# Patient Record
Sex: FEMALE | Race: WHITE | NOT HISPANIC OR LATINO | Employment: OTHER | URBAN - METROPOLITAN AREA
[De-identification: names, ages, dates, MRNs, and addresses within clinical notes are randomized per-mention and may not be internally consistent; named-entity substitution may affect disease eponyms.]

---

## 2017-03-13 ENCOUNTER — HOSPITAL ENCOUNTER (OUTPATIENT)
Dept: LAB | Facility: MEDICAL CENTER | Age: 77
End: 2017-03-13
Attending: INTERNAL MEDICINE
Payer: MEDICARE

## 2017-03-13 LAB
25(OH)D3 SERPL-MCNC: 36 NG/ML (ref 30–100)
ALBUMIN SERPL BCP-MCNC: 3.8 G/DL (ref 3.2–4.9)
ALBUMIN/GLOB SERPL: 1.3 G/DL
ALP SERPL-CCNC: 87 U/L (ref 30–99)
ALT SERPL-CCNC: 13 U/L (ref 2–50)
AMORPHOUS CRYSTALS 1764: PRESENT /HPF
ANION GAP SERPL CALC-SCNC: 10 MMOL/L (ref 0–11.9)
APPEARANCE UR: ABNORMAL
AST SERPL-CCNC: 17 U/L (ref 12–45)
BACTERIA #/AREA URNS HPF: ABNORMAL /HPF
BILIRUB SERPL-MCNC: 0.8 MG/DL (ref 0.1–1.5)
BILIRUB UR QL STRIP.AUTO: NEGATIVE
BUN SERPL-MCNC: 12 MG/DL (ref 8–22)
CALCIUM SERPL-MCNC: 9.4 MG/DL (ref 8.5–10.5)
CHLORIDE SERPL-SCNC: 106 MMOL/L (ref 96–112)
CHOLEST SERPL-MCNC: 153 MG/DL (ref 100–199)
CO2 SERPL-SCNC: 26 MMOL/L (ref 20–33)
COLOR UR AUTO: YELLOW
CREAT SERPL-MCNC: 0.62 MG/DL (ref 0.5–1.4)
CULTURE IF INDICATED INDCX: YES UA CULTURE
EPITHELIAL CELLS 1715: ABNORMAL /HPF
GLOBULIN SER CALC-MCNC: 3 G/DL (ref 1.9–3.5)
GLUCOSE SERPL-MCNC: 121 MG/DL (ref 65–99)
GLUCOSE UR STRIP.AUTO-MCNC: NEGATIVE MG/DL
HDLC SERPL-MCNC: 56 MG/DL
KETONES UR STRIP.AUTO-MCNC: NEGATIVE MG/DL
LDLC SERPL CALC-MCNC: 75 MG/DL
LEUKOCYTE ESTERASE UR QL STRIP.AUTO: ABNORMAL
MICRO URNS: ABNORMAL
NITRITE UR QL STRIP.AUTO: POSITIVE
PH UR: 6.5 [PH]
POTASSIUM SERPL-SCNC: 3.7 MMOL/L (ref 3.6–5.5)
PROT SERPL-MCNC: 6.8 G/DL (ref 6–8.2)
PROT UR QL STRIP: NEGATIVE MG/DL
RBC #/AREA URNS HPF: ABNORMAL /HPF
RBC UR QL AUTO: ABNORMAL
SODIUM SERPL-SCNC: 142 MMOL/L (ref 135–145)
SP GR UR STRIP.AUTO: 1.01
TRIGL SERPL-MCNC: 110 MG/DL (ref 0–149)
WBC #/AREA URNS HPF: ABNORMAL /HPF

## 2017-03-13 PROCEDURE — 81001 URINALYSIS AUTO W/SCOPE: CPT

## 2017-03-13 PROCEDURE — 87186 SC STD MICRODIL/AGAR DIL: CPT

## 2017-03-13 PROCEDURE — 82306 VITAMIN D 25 HYDROXY: CPT | Mod: GA

## 2017-03-13 PROCEDURE — 87086 URINE CULTURE/COLONY COUNT: CPT

## 2017-03-13 PROCEDURE — 80053 COMPREHEN METABOLIC PANEL: CPT

## 2017-03-13 PROCEDURE — 87077 CULTURE AEROBIC IDENTIFY: CPT

## 2017-03-13 PROCEDURE — 80061 LIPID PANEL: CPT

## 2017-03-13 PROCEDURE — 36415 COLL VENOUS BLD VENIPUNCTURE: CPT

## 2017-03-15 LAB
BACTERIA UR CULT: ABNORMAL
SIGNIFICANT IND 70042: ABNORMAL
SOURCE SOURCE: ABNORMAL

## 2017-03-17 ENCOUNTER — OFFICE VISIT (OUTPATIENT)
Dept: URGENT CARE | Facility: CLINIC | Age: 77
End: 2017-03-17
Payer: MEDICARE

## 2017-03-17 VITALS
TEMPERATURE: 98.4 F | HEART RATE: 78 BPM | OXYGEN SATURATION: 95 % | WEIGHT: 180 LBS | BODY MASS INDEX: 27.38 KG/M2 | SYSTOLIC BLOOD PRESSURE: 110 MMHG | RESPIRATION RATE: 16 BRPM | DIASTOLIC BLOOD PRESSURE: 70 MMHG

## 2017-03-17 DIAGNOSIS — J40 BRONCHITIS: ICD-10-CM

## 2017-03-17 PROCEDURE — 1036F TOBACCO NON-USER: CPT | Performed by: PHYSICIAN ASSISTANT

## 2017-03-17 PROCEDURE — G8420 CALC BMI NORM PARAMETERS: HCPCS | Performed by: PHYSICIAN ASSISTANT

## 2017-03-17 PROCEDURE — 4040F PNEUMOC VAC/ADMIN/RCVD: CPT | Mod: 8P | Performed by: PHYSICIAN ASSISTANT

## 2017-03-17 PROCEDURE — G8432 DEP SCR NOT DOC, RNG: HCPCS | Performed by: PHYSICIAN ASSISTANT

## 2017-03-17 PROCEDURE — G8484 FLU IMMUNIZE NO ADMIN: HCPCS | Performed by: PHYSICIAN ASSISTANT

## 2017-03-17 PROCEDURE — 99214 OFFICE O/P EST MOD 30 MIN: CPT | Performed by: PHYSICIAN ASSISTANT

## 2017-03-17 PROCEDURE — 1101F PT FALLS ASSESS-DOCD LE1/YR: CPT | Mod: 8P | Performed by: PHYSICIAN ASSISTANT

## 2017-03-17 RX ORDER — BENZONATATE 200 MG/1
200 CAPSULE ORAL 3 TIMES DAILY PRN
Qty: 30 CAP | Refills: 0 | Status: SHIPPED | OUTPATIENT
Start: 2017-03-17 | End: 2019-07-02

## 2017-03-17 RX ORDER — CODEINE PHOSPHATE AND GUAIFENESIN 10; 100 MG/5ML; MG/5ML
5 SOLUTION ORAL EVERY 4 HOURS PRN
Qty: 100 ML | Refills: 0 | Status: SHIPPED | OUTPATIENT
Start: 2017-03-17 | End: 2019-07-02

## 2017-03-17 ASSESSMENT — ENCOUNTER SYMPTOMS
PALPITATIONS: 0
SPUTUM PRODUCTION: 1
HEMOPTYSIS: 0
SHORTNESS OF BREATH: 0
SORE THROAT: 1
WHEEZING: 0
FEVER: 0
COUGH: 1
CHILLS: 0

## 2017-03-17 ASSESSMENT — COPD QUESTIONNAIRES: COPD: 0

## 2017-03-17 NOTE — MR AVS SNAPSHOT
Aaliyah Mijares Donaldo   3/17/2017 11:30 AM   Office Visit   MRN: [id number]    Department:  [facility]   Dept Phone:  [phone]    Description:  Female : 1940   Provider:  Pawel Salgado PA-C           Reason for Visit     Cough x 3 days, productive cough, chest congestion      Allergies as of 3/17/2017     No Known Allergies      You were diagnosed with     Bronchitis   [[id number]]         Vital Signs     Blood Pressure Pulse Temperature Respirations Weight Oxygen Saturation    110/70 mmHg 78 36.9 °C (98.4 °F) 16 81.647 kg (180 lb) 95%    Smoking Status                   Never Smoker            Basic Information     Date Of Birth Sex Race Ethnicity Preferred Language    1940 Female White Non- English      Problem List              ICD-10-CM Priority Class Noted - Resolved    Thoracic kyphosis M40.204   10/17/2012 - Present    S/P laminectomy Z98.890   10/17/2012 - Present    OA (osteoarthritis) of knee M17.9   2013 - Present    Hand arthritis M12.9   3/4/2015 - Present    Depression F32.9   3/4/2015 - Present    Breast cancer in situ D05.90   3/4/2015 - Present      Health Maintenance        Date Due Completion Dates    IMM DTaP/Tdap/Td Vaccine (1 - Tdap) 3/26/1957 ---    PAP SMEAR 3/26/1959 ---    IMM ZOSTER VACCINE 3/26/1998 ---    BONE DENSITY 3/26/2003 ---    IMM PNEUMOCOCCAL 65+ (ADULT) LOW/MEDIUM RISK SERIES (1 of 2 - PCV13) 3/26/2003 ---    IMM INFLUENZA (1) 2016 11/3/2014    MAMMOGRAM 2017, 2016, 2015, 2014, 2013, 2012, 3/25/2011, 3/8/2010, 2010, 2010, 2009, 2009, 2006, 10/21/2005, 10/13/2005, 10/12/2004    COLONOSCOPY 2024 (Prv Comp)    Override on 2014: Previously completed            Current Immunizations     Influenza Vaccine Adult HD 11/3/2014      Below and/or attached are the medications your provider expects you to take. Review all of your home medications and newly ordered  medications with your provider and/or pharmacist. Follow medication instructions as directed by your provider and/or pharmacist. Please keep your medication list with you and share with your provider. Update the information when medications are discontinued, doses are changed, or new medications (including over-the-counter products) are added; and carry medication information at all times in the event of emergency situations     Allergies:  No Known Allergies          Medications  Valid as of: March 17, 2017 - 12:33 PM    Generic Name Brand Name Tablet Size Instructions for use    Albuterol   Inhale  by mouth as needed.        Atorvastatin Calcium (Tab) LIPITOR 10 MG Take 10 mg by mouth every evening.        Benzonatate (Cap) TESSALON 200 MG Take 1 Cap by mouth 3 times a day as needed for Cough.        Calcium Carbonate-Vit D-Min   Take  by mouth 2 Times a Day.        Guaifenesin-Codeine (Solution) ROBITUSSIN -10 mg/5mL Take 5 mL by mouth every four hours as needed for Cough.        Meclizine HCl (Tab) ANTIVERT 25 MG Take 1 Tab by mouth 3 times a day as needed for Dizziness or Vertigo.        Multiple Vitamin   Take 1 Tab by mouth every day.        Multiple Vitamins-Minerals   Take  by mouth every day.        Omega-3 Fatty Acids (Cap) OMEGA 3 FA 1000 MG Take 1,000 mg by mouth every day.        Omeprazole Magnesium   Take  by mouth every day.        Oxybutynin Chloride (TABLET SR 24 HR) DITROPAN-XL 10 MG TAKE ONE TABLET BY MOUTH ONE TIME DAILY        Oxycodone-Acetaminophen (Tab) PERCOCET 5-325 MG Take 1-2 Tabs by mouth every four hours as needed (pain).        TraMADol HCl (Tab) ULTRAM 50 MG Take  mg by mouth every four hours as needed.        Valsartan-Hydrochlorothiazide (Tab) DIOVAN-HCT 80-12.5 MG TAKE 1 TABLET EVERY DAY        Venlafaxine HCl (CAPSULE SR 24 HR) EFFEXOR- MG TAKE 1 CAPSULE EVERY DAY        .                 Medicines prescribed today were sent to:     [facility] [place]  - [address]    [address] [place] [address]    Phone: [phone] Fax: [phone]    Open 24 Hours?: No    [facility] - [place], [place] - [address]    [address] [address]    Phone: [phone] Fax: [phone]    Open 24 Hours?: No      Medication refill instructions:       If your prescription bottle indicates you have medication refills left, it is not necessary to call your provider’s office. Please contact your pharmacy and they will refill your medication.    If your prescription bottle indicates you do not have any refills left, you may request refills at any time through one of the following ways: The online [employer] system (except Urgent Care), by calling your provider’s office, or by asking your pharmacy to contact your provider’s office with a refill request. Medication refills are processed only during regular business hours and may not be available until the next business day. Your provider may request additional information or to have a follow-up visit with you prior to refilling your medication.   *Please Note: Medication refills are assigned a new Rx number when refilled electronically. Your pharmacy may indicate that no refills were authorized even though a new prescription for the same medication is available at the pharmacy. Please request the medicine by name with the pharmacy before contacting your provider for a refill.        Instructions    Bronchitis  Bronchitis is the body's way of reacting to injury and/or infection (inflammation) of the bronchi. Bronchi are the air tubes that extend from the windpipe into the lungs. If the inflammation becomes severe, it may cause shortness of breath.  CAUSES   Inflammation may be caused by:  · A virus.  · Germs (bacteria).  · Dust.  · Allergens.  · Pollutants and many other irritants.  The cells lining the bronchial tree are covered with tiny hairs (cilia). These constantly beat upward, away from the  lungs, toward the mouth. This keeps the lungs free of pollutants. When these cells become too irritated and are unable to do their job, mucus begins to develop. This causes the characteristic cough of bronchitis. The cough clears the lungs when the cilia are unable to do their job. Without either of these protective mechanisms, the mucus would settle in the lungs. Then you would develop pneumonia.  Smoking is a common cause of bronchitis and can contribute to pneumonia. Stopping this habit is the single most important thing you can do to help yourself.  TREATMENT   · Your caregiver may prescribe an antibiotic if the cough is caused by bacteria. Also, medicines that open up your airways make it easier to breathe. Your caregiver may also recommend or prescribe an expectorant. It will loosen the mucus to be coughed up. Only take over-the-counter or prescription medicines for pain, discomfort, or fever as directed by your caregiver.  · Removing whatever causes the problem (smoking, for example) is critical to preventing the problem from getting worse.  · Cough suppressants may be prescribed for relief of cough symptoms.  · Inhaled medicines may be prescribed to help with symptoms now and to help prevent problems from returning.  · For those with recurrent (chronic) bronchitis, there may be a need for steroid medicines.  SEEK IMMEDIATE MEDICAL CARE IF:   · During treatment, you develop more pus-like mucus (purulent sputum).  · You have a fever.  · Your baby is older than 3 months with a rectal temperature of 102° F (38.9° C) or higher.  · Your baby is 3 months old or younger with a rectal temperature of 100.4° F (38° C) or higher.  · You become progressively more ill.  · You have increased difficulty breathing, wheezing, or shortness of breath.  It is necessary to seek immediate medical care if you are elderly or sick from any other disease.  MAKE SURE YOU:   · Understand these instructions.  · Will watch your  condition.  · Will get help right away if you are not doing well or get worse.  Document Released: 12/18/2006 Document Revised: 03/11/2013 Document Reviewed: 10/27/2009  ExitCare® Patient Information ©2014 [employer].            MyChart Access Code: Activation code not generated  Current MyChart Status: Active

## 2017-03-17 NOTE — PATIENT INSTRUCTIONS

## 2017-03-17 NOTE — PROGRESS NOTES
Subjective:      Aaliyah Fulton is a 76 y.o. female who presents with Cough            Cough  This is a new problem. Episode onset: 3 days ago\ The problem has been unchanged. The cough is productive of sputum. Associated symptoms include a sore throat. Pertinent negatives include no chest pain, chills, ear pain, fever, hemoptysis, shortness of breath or wheezing. Nothing aggravates the symptoms. She has tried nothing for the symptoms. There is no history of asthma or COPD.       Review of Systems   Constitutional: Positive for malaise/fatigue. Negative for fever and chills.   HENT: Positive for sore throat. Negative for ear pain.    Respiratory: Positive for cough and sputum production. Negative for hemoptysis, shortness of breath and wheezing.    Cardiovascular: Negative for chest pain and palpitations.     All other systems reviewed and are negative.  PMH:  has a past medical history of High cholesterol; Arthritis; Unspecified urinary incontinence; Hypertension; Anesthesia; Pain; Heart burn; Indigestion; Dental disorder; ASTHMA; Psychiatric problem; Cancer (CMS-[facility]) (2010); Breast cancer (CMS-HCC); Osteoporosis; Pneumonia; and Cataract.  MEDS:   Current outpatient prescriptions:   •  guaifenesin-codeine (CHERATUSSIN AC) Solution oral solution, Take 5 mL by mouth every four hours as needed for Cough., Disp: 100 mL, Rfl: 0  •  benzonatate (TESSALON) 200 MG capsule, Take 1 Cap by mouth 3 times a day as needed for Cough., Disp: 30 Cap, Rfl: 0  •  oxybutynin SR (DITROPAN-XL) 10 MG CR tablet, TAKE ONE TABLET BY MOUTH ONE TIME DAILY, Disp: 30 Tab, Rfl: 3  •  tramadol (ULTRAM) 50 MG Tab, Take  mg by mouth every four hours as needed., Disp: , Rfl:   •  atorvastatin (LIPITOR) 10 MG TABS, Take 10 mg by mouth every evening., Disp: 90 Tab, Rfl: 3  •  Albuterol (PROVENTIL INH), Inhale  by mouth as needed., Disp: , Rfl:   •  Multiple Vitamin (MULTIVITAMINS PO), Take 1 Tab by mouth every day., Disp: , Rfl:   •  Multiple  Vitamins-Minerals (OCUVITE PO), Take  by mouth every day., Disp: , Rfl:   •  Calcium Carbonate-Vit D-Min (CALCIUM 1200 PO), Take  by mouth 2 Times a Day., Disp: , Rfl:   •  docosahexanoic acid (OMEGA-3) 1000 MG CAPS, Take 1,000 mg by mouth every day., Disp: , Rfl:   •  Omeprazole Magnesium (PRILOSEC OTC PO), Take  by mouth every day., Disp: , Rfl:   •  venlafaxine (EFFEXOR-XR) 150 MG extended-release capsule, TAKE 1 CAPSULE EVERY DAY, Disp: 90 Cap, Rfl: 2  •  valsartan-hydrochlorothiazide (DIOVAN-HCT) 80-12.5 MG per tablet, TAKE 1 TABLET EVERY DAY, Disp: 90 Tab, Rfl: 3  •  oxycodone-acetaminophen (PERCOCET) 5-325 MG Tab, Take 1-2 Tabs by mouth every four hours as needed (pain)., Disp: 10 Tab, Rfl: 0  •  meclizine (ANTIVERT) 25 MG TABS, Take 1 Tab by mouth 3 times a day as needed for Dizziness or Vertigo., Disp: 30 Tab, Rfl: 1  ALLERGIES: No Known Allergies  SURGHX:   Past Surgical History   Procedure Laterality Date   • Nila by laparoscopy     • Sinuscopy     • Breast biopsy  5/21/2010     Performed by ROB TOMAS at SURGERY SAME DAY [facility]   • Other       rt breast lumpectomy   • Other orthopedic surgery       foot, shoulder, and knee   • Knee arthroplasty total  9/19/2011     Performed by KISHAN CARDENAS at SURGERY [facility] ORS   • Lumbar laminectomy diskectomy  10/17/2012     Performed by Daksha Melendez M.D. at SURGERY [facility] ORS   • Lumbar decompression  10/17/2012     Performed by Daksha Melendez M.D. at SURGERY [facility] ORS   • Foraminotomy  10/17/2012     Performed by Daksha Melendez M.D. at SURGERY [facility]   • Pr radiation therapy plan simple     • Lumpectomy     • Recovery  5/10/2016     Procedure: [id number];  Surgeon: Recoveryonly Surgery;  [place] [facility] [place]  Service:      SOCHX:  reports that she has never smoked. She does not have any smokeless tobacco history on file. She reports that she does not drink alcohol or use  illicit drugs.  FH: Family history was reviewed, no pertinent findings to report  Medications, Allergies, and current problem list reviewed today in Epic       Objective:     /70 mmHg  Pulse 78  Temp(Src) 36.9 °C (98.4 °F)  Resp 16  Wt 81.647 kg (180 lb)  SpO2 95%     Physical Exam   Constitutional: She is oriented to person, place, and time. She appears well-developed and well-nourished.   HENT:   Head: Normocephalic and atraumatic.   Right Ear: Hearing, tympanic membrane, external ear and ear canal normal.   Left Ear: Hearing, tympanic membrane, external ear and ear canal normal.   Nose: Nose normal.   Mouth/Throat: Uvula is midline, oropharynx is clear and moist and mucous membranes are normal.   Neck: Normal range of motion. Neck supple.   Cardiovascular: Normal rate, regular rhythm and normal heart sounds.  Exam reveals no gallop and no friction rub.    No murmur heard.  Pulmonary/Chest: Effort normal and breath sounds normal. No accessory muscle usage. No apnea, no tachypnea and no bradypnea. No respiratory distress. She has no decreased breath sounds. She has no wheezes. She has no rhonchi. She has no rales. She exhibits no tenderness.   Neurological: She is alert and oriented to person, place, and time.   Skin: Skin is warm and dry.   Psychiatric: She has a normal mood and affect. Her behavior is normal. Judgment and thought content normal.   Vitals reviewed.              Assessment/Plan:     1. Bronchitis    - guaifenesin-codeine (CHERATUSSIN AC) Solution oral solution; Take 5 mL by mouth every four hours as needed for Cough.  Dispense: 100 mL; Refill: 0  - benzonatate (TESSALON) 200 MG capsule; Take 1 Cap by mouth 3 times a day as needed for Cough.  Dispense: 30 Cap; Refill: 0    Differential diagnosis, natural history, supportive care, and indications for immediate follow-up discussed at length.   Follow-up with primary care provider within 4-5 days, emergency room precautions discussed.   Patient and/or family appears understanding of information.

## 2017-05-17 ENCOUNTER — HOSPITAL ENCOUNTER (OUTPATIENT)
Dept: RADIOLOGY | Facility: MEDICAL CENTER | Age: 77
End: 2017-05-17
Attending: INTERNAL MEDICINE
Payer: MEDICARE

## 2017-05-17 DIAGNOSIS — R92.8 ABNORMAL MAMMOGRAM: ICD-10-CM

## 2017-05-17 PROCEDURE — G0204 DX MAMMO INCL CAD BI: HCPCS

## 2017-10-26 ENCOUNTER — HOSPITAL ENCOUNTER (OUTPATIENT)
Dept: LAB | Facility: MEDICAL CENTER | Age: 77
End: 2017-10-26
Attending: INTERNAL MEDICINE
Payer: MEDICARE

## 2017-10-26 LAB
BASOPHILS # BLD AUTO: 1.6 % (ref 0–1.8)
BASOPHILS # BLD: 0.1 K/UL (ref 0–0.12)
EOSINOPHIL # BLD AUTO: 0.57 K/UL (ref 0–0.51)
EOSINOPHIL NFR BLD: 9.3 % (ref 0–6.9)
ERYTHROCYTE [DISTWIDTH] IN BLOOD BY AUTOMATED COUNT: 45.1 FL (ref 35.9–50)
ERYTHROCYTE [SEDIMENTATION RATE] IN BLOOD BY WESTERGREN METHOD: 22 MM/HOUR (ref 0–30)
FOLATE SERPL-MCNC: >23.2 NG/ML
HCT VFR BLD AUTO: 44.1 % (ref 37–47)
HGB BLD-MCNC: 14.5 G/DL (ref 12–16)
IMM GRANULOCYTES # BLD AUTO: 0.01 K/UL (ref 0–0.11)
IMM GRANULOCYTES NFR BLD AUTO: 0.2 % (ref 0–0.9)
LYMPHOCYTES # BLD AUTO: 1.87 K/UL (ref 1–4.8)
LYMPHOCYTES NFR BLD: 30.7 % (ref 22–41)
MCH RBC QN AUTO: 31.5 PG (ref 27–33)
MCHC RBC AUTO-ENTMCNC: 32.9 G/DL (ref 33.6–35)
MCV RBC AUTO: 95.9 FL (ref 81.4–97.8)
MONOCYTES # BLD AUTO: 0.58 K/UL (ref 0–0.85)
MONOCYTES NFR BLD AUTO: 9.5 % (ref 0–13.4)
NEUTROPHILS # BLD AUTO: 2.97 K/UL (ref 2–7.15)
NEUTROPHILS NFR BLD: 48.7 % (ref 44–72)
NRBC # BLD AUTO: 0 K/UL
NRBC BLD AUTO-RTO: 0 /100 WBC
PLATELET # BLD AUTO: 208 K/UL (ref 164–446)
PMV BLD AUTO: 11.8 FL (ref 9–12.9)
RBC # BLD AUTO: 4.6 M/UL (ref 4.2–5.4)
TREPONEMA PALLIDUM IGG+IGM AB [PRESENCE] IN SERUM OR PLASMA BY IMMUNOASSAY: NON REACTIVE
TSH SERPL DL<=0.005 MIU/L-ACNC: 1.6 UIU/ML (ref 0.3–3.7)
VIT B12 SERPL-MCNC: 712 PG/ML (ref 211–911)
WBC # BLD AUTO: 6.1 K/UL (ref 4.8–10.8)

## 2017-10-26 PROCEDURE — 36415 COLL VENOUS BLD VENIPUNCTURE: CPT

## 2017-10-26 PROCEDURE — 84443 ASSAY THYROID STIM HORMONE: CPT

## 2017-10-26 PROCEDURE — 85025 COMPLETE CBC W/AUTO DIFF WBC: CPT

## 2017-10-26 PROCEDURE — 85652 RBC SED RATE AUTOMATED: CPT

## 2017-10-26 PROCEDURE — 86780 TREPONEMA PALLIDUM: CPT | Mod: GA

## 2017-10-26 PROCEDURE — 82607 VITAMIN B-12: CPT

## 2017-10-26 PROCEDURE — 82746 ASSAY OF FOLIC ACID SERUM: CPT

## 2018-03-15 ENCOUNTER — HOSPITAL ENCOUNTER (OUTPATIENT)
Dept: LAB | Facility: MEDICAL CENTER | Age: 78
End: 2018-03-15
Attending: INTERNAL MEDICINE
Payer: MEDICARE

## 2018-03-15 LAB
ALBUMIN SERPL BCP-MCNC: 4.2 G/DL (ref 3.2–4.9)
ALBUMIN/GLOB SERPL: 1.4 G/DL
ALP SERPL-CCNC: 80 U/L (ref 30–99)
ALT SERPL-CCNC: 18 U/L (ref 2–50)
ANION GAP SERPL CALC-SCNC: 8 MMOL/L (ref 0–11.9)
AST SERPL-CCNC: 20 U/L (ref 12–45)
BILIRUB SERPL-MCNC: 0.8 MG/DL (ref 0.1–1.5)
BUN SERPL-MCNC: 12 MG/DL (ref 8–22)
CALCIUM SERPL-MCNC: 9.9 MG/DL (ref 8.5–10.5)
CHLORIDE SERPL-SCNC: 104 MMOL/L (ref 96–112)
CHOLEST SERPL-MCNC: 137 MG/DL (ref 100–199)
CO2 SERPL-SCNC: 29 MMOL/L (ref 20–33)
CREAT SERPL-MCNC: 0.74 MG/DL (ref 0.5–1.4)
CREAT UR-MCNC: 112.2 MG/DL
EST. AVERAGE GLUCOSE BLD GHB EST-MCNC: 114 MG/DL
GLOBULIN SER CALC-MCNC: 3 G/DL (ref 1.9–3.5)
GLUCOSE SERPL-MCNC: 93 MG/DL (ref 65–99)
HBA1C MFR BLD: 5.6 % (ref 0–5.6)
HDLC SERPL-MCNC: 51 MG/DL
LDLC SERPL CALC-MCNC: 68 MG/DL
MICROALBUMIN UR-MCNC: 4.1 MG/DL
MICROALBUMIN/CREAT UR: 37 MG/G (ref 0–30)
POTASSIUM SERPL-SCNC: 3.8 MMOL/L (ref 3.6–5.5)
PROT SERPL-MCNC: 7.2 G/DL (ref 6–8.2)
SODIUM SERPL-SCNC: 141 MMOL/L (ref 135–145)
TRIGL SERPL-MCNC: 88 MG/DL (ref 0–149)

## 2018-03-15 PROCEDURE — 82570 ASSAY OF URINE CREATININE: CPT

## 2018-03-15 PROCEDURE — 80053 COMPREHEN METABOLIC PANEL: CPT

## 2018-03-15 PROCEDURE — 82043 UR ALBUMIN QUANTITATIVE: CPT

## 2018-03-15 PROCEDURE — 83036 HEMOGLOBIN GLYCOSYLATED A1C: CPT | Mod: GA

## 2018-03-15 PROCEDURE — 80061 LIPID PANEL: CPT

## 2018-03-15 PROCEDURE — 36415 COLL VENOUS BLD VENIPUNCTURE: CPT | Mod: GA

## 2018-04-10 ENCOUNTER — HOSPITAL ENCOUNTER (OUTPATIENT)
Dept: HOSPITAL 8 - CFH | Age: 78
Discharge: HOME | End: 2018-04-10
Attending: INTERNAL MEDICINE
Payer: MEDICARE

## 2018-04-10 DIAGNOSIS — J02.9: Primary | ICD-10-CM

## 2018-04-10 DIAGNOSIS — R53.83: ICD-10-CM

## 2018-04-10 PROCEDURE — 71046 X-RAY EXAM CHEST 2 VIEWS: CPT

## 2018-04-29 ENCOUNTER — HOSPITAL ENCOUNTER (EMERGENCY)
Facility: MEDICAL CENTER | Age: 78
End: 2018-04-30
Attending: EMERGENCY MEDICINE
Payer: MEDICARE

## 2018-04-29 ENCOUNTER — APPOINTMENT (OUTPATIENT)
Dept: RADIOLOGY | Facility: MEDICAL CENTER | Age: 78
End: 2018-04-29
Attending: EMERGENCY MEDICINE
Payer: MEDICARE

## 2018-04-29 DIAGNOSIS — R05.9 COUGH: ICD-10-CM

## 2018-04-29 LAB
FLUAV RNA SPEC QL NAA+PROBE: NEGATIVE
FLUBV RNA SPEC QL NAA+PROBE: NEGATIVE

## 2018-04-29 PROCEDURE — 700102 HCHG RX REV CODE 250 W/ 637 OVERRIDE(OP): Performed by: EMERGENCY MEDICINE

## 2018-04-29 PROCEDURE — 700101 HCHG RX REV CODE 250: Performed by: EMERGENCY MEDICINE

## 2018-04-29 PROCEDURE — A9270 NON-COVERED ITEM OR SERVICE: HCPCS | Performed by: EMERGENCY MEDICINE

## 2018-04-29 PROCEDURE — 87502 INFLUENZA DNA AMP PROBE: CPT

## 2018-04-29 PROCEDURE — 94640 AIRWAY INHALATION TREATMENT: CPT

## 2018-04-29 PROCEDURE — 99284 EMERGENCY DEPT VISIT MOD MDM: CPT

## 2018-04-29 PROCEDURE — 71046 X-RAY EXAM CHEST 2 VIEWS: CPT

## 2018-04-29 RX ORDER — LEVOFLOXACIN 750 MG/1
750 TABLET, FILM COATED ORAL ONCE
Status: DISCONTINUED | OUTPATIENT
Start: 2018-04-30 | End: 2018-04-29

## 2018-04-29 RX ORDER — AMOXICILLIN 500 MG/1
1000 CAPSULE ORAL 3 TIMES DAILY
Qty: 30 CAP | Refills: 0 | Status: SHIPPED | OUTPATIENT
Start: 2018-04-29 | End: 2018-05-06

## 2018-04-29 RX ORDER — AZITHROMYCIN 250 MG/1
500 TABLET, FILM COATED ORAL ONCE
Status: COMPLETED | OUTPATIENT
Start: 2018-04-30 | End: 2018-04-29

## 2018-04-29 RX ORDER — AZITHROMYCIN 250 MG/1
250 TABLET, FILM COATED ORAL DAILY
Qty: 4 TAB | Refills: 0 | Status: SHIPPED | OUTPATIENT
Start: 2018-04-29 | End: 2018-05-03

## 2018-04-29 RX ORDER — AMOXICILLIN 500 MG/1
1000 CAPSULE ORAL ONCE
Status: COMPLETED | OUTPATIENT
Start: 2018-04-30 | End: 2018-04-29

## 2018-04-29 RX ADMIN — AMOXICILLIN 1000 MG: 500 CAPSULE ORAL at 23:50

## 2018-04-29 RX ADMIN — ALBUTEROL SULFATE 2.5 MG: 2.5 SOLUTION RESPIRATORY (INHALATION) at 23:05

## 2018-04-29 RX ADMIN — AZITHROMYCIN 500 MG: 250 TABLET, FILM COATED ORAL at 23:50

## 2018-04-29 ASSESSMENT — PAIN SCALES - GENERAL: PAINLEVEL_OUTOF10: 7

## 2018-04-30 VITALS
HEART RATE: 90 BPM | SYSTOLIC BLOOD PRESSURE: 135 MMHG | RESPIRATION RATE: 14 BRPM | OXYGEN SATURATION: 95 % | HEIGHT: 68 IN | DIASTOLIC BLOOD PRESSURE: 60 MMHG | WEIGHT: 177.25 LBS | BODY MASS INDEX: 26.86 KG/M2 | TEMPERATURE: 99.6 F

## 2018-04-30 NOTE — ED NOTES
.All lines and monitors discontinued. Discharge instructions given, questions answered.    ambulated out of ER, escorted by Rn.  Instructed not to drive after taking pain medication and pt verbalizes understanding.  Rx x 2 given.

## 2018-04-30 NOTE — ED NOTES
[place] fall assessment completed. Pt is High risk for fall. Interventions complete. Pt placed in yellow non slip socks, wrist band placed, green sign on door. Bed locked in low position, call light in place. Personal possessions in place.  Personal needs assessed. Safety assessed. Will monitor frequently.

## 2018-04-30 NOTE — ED PROVIDER NOTES
CHIEF COMPLAINT  Chief Complaint   Patient presents with   • Flu Like Symptoms   • Body Aches   • Fever       HPI  Aaliyah Fulton is a 78 y.o. female who presents for cough, subjective fever, body aches for approximately 3-4 days. Patient notes she was treated approximately 2 weeks ago for the acquired pneumonia by her primary care physician. She states she felt better for a few days after the antibiotics but symptoms returned. She denies any nausea, vomiting, diarrhea, abdominal pain, or chest pain.      REVIEW OF SYSTEMS  Constitutional: Fevers, generalized malaise  Skin: No rashes  HEENT: No diplopia or blurred vision, no eye pain, no discharge. No ear pain, ringing in ears, or decreased hearing. Rhinorrhea. No sore throat, sores, trouble swallowing, trouble speaking.  Neck: No neck pain, stiffness, or masses.  Chest: No pain or rashes  Pulm: No shortness of breath, stridor, or pain with inspiration/expiration  Gastrointestinal: No nausea, vomiting, diarrhea, constipation  Genitourinary: No pain, urgency, frequency, dysuria, hematuria, or polyuria.   Musculoskeletal: No recent trauma, pain, swelling, weakness  Neurologic: No sensory or motor changes. No confusion or disorientation.  Heme: No bleeding or bruising problems.   Immuno: No hx of recurrent infections      PAST MEDICAL HISTORY   has a past medical history of Anesthesia; Arthritis; ASTHMA; Breast cancer (HCC); Cancer (HCC) (2010); Cataract; Dental disorder; Heart burn; High cholesterol; Hypertension; Indigestion; Osteoporosis; Pain; Pneumonia; Psychiatric problem; and Unspecified urinary incontinence.    SOCIAL HISTORY  Social History     Social History Main Topics   • Smoking status: Never Smoker   • Smokeless tobacco: Never Used   • Alcohol use No      Comment: very rare   • Drug use: No   • Sexual activity: Not on file      Comment: ; two daughters; retired ( @ [employer] / [employer])       SURGICAL HISTORY   has a past  "surgical history that includes gilbert by laparoscopy; sinuscopy; breast biopsy (5/21/2010); other; other orthopedic surgery; knee arthroplasty total (9/19/2011); lumbar laminectomy diskectomy (10/17/2012); lumbar decompression (10/17/2012); foraminotomy (10/17/2012); radiation therapy plan simple; lumpectomy; and recovery (5/10/2016).    CURRENT MEDICATIONS  Home Medications    **Home medications have not yet been reviewed for this encounter**         ALLERGIES  No Known Allergies    PHYSICAL EXAM  VITAL SIGNS: /57   Pulse 84   Temp 37.6 °C (99.6 °F) (Temporal)   Resp 14   Ht 1.727 m (5' 8\")   Wt 80.4 kg (177 lb 4 oz)   SpO2 93%   BMI 26.95 kg/m²    Gen: Alert in no apparent distress.  HEENT: No signs of trauma, Bilateral external ears normal, Nose normal. Conjunctiva normal, Non-icteric. No posterior pharyngeal erythema or exudate.  Neck:  No tenderness, Supple, No masses  Lymphatic: No cervical lymphadenopathy noted.   Cardiovascular: Regular rate and rhythm  Thorax & Lungs: Scattered coarse wheezing bilaterally. No retractions.   Abdomen: Bowel sounds normal, Soft, No tenderness, No masses, No pulsatile masses. No Guarding or rebound  Skin: Warm, Dry, No erythema, No rash.   Extremities: Intact distal pulses, No edema, No tenderness  Neurologic: Alert , no facial droop, grossly normal coordination and strength. Ambulates without difficulty  Psychiatric: Affect normal, Judgment normal, Mood normal.           LABS  Results for orders placed or performed during the hospital encounter of 04/29/18   INFLUENZA A/B BY PCR   Result Value Ref Range    Influenza virus A RNA Negative Negative    Influenza virus B, PCR Negative Negative       RADIOLOGY  DX-CHEST-2 VIEWS   Final Result         1.  Hazy interstitial bilateral pulmonary opacities suggests interstitial edema or infiltrate.   2.  Atherosclerosis        Reevaluation   Time:12:03 AM  Vital signs: 93% on room air, respiratory rate 20, no apparent " distress but is coughing intermittently.  Assessment:    COURSE & MEDICAL DECISION MAKING  Pertinent Labs & Imaging studies reviewed. (See chart for details)  Patient has symptoms and findings suggestive of an atypical pneumonia although she does not appear septic or toxic. She does have borderline low oxygenation however she did not appear to have any respiratory distress and did not have a sensation of shortness of breath. She did have persistent cough but did not express any deterioration in her condition while in the emergency department. I did consider further testing and imaging however felt this would be low yield and I felt it was reasonable to treat her empirically for recurrent community-acquired pneumonia. I did consider pulmonary edema and CHF as the cause however this seems unlikely given the patient's history and lack of peripheral edema. The patient is on many different medications some of which will interact with respiratory fluoroquinolones so I chose to treat with a beta-lactam and macrolide. The patient felt with the plan for discharge and will follow up with her primary care physician next week. She stated clear to standing return instructions and will have a low threshold for returning if her symptoms worsen or change.     FINAL IMPRESSION  1. Community acquired pneumonia  2.   3.         Electronically signed by: Rod Torres, 4/29/2018 10:16 PM

## 2018-05-04 ENCOUNTER — HOSPITAL ENCOUNTER (OUTPATIENT)
Dept: HOSPITAL 8 - CFH | Age: 78
Discharge: HOME | End: 2018-05-04
Attending: INTERNAL MEDICINE
Payer: MEDICARE

## 2018-05-04 DIAGNOSIS — J18.9: Primary | ICD-10-CM

## 2018-05-04 PROCEDURE — 71046 X-RAY EXAM CHEST 2 VIEWS: CPT

## 2018-05-21 ENCOUNTER — APPOINTMENT (OUTPATIENT)
Dept: RADIOLOGY | Facility: MEDICAL CENTER | Age: 78
End: 2018-05-21
Attending: INTERNAL MEDICINE
Payer: MEDICARE

## 2018-05-23 ENCOUNTER — HOSPITAL ENCOUNTER (OUTPATIENT)
Dept: RADIOLOGY | Facility: MEDICAL CENTER | Age: 78
End: 2018-05-23
Attending: INTERNAL MEDICINE
Payer: MEDICARE

## 2018-05-23 DIAGNOSIS — Z12.31 VISIT FOR SCREENING MAMMOGRAM: ICD-10-CM

## 2018-05-23 PROCEDURE — 77063 BREAST TOMOSYNTHESIS BI: CPT

## 2018-09-17 ENCOUNTER — HOSPITAL ENCOUNTER (OUTPATIENT)
Dept: LAB | Facility: MEDICAL CENTER | Age: 78
End: 2018-09-17
Attending: INTERNAL MEDICINE
Payer: MEDICARE

## 2018-09-17 LAB
ALBUMIN SERPL BCP-MCNC: 4 G/DL (ref 3.2–4.9)
ALBUMIN/GLOB SERPL: 1.3 G/DL
ALP SERPL-CCNC: 83 U/L (ref 30–99)
ALT SERPL-CCNC: 13 U/L (ref 2–50)
ANION GAP SERPL CALC-SCNC: 7 MMOL/L (ref 0–11.9)
AST SERPL-CCNC: 19 U/L (ref 12–45)
BILIRUB SERPL-MCNC: 0.6 MG/DL (ref 0.1–1.5)
BUN SERPL-MCNC: 18 MG/DL (ref 8–22)
CALCIUM SERPL-MCNC: 9.3 MG/DL (ref 8.5–10.5)
CHLORIDE SERPL-SCNC: 105 MMOL/L (ref 96–112)
CHOLEST SERPL-MCNC: 149 MG/DL (ref 100–199)
CO2 SERPL-SCNC: 27 MMOL/L (ref 20–33)
CREAT SERPL-MCNC: 0.61 MG/DL (ref 0.5–1.4)
FASTING STATUS PATIENT QL REPORTED: NORMAL
GLOBULIN SER CALC-MCNC: 3 G/DL (ref 1.9–3.5)
GLUCOSE SERPL-MCNC: 97 MG/DL (ref 65–99)
HDLC SERPL-MCNC: 56 MG/DL
LDLC SERPL CALC-MCNC: 76 MG/DL
POTASSIUM SERPL-SCNC: 4.3 MMOL/L (ref 3.6–5.5)
PROT SERPL-MCNC: 7 G/DL (ref 6–8.2)
SODIUM SERPL-SCNC: 139 MMOL/L (ref 135–145)
TRIGL SERPL-MCNC: 85 MG/DL (ref 0–149)
TSH SERPL DL<=0.005 MIU/L-ACNC: 1.4 UIU/ML (ref 0.38–5.33)

## 2018-09-17 PROCEDURE — 36415 COLL VENOUS BLD VENIPUNCTURE: CPT

## 2018-09-17 PROCEDURE — 80061 LIPID PANEL: CPT

## 2018-09-17 PROCEDURE — 84443 ASSAY THYROID STIM HORMONE: CPT

## 2018-09-17 PROCEDURE — 80053 COMPREHEN METABOLIC PANEL: CPT

## 2019-01-15 ENCOUNTER — HOSPITAL ENCOUNTER (OUTPATIENT)
Dept: RADIOLOGY | Facility: MEDICAL CENTER | Age: 79
End: 2019-01-15
Attending: OPTOMETRIST
Payer: MEDICARE

## 2019-01-15 DIAGNOSIS — I25.10 CORONARY ATHEROSCLEROSIS DUE TO LIPID RICH PLAQUE: ICD-10-CM

## 2019-01-15 DIAGNOSIS — I25.83 CORONARY ATHEROSCLEROSIS DUE TO LIPID RICH PLAQUE: ICD-10-CM

## 2019-01-15 PROCEDURE — 93880 EXTRACRANIAL BILAT STUDY: CPT

## 2019-01-21 ENCOUNTER — HOSPITAL ENCOUNTER (OUTPATIENT)
Dept: LAB | Facility: MEDICAL CENTER | Age: 79
End: 2019-01-21
Attending: STUDENT IN AN ORGANIZED HEALTH CARE EDUCATION/TRAINING PROGRAM
Payer: MEDICARE

## 2019-01-21 ENCOUNTER — HOSPITAL ENCOUNTER (OUTPATIENT)
Dept: LAB | Facility: MEDICAL CENTER | Age: 79
End: 2019-01-21
Attending: INTERNAL MEDICINE
Payer: MEDICARE

## 2019-01-21 LAB
ALBUMIN SERPL BCP-MCNC: 4.1 G/DL (ref 3.2–4.9)
ALBUMIN/GLOB SERPL: 1.3 G/DL
ALP SERPL-CCNC: 78 U/L (ref 30–99)
ALT SERPL-CCNC: 16 U/L (ref 2–50)
ANION GAP SERPL CALC-SCNC: 6 MMOL/L (ref 0–11.9)
AST SERPL-CCNC: 18 U/L (ref 12–45)
BILIRUB SERPL-MCNC: 0.7 MG/DL (ref 0.1–1.5)
BUN SERPL-MCNC: 18 MG/DL (ref 8–22)
CALCIUM SERPL-MCNC: 9.7 MG/DL (ref 8.5–10.5)
CHLORIDE SERPL-SCNC: 108 MMOL/L (ref 96–112)
CHOLEST SERPL-MCNC: 151 MG/DL (ref 100–199)
CO2 SERPL-SCNC: 27 MMOL/L (ref 20–33)
CREAT SERPL-MCNC: 0.83 MG/DL (ref 0.5–1.4)
CRP SERPL HS-MCNC: 0.3 MG/DL (ref 0–0.75)
ERYTHROCYTE [SEDIMENTATION RATE] IN BLOOD BY WESTERGREN METHOD: 14 MM/HOUR (ref 0–30)
FASTING STATUS PATIENT QL REPORTED: NORMAL
GLOBULIN SER CALC-MCNC: 3.1 G/DL (ref 1.9–3.5)
GLUCOSE SERPL-MCNC: 104 MG/DL (ref 65–99)
HDLC SERPL-MCNC: 57 MG/DL
LDLC SERPL CALC-MCNC: 75 MG/DL
POTASSIUM SERPL-SCNC: 4.1 MMOL/L (ref 3.6–5.5)
PROT SERPL-MCNC: 7.2 G/DL (ref 6–8.2)
SODIUM SERPL-SCNC: 141 MMOL/L (ref 135–145)
TRIGL SERPL-MCNC: 94 MG/DL (ref 0–149)

## 2019-01-21 PROCEDURE — 80061 LIPID PANEL: CPT

## 2019-01-21 PROCEDURE — 36415 COLL VENOUS BLD VENIPUNCTURE: CPT

## 2019-01-21 PROCEDURE — 85652 RBC SED RATE AUTOMATED: CPT

## 2019-01-21 PROCEDURE — 86140 C-REACTIVE PROTEIN: CPT

## 2019-01-21 PROCEDURE — 80053 COMPREHEN METABOLIC PANEL: CPT

## 2019-03-04 ENCOUNTER — HOSPITAL ENCOUNTER (OUTPATIENT)
Dept: HOSPITAL 8 - CVU | Age: 79
Discharge: HOME | End: 2019-03-04
Attending: INTERNAL MEDICINE
Payer: MEDICARE

## 2019-03-04 DIAGNOSIS — I11.9: ICD-10-CM

## 2019-03-04 DIAGNOSIS — Z85.3: ICD-10-CM

## 2019-03-04 DIAGNOSIS — E78.5: ICD-10-CM

## 2019-03-04 DIAGNOSIS — I63.9: ICD-10-CM

## 2019-03-04 DIAGNOSIS — H54.61: ICD-10-CM

## 2019-03-04 DIAGNOSIS — I08.1: Primary | ICD-10-CM

## 2019-03-04 PROCEDURE — 93306 TTE W/DOPPLER COMPLETE: CPT

## 2019-07-02 ENCOUNTER — APPOINTMENT (OUTPATIENT)
Dept: RADIOLOGY | Facility: MEDICAL CENTER | Age: 79
End: 2019-07-02
Attending: EMERGENCY MEDICINE
Payer: MEDICARE

## 2019-07-02 ENCOUNTER — HOSPITAL ENCOUNTER (EMERGENCY)
Facility: MEDICAL CENTER | Age: 79
End: 2019-07-02
Attending: EMERGENCY MEDICINE
Payer: MEDICARE

## 2019-07-02 VITALS
TEMPERATURE: 97.3 F | HEART RATE: 60 BPM | BODY MASS INDEX: 27.73 KG/M2 | WEIGHT: 182.98 LBS | DIASTOLIC BLOOD PRESSURE: 62 MMHG | RESPIRATION RATE: 16 BRPM | OXYGEN SATURATION: 93 % | SYSTOLIC BLOOD PRESSURE: 132 MMHG | HEIGHT: 68 IN

## 2019-07-02 DIAGNOSIS — S09.90XA CLOSED HEAD INJURY, INITIAL ENCOUNTER: ICD-10-CM

## 2019-07-02 DIAGNOSIS — S01.81XA LACERATION OF PERIORBITAL AREA, INITIAL ENCOUNTER: ICD-10-CM

## 2019-07-02 PROCEDURE — 70450 CT HEAD/BRAIN W/O DYE: CPT

## 2019-07-02 PROCEDURE — 304217 HCHG IRRIGATION SYSTEM

## 2019-07-02 PROCEDURE — 303747 HCHG EXTRA SUTURE

## 2019-07-02 PROCEDURE — 99284 EMERGENCY DEPT VISIT MOD MDM: CPT

## 2019-07-02 PROCEDURE — 304999 HCHG REPAIR-SIMPLE/INTERMED LEVEL 1

## 2019-07-02 RX ORDER — ACETAMINOPHEN 500 MG
1500 TABLET ORAL
Status: SHIPPED | COMMUNITY
End: 2019-12-23

## 2019-07-02 RX ORDER — ATORVASTATIN CALCIUM 10 MG/1
10 TABLET, FILM COATED ORAL DAILY
Status: SHIPPED | COMMUNITY
End: 2021-11-24

## 2019-07-02 RX ORDER — OMEPRAZOLE 20 MG/1
20 CAPSULE, DELAYED RELEASE ORAL DAILY
Status: SHIPPED | COMMUNITY
End: 2019-12-23

## 2019-07-02 NOTE — ED PROVIDER NOTES
"ED Provider Note    CHIEF COMPLAINT  Chief Complaint   Patient presents with   • Fall     tripped on curb  laceration left eyebrow  No LOC  No vomiing       HPI  Aaliyah Fulton is a 79 y.o. female who presents for evaluation after a fall.  Patient states she tripped on a curb falling forward striking her left supraorbital area against the ground resulting in laceration.  The patient indicates she was not knocked unconscious.  Patient presents here complaining of a laceration only.  She denies: Headache, double vision, blurring of vision, neck pain, back pain, rib pain, difficulty breathing, abdominal pain, extremity pain, motor weakness or paresthesias.  There is been no recent: Fever, chills, URI symptoms, current depressive symptoms, gastrointestinal symptoms.  The patient is not on any anticoagulants.  No other complaints.    REVIEW OF SYSTEMS  See HPI for further details. Review of systems otherwise negative.     PAST MEDICAL HISTORY  Past Medical History:   Diagnosis Date   • Anesthesia     nausea   • Arthritis    • ASTHMA     CHEMICAL INDUCED   • Breast cancer (HCC)     stage 0 - Dr. Naqvi   • Cancer (HCC) 2010    right breast   • Cataract    • Dental disorder    • Heart burn    • High cholesterol    • Hypertension    • Indigestion    • Osteoporosis    • Pain     \"everywhere\"   • Pneumonia    • Psychiatric problem     depression on zoloft   • Unspecified urinary incontinence        FAMILY HISTORY  Family History   Problem Relation Age of Onset   • Lung Disease Mother         copd       SOCIAL HISTORY  Non-smoker; rare alcohol use;    SURGICAL HISTORY  Past Surgical History:   Procedure Laterality Date   • RECOVERY  5/10/2016    Procedure: [id number];  Surgeon: Recovery[facility]ia [facility];  Location: SURGERY PRE-POST PROC UNIT [facility] [place];  Service:    • LUMBAR LAMINECTOMY DISKECTOMY  10/17/2012    Performed by Daksha Melendez M.D. at SURGERY [facility] ORS   • LUMBAR DECOMPRESSION  10/17/2012    " "Performed by Daksha Melendez M.D. at SURGERY [facility] ORS   • FORAMINOTOMY  10/17/2012    Performed by Daksha Melendez M.D. at SURGERY [facility] ORS   • KNEE ARTHROPLASTY TOTAL  9/19/2011    Performed by KISHAN CARDENAS at SURGERY [facility] ORS   • BREAST BIOPSY  5/21/2010    Performed by ROB TOMAS at SURGERY SAME DAY [facility] ORS   • KYLEE BY LAPAROSCOPY     • LUMPECTOMY     • OTHER      rt breast lumpectomy   • OTHER ORTHOPEDIC SURGERY      foot, shoulder, and knee   • PB RADIATION THERAPY PLAN SIMPLE     • SINUSCOPY         CURRENT MEDICATIONS  See nurse's notes    ALLERGIES  No Known Allergies    PHYSICAL EXAM  VITAL SIGNS: /59   Pulse 68   Temp 37.1 °C (98.8 °F) (Temporal)   Resp 18   Ht 1.727 m (5' 8\")   Wt 83 kg (182 lb 15.7 oz)   SpO2 94%   BMI 27.82 kg/m²    Constitutional: 79-year-old female, awake, oriented x3  HENT: Calvarium: 3 cm laceration to the left supraorbital area, No Domínguez's sign, No racoon sign, No hemotypanum, No midface trauma, No intraoral trauma, No malocclusion;  Eyes: PERRL, EOMI,   Neck: No tenderness over the spinous processes, no step-offs; Trachea: midline; No JVD;   Cardiovascular: Normal heart rate, Normal rhythm, No murmurs, No rubs, No gallops.   Thorax & Lungs: Non tender to palpation, No palpable deformities or palpable rib fractures, No subcutaneous emphysema;Equal breath sounds, Lungs are clear to auscultation,No respiratory distress.   Abdomen: Soft, Nontender without guarding, rebound or rigidity; No left or right upper quadrant tenderness; Bowel sounds normal in quality;  Skin: Warm, Dry, No erythema, No rash.   Back: No palpable deformities; No localized tenderness over the spinous processes of the thoracic/lumbar spine;  Extremities: Intact distal pulses, No edema, No tenderness, No cyanosis, No clubbing.   Musculoskeletal: No palpable deformity upper lower extremities with full range of motion without discomfort;  Neurologic: Alert & " oriented x 3, Randall Coma Score: 15; Normal motor function, Normal sensory function, No focal deficits noted.     RADIOLOGY/PROCEDURES  CT-HEAD W/O   Final Result      1.  No acute intracranial process.      2.  Atrophy and small vessel ischemic change.      3.  Soft tissue swelling left supraorbital region.            COURSE & MEDICAL DECISION MAKING  Pertinent Labs & Imaging studies reviewed. (See chart for details)  Procedure note: The patient's laceration was prepped and draped in sterile fashion.  The skin was anesthetized with 1% lidocaine.  Wound was irrigated with normal saline and the skin was cleansed with diluted Betadine prep.  Laceration was closed with 5-0 Ethilon.  7 interrupted sutures were placed.  Wound was cleansed and dressed.  No complications.    Discussion: At this time, the patient presents for evaluation after a fall.  The patient has no symptoms to suggest syncope or seizure activity.  Imaging studies of the calvarium showed no acute ab demise.  Patient had no deterioration in neurological status.  CT scan is unremarkable for any acute ab demise.  At this time, the patient is stable for discharge.  I discussed the findings and treatment plan the patient and her daughter.  They indicate that they are comfortable with this explanation and disposition.    FINAL IMPRESSION  1. Closed head injury, initial encounter    2. Laceration of periorbital area, initial encounter        PLAN  1.  Appropriate discharge instructions given  2.  Stitches out in 5-7 days;    Electronically signed by: Guy G Gansert, 7/2/2019

## 2019-07-02 NOTE — ED NOTES
Med rec updated and complete  Allergies reviewed  Interviewed pt with daughter at bedside with permission from pt.  Pt reports no antibiotics in the last 2 weeks.

## 2019-07-02 NOTE — ED NOTES
Pt was re-evaluated by MD and is now cleared for d/c  Pt given dischg instructions  Verbally understands  D/c'ed to home in NAD w/ daughter driving home

## 2019-07-16 ENCOUNTER — HOSPITAL ENCOUNTER (OUTPATIENT)
Dept: RADIOLOGY | Facility: MEDICAL CENTER | Age: 79
End: 2019-07-16
Attending: INTERNAL MEDICINE
Payer: MEDICARE

## 2019-07-16 DIAGNOSIS — Z12.31 SCREENING MAMMOGRAM, ENCOUNTER FOR: ICD-10-CM

## 2019-07-16 PROCEDURE — 77063 BREAST TOMOSYNTHESIS BI: CPT

## 2019-09-09 ENCOUNTER — HOSPITAL ENCOUNTER (OUTPATIENT)
Dept: LAB | Facility: MEDICAL CENTER | Age: 79
End: 2019-09-09
Attending: INTERNAL MEDICINE
Payer: MEDICARE

## 2019-09-09 LAB
25(OH)D3 SERPL-MCNC: 35 NG/ML (ref 30–100)
ALBUMIN SERPL BCP-MCNC: 3.9 G/DL (ref 3.2–4.9)
ALBUMIN/GLOB SERPL: 1.2 G/DL
ALP SERPL-CCNC: 88 U/L (ref 30–99)
ALT SERPL-CCNC: 16 U/L (ref 2–50)
ANION GAP SERPL CALC-SCNC: 10 MMOL/L (ref 0–11.9)
AST SERPL-CCNC: 20 U/L (ref 12–45)
BILIRUB SERPL-MCNC: 0.5 MG/DL (ref 0.1–1.5)
BUN SERPL-MCNC: 18 MG/DL (ref 8–22)
CALCIUM SERPL-MCNC: 8.8 MG/DL (ref 8.5–10.5)
CHLORIDE SERPL-SCNC: 110 MMOL/L (ref 96–112)
CHOLEST SERPL-MCNC: 147 MG/DL (ref 100–199)
CO2 SERPL-SCNC: 23 MMOL/L (ref 20–33)
CREAT SERPL-MCNC: 0.73 MG/DL (ref 0.5–1.4)
EST. AVERAGE GLUCOSE BLD GHB EST-MCNC: 123 MG/DL
FASTING STATUS PATIENT QL REPORTED: NORMAL
GLOBULIN SER CALC-MCNC: 3.2 G/DL (ref 1.9–3.5)
GLUCOSE SERPL-MCNC: 115 MG/DL (ref 65–99)
HBA1C MFR BLD: 5.9 % (ref 0–5.6)
HDLC SERPL-MCNC: 52 MG/DL
LDLC SERPL CALC-MCNC: 68 MG/DL
POTASSIUM SERPL-SCNC: 3.9 MMOL/L (ref 3.6–5.5)
PROT SERPL-MCNC: 7.1 G/DL (ref 6–8.2)
SODIUM SERPL-SCNC: 143 MMOL/L (ref 135–145)
TRIGL SERPL-MCNC: 137 MG/DL (ref 0–149)

## 2019-09-09 PROCEDURE — 80061 LIPID PANEL: CPT

## 2019-09-09 PROCEDURE — 80053 COMPREHEN METABOLIC PANEL: CPT

## 2019-09-09 PROCEDURE — 83036 HEMOGLOBIN GLYCOSYLATED A1C: CPT | Mod: GA

## 2019-09-09 PROCEDURE — 36415 COLL VENOUS BLD VENIPUNCTURE: CPT

## 2019-09-09 PROCEDURE — 82306 VITAMIN D 25 HYDROXY: CPT

## 2019-10-23 ENCOUNTER — IMMUNIZATION (OUTPATIENT)
Dept: SOCIAL WORK | Facility: CLINIC | Age: 79
End: 2019-10-23
Payer: MEDICARE

## 2019-10-23 DIAGNOSIS — Z23 NEED FOR VACCINATION: ICD-10-CM

## 2019-10-23 PROCEDURE — 90662 IIV NO PRSV INCREASED AG IM: CPT | Performed by: REGISTERED NURSE

## 2019-10-23 PROCEDURE — G0008 ADMIN INFLUENZA VIRUS VAC: HCPCS | Performed by: REGISTERED NURSE

## 2019-12-23 ENCOUNTER — APPOINTMENT (OUTPATIENT)
Dept: RADIOLOGY | Facility: MEDICAL CENTER | Age: 79
End: 2019-12-23
Attending: EMERGENCY MEDICINE
Payer: MEDICARE

## 2019-12-23 ENCOUNTER — HOSPITAL ENCOUNTER (EMERGENCY)
Facility: MEDICAL CENTER | Age: 79
End: 2019-12-23
Attending: EMERGENCY MEDICINE
Payer: MEDICARE

## 2019-12-23 VITALS
HEIGHT: 68 IN | BODY MASS INDEX: 28.03 KG/M2 | RESPIRATION RATE: 18 BRPM | TEMPERATURE: 97.8 F | SYSTOLIC BLOOD PRESSURE: 175 MMHG | HEART RATE: 82 BPM | WEIGHT: 184.97 LBS | OXYGEN SATURATION: 94 % | DIASTOLIC BLOOD PRESSURE: 82 MMHG

## 2019-12-23 DIAGNOSIS — M25.561 ACUTE PAIN OF RIGHT KNEE: ICD-10-CM

## 2019-12-23 DIAGNOSIS — R10.13 EPIGASTRIC ABDOMINAL PAIN: ICD-10-CM

## 2019-12-23 DIAGNOSIS — N39.0 ACUTE UTI: ICD-10-CM

## 2019-12-23 LAB
ALBUMIN SERPL BCP-MCNC: 4.1 G/DL (ref 3.2–4.9)
ALBUMIN/GLOB SERPL: 1.3 G/DL
ALP SERPL-CCNC: 93 U/L (ref 30–99)
ALT SERPL-CCNC: 14 U/L (ref 2–50)
ANION GAP SERPL CALC-SCNC: 12 MMOL/L (ref 0–11.9)
APPEARANCE UR: CLEAR
AST SERPL-CCNC: 18 U/L (ref 12–45)
BACTERIA #/AREA URNS HPF: ABNORMAL /HPF
BASOPHILS # BLD AUTO: 1 % (ref 0–1.8)
BASOPHILS # BLD: 0.08 K/UL (ref 0–0.12)
BILIRUB SERPL-MCNC: 0.4 MG/DL (ref 0.1–1.5)
BILIRUB UR QL STRIP.AUTO: NEGATIVE
BUN SERPL-MCNC: 13 MG/DL (ref 8–22)
CALCIUM SERPL-MCNC: 9.2 MG/DL (ref 8.4–10.2)
CHLORIDE SERPL-SCNC: 106 MMOL/L (ref 96–112)
CO2 SERPL-SCNC: 22 MMOL/L (ref 20–33)
COLOR UR: YELLOW
CREAT SERPL-MCNC: 0.58 MG/DL (ref 0.5–1.4)
EOSINOPHIL # BLD AUTO: 0.26 K/UL (ref 0–0.51)
EOSINOPHIL NFR BLD: 3.3 % (ref 0–6.9)
EPI CELLS #/AREA URNS HPF: ABNORMAL /HPF
ERYTHROCYTE [DISTWIDTH] IN BLOOD BY AUTOMATED COUNT: 43.8 FL (ref 35.9–50)
GLOBULIN SER CALC-MCNC: 3.2 G/DL (ref 1.9–3.5)
GLUCOSE SERPL-MCNC: 110 MG/DL (ref 65–99)
GLUCOSE UR STRIP.AUTO-MCNC: NEGATIVE MG/DL
HCT VFR BLD AUTO: 40.8 % (ref 37–47)
HGB BLD-MCNC: 13.7 G/DL (ref 12–16)
IMM GRANULOCYTES # BLD AUTO: 0.01 K/UL (ref 0–0.11)
IMM GRANULOCYTES NFR BLD AUTO: 0.1 % (ref 0–0.9)
KETONES UR STRIP.AUTO-MCNC: NEGATIVE MG/DL
LEUKOCYTE ESTERASE UR QL STRIP.AUTO: ABNORMAL
LIPASE SERPL-CCNC: 7 U/L (ref 7–58)
LYMPHOCYTES # BLD AUTO: 1.97 K/UL (ref 1–4.8)
LYMPHOCYTES NFR BLD: 25.2 % (ref 22–41)
MCH RBC QN AUTO: 32.5 PG (ref 27–33)
MCHC RBC AUTO-ENTMCNC: 33.6 G/DL (ref 33.6–35)
MCV RBC AUTO: 96.7 FL (ref 81.4–97.8)
MICRO URNS: ABNORMAL
MONOCYTES # BLD AUTO: 0.88 K/UL (ref 0–0.85)
MONOCYTES NFR BLD AUTO: 11.2 % (ref 0–13.4)
MUCOUS THREADS #/AREA URNS HPF: ABNORMAL /HPF
NEUTROPHILS # BLD AUTO: 4.63 K/UL (ref 2–7.15)
NEUTROPHILS NFR BLD: 59.2 % (ref 44–72)
NITRITE UR QL STRIP.AUTO: POSITIVE
NRBC # BLD AUTO: 0 K/UL
NRBC BLD-RTO: 0 /100 WBC
PH UR STRIP.AUTO: 7 [PH] (ref 5–8)
PLATELET # BLD AUTO: 217 K/UL (ref 164–446)
PMV BLD AUTO: 10.8 FL (ref 9–12.9)
POTASSIUM SERPL-SCNC: 3.9 MMOL/L (ref 3.6–5.5)
PROT SERPL-MCNC: 7.3 G/DL (ref 6–8.2)
PROT UR QL STRIP: NEGATIVE MG/DL
RBC # BLD AUTO: 4.22 M/UL (ref 4.2–5.4)
RBC # URNS HPF: ABNORMAL /HPF
RBC UR QL AUTO: ABNORMAL
SODIUM SERPL-SCNC: 140 MMOL/L (ref 135–145)
SP GR UR STRIP.AUTO: 1.01
WBC # BLD AUTO: 7.8 K/UL (ref 4.8–10.8)
WBC #/AREA URNS HPF: ABNORMAL /HPF

## 2019-12-23 PROCEDURE — 85025 COMPLETE CBC W/AUTO DIFF WBC: CPT

## 2019-12-23 PROCEDURE — 99284 EMERGENCY DEPT VISIT MOD MDM: CPT

## 2019-12-23 PROCEDURE — 83690 ASSAY OF LIPASE: CPT

## 2019-12-23 PROCEDURE — 93971 EXTREMITY STUDY: CPT | Mod: RT

## 2019-12-23 PROCEDURE — 93971 EXTREMITY STUDY: CPT | Mod: 26,RT | Performed by: INTERNAL MEDICINE

## 2019-12-23 PROCEDURE — 74177 CT ABD & PELVIS W/CONTRAST: CPT

## 2019-12-23 PROCEDURE — 81001 URINALYSIS AUTO W/SCOPE: CPT

## 2019-12-23 PROCEDURE — 700117 HCHG RX CONTRAST REV CODE 255: Performed by: EMERGENCY MEDICINE

## 2019-12-23 PROCEDURE — 36415 COLL VENOUS BLD VENIPUNCTURE: CPT

## 2019-12-23 PROCEDURE — 80053 COMPREHEN METABOLIC PANEL: CPT

## 2019-12-23 RX ORDER — VALSARTAN 80 MG/1
80 TABLET ORAL
Status: SHIPPED | COMMUNITY
Start: 2019-12-02 | End: 2021-11-24

## 2019-12-23 RX ORDER — VENLAFAXINE HYDROCHLORIDE 75 MG/1
75 TABLET, EXTENDED RELEASE ORAL DAILY
Status: ON HOLD | COMMUNITY
End: 2021-12-10 | Stop reason: SDUPTHER

## 2019-12-23 RX ORDER — OMEPRAZOLE 40 MG/1
40 CAPSULE, DELAYED RELEASE ORAL DAILY
Status: ON HOLD | COMMUNITY
Start: 2019-11-29 | End: 2021-12-10 | Stop reason: SDUPTHER

## 2019-12-23 RX ORDER — MELOXICAM 15 MG/1
7.5-15 TABLET ORAL DAILY
Status: SHIPPED | COMMUNITY
Start: 2019-12-10 | End: 2021-11-24

## 2019-12-23 RX ORDER — CEPHALEXIN 500 MG/1
500 CAPSULE ORAL 4 TIMES DAILY
Qty: 20 CAP | Refills: 0 | Status: SHIPPED | OUTPATIENT
Start: 2019-12-23 | End: 2021-11-24

## 2019-12-23 RX ORDER — HYDROCODONE BITARTRATE AND ACETAMINOPHEN 5; 325 MG/1; MG/1
1 TABLET ORAL EVERY 4 HOURS PRN
Qty: 16 TAB | Refills: 0 | Status: SHIPPED | OUTPATIENT
Start: 2019-12-23 | End: 2019-12-28

## 2019-12-23 RX ORDER — SERTRALINE HYDROCHLORIDE 25 MG/1
25 TABLET, FILM COATED ORAL DAILY
Status: SHIPPED | COMMUNITY
Start: 2019-10-02 | End: 2021-11-24

## 2019-12-23 RX ADMIN — IOHEXOL 100 ML: 350 INJECTION, SOLUTION INTRAVENOUS at 13:25

## 2019-12-23 NOTE — ED NOTES
ERP at bedside. Pt agrees with plan of care discussed by ERP. AIDET acknowledged with patient. Clint in low position, side rail up for pt safety. Call light within reach. Will continue to monitor.      IV established. Blood sent to lab.

## 2019-12-23 NOTE — ED PROVIDER NOTES
"ED Provider Note    ED Provider    Means of arrival: Private vehicle  History obtained from: Patient  History limited by: None    CHIEF COMPLAINT  Chief Complaint   Patient presents with   • Abdominal Pain     Pt thinks she has an \"ulcer\"; has had centralized abdominal pain for 1 month; has had ulcers before   • Leg Pain     Pt c/o pain behind right knee that started last night;        HPI  Aaliyah Fulton is a 79 y.o. female who presents to specific complaints patient complains of epigastric pain is been going on for 1 month.  It is constant, no waxing and waning, no exacerbating or alleviating symptoms.  Is mild to moderate at this time and is described as an ache.  She has had no vomiting, no diarrhea.  She has had her gallbladder removed.  She does have a history of ulcers she sees a GI specialist and is currently on Protonix.  She has had no dizziness no lightheadedness no shortness of breath or other chest pain.    Second pain is right posterior knee pain is been going on for approximately 1 week.  Pain is worse with movement of the leg.  She has no history of recent trauma.  She does have a history of knee replacement in that knee.  There is no distal numbness tingling or weakness.    REVIEW OF SYSTEMS  See HPI for further details. All other systems are negative.     PAST MEDICAL HISTORY   has a past medical history of Anesthesia, Arthritis, ASTHMA, Breast cancer (HCC), Cancer (HCC) (2010), Cataract, Dental disorder, Heart burn, High cholesterol, Hypertension, Indigestion, Osteoporosis, Pain, Pneumonia, Psychiatric problem, and Unspecified urinary incontinence.    SOCIAL HISTORY  Social History     Tobacco Use   • Smoking status: Never Smoker   • Smokeless tobacco: Never Used   Substance and Sexual Activity   • Alcohol use: No     Comment: very rare   • Drug use: No   • Sexual activity: Not on file     Comment: ; two daughters; retired ( @ [employer])       SURGICAL " "HISTORY   has a past surgical history that includes gilbert by laparoscopy; sinuscopy; breast biopsy (5/21/2010); other; other orthopedic surgery; knee arthroplasty total (9/19/2011); lumbar laminectomy diskectomy (10/17/2012); lumbar decompression (10/17/2012); foraminotomy (10/17/2012); radiation therapy plan simple; lumpectomy; and recovery (5/10/2016).    CURRENT MEDICATIONS  Home Medications     Reviewed by Tanja Jones (Pharmacy Tech) on 12/23/19 at 1223  Med List Status: Complete   Medication Last Dose Status   atorvastatin (LIPITOR) 10 MG Tab 12/23/2019 Active   meloxicam (MOBIC) 15 MG tablet 12/23/2019 Active   omeprazole (PRILOSEC) 40 MG delayed-release capsule 12/23/2019 Active   oxybutynin SR (DITROPAN-XL) 10 MG CR tablet 12/23/2019 Active   sertraline (ZOLOFT) 25 MG tablet 12/23/2019 Active   valsartan (DIOVAN) 80 MG Tab 12/23/2019 Active   venlafaxine XR (EFFEXOR XR) 37.5 MG CAPSULE SR 24 HR 12/23/2019 Active                ALLERGIES  No Known Allergies    PHYSICAL EXAM  VITAL SIGNS: BP (!) 175/82   Pulse 82   Temp 36.6 °C (97.8 °F) (Temporal)   Resp 18   Ht 1.727 m (5' 8\")   Wt 83.9 kg (184 lb 15.5 oz)   SpO2 94%   BMI 28.12 kg/m²   Constitutional: Alert in no apparent distress.  HENT: No signs of trauma, Mucous membranes are moist   Eyes:  Conjunctiva normal, Non-icteric.   Neck: Normal range of motion, No tenderness, Supple,  Lymphatic: No lymphadenopathy noted.   Cardiovascular: Regular rate and rhythm, no murmurs.   Thorax & Lungs: Normal breath sounds, No respiratory distress, No wheezing, No chest tenderness.   Abdomen: Bowel sounds normal, Soft, No tenderness, No masses, No pulsatile masses. No peritoneal signs.  Skin: Warm, Dry,Normal color  Back: No bony tenderness, No CVA tenderness.   Extremities:No edema, No tenderness, No cyanosis,    Musculoskeletal: Good range of motion in all major joints. No tenderness to palpation or major deformities noted.   Neurologic: Alert " ,Oriented x4, Normal motor function, Normal sensory function, No focal deficits noted.   Psychiatric: Affect normal, Judgment normal, Mood normal.       MEDICAL DECISION MAKING  This is a 79 y.o. female who presents with complaints as described above.  She does have mild tenderness of the posterior that he ultrasound be done to evaluate for DVT.  There is no deformity.  No recent trauma, x-rays are not indicated    She has midepigastric pain.  Her gallbladder was removed with pancreatitis and common bile duct disease is still possible.  Also concerns of aneurysm so CT will be done for evaluation of the whole area.  The patient will be medicated for discomfort.    DIAGNOSTIC STUDIES / PROCEDURES    EKG      LABS  Results for orders placed or performed during the hospital encounter of 12/23/19   URINALYSIS (UA)   Result Value Ref Range    Color Yellow     Character Clear     Specific Gravity 1.010 <1.035    Ph 7.0 5.0 - 8.0    Glucose Negative Negative mg/dL    Ketones Negative Negative mg/dL    Protein Negative Negative mg/dL    Bilirubin Negative Negative    Nitrite Positive (A) Negative    Leukocyte Esterase Small (A) Negative    Occult Blood Small (A) Negative    Micro Urine Req Microscopic    CBC WITH DIFFERENTIAL   Result Value Ref Range    WBC 7.8 4.8 - 10.8 K/uL    RBC 4.22 4.20 - 5.40 M/uL    Hemoglobin 13.7 12.0 - 16.0 g/dL    Hematocrit 40.8 37.0 - 47.0 %    MCV 96.7 81.4 - 97.8 fL    MCH 32.5 27.0 - 33.0 pg    MCHC 33.6 33.6 - 35.0 g/dL    RDW 43.8 35.9 - 50.0 fL    Platelet Count 217 164 - 446 K/uL    MPV 10.8 9.0 - 12.9 fL    Neutrophils-Polys 59.20 44.00 - 72.00 %    Lymphocytes 25.20 22.00 - 41.00 %    Monocytes 11.20 0.00 - 13.40 %    Eosinophils 3.30 0.00 - 6.90 %    Basophils 1.00 0.00 - 1.80 %    Immature Granulocytes 0.10 0.00 - 0.90 %    Nucleated RBC 0.00 /100 WBC    Neutrophils (Absolute) 4.63 2.00 - 7.15 K/uL    Lymphs (Absolute) 1.97 1.00 - 4.80 K/uL    Monos (Absolute) 0.88 (H) 0.00 - 0.85  K/uL    Eos (Absolute) 0.26 0.00 - 0.51 K/uL    Baso (Absolute) 0.08 0.00 - 0.12 K/uL    Immature Granulocytes (abs) 0.01 0.00 - 0.11 K/uL    NRBC (Absolute) 0.00 K/uL   COMP METABOLIC PANEL   Result Value Ref Range    Sodium 140 135 - 145 mmol/L    Potassium 3.9 3.6 - 5.5 mmol/L    Chloride 106 96 - 112 mmol/L    Co2 22 20 - 33 mmol/L    Anion Gap 12.0 (H) 0.0 - 11.9    Glucose 110 (H) 65 - 99 mg/dL    Bun 13 8 - 22 mg/dL    Creatinine 0.58 0.50 - 1.40 mg/dL    Calcium 9.2 8.4 - 10.2 mg/dL    AST(SGOT) 18 12 - 45 U/L    ALT(SGPT) 14 2 - 50 U/L    Alkaline Phosphatase 93 30 - 99 U/L    Total Bilirubin 0.4 0.1 - 1.5 mg/dL    Albumin 4.1 3.2 - 4.9 g/dL    Total Protein 7.3 6.0 - 8.2 g/dL    Globulin 3.2 1.9 - 3.5 g/dL    A-G Ratio 1.3 g/dL   LIPASE   Result Value Ref Range    Lipase 7 7 - 58 U/L   URINE MICROSCOPIC (W/UA)   Result Value Ref Range    WBC 20-50 (A) /hpf    RBC 5-10 (A) /hpf    Bacteria Many (A) None /hpf    Epithelial Cells Few Few /hpf    Mucous Threads Few /hpf   ESTIMATED GFR   Result Value Ref Range    GFR If African American >60 >60 mL/min/1.73 m 2    GFR If Non African American >60 >60 mL/min/1.73 m 2         RADIOLOGY  CT-ABDOMEN-PELVIS WITH   Final Result         1.  Mild biliary dilatation in this postcholecystectomy patient.   2.  No acute abnormality.   3.  Atherosclerosis.   4.  Small fat-containing periumbilical hernia.   5.  Small hiatal hernia.   6.               US-EXTREMITY VENOUS LOWER UNILAT RIGHT   Final Result          COURSE  Pertinent Labs & Imaging studies reviewed. (See chart for details)    12:15 PM - Patient seen and examined at bedside. Discussed plan of care,    Patient has an atraumatic right knee pain.  She has knee replacement there.  X-rays are not indicated.  Ultrasound shows no DVT.  She is also complaining of midepigastric discomfort is been constant for extended period of time it does not sound cardiac in nature.  CT abdomen pelvis shows no obstructive or  inflammatory process.  Lab test shows no concerns for obstructive processes, she does have a urinary tract infection she will be treated with antibiotics for this.        FINAL IMPRESSION  1. Acute pain of right knee    2. Epigastric abdominal pain    3. Acute UTI

## 2019-12-23 NOTE — ED NOTES
Back from Radiology, per CT tech she thinks IV may have infiltrated during contrast injection, prior to contrast tech states IV flushed without difficulty.  Hot compress on site at this time.

## 2019-12-23 NOTE — ED TRIAGE NOTES
"Aaliyah Fulton 79 y.o. female   Chief Complaint   Patient presents with   • Abdominal Pain     Pt thinks she has an \"ulcer\"; has had centralized abdominal pain for 1 month; has had ulcers before   • Leg Pain     Pt c/o pain behind right knee that started last night;      /64   Pulse 71   Temp 36.6 °C (97.8 °F) (Temporal)   Resp 18   Ht 1.727 m (5' 8\")   Wt 83.9 kg (184 lb 15.5 oz)   SpO2 94%   BMI 28.12 kg/m²     Pt returned to lobby and educated on triage process. Advised to notify RN with changes or concerns.   Pt amb independently with cane and guarded, shuffling gait. Pt refused offered wc.  "

## 2019-12-23 NOTE — ED NOTES
Discharge instructions provided.  Pt verbalized the understanding of discharge instructions to follow up with PCP and to return to ER if condition worsens.  Pt ambulated out of ER without difficulty.     Controlled substance informed consent has been reviewed with patient and necessary signatures.

## 2019-12-23 NOTE — DISCHARGE INSTRUCTIONS
Activity as tolerated, use pain medication as prescribed.  Please follow-up with your gastroenterologist for the abdominal pain.  Please see her primary doctor for the knee pain.

## 2020-07-24 ENCOUNTER — HOSPITAL ENCOUNTER (OUTPATIENT)
Dept: RADIOLOGY | Facility: MEDICAL CENTER | Age: 80
End: 2020-07-24
Attending: INTERNAL MEDICINE
Payer: MEDICARE

## 2020-07-24 DIAGNOSIS — Z12.31 VISIT FOR SCREENING MAMMOGRAM: ICD-10-CM

## 2020-07-24 PROCEDURE — 77067 SCR MAMMO BI INCL CAD: CPT

## 2021-01-14 DIAGNOSIS — Z23 NEED FOR VACCINATION: ICD-10-CM

## 2021-02-16 ENCOUNTER — IMMUNIZATION (OUTPATIENT)
Dept: FAMILY PLANNING/WOMEN'S HEALTH CLINIC | Facility: IMMUNIZATION CENTER | Age: 81
End: 2021-02-16
Attending: INTERNAL MEDICINE
Payer: MEDICARE

## 2021-02-16 DIAGNOSIS — Z23 ENCOUNTER FOR VACCINATION: Primary | ICD-10-CM

## 2021-02-16 DIAGNOSIS — Z23 NEED FOR VACCINATION: ICD-10-CM

## 2021-02-16 PROCEDURE — 0001A PFIZER SARS-COV-2 VACCINE: CPT

## 2021-02-16 PROCEDURE — 91300 PFIZER SARS-COV-2 VACCINE: CPT

## 2021-03-06 PROCEDURE — 0002A PFIZER SARS-COV-2 VACCINE: CPT | Performed by: ANESTHESIOLOGY

## 2021-03-06 PROCEDURE — 91300 PFIZER SARS-COV-2 VACCINE: CPT | Performed by: ANESTHESIOLOGY

## 2021-03-08 ENCOUNTER — IMMUNIZATION (OUTPATIENT)
Dept: FAMILY PLANNING/WOMEN'S HEALTH CLINIC | Facility: IMMUNIZATION CENTER | Age: 81
End: 2021-03-08
Payer: MEDICARE

## 2021-03-08 DIAGNOSIS — Z23 ENCOUNTER FOR VACCINATION: Primary | ICD-10-CM

## 2021-07-13 ENCOUNTER — HOSPITAL ENCOUNTER (OUTPATIENT)
Dept: LAB | Facility: MEDICAL CENTER | Age: 81
End: 2021-07-13
Attending: NEUROLOGICAL SURGERY
Payer: MEDICARE

## 2021-07-13 LAB
INR PPP: 0.99 (ref 0.87–1.13)
PLATELET # BLD AUTO: 267 K/UL (ref 164–446)
PROTHROMBIN TIME: 12.8 SEC (ref 12–14.6)

## 2021-07-13 PROCEDURE — 36415 COLL VENOUS BLD VENIPUNCTURE: CPT | Mod: GA

## 2021-07-13 PROCEDURE — 85049 AUTOMATED PLATELET COUNT: CPT | Mod: GA

## 2021-07-13 PROCEDURE — 85610 PROTHROMBIN TIME: CPT | Mod: GA

## 2021-07-14 ENCOUNTER — HOSPITAL ENCOUNTER (OUTPATIENT)
Dept: RADIOLOGY | Facility: MEDICAL CENTER | Age: 81
End: 2021-07-14
Attending: NEUROLOGICAL SURGERY
Payer: MEDICARE

## 2021-07-14 DIAGNOSIS — Z98.2 PRESENCE OF CEREBROSPINAL FLUID DRAINAGE DEVICE: ICD-10-CM

## 2021-07-15 ENCOUNTER — APPOINTMENT (OUTPATIENT)
Dept: RADIOLOGY | Facility: MEDICAL CENTER | Age: 81
End: 2021-07-15
Attending: NEUROLOGICAL SURGERY
Payer: MEDICARE

## 2021-07-16 ENCOUNTER — APPOINTMENT (OUTPATIENT)
Dept: RADIOLOGY | Facility: MEDICAL CENTER | Age: 81
End: 2021-07-16
Attending: NEUROLOGICAL SURGERY
Payer: MEDICARE

## 2021-07-19 ENCOUNTER — HOSPITAL ENCOUNTER (OUTPATIENT)
Dept: RADIOLOGY | Facility: MEDICAL CENTER | Age: 81
End: 2021-07-19
Attending: NEUROLOGICAL SURGERY
Payer: MEDICARE

## 2021-07-19 PROCEDURE — A9548 IN111 PENTETATE: HCPCS

## 2021-07-19 PROCEDURE — 62328 DX LMBR SPI PNXR W/FLUOR/CT: CPT

## 2021-07-19 PROCEDURE — 306637 DX-LUMBAR PUNCTURE FOR DIAGNOSIS

## 2021-07-19 PROCEDURE — 62270 DX LMBR SPI PNXR: CPT

## 2021-07-20 ENCOUNTER — HOSPITAL ENCOUNTER (OUTPATIENT)
Dept: RADIOLOGY | Facility: MEDICAL CENTER | Age: 81
End: 2021-07-20
Attending: NEUROLOGICAL SURGERY
Payer: MEDICARE

## 2021-07-21 ENCOUNTER — HOSPITAL ENCOUNTER (OUTPATIENT)
Dept: RADIOLOGY | Facility: MEDICAL CENTER | Age: 81
End: 2021-07-21
Attending: NEUROLOGICAL SURGERY
Payer: MEDICARE

## 2021-08-31 ENCOUNTER — HOSPITAL ENCOUNTER (OUTPATIENT)
Dept: RADIOLOGY | Facility: MEDICAL CENTER | Age: 81
End: 2021-08-31
Attending: INTERNAL MEDICINE
Payer: MEDICARE

## 2021-08-31 DIAGNOSIS — Z12.31 VISIT FOR SCREENING MAMMOGRAM: ICD-10-CM

## 2021-08-31 PROCEDURE — 77063 BREAST TOMOSYNTHESIS BI: CPT

## 2021-11-15 ENCOUNTER — HOSPITAL ENCOUNTER (OUTPATIENT)
Facility: MEDICAL CENTER | Age: 81
End: 2021-11-15
Attending: UROLOGY
Payer: MEDICARE

## 2021-11-15 PROCEDURE — 87086 URINE CULTURE/COLONY COUNT: CPT

## 2021-11-18 LAB
BACTERIA UR CULT: NORMAL
SIGNIFICANT IND 70042: NORMAL
SITE SITE: NORMAL
SOURCE SOURCE: NORMAL

## 2021-11-23 ENCOUNTER — APPOINTMENT (OUTPATIENT)
Dept: RADIOLOGY | Facility: MEDICAL CENTER | Age: 81
DRG: 481 | End: 2021-11-23
Attending: EMERGENCY MEDICINE
Payer: MEDICARE

## 2021-11-23 ENCOUNTER — HOSPITAL ENCOUNTER (INPATIENT)
Facility: MEDICAL CENTER | Age: 81
LOS: 2 days | DRG: 481 | End: 2021-11-26
Attending: EMERGENCY MEDICINE | Admitting: INTERNAL MEDICINE
Payer: MEDICARE

## 2021-11-23 DIAGNOSIS — S72.002A CLOSED FRACTURE OF LEFT HIP, INITIAL ENCOUNTER (HCC): ICD-10-CM

## 2021-11-23 PROCEDURE — 85025 COMPLETE CBC W/AUTO DIFF WBC: CPT

## 2021-11-23 PROCEDURE — 99291 CRITICAL CARE FIRST HOUR: CPT

## 2021-11-23 PROCEDURE — 83880 ASSAY OF NATRIURETIC PEPTIDE: CPT

## 2021-11-23 PROCEDURE — 93005 ELECTROCARDIOGRAM TRACING: CPT | Performed by: EMERGENCY MEDICINE

## 2021-11-23 PROCEDURE — 36415 COLL VENOUS BLD VENIPUNCTURE: CPT

## 2021-11-23 PROCEDURE — 84443 ASSAY THYROID STIM HORMONE: CPT

## 2021-11-23 PROCEDURE — 80048 BASIC METABOLIC PNL TOTAL CA: CPT

## 2021-11-23 RX ORDER — MORPHINE SULFATE 4 MG/ML
4 INJECTION INTRAVENOUS ONCE
Status: COMPLETED | OUTPATIENT
Start: 2021-11-24 | End: 2021-11-24

## 2021-11-23 RX ORDER — ONDANSETRON 2 MG/ML
4 INJECTION INTRAMUSCULAR; INTRAVENOUS ONCE
Status: COMPLETED | OUTPATIENT
Start: 2021-11-24 | End: 2021-11-24

## 2021-11-23 ASSESSMENT — FIBROSIS 4 INDEX: FIB4 SCORE: 1.5

## 2021-11-24 ENCOUNTER — ANESTHESIA EVENT (OUTPATIENT)
Dept: SURGERY | Facility: MEDICAL CENTER | Age: 81
DRG: 481 | End: 2021-11-24
Payer: MEDICARE

## 2021-11-24 ENCOUNTER — ANESTHESIA (OUTPATIENT)
Dept: SURGERY | Facility: MEDICAL CENTER | Age: 81
DRG: 481 | End: 2021-11-24
Payer: MEDICARE

## 2021-11-24 PROBLEM — G91.9 HYDROCEPHALUS (HCC): Chronic | Status: ACTIVE | Noted: 2021-11-24

## 2021-11-24 PROBLEM — R01.1 SYSTOLIC MURMUR: Chronic | Status: ACTIVE | Noted: 2021-11-24

## 2021-11-24 PROBLEM — S72.002A CLOSED LEFT HIP FRACTURE (HCC): Status: ACTIVE | Noted: 2021-11-24

## 2021-11-24 LAB
ABO + RH BLD: NORMAL
ABO GROUP BLD: ABNORMAL
ANION GAP SERPL CALC-SCNC: 9 MMOL/L (ref 7–16)
APPEARANCE UR: CLEAR
BACTERIA #/AREA URNS HPF: NEGATIVE /HPF
BASOPHILS # BLD AUTO: 1 % (ref 0–1.8)
BASOPHILS # BLD: 0.07 K/UL (ref 0–0.12)
BILIRUB UR QL STRIP.AUTO: NEGATIVE
BLD GP AB INVEST PLASRBC-IMP: ABNORMAL
BLD GP AB INVEST PLASRBC-IMP: ABNORMAL
BLD GP AB SCN SERPL QL: ABNORMAL
BUN SERPL-MCNC: 12 MG/DL (ref 8–22)
CALCIUM SERPL-MCNC: 8.8 MG/DL (ref 8.5–10.5)
CHLORIDE SERPL-SCNC: 107 MMOL/L (ref 96–112)
CO2 SERPL-SCNC: 25 MMOL/L (ref 20–33)
COLOR UR: YELLOW
CREAT SERPL-MCNC: 0.63 MG/DL (ref 0.5–1.4)
EKG IMPRESSION: NORMAL
EOSINOPHIL # BLD AUTO: 0.25 K/UL (ref 0–0.51)
EOSINOPHIL NFR BLD: 3.7 % (ref 0–6.9)
EPI CELLS #/AREA URNS HPF: NEGATIVE /HPF
ERYTHROCYTE [DISTWIDTH] IN BLOOD BY AUTOMATED COUNT: 43.5 FL (ref 35.9–50)
GLUCOSE SERPL-MCNC: 139 MG/DL (ref 65–99)
GLUCOSE UR STRIP.AUTO-MCNC: NEGATIVE MG/DL
HCT VFR BLD AUTO: 41.4 % (ref 37–47)
HGB BLD-MCNC: 13.9 G/DL (ref 12–16)
HYALINE CASTS #/AREA URNS LPF: NORMAL /LPF
IMM GRANULOCYTES # BLD AUTO: 0.03 K/UL (ref 0–0.11)
IMM GRANULOCYTES NFR BLD AUTO: 0.4 % (ref 0–0.9)
KETONES UR STRIP.AUTO-MCNC: NEGATIVE MG/DL
LEUKOCYTE ESTERASE UR QL STRIP.AUTO: ABNORMAL
LYMPHOCYTES # BLD AUTO: 1.37 K/UL (ref 1–4.8)
LYMPHOCYTES NFR BLD: 20.4 % (ref 22–41)
MCH RBC QN AUTO: 31.8 PG (ref 27–33)
MCHC RBC AUTO-ENTMCNC: 33.6 G/DL (ref 33.6–35)
MCV RBC AUTO: 94.7 FL (ref 81.4–97.8)
MICRO URNS: ABNORMAL
MONOCYTES # BLD AUTO: 0.56 K/UL (ref 0–0.85)
MONOCYTES NFR BLD AUTO: 8.4 % (ref 0–13.4)
NEUTROPHILS # BLD AUTO: 4.42 K/UL (ref 2–7.15)
NEUTROPHILS NFR BLD: 66.1 % (ref 44–72)
NITRITE UR QL STRIP.AUTO: NEGATIVE
NRBC # BLD AUTO: 0 K/UL
NRBC BLD-RTO: 0 /100 WBC
NT-PROBNP SERPL IA-MCNC: 400 PG/ML (ref 0–125)
PH UR STRIP.AUTO: 7.5 [PH] (ref 5–8)
PLATELET # BLD AUTO: 190 K/UL (ref 164–446)
PMV BLD AUTO: 11.4 FL (ref 9–12.9)
POTASSIUM SERPL-SCNC: 4 MMOL/L (ref 3.6–5.5)
PROT UR QL STRIP: NEGATIVE MG/DL
RBC # BLD AUTO: 4.37 M/UL (ref 4.2–5.4)
RBC # URNS HPF: NORMAL /HPF
RBC UR QL AUTO: NEGATIVE
RH BLD: ABNORMAL
SARS-COV+SARS-COV-2 AG RESP QL IA.RAPID: NOTDETECTED
SODIUM SERPL-SCNC: 141 MMOL/L (ref 135–145)
SP GR UR STRIP.AUTO: 1.02
SPECIMEN SOURCE: NORMAL
TSH SERPL DL<=0.005 MIU/L-ACNC: 6.39 UIU/ML (ref 0.38–5.33)
UROBILINOGEN UR STRIP.AUTO-MCNC: 1 MG/DL
WBC # BLD AUTO: 6.7 K/UL (ref 4.8–10.8)
WBC #/AREA URNS HPF: NORMAL /HPF

## 2021-11-24 PROCEDURE — 160048 HCHG OR STATISTICAL LEVEL 1-5: Performed by: ORTHOPAEDIC SURGERY

## 2021-11-24 PROCEDURE — 96375 TX/PRO/DX INJ NEW DRUG ADDON: CPT

## 2021-11-24 PROCEDURE — 501838 HCHG SUTURE GENERAL: Performed by: ORTHOPAEDIC SURGERY

## 2021-11-24 PROCEDURE — A9270 NON-COVERED ITEM OR SERVICE: HCPCS | Performed by: ORTHOPAEDIC SURGERY

## 2021-11-24 PROCEDURE — 73501 X-RAY EXAM HIP UNI 1 VIEW: CPT | Mod: LT

## 2021-11-24 PROCEDURE — 700102 HCHG RX REV CODE 250 W/ 637 OVERRIDE(OP): Performed by: ANESTHESIOLOGY

## 2021-11-24 PROCEDURE — 502000 HCHG MISC OR IMPLANTS RC 0278: Performed by: ORTHOPAEDIC SURGERY

## 2021-11-24 PROCEDURE — L8699 PROSTHETIC IMPLANT NOS: HCPCS | Performed by: ORTHOPAEDIC SURGERY

## 2021-11-24 PROCEDURE — A9270 NON-COVERED ITEM OR SERVICE: HCPCS | Performed by: INTERNAL MEDICINE

## 2021-11-24 PROCEDURE — 160035 HCHG PACU - 1ST 60 MINS PHASE I: Performed by: ORTHOPAEDIC SURGERY

## 2021-11-24 PROCEDURE — 160036 HCHG PACU - EA ADDL 30 MINS PHASE I: Performed by: ORTHOPAEDIC SURGERY

## 2021-11-24 PROCEDURE — 87426 SARSCOV CORONAVIRUS AG IA: CPT

## 2021-11-24 PROCEDURE — C1776 JOINT DEVICE (IMPLANTABLE): HCPCS | Performed by: ORTHOPAEDIC SURGERY

## 2021-11-24 PROCEDURE — 160031 HCHG SURGERY MINUTES - 1ST 30 MINS LEVEL 5: Performed by: ORTHOPAEDIC SURGERY

## 2021-11-24 PROCEDURE — 81001 URINALYSIS AUTO W/SCOPE: CPT

## 2021-11-24 PROCEDURE — 86901 BLOOD TYPING SEROLOGIC RH(D): CPT

## 2021-11-24 PROCEDURE — 700111 HCHG RX REV CODE 636 W/ 250 OVERRIDE (IP): Performed by: ANESTHESIOLOGY

## 2021-11-24 PROCEDURE — 700102 HCHG RX REV CODE 250 W/ 637 OVERRIDE(OP): Performed by: INTERNAL MEDICINE

## 2021-11-24 PROCEDURE — 160002 HCHG RECOVERY MINUTES (STAT): Performed by: ORTHOPAEDIC SURGERY

## 2021-11-24 PROCEDURE — 160009 HCHG ANES TIME/MIN: Performed by: ORTHOPAEDIC SURGERY

## 2021-11-24 PROCEDURE — 700105 HCHG RX REV CODE 258: Performed by: INTERNAL MEDICINE

## 2021-11-24 PROCEDURE — 0QS70ZZ REPOSITION LEFT UPPER FEMUR, OPEN APPROACH: ICD-10-PCS | Performed by: ORTHOPAEDIC SURGERY

## 2021-11-24 PROCEDURE — 36415 COLL VENOUS BLD VENIPUNCTURE: CPT

## 2021-11-24 PROCEDURE — 96374 THER/PROPH/DIAG INJ IV PUSH: CPT

## 2021-11-24 PROCEDURE — 700101 HCHG RX REV CODE 250: Performed by: ANESTHESIOLOGY

## 2021-11-24 PROCEDURE — 700111 HCHG RX REV CODE 636 W/ 250 OVERRIDE (IP): Performed by: EMERGENCY MEDICINE

## 2021-11-24 PROCEDURE — 700102 HCHG RX REV CODE 250 W/ 637 OVERRIDE(OP): Performed by: ORTHOPAEDIC SURGERY

## 2021-11-24 PROCEDURE — 700102 HCHG RX REV CODE 250 W/ 637 OVERRIDE(OP): Performed by: EMERGENCY MEDICINE

## 2021-11-24 PROCEDURE — 27236 TREAT THIGH FRACTURE: CPT | Mod: LT | Performed by: ORTHOPAEDIC SURGERY

## 2021-11-24 PROCEDURE — 86850 RBC ANTIBODY SCREEN: CPT

## 2021-11-24 PROCEDURE — 160042 HCHG SURGERY MINUTES - EA ADDL 1 MIN LEVEL 5: Performed by: ORTHOPAEDIC SURGERY

## 2021-11-24 PROCEDURE — 502578 HCHG PACK, TOTAL HIP: Performed by: ORTHOPAEDIC SURGERY

## 2021-11-24 PROCEDURE — 86870 RBC ANTIBODY IDENTIFICATION: CPT | Mod: 91

## 2021-11-24 PROCEDURE — A9270 NON-COVERED ITEM OR SERVICE: HCPCS | Performed by: ANESTHESIOLOGY

## 2021-11-24 PROCEDURE — 86900 BLOOD TYPING SEROLOGIC ABO: CPT

## 2021-11-24 PROCEDURE — 86905 BLOOD TYPING RBC ANTIGENS: CPT

## 2021-11-24 PROCEDURE — 27236 TREAT THIGH FRACTURE: CPT | Mod: ASROC,LT | Performed by: PHYSICIAN ASSISTANT

## 2021-11-24 PROCEDURE — 99223 1ST HOSP IP/OBS HIGH 75: CPT | Mod: AI | Performed by: INTERNAL MEDICINE

## 2021-11-24 PROCEDURE — 700105 HCHG RX REV CODE 258: Performed by: HOSPITALIST

## 2021-11-24 PROCEDURE — 770020 HCHG ROOM/CARE - TELE (206)

## 2021-11-24 PROCEDURE — 700111 HCHG RX REV CODE 636 W/ 250 OVERRIDE (IP): Performed by: ORTHOPAEDIC SURGERY

## 2021-11-24 DEVICE — BONE CEMENT SIMPLEX ANTIBIO - (10/PK): Type: IMPLANTABLE DEVICE | Site: HIP | Status: FUNCTIONAL

## 2021-11-24 DEVICE — IMPLANTABLE DEVICE: Type: IMPLANTABLE DEVICE | Site: HIP | Status: FUNCTIONAL

## 2021-11-24 RX ORDER — HYDROMORPHONE HYDROCHLORIDE 1 MG/ML
0.5 INJECTION, SOLUTION INTRAMUSCULAR; INTRAVENOUS; SUBCUTANEOUS
Status: DISCONTINUED | OUTPATIENT
Start: 2021-11-24 | End: 2021-11-26 | Stop reason: HOSPADM

## 2021-11-24 RX ORDER — LABETALOL HYDROCHLORIDE 5 MG/ML
5 INJECTION, SOLUTION INTRAVENOUS
Status: DISCONTINUED | OUTPATIENT
Start: 2021-11-24 | End: 2021-11-24 | Stop reason: HOSPADM

## 2021-11-24 RX ORDER — OXYCODONE HYDROCHLORIDE 5 MG/1
2.5 TABLET ORAL
Status: DISCONTINUED | OUTPATIENT
Start: 2021-11-24 | End: 2021-11-24

## 2021-11-24 RX ORDER — DOCUSATE SODIUM 100 MG/1
100 CAPSULE, LIQUID FILLED ORAL 2 TIMES DAILY
Status: DISCONTINUED | OUTPATIENT
Start: 2021-11-24 | End: 2021-11-26 | Stop reason: HOSPADM

## 2021-11-24 RX ORDER — ENEMA 19; 7 G/133ML; G/133ML
1 ENEMA RECTAL
Status: DISCONTINUED | OUTPATIENT
Start: 2021-11-24 | End: 2021-11-26 | Stop reason: HOSPADM

## 2021-11-24 RX ORDER — VENLAFAXINE HYDROCHLORIDE 75 MG/1
225 CAPSULE, EXTENDED RELEASE ORAL DAILY
Status: DISCONTINUED | OUTPATIENT
Start: 2021-11-24 | End: 2021-11-24

## 2021-11-24 RX ORDER — CEFAZOLIN SODIUM 1 G/3ML
INJECTION, POWDER, FOR SOLUTION INTRAMUSCULAR; INTRAVENOUS PRN
Status: DISCONTINUED | OUTPATIENT
Start: 2021-11-24 | End: 2021-11-24 | Stop reason: SURG

## 2021-11-24 RX ORDER — SODIUM CHLORIDE, SODIUM LACTATE, POTASSIUM CHLORIDE, CALCIUM CHLORIDE 600; 310; 30; 20 MG/100ML; MG/100ML; MG/100ML; MG/100ML
INJECTION, SOLUTION INTRAVENOUS CONTINUOUS
Status: DISCONTINUED | OUTPATIENT
Start: 2021-11-24 | End: 2021-11-24 | Stop reason: HOSPADM

## 2021-11-24 RX ORDER — VENLAFAXINE HYDROCHLORIDE 75 MG/1
150 CAPSULE, EXTENDED RELEASE ORAL DAILY
Status: DISCONTINUED | OUTPATIENT
Start: 2021-11-25 | End: 2021-11-26 | Stop reason: HOSPADM

## 2021-11-24 RX ORDER — OXYCODONE HCL 5 MG/5 ML
5 SOLUTION, ORAL ORAL
Status: COMPLETED | OUTPATIENT
Start: 2021-11-24 | End: 2021-11-24

## 2021-11-24 RX ORDER — CEFAZOLIN SODIUM 2 G/100ML
2 INJECTION, SOLUTION INTRAVENOUS EVERY 8 HOURS
Status: COMPLETED | OUTPATIENT
Start: 2021-11-24 | End: 2021-11-25

## 2021-11-24 RX ORDER — OXYBUTYNIN CHLORIDE 5 MG/1
5 TABLET, EXTENDED RELEASE ORAL DAILY
Status: DISCONTINUED | OUTPATIENT
Start: 2021-11-25 | End: 2021-11-26 | Stop reason: HOSPADM

## 2021-11-24 RX ORDER — HYDROMORPHONE HYDROCHLORIDE 1 MG/ML
0.25 INJECTION, SOLUTION INTRAMUSCULAR; INTRAVENOUS; SUBCUTANEOUS
Status: DISCONTINUED | OUTPATIENT
Start: 2021-11-24 | End: 2021-11-24

## 2021-11-24 RX ORDER — ACETAMINOPHEN 325 MG/1
650 TABLET ORAL EVERY 6 HOURS PRN
Status: DISCONTINUED | OUTPATIENT
Start: 2021-11-29 | End: 2021-11-26 | Stop reason: HOSPADM

## 2021-11-24 RX ORDER — LIDOCAINE HYDROCHLORIDE 20 MG/ML
INJECTION, SOLUTION EPIDURAL; INFILTRATION; INTRACAUDAL; PERINEURAL PRN
Status: DISCONTINUED | OUTPATIENT
Start: 2021-11-24 | End: 2021-11-24 | Stop reason: SURG

## 2021-11-24 RX ORDER — ONDANSETRON 2 MG/ML
4 INJECTION INTRAMUSCULAR; INTRAVENOUS EVERY 4 HOURS PRN
Status: DISCONTINUED | OUTPATIENT
Start: 2021-11-24 | End: 2021-11-26 | Stop reason: HOSPADM

## 2021-11-24 RX ORDER — KETOROLAC TROMETHAMINE 30 MG/ML
15 INJECTION, SOLUTION INTRAMUSCULAR; INTRAVENOUS EVERY 6 HOURS
Status: DISCONTINUED | OUTPATIENT
Start: 2021-11-24 | End: 2021-11-26 | Stop reason: HOSPADM

## 2021-11-24 RX ORDER — OXYCODONE HCL 5 MG/5 ML
10 SOLUTION, ORAL ORAL
Status: COMPLETED | OUTPATIENT
Start: 2021-11-24 | End: 2021-11-24

## 2021-11-24 RX ORDER — ONDANSETRON 2 MG/ML
4 INJECTION INTRAMUSCULAR; INTRAVENOUS EVERY 4 HOURS PRN
Status: DISCONTINUED | OUTPATIENT
Start: 2021-11-24 | End: 2021-11-24

## 2021-11-24 RX ORDER — PROPOFOL 10 MG/ML
INJECTION, EMULSION INTRAVENOUS PRN
Status: DISCONTINUED | OUTPATIENT
Start: 2021-11-24 | End: 2021-11-24 | Stop reason: SURG

## 2021-11-24 RX ORDER — ENALAPRILAT 1.25 MG/ML
1.25 INJECTION INTRAVENOUS EVERY 6 HOURS PRN
Status: DISCONTINUED | OUTPATIENT
Start: 2021-11-24 | End: 2021-11-26 | Stop reason: HOSPADM

## 2021-11-24 RX ORDER — KETOROLAC TROMETHAMINE 30 MG/ML
INJECTION, SOLUTION INTRAMUSCULAR; INTRAVENOUS PRN
Status: DISCONTINUED | OUTPATIENT
Start: 2021-11-24 | End: 2021-11-24 | Stop reason: SURG

## 2021-11-24 RX ORDER — IBUPROFEN 800 MG/1
800 TABLET ORAL 3 TIMES DAILY PRN
Status: DISCONTINUED | OUTPATIENT
Start: 2021-11-27 | End: 2021-11-26 | Stop reason: HOSPADM

## 2021-11-24 RX ORDER — OXYCODONE HYDROCHLORIDE 5 MG/1
5 TABLET ORAL
Status: DISCONTINUED | OUTPATIENT
Start: 2021-11-24 | End: 2021-11-24

## 2021-11-24 RX ORDER — SCOLOPAMINE TRANSDERMAL SYSTEM 1 MG/1
1 PATCH, EXTENDED RELEASE TRANSDERMAL
Status: DISCONTINUED | OUTPATIENT
Start: 2021-11-24 | End: 2021-11-26 | Stop reason: HOSPADM

## 2021-11-24 RX ORDER — VENLAFAXINE HYDROCHLORIDE 75 MG/1
75 TABLET, EXTENDED RELEASE ORAL DAILY
Status: DISCONTINUED | OUTPATIENT
Start: 2021-11-24 | End: 2021-11-24

## 2021-11-24 RX ORDER — LABETALOL HYDROCHLORIDE 5 MG/ML
10 INJECTION, SOLUTION INTRAVENOUS EVERY 4 HOURS PRN
Status: DISCONTINUED | OUTPATIENT
Start: 2021-11-24 | End: 2021-11-26 | Stop reason: HOSPADM

## 2021-11-24 RX ORDER — ONDANSETRON 4 MG/1
4 TABLET, ORALLY DISINTEGRATING ORAL EVERY 4 HOURS PRN
Status: DISCONTINUED | OUTPATIENT
Start: 2021-11-24 | End: 2021-11-26 | Stop reason: HOSPADM

## 2021-11-24 RX ORDER — ACETAMINOPHEN 325 MG/1
650 TABLET ORAL EVERY 6 HOURS PRN
Status: DISCONTINUED | OUTPATIENT
Start: 2021-11-24 | End: 2021-11-24

## 2021-11-24 RX ORDER — OXYCODONE HYDROCHLORIDE 10 MG/1
10 TABLET ORAL
Status: DISCONTINUED | OUTPATIENT
Start: 2021-11-24 | End: 2021-11-26 | Stop reason: HOSPADM

## 2021-11-24 RX ORDER — HALOPERIDOL 5 MG/ML
1 INJECTION INTRAMUSCULAR EVERY 6 HOURS PRN
Status: DISCONTINUED | OUTPATIENT
Start: 2021-11-24 | End: 2021-11-26 | Stop reason: HOSPADM

## 2021-11-24 RX ORDER — OXYBUTYNIN CHLORIDE 5 MG/1
15 TABLET, EXTENDED RELEASE ORAL DAILY
Status: DISCONTINUED | OUTPATIENT
Start: 2021-11-24 | End: 2021-11-24

## 2021-11-24 RX ORDER — ONDANSETRON 2 MG/ML
4 INJECTION INTRAMUSCULAR; INTRAVENOUS
Status: COMPLETED | OUTPATIENT
Start: 2021-11-24 | End: 2021-11-24

## 2021-11-24 RX ORDER — AMOXICILLIN 250 MG
2 CAPSULE ORAL 2 TIMES DAILY
Status: DISCONTINUED | OUTPATIENT
Start: 2021-11-24 | End: 2021-11-24

## 2021-11-24 RX ORDER — DIPHENHYDRAMINE HYDROCHLORIDE 50 MG/ML
12.5 INJECTION INTRAMUSCULAR; INTRAVENOUS
Status: DISCONTINUED | OUTPATIENT
Start: 2021-11-24 | End: 2021-11-24 | Stop reason: HOSPADM

## 2021-11-24 RX ORDER — PHENYLEPHRINE HYDROCHLORIDE 10 MG/ML
INJECTION, SOLUTION INTRAMUSCULAR; INTRAVENOUS; SUBCUTANEOUS PRN
Status: DISCONTINUED | OUTPATIENT
Start: 2021-11-24 | End: 2021-11-24 | Stop reason: SURG

## 2021-11-24 RX ORDER — ACETAMINOPHEN 325 MG/1
650 TABLET ORAL EVERY 6 HOURS
Status: DISCONTINUED | OUTPATIENT
Start: 2021-11-24 | End: 2021-11-26 | Stop reason: HOSPADM

## 2021-11-24 RX ORDER — HALOPERIDOL 5 MG/ML
1 INJECTION INTRAMUSCULAR
Status: DISCONTINUED | OUTPATIENT
Start: 2021-11-24 | End: 2021-11-24 | Stop reason: HOSPADM

## 2021-11-24 RX ORDER — SODIUM CHLORIDE 9 MG/ML
INJECTION, SOLUTION INTRAVENOUS CONTINUOUS
Status: DISCONTINUED | OUTPATIENT
Start: 2021-11-24 | End: 2021-11-26

## 2021-11-24 RX ORDER — DEXAMETHASONE SODIUM PHOSPHATE 4 MG/ML
4 INJECTION, SOLUTION INTRA-ARTICULAR; INTRALESIONAL; INTRAMUSCULAR; INTRAVENOUS; SOFT TISSUE
Status: DISCONTINUED | OUTPATIENT
Start: 2021-11-24 | End: 2021-11-26 | Stop reason: HOSPADM

## 2021-11-24 RX ORDER — DIPHENHYDRAMINE HYDROCHLORIDE 50 MG/ML
25 INJECTION INTRAMUSCULAR; INTRAVENOUS EVERY 6 HOURS PRN
Status: DISCONTINUED | OUTPATIENT
Start: 2021-11-24 | End: 2021-11-26 | Stop reason: HOSPADM

## 2021-11-24 RX ORDER — BISACODYL 10 MG
10 SUPPOSITORY, RECTAL RECTAL
Status: DISCONTINUED | OUTPATIENT
Start: 2021-11-24 | End: 2021-11-26 | Stop reason: HOSPADM

## 2021-11-24 RX ORDER — POLYETHYLENE GLYCOL 3350 17 G/17G
1 POWDER, FOR SOLUTION ORAL
Status: DISCONTINUED | OUTPATIENT
Start: 2021-11-24 | End: 2021-11-24

## 2021-11-24 RX ORDER — NITROFURANTOIN MACROCRYSTALS 50 MG/1
50 CAPSULE ORAL DAILY
Status: ON HOLD | COMMUNITY
End: 2021-11-26

## 2021-11-24 RX ORDER — AMOXICILLIN 250 MG
1 CAPSULE ORAL NIGHTLY
Status: DISCONTINUED | OUTPATIENT
Start: 2021-11-24 | End: 2021-11-26 | Stop reason: HOSPADM

## 2021-11-24 RX ORDER — PHENOL 1.4 %
600 AEROSOL, SPRAY (ML) MUCOUS MEMBRANE EVERY EVENING
COMMUNITY
End: 2023-03-21

## 2021-11-24 RX ORDER — AMOXICILLIN 250 MG
1 CAPSULE ORAL
Status: DISCONTINUED | OUTPATIENT
Start: 2021-11-24 | End: 2021-11-26 | Stop reason: HOSPADM

## 2021-11-24 RX ORDER — MELATONIN
1000 DAILY
COMMUNITY
End: 2023-03-21

## 2021-11-24 RX ORDER — ACETAMINOPHEN 500 MG
1000 TABLET ORAL ONCE
Status: COMPLETED | OUTPATIENT
Start: 2021-11-24 | End: 2021-11-24

## 2021-11-24 RX ORDER — POLYETHYLENE GLYCOL 3350 17 G/17G
1 POWDER, FOR SOLUTION ORAL 2 TIMES DAILY PRN
Status: DISCONTINUED | OUTPATIENT
Start: 2021-11-24 | End: 2021-11-26 | Stop reason: HOSPADM

## 2021-11-24 RX ORDER — ONDANSETRON 2 MG/ML
INJECTION INTRAMUSCULAR; INTRAVENOUS PRN
Status: DISCONTINUED | OUTPATIENT
Start: 2021-11-24 | End: 2021-11-24 | Stop reason: SURG

## 2021-11-24 RX ORDER — OXYCODONE HYDROCHLORIDE 5 MG/1
5 TABLET ORAL
Status: DISCONTINUED | OUTPATIENT
Start: 2021-11-24 | End: 2021-11-26 | Stop reason: HOSPADM

## 2021-11-24 RX ORDER — BISACODYL 10 MG
10 SUPPOSITORY, RECTAL RECTAL
Status: DISCONTINUED | OUTPATIENT
Start: 2021-11-24 | End: 2021-11-24

## 2021-11-24 RX ADMIN — ONDANSETRON 4 MG: 2 INJECTION INTRAMUSCULAR; INTRAVENOUS at 14:08

## 2021-11-24 RX ADMIN — MORPHINE SULFATE 4 MG: 4 INJECTION INTRAVENOUS at 00:11

## 2021-11-24 RX ADMIN — SENNOSIDES AND DOCUSATE SODIUM 1 TABLET: 50; 8.6 TABLET ORAL at 19:36

## 2021-11-24 RX ADMIN — CEFAZOLIN SODIUM 2 G: 2 INJECTION, SOLUTION INTRAVENOUS at 19:35

## 2021-11-24 RX ADMIN — DOCUSATE SODIUM 100 MG: 100 CAPSULE, LIQUID FILLED ORAL at 19:35

## 2021-11-24 RX ADMIN — ACETAMINOPHEN 650 MG: 325 TABLET, FILM COATED ORAL at 19:35

## 2021-11-24 RX ADMIN — LIDOCAINE HYDROCHLORIDE 30 MG: 20 INJECTION, SOLUTION EPIDURAL; INFILTRATION; INTRACAUDAL at 13:38

## 2021-11-24 RX ADMIN — FENTANYL CITRATE 25 MCG: 50 INJECTION, SOLUTION INTRAMUSCULAR; INTRAVENOUS at 13:38

## 2021-11-24 RX ADMIN — FENTANYL CITRATE 25 MCG: 50 INJECTION, SOLUTION INTRAMUSCULAR; INTRAVENOUS at 13:56

## 2021-11-24 RX ADMIN — SODIUM CHLORIDE: 9 INJECTION, SOLUTION INTRAVENOUS at 13:30

## 2021-11-24 RX ADMIN — ONDANSETRON 4 MG: 2 INJECTION INTRAMUSCULAR; INTRAVENOUS at 00:11

## 2021-11-24 RX ADMIN — OXYCODONE HYDROCHLORIDE 5 MG: 5 SOLUTION ORAL at 15:42

## 2021-11-24 RX ADMIN — SODIUM CHLORIDE: 9 INJECTION, SOLUTION INTRAVENOUS at 00:45

## 2021-11-24 RX ADMIN — ACETAMINOPHEN 1000 MG: 500 TABLET ORAL at 12:57

## 2021-11-24 RX ADMIN — OXYBUTYNIN CHLORIDE 15 MG: 5 TABLET, EXTENDED RELEASE ORAL at 09:18

## 2021-11-24 RX ADMIN — KETOROLAC TROMETHAMINE 15 MG: 30 INJECTION, SOLUTION INTRAMUSCULAR; INTRAVENOUS at 19:33

## 2021-11-24 RX ADMIN — FENTANYL CITRATE 25 MCG: 50 INJECTION, SOLUTION INTRAMUSCULAR; INTRAVENOUS at 13:54

## 2021-11-24 RX ADMIN — VENLAFAXINE HYDROCHLORIDE 225 MG: 75 CAPSULE, EXTENDED RELEASE ORAL at 09:18

## 2021-11-24 RX ADMIN — PROPOFOL 50 MG: 10 INJECTION, EMULSION INTRAVENOUS at 13:57

## 2021-11-24 RX ADMIN — PHENYLEPHRINE HYDROCHLORIDE 100 MCG: 10 INJECTION INTRAVENOUS at 13:49

## 2021-11-24 RX ADMIN — EPHEDRINE SULFATE 10 MG: 50 INJECTION, SOLUTION INTRAVENOUS at 13:47

## 2021-11-24 RX ADMIN — FENTANYL CITRATE 25 MCG: 50 INJECTION, SOLUTION INTRAMUSCULAR; INTRAVENOUS at 13:55

## 2021-11-24 RX ADMIN — PHENYLEPHRINE HYDROCHLORIDE 100 MCG: 10 INJECTION INTRAVENOUS at 14:13

## 2021-11-24 RX ADMIN — KETOROLAC TROMETHAMINE 30 MG: 30 INJECTION, SOLUTION INTRAMUSCULAR at 14:07

## 2021-11-24 RX ADMIN — OXYCODONE 5 MG: 5 TABLET ORAL at 11:38

## 2021-11-24 RX ADMIN — PROPOFOL 100 MG: 10 INJECTION, EMULSION INTRAVENOUS at 13:38

## 2021-11-24 RX ADMIN — SENNOSIDES AND DOCUSATE SODIUM 1 TABLET: 50; 8.6 TABLET ORAL at 21:00

## 2021-11-24 RX ADMIN — CEFAZOLIN 2 G: 330 INJECTION, POWDER, FOR SOLUTION INTRAMUSCULAR; INTRAVENOUS at 13:35

## 2021-11-24 RX ADMIN — OXYCODONE 5 MG: 5 TABLET ORAL at 08:15

## 2021-11-24 ASSESSMENT — PAIN DESCRIPTION - PAIN TYPE
TYPE: SURGICAL PAIN

## 2021-11-24 ASSESSMENT — ENCOUNTER SYMPTOMS
CONSTITUTIONAL NEGATIVE: 1
EYES NEGATIVE: 1
DOUBLE VISION: 0
FALLS: 1
NAUSEA: 0
FOCAL WEAKNESS: 0
CHILLS: 0
DIZZINESS: 0
WEAKNESS: 1
MEMORY LOSS: 1
NERVOUS/ANXIOUS: 1
FEVER: 0
SORE THROAT: 0
BLURRED VISION: 0
HEARTBURN: 0
DIARRHEA: 0
MYALGIAS: 1
SPUTUM PRODUCTION: 0
RESPIRATORY NEGATIVE: 1
HEMOPTYSIS: 0
ABDOMINAL PAIN: 0
PALPITATIONS: 0
GASTROINTESTINAL NEGATIVE: 1
COUGH: 0
CARDIOVASCULAR NEGATIVE: 1
ORTHOPNEA: 0
SHORTNESS OF BREATH: 0
VOMITING: 0

## 2021-11-24 ASSESSMENT — FIBROSIS 4 INDEX: FIB4 SCORE: 2.1

## 2021-11-24 ASSESSMENT — PAIN SCALES - GENERAL: PAIN_LEVEL: 0

## 2021-11-24 NOTE — ED NOTES
Patient is resting comfortably, visitor at bedside. Pt remains on monitoring. Call light within reach, denies needs or questions.

## 2021-11-24 NOTE — ED NOTES
Med Rec completed: per pt's daughter in pt's room      Pt reports taking Macrodantin 50 mg every day for Chronic use for UTI.    Preferred Pharmacy: [facility]     Pt confirmed following allergies:  No Known Allergies     Pt's home medications:   Medication Sig   • nitrofurantoin (MACRODANTIN) 50 MG Cap Take 50 mg by mouth every day. Indications: Urinary Tract Infection   • CRANBERRY EXTRACT PO Take 650 mg by mouth every day.   • Calcium Carbonate (CALCIUM 600) 600 MG Tab Take 600 mg by mouth every day.   • vitamin D (CHOLECALCIFEROL) 1000 Unit Tab Take 1,000 Units by mouth every day.   • oxybutynin (DITROPAN XL) 15 MG CR tablet Take 15 mg by mouth every day.   • venlafaxine (EFFEXOR-XR) 150 MG extended-release capsule Take 150 mg by mouth every day. Taken with 75 mg for 225mg daily   • omeprazole (PRILOSEC) 40 MG delayed-release capsule Take 40 mg by mouth every day.   • Venlafaxine HCl 75 MG TABLET SR 24 HR Take 75 mg by mouth every day. Taken with 150 mg for 225mg daily     Removed medications:   Medication Removal Reason   • [DISCONTINUED] meloxicam (MOBIC) 15 MG tablet   [DISCONTINUED] sertraline (ZOLOFT) 25 MG tablet   [DISCONTINUED] valsartan (DIOVAN) 80 MG Tab   [DISCONTINUED] cephALEXin (KEFLEX) 500 MG Cap   [DISCONTINUED] atorvastatin (LIPITOR) 10 MG Tab   [DISCONTINUED] oxybutynin SR (DITROPAN-XL) 10 MG CR tablet    Pt reports not taking

## 2021-11-24 NOTE — ED TRIAGE NOTES
"Chief Complaint   Patient presents with   • Fall     Per EMS report, Pt sustained a GLF today due to feeling like she \"was going to pass out\". -LOC, -head trauma. Pt experiencing left hip pain. Pt had a syncopal episode yesterday in the store. PTA EMS gave 50mcg fent. Pt reports feeling dehydrated the past few days.         "

## 2021-11-24 NOTE — H&P
"Hospital Medicine History & Physical Note    Date of Service  11/24/2021    Primary Care Physician  Katerina Yanes M.D.    Consultants  neurosurgery and orthopedics    Specialist Names: call NSG in AM    Code Status  Full Code    Chief Complaint  Chief Complaint   Patient presents with   • Fall     Per EMS report, Pt sustained a GLF today due to feeling like she \"was going to pass out\". -LOC, -head trauma. Pt experiencing left hip pain. Pt had a syncopal episode yesterday in the store. PTA EMS gave 50mcg fent. Pt reports feeling dehydrated the past few days.         History of Presenting Illness  Aaliyah Fulton is a 81 y.o. female who presented 11/23/2021 with ground level fall. She felt lightheaded after taking trash out and tried to fall on her stomach, injuring her left side. She did not lose consciousness and did not hit her head. Patient reports that she has been falling more frequently due to her hydrocephalus and is being followed by neurosurgery for shunt placement. She states that she had an infection on her left leg that finally cleared and she was to have shunt surgery scheduled soon. She had intractable pain and came to ED where she was found to have left femoral fracture. Patient will be going to OR, however she is found to have loud systolic murmur, no previous ECHO available. She states that she is aware of the murmur. Given her current symptoms and cardiac murmur, she should be evaluated for cardiac risk factors prior to proceeding with surgery.     I discussed the plan of care with patient and family.    Review of Systems  Review of Systems   Constitutional: Negative.    HENT: Negative.    Eyes: Negative.    Respiratory: Negative.    Cardiovascular: Negative.    Gastrointestinal: Negative.    Genitourinary: Negative.    Musculoskeletal: Positive for falls and myalgias.   Skin: Negative.    Neurological: Positive for weakness.   Psychiatric/Behavioral: Positive for memory loss.       Past " Medical History   has a past medical history of Anesthesia, Arthritis, ASTHMA, Breast cancer (HCC), Cancer (HCC) (2010), Cataract, Dental disorder, Heart burn, High cholesterol, Hypertension, Indigestion, Osteoporosis, Pain, Pneumonia, Psychiatric problem, and Unspecified urinary incontinence.    Surgical History   has a past surgical history that includes gilbert by laparoscopy; sinuscopy; breast biopsy (5/21/2010); other; other orthopedic surgery; knee arthroplasty total (9/19/2011); lumbar laminectomy diskectomy (10/17/2012); lumbar decompression (10/17/2012); foraminotomy (10/17/2012); pr radiation therapy plan simple; lumpectomy; and recovery (5/10/2016).     Family History  family history includes Lung Disease in her mother.   Family history reviewed with patient. There is no family history that is pertinent to the chief complaint.     Social History   reports that she has never smoked. She has never used smokeless tobacco. She reports that she does not drink alcohol and does not use drugs.    Allergies  No Known Allergies    Medications  Prior to Admission Medications   Prescriptions Last Dose Informant Patient Reported? Taking?   CRANBERRY EXTRACT PO 11/23/2021 at am Family Member Yes Yes   Sig: Take 650 mg by mouth every day.   Calcium Carbonate (CALCIUM 600) 600 MG Tab 11/23/2021 at am Family Member Yes No   Sig: Take 600 mg by mouth every day.   Venlafaxine HCl 75 MG TABLET SR 24 HR 11/23/2021 at am Family Member Yes No   Sig: Take 75 mg by mouth every day. Taken with 150 mg for 225mg daily   nitrofurantoin (MACRODANTIN) 50 MG Cap 11/23/2021 at am Family Member Yes Yes   Sig: Take 50 mg by mouth every day. Indications: Urinary Tract Infection   omeprazole (PRILOSEC) 40 MG delayed-release capsule 11/23/2021 at am Family Member Yes No   Sig: Take 40 mg by mouth every day.   oxybutynin (DITROPAN XL) 15 MG CR tablet 11/23/2021 at am Family Member Yes No   Sig: Take 15 mg by mouth every day.   venlafaxine  (EFFEXOR-XR) 150 MG extended-release capsule 11/23/2021 at am Family Member Yes No   Sig: Take 150 mg by mouth every day. Taken with 75 mg for 225mg daily   vitamin D (CHOLECALCIFEROL) 1000 Unit Tab 11/23/2021 at am Family Member Yes No   Sig: Take 1,000 Units by mouth every day.      Facility-Administered Medications: None       Physical Exam  Temp:  [36.6 °C (97.9 °F)] 36.6 °C (97.9 °F)  Pulse:  [76] 76  Resp:  [20] 20  BP: (186)/(91) 186/91  SpO2:  [98 %] 98 %  Blood Pressure : (!) 186/91   Temperature: 36.6 °C (97.9 °F)   Pulse: 76   Respiration: 20   Pulse Oximetry: 98 %       Physical Exam  Vitals and nursing note reviewed.   Constitutional:       General: She is not in acute distress.     Appearance: Normal appearance.   HENT:      Head: Normocephalic and atraumatic.      Mouth/Throat:      Mouth: Mucous membranes are moist.      Pharynx: Oropharynx is clear.   Eyes:      General: No scleral icterus.     Extraocular Movements: Extraocular movements intact.      Conjunctiva/sclera: Conjunctivae normal.      Pupils: Pupils are equal, round, and reactive to light.   Cardiovascular:      Rate and Rhythm: Normal rate and regular rhythm.      Pulses: Normal pulses.      Heart sounds: Murmur heard.       Pulmonary:      Effort: Pulmonary effort is normal. No respiratory distress.      Breath sounds: Normal breath sounds. No wheezing.   Abdominal:      General: Abdomen is flat. Bowel sounds are normal. There is no distension.      Palpations: Abdomen is soft.      Tenderness: There is no abdominal tenderness. There is no guarding.   Musculoskeletal:         General: Tenderness present. No swelling.      Cervical back: Normal range of motion and neck supple. No rigidity.      Comments: Left lower extremity tender to touch, rotated outward   Skin:     General: Skin is warm and dry.   Neurological:      General: No focal deficit present.      Mental Status: She is alert and oriented to person, place, and time. Mental  status is at baseline.      Cranial Nerves: No cranial nerve deficit.         Laboratory:  Recent Labs     11/23/21 2306   WBC 6.7   RBC 4.37   HEMOGLOBIN 13.9   HEMATOCRIT 41.4   MCV 94.7   MCH 31.8   MCHC 33.6   RDW 43.5   PLATELETCT 190   MPV 11.4     Recent Labs     11/23/21 2306   SODIUM 141   POTASSIUM 4.0   CHLORIDE 107   CO2 25   GLUCOSE 139*   BUN 12   CREATININE 0.63   CALCIUM 8.8     Recent Labs     11/23/21 2306   GLUCOSE 139*         No results for input(s): NTPROBNP in the last 72 hours.      No results for input(s): TROPONINT in the last 72 hours.    Imaging:  DX-HIP-UNILATERAL-WITH PELVIS-1 VIEW LEFT   Final Result      Acute left subcapital femoral neck fracture has slightly irregular margination raising possibility of a pathologic fracture. This however may just be related to positioning. CT pelvis without contrast may help clarify if clinically indicated      Healed right obturator ring fractures      EC-ECHOCARDIOGRAM COMPLETE W/O CONT    (Results Pending)       X-Ray:  I have personally reviewed the images and compared with prior images.    Assessment/Plan:  I anticipate this patient will require at least two midnights for appropriate medical management, necessitating inpatient admission.    * Closed left hip fracture (HCC)- (present on admission)  Assessment & Plan  -Pain control PRN  -Ortho consult  -Will need cardiac evaluation with ECHO prior to surgery  -ECHO pending  -PT eval post op    Systolic murmur- (present on admission)  Assessment & Plan  -Will obtain ECHO to rule out valvular disease as cause for her symptoms    Hydrocephalus (HCC)- (present on admission)  Assessment & Plan  -Neurosurgery consult - Dr. Blanchard      Depression- (present on admission)  Assessment & Plan  -Resume home meds      VTE prophylaxis: SCDs/TEDs

## 2021-11-24 NOTE — ANESTHESIA PREPROCEDURE EVALUATION
Case: [id number] Date/Time: 11/24/21 1223    Procedure: HEMIARTHROPLASTY, HIP (Left )    Location: [place] [address] / SURGERY [facility]    Surgeons: Marlon Culver M.D.          Relevant Problems   CARDIAC   (positive) Systolic murmur      Other   (positive) Hand arthritis       Physical Exam    Airway   Mallampati: III  TM distance: >3 FB  Neck ROM: full       Cardiovascular   Rhythm: regular  Rate: normal  (+) murmur     Dental - normal exam           Pulmonary   Breath sounds clear to auscultation     Abdominal - normal exam     Neurological              Anesthesia Plan    ASA 3 (Syncope and hydrocephalus)   ASA physical status 3 criteria: other (comment)    Plan - general       Airway plan will be LMA          Induction: intravenous    Postoperative Plan: Postoperative administration of opioids is intended.    Pertinent diagnostic labs and testing reviewed    Informed Consent:    Anesthetic plan and risks discussed with patient.    Use of blood products discussed with: whom consented to blood products.

## 2021-11-24 NOTE — PROGRESS NOTES
Left femoral neck fx, displaced  Will need hemiarthroplasty  I have asked Dr. Culver to oversee orthopaedic care  Will defer timing to him  Covid negative  Echo pending  Keep NPO

## 2021-11-24 NOTE — ED NOTES
Patient is resting comfortably, sleeping with unlabored respirs. Patient remains on monitoring, daughter at bedside. Call light within reach, lights dimmed.

## 2021-11-24 NOTE — ED PROVIDER NOTES
"ER Provider Note     Scribed for Layton Li M.D. by Teja Chase. 11/23/2021, 11:24 PM.    Primary Care Provider: Katerina Yanes M.D.  Means of Arrival: EMS   History obtained from: Patient  History limited by: None     CHIEF COMPLAINT  Chief Complaint   Patient presents with    Fall     Per EMS report, Pt sustained a GLF today due to feeling like she \"was going to pass out\". -LOC, -head trauma. Pt experiencing left hip pain. Pt had a syncopal episode yesterday in the store. PTA EMS gave 50mcg fent. Pt reports feeling dehydrated the past few days.         HPI  Aaliyah Fulton is a 81 y.o. female who presents to the Emergency Department by EMS for evaluation of ground level fall onset today. Per EMS, patient felt near syncopal and lightheaded prior to her fall . Patient denies loss of consciousness or or head trauma. On route, patient was administered 50 mcg fentanyl.  In the ED, patient complains of left hip pain secondary to fall. She adds that she had a syncopal event yesterday that was witnessed by her daughter. She did not los consciousness or sustain a head injury then. She presents associated symptoms of dehydration. Denies emesis. According to patient's daughter, patient has an active UTI. Patient also needs to have a stent placed in her brain for hydrocephaly. After the stent, she is scheduled for a knee replacement.      REVIEW OF SYSTEMS  See HPI for further details. All other systems are negative.     PAST MEDICAL HISTORY   has a past medical history of Anesthesia, Arthritis, ASTHMA, Breast cancer (HCC), Cancer (HCC) (2010), Cataract, Dental disorder, Heart burn, High cholesterol, Hypertension, Indigestion, Osteoporosis, Pain, Pneumonia, Psychiatric problem, and Unspecified urinary incontinence.    SURGICAL HISTORY   has a past surgical history that includes gilbert by laparoscopy; sinuscopy; breast biopsy (5/21/2010); other; other orthopedic surgery; knee arthroplasty total (9/19/2011); " "lumbar laminectomy diskectomy (10/17/2012); lumbar decompression (10/17/2012); foraminotomy (10/17/2012); radiation therapy plan simple; lumpectomy; and recovery (5/10/2016).    SOCIAL HISTORY  Social History     Tobacco Use    Smoking status: Never Smoker    Smokeless tobacco: Never Used   Substance Use Topics    Alcohol use: No     Comment: very rare    Drug use: No      Social History     Substance and Sexual Activity   Drug Use No       FAMILY HISTORY  Family History   Problem Relation Age of Onset    Lung Disease Mother         copd       CURRENT MEDICATIONS  Current Outpatient Medications   Medication Instructions    atorvastatin (LIPITOR) 10 mg, Oral, DAILY    cephALEXin (KEFLEX) 500 mg, Oral, 4 TIMES DAILY    meloxicam (MOBIC) 7.5-15 mg, Oral, DAILY, Hand pain    omeprazole (PRILOSEC) 40 mg, Oral, DAILY    oxybutynin (DITROPAN XL) 15 mg, Oral, EVERY DAY    oxybutynin SR (DITROPAN-XL) 10 MG CR tablet TAKE ONE TABLET BY MOUTH ONE TIME DAILY    sertraline (ZOLOFT) 25 mg, Oral, DAILY, TAKE 1 TABLET BY MOUTH DAILY    valsartan (DIOVAN) 80 mg, Oral, EVERY DAY    venlafaxine (EFFEXOR-XR) 150 mg, Oral    venlafaxine XR (EFFEXOR XR) 37.5 mg, Oral, DAILY        ALLERGIES  No Known Allergies    PHYSICAL EXAM  VITAL SIGNS: BP (!) 186/91   Pulse 76   Temp 36.6 °C (97.9 °F)   Resp 20   Ht 1.727 m (5' 8\")   Wt 72.6 kg (160 lb)   SpO2 98%   BMI 24.33 kg/m²      Constitutional: Alert in no apparent distress.  HENT: No signs of trauma, Bilateral external ears normal, Nose normal.   Eyes: Pupils are equal and reactive, Conjunctiva normal, Non-icteric.   Neck: Normal range of motion, No tenderness, Supple, No stridor.   Lymphatic: No lymphadenopathy noted.   Cardiovascular: Regular rate and rhythm, no palpable thrill  Thorax & Lungs: No respiratory distress,  No chest tenderness.  CTAB  Abdomen: Bowel sounds normal, Soft, No tenderness, No masses, No pulsatile masses. No peritoneal signs.  Skin: Warm, Dry, No erythema, " No rash.   Back: No bony tenderness, No CVA tenderness.   Extremities: Strong dorsalis pedal pulses. External rotation and shortening of left leg to the left hip.  Musculoskeletal: Good range of motion in all major joints. No tenderness to palpation or major deformities noted.   Neurologic: Alert , Normal motor function, Normal sensory function, No focal deficits noted.  Inability to move her left lower extremity secondary to pain.  Psychiatric: Affect normal, Judgment normal, Mood normal.     DIAGNOSTIC STUDIES / PROCEDURES    EKG Interpretation:  Interpreted by me  Sinus rate of 77 with no ST elevation, depression or T wave inversion.    LABS  Labs Reviewed   CBC WITH DIFFERENTIAL - Abnormal; Notable for the following components:       Result Value    Lymphocytes 20.40 (*)     All other components within normal limits   BASIC METABOLIC PANEL - Abnormal; Notable for the following components:    Glucose 139 (*)     All other components within normal limits   SARS-COV ANTIGEN MARGARITA    Narrative:     Have you been in close contact with a person who is suspected  or known to be positive for COVID-19 within the last 30 days  (e.g. last seen that person < 30 days ago)->Unknown   ESTIMATED GFR   URINALYSIS,CULTURE IF INDICATED           All labs reviewed by me.    RADIOLOGY  DX-HIP-UNILATERAL-WITH PELVIS-1 VIEW LEFT   Final Result      Acute left subcapital femoral neck fracture has slightly irregular margination raising possibility of a pathologic fracture. This however may just be related to positioning. CT pelvis without contrast may help clarify if clinically indicated      Healed right obturator ring fractures      EC-ECHOCARDIOGRAM COMPLETE W/O CONT    (Results Pending)      The radiologist's interpretation of all radiological studies have been reviewed by me.    COURSE & MEDICAL DECISION MAKING  Pertinent Labs & Imaging studies reviewed. (See chart for details)    This is a 81 y.o. female that presents with a fall.   This could be related to the patient's hydrocephalus versus urinary tract infection and will get a screening EKG as well.  I will reassess after this..     11:24 PM - Patient seen and examined at bedside. Ordered DX-Hip, CBC with diff., BMP, Urinalysis, and EKG.  Patient will be medicated with Morphine 4 mg and Zofran 4 mg for her symptoms. Informed patient that she more than likely has broken her hip and will require surgical intervention. Patient verbalizes understanding and agreement to this plan of care.      EKG is unremarkable.  The patient does have an acute left subcapital femoral neck fracture.  The patient has no significant electrolyte derangements and no anemia.  Urinalysis is pending at this time.  We will admit the patient to the hospitalist in guarded condition.    FINAL IMPRESSION  1. Closed fracture of left hip, initial encounter ([facility])          Teja SEQUEIRA (Scribe), am scribing for, and in the presence of, Layton Li M.D..    Electronically signed by: Teja Chase (Dali), 11/23/2021    Layton SEQUEIRA M.D. personally performed the services described in this documentation, as scribed by Teja Chase in my presence, and it is both accurate and complete.     C    The note accurately reflects work and decisions made by me.  Layton Li M.D.  11/24/2021  1:09 AM

## 2021-11-24 NOTE — ED NOTES
Report given to pre-op RN, states pt on schedule for approx 1230, likely earlier. Pt to be updated on this. hospitalist at bedside updating pt & daughter on POC.

## 2021-11-24 NOTE — ED NOTES
Patient is resting on cart, medicated per MAR. Updated on POC, pending time for OR, all questions answered. Pt denies needs, resting with unlabored respirs, remains on monitoring.

## 2021-11-24 NOTE — ASSESSMENT & PLAN NOTE
X-ray of the hip showed Acute left subcapital femoral neck fracture has slightly irregular margination raising possibility of a pathologic fracture.   Orthopedic was consulted, s/p surgical fixation 11/24  PT/OT evaluation, PT recommend SNF, referral placed

## 2021-11-24 NOTE — PROGRESS NOTES
"Hospital Medicine Daily Progress Note    Date of Service  11/24/2021    Chief Complaint  Aaliyah Fulton is a 81 y.o. female admitted 11/23/2021 with   Chief Complaint   Patient presents with   • Fall     Per EMS report, Pt sustained a GLF today due to feeling like she \"was going to pass out\". -LOC, -head trauma. Pt experiencing left hip pain. Pt had a syncopal episode yesterday in the store. PTA EMS gave 50mcg fent. Pt reports feeling dehydrated the past few days.           Hospital Course  This 81-year-old female with a past medical significant for hydrocephalus being followed by Dr. Blanchard depression history of systolic murmur present to the ER on 11/23/2021 after she was noted to have a ground-level fall.  She reported that she was feeling lightheaded after taking the trash and landed on the left side. X-ray of the hip showed Acute left subcapital femoral neck fracture has slightly irregular margination raising possibility of a pathologic fracture.   Orthopedic was consulted, patient will be going for surgery today.    Of note he is found to have a systolic murmur, echocardiogram ordered, pending at this time.  She reports that she has been following more frequently at home, she is supposed to have a scheduled shunt placement by Dr. Blanchard in January.  I called Dr. Blanchard's office and notified the patient is being admitted to the hospital.      Interval Problem Update  No acute events overnight, laying in bed, denies any complaint.  Patient is alert and oriented, answering questions appropriately.  Plan of care has been discussed with the patient daughter at the bedside    Echocardiogram pending, orthopedic surgery following, follow orthopedic surgery recommendation    I have personally seen and examined the patient at bedside. I discussed the plan of care with patient, family, bedside RN and charge RN.    Consultants/Specialty  orthopedics    Code Status  Full Code    Disposition  Patient is not medically " cleared.   Anticipate discharge to to home with close outpatient follow-up.  I have placed the appropriate orders for post-discharge needs.    Review of Systems  Review of Systems   Constitutional: Negative for chills, fever and malaise/fatigue.   HENT: Negative for congestion and sore throat.    Eyes: Negative for blurred vision and double vision.   Respiratory: Negative for cough, hemoptysis, sputum production and shortness of breath.    Cardiovascular: Negative for chest pain, palpitations and orthopnea.   Gastrointestinal: Negative for abdominal pain, diarrhea, heartburn, nausea and vomiting.   Genitourinary: Negative for dysuria.   Musculoskeletal: Positive for joint pain (lft femur).   Neurological: Negative for dizziness and focal weakness.   Psychiatric/Behavioral: The patient is nervous/anxious.         Physical Exam  Temp:  [36.6 °C (97.9 °F)-37.4 °C (99.4 °F)] 37.4 °C (99.4 °F)  Pulse:  [] 109  Resp:  [17-22] 17  BP: (154-186)/(73-91) 177/85  SpO2:  [90 %-98 %] 97 %    Physical Exam  Vitals and nursing note reviewed.   Constitutional:       Appearance: Normal appearance.   HENT:      Head: Normocephalic.   Eyes:      Extraocular Movements: Extraocular movements intact.   Cardiovascular:      Rate and Rhythm: Normal rate.      Pulses: Normal pulses.   Pulmonary:      Effort: No respiratory distress.      Breath sounds: Normal breath sounds.   Abdominal:      General: Bowel sounds are normal.      Palpations: Abdomen is soft.   Musculoskeletal:      Right lower leg: No edema.      Left lower leg: No edema.      Comments: Decreased ROM on the left side 2/2 pain   Skin:     General: Skin is warm.   Neurological:      Mental Status: She is alert and oriented to person, place, and time.      Cranial Nerves: No cranial nerve deficit.         Fluids    Intake/Output Summary (Last 24 hours) at 11/24/2021 1348  Last data filed at 11/24/2021 1229  Gross per 24 hour   Intake --   Output 300 ml   Net -300 ml        Laboratory  Recent Labs     11/23/21  2306   WBC 6.7   RBC 4.37   HEMOGLOBIN 13.9   HEMATOCRIT 41.4   MCV 94.7   MCH 31.8   MCHC 33.6   RDW 43.5   PLATELETCT 190   MPV 11.4     Recent Labs     11/23/21  2306   SODIUM 141   POTASSIUM 4.0   CHLORIDE 107   CO2 25   GLUCOSE 139*   BUN 12   CREATININE 0.63   CALCIUM 8.8                   Imaging  DX-HIP-UNILATERAL-WITH PELVIS-1 VIEW LEFT   Final Result      Acute left subcapital femoral neck fracture has slightly irregular margination raising possibility of a pathologic fracture. This however may just be related to positioning. CT pelvis without contrast may help clarify if clinically indicated      Healed right obturator ring fractures      EC-ECHOCARDIOGRAM COMPLETE W/O CONT    (Results Pending)        Assessment/Plan  * Closed left hip fracture (HCC)- (present on admission)  Assessment & Plan  X-ray of the hip showed Acute left subcapital femoral neck fracture has slightly irregular margination raising possibility of a pathologic fracture.   Orthopedic was consulted, patient will be going for surgery today  PT/OT  As per surgery recs    Systolic murmur- (present on admission)  Assessment & Plan  -Will obtain ECHO to rule out valvular disease as cause for her symptoms    Hydrocephalus (HCC)- (present on admission)  Assessment & Plan  Call Dr. Blanchard's office, stated that if patient did not hit her head no imaging is required  Patient is supposed to have a shunt procedure in January  But his MA stated that patient need to  recover from this hip fracture prior to the surgery    Depression- (present on admission)  Assessment & Plan  Continue  venlafaxine to 150 mg daily       VTE prophylaxis: SCDs/TEDs as patient is going for [facility]

## 2021-11-24 NOTE — OP REPORT
DATE OF OPERATION: 11/24/2021     PREOPERATIVE DIAGNOSIS: Left displaced femoral neck fracture    POSTOPERATIVE DIAGNOSIS: Same    PROCEDURE PERFORMED: Open treatment of left femoral fracture, proximal end, neck with prosthetic replacement    SURGEON: Marlon Culver M.D.     ASSISTANT: Trey Earl PA-C    ANESTHESIOLOGIST: Devorah Torres MD.    ANESTHESIA: General    ESTIMATED BLOOD LOSS: 50 mL    INDICATIONS: The patient is a 81 y.o. female with a left femoral neck fracture resulting from a ground level fall.  The patient denies antecedent pain, and was found to have a normal neurovascular exam and skin envelope.  Radiographs reviewed by myself demonstrated a displaced femoral neck fracture.  Given these findings, the patient is a candidate for surgical treatment of the femoral neck fracture with hemiarthroplasty.  I discussed the risks and benefits of the procedure, including the risks of infection, wound healing complication, neurovascular injury, instability, limb length discrepancy, need for revision surgery, and the medical risks of anesthesia including DVT, PE, MI, stroke, and death.  Benefits include early mobilization and reduction in the medical risks of hip fractures.  Alternatives to surgery were also discussed, including non-operative management, percutaneous screw fixation and total hip arthroplasty.  The patient was in agreement with the plan to proceed, the informed consent was signed and documented and the operative extremity was marked.      PROCEDURE:  The patient was sedated with general anesthesia, and positioned in the lateral decubitus position on a beanbag with all bony prominences well padded and an axillary roll placed.  Perioperative antibiotics were administered. Sequential compression devices were employed.  The correct operative site was prepped and draped into a sterile field.  A procedural pause was conducted to verify correct patient, correct extremity, and presence of the  surgeons initials on the operative extremity.    A posterior approach to the hip was performed with care taken to avoid all neurovascular structures.  The fascia was split in line with the incision to the tip of the greater troch, then curved posteriorly to parallel the gluteal fibers.  The short external rotators and capsule were taken down en bloc and tagged with No 5 Ticron suture for future repair. The labrum was preserved and the sciatic nerve was protected at all times.    A femoral neck cut was made one finger breadth above the lesser trochanter and the femoral head was removed without difficulty.  The acetabulum was inspected and cleared of foreign material.  A femoral neck retractor was placed, and the canal was sequentially reamed and broached to a size CR4.  After preparation of the canal, a cement restrictor was placed. A [employer] Ion size CR4 stem was cemented in the appropriate version using third generation cement techniques.    Once cement had dried completely a 28+0mm head and 43mm shell were impacted. The hip was then reduced and stability was tested. Hip was stable in full extension and external rotation and stable to 90 degrees internal rotation at 90 degrees forward flexion. Wounds were irrigated and capsule was closed to greater trochanter with #5 Ticron sutures.  The wound was then closed in layers, with #1 Vicryl in the fascia, 2-0 vicryl in the subcutaneous tissue, and staples in the skin.  Sterile dressings were applied, and the patient was transferred to PACU in stable condition.    The patient tolerated the procedure well. There were no apparent complications. All sponge, needle, and instrument counts were correct on two separate occasions. She was awakened, extubated, and transferred to the recovery room in satisfactory condition.     The use of Trey Earl as a surgical assistant was necessary for assistance with exposure, retraction, fracture reduction, instrumentation, and  closure.      Post-Operative Plan:    1.  The patient should bear weight as tolerated on their operative extremity with posterior hip precautions.  Gait aids (crutch or crutches, cane, walker) may be used as needed, and may be discontinued when no longer required.  2.  IV antibiotics - may be continued for 24 hours  3.  DVT prophylaxis - SCD's and Lovenox 40 mg SQ daily while inpatient.  The patient may transition to Aspirin 325 mg PO BID as an outpatient  4.  Discharge planning  ____________________________________   Marlon Culver M.D.   DD: 11/24/2021  2:12 PM

## 2021-11-24 NOTE — ASSESSMENT & PLAN NOTE
Call Dr. Blanchard's office, stated that if patient did not hit her head no imaging is required  Patient is supposed to have a shunt procedure in January  But his MA stated that patient need to  recover from this hip fracture prior to the surgery

## 2021-11-24 NOTE — ED NOTES
Pt resting on cart, sleeping, wakes to verbal stim. Pt alert, oriented x 4. Pt daughter arrives to bedside, purewick placed for patient to void, instructed on use & verbalizes understanding. Pt remains on monitoring, c/o 7/10 left hip pain, agreeable to medication per MAR. Patient denies additional needs, call light within reach, resting with unlabored respirs in no distress.

## 2021-11-24 NOTE — OR NURSING
1440 Pt arrived from OR via gurney. Report given by anesthesia and RN. Sleeping 6L mask even non labored breathing, lungs clear, airway patent. ST, murmur. Skin pink warm dry. PIV patent. Left hip dressing cdi, cold pack, no drainage, no hematoma. LLE 2+ pedal pulse, pink warm brisk cap refill    1455 easily arousable. Even non labored breathing. LLE strong dorsi flex/ext, wiggles toes, denies numbness tingling.     1500 10L oxymask ERAS.     1530 resting with eyes closed, even non labored breathing. Face relaxed. Skin pink warm dry. LLE dressing cdi, cold pack, no changes. LLE cms intact.    1534 updated Angi, daughter.      1542 awake and oriented, c/o pain treated per mar. Denies nausea, sob, chest pain.     1600 phlebotomy at bedside.     1615 no changes    1630 replaced PIV. Phase 1 complete    1640 updated Angi, daughter     1700 resting comfortably, denies pain and nausea. LLE no changes.     1714 MD Torres at bedside    1728 report given to Zaria BURT T820. VSS  Pt ready for transfer to room with RN, monitor, O2

## 2021-11-24 NOTE — ANESTHESIA PROCEDURE NOTES
Airway    Date/Time: 11/24/2021 1:44 PM  Performed by: Devorah Torres M.D.  Authorized by: Devorah Torres M.D.     Location:  OR  Urgency:  Elective  Difficult Airway: No    Indications for Airway Management:  Anesthesia      Spontaneous Ventilation: present    Sedation Level:  Deep  Preoxygenated: Yes    Patient Position:  Sniffing  MILS Maintained Throughout: Yes    Mask Difficulty Assessment:  0 - not attempted  Final Airway Type:  Supraglottic airway  Final Supraglottic Airway:  Standard LMA    SGA Size:  4  Number of Attempts at Approach:  1

## 2021-11-25 ENCOUNTER — APPOINTMENT (OUTPATIENT)
Dept: CARDIOLOGY | Facility: MEDICAL CENTER | Age: 81
DRG: 481 | End: 2021-11-25
Attending: INTERNAL MEDICINE
Payer: MEDICARE

## 2021-11-25 LAB
ANION GAP SERPL CALC-SCNC: 8 MMOL/L (ref 7–16)
BUN SERPL-MCNC: 12 MG/DL (ref 8–22)
CALCIUM SERPL-MCNC: 8.5 MG/DL (ref 8.5–10.5)
CHLORIDE SERPL-SCNC: 104 MMOL/L (ref 96–112)
CO2 SERPL-SCNC: 25 MMOL/L (ref 20–33)
CREAT SERPL-MCNC: 0.66 MG/DL (ref 0.5–1.4)
ERYTHROCYTE [DISTWIDTH] IN BLOOD BY AUTOMATED COUNT: 45.3 FL (ref 35.9–50)
GLUCOSE SERPL-MCNC: 93 MG/DL (ref 65–99)
HCT VFR BLD AUTO: 36.9 % (ref 37–47)
HGB BLD-MCNC: 11.7 G/DL (ref 12–16)
LV EJECT FRACT MOD 2C 99903: 74.03
LV EJECT FRACT MOD 4C 99902: 76.28
LV EJECT FRACT MOD BP 99901: 73.47
MCH RBC QN AUTO: 31.4 PG (ref 27–33)
MCHC RBC AUTO-ENTMCNC: 31.7 G/DL (ref 33.6–35)
MCV RBC AUTO: 98.9 FL (ref 81.4–97.8)
PLATELET # BLD AUTO: 123 K/UL (ref 164–446)
PMV BLD AUTO: 11 FL (ref 9–12.9)
POTASSIUM SERPL-SCNC: 4.2 MMOL/L (ref 3.6–5.5)
RBC # BLD AUTO: 3.73 M/UL (ref 4.2–5.4)
SODIUM SERPL-SCNC: 137 MMOL/L (ref 135–145)
WBC # BLD AUTO: 6.6 K/UL (ref 4.8–10.8)

## 2021-11-25 PROCEDURE — A9270 NON-COVERED ITEM OR SERVICE: HCPCS | Performed by: ORTHOPAEDIC SURGERY

## 2021-11-25 PROCEDURE — 700111 HCHG RX REV CODE 636 W/ 250 OVERRIDE (IP): Performed by: ORTHOPAEDIC SURGERY

## 2021-11-25 PROCEDURE — 85027 COMPLETE CBC AUTOMATED: CPT

## 2021-11-25 PROCEDURE — 36415 COLL VENOUS BLD VENIPUNCTURE: CPT

## 2021-11-25 PROCEDURE — 97166 OT EVAL MOD COMPLEX 45 MIN: CPT

## 2021-11-25 PROCEDURE — 99024 POSTOP FOLLOW-UP VISIT: CPT | Performed by: SURGERY

## 2021-11-25 PROCEDURE — 700102 HCHG RX REV CODE 250 W/ 637 OVERRIDE(OP): Performed by: ORTHOPAEDIC SURGERY

## 2021-11-25 PROCEDURE — 99232 SBSQ HOSP IP/OBS MODERATE 35: CPT | Performed by: STUDENT IN AN ORGANIZED HEALTH CARE EDUCATION/TRAINING PROGRAM

## 2021-11-25 PROCEDURE — 93306 TTE W/DOPPLER COMPLETE: CPT | Mod: 26 | Performed by: STUDENT IN AN ORGANIZED HEALTH CARE EDUCATION/TRAINING PROGRAM

## 2021-11-25 PROCEDURE — 93306 TTE W/DOPPLER COMPLETE: CPT

## 2021-11-25 PROCEDURE — 770020 HCHG ROOM/CARE - TELE (206)

## 2021-11-25 PROCEDURE — A9270 NON-COVERED ITEM OR SERVICE: HCPCS | Performed by: HOSPITALIST

## 2021-11-25 PROCEDURE — 97535 SELF CARE MNGMENT TRAINING: CPT

## 2021-11-25 PROCEDURE — 97161 PT EVAL LOW COMPLEX 20 MIN: CPT

## 2021-11-25 PROCEDURE — 80048 BASIC METABOLIC PNL TOTAL CA: CPT

## 2021-11-25 PROCEDURE — 700102 HCHG RX REV CODE 250 W/ 637 OVERRIDE(OP): Performed by: HOSPITALIST

## 2021-11-25 RX ADMIN — DOCUSATE SODIUM 100 MG: 100 CAPSULE, LIQUID FILLED ORAL at 06:00

## 2021-11-25 RX ADMIN — ENOXAPARIN SODIUM 40 MG: 40 INJECTION SUBCUTANEOUS at 06:06

## 2021-11-25 RX ADMIN — KETOROLAC TROMETHAMINE 15 MG: 30 INJECTION, SOLUTION INTRAMUSCULAR; INTRAVENOUS at 02:51

## 2021-11-25 RX ADMIN — ACETAMINOPHEN 650 MG: 325 TABLET, FILM COATED ORAL at 06:06

## 2021-11-25 RX ADMIN — SENNOSIDES AND DOCUSATE SODIUM 1 TABLET: 50; 8.6 TABLET ORAL at 20:20

## 2021-11-25 RX ADMIN — KETOROLAC TROMETHAMINE 15 MG: 30 INJECTION, SOLUTION INTRAMUSCULAR; INTRAVENOUS at 13:32

## 2021-11-25 RX ADMIN — CEFAZOLIN SODIUM 2 G: 2 INJECTION, SOLUTION INTRAVENOUS at 02:58

## 2021-11-25 RX ADMIN — DOCUSATE SODIUM 100 MG: 100 CAPSULE, LIQUID FILLED ORAL at 18:02

## 2021-11-25 RX ADMIN — VENLAFAXINE HYDROCHLORIDE 150 MG: 75 CAPSULE, EXTENDED RELEASE ORAL at 06:07

## 2021-11-25 RX ADMIN — ACETAMINOPHEN 650 MG: 325 TABLET, FILM COATED ORAL at 18:03

## 2021-11-25 RX ADMIN — KETOROLAC TROMETHAMINE 15 MG: 30 INJECTION, SOLUTION INTRAMUSCULAR; INTRAVENOUS at 20:20

## 2021-11-25 RX ADMIN — OXYBUTYNIN CHLORIDE 5 MG: 5 TABLET, EXTENDED RELEASE ORAL at 06:07

## 2021-11-25 RX ADMIN — ACETAMINOPHEN 650 MG: 325 TABLET, FILM COATED ORAL at 02:50

## 2021-11-25 RX ADMIN — ACETAMINOPHEN 650 MG: 325 TABLET, FILM COATED ORAL at 13:33

## 2021-11-25 RX ADMIN — KETOROLAC TROMETHAMINE 15 MG: 30 INJECTION, SOLUTION INTRAMUSCULAR; INTRAVENOUS at 08:46

## 2021-11-25 ASSESSMENT — COGNITIVE AND FUNCTIONAL STATUS - GENERAL
SUGGESTED CMS G CODE MODIFIER MOBILITY: CL
HELP NEEDED FOR BATHING: A LOT
SUGGESTED CMS G CODE MODIFIER DAILY ACTIVITY: CK
DRESSING REGULAR UPPER BODY CLOTHING: A LITTLE
CLIMB 3 TO 5 STEPS WITH RAILING: A LOT
TURNING FROM BACK TO SIDE WHILE IN FLAT BAD: A LOT
STANDING UP FROM CHAIR USING ARMS: A LITTLE
DAILY ACTIVITIY SCORE: 16
MOVING FROM LYING ON BACK TO SITTING ON SIDE OF FLAT BED: UNABLE
WALKING IN HOSPITAL ROOM: A LITTLE
DRESSING REGULAR LOWER BODY CLOTHING: A LOT
TOILETING: A LOT
MOBILITY SCORE: 12
PERSONAL GROOMING: A LITTLE
MOVING TO AND FROM BED TO CHAIR: UNABLE

## 2021-11-25 ASSESSMENT — ENCOUNTER SYMPTOMS
COUGH: 0
DIARRHEA: 0
ORTHOPNEA: 0
SORE THROAT: 0
NAUSEA: 0
DOUBLE VISION: 0
SHORTNESS OF BREATH: 0
FOCAL WEAKNESS: 0
ABDOMINAL PAIN: 0
HEMOPTYSIS: 0
CHILLS: 0
DIZZINESS: 0
VOMITING: 0
NERVOUS/ANXIOUS: 1
SPUTUM PRODUCTION: 0
FEVER: 0
BLURRED VISION: 0
HEARTBURN: 0
PALPITATIONS: 0

## 2021-11-25 ASSESSMENT — FIBROSIS 4 INDEX: FIB4 SCORE: 3.25

## 2021-11-25 ASSESSMENT — GAIT ASSESSMENTS
DEVIATION: ANTALGIC;STEP TO;DECREASED BASE OF SUPPORT;BRADYKINETIC;DECREASED HEEL STRIKE;DECREASED TOE OFF
DISTANCE (FEET): 12
GAIT LEVEL OF ASSIST: MINIMAL ASSIST
ASSISTIVE DEVICE: FRONT WHEEL WALKER

## 2021-11-25 ASSESSMENT — ACTIVITIES OF DAILY LIVING (ADL): TOILETING: INDEPENDENT

## 2021-11-25 NOTE — PROGRESS NOTES
Assumed care of pt. Bedside report received from JAVED Bell. Pt was updated on plan of care. Call light, phone and personal belongings in reach. Bed alarm on and working properly, bed in lowest position, and locked.     COVID 19 crisis charting performed

## 2021-11-25 NOTE — THERAPY
"Occupational Therapy   Initial Evaluation     Patient Name: Aaliyah Fulton  Age:  81 y.o., Sex:  female  Medical Record #: [id number]  Today's Date: 11/25/2021     Precautions  Precautions: (P) Fall Risk,Posterior Hip Precautions,Weight Bearing As Tolerated Left Lower Extremity    Assessment  Patient is 81 y.o. female admitted for GLF, now POD # 1 L hip hemiarthroplasty.  Patient reported she has been falling more frequently due to hydrocephalus & is being followed by neurosurgery for shunt placement, pt unaware she had surgery had to be shown surgical bandage as proof. Pt required Prince for bed mobility, maxA for lower body ADLs, Prince for STS transfer and Prince for mobility up to chair. Pt educated on posterior hip precautions along with provided handout, will address lower body ADLs at future session, will continue to see for skilled therapy while admitted as well as recommend post-acute placement at this time.    Plan    Recommend Occupational Therapy 4 times per week until therapy goals are met for the following treatments:  Adaptive Equipment, Neuro Re-Education / Balance, Self Care/Activities of Daily Living, Therapeutic Activities and Therapeutic Exercises.    DC Equipment Recommendations: (P) Unable to determine at this time  Discharge Recommendations: (P) Recommend post-acute placement for additional occupational therapy services prior to discharge home     Subjective    \"I had surgery? No way, they haven't done it yet\"     Objective       11/25/21 0822   Prior Living Situation   Prior Services Home-Independent   Housing / Facility 1 Story House   Steps Into Home 2   Rail Right Rail (Steps into Home)   Bathroom Set up Bathtub / Shower Combination;Grab Bars   Equipment Owned 4-Wheel Walker   Lives with - Patient's Self Care Capacity Spouse   Comments Spouse able to assist as needed   Prior Level of ADL Function   Self Feeding Independent   Grooming / Hygiene Independent   Bathing Independent   Dressing " Independent   Toileting Independent   Prior Level of IADL Function   Medication Management Independent   Laundry Independent   Kitchen Mobility Independent   Finances Independent   Home Management Independent   Shopping Independent   Prior Level Of Mobility Independent With Device in Community   History of Falls   History of Falls Yes   Date of Last Fall   (reason for admit)   Precautions   Precautions Fall Risk;Posterior Hip Precautions;Weight Bearing As Tolerated Left Lower Extremity   Cognition    Cognition / Consciousness X   Orientation Level Not Oriented to Reason   Safety Awareness Impaired   New Learning Impaired   Attention Impaired   Comments Pleasant, cooperative, unaware surgery occured   Active ROM Upper Body   Active ROM Upper Body  WDL   Dominant Hand Right   Strength Upper Body   Upper Body Strength  X   Gross Strength Generalized Weakness, Equal Bilaterally.    Sensation Upper Body   Upper Extremity Sensation  WDL   Upper Body Muscle Tone   Upper Body Muscle Tone  WDL   Neurological Concerns   Neurological Concerns No   Coordination Upper Body   Coordination WDL   Balance Assessment   Sitting Balance (Dynamic) Fair   Standing Balance (Static) Fair -   Standing Balance (Dynamic) Poor +   Weight Shift Sitting Fair   Weight Shift Standing Fair   Comments   (w/ FWW)   Bed Mobility    Supine to Sit Minimal Assist   Scooting Minimal Assist   Comments HOB elevated, left seated in chair   ADL Assessment   Grooming Supervision;Seated   Upper Body Dressing Supervision   Lower Body Dressing Maximal Assist   Toileting   (NT-refused need)   How much help from another person does the patient currently need...   Putting on and taking off regular lower body clothing? 2   Bathing (including washing, rinsing, and drying)? 2   Toileting, which includes using a toilet, bedpan, or urinal? 2   Putting on and taking off regular upper body clothing? 3   Taking care of personal grooming such as brushing teeth? 3   Eating  meals? 4   6 Clicks Daily Activity Score 16   Functional Mobility   Sit to Stand Minimal Assist   Bed, Chair, Wheelchair Transfer Minimal Assist   Toilet Transfers Refused   Transfer Method Stand Step   Mobility bed mobility, mobility in room, up to chair   Comments w/ FWW   Activity Tolerance   Sitting in Chair left seated in chair   Sitting Edge of Bed 10 min   Standing 5 min   Patient / Family Goals   Patient / Family Goal #1 To go home   Short Term Goals   Short Term Goal # 1 Pt will complete LB dressing with AE PRN maintaining hip precautions   Short Term Goal # 2 Pt will complete ADL transfers with supervision   Short Term Goal # 3 Pt will complete toileting with supervision   Education Group   Education Provided Role of Occupational Therapist;Hip Precautions;Weight Bearing Precautions   Role of Occupational Therapist Patient Response Patient;Acceptance;Explanation   Hip Precautions Patient Response Patient;Acceptance;Explanation;Handout;Reinforcement Needed   Weight Bearing Precautions Patient Response Patient;Acceptance;Explanation;Reinforcement Needed   Problem List   Problem List Decreased Active Daily Living Skills;Decreased Homemaking Skills;Decreased Upper Extremity Strength Right;Decreased Upper Extremity Strength Left;Decreased Functional Mobility;Decreased Activity Tolerance;Limited Knowledge of Post Op Precautions;Safety Awareness Deficits / Cognition   Interdisciplinary Plan of Care Collaboration   IDT Collaboration with  Nursing;Physical Therapist   Patient Position at End of Therapy Seated;Chair Alarm On;Call Light within Reach;Tray Table within Reach;Phone within Reach   Collaboration Comments RN updated

## 2021-11-25 NOTE — ANESTHESIA POSTPROCEDURE EVALUATION
Patient: Aaliyah Fulton    Procedure Summary     Date: 11/24/21 Room / Location: [place] [address] / SURGERY [facility]    Anesthesia Start: 1330 Anesthesia Stop: 1445    Procedure: HEMIARTHROPLASTY, HIP (Left Hip) Diagnosis: (left femoral neck fracture )    Surgeons: Marlon Culver M.D. Responsible Provider: Devorah Torres M.D.    Anesthesia Type: general ASA Status: 3          Final Anesthesia Type: general  Last vitals  BP   Blood Pressure : 101/57    Temp   36.4 °C (97.6 °F)    Pulse   90   Resp   20    SpO2   95 %      Anesthesia Post Evaluation    Patient location during evaluation: PACU  Patient participation: complete - patient participated  Level of consciousness: awake and alert  Pain score: 0    Airway patency: patent  Anesthetic complications: no  Cardiovascular status: adequate  Respiratory status: acceptable and face mask  Hydration status: acceptable    PONV: none          No complications documented.     Nurse Pain Score: 0 (NPRS)

## 2021-11-25 NOTE — THERAPY
Physical Therapy   Initial Evaluation     Patient Name: Aaliyah Fulton  Age:  81 y.o., Sex:  female  Medical Record #: [id number]  Today's Date: 11/25/2021     Precautions  Precautions: Fall Risk;Posterior Hip Precautions;Weight Bearing As Tolerated Left Lower Extremity    Assessment  Patient is 81 y.o. female who presented acutely after GLF, now POD # 1 L hip hemiarthroplasty.  Patient reported she has been falling more frequently due to hydrocephalus & is being followed by neurosurgery for shunt placement.  Today patient was unaware that she had surgery.  Educated patient on procedure performed and posterior hip precautions.  Handout was provided, recommend reinforcement.  Patient with B LE hip adduction at baseline.  Patient required min A for supine > sit.  She was able to perform STS with FWW and min A and was able to perform standing marches with cues for sequencing.  Patient was able to ambulate ~12 feet with FWW and min A with cues for stepping and assist to progress FWW.  Patient ambulated with antalgic, step to gait pattern with frequent pauses.  Recommend continued acute PT and post acute placement to address deficits in balance, sequencing, strength, and safety with functional mobility.    Plan    Recommend Physical Therapy 4 times per week until therapy goals are met for the following treatments:  Bed Mobility, Equipment, Gait Training, Neuro Re-Education / Balance, Self Care/Home Evaluation, Stair Training, Therapeutic Activities and Therapeutic Exercises    DC Equipment Recommendations: Unable to determine at this time  Discharge Recommendations: Recommend post-acute placement for additional physical therapy services prior to discharge home       Objective     11/25/21 0819   Prior Living Situation   Prior Services Home-Independent   Housing / Facility 1 Story House   Steps Into Home 2   Rail Right Rail (Steps into Home)   Equipment Owned 4-Wheel Walker   Lives with - Patient's Self Care Capacity  Spouse   Comments Pt reports her  will be able to assist her, reported he also uses a 4WW   Prior Level of Functional Mobility   Bed Mobility Independent   Transfer Status Independent   Ambulation Independent   Distance Ambulation (Feet) (household-limited community)   Assistive Devices Used 4-Wheel Walker   Stairs Independent   Comments Pt reports her daughter assists with grocery shopping and does not allow her to drive much   History of Falls   History of Falls Yes   Date of Last Fall 11/24/21   Cognition    Cognition / Consciousness X   Safety Awareness Impaired   New Learning Impaired   Comments Pleasant & cooperative, unaware that she had surgery   Active ROM Lower Body    Active ROM Lower Body  WDL   Strength Lower Body   Lower Body Strength  WDL   Comments R LE grossly 4/5, L LE grossly 3+ to 4/5, limited by pain   Sensation Lower Body   Lower Extremity Sensation   WDL   Comments Denies numbness/tingling   Balance Assessment   Sitting Balance (Static) Fair +   Sitting Balance (Dynamic) Fair   Standing Balance (Static) Fair -   Standing Balance (Dynamic) Poor +   Weight Shift Sitting Fair   Weight Shift Standing Fair   Comments w/ FWW   Gait Analysis   Gait Level Of Assist Minimal Assist   Assistive Device Front Wheel Walker   Distance (Feet) 12   # of Times Distance was Traveled 1   Deviation Antalgic;Step To;Decreased Base Of Support;Bradykinetic;Decreased Heel Strike;Decreased Toe Off   # of Stairs Climbed 0   Weight Bearing Status WBAT L LE   Comments Cues for sequencing steps, progression of gait, assist for progressing FWW   Bed Mobility    Supine to Sit Minimal Assist   Sit to Supine (NT, up in chair post session)   Scooting Minimal Assist (seated)   Comments HOB elevated, cues for sequencing   Functional Mobility   Sit to Stand Minimal Assist   Bed, Chair, Wheelchair Transfer Minimal Assist   Transfer Method Stand Step   Mobility bed mobility, ambulation to chair   Activity Tolerance   Sitting  in Chair 10+ min (post session)   Sitting Edge of Bed 8 min   Standing 5 min   Comments limited by pain   Short Term Goals    Short Term Goal # 1 Pt will perform supine <> sit without bed features with SPV within 6 visits in order to progress toward functional independence   Short Term Goal # 2 Pt will perform STS/functional transfers with FWW and SPV within 6 visits in order to progress OOB activity   Short Term Goal # 3 Pt will ambulate 100 ft with FWW and SPV within 6 visits in order to progress toward PLOF   Short Term Goal # 4 Pt will negotiate 2 steps with R rail and min A within 6 visits in order to access home   Session Information   Date / Session Number  11/25 - 1 (1/4, 12/1)

## 2021-11-25 NOTE — PROGRESS NOTES
4 Eyes Skin Assessment Completed by JAVED Arnold and JAVED Eller.    Head WDL  Ears WDL  Nose WDL  Mouth WDL  Neck WDL  Breast/Chest Redness and Blanching  Shoulder Blades WDL  Spine WDL  (R) Arm/Elbow/Hand Redness and Blanching  (L) Arm/Elbow/Hand Redness and Blanching  Abdomen WDL  Groin Redness and Blanching  Scrotum/Coccyx/Buttocks Redness, Blanching and Scar  (R) Leg Scar  (L) Leg Scar  (R) Heel/Foot/Toe Redness and Blanching  (L) Heel/Foot/Toe Redness and Blanching    Generalized dark spots and scars to back, buttocks, and lower extremities      Devices In Places Tele Box, Pulse Ox and Oxy Mask      Interventions In Place NC W/Ear Foams, Sacral Mepilex, Pillows and Pressure Redistribution Mattress    Possible Skin Injury No    Pictures Uploaded Into Epic N/A  Wound Consult Placed N/A  RN Wound Prevention Protocol Ordered No

## 2021-11-25 NOTE — PROGRESS NOTES
Assumed care of patient at bedside report from Zaria BURT. Pt on 10L oxymask, with no complaints of pain. Surgical site is CDI, no bruising or shadowing. Pt AOx4. Updated on POC. Call light within reach. Whiteboard updated. Fall precautions in place. Bed locked and in lowest position. All questions answered. No other needs indicated at this time.    Crisis charting performed, Covid-19 surge in effect

## 2021-11-25 NOTE — PROGRESS NOTES
Received pt from PACU via bed. Report received from Cat BURT. Pt drowsy but able oriented x4, 10L oxymask in place. Pt denies pain. 2 RN  Skin check completed. Incision site with dressing in place CDI. Left LE CMS intact foot cool and dry to touch with palpable pedal pulse. Pt denies numbness or tingling, sensation intact, . Pt daughter at bedside.

## 2021-11-25 NOTE — DISCHARGE PLANNING
[facility] Transitional Care Coordination    Referral from:  Dr. Herrera Velasquez  Insurance Provider on Facesheet: Medicare/[employer]  Potential Rehab Diagnosis:  Unilateral hip fracture/Non-traumatic brain    Chart review indicates patient has on going medical management and therapy needs to possibly meet inpatient rehab facility criteria with the goal of returning to community.    D/C support: Spouse     Physiatry to consult Friday 11/26.  GLF - POD #1 left hip hemiarthroplasty.  Hx hydrocephalus w/shunt - recent frequent falls.      Last Covid test: 11/24/202 not detected     Thank you for the referral.

## 2021-11-25 NOTE — PROGRESS NOTES
"Hospital Medicine Daily Progress Note    Date of Service  11/25/2021    Chief Complaint  Aaliyah Fulton is a 81 y.o. female admitted 11/23/2021 with   Chief Complaint   Patient presents with   • Fall     Per EMS report, Pt sustained a GLF today due to feeling like she \"was going to pass out\". -LOC, -head trauma. Pt experiencing left hip pain. Pt had a syncopal episode yesterday in the store. PTA EMS gave 50mcg fent. Pt reports feeling dehydrated the past few days.           Hospital Course  This 81-year-old female with a past medical significant for hydrocephalus being followed by Dr. Blanchard depression history of systolic murmur present to the ER on 11/23/2021 after she was noted to have a ground-level fall.  She reported that she was feeling lightheaded after taking the trash and landed on the left side. X-ray of the hip showed Acute left subcapital femoral neck fracture has slightly irregular margination raising possibility of a pathologic fracture.   Orthopedic was consulted, patient will be going for surgery today.    Of note he is found to have a systolic murmur, echocardiogram ordered, pending at this time.  She reports that she has been following more frequently at home, she is supposed to have a scheduled shunt placement by Dr. Blanchard in January.  I called Dr. Blanchrad's office and notified the patient is being admitted to the hospital.      Interval Problem Update  No acute events overnight.  S/p femoral neck fracture surgery yesterday.  On 2.5L oxygen, continue to wean.  PT recommending SNF. SNF referral placed.  Echocardiogram pending, although suspect dizziness is due to chronic hydrocephalus.      I have personally seen and examined the patient at bedside. I discussed the plan of care with patient, family, bedside RN and charge RN.    Consultants/Specialty  orthopedics    Code Status  Full Code    Disposition  Patient is not medically cleared.   Anticipate discharge to SNF.  I have placed the " appropriate orders for post-discharge needs.    Review of Systems  Review of Systems   Constitutional: Negative for chills, fever and malaise/fatigue.   HENT: Negative for congestion and sore throat.    Eyes: Negative for blurred vision and double vision.   Respiratory: Negative for cough, hemoptysis, sputum production and shortness of breath.    Cardiovascular: Negative for chest pain, palpitations and orthopnea.   Gastrointestinal: Negative for abdominal pain, diarrhea, heartburn, nausea and vomiting.   Genitourinary: Negative for dysuria.   Musculoskeletal: Positive for joint pain (lft femur).   Neurological: Negative for dizziness and focal weakness.   Psychiatric/Behavioral: The patient is nervous/anxious.         Physical Exam  Temp:  [36 °C (96.8 °F)-37.4 °C (99.4 °F)] 36.6 °C (97.8 °F)  Pulse:  [] 81  Resp:  [14-20] 18  BP: ()/(53-85) 114/69  SpO2:  [92 %-99 %] 96 %    Physical Exam  Vitals and nursing note reviewed.   Constitutional:       Appearance: Normal appearance.   HENT:      Head: Normocephalic.   Eyes:      Extraocular Movements: Extraocular movements intact.   Cardiovascular:      Rate and Rhythm: Normal rate.      Pulses: Normal pulses.   Pulmonary:      Effort: No respiratory distress.      Breath sounds: Normal breath sounds.   Abdominal:      General: Bowel sounds are normal.      Palpations: Abdomen is soft.   Musculoskeletal:      Right lower leg: No edema.      Left lower leg: No edema.      Comments: Decreased ROM on the left side 2/2 pain   Skin:     General: Skin is warm.   Neurological:      Mental Status: She is alert and oriented to person, place, and time.      Cranial Nerves: No cranial nerve deficit.         Fluids    Intake/Output Summary (Last 24 hours) at 11/25/2021 1213  Last data filed at 11/25/2021 1100  Gross per 24 hour   Intake 1120 ml   Output 1550 ml   Net -430 ml       Laboratory  Recent Labs     11/23/21  2306 11/25/21  0054   WBC 6.7 6.6   RBC 4.37 3.73*    HEMOGLOBIN 13.9 11.7*   HEMATOCRIT 41.4 36.9*   MCV 94.7 98.9*   MCH 31.8 31.4   MCHC 33.6 31.7*   RDW 43.5 45.3   PLATELETCT 190 123*   MPV 11.4 11.0     Recent Labs     11/23/21  2306 11/25/21  0054   SODIUM 141 137   POTASSIUM 4.0 4.2   CHLORIDE 107 104   CO2 25 25   GLUCOSE 139* 93   BUN 12 12   CREATININE 0.63 0.66   CALCIUM 8.8 8.5                   Imaging  DX-HIP-UNILATERAL-WITH PELVIS-1 VIEW LEFT   Final Result      Acute left subcapital femoral neck fracture has slightly irregular margination raising possibility of a pathologic fracture. This however may just be related to positioning. CT pelvis without contrast may help clarify if clinically indicated      Healed right obturator ring fractures      EC-ECHOCARDIOGRAM COMPLETE W/O CONT    (Results Pending)        Assessment/Plan  * Closed left hip fracture (HCC)- (present on admission)  Assessment & Plan  X-ray of the hip showed Acute left subcapital femoral neck fracture has slightly irregular margination raising possibility of a pathologic fracture.   Orthopedic was consulted, s/p surgical fixation 11/24  PT/OT evaluation, PT recommend SNF, referral placed    Systolic murmur- (present on admission)  Assessment & Plan  -Will obtain ECHO to rule out valvular disease as cause for her symptoms    Hydrocephalus (HCC)- (present on admission)  Assessment & Plan  Call Dr. Blanchard's office, stated that if patient did not hit her head no imaging is required  Patient is supposed to have a shunt procedure in January  But his MA stated that patient need to  recover from this hip fracture prior to the surgery    Depression- (present on admission)  Assessment & Plan  Continue  venlafaxine to 150 mg daily       VTE prophylaxis: SCDs/TEDs as patient is going for [facility]

## 2021-11-25 NOTE — PROGRESS NOTES
"   Orthopaedic PA Progress Note  Pleasant 81F suffered left femoral neck fracture yesterday and is now s/p L hip hemiarthroplasty on 11/24/2021    Interval changes:Did well overnight, now OOB in chair    ROS - Patient denies any new issues. No chest pain, dyspnea, or fever.  Pain well controlled.    /69   Pulse 81   Temp 36.6 °C (97.8 °F) (Temporal)   Resp 18   Ht 1.727 m (5' 8\")   Wt 72.5 kg (159 lb 13.3 oz)   SpO2 96%     Patient seen and examined  No acute distress  Breathing non labored  RRR  Surgical dressing is clean, dry, and intact. Patient clearly fires tibialis anterior, EHL, and gastrocnemius/soleus. Sensation is intact to light touch throughout superficial peroneal, deep peroneal, tibial, saphenous, and sural nerve distributions. Strong and palpable 2+ dorsalis pedis and posterior tibial pulses with capillary refill less than 2 seconds. No lower leg tenderness or discomfort.    Recent Labs     11/23/21  2306 11/25/21  0054   WBC 6.7 6.6   RBC 4.37 3.73*   HEMOGLOBIN 13.9 11.7*   HEMATOCRIT 41.4 36.9*   MCV 94.7 98.9*   MCH 31.8 31.4   MCHC 33.6 31.7*   RDW 43.5 45.3   PLATELETCT 190 123*   MPV 11.4 11.0       Active Hospital Problems    Diagnosis    • Hydrocephalus (HCC) [G91.9]    • Systolic murmur [R01.1]    • Closed left hip fracture (HCC) [S72.002A]    • Depression [F32.A]        Assessment/Plan:  POD#1  Wt bearing status - AT  PT/OT-initiated  Wound care:dressing left in place  Drains - no  Woodson-may discontinue  Sutures/Staples out- 10-14 days post operatively  Antibiotics: complete  DVT Prophylaxis- TEDS/SCDs/Foot pumps.   Lovenox: Start 40 mg daily, Duration-until ambulatory > 150'  OK to convert to  PO BID on discharge/ xfr  Future Procedures - none planned  Case Coordination for Discharge Planning - Disposition Clear for disposition (home/SNF/IRF) from Orthopaedic team standpoint. Follow-Up: needs appointment with Dr. Culver at [facility] at 10-14 days post-op " for re-evaluation, staple removal and radiographs.

## 2021-11-26 ENCOUNTER — HOSPITAL ENCOUNTER (INPATIENT)
Facility: REHABILITATION | Age: 81
LOS: 15 days | DRG: 560 | End: 2021-12-11
Attending: PHYSICAL MEDICINE & REHABILITATION | Admitting: PHYSICAL MEDICINE & REHABILITATION
Payer: MEDICARE

## 2021-11-26 VITALS
BODY MASS INDEX: 24.16 KG/M2 | OXYGEN SATURATION: 91 % | HEIGHT: 68 IN | TEMPERATURE: 97.7 F | RESPIRATION RATE: 17 BRPM | SYSTOLIC BLOOD PRESSURE: 115 MMHG | HEART RATE: 85 BPM | WEIGHT: 159.39 LBS | DIASTOLIC BLOOD PRESSURE: 67 MMHG

## 2021-11-26 DIAGNOSIS — N39.0 URINARY TRACT INFECTION DUE TO EXTENDED-SPECTRUM BETA LACTAMASE (ESBL) PRODUCING ESCHERICHIA COLI: ICD-10-CM

## 2021-11-26 DIAGNOSIS — I10 PRIMARY HYPERTENSION: ICD-10-CM

## 2021-11-26 DIAGNOSIS — M17.9 OSTEOARTHRITIS OF KNEE, UNSPECIFIED LATERALITY, UNSPECIFIED OSTEOARTHRITIS TYPE: ICD-10-CM

## 2021-11-26 DIAGNOSIS — Z16.12 URINARY TRACT INFECTION DUE TO EXTENDED-SPECTRUM BETA LACTAMASE (ESBL) PRODUCING ESCHERICHIA COLI: ICD-10-CM

## 2021-11-26 DIAGNOSIS — N32.81 OVERACTIVE BLADDER: ICD-10-CM

## 2021-11-26 DIAGNOSIS — M40.204 KYPHOSIS OF THORACIC REGION, UNSPECIFIED KYPHOSIS TYPE: ICD-10-CM

## 2021-11-26 DIAGNOSIS — F32.A DEPRESSION, UNSPECIFIED DEPRESSION TYPE: ICD-10-CM

## 2021-11-26 DIAGNOSIS — B96.29 URINARY TRACT INFECTION DUE TO EXTENDED-SPECTRUM BETA LACTAMASE (ESBL) PRODUCING ESCHERICHIA COLI: ICD-10-CM

## 2021-11-26 PROBLEM — D64.89 OTHER SPECIFIED ANEMIAS: Status: ACTIVE | Noted: 2021-11-26

## 2021-11-26 PROCEDURE — A9270 NON-COVERED ITEM OR SERVICE: HCPCS | Performed by: PHYSICAL MEDICINE & REHABILITATION

## 2021-11-26 PROCEDURE — 700111 HCHG RX REV CODE 636 W/ 250 OVERRIDE (IP): Performed by: ORTHOPAEDIC SURGERY

## 2021-11-26 PROCEDURE — A9270 NON-COVERED ITEM OR SERVICE: HCPCS | Performed by: HOSPITALIST

## 2021-11-26 PROCEDURE — 700102 HCHG RX REV CODE 250 W/ 637 OVERRIDE(OP): Performed by: HOSPITALIST

## 2021-11-26 PROCEDURE — 770010 HCHG ROOM/CARE - REHAB SEMI PRIVAT*

## 2021-11-26 PROCEDURE — U0005 INFEC AGEN DETEC AMPLI PROBE: HCPCS

## 2021-11-26 PROCEDURE — 99239 HOSP IP/OBS DSCHRG MGMT >30: CPT | Performed by: STUDENT IN AN ORGANIZED HEALTH CARE EDUCATION/TRAINING PROGRAM

## 2021-11-26 PROCEDURE — A9270 NON-COVERED ITEM OR SERVICE: HCPCS | Performed by: ORTHOPAEDIC SURGERY

## 2021-11-26 PROCEDURE — 99223 1ST HOSP IP/OBS HIGH 75: CPT | Mod: AI | Performed by: PHYSICAL MEDICINE & REHABILITATION

## 2021-11-26 PROCEDURE — 94760 N-INVAS EAR/PLS OXIMETRY 1: CPT

## 2021-11-26 PROCEDURE — 700102 HCHG RX REV CODE 250 W/ 637 OVERRIDE(OP): Performed by: PHYSICAL MEDICINE & REHABILITATION

## 2021-11-26 PROCEDURE — U0003 INFECTIOUS AGENT DETECTION BY NUCLEIC ACID (DNA OR RNA); SEVERE ACUTE RESPIRATORY SYNDROME CORONAVIRUS 2 (SARS-COV-2) (CORONAVIRUS DISEASE [COVID-19]), AMPLIFIED PROBE TECHNIQUE, MAKING USE OF HIGH THROUGHPUT TECHNOLOGIES AS DESCRIBED BY CMS-2020-01-R: HCPCS

## 2021-11-26 PROCEDURE — 700102 HCHG RX REV CODE 250 W/ 637 OVERRIDE(OP): Performed by: ORTHOPAEDIC SURGERY

## 2021-11-26 RX ORDER — TRAZODONE HYDROCHLORIDE 50 MG/1
50 TABLET ORAL
Status: DISCONTINUED | OUTPATIENT
Start: 2021-11-26 | End: 2021-12-12 | Stop reason: HOSPADM

## 2021-11-26 RX ORDER — HYDRALAZINE HYDROCHLORIDE 25 MG/1
25 TABLET, FILM COATED ORAL EVERY 8 HOURS PRN
Status: DISCONTINUED | OUTPATIENT
Start: 2021-11-26 | End: 2021-12-12 | Stop reason: HOSPADM

## 2021-11-26 RX ORDER — OXYBUTYNIN CHLORIDE 5 MG/1
5 TABLET, EXTENDED RELEASE ORAL DAILY
Status: DISCONTINUED | OUTPATIENT
Start: 2021-11-27 | End: 2021-12-12 | Stop reason: HOSPADM

## 2021-11-26 RX ORDER — VENLAFAXINE HYDROCHLORIDE 37.5 MG/1
150 CAPSULE, EXTENDED RELEASE ORAL DAILY
Status: CANCELLED | OUTPATIENT
Start: 2021-11-26

## 2021-11-26 RX ORDER — OXYCODONE HYDROCHLORIDE 10 MG/1
10 TABLET ORAL
Status: DISCONTINUED | OUTPATIENT
Start: 2021-11-26 | End: 2021-12-12 | Stop reason: HOSPADM

## 2021-11-26 RX ORDER — OXYCODONE HYDROCHLORIDE 5 MG/1
5 TABLET ORAL
Status: DISCONTINUED | OUTPATIENT
Start: 2021-11-26 | End: 2021-12-12 | Stop reason: HOSPADM

## 2021-11-26 RX ORDER — ALUMINA, MAGNESIA, AND SIMETHICONE 2400; 2400; 240 MG/30ML; MG/30ML; MG/30ML
20 SUSPENSION ORAL
Status: DISCONTINUED | OUTPATIENT
Start: 2021-11-26 | End: 2021-12-12 | Stop reason: HOSPADM

## 2021-11-26 RX ORDER — SCOLOPAMINE TRANSDERMAL SYSTEM 1 MG/1
1 PATCH, EXTENDED RELEASE TRANSDERMAL
Status: CANCELLED | OUTPATIENT
Start: 2021-11-26

## 2021-11-26 RX ORDER — OMEPRAZOLE 20 MG/1
20 CAPSULE, DELAYED RELEASE ORAL DAILY
Status: DISCONTINUED | OUTPATIENT
Start: 2021-11-26 | End: 2021-12-12 | Stop reason: HOSPADM

## 2021-11-26 RX ORDER — ECHINACEA PURPUREA EXTRACT 125 MG
2 TABLET ORAL PRN
Status: DISCONTINUED | OUTPATIENT
Start: 2021-11-26 | End: 2021-12-12 | Stop reason: HOSPADM

## 2021-11-26 RX ORDER — VENLAFAXINE HYDROCHLORIDE 75 MG/1
150 CAPSULE, EXTENDED RELEASE ORAL DAILY
Status: DISCONTINUED | OUTPATIENT
Start: 2021-11-27 | End: 2021-12-06

## 2021-11-26 RX ORDER — AMOXICILLIN 250 MG
2 CAPSULE ORAL 2 TIMES DAILY
Status: DISCONTINUED | OUTPATIENT
Start: 2021-11-26 | End: 2021-12-12 | Stop reason: HOSPADM

## 2021-11-26 RX ORDER — POLYVINYL ALCOHOL 14 MG/ML
1 SOLUTION/ DROPS OPHTHALMIC PRN
Status: DISCONTINUED | OUTPATIENT
Start: 2021-11-26 | End: 2021-12-12 | Stop reason: HOSPADM

## 2021-11-26 RX ORDER — POLYETHYLENE GLYCOL 3350 17 G/17G
1 POWDER, FOR SOLUTION ORAL
Status: DISCONTINUED | OUTPATIENT
Start: 2021-11-26 | End: 2021-12-12 | Stop reason: HOSPADM

## 2021-11-26 RX ORDER — BISACODYL 10 MG
10 SUPPOSITORY, RECTAL RECTAL
Status: DISCONTINUED | OUTPATIENT
Start: 2021-11-26 | End: 2021-12-12 | Stop reason: HOSPADM

## 2021-11-26 RX ORDER — OXYBUTYNIN CHLORIDE 5 MG/1
5 TABLET, EXTENDED RELEASE ORAL DAILY
Status: CANCELLED | OUTPATIENT
Start: 2021-11-26

## 2021-11-26 RX ORDER — ONDANSETRON 4 MG/1
4 TABLET, ORALLY DISINTEGRATING ORAL 4 TIMES DAILY PRN
Status: DISCONTINUED | OUTPATIENT
Start: 2021-11-26 | End: 2021-12-12 | Stop reason: HOSPADM

## 2021-11-26 RX ORDER — SCOLOPAMINE TRANSDERMAL SYSTEM 1 MG/1
1 PATCH, EXTENDED RELEASE TRANSDERMAL
Status: DISCONTINUED | OUTPATIENT
Start: 2021-11-26 | End: 2021-12-12 | Stop reason: HOSPADM

## 2021-11-26 RX ORDER — ACETAMINOPHEN 325 MG/1
650 TABLET ORAL EVERY 4 HOURS PRN
Status: DISCONTINUED | OUTPATIENT
Start: 2021-11-26 | End: 2021-12-12 | Stop reason: HOSPADM

## 2021-11-26 RX ORDER — ACETAMINOPHEN 500 MG
1000 TABLET ORAL 3 TIMES DAILY
Status: DISCONTINUED | OUTPATIENT
Start: 2021-11-26 | End: 2021-12-12 | Stop reason: HOSPADM

## 2021-11-26 RX ORDER — ONDANSETRON 2 MG/ML
4 INJECTION INTRAMUSCULAR; INTRAVENOUS 4 TIMES DAILY PRN
Status: DISCONTINUED | OUTPATIENT
Start: 2021-11-26 | End: 2021-12-12 | Stop reason: HOSPADM

## 2021-11-26 RX ADMIN — OXYBUTYNIN CHLORIDE 5 MG: 5 TABLET, EXTENDED RELEASE ORAL at 04:34

## 2021-11-26 RX ADMIN — ALUMINUM HYDROXIDE, MAGNESIUM HYDROXIDE, AND DIMETHICONE 20 ML: 400; 400; 40 SUSPENSION ORAL at 15:08

## 2021-11-26 RX ADMIN — ACETAMINOPHEN 1000 MG: 500 TABLET, FILM COATED ORAL at 20:48

## 2021-11-26 RX ADMIN — ACETAMINOPHEN 650 MG: 325 TABLET, FILM COATED ORAL at 01:34

## 2021-11-26 RX ADMIN — OMEPRAZOLE 20 MG: 20 CAPSULE, DELAYED RELEASE ORAL at 15:09

## 2021-11-26 RX ADMIN — SENNOSIDES AND DOCUSATE SODIUM 2 TABLET: 50; 8.6 TABLET ORAL at 20:48

## 2021-11-26 RX ADMIN — KETOROLAC TROMETHAMINE 15 MG: 30 INJECTION, SOLUTION INTRAMUSCULAR; INTRAVENOUS at 09:10

## 2021-11-26 RX ADMIN — ENOXAPARIN SODIUM 40 MG: 40 INJECTION SUBCUTANEOUS at 04:34

## 2021-11-26 RX ADMIN — KETOROLAC TROMETHAMINE 15 MG: 30 INJECTION, SOLUTION INTRAMUSCULAR; INTRAVENOUS at 01:34

## 2021-11-26 RX ADMIN — VENLAFAXINE HYDROCHLORIDE 150 MG: 75 CAPSULE, EXTENDED RELEASE ORAL at 04:34

## 2021-11-26 RX ADMIN — DOCUSATE SODIUM 100 MG: 100 CAPSULE, LIQUID FILLED ORAL at 04:34

## 2021-11-26 RX ADMIN — ACETAMINOPHEN 1000 MG: 500 TABLET, FILM COATED ORAL at 15:09

## 2021-11-26 RX ADMIN — ACETAMINOPHEN 650 MG: 325 TABLET, FILM COATED ORAL at 06:41

## 2021-11-26 ASSESSMENT — PAIN DESCRIPTION - PAIN TYPE
TYPE: ACUTE PAIN

## 2021-11-26 ASSESSMENT — FIBROSIS 4 INDEX: FIB4 SCORE: 3.25

## 2021-11-26 ASSESSMENT — PATIENT HEALTH QUESTIONNAIRE - PHQ9
1. LITTLE INTEREST OR PLEASURE IN DOING THINGS: NOT AT ALL
SUM OF ALL RESPONSES TO PHQ9 QUESTIONS 1 AND 2: 0
2. FEELING DOWN, DEPRESSED, IRRITABLE, OR HOPELESS: NOT AT ALL

## 2021-11-26 ASSESSMENT — LIFESTYLE VARIABLES
ON A TYPICAL DAY WHEN YOU DRINK ALCOHOL HOW MANY DRINKS DO YOU HAVE: 0
CONSUMPTION TOTAL: NEGATIVE
TOTAL SCORE: 0
TOTAL SCORE: 0
EVER HAD A DRINK FIRST THING IN THE MORNING TO STEADY YOUR NERVES TO GET RID OF A HANGOVER: NO
EVER_SMOKED: NEVER
HAVE YOU EVER FELT YOU SHOULD CUT DOWN ON YOUR DRINKING: NO
EVER FELT BAD OR GUILTY ABOUT YOUR DRINKING: NO
ALCOHOL_USE: NO
HAVE PEOPLE ANNOYED YOU BY CRITICIZING YOUR DRINKING: NO
AVERAGE NUMBER OF DAYS PER WEEK YOU HAVE A DRINK CONTAINING ALCOHOL: 0
TOTAL SCORE: 0
HOW MANY TIMES IN THE PAST YEAR HAVE YOU HAD 5 OR MORE DRINKS IN A DAY: 0

## 2021-11-26 NOTE — CONSULTS
Chart reviewed, patient is appropriate for direct admit to [facility] today. Accepting physician will be Dr. Reji Gibbons MD. Thank you for the consult.     Dx:  Left displaced femoral neck fracture s/p repair by Marlon Culver on 11/24    Malcolm Rodas, DO   Physical Medicine and Rehabilitation   11/26/2021

## 2021-11-26 NOTE — CARE PLAN
Problem: Fall Risk  Goal: Patient will remain free from falls  Outcome: Progressing  Note: Bed locked in lowest position. Bed alarm on. Treaded socks in use. Call light and belongings within reach. Pt educated to call for assistance. Pt verbalized understanding. Hourly rounding in place.      Problem: Knowledge Deficit - Standard  Goal: Patient and family/care givers will demonstrate understanding of plan of care, disease process/condition, diagnostic tests and medications  Outcome: Progressing  Note: Pt's whiteboard is updated, pt has been updated on POC, all questions have been answered at this time       Problem: Pain - Standard  Goal: Alleviation of pain or a reduction in pain to the patient’s comfort goal  Outcome: Progressing  Note: Pain interventions will allow pt to sleep comfortably    The patient is Watcher - Medium risk of patient condition declining or worsening    Shift Goals  Clinical Goals: pain control  Patient Goals: rest    Progress made toward(s) clinical / shift goals:  rest, pain management    Patient is not progressing towards the following goals:

## 2021-11-26 NOTE — DISCHARGE SUMMARY
"Discharge Summary    CHIEF COMPLAINT ON ADMISSION  Chief Complaint   Patient presents with   • Fall     Per EMS report, Pt sustained a GLF today due to feeling like she \"was going to pass out\". -LOC, -head trauma. Pt experiencing left hip pain. Pt had a syncopal episode yesterday in the store. PTA EMS gave 50mcg fent. Pt reports feeling dehydrated the past few days.         Reason for Admission  Closed left hip fracture, initial *     CODE STATUS  Full Code    HPI & HOSPITAL COURSE  This is a 81 y.o. female here with fall. Patient with history of hydrocephalus, who presented for ground level fall. Patient experienced dizziness which appears chronic due to hydrocephalus, with resulting fall. She was found to have acute subcapital femoral neck fracture. Orthopedic surgery performed left hip hemiarthroplasty. Patient doing well post operatively. Patient accepted to inpatient rehab facility. She is to follow up with her neurosurgery Dr Blanchard for hydrocephalus follow up, as well as follow up with her PCP.    Therefore, she is discharged in good and stable condition to an inpatient rehabilitation hospital.    The patient met 2-midnight criteria for an inpatient stay at the time of discharge.      FOLLOW UP ITEMS POST DISCHARGE  Take medications as prescribed.  Follow up with neurosurgery and PCP.    DISCHARGE DIAGNOSES  Principal Problem:    Closed left hip fracture (HCC) POA: Yes  Active Problems:    Depression POA: Yes    Hydrocephalus (HCC) (Chronic) POA: Yes    Systolic murmur (Chronic) POA: Yes  Resolved Problems:    * No resolved hospital problems. *      FOLLOW UP  No future appointments.  No follow-up provider specified.    MEDICATIONS ON DISCHARGE     Medication List      CONTINUE taking these medications      Instructions   Calcium 600 600 MG Tabs  Generic drug: Calcium Carbonate   Take 600 mg by mouth every day.  Dose: 600 mg     CRANBERRY EXTRACT PO   Take 650 mg by mouth every day.  Dose: 650 mg   "   omeprazole 40 MG delayed-release capsule  Commonly known as: PRILOSEC   Take 40 mg by mouth every day.  Dose: 40 mg     oxybutynin 15 MG CR tablet  Commonly known as: DITROPAN XL   Take 15 mg by mouth every day.  Dose: 15 mg     * Venlafaxine HCl 75 MG Tb24   Take 75 mg by mouth every day. Taken with 150 mg for 225mg daily  Dose: 75 mg     * venlafaxine 150 MG extended-release capsule  Commonly known as: EFFEXOR-XR   Take 150 mg by mouth every day. Taken with 75 mg for 225mg daily  Dose: 150 mg     vitamin D 1000 Unit Tabs  Commonly known as: Cholecalciferol   Take 1,000 Units by mouth every day.  Dose: 1,000 Units         * This list has 2 medication(s) that are the same as other medications prescribed for you. Read the directions carefully, and ask your doctor or other care provider to review them with you.            STOP taking these medications    nitrofurantoin 50 MG Caps  Commonly known as: MACRODANTIN            Allergies  No Known Allergies    DIET  No orders of the defined types were placed in this encounter.      ACTIVITY  As tolerated and directed by rehab.  Weight bearing as tolerated    LINES, DRAINS, AND WOUNDS  This is an automated list. Peripheral IVs will be removed prior to discharge.  Peripheral IV 11/23/21 20 G Left;Posterior Hand (Active)       Wound 11/24/21 Incision Hip Left mepilex silver (Active)   Site Assessment Dry;Clean;Intact 11/26/21 0815   Periwound Assessment Induration 11/25/21 2105   Margins SHALINI 11/25/21 2105   Closure SHALINI 11/25/21 2105   Drainage Amount None 11/25/21 2105   Treatments Offloading 11/25/21 2105   Wound Cleansing Not Applicable 11/25/21 2105   Periwound Protectant Not Applicable 11/25/21 2105   Dressing Cleansing/Solutions Not Applicable 11/25/21 2105   Dressing Options Mepilex 11/24/21 1915   Dressing Changed Observed 11/25/21 2105   Dressing Status Clean;Dry;Intact 11/26/21 0815       Peripheral IV 11/23/21 20 G Left;Posterior Hand (Active)               MENTAL  STATUS ON TRANSFER      alert and oriented x4       CONSULTATIONS  Orthopedic surgery    PROCEDURES  Open treatment of left femoral neck fracture, proximal end, neck with prosthetic replacement    LABORATORY  Lab Results   Component Value Date    SODIUM 137 11/25/2021    POTASSIUM 4.2 11/25/2021    CHLORIDE 104 11/25/2021    CO2 25 11/25/2021    GLUCOSE 93 11/25/2021    BUN 12 11/25/2021    CREATININE 0.66 11/25/2021        Lab Results   Component Value Date    WBC 6.6 11/25/2021    HEMOGLOBIN 11.7 (L) 11/25/2021    HEMATOCRIT 36.9 (L) 11/25/2021    PLATELETCT 123 (L) 11/25/2021        Total time of the discharge process exceeds 34 minutes.

## 2021-11-26 NOTE — H&P
"REHABILITATION HISTORY AND PHYSICAL/POST ADMISSION PHYSICAL EVALUATION    Date of Admission: 11/26/2021  Aaliyah Fulton  RH15/02    [facility] Code / Diagnosis to Support: 0008.11 - Orthopaedic Disorders: Status Post Unilateral Hip Fracture  Etiologic Diagnosis: Closed left hip fracture (HCC)    CC: Decreased mobility, hip pain    HPI:  Patient is a 81 y.o. female with a PMH of OA< asthma, HTN, hydrocephalus and depression who was admitted on 11/23/21 after a GLF at home. Patient reportedly was taking the trash out when she lost balance, felt dizzy, and fell onto her left side. Patient was found to have left hip fracture on trauma screen. Patient is now s/p surgical fixation on 11/24/21 with Dr. Culver. Of note, patient is followed by Dr. Blanchard for hydrocephalus and scheduled for possible shunt in January.  Post-operative course has been complicated by anemia and labile SBP.  Patient evaluated by PT/OT and recommend ongoing rehabilitation.     Patient tolerated transfer to [facility]. She reports she is motivated to get home. She reports poor memory at baseline which she attributes to the hydrocephalus. She reports that prior to hydrocephalus she would never forget a face or name.  She is hopeful the surgery might improve it some. Denies pain at rest. Denies NVD. Denies numbness in LLE. Denies foot drop.     REVIEW OF SYSTEMS:     Comprehensive 14 point ROS was reviewed and all were negative except as noted elsewhere in this document.       PMH:  Past Medical History:   Diagnosis Date   • Anesthesia     nausea   • Arthritis    • ASTHMA     CHEMICAL INDUCED   • Breast cancer (HCC)     stage 0 - Dr. Naqvi   • Cancer (HCC) 2010    right breast   • Cataract    • Dental disorder    • Heart burn    • High cholesterol    • Hypertension    • Indigestion    • Osteoporosis    • Pain     \"everywhere\"   • Pneumonia    • Psychiatric problem     depression on zoloft   • Unspecified urinary incontinence        PSH:  Past Surgical History: "   Procedure Laterality Date   • RECOVERY  5/10/2016    Procedure: [id number];  Surgeon: [facility];  [place] [facility] [place]:    • LUMBAR LAMINECTOMY DISKECTOMY  10/17/2012    Performed by Daksha Melendez M.D. at SURGERY [facility] ORS   • LUMBAR DECOMPRESSION  10/17/2012    Performed by Daksha Melendez M.D. at SURGERY [facility] ORS   • FORAMINOTOMY  10/17/2012    Performed by Daksha Melendez M.D. at SURGERY [facility] ORS   • KNEE ARTHROPLASTY TOTAL  9/19/2011    Performed by KISHAN CARDENAS at SURGERY [facility] ORS   • BREAST BIOPSY  5/21/2010    Performed by ORB TOMAS at SURGERY SAME DAY [facility] ORS   • KYLEE BY LAPAROSCOPY     • LUMPECTOMY     • OTHER      rt breast lumpectomy   • OTHER ORTHOPEDIC SURGERY      foot, shoulder, and knee   • PB RADIATION THERAPY PLAN SIMPLE     • SINUSCOPY         FAMILY HISTORY:  Family History   Problem Relation Age of Onset   • Lung Disease Mother         copd       MEDICATIONS:  Current Facility-Administered Medications   Medication Dose   • Respiratory Therapy Consult     • hydrALAZINE (APRESOLINE) tablet 25 mg  25 mg   • acetaminophen (Tylenol) tablet 650 mg  650 mg   • senna-docusate (PERICOLACE or SENOKOT S) 8.6-50 MG per tablet 2 Tablet  2 Tablet    And   • polyethylene glycol/lytes (MIRALAX) PACKET 1 Packet  1 Packet    And   • magnesium hydroxide (MILK OF MAGNESIA) suspension 30 mL  30 mL    And   • bisacodyl (DULCOLAX) suppository 10 mg  10 mg   • omeprazole (PRILOSEC) capsule 20 mg  20 mg   • artificial tears ophthalmic solution 1 Drop  1 Drop   • benzocaine-menthol (CEPACOL) lozenge 1 Lozenge  1 Lozenge   • mag hydrox-al hydrox-simeth (MAALOX PLUS ES or MYLANTA DS) suspension 20 mL  20 mL   • ondansetron (ZOFRAN ODT) dispertab 4 mg  4 mg    Or   • ondansetron (ZOFRAN) syringe/vial injection 4 mg  4 mg   • traZODone (DESYREL) tablet 50 mg  50 mg   • sodium chloride (OCEAN) 0.65 % nasal spray  2 Spray  2 Spray   • oxyCODONE immediate-release (ROXICODONE) tablet 5 mg  5 mg    Or   • oxyCODONE immediate release (ROXICODONE) tablet 10 mg  10 mg   • [START ON 2021] enoxaparin (LOVENOX) inj 40 mg  40 mg   • scopolamine (TRANSDERM-SCOP) patch 1 Patch  1 Patch   • [START ON 2021] oxybutynin SR (DITROPAN-XL) tablet 5 mg  5 mg   • [START ON 2021] venlafaxine XR (EFFEXOR XR) capsule 150 mg  150 mg   • acetaminophen (TYLENOL) tablet 1,000 mg  1,000 mg       ALLERGIES:  Patient has no known allergies.    PSYCHOSOCIAL HISTORY:  Housing / Facility: 1 Story House  Steps Into Home: 2  Lives with - Patient's Self Care Capacity: Spouse  Equipment Owned: 4-Wheel Walker     Prior Level of Function / Living Situation:   Physical Therapy: Prior Services: Home-Independent  Housing / Facility: 1 Story House  Steps Into Home: 2  Rail: Right Rail (Steps into Home)  Bathroom Set up: Bathtub / Shower Combination,Grab Bars  Equipment Owned: 4-Wheel Walker  Lives with - Patient's Self Care Capacity: Spouse  Bed Mobility: Independent  Transfer Status: Independent  Ambulation: Independent  Distance Ambulation (Feet):  (household-limited community)  Assistive Devices Used: 4-Wheel Walker  Stairs: Independent  Current Level of Function:   Gait Level Of Assist: Minimal Assist  Assistive Device: Front Wheel Walker  Distance (Feet): 12  Deviation: Antalgic,Step To,Decreased Base Of Support,Bradykinetic,Decreased Heel Strike,Decreased Toe Off  # of Stairs Climbed: 0  Weight Bearing Status: WBAT L LE  Supine to Sit: Minimal Assist  Sit to Supine:  (NT, up in chair post session)  Scooting: Minimal Assist  Comments: HOB elevated, left seated in chair  Sit to Stand: Minimal Assist  Bed, Chair, Wheelchair Transfer: Minimal Assist  Toilet Transfers: Refused  Transfer Method: Stand Step  Sitting in Chair: left seated in chair  Sitting Edge of Bed: 10 min  Standin min  Occupational Therapy:   Self Feeding: Independent  Grooming  "/ Hygiene: Independent  Bathing: Independent  Dressing: Independent  Toileting: Independent  Medication Management: Independent  Laundry: Independent  Kitchen Mobility: Independent  Finances: Independent  Home Management: Independent  Shopping: Independent  Prior Level Of Mobility: Independent With Device in Community  Prior Services: Home-Independent  Housing / Facility: [address]  Current Level of Function:   Upper Body Dressing: Supervision  Lower Body Dressing: Maximal Assist  Toileting:  (NT-refused need)    CURRENT LEVEL OF FUNCTION:   Same as level of function prior to admission to [facility]    PHYSICAL EXAM:     VITAL SIGNS:   height is 1.727 m (5' 8\") and weight is 80.5 kg (177 lb 7.5 oz). Her temporal temperature is 36.2 °C (97.1 °F). Her blood pressure is 120/75 and her pulse is 82. Her respiration is 18 and oxygen saturation is 97%.      GENERAL: No apparent distress  HEENT: Normocephalic/atraumatic, EOMI and PERRL  CARDIAC: Regular rate and rhythm, normal S1, S2   LUNGS: Clear to auscultation   ABDOMINAL: bowel sounds present, soft and nontender    EXTREMITIES: no contractures, spasticity, or edema.  No calf tenderness bilaterally, 2+ bilateral DP/PT pulses.    NEURO:  Mental status:  A&Ox4 (person, place, date, situation) answers questions appropriately  Speech: fluent, no aphasia or dysarthria  CRANIAL NERVES: CN 2-12 intact  Motor:  5/5 BUE  5/5 RLE, 3/5 L HF, 4/5 L KE otherwise foot intact  Sensory: intact to light touch through out  DTRs: 2+ in bilateral biceps and patellar tendons      RADIOLOGY:  XR Hip 11/24/21    IMPRESSION:     Acute left subcapital femoral neck fracture has slightly irregular margination raising possibility of a pathologic fracture. This however may just be related to positioning. CT pelvis without contrast may help clarify if clinically indicated     Healed right obturator ring fractures    LABS:  Recent Labs     11/23/21  2306 11/25/21  0054 "   SODIUM 141 137   POTASSIUM 4.0 4.2   CHLORIDE 107 104   CO2 25 25   GLUCOSE 139* 93   BUN 12 12   CREATININE 0.63 0.66   CALCIUM 8.8 8.5     Recent Labs     11/23/21  2306 11/25/21  0054   WBC 6.7 6.6   RBC 4.37 3.73*   HEMOGLOBIN 13.9 11.7*   HEMATOCRIT 41.4 36.9*   MCV 94.7 98.9*   MCH 31.8 31.4   MCHC 33.6 31.7*   RDW 43.5 45.3   PLATELETCT 190 123*   MPV 11.4 11.0             PRIMARY REHAB DIAGNOSIS:    This patient is a 81 y.o. female admitted for acute inpatient rehabilitation with Closed left hip fracture (HCC).    IMPAIRMENTS:   ADLs/IADLs  Mobility    SECONDARY DIAGNOSIS/MEDICAL CO-MORBIDITIES AFFECTING FUNCTION:  Hydrocephalus  HTN  Asthma  Anemia  GERD  Depression    RELEVANT CHANGES SINCE PREADMISSION EVALUATION:    Status unchanged    The patient's rehabilitation potential is Good  The patient's medical prognosis is good    PLAN:   Discussion and Recommendations:   1. The patient requires an acute inpatient rehabilitation program with a coordinated program of care at an intensity and frequency not available at a lower level of care. This recommendation is substantiated by the patient's medical physicians who recommend that the patient's intervention and assessment of medical issues needs to be done at an acute level of care for patient's safety and maximum outcome.   2. A coordinated program of care will be supplied by an interdisciplinary team of physical therapy, occupational therapy, rehab physician, rehab nursing, and, if needed, speech therapy and rehab psychology. Rehab team presents a patient-specific rehabilitation and education program concentrating on prevention of future problems related to accessibility, mobility, skin, bowel, bladder, sexuality, and psychosocial and medical/surgical problems.   3. Need for Rehabilitation Physician: The rehab physician will be evaluating the patient on a multi-weekly basis to help coordinate the program of care. The rehab physician communicates between  medical physicians, therapists, and nurses to maximize the patient's potential outcome. Specific areas in which the rehab physician will be providing daily assessment include the following:   A. Assessing the patient's heart rate and blood pressure response (vitals monitoring) to activity and making adjustments in medications or conservative measures as needed.   B. The rehab physician will be assessing the frequency at which the program can be increased to allow the patient to reach optimal functional outcome.   C. The rehab physician will also provide assessments in daily skin care, especially in light of patient's impairments in mobility.   D. The rehab physician will provide special expertise in understanding how to work with functional impairment and recommend appropriate interventions, compensatory techniques, and education that will facilitate the patient's outcome.   4. Rehab R.N.   The rehab RN will be working with patient to carry over in room mobility and activities of daily living when the patient is not in 3 hours of skilled therapy. Rehab nursing will be working in conjunction with rehab physician to address all the medical issues above and continue to assess laboratory work and discuss abnormalities with the treating physicians, assess vitals, and response to activity, and discuss and report abnormalities with the rehab physician. Rehab RN will also continue daily skin care, supervise bladder/bowel program, instruct in medication administration, and ensure patient safety.   5. Rehab Therapy: Therapies to treat at intensity and frequency of (may change after completion of evaluation by all therapeutic disciplines):       PT:  Physical therapy to address mobility, transfer, gait training and evaluation for adaptive equipment needs 1 and 1/2 hour/day at least 5 days/week for the duration of the ELOS (see below)       OT:  Occupational therapy to address ADLs, self-care, home management training,  functional mobility/transfers and assistive device evaluation, and community re-integration 1 and 1/2  hour/day at least 5 days/week for the duration of the ELOS (see below).     Medical management / Rehabilitation Issues/ Adverse Potential as part of rehabilitation plan     REHABILITATION ISSUES/ADVERSE POTENTIAL:  1.  Left Hip fracture - Patient with GLF with L hip fracture s/p fixation with Dr. Culver on 11/24/21. Patient demonstrates functional deficits in strength, balance, coordination, and ADL's. Patient is admitted to [facility] for comprehensive rehabilitation therapy as described below.   Rehabilitation nursing monitors bowel and bladder control, educates on medication administration, co-morbidities and monitors patient safety    2.  Neurostimulants: None at this time but continue to assess daily for need to initiate should status change.    3.  DVT prophylaxis:  Patient is on Lovenox for anticoagulation upon transfer. Encourage OOB. Monitor daily for signs and symptoms of DVT including but not limited to swelling and pain to prevent the development of DVT that may interfere with therapies.    4.  GI prophylaxis:  On prilosec to prevent gastritis/dyspepsia which may interfere with therapies.    5.  Pain: No issues with pain currently / Controlled with APAP/oxycodone     6.  Nutrition/Dysphagia: Dietician monitors nutrient intake, recommend supplements prn and provide nutrition education to pt/family to promote optimal nutrition for wound healing/recovery.     7.  Bladder/bowel:  Start bowel and bladder program as described below, to prevent constipation, urinary retention (which may lead to UTI), and urinary incontinence (which will impact upon pt's functional independence).   - Post void bladder scans, I&O cath for PVRs >400  - up to commode after meal     8.  Skin/dermal ulcer prophylaxis: Monitor for new skin conditions with q.2 h. turns as required to prevent the development of  skin breakdown.     9.  Cognition/Behavior: As needed psychologist provides adjustment counseling to illness and psychosocial barriers that may be potential barriers to rehabilitation.     10. Respiratory therapy: RT performs O2 management prn, breathing retraining, pulmonary hygiene and bronchospasm management prn to optimize participation in therapies.     MEDICAL CO-MORBIDITIES/ADVERSE POTENTIAL AFFECTING FUNCTION:   Left Hip fracture - Patient with GLF with L hip fracture s/p fixation with Dr. Culver on 11/24/21. WBAT per ortho  -PT and OT for mobility and ADLs  -Follow-up Dr. Culver  -Schedule Tylenol TID for pain control. PRN Oxycodone    Hydrocephalus - Followed by Dr. Blanchard's office. Possible intervention in January. May benefit from SLP will wait on OT evaluation.     HTN - Labile SBP post-surgery. Not on medications. Will monitor for a few days    Asthma - PRN Albuterol.     Anemia - Check AM CBC    GERD - Patient on Oxybutynin     Depression - Patient on Effexor 150 mg daily    DVT Ppx - Patient on Lovenox    I personally performed a complete drug regimen review and no potential clinically significant medication issues were identified.     Pt was seen today for 73 min, and entire time spent in face-to-face contact was >50% in counseling and coordination of care as detailed in A/P above.        GOALS/EXPECTED LEVEL OF FUNCTION BASED ON CURRENT MEDICAL AND FUNCTIONAL STATUS (may change based on patient's medical status and rate of impairment recovery):  Transfers:   Modified Independent  Mobility/Gait:   Modified Independent  ADL's:   Modified Independent    DISPOSITION: Discharge to pre-morbid independent living setting with the supportive care of patient's family.    ELOS: 10-14 days

## 2021-11-26 NOTE — DISCHARGE INSTRUCTIONS
Discharge Instructions    Discharged to other by [facility] with self. Discharged via wheelchair, hospital escort: Refused.  Special equipment needed: Walker    Be sure to schedule a follow-up appointment with your primary care doctor or any specialists as instructed.     Discharge Plan:        I understand that a diet low in cholesterol, fat, and sodium is recommended for good health. Unless I have been given specific instructions below for another diet, I accept this instruction as my diet prescription.   Other diet: Regular Diet    Special Instructions: None    · Is patient discharged on Warfarin / Coumadin?   No     Depression / Suicide Risk    As you are discharged from this [facility], it is important to learn how to keep safe from harming yourself.    Recognize the warning signs:  · Abrupt changes in personality, positive or negative- including increase in energy   · Giving away possessions  · Change in eating patterns- significant weight changes-  positive or negative  · Change in sleeping patterns- unable to sleep or sleeping all the time   · Unwillingness or inability to communicate  · Depression  · Unusual sadness, discouragement and loneliness  · Talk of wanting to die  · Neglect of personal appearance   · Rebelliousness- reckless behavior  · Withdrawal from people/activities they love  · Confusion- inability to concentrate     If you or a loved one observes any of these behaviors or has concerns about self-harm, here's what you can do:  · Talk about it- your feelings and reasons for harming yourself  · Remove any means that you might use to hurt yourself (examples: pills, rope, extension cords, firearm)  · Get professional help from the community (Mental Health, Substance Abuse, psychological counseling)  · Do not be alone:Call your Safe Contact- someone whom you trust who will be there for you.  · Call your local CRISIS HOTLINE 825-7631 or 921-670-8939  · Call your local Children's Mobile  Crisis Response Team [place] (668) 399-5182 or www.[employer]  · Call the toll free National Suicide Prevention Hotlines   · National Suicide Prevention Lifeline 323-119-GWSB (5000)  · National Hope Line Network 800-SUICIDE (840-6727)

## 2021-11-26 NOTE — DISCHARGE PLANNING
1009: Per Dr Velasquez patient is medically cleared for rehab. Dr Gibbons has reviewed the case. Attempted to contact family to verify support and left messages (Daughter Abby [phone] and spouse Jose [phone]). TCC to remain available.    1025: Sent PAS to Dr Gibbons to review. Waiting for family to call back to verify dc support.    1120: Dr Gibbons has accepted and transport arranged for 1230 with [facility]. Attempted to call spouse and daughter again to verify support, left messages. Updated bedside nurse, attending, and cm.

## 2021-11-26 NOTE — DISCHARGE PLANNING
Anticipated Discharge Disposition: [facility]     Action: CM visited with patient and daughter Angi at bedside. Patient was living with  Bertin in their single level home. She has a walker and cane at home. She plans on putting her cane in the closet forever and using her walker. She feels that if she was using her walker she would not have fallen. Her family is supportive and able to transport to appointments when needed. No other CM needs at this time.    Barriers to Discharge: None identified.    Plan: Discharge to [facility] at 1230 on 11/26/21      Care Transition Team Assessment    Information Source  Orientation Level: Oriented X4  Information Given By: Patient  Who is responsible for making decisions for patient? : Patient    Readmission Evaluation  Is this a readmission?: No    Elopement Risk  Legal Hold: No  Ambulatory or Self Mobile in Wheelchair: Yes  Disoriented: No  Psychiatric Symptoms: None  History of Wandering: No  Elopement this Admit: No  Vocalizing Wanting to Leave: No  Displays Behaviors, Body Language Wanting to Leave: No-Not at Risk for Elopement  Elopement Risk: Not at Risk for Elopement    Interdisciplinary Discharge Planning  Lives with - Patient's Self Care Capacity: Spouse  Housing / Facility: [address] [facility]  Prior Services: Home-Independent    Discharge Preparedness  What is your plan after discharge?: Other (comment) ([facility])  What are your discharge supports?: Child,Spouse         Finances  Financial Barriers to Discharge: No              Advance Directive  Advance Directive?: DPOA for Health Care    Domestic Abuse  Have you ever been the victim of abuse or violence?: No         Discharge Risks or Barriers  Discharge risks or barriers?: No    Anticipated Discharge Information  Discharge Disposition: Disch to  rehab facility or distinct part unit (62)  Discharge Address: [facility]

## 2021-11-26 NOTE — PREADMISSION SCREENING NOTE
"  Pre-Admission Screening Form    Patient Information:   Name: Aaliyah Fulton     MRN: [id number]       : 1940      Age: 81 y.o.   Gender: female      Race: White [7]       Marital Status:  [2]  Family Contact: Abby Tejeda,Angi Dasilva        Relationship: Daughter [2]  Spouse [17]  Daughter [2]  Home Phone: [phone]           Cell Phone: [phone]  Advanced Directives: Yes  Code Status:  FULL  Current Attending Provider: Herrera Velasquez M.D.  Referring Physician: Dr. Velasquez      Physiatrist Consult: Dr. Gibbons       Referral Date: 21  Primary Payor Source:  MEDICARE  Secondary Payor Source:  [facility]    Medical Information:   Date of Admission to Acute Care Sett[place]2021  Room Number: T820/00  Rehabilitation Diagnosis: 0008.11 - Orthopaedic Disorders: Status Post Unilateral Hip Fracture and 0008.51 - Orthopaedic Disorders: Status Post Unilateral Hip Replacement  Immunization History   Administered Date(s) Administered   • Influenza Vaccine Adult HD 2014, 10/23/2019   • Pfizer SARS-CoV-2 Vaccine 12+ 2021, 2021, 10/02/2021     No Known Allergies  Past Medical History:   Diagnosis Date   • Anesthesia     nausea   • Arthritis    • ASTHMA     CHEMICAL INDUCED   • Breast cancer (HCC)     stage 0 - Dr. Naqvi   • Cancer (HCC) 2010    right breast   • Cataract    • Dental disorder    • Heart burn    • High cholesterol    • Hypertension    • Indigestion    • Osteoporosis    • Pain     \"everywhere\"   • Pneumonia    • Psychiatric problem     depression on zoloft   • Unspecified urinary incontinence      Past Surgical History:   Procedure Laterality Date   • RECOVERY  5/10/2016    Procedure: [id number].[id number]-ANESTHESIA;  Surgeon: Recoveryonly Surgery;  Location: SURGERY PRE-POST PROC UNIT [facility] [place];  Service:    • LUMBAR LAMINECTOMY DISKECTOMY  10/17/2012    Performed by Daksha Melendez M.D. at [facility]" "ORS   • LUMBAR DECOMPRESSION  10/17/2012    Performed by Dkasha Melendez M.D. at SURGERY [facility] ORS   • FORAMINOTOMY  10/17/2012    Performed by Daksha Melendez M.D. at SURGERY [facility] ORS   • KNEE ARTHROPLASTY TOTAL  9/19/2011    Performed by KISHAN CARDENAS at SURGERY [facility] ORS   • BREAST BIOPSY  5/21/2010    Performed by ROB TOMAS at SURGERY SAME DAY [facility] ORS   • KYLEE BY LAPAROSCOPY     • LUMPECTOMY     • OTHER      rt breast lumpectomy   • OTHER ORTHOPEDIC SURGERY      foot, shoulder, and knee   • PB RADIATION THERAPY PLAN SIMPLE     • SINUSCOPY         History Leading to Admission, Conditions that Caused the Need for Rehab (CMS):     H&P 11/26 (Dr Vargas):    Chief Complaint   Patient presents with   • Fall       Per EMS report, Pt sustained a GLF today due to feeling like she \"was going to pass out\". -LOC, -head trauma. Pt experiencing left hip pain. Pt had a syncopal episode yesterday in the store. PTA EMS gave 50mcg fent. Pt reports feeling dehydrated the past few days.           History of Presenting Illness  Aaliyah Fulton is a 81 y.o. female who presented 11/23/2021 with ground level fall. She felt lightheaded after taking trash out and tried to fall on her stomach, injuring her left side. She did not lose consciousness and did not hit her head. Patient reports that she has been falling more frequently due to her hydrocephalus and is being followed by neurosurgery for shunt placement. She states that she had an infection on her left leg that finally cleared and she was to have shunt surgery scheduled soon. She had intractable pain and came to ED where she was found to have left femoral fracture. Patient will be going to OR, however she is found to have loud systolic murmur, no previous ECHO available. She states that she is aware of the murmur. Given her current symptoms and cardiac murmur, she should be evaluated for cardiac risk factors prior to proceeding with surgery. "     Assessment/Plan:  I anticipate this patient will require at least two midnights for appropriate medical management, necessitating inpatient admission.     * Closed left hip fracture (HCC)- (present on admission)  Assessment & Plan  -Pain control PRN  -Ortho consult  -Will need cardiac evaluation with ECHO prior to surgery  -ECHO pending  -PT eval post op     Systolic murmur- (present on admission)  Assessment & Plan  -Will obtain ECHO to rule out valvular disease as cause for her symptoms     Hydrocephalus (HCC)- (present on admission)  Assessment & Plan  -Neurosurgery consult - Dr. Blanchard        Depression- (present on admission)  Assessment & Plan  -Resume home meds        OP Report 11/24 (Dr Culver):  DATE OF OPERATION: 11/24/2021     PREOPERATIVE DIAGNOSIS: Left displaced femoral neck fracture     POSTOPERATIVE DIAGNOSIS: Same     PROCEDURE PERFORMED: Open treatment of left femoral fracture, proximal end, neck with prosthetic replacement     SURGEON: Marlon Culver M.D.      ASSISTANT: Trey Earl PA-C     ANESTHESIOLOGIST: Devorah Torres MD.     ANESTHESIA: General     ESTIMATED BLOOD LOSS: 50 mL     INDICATIONS: The patient is a 81 y.o. female with a left femoral neck fracture resulting from a ground level fall.  The patient denies antecedent pain, and was found to have a normal neurovascular exam and skin envelope.  Radiographs reviewed by myself demonstrated a displaced femoral neck fracture.  Given these findings, the patient is a candidate for surgical treatment of the femoral neck fracture with hemiarthroplasty.  I discussed the risks and benefits of the procedure, including the risks of infection, wound healing complication, neurovascular injury, instability, limb length discrepancy, need for revision surgery, and the medical risks of anesthesia including DVT, PE, MI, stroke, and death.  Benefits include early mobilization and reduction in the medical risks of hip fractures.  Alternatives  to surgery were also discussed, including non-operative management, percutaneous screw fixation and total hip arthroplasty.  The patient was in agreement with the plan to proceed, the informed consent was signed and documented and the operative extremity was marked.       PROCEDURE:  The patient was sedated with general anesthesia, and positioned in the lateral decubitus position on a beanbag with all bony prominences well padded and an axillary roll placed.  Perioperative antibiotics were administered. Sequential compression devices were employed.  The correct operative site was prepped and draped into a sterile field.  A procedural pause was conducted to verify correct patient, correct extremity, and presence of the surgeons initials on the operative extremity.     A posterior approach to the hip was performed with care taken to avoid all neurovascular structures.  The fascia was split in line with the incision to the tip of the greater troch, then curved posteriorly to parallel the gluteal fibers.  The short external rotators and capsule were taken down en bloc and tagged with No 5 Ticron suture for future repair. The labrum was preserved and the sciatic nerve was protected at all times.     A femoral neck cut was made one finger breadth above the lesser trochanter and the femoral head was removed without difficulty.  The acetabulum was inspected and cleared of foreign material.  A femoral neck retractor was placed, and the canal was sequentially reamed and broached to a size CR4.  After preparation of the canal, a cement restrictor was placed. A Gro[employer] Ion size CR4 stem was cemented in the appropriate version using third generation cement techniques.     Once cement had dried completely a 28+0mm head and 43mm shell were impacted. The hip was then reduced and stability was tested. Hip was stable in full extension and external rotation and stable to 90 degrees internal rotation at 90 degrees forward flexion.  "Wounds were irrigated and capsule was closed to greater trochanter with #5 Ticron sutures.  The wound was then closed in layers, with #1 Vicryl in the fascia, 2-0 vicryl in the subcutaneous tissue, and staples in the skin.  Sterile dressings were applied, and the patient was transferred to PACU in stable condition.     The patient tolerated the procedure well. There were no apparent complications. All sponge, needle, and instrument counts were correct on two separate occasions. She was awakened, extubated, and transferred to the recovery room in satisfactory condition.      The use of Trey Earl as a surgical assistant was necessary for assistance with exposure, retraction, fracture reduction, instrumentation, and closure.        Post-Operative Plan:     1.  The patient should bear weight as tolerated on their operative extremity with posterior hip precautions.  Gait aids (crutch or crutches, cane, walker) may be used as needed, and may be discontinued when no longer required.  2.  IV antibiotics - may be continued for 24 hours  3.  DVT prophylaxis - SCD's and Lovenox 40 mg SQ daily while inpatient.  The patient may transition to Aspirin 325 mg PO BID as an outpatient  4.  Discharge planning      Orthopedic Surgery Progress Note 11/25 (Tien MENDEZ)  Rigoberto 81F suffered left femoral neck fracture yesterday and is now s/p L hip hemiarthroplasty on 11/24/2021     Interval changes:Did well overnight, now OOB in chair     ROS - Patient denies any new issues. No chest pain, dyspnea, or fever.  Pain well controlled.     /69   Pulse 81   Temp 36.6 °C (97.8 °F) (Temporal)   Resp 18   Ht 1.727 m (5' 8\")   Wt 72.5 kg (159 lb 13.3 oz)   SpO2 96%      Patient seen and examined  No acute distress  Breathing non labored  RRR  Surgical dressing is clean, dry, and intact. Patient clearly fires tibialis anterior, EHL, and gastrocnemius/soleus. Sensation is intact to light touch throughout superficial peroneal, " "deep peroneal, tibial, saphenous, and sural nerve distributions. Strong and palpable 2+ dorsalis pedis and posterior tibial pulses with capillary refill less than 2 seconds. No lower leg tenderness or discomfort.      Active Hospital Problems     Diagnosis     • Hydrocephalus (HCC) [G91.9]     • Systolic murmur [R01.1]     • Closed left hip fracture (HCC) [S72.002A]     • Depression [F32.A]           Assessment/Plan:  POD#1  Wt bearing status - AT  PT/OT-initiated  Wound care:dressing left in place  Drains - no  Woodson-may discontinue  Sutures/Staples out- 10-14 days post operatively  Antibiotics: complete  DVT Prophylaxis- TEDS/SCDs/Foot pumps.   Lovenox: Start 40 mg daily, Duration-until ambulatory > 150'  OK to convert to  PO BID on discharge/ xfr  Future Procedures - none planned  Case Coordination for Discharge Planning - Disposition Clear for disposition (home/SNF/IRF) from Orthopaedic team standpoint. Follow-Up: needs appointment with Dr. Culver at [facility] at 10-14 days post-op for re-evaluation, staple removal and radiographs.        Co-morbidities: See PMH  Potential Risk - Complications: Deep Vein Thrombosis, Incontinence, Pain, Pneumonia, Pressure Ulcer and Urinary Tract Infection  Level of Risk: High    Ongoing Medical Management Needed (Medical/Nursing Needs):   Patient Active Problem List    Diagnosis Date Noted   • Hydrocephalus (HCC) 11/24/2021   • Systolic murmur 11/24/2021   • Closed left hip fracture (HCC) 11/24/2021   • Hand arthritis 03/04/2015   • Depression 03/04/2015   • Breast cancer in situ 03/04/2015   • OA (osteoarthritis) of knee 11/14/2013   • Thoracic kyphosis 10/17/2012   • S/P laminectomy 10/17/2012     Alert  Current Vital Signs:   Temperature: 36.7 °C (98.1 °F) Pulse: 76 Respiration: 17 Blood Pressure : 142/65  Weight: 72.3 kg (159 lb 6.3 oz) Height: 172.7 cm (5' 8\")  Pulse Oximetry: 92 % O2 (LPM): 3      Completed Laboratory Reports:  Recent Labs     " 21  2306 21  0054   WBC 6.7 6.6   HEMOGLOBIN 13.9 11.7*   HEMATOCRIT 41.4 36.9*   PLATELETCT 190 123*   SODIUM 141 137   POTASSIUM 4.0 4.2   BUN 12 12   CREATININE 0.63 0.66   GLUCOSE 139* 93     Additional Labs: Not Applicable    Prior Living Situation:   Housing / Facility: 1 Story House  Steps Into Home: 2  Lives with - Patient's Self Care Capacity: Spouse  Equipment Owned: 4-Wheel Walker    Prior Level of Function / Living Situation:   Physical Therapy: Prior Services: Home-Independent  Housing / Facility: 1 Story House  Steps Into Home: 2  Rail: Right Rail (Steps into Home)  Bathroom Set up: Bathtub / Shower Combination,Grab Bars  Equipment Owned: 4-Wheel Walker  Lives with - Patient's Self Care Capacity: Spouse  Bed Mobility: Independent  Transfer Status: Independent  Ambulation: Independent  Distance Ambulation (Feet):  (household-limited community)  Assistive Devices Used: 4-Wheel Walker  Stairs: Independent  Current Level of Function:   Gait Level Of Assist: Minimal Assist  Assistive Device: Front Wheel Walker  Distance (Feet): 12  Deviation: Antalgic,Step To,Decreased Base Of Support,Bradykinetic,Decreased Heel Strike,Decreased Toe Off  # of Stairs Climbed: 0  Weight Bearing Status: WBAT L LE  Supine to Sit: Minimal Assist  Sit to Supine:  (NT, up in chair post session)  Scooting: Minimal Assist  Comments: HOB elevated, left seated in chair  Sit to Stand: Minimal Assist  Bed, Chair, Wheelchair Transfer: Minimal Assist  Toilet Transfers: Refused  Transfer Method: Stand Step  Sitting in Chair: left seated in chair  Sitting Edge of Bed: 10 min  Standin min  Occupational Therapy:   Self Feeding: Independent  Grooming / Hygiene: Independent  Bathing: Independent  Dressing: Independent  Toileting: Independent  Medication Management: Independent  Laundry: Independent  Kitchen Mobility: Independent  Finances: Independent  Home Management: Independent  Shopping: Independent  Prior Level Of Mobility:  Independent With Device in Community  Prior Services: Home-Independent  Housing / Facility: [address] [place] [address]  Current Level of Function:   Upper Body Dressing: Supervision  Lower Body Dressing: Maximal Assist  Toileting:  (NT-refused need)  Speech Language Pathology:      Rehabilitation Prognosis/Potential: Good  Estimated Length of Stay: 10-14 days    Nursing:      Woodson in Place    Scope/Intensity of Services Recommended:  Physical Therapy: 1.5 hr / day  5 days / week. Therapeutic Interventions Required: Maximize Endurance, Mobility, Strength and Safety  Occupational Therapy: 1.5 hr / day 5 days / week. Therapeutic Interventions Required: Maximize Self Care, ADLs, IADLs and Energy Conservation  Rehabilitation Nursin/7. Therapeutic Interventions Required: Monitor Pain, Skin, Wound(s), Vital Signs, Intake and Output, Labs, Safety, Aspiration Risk and Family Training  Rehabilitation Physician: 3 - 5 days / week. Therapeutic Interventions Required: Medical Management    She requires 24-hour rehabilitation nursing to manage bowel and bladder function, skin care, surgical incision, nutrition and fluid intake, pulmonary hygiene, pain control, safety, medication management and patient/family goals. In addition, rehabilitation nursing will reiterate and reinforce therapy skills and equipment use, including ADLs, as well as provide education to the patient and family. Aaliyah Fulton is willing to participate in and is able to tolerate the proposed plan of care.    Rehabilitation Goals and Plan (Expected frequency & duration of treatment in the IRF):   Return to the Community, Modified Independent Level of Care, Minimal Assist Level of Care and Family Able to Provide 24/7 Assistance  Anticipated Date of Rehabilitation Admission: 21  Patient/Family oriented IRF level of care/facility/plan: Yes  Patient/Family willing to participate in IRF care/facility/plan: Yes  Patient able to tolerate IRF level of care proposed:  Yes  Patient has potential to benefit IRF level of care proposed: Yes  Comments: Not Applicable    Special Needs or Precautions - Medical Necessity:  Safety Concerns/Precautions:  Fall Risk / High Risk for Falls, Balance and Bed / Chair Alarm  IV Site: Peripheral  Requires Oxygen  Current Medications:    Current Facility-Administered Medications Ordered in Epic   Medication Dose Route Frequency Provider Last Rate Last Admin   • enalaprilat (Vasotec) injection 1.25 mg 1 mL  1.25 mg Intravenous Q6HRS PRN Caren Vargas M.D.       • labetalol (NORMODYNE/TRANDATE) injection 10 mg  10 mg Intravenous Q4HRS PRN Caren Vargas M.D.       • ondansetron (ZOFRAN ODT) dispertab 4 mg  4 mg Oral Q4HRS PRN Caren Vargas M.D.       • oxybutynin SR (DITROPAN-XL) tablet 5 mg  5 mg Oral DAILY PAUL Rowe.DJose Roberto   5 mg at 11/26/21 0434   • venlafaxine XR (EFFEXOR XR) capsule 150 mg  150 mg Oral DAILY William Caro M.D.   150 mg at 11/26/21 0434   • Pharmacy Consult Request ...Pain Management Review 1 Each  1 Each Other PHARMACY TO DOSE Marlon Culver M.D.       • ondansetron (ZOFRAN) syringe/vial injection 4 mg  4 mg Intravenous Q4HRS PRN Marlon Culver M.D.       • dexamethasone (DECADRON) injection 4 mg  4 mg Intravenous Once PRN Marlon Culver M.D.       • diphenhydrAMINE (BENADRYL) injection 25 mg  25 mg Intravenous Q6HRS PRN Marlon Culver M.D.       • haloperidol lactate (HALDOL) injection 1 mg  1 mg Intravenous Q6HRS PRN Marlon Culver M.D.       • scopolamine (TRANSDERM-SCOP) patch 1 Patch  1 Patch Transdermal Q72HRS PRN Marlon Culver M.D.       • docusate sodium (COLACE) capsule 100 mg  100 mg Oral BID Marlon Culver M.D.   100 mg at 11/26/21 0434   • senna-docusate (PERICOLACE or SENOKOT S) 8.6-50 MG per tablet 1 Tablet  1 Tablet Oral Nightly Marlon Culver M.D.   1 Tablet at 11/25/21 2020   • senna-docusate (PERICOLACE or SENOKOT S) 8.6-50 MG per tablet 1 Tablet  1 Tablet Oral Q24HRS PRN Marlon PERALTA  NORRIS Culver   1 Tablet at 11/24/21 1936   • polyethylene glycol/lytes (MIRALAX) PACKET 1 Packet  1 Packet Oral BID PRN Marlon Culver M.D.       • magnesium hydroxide (MILK OF MAGNESIA) suspension 30 mL  30 mL Oral QDAY PRN Marlon Culver M.D.       • bisacodyl (DULCOLAX) suppository 10 mg  10 mg Rectal Q24HRS PRN Marlon Culver M.D.       • sodium phosphate (Fleet) enema 133 mL  1 Each Rectal Once PRN Marlon Culver M.D.       • enoxaparin (LOVENOX) inj 40 mg  40 mg Subcutaneous QDAY Marlon Culver M.D.   40 mg at 11/26/21 0434   • acetaminophen (Tylenol) tablet 650 mg  650 mg Oral Q6HRS Marlon Culver M.D.   650 mg at 11/26/21 0641    Followed by   • [START ON 11/29/2021] acetaminophen (Tylenol) tablet 650 mg  650 mg Oral Q6HRS PRN Marlon Culver M.D.       • ketorolac (TORADOL) injection 15 mg  15 mg Intravenous Q6HRS Marlon Culver M.D.   15 mg at 11/26/21 0910    Followed by   • [START ON 11/27/2021] ibuprofen (MOTRIN) tablet 800 mg  800 mg Oral TID PRN Marlon Culver M.D.       • oxyCODONE immediate-release (ROXICODONE) tablet 5 mg  5 mg Oral Q3HRS PRN Marlon Culver M.D.        Or   • oxyCODONE immediate release (ROXICODONE) tablet 10 mg  10 mg Oral Q3HRS PRN Marlon Culver M.D.        Or   • HYDROmorphone (Dilaudid) injection 0.5 mg  0.5 mg Intravenous Q3HRS PRCARLOS EDUARDO Culver M.D.         No current Epic-ordered outpatient medications on file.     Diet:   DIET ORDERS (From admission to next 24h)     Start     Ordered    11/24/21 1811  Diet Order Diet: Regular  ALL MEALS        Question:  Diet:  Answer:  Regular    11/24/21 1810                Anticipated Discharge Destination / Patient/Family Goal:  Destination: Home with Assistance Support System: Spouse  Anticipated home health services: OT, PT, Nursing, Social Work and Aide  Previously used HH service/ provider: Not Applicable  Anticipated DME Needs: Oxygen, Walker, Commode and Life Line  Outpatient  Services: OT and PT  Alternative resources to address additional identified needs:   None  Pre-Screen Completed: 11/26/2021 10:13 AM Gale Tovar

## 2021-11-26 NOTE — FLOWSHEET NOTE
11/26/21 1400   Events/Summary/Plan   Events/Summary/Plan RT assessment, pt admitted on 1 lpm and weaned to RA   Vital Signs   Pulse 79   Respiration 18   Pulse Oximetry 94 %   $ Pulse Oximetry (Spot Check) Yes   Respiratory Assessment   Level of Consciousness Alert   Chest Exam   Work Of Breathing / Effort Within Normal Limits   Oxygen   O2 Delivery Device Room air w/o2 available   Smoking History   Have you ever smoked Never

## 2021-11-26 NOTE — FLOWSHEET NOTE
11/26/21 1411   Patient History   Pulmonary Diagnosis none   Procedures Relevant to Respiratory Status none   Home O2 No   Nocturnal CPAP No   Home Treatments/Frequency No   Sleep Apnea Screening   Have you had a sleep study? No   Have you been diagnosed with sleep apnea? No

## 2021-11-27 LAB
ALBUMIN SERPL BCP-MCNC: 3.3 G/DL (ref 3.2–4.9)
ALBUMIN/GLOB SERPL: 1 G/DL
ALP SERPL-CCNC: 149 U/L (ref 30–99)
ALT SERPL-CCNC: 58 U/L (ref 2–50)
ANION GAP SERPL CALC-SCNC: 9 MMOL/L (ref 7–16)
AST SERPL-CCNC: 42 U/L (ref 12–45)
BASOPHILS # BLD AUTO: 0.8 % (ref 0–1.8)
BASOPHILS # BLD: 0.04 K/UL (ref 0–0.12)
BILIRUB SERPL-MCNC: 0.6 MG/DL (ref 0.1–1.5)
BUN SERPL-MCNC: 16 MG/DL (ref 8–22)
CALCIUM SERPL-MCNC: 8.8 MG/DL (ref 8.5–10.5)
CHLORIDE SERPL-SCNC: 105 MMOL/L (ref 96–112)
CO2 SERPL-SCNC: 26 MMOL/L (ref 20–33)
CREAT SERPL-MCNC: 0.57 MG/DL (ref 0.5–1.4)
EOSINOPHIL # BLD AUTO: 0.55 K/UL (ref 0–0.51)
EOSINOPHIL NFR BLD: 10.7 % (ref 0–6.9)
ERYTHROCYTE [DISTWIDTH] IN BLOOD BY AUTOMATED COUNT: 44.4 FL (ref 35.9–50)
EST. AVERAGE GLUCOSE BLD GHB EST-MCNC: 114 MG/DL
GLOBULIN SER CALC-MCNC: 3.4 G/DL (ref 1.9–3.5)
GLUCOSE SERPL-MCNC: 112 MG/DL (ref 65–99)
HBA1C MFR BLD: 5.6 % (ref 4–5.6)
HCT VFR BLD AUTO: 36.5 % (ref 37–47)
HGB BLD-MCNC: 11.8 G/DL (ref 12–16)
IMM GRANULOCYTES # BLD AUTO: 0.01 K/UL (ref 0–0.11)
IMM GRANULOCYTES NFR BLD AUTO: 0.2 % (ref 0–0.9)
LYMPHOCYTES # BLD AUTO: 1.13 K/UL (ref 1–4.8)
LYMPHOCYTES NFR BLD: 22 % (ref 22–41)
MCH RBC QN AUTO: 31.4 PG (ref 27–33)
MCHC RBC AUTO-ENTMCNC: 32.3 G/DL (ref 33.6–35)
MCV RBC AUTO: 97.1 FL (ref 81.4–97.8)
MONOCYTES # BLD AUTO: 0.37 K/UL (ref 0–0.85)
MONOCYTES NFR BLD AUTO: 7.2 % (ref 0–13.4)
NEUTROPHILS # BLD AUTO: 3.04 K/UL (ref 2–7.15)
NEUTROPHILS NFR BLD: 59.1 % (ref 44–72)
NRBC # BLD AUTO: 0 K/UL
NRBC BLD-RTO: 0 /100 WBC
PLATELET # BLD AUTO: 179 K/UL (ref 164–446)
PMV BLD AUTO: 11 FL (ref 9–12.9)
POTASSIUM SERPL-SCNC: 3.9 MMOL/L (ref 3.6–5.5)
PROT SERPL-MCNC: 6.7 G/DL (ref 6–8.2)
RBC # BLD AUTO: 3.76 M/UL (ref 4.2–5.4)
SARS-COV-2 RNA RESP QL NAA+PROBE: NOTDETECTED
SODIUM SERPL-SCNC: 140 MMOL/L (ref 135–145)
SPECIMEN SOURCE: NORMAL
TSH SERPL DL<=0.005 MIU/L-ACNC: 3.05 UIU/ML (ref 0.38–5.33)
WBC # BLD AUTO: 5.1 K/UL (ref 4.8–10.8)

## 2021-11-27 PROCEDURE — 36415 COLL VENOUS BLD VENIPUNCTURE: CPT

## 2021-11-27 PROCEDURE — 85025 COMPLETE CBC W/AUTO DIFF WBC: CPT

## 2021-11-27 PROCEDURE — 97110 THERAPEUTIC EXERCISES: CPT

## 2021-11-27 PROCEDURE — 700111 HCHG RX REV CODE 636 W/ 250 OVERRIDE (IP): Performed by: PHYSICAL MEDICINE & REHABILITATION

## 2021-11-27 PROCEDURE — 700102 HCHG RX REV CODE 250 W/ 637 OVERRIDE(OP): Performed by: PHYSICAL MEDICINE & REHABILITATION

## 2021-11-27 PROCEDURE — 97116 GAIT TRAINING THERAPY: CPT

## 2021-11-27 PROCEDURE — 80053 COMPREHEN METABOLIC PANEL: CPT

## 2021-11-27 PROCEDURE — 99233 SBSQ HOSP IP/OBS HIGH 50: CPT | Performed by: PHYSICAL MEDICINE & REHABILITATION

## 2021-11-27 PROCEDURE — A9270 NON-COVERED ITEM OR SERVICE: HCPCS | Performed by: PHYSICAL MEDICINE & REHABILITATION

## 2021-11-27 PROCEDURE — 83036 HEMOGLOBIN GLYCOSYLATED A1C: CPT

## 2021-11-27 PROCEDURE — 82306 VITAMIN D 25 HYDROXY: CPT

## 2021-11-27 PROCEDURE — 84443 ASSAY THYROID STIM HORMONE: CPT

## 2021-11-27 PROCEDURE — 770010 HCHG ROOM/CARE - REHAB SEMI PRIVAT*

## 2021-11-27 PROCEDURE — 97162 PT EVAL MOD COMPLEX 30 MIN: CPT

## 2021-11-27 PROCEDURE — 97166 OT EVAL MOD COMPLEX 45 MIN: CPT

## 2021-11-27 PROCEDURE — 97535 SELF CARE MNGMENT TRAINING: CPT

## 2021-11-27 RX ORDER — VALSARTAN 80 MG/1
80 TABLET ORAL DAILY
Status: DISCONTINUED | OUTPATIENT
Start: 2021-11-28 | End: 2021-11-29

## 2021-11-27 RX ADMIN — SENNOSIDES AND DOCUSATE SODIUM 2 TABLET: 50; 8.6 TABLET ORAL at 21:12

## 2021-11-27 RX ADMIN — ACETAMINOPHEN 1000 MG: 500 TABLET, FILM COATED ORAL at 21:12

## 2021-11-27 RX ADMIN — VENLAFAXINE HYDROCHLORIDE 150 MG: 75 CAPSULE, EXTENDED RELEASE ORAL at 08:22

## 2021-11-27 RX ADMIN — ENOXAPARIN SODIUM 40 MG: 40 INJECTION SUBCUTANEOUS at 08:22

## 2021-11-27 RX ADMIN — SENNOSIDES AND DOCUSATE SODIUM 2 TABLET: 50; 8.6 TABLET ORAL at 08:23

## 2021-11-27 RX ADMIN — OXYBUTYNIN CHLORIDE 5 MG: 5 TABLET, EXTENDED RELEASE ORAL at 08:22

## 2021-11-27 RX ADMIN — ACETAMINOPHEN 1000 MG: 500 TABLET, FILM COATED ORAL at 15:47

## 2021-11-27 RX ADMIN — ACETAMINOPHEN 1000 MG: 500 TABLET, FILM COATED ORAL at 08:22

## 2021-11-27 RX ADMIN — OMEPRAZOLE 20 MG: 20 CAPSULE, DELAYED RELEASE ORAL at 08:22

## 2021-11-27 ASSESSMENT — ACTIVITIES OF DAILY LIVING (ADL)
TOILET_TRANSFER_DESCRIPTION: ADAPTIVE EQUIPMENT;GRAB BAR;VERBAL CUEING
BED_CHAIR_WHEELCHAIR_TRANSFER_DESCRIPTION: ADAPTIVE EQUIPMENT;SET-UP OF EQUIPMENT;SUPERVISION FOR SAFETY;VERBAL CUEING
TOILET_TRANSFER_DESCRIPTION: GRAB BAR;INCREASED TIME;INITIAL PREPARATION FOR TASK;SET-UP OF EQUIPMENT;SUPERVISION FOR SAFETY;VERBAL CUEING
TOILET_TRANSFER_DESCRIPTION: GRAB BAR;INCREASED TIME;INITIAL PREPARATION FOR TASK;SET-UP OF EQUIPMENT;SUPERVISION FOR SAFETY;VERBAL CUEING;REQUIRES LIFT
TOILETING_LEVEL_OF_ASSIST_DESCRIPTION: ASSIST FOR HYGIENE;ASSIST TO PULL PANTS UP;ASSIST TO PULL PANTS DOWN;ASSIST FOR STANDING BALANCE;GRAB BAR;INCREASED TIME;INITIAL PREPARATION FOR TASK;SET-UP OF EQUIPMENT;SUPERVISION FOR SAFETY;VERBAL CUEING
BED_CHAIR_WHEELCHAIR_TRANSFER_DESCRIPTION: ADAPTIVE EQUIPMENT;INCREASED TIME;REQUIRES LIFT
TUB_SHOWER_TRANSFER_DESCRIPTION: GRAB BAR;SHOWER BENCH;INCREASED TIME;INITIAL PREPARATION FOR TASK;REQUIRES LIFT;SET-UP OF EQUIPMENT;SUPERVISION FOR SAFETY;VERBAL CUEING
BED_CHAIR_WHEELCHAIR_TRANSFER_DESCRIPTION: INCREASED TIME;REQUIRES LIFT;SET-UP OF EQUIPMENT;SUPERVISION FOR SAFETY;VERBAL CUEING
TOILETING_LEVEL_OF_ASSIST_DESCRIPTION: ASSIST TO PULL PANTS UP;ASSIST TO PULL PANTS DOWN;ASSIST FOR STANDING BALANCE;GRAB BAR;INCREASED TIME;INITIAL PREPARATION FOR TASK;SET-UP OF EQUIPMENT;SUPERVISION FOR SAFETY;VERBAL CUEING
TOILETING: INDEPENDENT

## 2021-11-27 ASSESSMENT — GAIT ASSESSMENTS
DEVIATION: ANTALGIC;STEP TO;DECREASED BASE OF SUPPORT
GAIT LEVEL OF ASSIST: MINIMAL ASSIST
GAIT LEVEL OF ASSIST: MINIMAL ASSIST
DISTANCE (FEET): 10
DEVIATION: ANTALGIC;STEP TO;DECREASED BASE OF SUPPORT
ASSISTIVE DEVICE: FRONT WHEEL WALKER
DISTANCE (FEET): 10
ASSISTIVE DEVICE: FRONT WHEEL WALKER

## 2021-11-27 ASSESSMENT — BRIEF INTERVIEW FOR MENTAL STATUS (BIMS)
ASKED TO RECALL SOCK: NO, COULD NOT RECALL
INITIAL REPETITION OF BED BLUE SOCK - FIRST ATTEMPT: 3
BIMS SUMMARY SCORE: 9
ASKED TO RECALL BED: NO, COULD NOT RECALL
ASKED TO RECALL BLUE: NO, COULD NOT RECALL
WHAT YEAR IS IT: CORRECT
WHAT MONTH IS IT: ACCURATE WITHIN 5 DAYS
WHAT DAY OF THE WEEK IS IT: CORRECT

## 2021-11-27 ASSESSMENT — PATIENT HEALTH QUESTIONNAIRE - PHQ9
SUM OF ALL RESPONSES TO PHQ9 QUESTIONS 1 AND 2: 0
1. LITTLE INTEREST OR PLEASURE IN DOING THINGS: NOT AT ALL
2. FEELING DOWN, DEPRESSED, IRRITABLE, OR HOPELESS: NOT AT ALL

## 2021-11-27 ASSESSMENT — PAIN DESCRIPTION - PAIN TYPE: TYPE: ACUTE PAIN

## 2021-11-27 NOTE — CARE PLAN
"The patient is Stable - Low risk of patient condition declining or worsening    Problem: Pain - Standard  Goal: Alleviation of pain or a reduction in pain to the patient’s comfort goal  Outcome: Progressing  Flowsheets  Taken 11/27/2021 0306  OB Pain Intervention:   Declines   Rest  Taken 11/26/2021 2100  Pain Rating Scale (NPRS): 0     Problem: Fall Risk - Rehab  Goal: Patient will remain free from falls  Outcome: Progressing  Note: Diana Becker Fall risk Assessment Score: 12    Moderate fall risk Interventions  - Bed and strip alarm   - Yellow sign by the door   - Yellow wrist band \"Fall risk\"  - Room near to the nurse station  - Do not leave patient unattended in the bathroom  - Fall risk education provided       "

## 2021-11-27 NOTE — PROGRESS NOTES
"Rehab Progress Note     Encounter Date: 11/27/2021    CC: Decreased mobility, hip pain    Interval Events (Subjective)  Patient sitting up in room. She reports she is doing well. She reports poor sleep. She reports ongoing poor memory.  Discussed about waiting on covid screen before family can come in. Daughter dropped off clothing for her. Reviewed admission labs and ongoing anemia. Denies NVD.    Objective:  VITAL SIGNS: /81   Pulse 86   Temp 36.5 °C (97.7 °F) (Temporal)   Resp 18   Ht 1.727 m (5' 8\")   Wt 80.5 kg (177 lb 7.5 oz)   SpO2 93%   BMI 26.98 kg/m²   Gen: NAD  Psych: Mood and affect appropriate  CV: RRR, no edema  Resp: CTAB, no upper airway sounds  Abd: NTND  Neuro: AOx3, following commands    Recent Results (from the past 72 hour(s))   URINALYSIS CULTURE, IF INDICATED    Collection Time: 11/24/21 12:22 PM    Specimen: Urine   Result Value Ref Range    Color Yellow     Character Clear     Specific Gravity 1.016 <1.035    Ph 7.5 5.0 - 8.0    Glucose Negative Negative mg/dL    Ketones Negative Negative mg/dL    Protein Negative Negative mg/dL    Bilirubin Negative Negative    Urobilinogen, Urine 1.0 Negative    Nitrite Negative Negative    Leukocyte Esterase Trace (A) Negative    Occult Blood Negative Negative    Micro Urine Req Microscopic    URINE MICROSCOPIC (W/UA)    Collection Time: 11/24/21 12:22 PM   Result Value Ref Range    WBC 0-2 /hpf    RBC 0-2 /hpf    Bacteria Negative None /hpf    Epithelial Cells Negative /hpf    Hyaline Cast 0-2 /lpf   COD - Adult (Type and Screen)    Collection Time: 11/24/21  4:01 PM   Result Value Ref Range    ABO Grouping Only O     Rh Grouping Only NEG     Antibody Screen-Cod POS (A)    ANTIBODY IDENTIFICATION    Collection Time: 11/24/21  4:01 PM   Result Value Ref Range    Antibody Id POS, anti-C (A)     Antibody Id POS, anti-D (A)    CBC without Differential    Collection Time: 11/25/21 12:54 AM   Result Value Ref Range    WBC 6.6 4.8 - 10.8 K/uL    " RBC 3.73 (L) 4.20 - 5.40 M/uL    Hemoglobin 11.7 (L) 12.0 - 16.0 g/dL    Hematocrit 36.9 (L) 37.0 - 47.0 %    MCV 98.9 (H) 81.4 - 97.8 fL    MCH 31.4 27.0 - 33.0 pg    MCHC 31.7 (L) 33.6 - 35.0 g/dL    RDW 45.3 35.9 - 50.0 fL    Platelet Count 123 (L) 164 - 446 K/uL    MPV 11.0 9.0 - 12.9 fL   Basic Metabolic Panel (BMP)    Collection Time: 11/25/21 12:54 AM   Result Value Ref Range    Sodium 137 135 - 145 mmol/L    Potassium 4.2 3.6 - 5.5 mmol/L    Chloride 104 96 - 112 mmol/L    Co2 25 20 - 33 mmol/L    Glucose 93 65 - 99 mg/dL    Bun 12 8 - 22 mg/dL    Creatinine 0.66 0.50 - 1.40 mg/dL    Calcium 8.5 8.5 - 10.5 mg/dL    Anion Gap 8.0 7.0 - 16.0   ESTIMATED GFR    Collection Time: 11/25/21 12:54 AM   Result Value Ref Range    GFR If African American >60 >60 mL/min/1.73 m 2    GFR If Non African American >60 >60 mL/min/1.73 m 2   EC-ECHOCARDIOGRAM COMPLETE W/O CONT    Collection Time: 11/25/21  3:01 PM   Result Value Ref Range    Eject.Frac. MOD BP 73.47     Eject.Frac. MOD 4C 76.28     Eject.Frac. MOD 2C 74.03    SARS-CoV-2 PCR (24 hour In-House): Collect NP swab in VTM    Collection Time: 11/26/21  2:30 PM    Specimen: Nasopharyngeal; Respirate   Result Value Ref Range    SARS-CoV-2 Source NP Swab    CBC with Differential    Collection Time: 11/27/21  5:38 AM   Result Value Ref Range    WBC 5.1 4.8 - 10.8 K/uL    RBC 3.76 (L) 4.20 - 5.40 M/uL    Hemoglobin 11.8 (L) 12.0 - 16.0 g/dL    Hematocrit 36.5 (L) 37.0 - 47.0 %    MCV 97.1 81.4 - 97.8 fL    MCH 31.4 27.0 - 33.0 pg    MCHC 32.3 (L) 33.6 - 35.0 g/dL    RDW 44.4 35.9 - 50.0 fL    Platelet Count 179 164 - 446 K/uL    MPV 11.0 9.0 - 12.9 fL    Neutrophils-Polys 59.10 44.00 - 72.00 %    Lymphocytes 22.00 22.00 - 41.00 %    Monocytes 7.20 0.00 - 13.40 %    Eosinophils 10.70 (H) 0.00 - 6.90 %    Basophils 0.80 0.00 - 1.80 %    Immature Granulocytes 0.20 0.00 - 0.90 %    Nucleated RBC 0.00 /100 WBC    Neutrophils (Absolute) 3.04 2.00 - 7.15 K/uL    Lymphs  (Absolute) 1.13 1.00 - 4.80 K/uL    Monos (Absolute) 0.37 0.00 - 0.85 K/uL    Eos (Absolute) 0.55 (H) 0.00 - 0.51 K/uL    Baso (Absolute) 0.04 0.00 - 0.12 K/uL    Immature Granulocytes (abs) 0.01 0.00 - 0.11 K/uL    NRBC (Absolute) 0.00 K/uL   Comp Metabolic Panel (CMP)    Collection Time: 11/27/21  5:38 AM   Result Value Ref Range    Sodium 140 135 - 145 mmol/L    Potassium 3.9 3.6 - 5.5 mmol/L    Chloride 105 96 - 112 mmol/L    Co2 26 20 - 33 mmol/L    Anion Gap 9.0 7.0 - 16.0    Glucose 112 (H) 65 - 99 mg/dL    Bun 16 8 - 22 mg/dL    Creatinine 0.57 0.50 - 1.40 mg/dL    Calcium 8.8 8.5 - 10.5 mg/dL    AST(SGOT) 42 12 - 45 U/L    ALT(SGPT) 58 (H) 2 - 50 U/L    Alkaline Phosphatase 149 (H) 30 - 99 U/L    Total Bilirubin 0.6 0.1 - 1.5 mg/dL    Albumin 3.3 3.2 - 4.9 g/dL    Total Protein 6.7 6.0 - 8.2 g/dL    Globulin 3.4 1.9 - 3.5 g/dL    A-G Ratio 1.0 g/dL   HEMOGLOBIN A1C    Collection Time: 11/27/21  5:38 AM   Result Value Ref Range    Glycohemoglobin 5.6 4.0 - 5.6 %    Est Avg Glucose 114 mg/dL   TSH with Reflex to FT4    Collection Time: 11/27/21  5:38 AM   Result Value Ref Range    TSH 3.050 0.380 - 5.330 uIU/mL   ESTIMATED GFR    Collection Time: 11/27/21  5:38 AM   Result Value Ref Range    GFR If African American >60 >60 mL/min/1.73 m 2    GFR If Non African American >60 >60 mL/min/1.73 m 2       Current Facility-Administered Medications   Medication Frequency   • enoxaparin (LOVENOX) inj 40 mg QDAY   • Respiratory Therapy Consult Continuous RT   • hydrALAZINE (APRESOLINE) tablet 25 mg Q8HRS PRN   • acetaminophen (Tylenol) tablet 650 mg Q4HRS PRN   • senna-docusate (PERICOLACE or SENOKOT S) 8.6-50 MG per tablet 2 Tablet BID    And   • polyethylene glycol/lytes (MIRALAX) PACKET 1 Packet QDAY PRN    And   • magnesium hydroxide (MILK OF MAGNESIA) suspension 30 mL QDAY PRN    And   • bisacodyl (DULCOLAX) suppository 10 mg QDAY PRN   • omeprazole (PRILOSEC) capsule 20 mg DAILY   • artificial tears ophthalmic  solution 1 Drop PRN   • benzocaine-menthol (CEPACOL) lozenge 1 Lozenge Q2HRS PRN   • mag hydrox-al hydrox-simeth (MAALOX PLUS ES or MYLANTA DS) suspension 20 mL Q2HRS PRN   • ondansetron (ZOFRAN ODT) dispertab 4 mg 4X/DAY PRN    Or   • ondansetron (ZOFRAN) syringe/vial injection 4 mg 4X/DAY PRN   • traZODone (DESYREL) tablet 50 mg QHS PRN   • sodium chloride (OCEAN) 0.65 % nasal spray 2 Spray PRN   • oxyCODONE immediate-release (ROXICODONE) tablet 5 mg Q3HRS PRN    Or   • oxyCODONE immediate release (ROXICODONE) tablet 10 mg Q3HRS PRN   • scopolamine (TRANSDERM-SCOP) patch 1 Patch Q72HRS PRN   • oxybutynin SR (DITROPAN-XL) tablet 5 mg DAILY   • venlafaxine XR (EFFEXOR XR) capsule 150 mg DAILY   • acetaminophen (TYLENOL) tablet 1,000 mg TID       Orders Placed This Encounter   Procedures   • Diet Order Diet: Regular     Standing Status:   Standing     Number of Occurrences:   1     Order Specific Question:   Diet:     Answer:   Regular [1]       Assessment:  Active Hospital Problems    Diagnosis    • *Closed left hip fracture (HCC)    • Other specified anemias    • Overactive bladder    • Hydrocephalus (HCC)    • Systolic murmur    • Depression    • OA (osteoarthritis) of knee        Medical Decision Making and Plan:  Left Hip fracture - Patient with GLF with L hip fracture s/p fixation with Dr. Culver on 11/24/21. WBAT per ortho  -PT and OT for mobility and ADLs  -Follow-up Dr. Culver  -Schedule Tylenol TID for pain control. PRN Oxycodone     Hydrocephalus - Followed by Dr. Blanchard's office. Possible intervention in January. May benefit from SLP will wait on OT evaluation.      HTN - Labile SBP post-surgery. Not on medications. Into 170s, start Valsartan 80 mg     Asthma - PRN Albuterol.      Anemia - Check AM CBC - 11.8, stable     GERD - Patient on Oxybutynin      Depression - Patient on Effexor 150 mg daily     DVT Ppx - Patient on Lovenox    Total time:  36 minutes.  I spent greater than 50% of the time  for patient care and coordination on this date, including unit/floor time, and face-to-face time with the patient as per assessment and plan above.  Discussion included admission labs, anemia, pain control, elevated SBP, and start ARB.     Prem Gibbons M.D.

## 2021-11-27 NOTE — PROGRESS NOTES
Patient admitted to facility at 1330 via w/c; accompanied by hospital transport.  Patient assisted to room and positioned in bed for comfort and safety; call light within reach.  Patient assisted with stowing belongings and oriented to room and facility.  Admission assessment performed and documented in computer. Patient received and reviewed education binder. Admission paperwork completed; signed copies placed in chart.  Will continue to monitor.

## 2021-11-27 NOTE — THERAPY
Physical Therapy   Initial Evaluation     Patient Name: Aaliyah Fulton  Age:  81 y.o., Sex:  female  Medical Record #: [id number]  Today's Date: 11/27/2021     Subjective    Pt up in wc, willing to participate     Objective       11/27/21 1231   Prior Living Situation   Prior Services None   Housing / Facility 1 Story House   Steps Into Home 2   Steps In Home 0   Rail None  (grab bar on wall)   Equipment Owned 4-Wheel Walker;Quad Cane   Lives with - Patient's Self Care Capacity Spouse   Prior Level of Functional Mobility   Bed Mobility Independent   Transfer Status Independent   Ambulation Independent   Distance Ambulation (Feet)   (household/ limited community)   Assistive Devices Used Single Point Cane   Stairs Independent   Prior Functioning: Everyday Activities   Self Care Independent   Indoor Mobility (Ambulation) Independent   Stairs Independent   Functional Cognition Independent   Prior Device Use   (SPC)   Pain 0 - 10 Group   Location Hip   Location Orientation Left   Therapist Pain Assessment During Activity   Passive ROM Lower Body   Passive ROM Lower Body WDL   Comments L hip within posterior hip precaution restrictions. Pt with significant L knee valgus   Active ROM Lower Body    Active ROM Lower Body  WDL   Comments L hip within posterior hip precaution restrictions   Strength Lower Body   Lower Body Strength  X   Comments 3+ at hips, otherwise 4+ to 5/5 knees and ankles   Sensation Lower Body   Lower Extremity Sensation   WDL   Lower Body Muscle Tone   Lower Body Muscle Tone  WDL   Balance Assessment   Sitting Balance (Static) Fair +   Sitting Balance (Dynamic) Fair   Standing Balance (Static) Fair -   Standing Balance (Dynamic) Poor +   Weight Shift Sitting Fair   Weight Shift Standing Fair   Comments UE support at FWW for standing stabilization   Bed Mobility    Supine to Sit Minimal Assist   Sit to Supine Moderate Assist   Sit to Stand Moderate Assist  (mod to min with repeated trials)    Neurological Concerns   Neurological Concerns No   Coordination Lower Body    Coordination Lower Body  WDL   Roll Left and Right   Reason if not Attempted Medical concerns   CARE Score - Roll Left and Right 88   Roll Left and Right Discharge Goal   Discharge Goal 4   Sit to Lying   Assistance Needed Physical assistance   Physical Assistance Level 51%-75%   CARE Score - Sit to Lying 2   Sit to Lying Discharge Goal   Discharge Goal 4   Lying to Sitting on Side of Bed   Assistance Needed Physical assistance   Physical Assistance Level 26%-50%   CARE Score - Lying to Sitting on Side of Bed 3   Lying to Sitting on Side of Bed Discharge Goal   Discharge Goal 4   Sit to Stand   Assistance Needed Adaptive equipment;Physical assistance   Physical Assistance Level 26%-50%   CARE Score - Sit to Stand 3   Sit to Stand Discharge Goal   Discharge Goal 4   Chair/Bed-to-Chair Transfer   Assistance Needed Adaptive equipment;Physical assistance   Physical Assistance Level 26%-50%   CARE Score - Chair/Bed-to-Chair Transfer 3   Chair/Bed-to-Chair Transfer Discharge Goal   Discharge Goal 4   Car Transfer   Reason if not Attempted Medical concerns   CARE Score - Car Transfer 88   Car Transfer Discharge Goal   Discharge Goal 4   Walk 10 Feet   Assistance Needed Adaptive equipment;Physical assistance   Physical Assistance Level 26%-50%   CARE Score - Walk 10 Feet 3   Walk 10 Feet Discharge Goal   Discharge Goal 4   Walk 50 Feet with Two Turns   Reason if not Attempted Medical concerns   CARE Score - Walk 50 Feet with Two Turns 88   Walk 50 Feet with Two Turns Discharge Goal   Discharge Goal 4   Walk 150 Feet   Reason if not Attempted Medical concerns   CARE Score - Walk 150 Feet 88   Walk 150 Feet Discharge Goal   Discharge Goal 3   Walking 10 Feet on Uneven Surfaces   Reason if not Attempted Medical concerns   CARE Score - Walking 10 Feet on Uneven Surfaces 88   Walking 10 Feet on Uneven Surfaces Discharge Goal   Discharge Goal 4   1  Step (Curb)   Reason if not Attempted Medical concerns   CARE Score - 1 Step (Curb) 88   1 Step (Curb) Discharge Goal   Discharge Goal 4   4 Steps   Reason if not Attempted Medical concerns   CARE Score - 4 Steps 88   4 Steps Discharge Goal   Discharge Goal 4   12 Steps   Reason if not Attempted Activity not applicable   CARE Score - 12 Steps 9   12 Steps Discharge Goal   Discharge Goal 9   Picking Up Object   Reason if not Attempted Safety concerns   CARE Score - Picking Up Object 88   Picking Up Object Discharge Goal   Discharge Goal 4   Wheel 50 Feet with Two Turns   Reason if not Attempted Activity not applicable   CARE Score - Wheel 50 Feet with Two Turns 9   Type of Wheelchair/Scooter Manual   Wheel 50 Feet with Two Turns Discharge Goal   Discharge Goal 9   Wheel 150 Feet   Reason if not Attempted Activity not applicable   CARE Score - Wheel 150 Feet 9   Type of Wheelchair/Scooter Manual   Wheel 150 Feet Discharge Goal   Discharge Goal 9   Gait Functional Level of Assist    Gait Level Of Assist Minimal Assist   Assistive Device Front Wheel Walker   Distance (Feet) 10   # of Times Distance was Traveled 3   Deviation Antalgic;Step To;Decreased Base Of Support  (L knee valgus)   Stairs Functional Level of Assist   Level of Assist with Stairs Unable to Participate   Transfer Functional Level of Assist   Bed, Chair, Wheelchair Transfer Moderate Assist  (mod to min for sit<>stand with repeated trials with FWW)   Bed Chair Wheelchair Transfer Description Adaptive equipment;Increased time;Requires lift   Problem List    Problems Pain;Impaired Bed Mobility;Impaired Transfers;Impaired Ambulation;Functional ROM Deficit;Functional Strength Deficit;Impaired Balance;Impaired Coordination;Decreased Activity Tolerance;Limited Knowledge of Post-Op Precautions  (requires frequent cues for posterior hip precautions)   Precautions   Precautions Posterior Hip Precautions;Weight Bearing As Tolerated Left Lower Extremity   Current  Discharge Plan   Current Discharge Plan Return to Prior Living Situation   Interdisciplinary Plan of Care Collaboration   IDT Collaboration with  Occupational Therapist   Patient Position at End of Therapy In Bed;Call Light within Reach;Tray Table within Reach;Phone within Reach   Collaboration Comments regarding L knee valgus and difficulty maintaining posterior hip precautions   Benefit   Therapy Benefit Patient Would Benefit from Inpatient Rehabilitation Physical Therapy to Maximize Functional [place] with ADLs, IADLs and Mobility.   Strengths & Barriers   Strengths Able to follow instructions;Adequate strength;Effective communication skills;Good insight into deficits/needs;Independent prior level of function;Making steady progress towards goals;Pleasant and cooperative;Supportive family;Willingly participates in therapeutic activities   Barriers Decreased endurance;Fatigue;Generalized weakness;Home accessibility;Impaired activity tolerance;Impaired balance;Pain;Limited mobility  (L knee valgus, posterior hip precautions)   PT Total Time Spent   PT Individual Total Time Spent (Mins) 60   PT Charge Group   Charges Yes   PT Gait Training 1   PT Evaluation PT Evaluation Mod     Pt oriented to rehab/ PT and reviewed posterior hip precautions. Pt ambulated 10 ft x 3 with FWW and CGA/min A, demonstrates L knee valgus and impaired WB stability. Pt completed supine bridging x 5 reps with min A to maintain postioning and prevent L hip adduction.     Assessment  Patient is 81 y.o. female with a diagnosis of L hip fx 2* GLF 11/23 s/p L hip hemiarthroplasy 11/24.  Additional factors influencing patient status / progress : include posterior hip precautions and WBAT, post op anemia and labile BP. Pt with PMH for OA, HTN, asthma, hydrocephalus and depression. Pt reports independent for household mobility but with frequent falls. Pt currently presents with impaired bed mobility, transfers, gait with FWW, LLE pain and  "weakness, difficulty maintaining posterior hip precautions and is unable to attempt stairs at this time. Pt has supportive family, motivated for independence and should benefit from rehab PT to maximize safety and household mobility with FWW.    Plan  Recommend Physical Therapy  minutes per day 5-7 days per week for 2 weeks for the following treatments:  PT Group Therapy, PT Gait Training, PT Self Care/Home Eval, PT Therapeutic Exercises, PT TENS Application, PT Neuro Re-Ed/Balance, PT Therapeutic Activity, PT Manual Therapy and PT Evaluation.    Passport items to be completed:  Get in/out of bed safely, in/out of a vehicle, safely use mobility device, walk or wheel around home/community, navigate up and down stairs, show how to get up/down from the ground, ensure home is accessible, demonstrate HEP, complete caregiver training    Goals:  Long term and short term goals have been discussed with patient and they are in agreement.    Physical Therapy Problems (Active)     Problem: Mobility     Dates: Start: 11/27/21       Goal: STG-Within one week, patient will ambulate household distance CGA with FWW 50 ft x 2     Dates: Start: 11/27/21          Goal: STG-Within one week, patient will ambulate up/down a curb with // bars for 4\" and 6\" step stool with CGA     Dates: Start: 11/27/21             Problem: Mobility Transfers     Dates: Start: 11/27/21       Goal: STG-Within one week, patient will perform bed mobility SBA with leg  as needed and compliance to posterior hip precautions LLE     Dates: Start: 11/27/21          Goal: STG-Within one week, patient will transfer bed to chair SBA with FWW after set-up     Dates: Start: 11/27/21             Problem: PT-Long Term Goals     Dates: Start: 11/27/21       Goal: LTG-By discharge, patient will ambulate with FWW and SPV / modified independent 100 ft x 2     Dates: Start: 11/27/21          Goal: LTG-By discharge, patient will transfer one surface to another with " FWW and SPV / modified independent     Dates: Start: 11/27/21          Goal: LTG-By discharge, patient will ambulate up/down 4-6 stairs with hand rails and CGA     Dates: Start: 11/27/21          Goal: LTG-By discharge, patient will transfer in/out of a car with FWW and SBA/ CGA to maintain posterior hip precautions LLE     Dates: Start: 11/27/21

## 2021-11-27 NOTE — CARE PLAN
The patient is Watcher - Medium risk of patient condition declining or worsening    Shift Goals  Clinical Goals: Safety      Problem: Pain - Standard  Goal: Alleviation of pain or a reduction in pain to the patient’s comfort goal  Note: Patient having pain in her left hip, patient received scheduled tylenol, provided ice pack, and helped the patient reposition when in bed.      Problem: Skin Integrity  Goal: Patient's skin integrity will be maintained or improve  Note: Patient's skin remains intact and free from new or accidental injury this shift.  Will continue to monitor.

## 2021-11-27 NOTE — THERAPY
Occupational Therapy  Daily Treatment     Patient Name: Aaliyah Fulton  Age:  81 y.o., Sex:  female  Medical Record #: [id number]  Today's Date: 11/27/2021     Precautions  Precautions: Fall Risk,Posterior Hip Precautions,Weight Bearing As Tolerated Left Lower Extremity  Comments: hydrocephalus at baseline          Subjective    Pt resting in bed upon arrival, agreeable to OT session. Pt reported improved pain mgmt compared to this morning.      Objective     11/27/21 1101   Vitals   O2 Delivery Device None - Room Air   Pain   Intervention Declines   Functional Level of Assist   Upper Body Dressing Supervision  (setup )   Upper Body Dressing Description Initial preparation for task   Lower Body Dressing Maximal Assist   Lower Body Dressing Description Assistive devices;Grab bar;Reacher;Assist with threading into pant leg;Increased time;Initial preparation for task;Set-up of equipment;Supervision for safety;Verbal cueing   Toileting Moderate Assist   Toileting Description Assist to pull pants up;Assist to pull pants down;Assist for standing balance;Grab bar;Increased time;Initial preparation for task;Set-up of equipment;Supervision for safety;Verbal cueing   Bed, Chair, Wheelchair Transfer Minimal Assist  (SPT EOB -> w/c)   Bed Chair Wheelchair Transfer Description Increased time;Requires lift;Set-up of equipment;Supervision for safety;Verbal cueing   Toilet Transfers Minimal Assist  (SPT w/c <> toilet via GB )   Toilet Transfer Description Grab bar;Increased time;Initial preparation for task;Set-up of equipment;Supervision for safety;Verbal cueing   Bed Mobility    Supine to Sit Minimal Assist   Scooting Maximal Assist   Interdisciplinary Plan of Care Collaboration   IDT Collaboration with  Physician   Patient Position at End of Therapy Seated;Chair Alarm On;Self Releasing Lap Belt Applied;Call Light within Reach;Tray Table within Reach;Phone within Reach   Collaboration Comments Dr. Gibbons rounds during session     OT Total Time Spent   OT Individual Total Time Spent (Mins) 30   OT Charge Group   OT Self Care / ADL 2     Education and training for use of reacher for LBD to maintain posterior hip precautions.    Assessment    Pt tolerated OT session well focused on above ADLs and use of AE. Pt requires increased time for transfers and ADLs. Had difficulty using the reacher to don pants but was able to thread the LLE through with it.     Strengths: Able to follow instructions,Alert and oriented,Effective communication skills,Good insight into deficits/needs,Independent prior level of function,Motivated for self care and independence,Pleasant and cooperative,Supportive family,Willingly participates in therapeutic activities  Barriers: Decreased endurance,Generalized weakness,Impaired activity tolerance,Impaired balance,Limited mobility,Pain    Plan    ADLs with AE, transfers, continue posterior hip precaution education, functional mobility, fall prevention education, balance, UE strengthening      Passport items to be completed:  Perform bathroom transfers, complete dressing, complete feeding, get ready for the day, prepare a simple meal, participate in household tasks, adapt home for safety needs, demonstrate home exercise program, complete caregiver training     Occupational Therapy Goals (Active)     Problem: Bathing     Dates: Start: 11/27/21       Goal: STG-Within one week, patient will bathe with SBA and use of AE as needed.      Dates: Start: 11/27/21             Problem: Dressing     Dates: Start: 11/27/21       Goal: STG-Within one week, patient will dress LB with Prince and use of AE as needed.      Dates: Start: 11/27/21             Problem: Functional Transfers     Dates: Start: 11/27/21       Goal: STG-Within one week, patient will perform bathroom transfers with SBA and use of AE as needed.      Dates: Start: 11/27/21             Problem: OT Long Term Goals     Dates: Start: 11/27/21       Goal: LTG-By discharge,  patient will complete basic self care tasks with [place] to modified independence and use of AE as needed.      Dates: Start: 11/27/21          Goal: LTG-By discharge, patient will perform bathroom transfers with supervision to modified independence with use of AE as needed.      Dates: Start: 11/27/21             Problem: Toileting     Dates: Start: 11/27/21       Goal: STG-Within one week, patient will complete toileting tasks with CGA to SBA and use of AE as needed.      Dates: Start: 11/27/21

## 2021-11-27 NOTE — PROGRESS NOTES
2 RN skin check done with admitting RN Halima and JAVED Escobedo. Face photo and skin photos documented in media. Appropriate LDAs opened.   Pt with Bogdan score of 18, RN wound protocol in place, orders current. Prevention measures in place.

## 2021-11-27 NOTE — THERAPY
Physical Therapy   Daily Treatment     Patient Name: Aaliyah Fulton  Age:  81 y.o., Sex:  female  Medical Record #: [id number]  Today's Date: 11/27/2021     Precautions  Precautions: Posterior Hip Precautions,Weight Bearing As Tolerated Left Lower Extremity  Comments: hydrocephalus at baseline     Subjective    Pt resting in bed, willing to participate     Objective       11/27/21 1501   Gait Functional Level of Assist    Gait Level Of Assist Minimal Assist   Assistive Device Front Wheel Walker   Distance (Feet) 10   # of Times Distance was Traveled 2   Deviation Antalgic;Step To;Decreased Base Of Support  (L knee valgus)   Transfer Functional Level of Assist   Bed, Chair, Wheelchair Transfer Minimal Assist   Bed Chair Wheelchair Transfer Description Adaptive equipment;Set-up of equipment;Supervision for safety;Verbal cueing   Toilet Transfers Minimal Assist   Toilet Transfer Description Adaptive equipment;Grab bar;Verbal cueing   Supine Lower Body Exercise   Supine Lower Body Exercises Yes   Bridges Two Legged;1 set of 10  (manual cues for neutral hip LLE)   Sitting Lower Body Exercises   Sitting Lower Body Exercises Yes   Long Arc Quad 2 sets of 10   Comments hip abduction pillow in place to maintain neutral hip LLE.    Bed Mobility    Supine to Sit Minimal Assist   Sit to Supine Moderate Assist   Sit to Stand Minimal Assist   PT Total Time Spent   PT Individual Total Time Spent (Mins) 30   PT Charge Group   PT Gait Training 1   PT Therapeutic Exercise 1       Assessment    Pt demonstrates difficulty with WB tolerance to LLE, knee valgus and instability. Pt provided with hip abduction pillow and instructed with use for sitting and when in bed to maintain neutral L hip positioning, re-inforced posterior hip precautions.     Strengths: Able to follow instructions,Adequate strength,Effective communication skills,Good insight into deficits/needs,Independent prior level of function,Making steady progress towards  "goals,Pleasant and cooperative,Supportive family,Willingly participates in therapeutic activities  Barriers: Decreased endurance,Fatigue,Generalized weakness,Home accessibility,Impaired activity tolerance,Impaired balance,Pain,Limited mobility (L knee valgus, posterior hip precautions)    Plan    Progressive gait tolerance with FWW, bed mobility/ transfers and LE strength training. Review posterior hip precautions and use of abduction pillow.     Passport items to be completed:  Get in/out of bed safely, in/out of a vehicle, safely use mobility device, walk or wheel around home/community, navigate up and down stairs, show how to get up/down from the ground, ensure home is accessible, demonstrate HEP, complete caregiver training    Physical Therapy Problems (Active)     Problem: Mobility     Dates: Start: 11/27/21       Goal: STG-Within one week, patient will ambulate household distance CGA with FWW 50 ft x 2     Dates: Start: 11/27/21          Goal: STG-Within one week, patient will ambulate up/down a curb with // bars for 4\" and 6\" step stool with CGA     Dates: Start: 11/27/21             Problem: Mobility Transfers     Dates: Start: 11/27/21       Goal: STG-Within one week, patient will perform bed mobility SBA with leg  as needed and compliance to posterior hip precautions LLE     Dates: Start: 11/27/21          Goal: STG-Within one week, patient will transfer bed to chair SBA with FWW after set-up     Dates: Start: 11/27/21             Problem: PT-Long Term Goals     Dates: Start: 11/27/21       Goal: LTG-By discharge, patient will ambulate with FWW and SPV / modified independent 100 ft x 2     Dates: Start: 11/27/21          Goal: LTG-By discharge, patient will transfer one surface to another with FWW and SPV / modified independent     Dates: Start: 11/27/21          Goal: LTG-By discharge, patient will ambulate up/down 4-6 stairs with hand rails and CGA     Dates: Start: 11/27/21          Goal: LTG-By " discharge, patient will transfer in/out of a car with FWW and SBA/ CGA to maintain posterior hip precautions LLE     Dates: Start: 11/27/21

## 2021-11-27 NOTE — THERAPY
Occupational Therapy   Initial Evaluation     Patient Name: Aaliyah Fulton  Age:  81 y.o., Sex:  female  Medical Record #: [id number]  Today's Date: 11/27/2021     Subjective    Pt resting in bed upon arrival, agreeable to OT session.      Objective     11/27/21 0701   Prior Living Situation   Prior Services None   Housing / Facility 1 Story House   Steps Into Home 2   Steps In Home 0  (has grab bar on wall entering home )   Rail None   Elevator No   Bathroom Set up Bathtub / Shower Combination   Equipment Owned 4-Wheel Walker;Quad Cane;Grab Bar(s) In Tub / Shower   Lives with - Patient's Self Care Capacity Spouse   Prior Level of ADL Function   Self Feeding Independent   Grooming / Hygiene Independent   Bathing Independent   Dressing Independent   Toileting Independent   Prior Level of IADL Function   Medication Management Independent   Laundry Independent   Kitchen Mobility Independent   Finances Independent   Home Management Independent   Shopping Independent   Prior Level Of Mobility Supervision With Device in Home   Driving / Transportation Driving Independent   Occupation (Pre-Hospital Vocational) Retired Due To Age  (retired  )   Prior Functioning: Everyday Activities   Self Care Independent   Indoor Mobility (Ambulation) Independent   Stairs Independent   Functional Cognition Independent   Prior Device Use Walker  (also has SPC )   Vitals   O2 Delivery Device None - Room Air   Pain 0 - 10 Group   Location Hip   Location Orientation Left   Therapist Pain Assessment During Activity  (pain with movement, did not quantify )   Cognition    Orientation Level Oriented x 4   Level of Consciousness Alert   Ability To Follow Commands 1 Step   ABS (Agitated Behavior Scale)   Agitated Behavior Scale Performed No   Cognitive Pattern Assessment   Cognitive Pattern Assessment Used BIMS   Brief Interview for Mental Status (BIMS)   Repetition of Three Words (First Attempt) 3   Temporal Orientation: Year  "Correct   Temporal Orientation: Month Accurate within 5 days   Temporal Orientation: Day Correct   Recall: \"Sock\" No, could not recall   Recall: \"Blue\" No, could not recall   Recall: \"Bed\" No, could not recall   BIMS Summary Score 9   Vision Screen   Vision Not tested  (reports no changes in vision )   Passive ROM Upper Body   Passive ROM Upper Body WDL   Active ROM Upper Body   Active ROM Upper Body  WDL   Strength Upper Body   Upper Body Strength  X   Rt Shoulder Flexion Strength 3+ (F+)   Rt Elbow Flexion Strength 4 (G)   Lt Shoulder Flexion Strength 3+ (F+)   Lt Elbow Flexion Strength 4 (G)   Sensation Upper Body   Upper Extremity Sensation  WDL  (no reported numbness or tingling )   Upper Body Muscle Tone   Upper Body Muscle Tone  WDL   Balance Assessment   Sitting Balance (Static) Fair   Sitting Balance (Dynamic) Fair -   Standing Balance (Static) Fair -   Standing Balance (Dynamic) Poor +   Weight Shift Sitting Poor   Weight Shift Standing Poor   Bed Mobility    Supine to Sit Moderate Assist   Sit to Supine   (DNT - pt up in w/c at end of session )   Sit to Stand Minimal Assist   Scooting Maximal Assist   Coordination Upper Body   Coordination WDL  (serial opposition intact )   Eating   Assistance Needed Set-up / clean-up   Physical Assistance Level No physical assistance   CARE Score - Eating 5   Eating Discharge Goal   Discharge Goal 6   Oral Hygiene   Assistance Needed Set-up / clean-up   Physical Assistance Level No physical assistance   CARE Score - Oral Hygiene 5   Oral Hygiene Discharge Goal   Discharge Goal 6   Shower/Bathe Self   Assistance Needed Physical assistance   Physical Assistance Level 25% or less   CARE Score - Shower/Bathe Self 3   Shower/Bathe Self Discharge Goal   Discharge Goal 6   Upper Body Dressing   Assistance Needed Set-up / clean-up   Physical Assistance Level No physical assistance   CARE Score - Upper Body Dressing 5   Upper Body Dressing Discharge Goal   Discharge Goal 6 "   Lower Body Dressing   Assistance Needed Physical assistance   Physical Assistance Level 76% or more   CARE Score - Lower Body Dressing 2   Lower Body Dressing Discharge Goal   Discharge Goal 4   Putting On/Taking Off Footwear   Assistance Needed Physical assistance   Physical Assistance Level Total assistance   CARE Score - Putting On/Taking Off Footwear 1   Putting On/Taking Off Footwear Discharge Goal   Discharge Goal 5   Toileting Hygiene   Assistance Needed Physical assistance   Physical Assistance Level 76% or more   CARE Score - Toileting Hygiene 2   Toileting Hygiene Discharge Goal   Discharge Goal 6   Toilet Transfer   Assistance Needed Physical assistance   Physical Assistance Level 25% or less   CARE Score - Toilet Transfer 3   Toilet Transfer Discharge Goal   Discharge Goal 6   Hearing, Speech, and Vision   Expression of Ideas and Wants Without difficulty   Understanding Verbal and Non-Verbal Content Understands   Functional Level of Assist   Eating Supervision   Eating Description   (setup )   Grooming Supervision;Seated   Grooming Description Increased time;Initial preparation for task;Seated in wheelchair at sink   Bathing Minimal Assist  (assist for distal LEs; could not stand  )   Bathing Description Grab bar;Hand held shower;Tub bench;Assit with back;Assit wtih lower extremities;Increased time;Initial preparation for task;Set-up of equipment;Supervision for safety;Verbal cueing   Upper Body Dressing Supervision   Upper Body Dressing Description Initial preparation for task   Lower Body Dressing Maximal Assist   Lower Body Dressing Description Grab bar;Assist with threading into pant leg;Increased time;Initial preparation for task;Set-up of equipment;Supervision for safety;Verbal cueing   Toileting Maximal Assist   Toileting Description Assist for hygiene;Assist to pull pants up;Assist to pull pants down;Assist for standing balance;Grab bar;Increased time;Initial preparation for task;Set-up of  equipment;Supervision for safety;Verbal cueing   Bed, Chair, Wheelchair Transfer Minimal Assist  (SPT EOB -> w/c )   Bed Chair Wheelchair Transfer Description Increased time;Initial preparation for task;Set-up of equipment;Supervision for safety;Verbal cueing;Requires lift   Toilet Transfers Minimal Assist  (SPT w/c <> toilet via GB )   Toilet Transfer Description Grab bar;Increased time;Initial preparation for task;Set-up of equipment;Supervision for safety;Verbal cueing;Requires lift   Tub / Shower Transfers Minimal Assist  (SPT w/c <> tub bench via GB )   Tub Shower Transfer Description Grab bar;Shower bench;Increased time;Initial preparation for task;Requires lift;Set-up of equipment;Supervision for safety;Verbal cueing   Comprehension Modified Independent   Comprehension Description Glasses  (readers )   Expression Independent   Problem Solving Supervision   Memory Supervision   Problem List   Problem List Decreased Active Daily Living Skills;Decreased Homemaking Skills;Decreased Upper Extremity Strength Right;Decreased Upper Extremity Strength Left;Decreased Functional Mobility;Decreased Activity Tolerance;Impaired Postural Control / Balance;Limited Knowledge of Post Op Precautions   Precautions   Precautions Fall Risk;Posterior Hip Precautions;Weight Bearing As Tolerated Left Lower Extremity   Comments hydrocephalus at baseline    Current Discharge Plan   Current Discharge Plan Return to Prior Living Situation   Benefit    Therapy Benefit Patient Would Benefit from Inpatient Rehab Occupational Therapy to Maximize [place] with ADLs, IADLs and Functional Mobility.   Interdisciplinary Plan of Care Collaboration   IDT Collaboration with  Nursing;Physical Therapist   Patient Position at End of Therapy Seated;Chair Alarm On;Self Releasing Lap Belt Applied;Call Light within Reach;Tray Table within Reach;Phone within Reach   Collaboration Comments RN notified of need for island dressing change; CLOF with PT     Equipment Needs   Assistive Device / DME Front-Wheel Walker;Tub Transfer Bench;Raised Toilet Seat;Grab Bars By Toilet   Adaptive Equipment Reacher;Sock Aide;Dressing Stick;Long Handled Shoe Horn;Long Handled Sponge;Leg    Strengths & Barriers   Strengths Able to follow instructions;Alert and oriented;Effective communication skills;Good insight into deficits/needs;Independent prior level of function;Motivated for self care and independence;Pleasant and cooperative;Supportive family;Willingly participates in therapeutic activities   Barriers Decreased endurance;Generalized weakness;Impaired activity tolerance;Impaired balance;Limited mobility;Pain   OT Total Time Spent   OT Individual Total Time Spent (Mins) 60   OT Charge Group   OT Self Care / ADL 1   OT Evaluation OT Evaluation Mod     Assessment  Patient is an 81 y.o. female with a diagnosis of left hip fx now s/p surgical fixation on 11/24/21. Pt had GLF when she was taking the trash out, lost balance, felt dizzy and fell on her left side. Additional factors influencing patient status / progress (ie: cognitive factors, co-morbidities, social support, etc): PMHx includes OA, asthma, HTN, hydrocephalus and depression. Pt is being followed for the hydrocephalus and is reportedly scheduled for possible shunt placement in January.     Pt presents with pain during STS and transfers, impaired balance, decreased activity tolerance, and generalized weakness. Requires verbal cues to maintain posterior hip precautions. MaxA for LBD and toileting, Prince for bathing, setup/supervision for UBD and oral care. Pt reports she falls frequently especially when she is not paying attention to her surroundings. Pt will benefit from inpatient occupational therapy services to maximize safety and independence in ADLs prior to d/c home with the support of her family.     Plan  Recommend Occupational Therapy  minutes per day 5-6 days per week for 2 weeks for the following  treatments:  OT Group Therapy, OT Self Care/ADL, OT Cognitive Skill Dev, OT Neuro Re-Ed/Balance, OT Therapeutic Activity and OT Therapeutic Exercise.    Passport items to be completed:  Perform bathroom transfers, complete dressing, complete feeding, get ready for the day, prepare a simple meal, participate in household tasks, adapt home for safety needs, demonstrate home exercise program, complete caregiver training     Goals:  Long term and short term goals have been discussed with patient and they are in agreement.    Occupational Therapy Goals (Active)     Problem: Bathing     Dates: Start: 11/27/21       Goal: STG-Within one week, patient will bathe with SBA and use of AE as needed.      Dates: Start: 11/27/21             Problem: Dressing     Dates: Start: 11/27/21       Goal: STG-Within one week, patient will dress LB with Prince and use of AE as needed.      Dates: Start: 11/27/21             Problem: Functional Transfers     Dates: Start: 11/27/21       Goal: STG-Within one week, patient will perform bathroom transfers with SBA and use of AE as needed.      Dates: Start: 11/27/21             Problem: OT Long Term Goals     Dates: Start: 11/27/21       Goal: LTG-By discharge, patient will complete basic self care tasks with Prince to modified independence and use of AE as needed.      Dates: Start: 11/27/21          Goal: LTG-By discharge, patient will perform bathroom transfers with supervision to modified independence with use of AE as needed.      Dates: Start: 11/27/21             Problem: Toileting     Dates: Start: 11/27/21       Goal: STG-Within one week, patient will complete toileting tasks with CGA to SBA and use of AE as needed.      Dates: Start: 11/27/21

## 2021-11-28 PROCEDURE — 97530 THERAPEUTIC ACTIVITIES: CPT

## 2021-11-28 PROCEDURE — 700111 HCHG RX REV CODE 636 W/ 250 OVERRIDE (IP): Performed by: PHYSICAL MEDICINE & REHABILITATION

## 2021-11-28 PROCEDURE — 700102 HCHG RX REV CODE 250 W/ 637 OVERRIDE(OP): Performed by: PHYSICAL MEDICINE & REHABILITATION

## 2021-11-28 PROCEDURE — A9270 NON-COVERED ITEM OR SERVICE: HCPCS | Performed by: PHYSICAL MEDICINE & REHABILITATION

## 2021-11-28 PROCEDURE — 770010 HCHG ROOM/CARE - REHAB SEMI PRIVAT*

## 2021-11-28 PROCEDURE — 97535 SELF CARE MNGMENT TRAINING: CPT

## 2021-11-28 RX ADMIN — ENOXAPARIN SODIUM 40 MG: 40 INJECTION SUBCUTANEOUS at 08:41

## 2021-11-28 RX ADMIN — SENNOSIDES AND DOCUSATE SODIUM 2 TABLET: 50; 8.6 TABLET ORAL at 08:40

## 2021-11-28 RX ADMIN — ACETAMINOPHEN 1000 MG: 500 TABLET, FILM COATED ORAL at 08:40

## 2021-11-28 RX ADMIN — OMEPRAZOLE 20 MG: 20 CAPSULE, DELAYED RELEASE ORAL at 08:41

## 2021-11-28 RX ADMIN — VENLAFAXINE HYDROCHLORIDE 150 MG: 75 CAPSULE, EXTENDED RELEASE ORAL at 08:41

## 2021-11-28 RX ADMIN — VALSARTAN 80 MG: 80 TABLET, FILM COATED ORAL at 08:42

## 2021-11-28 RX ADMIN — OXYBUTYNIN CHLORIDE 5 MG: 5 TABLET, EXTENDED RELEASE ORAL at 08:41

## 2021-11-28 RX ADMIN — SENNOSIDES AND DOCUSATE SODIUM 2 TABLET: 50; 8.6 TABLET ORAL at 21:13

## 2021-11-28 RX ADMIN — ACETAMINOPHEN 1000 MG: 500 TABLET, FILM COATED ORAL at 15:35

## 2021-11-28 RX ADMIN — ACETAMINOPHEN 1000 MG: 500 TABLET, FILM COATED ORAL at 21:13

## 2021-11-28 ASSESSMENT — PAIN DESCRIPTION - PAIN TYPE: TYPE: ACUTE PAIN

## 2021-11-28 ASSESSMENT — ACTIVITIES OF DAILY LIVING (ADL)
TOILET_TRANSFER_DESCRIPTION: GRAB BAR;SET-UP OF EQUIPMENT;SUPERVISION FOR SAFETY;VERBAL CUEING
TOILETING_LEVEL_OF_ASSIST_DESCRIPTION: GRAB BAR;INCREASED TIME;SET-UP OF EQUIPMENT;SUPERVISION FOR SAFETY

## 2021-11-28 ASSESSMENT — FIBROSIS 4 INDEX: FIB4 SCORE: 2.56

## 2021-11-28 NOTE — CARE PLAN
Problem: Pain - Post Surgery  Goal: Alleviation or reduction of pain post surgery  Outcome: Progressing  Note: Patient able to verbalize pain level and verbalize an acceptable level of pain.    The patient is Stable - Low risk of patient condition declining or worsening    Shift Goals  Clinical Goals: Safety    Progress made toward(s) clinical / shift goals:  wnl    Patient is not progressing towards the following goals:

## 2021-11-28 NOTE — CARE PLAN
The patient is Stable - Low risk of patient condition declining or worsening    Shift Goals  Clinical Goals: Safety    Problem: Pain - Standard  Goal: Alleviation of pain or a reduction in pain to the patient’s comfort goal  Outcome: Progressing  Flowsheets  Taken 11/27/2021 2112  Pain Rating Scale (NPRS): 2  Taken 11/27/2021 0306  OB Pain Intervention:   Declines   Rest  Problem: Skin Integrity  Goal: Patient's skin integrity will be maintained or improve  Outcome: Progressing  Note:   Bogdan Score: 18    Patient's skin remains intact and free from new or accidental injury this shift; no s/s of infection. RN wound protocol checked. Encouraged hydration and educated about the importance of nutrition to keep skin integrity. Will continue to monitor.

## 2021-11-28 NOTE — DISCHARGE PLANNING
CASE MANAGEMENT INITIAL ASSESSMENT    Admit Date:  11/26/2021     Patient is a  81 y.o. female transferred from [facility] where she was admitted from 11/23-11/26 after a fall. She was transferred to [facility] on 11/26/2021.    Admitting physician: Dr. Gibbons   PCP: Katerina Yanes   Ortho: Dr. Culver   Neurosurgeon: Dr. Blanchard     Case Management review chart and will meet patient and family to discuss role of case management / discharge planning / team conference.     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   • Depression 03/04/2015   • Breast cancer in situ 03/04/2015   • OA (osteoarthritis) of knee 11/14/2013   • Thoracic kyphosis 10/17/2012   • S/P laminectomy 10/17/2012     Prior Living Situation:  Housing / Facility: [address]  Lives with - Patient's Self Care Capacity: Spouse    Prior Level of Function:  Medication Management: Independent  Finances: Independent  Home Management: Independent  Shopping: Independent  Prior Level Of Mobility: Supervision With Device in Home  Driving / Transportation: Driving Independent    Support Systems:  Primary : Abby Tejeda (daughter) [phone], Bertin Fulton (spouse) [phone]    Previous Services Utilized:   Equipment Owned: 4-Wheel Walker,Quad Cane  Prior Services: None    Other Information:  Occupation (Pre-Hospital Vocational): Retired Due To Age (retired  )  Primary Payor Source: Other (Comments) (Medicare - [employer] )  Secondary Payor Source: Other (Comments) (aarp)  Primary Care Practitioner : Katerina Yanes  Other MDs: Dr. Culver (ortho) Dr. Blanchard (nerosurg)     Patient / Family Goal:  Case Management reviewed medical chart and will meet  patient closer to discharge.     Patient lives at home with spouse. They have a 1 sh with 2 hugh. Patient has a fww and quad cane.     Plan:  1. Continue to follow patient through hospitalization and provide discharge planning in collaboration with patient, family, physicians and ancillary services.     2. Utilize community resources to ensure a safe discharge.

## 2021-11-28 NOTE — THERAPY
"Occupational Therapy  Daily Treatment     Patient Name: Aaliyah Fulton  Age:  81 y.o., Sex:  female  Medical Record #: [id number]  Today's Date: 11/28/2021     Precautions  Precautions: Posterior Hip Precautions,Weight Bearing As Tolerated Left Lower Extremity  Comments: hydrocephalus at baseline          Subjective    \"I'm stuck here. I'm glad you found me. I was trying to fill up my water in the sink, but I couldn't get the door open. Then I was going to use the toilet while I was in there, but I realized my nurse would be mad at me if I did. I'm not supposed to be doing that yet.\"    \"This has been the worst day. I don't know why I am being short with everyone\" See below.    \"My  walks with a cane or a walker too. I thought he would've been the one to fall.\"     Objective       11/28/21 1031   Vitals   O2 Delivery Device None - Room Air   Vitals Comments no s/s of respiratory or cardiac distress during therapy   Cognition    Level of Consciousness Alert   Ability To Follow Commands 2 Step   Functional Level of Assist   Grooming Supervision;Seated  (practice propelling self in/out of restroom to sink)   Toileting Contact Guard Assist   Toileting Description Grab bar;Increased time;Set-up of equipment;Supervision for safety   Toilet Transfers Contact Guard Assist   Toilet Transfer Description Grab bar;Set-up of equipment;Supervision for safety;Verbal cueing   Tub / Shower Transfers Contact Guard Assist  (tub transfer bench to simulate home set up. FWW)   Tub Shower Transfer Description   (Demo provided. Cues throughout.)   Interdisciplinary Plan of Care Collaboration   IDT Collaboration with  Certified Nursing Assistant   Patient Position at End of Therapy Seated;Self Releasing Lap Belt Applied;Call Light within Reach;Tray Table within Reach;Phone within Reach   Collaboration Comments re: set up for lunch   OT Total Time Spent   OT Individual Total Time Spent (Mins) 60   OT Charge Group   OT Self Care / ADL " 2   OT Therapy Activity 2     Pt with negative self talk and depressed mood at start of session. Pt felt regretful re: her fall. Education on importance of learning from mistakes instead of dwelling/bullying self. Pt able to identify several things she could do differently next time to prevent fall such as: slow down, remove area rug, use her walker (not her cane), and let her daughter help with some of the more difficult cleaning. OT provided empathetic listening re: pt having a bad day/year. Completed motivational interviewing and coping skills education. Pt reported cleaning is her best coping skill, but understands she should do cleaning within her physical abilities. Pt reported feeling much better at end of session.     Assessment    Pt improved significantly with ADLs and functional transfers. Pt walked 3 feet to/from tub during tub transfer bench practice. Pt put minimal weight through LLE. Pt asking appropriate questions to improve performance. Pt does need cueing to attend to task. Pt admitted to trying to procrastinate movement with conversation at times.     Strengths: Able to follow instructions,Alert and oriented,Effective communication skills,Good insight into deficits/needs,Independent prior level of function,Motivated for self care and independence,Pleasant and cooperative,Supportive family,Willingly participates in therapeutic activities  Barriers: Decreased endurance,Generalized weakness,Impaired activity tolerance,Impaired balance,Limited mobility,Pain    Plan    Discuss ordering Tub Transfer Bench with pts daughters, ADLs with AE, transfers, continue posterior hip precaution education, functional mobility, fall prevention education, balance, UE strengthening       Passport items to be completed:  Perform bathroom transfers, complete dressing, complete feeding, get ready for the day, prepare a simple meal, participate in household tasks, adapt home for safety needs, demonstrate home exercise  program, complete caregiver training     \\  Passport items to be completed:  Perform bathroom transfers, complete dressing, get ready for the day, prepare a simple meal, participate in household tasks, adapt home for safety needs, demonstrate home exercise program, complete caregiver training     Occupational Therapy Goals (Active)     Problem: Bathing     Dates: Start: 11/27/21       Goal: STG-Within one week, patient will bathe with SBA and use of AE as needed.      Dates: Start: 11/27/21             Problem: Dressing     Dates: Start: 11/27/21       Goal: STG-Within one week, patient will dress LB with Prince and use of AE as needed.      Dates: Start: 11/27/21             Problem: Functional Transfers     Dates: Start: 11/27/21       Goal: STG-Within one week, patient will perform bathroom transfers with SBA and use of AE as needed.      Dates: Start: 11/27/21             Problem: OT Long Term Goals     Dates: Start: 11/27/21       Goal: LTG-By discharge, patient will complete basic self care tasks with Prince to modified independence and use of AE as needed.      Dates: Start: 11/27/21          Goal: LTG-By discharge, patient will perform bathroom transfers with supervision to modified independence with use of AE as needed.      Dates: Start: 11/27/21             Problem: Toileting     Dates: Start: 11/27/21       Goal: STG-Within one week, patient will complete toileting tasks with CGA to SBA and use of AE as needed.      Dates: Start: 11/27/21

## 2021-11-29 PROCEDURE — 99232 SBSQ HOSP IP/OBS MODERATE 35: CPT | Performed by: PHYSICAL MEDICINE & REHABILITATION

## 2021-11-29 PROCEDURE — A9270 NON-COVERED ITEM OR SERVICE: HCPCS | Performed by: PHYSICAL MEDICINE & REHABILITATION

## 2021-11-29 PROCEDURE — 97116 GAIT TRAINING THERAPY: CPT

## 2021-11-29 PROCEDURE — 770010 HCHG ROOM/CARE - REHAB SEMI PRIVAT*

## 2021-11-29 PROCEDURE — 97110 THERAPEUTIC EXERCISES: CPT

## 2021-11-29 PROCEDURE — 700111 HCHG RX REV CODE 636 W/ 250 OVERRIDE (IP): Performed by: PHYSICAL MEDICINE & REHABILITATION

## 2021-11-29 PROCEDURE — 97530 THERAPEUTIC ACTIVITIES: CPT

## 2021-11-29 PROCEDURE — 700102 HCHG RX REV CODE 250 W/ 637 OVERRIDE(OP): Performed by: PHYSICAL MEDICINE & REHABILITATION

## 2021-11-29 RX ORDER — VALSARTAN 80 MG/1
160 TABLET ORAL DAILY
Status: DISCONTINUED | OUTPATIENT
Start: 2021-11-30 | End: 2021-12-12 | Stop reason: HOSPADM

## 2021-11-29 RX ADMIN — ACETAMINOPHEN 650 MG: 325 TABLET ORAL at 06:27

## 2021-11-29 RX ADMIN — VENLAFAXINE HYDROCHLORIDE 150 MG: 75 CAPSULE, EXTENDED RELEASE ORAL at 08:17

## 2021-11-29 RX ADMIN — OXYCODONE 5 MG: 5 TABLET ORAL at 11:29

## 2021-11-29 RX ADMIN — SENNOSIDES AND DOCUSATE SODIUM 2 TABLET: 50; 8.6 TABLET ORAL at 08:18

## 2021-11-29 RX ADMIN — ACETAMINOPHEN 1000 MG: 500 TABLET, FILM COATED ORAL at 15:19

## 2021-11-29 RX ADMIN — OMEPRAZOLE 20 MG: 20 CAPSULE, DELAYED RELEASE ORAL at 08:18

## 2021-11-29 RX ADMIN — ENOXAPARIN SODIUM 40 MG: 40 INJECTION SUBCUTANEOUS at 08:20

## 2021-11-29 RX ADMIN — OXYBUTYNIN CHLORIDE 5 MG: 5 TABLET, EXTENDED RELEASE ORAL at 08:18

## 2021-11-29 RX ADMIN — ACETAMINOPHEN 1000 MG: 500 TABLET, FILM COATED ORAL at 08:18

## 2021-11-29 RX ADMIN — ACETAMINOPHEN 1000 MG: 500 TABLET, FILM COATED ORAL at 21:23

## 2021-11-29 RX ADMIN — SENNOSIDES AND DOCUSATE SODIUM 2 TABLET: 50; 8.6 TABLET ORAL at 21:22

## 2021-11-29 RX ADMIN — VALSARTAN 80 MG: 80 TABLET, FILM COATED ORAL at 08:18

## 2021-11-29 ASSESSMENT — GAIT ASSESSMENTS
DISTANCE (FEET): 15
DEVIATION: ANTALGIC;STEP TO;BRADYKINETIC
GAIT LEVEL OF ASSIST: MINIMAL ASSIST
ASSISTIVE DEVICE: FRONT WHEEL WALKER

## 2021-11-29 ASSESSMENT — ACTIVITIES OF DAILY LIVING (ADL)
TOILET_TRANSFER_DESCRIPTION: ADAPTIVE EQUIPMENT;INCREASED TIME;INITIAL PREPARATION FOR TASK;SET-UP OF EQUIPMENT;SUPERVISION FOR SAFETY;VERBAL CUEING
TUB_SHOWER_TRANSFER_DESCRIPTION: ADAPTIVE EQUIPMENT;GRAB BAR;SHOWER BENCH;INCREASED TIME;INITIAL PREPARATION FOR TASK;SET-UP OF EQUIPMENT;SUPERVISION FOR SAFETY;VERBAL CUEING

## 2021-11-29 ASSESSMENT — PAIN DESCRIPTION - PAIN TYPE
TYPE: ACUTE PAIN

## 2021-11-29 NOTE — PROGRESS NOTES
"Rehab Progress Note     Encounter Date: 11/29/2021    CC: Decreased mobility, hip pain    Interval Events (Subjective)  Patient sitting up in room. She reports she is doing well. She reports pain is controlled. She understands it will take some time to heal. She did take oxycodone this morning. Reviewed about constipation and she reports she will monitor. SBP still remains elevated, will increase ARB.     Objective:  VITAL SIGNS: /69   Pulse 76   Temp 36.4 °C (97.5 °F) (Temporal)   Resp 18   Ht 1.727 m (5' 8\")   Wt 79.5 kg (175 lb 4.3 oz)   SpO2 94%   BMI 26.65 kg/m²   Gen: NAD  Psych: Mood and affect appropriate  CV: RRR, no edema  Resp: CTAB, no upper airway sounds  Abd: NTND  Neuro: AOx4, following commands    Recent Results (from the past 72 hour(s))   SARS-CoV-2 PCR (24 hour In-House): Collect NP swab in [place]    Collection Time: 11/26/21  2:30 PM    Specimen: Nasopharyngeal; Respirate   Result Value Ref Range    SARS-CoV-2 Source NP Swab     SARS-CoV-2 by PCR NotDetected    CBC with Differential    Collection Time: 11/27/21  5:38 AM   Result Value Ref Range    WBC 5.1 4.8 - 10.8 K/uL    RBC 3.76 (L) 4.20 - 5.40 M/uL    Hemoglobin 11.8 (L) 12.0 - 16.0 g/dL    Hematocrit 36.5 (L) 37.0 - 47.0 %    MCV 97.1 81.4 - 97.8 fL    MCH 31.4 27.0 - 33.0 pg    MCHC 32.3 (L) 33.6 - 35.0 g/dL    RDW 44.4 35.9 - 50.0 fL    Platelet Count 179 164 - 446 K/uL    MPV 11.0 9.0 - 12.9 fL    Neutrophils-Polys 59.10 44.00 - 72.00 %    Lymphocytes 22.00 22.00 - 41.00 %    Monocytes 7.20 0.00 - 13.40 %    Eosinophils 10.70 (H) 0.00 - 6.90 %    Basophils 0.80 0.00 - 1.80 %    Immature Granulocytes 0.20 0.00 - 0.90 %    Nucleated RBC 0.00 /100 WBC    Neutrophils (Absolute) 3.04 2.00 - 7.15 K/uL    Lymphs (Absolute) 1.13 1.00 - 4.80 K/uL    Monos (Absolute) 0.37 0.00 - 0.85 K/uL    Eos (Absolute) 0.55 (H) 0.00 - 0.51 K/uL    Baso (Absolute) 0.04 0.00 - 0.12 K/uL    Immature Granulocytes (abs) 0.01 0.00 - 0.11 K/uL    NRBC " (Absolute) 0.00 K/uL   Comp Metabolic Panel (CMP)    Collection Time: 11/27/21  5:38 AM   Result Value Ref Range    Sodium 140 135 - 145 mmol/L    Potassium 3.9 3.6 - 5.5 mmol/L    Chloride 105 96 - 112 mmol/L    Co2 26 20 - 33 mmol/L    Anion Gap 9.0 7.0 - 16.0    Glucose 112 (H) 65 - 99 mg/dL    Bun 16 8 - 22 mg/dL    Creatinine 0.57 0.50 - 1.40 mg/dL    Calcium 8.8 8.5 - 10.5 mg/dL    AST(SGOT) 42 12 - 45 U/L    ALT(SGPT) 58 (H) 2 - 50 U/L    Alkaline Phosphatase 149 (H) 30 - 99 U/L    Total Bilirubin 0.6 0.1 - 1.5 mg/dL    Albumin 3.3 3.2 - 4.9 g/dL    Total Protein 6.7 6.0 - 8.2 g/dL    Globulin 3.4 1.9 - 3.5 g/dL    A-G Ratio 1.0 g/dL   HEMOGLOBIN A1C    Collection Time: 11/27/21  5:38 AM   Result Value Ref Range    Glycohemoglobin 5.6 4.0 - 5.6 %    Est Avg Glucose 114 mg/dL   TSH with Reflex to FT4    Collection Time: 11/27/21  5:38 AM   Result Value Ref Range    TSH 3.050 0.380 - 5.330 uIU/mL   ESTIMATED GFR    Collection Time: 11/27/21  5:38 AM   Result Value Ref Range    GFR If African American >60 >60 mL/min/1.73 m 2    GFR If Non African American >60 >60 mL/min/1.73 m 2       Current Facility-Administered Medications   Medication Frequency   • enoxaparin (LOVENOX) inj 40 mg QDAY   • valsartan (DIOVAN) tablet 80 mg DAILY   • Respiratory Therapy Consult Continuous RT   • hydrALAZINE (APRESOLINE) tablet 25 mg Q8HRS PRN   • acetaminophen (Tylenol) tablet 650 mg Q4HRS PRN   • senna-docusate (PERICOLACE or SENOKOT S) 8.6-50 MG per tablet 2 Tablet BID    And   • polyethylene glycol/lytes (MIRALAX) PACKET 1 Packet QDAY PRN    And   • magnesium hydroxide (MILK OF MAGNESIA) suspension 30 mL QDAY PRN    And   • bisacodyl (DULCOLAX) suppository 10 mg QDAY PRN   • omeprazole (PRILOSEC) capsule 20 mg DAILY   • artificial tears ophthalmic solution 1 Drop PRN   • benzocaine-menthol (CEPACOL) lozenge 1 Lozenge Q2HRS PRN   • mag hydrox-al hydrox-simeth (MAALOX PLUS ES or MYLANTA DS) suspension 20 mL Q2HRS PRN   •  ondansetron (ZOFRAN ODT) dispertab 4 mg 4X/DAY PRN    Or   • ondansetron (ZOFRAN) syringe/vial injection 4 mg 4X/DAY PRN   • traZODone (DESYREL) tablet 50 mg QHS PRN   • sodium chloride (OCEAN) 0.65 % nasal spray 2 Spray PRN   • oxyCODONE immediate-release (ROXICODONE) tablet 5 mg Q3HRS PRN    Or   • oxyCODONE immediate release (ROXICODONE) tablet 10 mg Q3HRS PRN   • scopolamine (TRANSDERM-SCOP) patch 1 Patch Q72HRS PRN   • oxybutynin SR (DITROPAN-XL) tablet 5 mg DAILY   • venlafaxine XR (EFFEXOR XR) capsule 150 mg DAILY   • acetaminophen (TYLENOL) tablet 1,000 mg TID       Orders Placed This Encounter   Procedures   • Diet Order Diet: Regular     Standing Status:   Standing     Number of Occurrences:   1     Order Specific Question:   Diet:     Answer:   Regular [1]       Assessment:  Active Hospital Problems    Diagnosis    • *Closed left hip fracture (HCC)    • Other specified anemias    • Overactive bladder    • Hydrocephalus (HCC)    • Systolic murmur    • Depression    • OA (osteoarthritis) of knee        Medical Decision Making and Plan:  Left Hip fracture - Patient with GLF with L hip fracture s/p fixation with Dr. Culver on 11/24/21. WBAT per ortho  -PT and OT for mobility and ADLs  -Follow-up Dr. Culver  -Schedule Tylenol TID for pain control. PRN Oxycodone     Hydrocephalus - Followed by Dr. Blanchard's office. Possible intervention in January. May benefit from SLP will wait on OT evaluation.      HTN - Labile SBP post-surgery. Not on medications. Into 170s, start Valsartan 80 mg. Ongoing elevation, will increase to 160     Asthma - PRN Albuterol.      Anemia - Check AM CBC - 11.8, stable     GERD - Patient on Oxybutynin      Depression - Patient on Effexor 150 mg daily     DVT Ppx - Patient on Lovenox    Total time:  26 minutes.  I spent greater than 50% of the time for patient care, counseling, and coordination on this date, including unit/floor time, and face-to-face time with the patient as per  interval events and assessment and plan above. Topics discussed included good pain control, opiate constipation, elevated SBP and increase ARB.     Prem Gibbons M.D.

## 2021-11-29 NOTE — PROGRESS NOTES
Received bedside shift report from Fabrizio BAKER RN regarding patient and assumed care. Patient awake, calm and stable, currently positioned in bed for comfort and safety; call light within reach. Denies pain or discomfort at this time. Will continue to monitor.

## 2021-11-29 NOTE — THERAPY
Physical Therapy   Daily Treatment     Patient Name: Aaliyah Fulton  Age:  81 y.o., Sex:  female  Medical Record #: [id number]  Today's Date: 11/29/2021     Precautions  Precautions: Posterior Hip Precautions,Weight Bearing As Tolerated Left Lower Extremity  Comments: hydrocephalus at baseline     Subjective    Pt up in wc, daughter present to observe     Objective       11/29/21 1955   Gait Functional Level of Assist    Gait Level Of Assist Minimal Assist   Assistive Device Front Wheel Walker   Distance (Feet) 15   # of Times Distance was Traveled 2   Deviation Antalgic;Step To;Bradykinetic  (L knee valgus)   Stairs Functional Level of Assist   Level of Assist with Stairs Minimal Assist   # of Stairs Climbed 2  (2 short rise / 1 standard rise)   Stairs Description Extra time;Hand rails;Verbal cueing   Sitting Lower Body Exercises   Sitting Lower Body Exercises Yes   Tricep Press 3 sets of 10  (15# at rickshaw/ forward facing )   Ankle Pumps 3 sets of 10   Hip Flexion 3 sets of 10   Hip Abduction 3 sets of 10   Long Arc Quad 3 sets of 10   Hamstring Curl 2 sets of 10  (pt with L hip muscle cramp)   Comments hip abduction pillow in place to maintain neutral hip LLE   Bed Mobility    Sit to Stand Minimal Assist   Interdisciplinary Plan of Care Collaboration   IDT Collaboration with  Family / Caregiver   Patient Position at End of Therapy Seated;Chair Alarm On;Self Releasing Lap Belt Applied;Family / Friend in Room   Collaboration Comments daughter present for observation, discussed adding hand rails for safe access to home.    PT Total Time Spent   PT Individual Total Time Spent (Mins) 60   PT Charge Group   PT Gait Training 2   PT Therapeutic Exercise 2     Gait training with // bars and CGA with cues for step through pattern 15 ft x 3.     Assessment    Pt limited by left hip pain/ weakness and impaired weight bearing tolerance, L knee valgus and instability. Improved gait quality with training in // bars for stride  "through.    Strengths: Able to follow instructions,Adequate strength,Effective communication skills,Good insight into deficits/needs,Independent prior level of function,Making steady progress towards goals,Pleasant and cooperative,Supportive family,Willingly participates in therapeutic activities  Barriers: Decreased endurance,Fatigue,Generalized weakness,Home accessibility,Impaired activity tolerance,Impaired balance,Pain,Limited mobility (L knee valgus, posterior hip precautions)    Plan    Progressive gait tolerance with FWW, bed mobility/ transfers and LE strength training. Review posterior hip precautions and use of abduction pillow.      Passport items to be completed:  Get in/out of bed safely, in/out of a vehicle, safely use mobility device, walk or wheel around home/community, navigate up and down stairs, show how to get up/down from the ground, ensure home is accessible, demonstrate HEP, complete caregiver training      Physical Therapy Problems (Active)     Problem: Mobility     Dates: Start: 11/27/21       Goal: STG-Within one week, patient will ambulate household distance CGA with FWW 50 ft x 2     Dates: Start: 11/27/21          Goal: STG-Within one week, patient will ambulate up/down a curb with // bars for 4\" and 6\" step stool with CGA     Dates: Start: 11/27/21             Problem: Mobility Transfers     Dates: Start: 11/27/21       Goal: STG-Within one week, patient will perform bed mobility SBA with leg  as needed and compliance to posterior hip precautions LLE     Dates: Start: 11/27/21          Goal: STG-Within one week, patient will transfer bed to chair SBA with FWW after set-up     Dates: Start: 11/27/21             Problem: PT-Long Term Goals     Dates: Start: 11/27/21       Goal: LTG-By discharge, patient will ambulate with FWW and SPV / modified independent 100 ft x 2     Dates: Start: 11/27/21          Goal: LTG-By discharge, patient will transfer one surface to another with FWW and " SPV / modified independent     Dates: Start: 11/27/21          Goal: LTG-By discharge, patient will ambulate up/down 4-6 stairs with hand rails and CGA     Dates: Start: 11/27/21          Goal: LTG-By discharge, patient will transfer in/out of a car with FWW and SBA/ CGA to maintain posterior hip precautions LLE     Dates: Start: 11/27/21

## 2021-11-29 NOTE — CARE PLAN
Problem: Knowledge Deficit - Standard  Goal: Patient and family/care givers will demonstrate understanding of plan of care, disease process/condition, diagnostic tests and medications  Outcome: Progressing  Note: Pt agrees with plan of care tonight regarding medications and safety.  Will continue to monitor patient.      Problem: Fall Risk - Rehab  Goal: Patient will remain free from falls  Outcome: Progressing  Note: Diana Becker Fall risk Assessment Score:  10      Low fall risk interventions   - Call light within reach   - Yellow  socks   - Belongings within reach   - Bed in the lowest position          The patient is Stable - Low risk of patient condition declining or worsening    Shift Goals  Clinical Goals: Safety  Patient Goals: Sleep well    Progress made toward(s) clinical / shift goals:  progressing

## 2021-11-29 NOTE — CARE PLAN
Problem: Bladder / Voiding  Goal: Patient will establish and maintain regular urinary output  Outcome: Progressing  Note: Patient is continent of bladder this shift.  Will continue to monitor.    The patient is Stable - Low risk of patient condition declining or worsening    Shift Goals  Clinical Goals: Safety  Patient Goals: Sleep well    Progress made toward(s) clinical / shift goals:  wnl    Patient is not progressing towards the following goals:

## 2021-11-29 NOTE — THERAPY
Occupational Therapy  Daily Treatment     Patient Name: Aaliyah Fulton  Age:  81 y.o., Sex:  female  Medical Record #: [id number]  Today's Date: 11/29/2021     Precautions  Precautions: Posterior Hip Precautions,Weight Bearing As Tolerated Left Lower Extremity  Comments: hydrocephalus at baseline          Subjective    Pt sitting up in w/c upon arrival for both sessions, agreeable to OT. Pt seen from 9:30-10:30 and 2:30-3:00; see below for details.      Objective     11/29/21 0931   Pain   Intervention Nurse Notified   Pain 0 - 10 Group   Location Hip   Location Orientation Left   Pain Rating Scale (NPRS) 4   Description Aching   Comfort Goal Comfort with Movement;Perform Activity;Sleep Comfortably   Therapist Pain Assessment Prior to Activity;During Activity;Post Activity   Functional Level of Assist   Toilet Transfers Contact Guard Assist  (FWW <> BSC over toilet in ADL bathroom )   Toilet Transfer Description Adaptive equipment;Increased time;Initial preparation for task;Set-up of equipment;Supervision for safety;Verbal cueing   Tub / Shower Transfers Contact Guard Assist  (tub bench transfer in ADL bathroom )   Tub Shower Transfer Description Adaptive equipment;Grab bar;Shower bench;Increased time;Initial preparation for task;Set-up of equipment;Supervision for safety;Verbal cueing   Sitting Upper Body Exercises   Front Arm Raise 2 sets of 10;Bilateral;Weight (See Comments for lbs)  (3# dumbbells)   Internal Shoulder Rotation 2 sets of 10;Bilateral;Weight (See Comments for lbs)  (3# dumbbells)   External Shoulder Rotation 2 sets of 10;Bilateral;Weight (See Comments for lbs)  (3# dumbbells)   Bilateral Row 3 sets of 10;Bilateral;Weight (See Comments for lbs)  (25# on Equalizer )   Bicep Curls 2 sets of 10;Bilateral;Weight (See Comments for lbs)  (3# dumbbells)   Tricep Press 3 sets of 10;Bilateral;Weight (See Comments for lbs)  (3x10 20# facing fwd, 3x10 15# facing bwd on Rikshaw )   Elbow Extension 2 sets of  "10;Bilateral;Weight (See Comments for lbs)  (3# dumbbells)   Upper Extremity Bike Level 3 Resistance  (Motomed 10 minutes for general endurance/UE AROM/strength)   Bed Mobility    Sit to Supine Moderate Assist  (assist for BLEs )   Interdisciplinary Plan of Care Collaboration   IDT Collaboration with  Nursing   Patient Position at End of Therapy In Bed;Bed Alarm On;Call Light within Reach;Tray Table within Reach;Phone within Reach   Collaboration Comments RN notified of pt request for pain med    OT Total Time Spent   OT Individual Total Time Spent (Mins) 90   OT Charge Group   OT Therapy Activity 2   OT Therapeutic Exercise  4     9:30-10:30: Pt performed above UE exercises to increase strength for functional transfers and ADLs.     2:30-3:00: Pt performed above bathroom transfers in ADL bathroom to simulate home setup. Provided pt education regarding recommendation to acquire tub transfer bench and BSC to place over toilet to maintain hip precautions; pt agreeable and may have BSC at home but needs to confirm with her daughter and . Handout provided for education on posterior hip precautions and tub transfer bench sequence. Pt positioned in bed with abductor wedge in place at end of session.     Assessment    Pt tolerated OT sessions well but requires frequent rest breaks due to fatigue, generalized weakness and pain. Pt performed bathroom transfers with CGA and verbal cues for sequence with FWW. Pt was standing parallel to tub transfer bench and stated \"can I sit down now?\" not realizing she was not backed up to the bench. Educated pt that she needs to feel the chair behind her legs and reach back to assure she is sitting down where she intends to; pt verbalized understanding.     Strengths: Able to follow instructions,Alert and oriented,Effective communication skills,Good insight into deficits/needs,Independent prior level of function,Motivated for self care and independence,Pleasant and " cooperative,Supportive family,Willingly participates in therapeutic activities  Barriers: Decreased endurance,Generalized weakness,Impaired activity tolerance,Impaired balance,Limited mobility,Pain    Plan    Shower 11/30, discuss ordering Tub Transfer Bench with pts daughters, ADLs with AE, transfers, continue posterior hip precaution education, functional mobility, fall prevention education, balance, UE strengthening     Passport items to be completed:  Perform bathroom transfers, complete dressing, complete feeding, get ready for the day, prepare a simple meal, participate in household tasks, adapt home for safety needs, demonstrate home exercise program, complete caregiver training     Occupational Therapy Goals (Active)     Problem: Bathing     Dates: Start: 11/27/21       Goal: STG-Within one week, patient will bathe with SBA and use of AE as needed.      Dates: Start: 11/27/21             Problem: Dressing     Dates: Start: 11/27/21       Goal: STG-Within one week, patient will dress LB with Prince and use of AE as needed.      Dates: Start: 11/27/21             Problem: Functional Transfers     Dates: Start: 11/27/21       Goal: STG-Within one week, patient will perform bathroom transfers with SBA and use of AE as needed.      Dates: Start: 11/27/21             Problem: OT Long Term Goals     Dates: Start: 11/27/21       Goal: LTG-By discharge, patient will complete basic self care tasks with Prince to modified independence and use of AE as needed.      Dates: Start: 11/27/21          Goal: LTG-By discharge, patient will perform bathroom transfers with supervision to modified independence with use of AE as needed.      Dates: Start: 11/27/21             Problem: Toileting     Dates: Start: 11/27/21       Goal: STG-Within one week, patient will complete toileting tasks with CGA to SBA and use of AE as needed.      Dates: Start: 11/27/21

## 2021-11-29 NOTE — CARE PLAN
"  Problem: Mobility  Goal: STG-Within one week, patient will ambulate household distance CGA with FWW 50 ft x 2  Outcome: Not Met     Problem: Mobility Transfers  Goal: STG-Within one week, patient will perform bed mobility SBA with leg  as needed and compliance to posterior hip precautions LLE  Outcome: Not Met  Goal: STG-Within one week, patient will transfer bed to chair SBA with FWW after set-up  Outcome: Not Met     Problem: Mobility  Goal: STG-Within one week, patient will ambulate up/down a curb with // bars for 4\" and 6\" step stool with CGA  Outcome: Met     "

## 2021-11-29 NOTE — THERAPY
Recreational Therapy   Initial Evaluation     Patient Name: Aaliyah Fulton  Age:  81 y.o., Sex:  female  Medical Record #: [id number]  Today's Date: 11/29/2021     Subjective    Pt reporting that he pain in the biggest barrier to participation.     Objective       11/29/21 1031   Leisure History   Leisure Interests Gardening;Hobbies;Other (Comments)  (puzzles)   Leisure Comments ryley garden in the summer   Prior Living Arrangements   Lives with - Patient's Self Care Capacity Spouse   Steps Into Home 2   Steps In Home 0   Ambulation Independent   Assistive Devices Used Single Point Cane   Driving / Transportation Driving Independent   Functional Ability Status - Physical   Endurance Low   Right  Strong   Left  Strong   Right Arm Strong   Left Arm Strong   Right Leg Strong   Left Leg Weak   Upper Extremity Gross Motor Uses Both Arms / Hands   Lower Extremity Gross Motor Uses Both Legs  (LLE pain)   Fine Motor Manipulates Small Objects   Functional Ability Status - Cognitive   Attention Span Remains on Task   Comprehension Follows Three Step Commands   Judgment Able to Make Independent Decisions   Functional Ability Status - Emotional    Affect Appropriate   Mood Appropriate   Behavior Appropriate   Leisure Competence Measure   Leisure Awareness Independent   Leisure Attitude Independent   Leisure Skills Minimal Assist   Cultural / Social Behaviors Independent   Interpersonal Skills Independent   Community Integration Skills Minimal Assist   Social Contact Independent   Community Participation Minimal Assist   Clinical Impression   Clinical Impression Impaired Gross Motor Leisure Functioning;Impaired Endurance;Impaired Community Skills;Impaired Relaxation and Coping Skills;Impaired Leisure Skills   Current Discharge Plan   Current Discharge Plan Return to Prior Living Situation   Benefit    Benefit Patient would Benefit from Inpatient Recreational Therapy to Maximize Independent Leisure Functioning     Interdisciplinary Plan of Care Collaboration   Patient Position at End of Therapy Seated;Call Light within Reach;Tray Table within Reach   Strengths & Barriers   Strengths Able to follow instructions;Alert and oriented;Motivated for self care and independence;Pleasant and cooperative;Supportive family;Willingly participates in therapeutic activities   Barriers Decreased endurance;Fatigue;Generalized weakness;Pain   Treatment Time   Total Time Spent (mins) 30   Procedural Tracking   Procedural Tracking Community Re-Integration;Leisure Skills Awareness;Gross Motor Functional Leisure Skills       Assessment  Patient is 81 y.o. female with a diagnosis of Closed left hip fracture .  Additional factors influencing patient status / progress (ie: cognitive factors, co-morbidities, social support, etc): PMH of OA< asthma, HTN, hydrocephalus and depression who was admitted on 11/23/21 after a GLF at home. Patient reportedly was taking the trash out when she lost balance, felt dizzy, and fell onto her left side. Patient was found to have left hip fracture on trauma screen.      Pt sharing that she would like to regain strength and return to her prior leisure participation.       Plan  Recommend Recreational Therapy 30 minutes per day 2-3 days per week for 2 weeks for the following treatments:  Community Re-Integration, Community Skills Development, Leisure Skills Awareness, Leisure Skills Development and Gross Motor Functional Leisure Skills    Passport items to be completed:  Passport items to be completed:  Verbalize two positive leisure activities, discuss returning to work, hobbies, community groups or volunteer activities, explore community resources     Goals:  Long term and short term goals have been discussed with patient and they are in agreement.    Recreation Therapy Problems (Active)     Problem: Recreation Therapy     Dates: Start: 11/29/21       Goal: STG-Within one week, patient will demonstrate leisure  problem solving by sharing on two positive lesiure activities they would like to participate in either in session or during their free time and any perceived barriers to their participation.     Dates: Start: 11/29/21          Goal: STG-Within one week, patient will demonstrate a standing tolerance while dual tasking with a puzzle or other leisure activity for 2 minutes x3 CGA and no LOB.      Dates: Start: 11/29/21          Goal: LTG-By discharge, patient will demonstrate leisure problem solving by sharing on two positive lesiure activities they would like to participate in following dc and any perceived barriers to their participation.     Dates: Start: 11/29/21          Goal: LTG-By discharge, patient will demonstrate a standing tolerance while dual tasking with a puzzle or other leisure activity for 3 minutes x3 CGA and no LOB.      Dates: Start: 11/29/21

## 2021-11-30 LAB — 25(OH)D3 SERPL-MCNC: 20 NG/ML (ref 30–80)

## 2021-11-30 PROCEDURE — 97535 SELF CARE MNGMENT TRAINING: CPT

## 2021-11-30 PROCEDURE — 97110 THERAPEUTIC EXERCISES: CPT

## 2021-11-30 PROCEDURE — A9270 NON-COVERED ITEM OR SERVICE: HCPCS | Performed by: PHYSICAL MEDICINE & REHABILITATION

## 2021-11-30 PROCEDURE — 770010 HCHG ROOM/CARE - REHAB SEMI PRIVAT*

## 2021-11-30 PROCEDURE — 97116 GAIT TRAINING THERAPY: CPT

## 2021-11-30 PROCEDURE — 97530 THERAPEUTIC ACTIVITIES: CPT

## 2021-11-30 PROCEDURE — 700102 HCHG RX REV CODE 250 W/ 637 OVERRIDE(OP): Performed by: PHYSICAL MEDICINE & REHABILITATION

## 2021-11-30 PROCEDURE — 700111 HCHG RX REV CODE 636 W/ 250 OVERRIDE (IP): Performed by: PHYSICAL MEDICINE & REHABILITATION

## 2021-11-30 PROCEDURE — 99233 SBSQ HOSP IP/OBS HIGH 50: CPT | Performed by: PHYSICAL MEDICINE & REHABILITATION

## 2021-11-30 RX ORDER — OXYCODONE HYDROCHLORIDE 5 MG/1
5 TABLET ORAL 2 TIMES DAILY
Status: DISCONTINUED | OUTPATIENT
Start: 2021-12-01 | End: 2021-12-12 | Stop reason: HOSPADM

## 2021-11-30 RX ADMIN — OXYCODONE 5 MG: 5 TABLET ORAL at 13:08

## 2021-11-30 RX ADMIN — SENNOSIDES AND DOCUSATE SODIUM 2 TABLET: 50; 8.6 TABLET ORAL at 07:33

## 2021-11-30 RX ADMIN — OMEPRAZOLE 20 MG: 20 CAPSULE, DELAYED RELEASE ORAL at 07:32

## 2021-11-30 RX ADMIN — ACETAMINOPHEN 1000 MG: 500 TABLET, FILM COATED ORAL at 20:13

## 2021-11-30 RX ADMIN — ENOXAPARIN SODIUM 40 MG: 40 INJECTION SUBCUTANEOUS at 07:36

## 2021-11-30 RX ADMIN — VENLAFAXINE HYDROCHLORIDE 150 MG: 75 CAPSULE, EXTENDED RELEASE ORAL at 07:32

## 2021-11-30 RX ADMIN — SENNOSIDES AND DOCUSATE SODIUM 2 TABLET: 50; 8.6 TABLET ORAL at 20:13

## 2021-11-30 RX ADMIN — ACETAMINOPHEN 1000 MG: 500 TABLET, FILM COATED ORAL at 17:06

## 2021-11-30 RX ADMIN — VALSARTAN 160 MG: 80 TABLET, FILM COATED ORAL at 10:39

## 2021-11-30 RX ADMIN — ACETAMINOPHEN 1000 MG: 500 TABLET, FILM COATED ORAL at 07:33

## 2021-11-30 RX ADMIN — OXYCODONE 5 MG: 5 TABLET ORAL at 07:41

## 2021-11-30 RX ADMIN — OXYBUTYNIN CHLORIDE 5 MG: 5 TABLET, EXTENDED RELEASE ORAL at 07:32

## 2021-11-30 ASSESSMENT — GAIT ASSESSMENTS
ASSISTIVE DEVICE: FRONT WHEEL WALKER;PARALLEL BARS
ASSISTIVE DEVICE: PARALLEL BARS
GAIT LEVEL OF ASSIST: CONTACT GUARD ASSIST
DISTANCE (FEET): 30
GAIT LEVEL OF ASSIST: CONTACT GUARD ASSIST
DEVIATION: ANTALGIC;DECREASED BASE OF SUPPORT
DISTANCE (FEET): 15
DEVIATION: ANTALGIC;STEP TO;DECREASED BASE OF SUPPORT;DECREASED HEEL STRIKE;DECREASED TOE OFF

## 2021-11-30 ASSESSMENT — ACTIVITIES OF DAILY LIVING (ADL)
TUB_SHOWER_TRANSFER_DESCRIPTION: ADAPTIVE EQUIPMENT;GRAB BAR;SHOWER BENCH;INCREASED TIME;INITIAL PREPARATION FOR TASK;SET-UP OF EQUIPMENT;SUPERVISION FOR SAFETY;VERBAL CUEING
TOILETING_LEVEL_OF_ASSIST_DESCRIPTION: GRAB BAR;INCREASED TIME;INITIAL PREPARATION FOR TASK;SET-UP OF EQUIPMENT;SUPERVISION FOR SAFETY;VERBAL CUEING
BED_CHAIR_WHEELCHAIR_TRANSFER_DESCRIPTION: INCREASED TIME;INITIAL PREPARATION FOR TASK;SET-UP OF EQUIPMENT;SUPERVISION FOR SAFETY;VERBAL CUEING
TOILET_TRANSFER_DESCRIPTION: ADAPTIVE EQUIPMENT;GRAB BAR;INCREASED TIME;INITIAL PREPARATION FOR TASK;SET-UP OF EQUIPMENT;SUPERVISION FOR SAFETY;VERBAL CUEING
BED_CHAIR_WHEELCHAIR_TRANSFER_DESCRIPTION: ADAPTIVE EQUIPMENT;INCREASED TIME;INITIAL PREPARATION FOR TASK;SET-UP OF EQUIPMENT;SUPERVISION FOR SAFETY;VERBAL CUEING

## 2021-11-30 ASSESSMENT — PAIN DESCRIPTION - PAIN TYPE
TYPE: ACUTE PAIN

## 2021-11-30 NOTE — THERAPY
Physical Therapy   Daily Treatment     Patient Name: Aaliyah Fulton  Age:  81 y.o., Sex:  female  Medical Record #: [id number]  Today's Date: 11/30/2021     Precautions  Precautions: Fall Risk,Posterior Hip Precautions,Weight Bearing As Tolerated Left Lower Extremity  Comments: Hydrocephalus baseline    Subjective    Pt up in wc, willing to participate     Objective       11/30/21 0931   Gait Functional Level of Assist    Gait Level Of Assist Contact Guard Assist   Assistive Device Parallel Bars   Distance (Feet) 30   # of Times Distance was Traveled 3   Deviation Antalgic;Decreased Base Of Support  (L knee valgus)   Sitting Lower Body Exercises   Hip Flexion 3 sets of 10   Hip Abduction 3 sets of 10   PT Total Time Spent   PT Individual Total Time Spent (Mins) 30   PT Charge Group   PT Gait Training 1   PT Therapeutic Exercise 1       Assessment    Pt improving gait tolerance with // bars for UE support, remains limited by L hip pain/ weakness and knee valgus deformity.    Strengths: Able to follow instructions,Adequate strength,Effective communication skills,Good insight into deficits/needs,Independent prior level of function,Making steady progress towards goals,Pleasant and cooperative,Supportive family,Willingly participates in therapeutic activities  Barriers: Decreased endurance,Fatigue,Generalized weakness,Home accessibility,Impaired activity tolerance,Impaired balance,Pain,Limited mobility (L knee valgus, posterior hip precautions)    Plan    Progressive gait tolerance with FWW, bed mobility/ transfers and LE strength training. Review posterior hip precautions and use of abduction pillow.      Passport items to be completed:  Get in/out of bed safely, in/out of a vehicle, safely use mobility device, walk or wheel around home/community, navigate up and down stairs, show how to get up/down from the ground, ensure home is accessible, demonstrate HEP, complete caregiver training    Physical Therapy Problems  (Active)     Problem: Mobility     Dates: Start: 11/27/21       Goal: STG-Within one week, patient will ambulate household distance CGA with FWW 50 ft x 2     Dates: Start: 11/27/21             Problem: Mobility Transfers     Dates: Start: 11/27/21       Goal: STG-Within one week, patient will perform bed mobility SBA with leg  as needed and compliance to posterior hip precautions LLE     Dates: Start: 11/27/21          Goal: STG-Within one week, patient will transfer bed to chair SBA with FWW after set-up     Dates: Start: 11/27/21             Problem: PT-Long Term Goals     Dates: Start: 11/27/21       Goal: LTG-By discharge, patient will ambulate with FWW and SPV / modified independent 100 ft x 2     Dates: Start: 11/27/21          Goal: LTG-By discharge, patient will transfer one surface to another with FWW and SPV / modified independent     Dates: Start: 11/27/21          Goal: LTG-By discharge, patient will ambulate up/down 4-6 stairs with hand rails and CGA     Dates: Start: 11/27/21          Goal: LTG-By discharge, patient will transfer in/out of a car with FWW and SBA/ CGA to maintain posterior hip precautions LLE     Dates: Start: 11/27/21

## 2021-11-30 NOTE — THERAPY
11/29/21 0859   Precautions   Precautions Fall Risk;Posterior Hip Precautions;Weight Bearing As Tolerated Left Lower Extremity   Comments Hydrocephalus baseline   Pain 0 - 10 Group   Location Hip;Leg   Location Orientation Left   Therapist Pain Assessment Prior to Activity;During Activity   Cognition    Level of Consciousness Alert   Sitting Lower Body Exercises   Ankle Pumps 2 sets of 15;Bilateral   Hip Flexion 1 set of 15;Bilateral   Hip Abduction 2 sets of 15;Bilateral   Hip Adduction 2 sets of 15;Bilateral   Long Arc Quad 2 sets of 15;Bilateral   Hamstring Curl 2 sets of 15;Bilateral   PT Total Time Spent   PT Individual Total Time Spent (Mins) 30   PT Charge Group   PT Therapeutic Exercise 2   Physical Therapy   Daily Treatment     Patient Name: Aaliyah Fulton  Age:  81 y.o., Sex:  female  Medical Record #: [id number]  Today's Date: 11/29/2021     Precautions  Precautions: Fall Risk,Posterior Hip Precautions,Weight Bearing As Tolerated Left Lower Extremity  Comments: Hydrocephalus baseline    Subjective    The patient was up in her chair after breakfast.  She agreed to PT.  She reported that her left hip and leg had intermittent pain.     Objective    The patient participated in seated bilateral lower extremity therapeutic exercise 2 sets of 15 as indicated above.  PT provided recommendations for sitting posture to help alleviate or control left hip and leg pain.    Assessment    The patient was able to participate in the above exercise program for lower extremities without issues of significant pain although she reported some intermittent discomfort.  PT provided recommendations for sitting posture for pain control.  Strengths: Able to follow instructions,Adequate strength,Effective communication skills,Good insight into deficits/needs,Independent prior level of function,Making steady progress towards goals,Pleasant and cooperative,Supportive family,Willingly participates in therapeutic  activities  Barriers: Decreased endurance,Fatigue,Generalized weakness,Home accessibility,Impaired activity tolerance,Impaired balance,Pain,Limited mobility (L knee valgus, posterior hip precautions)    Plan    Safety education, bed mobility and transfers with posterior hip precautions, gait training as tolerated, therapeutic exercise    Passport items to be completed:  Passport items to be completed:  Get in/out of bed safely, in/out of a vehicle, safely use mobility device, walk or wheel around home/community, navigate up and down stairs, show how to get up/down from the ground, ensure home is accessible, demonstrate HEP, complete caregiver training    Physical Therapy Problems (Active)     Problem: Mobility     Dates: Start: 11/27/21       Goal: STG-Within one week, patient will ambulate household distance CGA with FWW 50 ft x 2     Dates: Start: 11/27/21             Problem: Mobility Transfers     Dates: Start: 11/27/21       Goal: STG-Within one week, patient will perform bed mobility SBA with leg  as needed and compliance to posterior hip precautions LLE     Dates: Start: 11/27/21          Goal: STG-Within one week, patient will transfer bed to chair SBA with FWW after set-up     Dates: Start: 11/27/21             Problem: PT-Long Term Goals     Dates: Start: 11/27/21       Goal: LTG-By discharge, patient will ambulate with FWW and SPV / modified independent 100 ft x 2     Dates: Start: 11/27/21          Goal: LTG-By discharge, patient will transfer one surface to another with FWW and SPV / modified independent     Dates: Start: 11/27/21          Goal: LTG-By discharge, patient will ambulate up/down 4-6 stairs with hand rails and CGA     Dates: Start: 11/27/21          Goal: LTG-By discharge, patient will transfer in/out of a car with FWW and SBA/ CGA to maintain posterior hip precautions LLE     Dates: Start: 11/27/21

## 2021-11-30 NOTE — CARE PLAN
Problem: Functional Transfers  Goal: STG-Within one week, patient will perform bathroom transfers with SBA and use of AE as needed.   Outcome: Progressing  Note: SBA to CGA      Problem: Bathing  Goal: STG-Within one week, patient will bathe with SBA and use of AE as needed.   Outcome: Met     Problem: Dressing  Goal: STG-Within one week, patient will dress LB with Prince and use of AE as needed.   Outcome: Met     Problem: Toileting  Goal: STG-Within one week, patient will complete toileting tasks with CGA to SBA and use of AE as needed.   Outcome: Met

## 2021-11-30 NOTE — PROGRESS NOTES
Received bedside shift report from Nick BAKER RN regarding patient and assumed care. Patient awake, calm and stable, currently positioned in bed for comfort and safety; call light within reach. Denies pain or discomfort at this time. Will continue to monitor.

## 2021-11-30 NOTE — THERAPY
Physical Therapy   Daily Treatment     Patient Name: Aaliayh Fulton  Age:  81 y.o., Sex:  female  Medical Record #: [id number]  Today's Date: 11/30/2021     Precautions  Precautions: Fall Risk,Posterior Hip Precautions,Weight Bearing As Tolerated Left Lower Extremity  Comments: Hydrocephalus baseline    Subjective    Pt up in wc, willing to participate. Reports increased hip pain and stiffness, encouraged pt to take pain med.     Objective       11/30/21 1231   Pain 0 - 10 Group   Location Hip   Location Orientation Left   Therapist Pain Assessment During Activity;Nurse Notified;8   Gait Functional Level of Assist    Gait Level Of Assist Contact Guard Assist   Assistive Device Front Wheel Walker;Parallel Bars   Distance (Feet) 15   # of Times Distance was Traveled 4  (2 trials with FWW/ 2 trials with // bars)   Deviation Antalgic;Step To;Decreased Base Of Support;Decreased Heel Strike;Decreased Toe Off  (L knee valgus)   Supine Lower Body Exercise   Supine Lower Body Exercises Yes   Bridges Two Legged;3 sets of 10   Hip Abduction Hook Lying;3 sets of 10   Short Arc Quad 3 sets of 10   Heel Slide 3 sets of 10   Ankle Pumps 3 sets of 10   Comments pt requires AAROM for LLE and manual cues for positioning/ neutral hip.    Sitting Lower Body Exercises   Ankle Pumps 3 sets of 10   Long Arc Quad 3 sets of 10   Bed Mobility    Supine to Sit Contact Guard Assist   Sit to Supine Moderate Assist   Sit to Stand Contact Guard Assist   PT Total Time Spent   PT Individual Total Time Spent (Mins) 60   PT Charge Group   PT Gait Training 2   PT Therapeutic Exercise 2       Assessment    Pt with limited gait tolerance and unable to maintain step through pattern this pm due to increased pain. Pt reports not thinking about taking a pain pill prior to therapy, education provided regarding improved therapy tolerance with pain management.    Strengths: Able to follow instructions,Adequate strength,Effective communication skills,Good  insight into deficits/needs,Independent prior level of function,Making steady progress towards goals,Pleasant and cooperative,Supportive family,Willingly participates in therapeutic activities  Barriers: Decreased endurance,Fatigue,Generalized weakness,Home accessibility,Impaired activity tolerance,Impaired balance,Pain,Limited mobility (L knee valgus, posterior hip precautions)    Plan    Progressive gait tolerance with FWW, bed mobility/ transfers and LE strength training. Review posterior hip precautions and use of abduction pillow.      Passport items to be completed:  Get in/out of bed safely, in/out of a vehicle, safely use mobility device, walk or wheel around home/community, navigate up and down stairs, show how to get up/down from the ground, ensure home is accessible, demonstrate HEP, complete caregiver training    Physical Therapy Problems (Active)     Problem: Mobility     Dates: Start: 11/27/21       Goal: STG-Within one week, patient will ambulate household distance CGA with FWW 50 ft x 2     Dates: Start: 11/27/21             Problem: Mobility Transfers     Dates: Start: 11/27/21       Goal: STG-Within one week, patient will perform bed mobility SBA with leg  as needed and compliance to posterior hip precautions LLE     Dates: Start: 11/27/21          Goal: STG-Within one week, patient will transfer bed to chair SBA with FWW after set-up     Dates: Start: 11/27/21             Problem: PT-Long Term Goals     Dates: Start: 11/27/21       Goal: LTG-By discharge, patient will ambulate with FWW and SPV / modified independent 100 ft x 2     Dates: Start: 11/27/21          Goal: LTG-By discharge, patient will transfer one surface to another with FWW and SPV / modified independent     Dates: Start: 11/27/21          Goal: LTG-By discharge, patient will ambulate up/down 4-6 stairs with hand rails and CGA     Dates: Start: 11/27/21          Goal: LTG-By discharge, patient will transfer in/out of a car  with FWW and SBA/ CGA to maintain posterior hip precautions LLE     Dates: Start: 11/27/21

## 2021-11-30 NOTE — CARE PLAN
Problem: Recreation Therapy  Goal: STG-Within one week, patient will demonstrate leisure problem solving by sharing on two positive lesiure activities they would like to participate in either in session or during their free time and any perceived barriers to their participation.  Outcome: Met  Goal: STG-Within one week, patient will demonstrate a standing tolerance while dual tasking with a puzzle or other leisure activity for 2 minutes x3 CGA and no LOB.   Outcome: Not Met  Goal: LTG-By discharge, patient will demonstrate leisure problem solving by sharing on two positive lesiure activities they would like to participate in following dc and any perceived barriers to their participation.  Outcome: Not Met  Goal: LTG-By discharge, patient will demonstrate a standing tolerance while dual tasking with a puzzle or other leisure activity for 3 minutes x3 CGA and no LOB.   Outcome: Not Met

## 2021-11-30 NOTE — PROGRESS NOTES
"Rehab Progress Note     Encounter Date: 11/30/2021    CC: Decreased mobility, hip pain    Interval Events (Subjective)  Patient sitting up in room. She reports therapy is going well. She reports she is in no rush to go home. Discussed discharge planning will be discussed at IDT later today. Denies NVD. Denies SOB.     IDT Team Meeting 11/30/2021    IPrem M.D., was present and led the interdisciplinary team conference on 11/30/2021.  I led the IDT conference and agree with the IDT conference documentation and plan of care as noted below.     RN:  Diet Regular   % Meal     Pain    Sleep    Bowel Continent   Bladder Continent   In's & Out's    Poor mood but improved today  Incision well healing    PT: 1 of 4 goals  Bed Mobility    Transfers modA   Mobility CGA FWW 15 feet limited   Stairs    Limited by pain   Left knee valgus; hip abduction wedge    OT: 3 of 4   Eating    Grooming    Bathing modA   UB Dressing    LB Dressing Prince   Toileting    Shower & Transfer CGA-modA   Needs reminders on hip precautions    Rec:  Working on gardening with adaptive gardening  Music therapy    CM:  Continues to work on disposition and DME needs.    FWW    DC/Disposition:  12/10/21    Objective:  VITAL SIGNS: /72   Pulse 83   Temp 36.8 °C (98.3 °F) (Temporal)   Resp 18   Ht 1.727 m (5' 8\")   Wt 79.5 kg (175 lb 4.3 oz)   SpO2 97%   BMI 26.65 kg/m²   Gen: NAD  Psych: Mood and affect appropriate  CV: RRR, no edema  Resp: CTAB, no upper airway sounds  Abd: NTND  Neuro: AOx4, following commands  Unchanged from 11/29/21    No results found for this or any previous visit (from the past 72 hour(s)).    Current Facility-Administered Medications   Medication Frequency   • enoxaparin (LOVENOX) inj 40 mg QDAY   • valsartan (DIOVAN) tablet 160 mg DAILY   • Respiratory Therapy Consult Continuous RT   • hydrALAZINE (APRESOLINE) tablet 25 mg Q8HRS PRN   • acetaminophen (Tylenol) tablet 650 mg Q4HRS PRN   • " senna-docusate (PERICOLACE or SENOKOT S) 8.6-50 MG per tablet 2 Tablet BID    And   • polyethylene glycol/lytes (MIRALAX) PACKET 1 Packet QDAY PRN    And   • magnesium hydroxide (MILK OF MAGNESIA) suspension 30 mL QDAY PRN    And   • bisacodyl (DULCOLAX) suppository 10 mg QDAY PRN   • omeprazole (PRILOSEC) capsule 20 mg DAILY   • artificial tears ophthalmic solution 1 Drop PRN   • benzocaine-menthol (CEPACOL) lozenge 1 Lozenge Q2HRS PRN   • mag hydrox-al hydrox-simeth (MAALOX PLUS ES or MYLANTA DS) suspension 20 mL Q2HRS PRN   • ondansetron (ZOFRAN ODT) dispertab 4 mg 4X/DAY PRN    Or   • ondansetron (ZOFRAN) syringe/vial injection 4 mg 4X/DAY PRN   • traZODone (DESYREL) tablet 50 mg QHS PRN   • sodium chloride (OCEAN) 0.65 % nasal spray 2 Spray PRN   • oxyCODONE immediate-release (ROXICODONE) tablet 5 mg Q3HRS PRN    Or   • oxyCODONE immediate release (ROXICODONE) tablet 10 mg Q3HRS PRN   • scopolamine (TRANSDERM-SCOP) patch 1 Patch Q72HRS PRN   • oxybutynin SR (DITROPAN-XL) tablet 5 mg DAILY   • venlafaxine XR (EFFEXOR XR) capsule 150 mg DAILY   • acetaminophen (TYLENOL) tablet 1,000 mg TID       Orders Placed This Encounter   Procedures   • Diet Order Diet: Regular     Standing Status:   Standing     Number of Occurrences:   1     Order Specific Question:   Diet:     Answer:   Regular [1]       Assessment:  Active Hospital Problems    Diagnosis    • *Closed left hip fracture (HCC)    • Other specified anemias    • Overactive bladder    • Hydrocephalus (HCC)    • Systolic murmur    • Depression    • OA (osteoarthritis) of knee        Medical Decision Making and Plan:  Left Hip fracture - Patient with GLF with L hip fracture s/p fixation with Dr. Culver on 11/24/21. WBAT per ortho  -PT and OT for mobility and ADLs  -Follow-up Dr. Culver  -Schedule Tylenol TID for pain control. PRN Oxycodone     Hydrocephalus - Followed by Dr. Blanchard's office. Possible intervention in January. May benefit from SLP will wait  on OT evaluation.      HTN - Labile SBP post-surgery. Not on medications. Into 170s, start Valsartan 80 mg. Ongoing elevation, will increase to 160  -Into 150s, will monitor another day     Asthma - PRN Albuterol.      Anemia - Check AM CBC - 11.8, stable     Bladder spasms - Brought in Oxybutynin, OK to start.     GERD - Patient on Prilosec     Depression - Patient on Effexor 150 mg daily     DVT Ppx - Patient on Lovenox    Total time:  36 minutes.  I spent greater than 50% of the time for patient care, counseling, and coordination on this date, including unit/floor time, and face-to-face time with the patient as per interval events and assessment and plan above. Topics discussed included elevated SBP, monitor another day, pain control, opiate induced constipation, and continue oxybutynin. Patient was discussed separately in IDT today; please see details above.    Prem Gibbons M.D.

## 2021-11-30 NOTE — CARE PLAN
Problem: Incision Care  Goal: Optimal post surgical incision care  Outcome: Progressing  Note: Patient's skin remains intact and free from new or accidental injury this shift.  Will continue to monitor.    The patient is Stable - Low risk of patient condition declining or worsening    Shift Goals  Clinical Goals: Safety  Patient Goals: Sleep well    Progress made toward(s) clinical / shift goals:  wnl    Patient is not progressing towards the following goals:

## 2021-11-30 NOTE — THERAPY
Occupational Therapy  Daily Treatment     Patient Name: Aaliyah Fulton  Age:  81 y.o., Sex:  female  Medical Record #: [id number]  Today's Date: 11/30/2021     Precautions  Precautions: Fall Risk,Posterior Hip Precautions,Weight Bearing As Tolerated Left Lower Extremity  Comments: Hydrocephalus baseline    Safety   ADL Safety : Requires Physical Assist for Safety,Requires Supervision for Safety,Requires Cueing for Safety  Bathroom Safety: Requires Physical Assist for Safety,Requires Supervision for Safety,Requires Cuing for Safety    Subjective    Pt resting in bed upon arrival, c/o L hip pain but agreeable to OT session.      Objective     11/30/21 1431   Functional Level of Assist   Bed, Chair, Wheelchair Transfer Contact Guard Assist   Bed Chair Wheelchair Transfer Description Adaptive equipment;Increased time;Initial preparation for task;Set-up of equipment;Supervision for safety;Verbal cueing   Sitting Upper Body Exercises   Upper Extremity Bike Level 2 Resistance  (10 min on fluidobike for UE strength/general endurance )   Bed Mobility    Supine to Sit Minimal Assist   Sit to Supine Minimal Assist   Scooting Stand by Assist   Interdisciplinary Plan of Care Collaboration   Patient Position at End of Therapy In Bed;Bed Alarm On;Call Light within Reach;Tray Table within Reach;Phone within Reach   OT Total Time Spent   OT Individual Total Time Spent (Mins) 30   OT Charge Group   OT Therapy Activity 1   OT Therapeutic Exercise  1     Provided pt with handout for recommended tub transfer bench, BSC to place over toilet at home, and hip kit. Noted on handout additional locations equipment can be acquired OTC ([employer], [employer], [employer], etc.) and purpose of each piece. Pt to give handout to her daughter for her to order.     Hip abductor wedge applied during and at end of session.     Assessment    Pt fatigued from earlier therapies but able to participate in above activities. Bed mobility varies depending on pt's pain  level and fatigue.   Strengths: Able to follow instructions,Alert and oriented,Effective communication skills,Good insight into deficits/needs,Independent prior level of function,Motivated for self care and independence,Pleasant and cooperative,Supportive family,Willingly participates in therapeutic activities  Barriers: Decreased endurance,Generalized weakness,Impaired activity tolerance,Impaired balance,Limited mobility,Pain    Plan    ADLs with AE, transfers, continue posterior hip precaution education, functional mobility, fall prevention education, balance, UE strengthening     Passport items to be completed:  Perform bathroom transfers, complete dressing, complete feeding, get ready for the day, prepare a simple meal, participate in household tasks, adapt home for safety needs, demonstrate home exercise program, complete caregiver training     Occupational Therapy Goals (Active)     Problem: Functional Transfers     Dates: Start: 11/27/21       Goal: STG-Within one week, patient will perform bathroom transfers with SBA and use of AE as needed.      Dates: Start: 11/27/21       Goal Note filed on 11/30/21 1138 by Kamala Bustamante OT     SBA to CGA                Problem: OT Long Term Goals     Dates: Start: 11/27/21       Goal: LTG-By discharge, patient will complete basic self care tasks with Prince to modified independence and use of AE as needed.      Dates: Start: 11/27/21          Goal: LTG-By discharge, patient will perform bathroom transfers with supervision to modified independence with use of AE as needed.      Dates: Start: 11/27/21

## 2021-11-30 NOTE — THERAPY
Occupational Therapy  Daily Treatment     Patient Name: Aaliyah Fulton  Age:  81 y.o., Sex:  female  Medical Record #: [id number]  Today's Date: 11/30/2021     Precautions  Precautions: Fall Risk,Posterior Hip Precautions,Weight Bearing As Tolerated Left Lower Extremity  Comments: Hydrocephalus baseline    Safety   ADL Safety : (P) Requires Physical Assist for Safety,Requires Supervision for Safety,Requires Cueing for Safety  Bathroom Safety: (P) Requires Physical Assist for Safety,Requires Supervision for Safety,Requires Cuing for Safety    Subjective    Pt in bed eating breakfast upon arrival, agreeable to OT session.      Objective     11/30/21 0831   Safety    ADL Safety  Requires Physical Assist for Safety;Requires Supervision for Safety;Requires Cueing for Safety   Bathroom Safety Requires Physical Assist for Safety;Requires Supervision for Safety;Requires Cuing for Safety   Non Verbal Descriptors   Non Verbal Scale  Calm   Functional Level of Assist   Grooming Modified Independent;Seated   Grooming Description Increased time;Seated in wheelchair at sink   Bathing Supervision  (declined to stand )   Bathing Description Adaptive equipment;Grab bar;Hand held shower;Long handled bath tool;Tub bench;Assit with back;Increased time;Initial preparation for task;Set-up of equipment;Supervision for safety;Verbal cueing   Upper Body Dressing Independent   Lower Body Dressing Minimal Assist  (assist for socks, CGA for LBD )   Lower Body Dressing Description Assistive devices;Grab bar;Reacher;Increased time;Initial preparation for task;Set-up of equipment;Supervision for safety;Verbal cueing   Toileting Stand by Assist   Toileting Description Grab bar;Increased time;Initial preparation for task;Set-up of equipment;Supervision for safety;Verbal cueing   Bed, Chair, Wheelchair Transfer Contact Guard Assist  (stand step with FWW )   Bed Chair Wheelchair Transfer Description Increased time;Initial preparation for task;Set-up  of equipment;Supervision for safety;Verbal cueing   Toilet Transfers Stand by Assist  (SPT w/c <> toilet )   Toilet Transfer Description Adaptive equipment;Grab bar;Increased time;Initial preparation for task;Set-up of equipment;Supervision for safety;Verbal cueing   Tub / Shower Transfers Stand by Assist  (SPT w/c <> tub bench )   Tub Shower Transfer Description Adaptive equipment;Grab bar;Shower bench;Increased time;Initial preparation for task;Set-up of equipment;Supervision for safety;Verbal cueing   Bed Mobility    Supine to Sit Moderate Assist  (assist for BLEs )   Scooting Contact Guard Assist   Interdisciplinary Plan of Care Collaboration   IDT Collaboration with  Nursing   Patient Position at End of Therapy Seated;Chair Alarm On;Call Light within Reach;Tray Table within Reach;Phone within Reach   OT Total Time Spent   OT Individual Total Time Spent (Mins) 60   OT Charge Group   OT Self Care / ADL 4       Assessment    Pt tolerated OT session well focused on ADLs with AE to maintain posterior hip precautions. Pt was able to use the reacher to don underwear and pants but required increased time and multiple trials to be successful without physical assist. Requires cues to maintain precautions throughout ADLs but had better recall of precautions in general compared to previous sessions.     Strengths: Able to follow instructions,Alert and oriented,Effective communication skills,Good insight into deficits/needs,Independent prior level of function,Motivated for self care and independence,Pleasant and cooperative,Supportive family,Willingly participates in therapeutic activities  Barriers: Decreased endurance,Generalized weakness,Impaired activity tolerance,Impaired balance,Limited mobility,Pain    Plan    Discuss ordering Tub Transfer Bench with pts daughters, ADLs with AE, transfers, continue posterior hip precaution education, functional mobility, fall prevention education, balance, UE  strengthening     Passport items to be completed:  Perform bathroom transfers, complete dressing, complete feeding, get ready for the day, prepare a simple meal, participate in household tasks, adapt home for safety needs, demonstrate home exercise program, complete caregiver training     Occupational Therapy Goals (Active)     Problem: Bathing     Dates: Start: 11/27/21       Goal: STG-Within one week, patient will bathe with SBA and use of AE as needed.      Dates: Start: 11/27/21             Problem: Dressing     Dates: Start: 11/27/21       Goal: STG-Within one week, patient will dress LB with Prince and use of AE as needed.      Dates: Start: 11/27/21             Problem: Functional Transfers     Dates: Start: 11/27/21       Goal: STG-Within one week, patient will perform bathroom transfers with SBA and use of AE as needed.      Dates: Start: 11/27/21             Problem: OT Long Term Goals     Dates: Start: 11/27/21       Goal: LTG-By discharge, patient will complete basic self care tasks with Prince to modified independence and use of AE as needed.      Dates: Start: 11/27/21          Goal: LTG-By discharge, patient will perform bathroom transfers with supervision to modified independence with use of AE as needed.      Dates: Start: 11/27/21             Problem: Toileting     Dates: Start: 11/27/21       Goal: STG-Within one week, patient will complete toileting tasks with CGA to SBA and use of AE as needed.      Dates: Start: 11/27/21

## 2021-12-01 PROCEDURE — A9270 NON-COVERED ITEM OR SERVICE: HCPCS | Performed by: PHYSICAL MEDICINE & REHABILITATION

## 2021-12-01 PROCEDURE — 97530 THERAPEUTIC ACTIVITIES: CPT

## 2021-12-01 PROCEDURE — 97112 NEUROMUSCULAR REEDUCATION: CPT

## 2021-12-01 PROCEDURE — 700111 HCHG RX REV CODE 636 W/ 250 OVERRIDE (IP): Performed by: PHYSICAL MEDICINE & REHABILITATION

## 2021-12-01 PROCEDURE — 99232 SBSQ HOSP IP/OBS MODERATE 35: CPT | Performed by: PHYSICAL MEDICINE & REHABILITATION

## 2021-12-01 PROCEDURE — 97116 GAIT TRAINING THERAPY: CPT

## 2021-12-01 PROCEDURE — 770010 HCHG ROOM/CARE - REHAB SEMI PRIVAT*

## 2021-12-01 PROCEDURE — 97110 THERAPEUTIC EXERCISES: CPT

## 2021-12-01 PROCEDURE — 700102 HCHG RX REV CODE 250 W/ 637 OVERRIDE(OP): Performed by: PHYSICAL MEDICINE & REHABILITATION

## 2021-12-01 RX ORDER — AMLODIPINE BESYLATE 5 MG/1
5 TABLET ORAL
Status: DISCONTINUED | OUTPATIENT
Start: 2021-12-02 | End: 2021-12-02

## 2021-12-01 RX ADMIN — ACETAMINOPHEN 1000 MG: 500 TABLET, FILM COATED ORAL at 16:15

## 2021-12-01 RX ADMIN — ACETAMINOPHEN 1000 MG: 500 TABLET, FILM COATED ORAL at 08:19

## 2021-12-01 RX ADMIN — OXYCODONE HYDROCHLORIDE 5 MG: 5 TABLET ORAL at 12:11

## 2021-12-01 RX ADMIN — OXYBUTYNIN CHLORIDE 5 MG: 5 TABLET, EXTENDED RELEASE ORAL at 08:20

## 2021-12-01 RX ADMIN — VENLAFAXINE HYDROCHLORIDE 150 MG: 75 CAPSULE, EXTENDED RELEASE ORAL at 08:20

## 2021-12-01 RX ADMIN — OXYCODONE 5 MG: 5 TABLET ORAL at 17:48

## 2021-12-01 RX ADMIN — VALSARTAN 160 MG: 80 TABLET, FILM COATED ORAL at 08:19

## 2021-12-01 RX ADMIN — SENNOSIDES AND DOCUSATE SODIUM 2 TABLET: 50; 8.6 TABLET ORAL at 08:20

## 2021-12-01 RX ADMIN — ACETAMINOPHEN 1000 MG: 500 TABLET, FILM COATED ORAL at 20:37

## 2021-12-01 RX ADMIN — SENNOSIDES AND DOCUSATE SODIUM 2 TABLET: 50; 8.6 TABLET ORAL at 20:37

## 2021-12-01 RX ADMIN — OMEPRAZOLE 20 MG: 20 CAPSULE, DELAYED RELEASE ORAL at 08:20

## 2021-12-01 RX ADMIN — ENOXAPARIN SODIUM 40 MG: 40 INJECTION SUBCUTANEOUS at 08:19

## 2021-12-01 RX ADMIN — OXYCODONE HYDROCHLORIDE 5 MG: 5 TABLET ORAL at 08:20

## 2021-12-01 ASSESSMENT — ACTIVITIES OF DAILY LIVING (ADL)
BED_CHAIR_WHEELCHAIR_TRANSFER_DESCRIPTION: ADAPTIVE EQUIPMENT;INCREASED TIME;SET-UP OF EQUIPMENT;VERBAL CUEING
TOILET_TRANSFER_DESCRIPTION: GRAB BAR;INCREASED TIME;INITIAL PREPARATION FOR TASK;SET-UP OF EQUIPMENT;SUPERVISION FOR SAFETY;VERBAL CUEING
TUB_SHOWER_TRANSFER_DESCRIPTION: ADAPTIVE EQUIPMENT;GRAB BAR;SHOWER BENCH;INCREASED TIME;INITIAL PREPARATION FOR TASK;SET-UP OF EQUIPMENT;SUPERVISION FOR SAFETY;VERBAL CUEING
BED_CHAIR_WHEELCHAIR_TRANSFER_DESCRIPTION: INCREASED TIME;INITIAL PREPARATION FOR TASK;SET-UP OF EQUIPMENT;SUPERVISION FOR SAFETY;VERBAL CUEING
TOILETING_LEVEL_OF_ASSIST_DESCRIPTION: ASSIST TO PULL PANTS UP;GRAB BAR;INCREASED TIME;INITIAL PREPARATION FOR TASK;SET-UP OF EQUIPMENT;SUPERVISION FOR SAFETY;VERBAL CUEING

## 2021-12-01 ASSESSMENT — GAIT ASSESSMENTS
DEVIATION: ANTALGIC;STEP TO;BRADYKINETIC
ASSISTIVE DEVICE: FRONT WHEEL WALKER
GAIT LEVEL OF ASSIST: CONTACT GUARD ASSIST
DISTANCE (FEET): 25

## 2021-12-01 ASSESSMENT — PAIN DESCRIPTION - PAIN TYPE
TYPE: ACUTE PAIN
TYPE: ACUTE PAIN

## 2021-12-01 NOTE — THERAPY
Physical Therapy   Daily Treatment     Patient Name: Aaliyah Fluton  Age:  81 y.o., Sex:  female  Medical Record #: [id number]  Today's Date: 12/1/2021     Precautions  Precautions: Fall Risk,Posterior Hip Precautions,Weight Bearing As Tolerated Left Lower Extremity  Comments: Hydrocephalus baseline    Subjective    Pt up in , willing to participate, reports having had a pain pill this morning.     Objective       12/01/21 0831   Gait Functional Level of Assist    Gait Level Of Assist Contact Guard Assist   Assistive Device Front Wheel Walker   Distance (Feet) 25  (in addition to 40 ft x 2 with // bars)   # of Times Distance was Traveled 3   Deviation Antalgic;Step To;Bradykinetic  (L knee valgus)   Transfer Functional Level of Assist   Bed, Chair, Wheelchair Transfer Contact Guard Assist   Bed Chair Wheelchair Transfer Description Adaptive equipment;Increased time;Set-up of equipment;Verbal cueing   Supine Lower Body Exercise   Bridges Two Legged;3 sets of 10   Hip Abduction Hook Lying;3 sets of 10   Short Arc Quad 3 sets of 10   Heel Slide 3 sets of 10   Quadriceps Isometrics 3 sets of 10;Left   Sitting Lower Body Exercises   Ankle Pumps 3 sets of 10   Hip Abduction 3 sets of 10   Long Arc Quad 3 sets of 10   Bed Mobility    Supine to Sit Stand by Assist   Sit to Supine Minimal Assist   Sit to Stand Minimal Assist   PT Total Time Spent   PT Individual Total Time Spent (Mins) 90   PT Charge Group   PT Gait Training 3   PT Therapeutic Exercise 2   PT Therapeutic Activities 1       Assessment    Pt tolerated increased gait distances and weight bearing tolerance but remains antalgic and limited step through gait pattern. Remains limited by L hip pain, weakness and low confidence.    Strengths: Able to follow instructions,Adequate strength,Effective communication skills,Good insight into deficits/needs,Independent prior level of function,Making steady progress towards goals,Pleasant and cooperative,Supportive  family,Willingly participates in therapeutic activities  Barriers: Decreased endurance,Fatigue,Generalized weakness,Home accessibility,Impaired activity tolerance,Impaired balance,Pain,Limited mobility (L knee valgus, posterior hip precautions)    Plan    Progressive gait tolerance with FWW, bed mobility/ transfers and LE strength training. Review posterior hip precautions and use of abduction pillow.      Passport items to be completed:  Get in/out of bed safely, in/out of a vehicle, safely use mobility device, walk or wheel around home/community, navigate up and down stairs, show how to get up/down from the ground, ensure home is accessible, demonstrate HEP, complete caregiver training      Physical Therapy Problems (Active)     Problem: Mobility     Dates: Start: 11/27/21       Goal: STG-Within one week, patient will ambulate household distance CGA with FWW 50 ft x 2     Dates: Start: 11/27/21             Problem: Mobility Transfers     Dates: Start: 11/27/21       Goal: STG-Within one week, patient will perform bed mobility SBA with leg  as needed and compliance to posterior hip precautions LLE     Dates: Start: 11/27/21          Goal: STG-Within one week, patient will transfer bed to chair SBA with FWW after set-up     Dates: Start: 11/27/21             Problem: PT-Long Term Goals     Dates: Start: 11/27/21       Goal: LTG-By discharge, patient will ambulate with FWW and SPV / modified independent 100 ft x 2     Dates: Start: 11/27/21          Goal: LTG-By discharge, patient will transfer one surface to another with FWW and SPV / modified independent     Dates: Start: 11/27/21          Goal: LTG-By discharge, patient will ambulate up/down 4-6 stairs with hand rails and CGA     Dates: Start: 11/27/21          Goal: LTG-By discharge, patient will transfer in/out of a car with FWW and SBA/ CGA to maintain posterior hip precautions LLE     Dates: Start: 11/27/21

## 2021-12-01 NOTE — THERAPY
"Occupational Therapy  Daily Treatment     Patient Name: Aaliyah Fulton  Age:  81 y.o., Sex:  female  Medical Record #: [id number]  Today's Date: 12/1/2021     Precautions  Precautions: Fall Risk,Posterior Hip Precautions,Weight Bearing As Tolerated Left Lower Extremity  Comments: Hydrocephalus baseline    Safety   ADL Safety : Requires Physical Assist for Safety,Requires Supervision for Safety,Requires Cueing for Safety  Bathroom Safety: Requires Physical Assist for Safety,Requires Supervision for Safety,Requires Cuing for Safety    Subjective    Pt reporting fatigue prior to each session but agreeable. Pt seen from 10:30-11:30 and 1:30-2:00; see below for details.     \"I won't be ready by then.\" Pt is anxious regarding d/c date of 12/10/2021. Provided pt with assurance that she will continue to improve day by day and we will re-evaluate her readiness for d/c next week. Pt's daughter is supportive but can only provide intermittent assist and  is unable to provide physical assist at home.      Objective     12/01/21 1031   Functional Level of Assist   Toileting Minimal Assist  (assist to pull pants up from ankles )   Toileting Description Assist to pull pants up;Grab bar;Increased time;Initial preparation for task;Set-up of equipment;Supervision for safety;Verbal cueing   Bed, Chair, Wheelchair Transfer Contact Guard Assist   Bed Chair Wheelchair Transfer Description Increased time;Initial preparation for task;Set-up of equipment;Supervision for safety;Verbal cueing   Toilet Transfers Stand by Assist  (x2 in ADL bathroom, x1 in room )   Toilet Transfer Description Grab bar;Increased time;Initial preparation for task;Set-up of equipment;Supervision for safety;Verbal cueing   Tub / Shower Transfers Stand by Assist  (x2 in ADL bathroom, tub bench transfer )   Tub Shower Transfer Description Adaptive equipment;Grab bar;Shower bench;Increased time;Initial preparation for task;Set-up of equipment;Supervision for " safety;Verbal cueing   Sitting Upper Body Exercises   Upper Extremity Bike Level 3 Resistance  (Fluidobike 6 min at end of session )   Balance   Skilled Intervention Facilitation;Postural Facilitation;Verbal Cuing   Comments Standing in // bars with CGA-SBA - balloon volley x3 and bean bag toss x2 to focus on dynamic standing balance and reaching outside RUFINA    Bed Mobility    Supine to Sit Stand by Assist   Sit to Supine Stand by Assist   Scooting Stand by Assist   Interdisciplinary Plan of Care Collaboration   IDT Collaboration with  Family / Caregiver   Patient Position at End of Therapy In Bed;Call Light within Reach;Tray Table within Reach;Phone within Reach;Family / Friend in Room   Collaboration Comments Pt's daughter present and actively participated in second session    OT Total Time Spent   OT Individual Total Time Spent (Mins) 90   OT Charge Group   OT Neuromuscular Re-education / Balance 2   OT Therapy Activity 4     10:30-11:30: Pt performed bathroom transfers in ADL bathroom (tub bench and commode placed over toilet). Pt had poor verbal recall of transfer sequence but demo'd increased safety and independence compared to previous session. Pt then completed above dynamic balance activities and Fluidobike for 6 min at end of session. Pt up in chair at end for lunch.     1:30-2:00: Pt's daughter present and actively participated in session. Provided education regarding recommended DME including tub transfer bench, BSC to place over toilet, and hip kit. Pt's daughter said she will work on acquiring equipment. Pt completed above bathroom transfers again for repetition to increase carryover and for family training so pt's daughter could see her CLOF and transfer sequence. Pt toileted at end of session with Prince only to pull pants up after they fell down to her ankles. Pt in bed at end of session with daughter at bedside.     Assessment    Pt tolerated OT sessions well but is primarily limited by poor standing  tolerance requiring frequent seated rest breaks, pain, weakness and fatigue. Pt requires intermittent redirection as she can be self-distracting. Pt had no overt LOB during balance activities but could only tolerate standing for a few minutes at a time. Pt didn't recall performing bathroom transfers despite having previously practiced however demo'd good physical carryover of transfers.      Strengths: Able to follow instructions,Alert and oriented,Effective communication skills,Good insight into deficits/needs,Independent prior level of function,Motivated for self care and independence,Pleasant and cooperative,Supportive family,Willingly participates in therapeutic activities  Barriers: Decreased endurance,Generalized weakness,Impaired activity tolerance,Impaired balance,Limited mobility,Pain    Plan    ADLs with AE, transfers, continue posterior hip precaution education, functional mobility, fall prevention education, balance, UE strengthening     Passport items to be completed:  Perform bathroom transfers, complete dressing, complete feeding, get ready for the day, prepare a simple meal, participate in household tasks, adapt home for safety needs, demonstrate home exercise program, complete caregiver training     Occupational Therapy Goals (Active)     Problem: Functional Transfers     Dates: Start: 11/27/21       Goal: STG-Within one week, patient will perform bathroom transfers with SBA and use of AE as needed.      Dates: Start: 11/27/21       Goal Note filed on 11/30/21 1138 by Kamala Bustamante OT     SBA to CGA                Problem: OT Long Term Goals     Dates: Start: 11/27/21       Goal: LTG-By discharge, patient will complete basic self care tasks with Prince to modified independence and use of AE as needed.      Dates: Start: 11/27/21          Goal: LTG-By discharge, patient will perform bathroom transfers with supervision to modified independence with use of AE as needed.      Dates: Start: 11/27/21

## 2021-12-01 NOTE — CARE PLAN
Problem: Knowledge Deficit - Standard  Goal: Patient and family/care givers will demonstrate understanding of plan of care, disease process/condition, diagnostic tests and medications  Outcome: Progressing    Problem: Fall Risk - Rehab  Goal: Patient will remain free from falls  Note: Pt uses call light consistently and appropriately. Waits for assistance does not attempt self transfer this shift. Able to verbalize needs.       The patient is Stable - Low risk of patient condition declining or worsening    Shift Goals  Clinical Goals: safety   Patient Goals: sleep well

## 2021-12-01 NOTE — PROGRESS NOTES
"Rehab Progress Note     Encounter Date: 12/1/2021    CC: Decreased mobility, hip pain    Interval Events (Subjective)  Patient sitting up in therapy gym. She reports therapy is going well. Discussed about our IDT and plan for 12/10/21 as discharge. She reports she was anxious last night after hearing it.  Discussed would have another meeting next week and will be able to talk about her progress at that time. Denies NVD. Denies SOB.     IDT Team Meeting 11/30/2021  DC/Disposition:  12/10/21    Objective:  VITAL SIGNS: /75   Pulse 70   Temp 36.7 °C (98.1 °F) (Oral)   Resp 14   Ht 1.727 m (5' 8\")   Wt 79.5 kg (175 lb 4.3 oz)   SpO2 94%   BMI 26.65 kg/m²   Gen: NAD  Psych: Mood and affect appropriate  CV: RRR, no edema  Resp: CTAB, no upper airway sounds  Abd: NTND  Neuro: AOx3, following commands    No results found for this or any previous visit (from the past 72 hour(s)).    Current Facility-Administered Medications   Medication Frequency   • oxyCODONE immediate-release (ROXICODONE) tablet 5 mg BID   • enoxaparin (LOVENOX) inj 40 mg QDAY   • valsartan (DIOVAN) tablet 160 mg DAILY   • Respiratory Therapy Consult Continuous RT   • hydrALAZINE (APRESOLINE) tablet 25 mg Q8HRS PRN   • acetaminophen (Tylenol) tablet 650 mg Q4HRS PRN   • senna-docusate (PERICOLACE or SENOKOT S) 8.6-50 MG per tablet 2 Tablet BID    And   • polyethylene glycol/lytes (MIRALAX) PACKET 1 Packet QDAY PRN    And   • magnesium hydroxide (MILK OF MAGNESIA) suspension 30 mL QDAY PRN    And   • bisacodyl (DULCOLAX) suppository 10 mg QDAY PRN   • omeprazole (PRILOSEC) capsule 20 mg DAILY   • artificial tears ophthalmic solution 1 Drop PRN   • benzocaine-menthol (CEPACOL) lozenge 1 Lozenge Q2HRS PRN   • mag hydrox-al hydrox-simeth (MAALOX PLUS ES or MYLANTA DS) suspension 20 mL Q2HRS PRN   • ondansetron (ZOFRAN ODT) dispertab 4 mg 4X/DAY PRN    Or   • ondansetron (ZOFRAN) syringe/vial injection 4 mg 4X/DAY PRN   • traZODone (DESYREL) tablet " 50 mg QHS PRN   • sodium chloride (OCEAN) 0.65 % nasal spray 2 Spray PRN   • oxyCODONE immediate-release (ROXICODONE) tablet 5 mg Q3HRS PRN    Or   • oxyCODONE immediate release (ROXICODONE) tablet 10 mg Q3HRS PRN   • scopolamine (TRANSDERM-SCOP) patch 1 Patch Q72HRS PRN   • oxybutynin SR (DITROPAN-XL) tablet 5 mg DAILY   • venlafaxine XR (EFFEXOR XR) capsule 150 mg DAILY   • acetaminophen (TYLENOL) tablet 1,000 mg TID       Orders Placed This Encounter   Procedures   • Diet Order Diet: Regular     Standing Status:   Standing     Number of Occurrences:   1     Order Specific Question:   Diet:     Answer:   Regular [1]       Assessment:  Active Hospital Problems    Diagnosis    • *Closed left hip fracture (HCC)    • Other specified anemias    • Overactive bladder    • Hydrocephalus (HCC)    • Systolic murmur    • Depression    • OA (osteoarthritis) of knee        Medical Decision Making and Plan:  Left Hip fracture - Patient with GLF with L hip fracture s/p fixation with Dr. Culver on 11/24/21. WBAT per ortho  -PT and OT for mobility and ADLs  -Follow-up Dr. Culver  -Schedule Tylenol TID for pain control. PRN Oxycodone     Hydrocephalus - Followed by Dr. Blanchard's office. Possible intervention in January. May benefit from SLP will wait on OT evaluation.      HTN - Labile SBP post-surgery. Not on medications. Into 170s, start Valsartan 80 mg. Ongoing elevation, will increase to 160  -Into 150s, will monitor another day. Add amlodipine 5 mg     Asthma - PRN Albuterol.      Anemia - Check AM CBC - 11.8, stable     Bladder spasms - Brought in Oxybutynin, OK to start.     GERD - Patient on Prilosec     Depression - Patient on Effexor 150 mg daily     DVT Ppx - Patient on Lovenox    Total time:  26 minutes.  I spent greater than 50% of the time for patient care, counseling, and coordination on this date, including unit/floor time, and face-to-face time with the patient as per interval events and assessment and plan  above. Topics discussed included elevated SBP, start CCB, and discharge planning.     Prem Gibbons M.D.

## 2021-12-02 PROCEDURE — 770010 HCHG ROOM/CARE - REHAB SEMI PRIVAT*

## 2021-12-02 PROCEDURE — 97110 THERAPEUTIC EXERCISES: CPT

## 2021-12-02 PROCEDURE — 700111 HCHG RX REV CODE 636 W/ 250 OVERRIDE (IP): Performed by: PHYSICAL MEDICINE & REHABILITATION

## 2021-12-02 PROCEDURE — 97530 THERAPEUTIC ACTIVITIES: CPT

## 2021-12-02 PROCEDURE — A9270 NON-COVERED ITEM OR SERVICE: HCPCS | Performed by: PHYSICAL MEDICINE & REHABILITATION

## 2021-12-02 PROCEDURE — 99232 SBSQ HOSP IP/OBS MODERATE 35: CPT | Performed by: PHYSICAL MEDICINE & REHABILITATION

## 2021-12-02 PROCEDURE — 97116 GAIT TRAINING THERAPY: CPT

## 2021-12-02 PROCEDURE — 700102 HCHG RX REV CODE 250 W/ 637 OVERRIDE(OP): Performed by: PHYSICAL MEDICINE & REHABILITATION

## 2021-12-02 RX ORDER — AMLODIPINE BESYLATE 5 MG/1
10 TABLET ORAL
Status: DISCONTINUED | OUTPATIENT
Start: 2021-12-03 | End: 2021-12-12 | Stop reason: HOSPADM

## 2021-12-02 RX ADMIN — OXYCODONE HYDROCHLORIDE 5 MG: 5 TABLET ORAL at 07:29

## 2021-12-02 RX ADMIN — OMEPRAZOLE 20 MG: 20 CAPSULE, DELAYED RELEASE ORAL at 07:29

## 2021-12-02 RX ADMIN — ACETAMINOPHEN 1000 MG: 500 TABLET, FILM COATED ORAL at 07:28

## 2021-12-02 RX ADMIN — OXYBUTYNIN CHLORIDE 5 MG: 5 TABLET, EXTENDED RELEASE ORAL at 07:29

## 2021-12-02 RX ADMIN — SENNOSIDES AND DOCUSATE SODIUM 2 TABLET: 50; 8.6 TABLET ORAL at 20:28

## 2021-12-02 RX ADMIN — VALSARTAN 160 MG: 80 TABLET, FILM COATED ORAL at 07:28

## 2021-12-02 RX ADMIN — SENNOSIDES AND DOCUSATE SODIUM 2 TABLET: 50; 8.6 TABLET ORAL at 07:32

## 2021-12-02 RX ADMIN — ENOXAPARIN SODIUM 40 MG: 40 INJECTION SUBCUTANEOUS at 07:28

## 2021-12-02 RX ADMIN — ACETAMINOPHEN 1000 MG: 500 TABLET, FILM COATED ORAL at 17:07

## 2021-12-02 RX ADMIN — ACETAMINOPHEN 1000 MG: 500 TABLET, FILM COATED ORAL at 20:28

## 2021-12-02 RX ADMIN — AMLODIPINE BESYLATE 5 MG: 5 TABLET ORAL at 10:15

## 2021-12-02 RX ADMIN — TRAZODONE HYDROCHLORIDE 50 MG: 50 TABLET ORAL at 20:28

## 2021-12-02 RX ADMIN — OXYCODONE HYDROCHLORIDE 5 MG: 5 TABLET ORAL at 11:55

## 2021-12-02 RX ADMIN — VENLAFAXINE HYDROCHLORIDE 150 MG: 75 CAPSULE, EXTENDED RELEASE ORAL at 07:29

## 2021-12-02 ASSESSMENT — GAIT ASSESSMENTS
ASSISTIVE DEVICE: FRONT WHEEL WALKER
DEVIATION: ANTALGIC;STEP TO;DECREASED BASE OF SUPPORT;BRADYKINETIC;SHUFFLED GAIT;DECREASED HEEL STRIKE;DECREASED TOE OFF
DISTANCE (FEET): 40
DISTANCE (FEET): 85
GAIT LEVEL OF ASSIST: CONTACT GUARD ASSIST
GAIT LEVEL OF ASSIST: CONTACT GUARD ASSIST
ASSISTIVE DEVICE: FRONT WHEEL WALKER
DEVIATION: ANTALGIC;BRADYKINETIC;SHUFFLED GAIT

## 2021-12-02 ASSESSMENT — ACTIVITIES OF DAILY LIVING (ADL): BED_CHAIR_WHEELCHAIR_TRANSFER_DESCRIPTION: INCREASED TIME;SET-UP OF EQUIPMENT;SUPERVISION FOR SAFETY;VERBAL CUEING

## 2021-12-02 ASSESSMENT — PAIN DESCRIPTION - PAIN TYPE
TYPE: ACUTE PAIN
TYPE: ACUTE PAIN

## 2021-12-02 NOTE — CARE PLAN
Problem: Pain - Standard  Goal: Alleviation of pain or a reduction in pain to the patient’s comfort goal  Note: Pain is manageable with scheduled tylenol at hs.Repositioned with pillows for comfort.Will continue to monitor and assess pain level and medicate as needed.     Problem: Fall Risk - Rehab  Goal: Patient will remain free from falls  Note: Diana Becker Fall risk Assessment Score: 10    Low fall risk interventions   - Call light within reach   - Yellow  socks   - Belongings within reach   - Bed in the lowest position

## 2021-12-02 NOTE — CARE PLAN
Problem: Pain - Standard  Goal: Alleviation of pain or a reduction in pain to the patient’s comfort goal  Note: Pt able to participate in therapies and activities this shift. Pt takes scheduled oxycodone and tylenol as ordered. Will continue to monitor.      Problem: Skin Integrity  Goal: Patient's skin integrity will be maintained or improve  Note: Patient's skin remains in same condition and free from new or accidental injury this shift.  Staples and island dressing to left hip. Dressing changed this shift. Will continue to monitor.    The patient is Stable - Low risk of patient condition declining or worsening    Shift Goals  Clinical Goals: safety   Patient Goals: strength, sleep well

## 2021-12-02 NOTE — THERAPY
Occupational Therapy  Daily Treatment     Patient Name: Aaliyah Fulton  Age:  81 y.o., Sex:  female  Medical Record #: [id number]  Today's Date: 12/2/2021     Precautions  Precautions: Fall Risk,Posterior Hip Precautions,Weight Bearing As Tolerated Left Lower Extremity  Comments: Hydrocephalus baseline    Safety   ADL Safety : Requires Physical Assist for Safety,Requires Supervision for Safety,Requires Cueing for Safety  Bathroom Safety: Requires Physical Assist for Safety,Requires Supervision for Safety,Requires Cuing for Safety    Subjective    Pt seated in w/c upon arrival, pleasant and cooperative, agreeable to therapy      Objective    Functional mobility task at FWW level - retrieving newspapers from dining room tables and transporting them to a central location in room, completed x2 with seated RB in between, total time completion: ~3-5 min. CGA overall, was able to hold newspapers appropriately and safely transport, cues to stay within walker. Requires total assist to don/doff foam wedge.     12/02/21 1031   Interdisciplinary Plan of Care Collaboration   Patient Position at End of Therapy Seated;Call Light within Reach;Tray Table within Reach   OT Total Time Spent   OT Individual Total Time Spent (Mins) 30   OT Charge Group   OT Therapy Activity 2       Assessment    Pt completed functional mobility activity fairly well to the best of her abilities at FWW level -  CGA overall, requires verbal cues for FWW safety    Strengths: Able to follow instructions,Alert and oriented,Effective communication skills,Good insight into deficits/needs,Independent prior level of function,Motivated for self care and independence,Pleasant and cooperative,Supportive family,Willingly participates in therapeutic activities  Barriers: Decreased endurance,Generalized weakness,Impaired activity tolerance,Impaired balance,Limited mobility,Pain    Plan    Refer to Primary OT POC/goals    Occupational Therapy Goals (Active)     Problem:  Functional Transfers     Dates: Start: 11/27/21       Goal: STG-Within one week, patient will perform bathroom transfers with SBA and use of AE as needed.      Dates: Start: 11/27/21       Goal Note filed on 11/30/21 1138 by Kamala Bustamante OT     SBA to CGA                Problem: OT Long Term Goals     Dates: Start: 11/27/21       Goal: LTG-By discharge, patient will complete basic self care tasks with Prince to modified independence and use of AE as needed.      Dates: Start: 11/27/21          Goal: LTG-By discharge, patient will perform bathroom transfers with supervision to modified independence with use of AE as needed.      Dates: Start: 11/27/21

## 2021-12-02 NOTE — THERAPY
Occupational Therapy  Daily Treatment     Patient Name: Aaliyah Fulton  Age:  81 y.o., Sex:  female  Medical Record #: [id number]  Today's Date: 12/2/2021     Precautions  Precautions: Fall Risk,Posterior Hip Precautions,Weight Bearing As Tolerated Left Lower Extremity  Comments: Hydrocephalus baseline    Safety   ADL Safety : Requires Physical Assist for Safety,Requires Supervision for Safety,Requires Cueing for Safety  Bathroom Safety: Requires Physical Assist for Safety,Requires Supervision for Safety,Requires Cuing for Safety    Subjective    Pt sitting up in w/c upon arrival, agreeable to OT session.      Objective     12/02/21 0901   Non Verbal Descriptors   Non Verbal Scale  Calm   Sitting Upper Body Exercises   Chest Press 2 sets of 10;Bilateral;Weight (See Comments for lbs)  (3# dumbbell )   Internal Shoulder Rotation 2 sets of 10;Bilateral;Weight (See Comments for lbs)  (3# dumbbells )   External Shoulder Rotation 2 sets of 10;Bilateral;Weight (See Comments for lbs)  (3# dumbbells)   Bilateral Row 3 sets of 10;Bilateral;Weight (See Comments for lbs)  (25# on Equalizer )   Bicep Curls 2 sets of 10;Bilateral;Weight (See Comments for lbs)  (3# dumbbells)   Tricep Press 3 sets of 10;Bilateral;Weight (See Comments for lbs)  (20# on Rikshaw )   Elbow Extension 2 sets of 10;Bilateral;Weight (See Comments for lbs)  (3# dumbbells)   Upper Extremity Bike Level 3 Resistance  (15 minutes on Fluidobike to increase endurance )   Interdisciplinary Plan of Care Collaboration   Patient Position at End of Therapy Seated;Chair Alarm On;Call Light within Reach;Tray Table within Reach;Phone within Reach   OT Total Time Spent   OT Individual Total Time Spent (Mins) 60   OT Charge Group   OT Therapeutic Exercise  4       Assessment    Pt tolerated OT session well focused on UE strengthening to increase strength for functional transfers and daily activities. Pt requires rest breaks and occasional cues for correct form.      Strengths: Able to follow instructions,Alert and oriented,Effective communication skills,Good insight into deficits/needs,Independent prior level of function,Motivated for self care and independence,Pleasant and cooperative,Supportive family,Willingly participates in therapeutic activities  Barriers: Decreased endurance,Generalized weakness,Impaired activity tolerance,Impaired balance,Limited mobility,Pain    Plan    ADLs with AE, transfers, continue posterior hip precaution education, functional mobility, fall prevention education, balance, UE strengthening     Passport items to be completed:  Perform bathroom transfers, complete dressing, complete feeding, get ready for the day, prepare a simple meal, participate in household tasks, adapt home for safety needs, demonstrate home exercise program, complete caregiver training     Occupational Therapy Goals (Active)     Problem: Functional Transfers     Dates: Start: 11/27/21       Goal: STG-Within one week, patient will perform bathroom transfers with SBA and use of AE as needed.      Dates: Start: 11/27/21       Goal Note filed on 11/30/21 1138 by Kamala Bustamante OT     SBA to CGA                Problem: OT Long Term Goals     Dates: Start: 11/27/21       Goal: LTG-By discharge, patient will complete basic self care tasks with Prince to modified independence and use of AE as needed.      Dates: Start: 11/27/21          Goal: LTG-By discharge, patient will perform bathroom transfers with supervision to modified independence with use of AE as needed.      Dates: Start: 11/27/21

## 2021-12-02 NOTE — THERAPY
Physical Therapy   Daily Treatment     Patient Name: Aaliyah Fulton  Age:  81 y.o., Sex:  female  Medical Record #: [id number]  Today's Date: 12/2/2021     Precautions  Precautions: Fall Risk,Posterior Hip Precautions,Weight Bearing As Tolerated Left Lower Extremity  Comments: Hydrocephalus baseline    Subjective    Pt up in wc, willing to participate     Objective       12/02/21 1301   Gait Functional Level of Assist    Gait Level Of Assist Contact Guard Assist   Assistive Device Front Wheel Walker   Distance (Feet) 40  (in addition to 40 ft x 1 with // bars for warm-up)   # of Times Distance was Traveled 3   Deviation Antalgic;Bradykinetic;Shuffled Gait  (L knee valgus)   PT Total Time Spent   PT Individual Total Time Spent (Mins) 30   PT Charge Group   PT Gait Training 2       Assessment    Pt improving gait tolerance, demonstrated less antalgia and improved step through with FWW. Reports improved pain control/ tolerance with scheduled pain meds this week.    Strengths: Able to follow instructions,Adequate strength,Effective communication skills,Good insight into deficits/needs,Independent prior level of function,Making steady progress towards goals,Pleasant and cooperative,Supportive family,Willingly participates in therapeutic activities  Barriers: Decreased endurance,Fatigue,Generalized weakness,Home accessibility,Impaired activity tolerance,Impaired balance,Pain,Limited mobility (L knee valgus, posterior hip precautions)    Plan    Progressive gait tolerance with FWW, bed mobility/ transfers and LE strength training. Review posterior hip precautions and use of abduction pillow.      Passport items to be completed:  Get in/out of bed safely, in/out of a vehicle, safely use mobility device, walk or wheel around home/community, navigate up and down stairs, show how to get up/down from the ground, ensure home is accessible, demonstrate HEP, complete caregiver training    Physical Therapy Problems (Active)      Problem: Mobility     Dates: Start: 11/27/21       Goal: STG-Within one week, patient will ambulate household distance CGA with FWW 50 ft x 2     Dates: Start: 11/27/21             Problem: Mobility Transfers     Dates: Start: 11/27/21       Goal: STG-Within one week, patient will perform bed mobility SBA with leg  as needed and compliance to posterior hip precautions LLE     Dates: Start: 11/27/21          Goal: STG-Within one week, patient will transfer bed to chair SBA with FWW after set-up     Dates: Start: 11/27/21             Problem: PT-Long Term Goals     Dates: Start: 11/27/21       Goal: LTG-By discharge, patient will ambulate with FWW and SPV / modified independent 100 ft x 2     Dates: Start: 11/27/21          Goal: LTG-By discharge, patient will transfer one surface to another with FWW and SPV / modified independent     Dates: Start: 11/27/21          Goal: LTG-By discharge, patient will ambulate up/down 4-6 stairs with hand rails and CGA     Dates: Start: 11/27/21          Goal: LTG-By discharge, patient will transfer in/out of a car with FWW and SBA/ CGA to maintain posterior hip precautions LLE     Dates: Start: 11/27/21

## 2021-12-02 NOTE — PROGRESS NOTES
"Rehab Progress Note     Encounter Date: 12/2/2021    CC: Decreased mobility, hip pain    Interval Events (Subjective)  Patient sitting up in room. She reports therapy is going well. She denies pain at rest. Denies NVD. Denies SOB.  SBP into 150s and 140s. Will increase Amlodipine    IDT Team Meeting 11/30/2021  DC/Disposition:  12/10/21    Objective:  VITAL SIGNS: /68   Pulse 71   Temp 37.1 °C (98.7 °F) (Oral)   Resp 18   Ht 1.727 m (5' 8\")   Wt 79.5 kg (175 lb 4.3 oz)   SpO2 96%   BMI 26.65 kg/m²   Gen: NAD  Psych: Mood and affect appropriate  CV: RRR, no edema  Resp: CTAB, no upper airway sounds  Abd: NTND  Neuro: AOx3, following commands  Unchanged from 12/1/21    No results found for this or any previous visit (from the past 72 hour(s)).    Current Facility-Administered Medications   Medication Frequency   • amLODIPine (NORVASC) tablet 5 mg Q DAY   • oxyCODONE immediate-release (ROXICODONE) tablet 5 mg BID   • enoxaparin (LOVENOX) inj 40 mg QDAY   • valsartan (DIOVAN) tablet 160 mg DAILY   • Respiratory Therapy Consult Continuous RT   • hydrALAZINE (APRESOLINE) tablet 25 mg Q8HRS PRN   • acetaminophen (Tylenol) tablet 650 mg Q4HRS PRN   • senna-docusate (PERICOLACE or SENOKOT S) 8.6-50 MG per tablet 2 Tablet BID    And   • polyethylene glycol/lytes (MIRALAX) PACKET 1 Packet QDAY PRN    And   • magnesium hydroxide (MILK OF MAGNESIA) suspension 30 mL QDAY PRN    And   • bisacodyl (DULCOLAX) suppository 10 mg QDAY PRN   • omeprazole (PRILOSEC) capsule 20 mg DAILY   • artificial tears ophthalmic solution 1 Drop PRN   • benzocaine-menthol (CEPACOL) lozenge 1 Lozenge Q2HRS PRN   • mag hydrox-al hydrox-simeth (MAALOX PLUS ES or MYLANTA DS) suspension 20 mL Q2HRS PRN   • ondansetron (ZOFRAN ODT) dispertab 4 mg 4X/DAY PRN    Or   • ondansetron (ZOFRAN) syringe/vial injection 4 mg 4X/DAY PRN   • traZODone (DESYREL) tablet 50 mg QHS PRN   • sodium chloride (OCEAN) 0.65 % nasal spray 2 Spray PRN   • oxyCODONE " immediate-release (ROXICODONE) tablet 5 mg Q3HRS PRN    Or   • oxyCODONE immediate release (ROXICODONE) tablet 10 mg Q3HRS PRN   • scopolamine (TRANSDERM-SCOP) patch 1 Patch Q72HRS PRN   • oxybutynin SR (DITROPAN-XL) tablet 5 mg DAILY   • venlafaxine XR (EFFEXOR XR) capsule 150 mg DAILY   • acetaminophen (TYLENOL) tablet 1,000 mg TID       Orders Placed This Encounter   Procedures   • Diet Order Diet: Regular     Standing Status:   Standing     Number of Occurrences:   1     Order Specific Question:   Diet:     Answer:   Regular [1]       Assessment:  Active Hospital Problems    Diagnosis    • *Closed left hip fracture (HCC)    • Other specified anemias    • Overactive bladder    • Hydrocephalus (HCC)    • Systolic murmur    • Depression    • OA (osteoarthritis) of knee        Medical Decision Making and Plan:  Left Hip fracture - Patient with GLF with L hip fracture s/p fixation with Dr. Culver on 11/24/21. WBAT per ortho  -PT and OT for mobility and ADLs  -Follow-up Dr. Culver  -Schedule Tylenol TID for pain control. PRN Oxycodone     Hydrocephalus - Followed by Dr. Blanchard's office. Possible intervention in January. May benefit from SLP will wait on OT evaluation.      HTN - Labile SBP post-surgery. Not on medications. Into 170s, start Valsartan 80 mg. Ongoing elevation, will increase to 160  -Into 150s, will monitor another day. Add amlodipine 5 mg ongoing, will increase to 10 mg     Asthma - PRN Albuterol.      Anemia - Check AM CBC - 11.8, stable     Bladder spasms - Brought in Oxybutynin, OK to start.     GERD - Patient on Prilosec     Depression - Patient on Effexor 150 mg daily     DVT Ppx - Patient on Lovenox    Total time:  26 minutes.  I spent greater than 50% of the time for patient care, counseling, and coordination on this date, including unit/floor time, and face-to-face time with the patient as per interval events and assessment and plan above. Topics discussed included improving mobility,  elevated SBP, and increase CCB.     Prem Gibbons M.D.

## 2021-12-03 PROCEDURE — 700111 HCHG RX REV CODE 636 W/ 250 OVERRIDE (IP): Performed by: PHYSICAL MEDICINE & REHABILITATION

## 2021-12-03 PROCEDURE — 770010 HCHG ROOM/CARE - REHAB SEMI PRIVAT*

## 2021-12-03 PROCEDURE — 97116 GAIT TRAINING THERAPY: CPT

## 2021-12-03 PROCEDURE — 97110 THERAPEUTIC EXERCISES: CPT

## 2021-12-03 PROCEDURE — 700102 HCHG RX REV CODE 250 W/ 637 OVERRIDE(OP): Performed by: PHYSICAL MEDICINE & REHABILITATION

## 2021-12-03 PROCEDURE — 99232 SBSQ HOSP IP/OBS MODERATE 35: CPT | Performed by: PHYSICAL MEDICINE & REHABILITATION

## 2021-12-03 PROCEDURE — A9270 NON-COVERED ITEM OR SERVICE: HCPCS | Performed by: PHYSICAL MEDICINE & REHABILITATION

## 2021-12-03 PROCEDURE — 97535 SELF CARE MNGMENT TRAINING: CPT

## 2021-12-03 RX ORDER — VITAMIN B COMPLEX
2000 TABLET ORAL DAILY
Status: DISCONTINUED | OUTPATIENT
Start: 2021-12-03 | End: 2021-12-12 | Stop reason: HOSPADM

## 2021-12-03 RX ADMIN — SENNOSIDES AND DOCUSATE SODIUM 2 TABLET: 50; 8.6 TABLET ORAL at 20:01

## 2021-12-03 RX ADMIN — SENNOSIDES AND DOCUSATE SODIUM 2 TABLET: 50; 8.6 TABLET ORAL at 08:14

## 2021-12-03 RX ADMIN — Medication 2000 UNITS: at 14:56

## 2021-12-03 RX ADMIN — VALSARTAN 160 MG: 80 TABLET, FILM COATED ORAL at 08:14

## 2021-12-03 RX ADMIN — AMLODIPINE BESYLATE 10 MG: 5 TABLET ORAL at 08:14

## 2021-12-03 RX ADMIN — TRAZODONE HYDROCHLORIDE 50 MG: 50 TABLET ORAL at 20:01

## 2021-12-03 RX ADMIN — OMEPRAZOLE 20 MG: 20 CAPSULE, DELAYED RELEASE ORAL at 08:14

## 2021-12-03 RX ADMIN — ACETAMINOPHEN 1000 MG: 500 TABLET, FILM COATED ORAL at 08:13

## 2021-12-03 RX ADMIN — OXYBUTYNIN CHLORIDE 5 MG: 5 TABLET, EXTENDED RELEASE ORAL at 08:14

## 2021-12-03 RX ADMIN — OXYCODONE HYDROCHLORIDE 5 MG: 5 TABLET ORAL at 08:13

## 2021-12-03 RX ADMIN — ACETAMINOPHEN 1000 MG: 500 TABLET, FILM COATED ORAL at 20:01

## 2021-12-03 RX ADMIN — VENLAFAXINE HYDROCHLORIDE 150 MG: 75 CAPSULE, EXTENDED RELEASE ORAL at 08:14

## 2021-12-03 RX ADMIN — ENOXAPARIN SODIUM 40 MG: 40 INJECTION SUBCUTANEOUS at 08:14

## 2021-12-03 RX ADMIN — ACETAMINOPHEN 1000 MG: 500 TABLET, FILM COATED ORAL at 14:54

## 2021-12-03 RX ADMIN — OXYCODONE HYDROCHLORIDE 5 MG: 5 TABLET ORAL at 12:07

## 2021-12-03 ASSESSMENT — GAIT ASSESSMENTS
DISTANCE (FEET): 75
ASSISTIVE DEVICE: FRONT WHEEL WALKER
DEVIATION: ANTALGIC;STEP TO;DECREASED BASE OF SUPPORT;BRADYKINETIC
DISTANCE (FEET): 75
GAIT LEVEL OF ASSIST: CONTACT GUARD ASSIST
DEVIATION: ANTALGIC;STEP TO;DECREASED BASE OF SUPPORT;BRADYKINETIC
ASSISTIVE DEVICE: FRONT WHEEL WALKER
GAIT LEVEL OF ASSIST: CONTACT GUARD ASSIST

## 2021-12-03 ASSESSMENT — ACTIVITIES OF DAILY LIVING (ADL)
TOILET_TRANSFER_DESCRIPTION: ADAPTIVE EQUIPMENT;GRAB BAR;INCREASED TIME;INITIAL PREPARATION FOR TASK;SET-UP OF EQUIPMENT;SUPERVISION FOR SAFETY;VERBAL CUEING
TOILETING_LEVEL_OF_ASSIST_DESCRIPTION: GRAB BAR;INCREASED TIME;INITIAL PREPARATION FOR TASK;SET-UP OF EQUIPMENT;VERBAL CUEING;SUPERVISION FOR SAFETY
BED_CHAIR_WHEELCHAIR_TRANSFER_DESCRIPTION: ADAPTIVE EQUIPMENT;INCREASED TIME;SET-UP OF EQUIPMENT
TUB_SHOWER_TRANSFER_DESCRIPTION: ADAPTIVE EQUIPMENT;GRAB BAR;SHOWER BENCH;INCREASED TIME;INITIAL PREPARATION FOR TASK;SET-UP OF EQUIPMENT;SUPERVISION FOR SAFETY;VERBAL CUEING

## 2021-12-03 ASSESSMENT — PAIN DESCRIPTION - PAIN TYPE: TYPE: ACUTE PAIN

## 2021-12-03 NOTE — CARE PLAN
"The patient is Stable - Low risk of patient condition declining or worsening      Problem: Pain - Standard  Goal: Alleviation of pain or a reduction in pain to the patient’s comfort goal  Note: Pt able to participate in therapies and activities this shift.  Pt is on scheduled tylenol and oxycodone for pain.     Problem: Fall Risk - Rehab  Goal: Patient will remain free from falls  Note: Diana Becker Fall risk Assessment Score: 11      Moderate fall risk Interventions  - Bed and strip alarm   - Yellow sign by the door   - Yellow wrist band \"Fall risk\"  - Room near to the nurse station  - Do not leave patient unattended in the bathroom  - Fall risk education provided       "

## 2021-12-03 NOTE — CARE PLAN
The patient is Stable - Low risk of patient condition declining or worsening    Shift Goals  Clinical Goals: safety  Patient Goals: strength, sleep well       Problem: Pain - Standard  Goal: Alleviation of pain or a reduction in pain to the patient’s comfort goal  Outcome: Progressing: Pt said her pain was 1/10 at bedtime. Pt had scheduled Tylenol at bedtime. Pt informed to ring call light during the night if she needs any other pain medication, she stated she understood.      Problem: Fall Risk - Rehab  Goal: Patient will remain free from falls  Outcome: Progressing: Pt uses call light consistently and appropriately. Waits for assistance does not attempt self transfer this shift. Able to verbalize needs.

## 2021-12-03 NOTE — THERAPY
"Occupational Therapy  Daily Treatment     Patient Name: Aaliyah Fulton  Age:  81 y.o., Sex:  female  Medical Record #: [id number]  Today's Date: 12/3/2021     Precautions  Precautions: Fall Risk,Posterior Hip Precautions,Weight Bearing As Tolerated Left Lower Extremity  Comments: Hydrocephalus baseline    Safety   ADL Safety : Requires Physical Assist for Safety,Requires Supervision for Safety,Requires Cueing for Safety  Bathroom Safety: Requires Physical Assist for Safety,Requires Supervision for Safety,Requires Cuing for Safety    Subjective    Pt seen from 11:00-11:30 and 1:00-2:00. When provided with treatment choices during first session between standing/balance or UE strengthening, pt chose strengthening stating \"oh no, I need to get stronger.\"      Objective     12/03/21 1101   Functional Level of Assist   Grooming Modified Independent;Seated   Grooming Description Seated in wheelchair at sink   Bathing Supervision  (SBA when standing )   Bathing Description Adaptive equipment;Hand held shower;Long handled bath tool;Tub bench;Assit with back;Increased time;Initial preparation for task;Set-up of equipment;Supervision for safety;Verbal cueing;Set up for wound protection   Upper Body Dressing Independent   Lower Body Dressing Stand by Assist   Lower Body Dressing Description Assistive devices;Grab bar;Reacher;Sock aid;Increased time;Initial preparation for task;Set-up of equipment;Supervision for safety;Verbal cueing  (verbal cues to maintain hip precautions )   Toileting Stand by Assist   Toileting Description Grab bar;Increased time;Initial preparation for task;Set-up of equipment;Verbal cueing;Supervision for safety   Toilet Transfers Stand by Assist   Toilet Transfer Description Adaptive equipment;Grab bar;Increased time;Initial preparation for task;Set-up of equipment;Supervision for safety;Verbal cueing   Tub / Shower Transfers Stand by Assist   Tub Shower Transfer Description Adaptive equipment;Grab " bar;Shower bench;Increased time;Initial preparation for task;Set-up of equipment;Supervision for safety;Verbal cueing   Sitting Upper Body Exercises   Tricep Press 3 sets of 10;Bilateral;Weight (See Comments for lbs)  (25# on Rikshaw )   Upper Extremity Bike Level 3 Resistance  (10 min on Fluidobike for general endurance/UE strength )   Interdisciplinary Plan of Care Collaboration   IDT Collaboration with  Family / Caregiver   Patient Position at End of Therapy Seated;Call Light within Reach;Tray Table within Reach;Phone within Reach   Collaboration Comments Pt's daughter present during first session    OT Total Time Spent   OT Individual Total Time Spent (Mins) 90   OT Charge Group   OT Self Care / ADL 4   OT Therapeutic Exercise  2       Assessment    Pt tolerated OT sessions well focused on ADLs and UE strengthening to increase strength for functional transfers and daily tasks. Pt demo'd increased independence with toileting, showering, and LBD. Pt requires increased time to use reacher to don pants but is able to do so with encouragement and verbal cues. Was able to use sock aid this session to don socks.     Strengths: Able to follow instructions,Alert and oriented,Effective communication skills,Good insight into deficits/needs,Independent prior level of function,Motivated for self care and independence,Pleasant and cooperative,Supportive family,Willingly participates in therapeutic activities  Barriers: Decreased endurance,Generalized weakness,Impaired activity tolerance,Impaired balance,Limited mobility,Pain    Plan    ADLs with AE, transfers, continue posterior hip precaution education, functional mobility, fall prevention education, balance, UE strengthening     Passport items to be completed:  Perform bathroom transfers, complete dressing, complete feeding, get ready for the day, prepare a simple meal, participate in household tasks, adapt home for safety needs, demonstrate home exercise program, complete  caregiver training     Occupational Therapy Goals (Active)     Problem: Functional Transfers     Dates: Start: 11/27/21       Goal: STG-Within one week, patient will perform bathroom transfers with SBA and use of AE as needed.      Dates: Start: 11/27/21       Goal Note filed on 11/30/21 1138 by Kamala Bustamante OT     SBA to CGA                Problem: OT Long Term Goals     Dates: Start: 11/27/21       Goal: LTG-By discharge, patient will complete basic self care tasks with Prince to modified independence and use of AE as needed.      Dates: Start: 11/27/21          Goal: LTG-By discharge, patient will perform bathroom transfers with supervision to modified independence with use of AE as needed.      Dates: Start: 11/27/21

## 2021-12-03 NOTE — THERAPY
Physical Therapy   Daily Treatment     Patient Name: Aaliyah Fulton  Age:  81 y.o., Sex:  female  Medical Record #: [id number]  Today's Date: 12/2/2021     Precautions  Precautions: Fall Risk,Posterior Hip Precautions,Weight Bearing As Tolerated Left Lower Extremity  Comments: Hydrocephalus baseline    Subjective    Patient agreeable to PT; received sitting in w/c at bedside.     Objective       12/02/21 1401   Vitals   O2 (LPM) 0   O2 Delivery Device None - Room Air   Pain   Intervention Ambulation / Increased Activity;Rest;Repositioned;Education   Pain 0 - 10 Group   Location Hip;Leg   Location Orientation Left;Upper   Description Sore   Comfort Goal 0   Therapist Pain Assessment During Activity  (improves after initial movement until fatigue increases)   Gait Functional Level of Assist    Gait Level Of Assist Contact Guard Assist   Assistive Device Front Wheel Walker  (and gait belt)   Distance (Feet) 85   # of Times Distance was Traveled 2   Deviation Antalgic;Step To;Decreased Base Of Support;Bradykinetic;Shuffled Gait;Decreased Heel Strike;Decreased Toe Off  (T/S kyphosis, and L genu valgum)   Transfer Functional Level of Assist   Bed, Chair, Wheelchair Transfer Contact Guard Assist   Bed Chair Wheelchair Transfer Description Increased time;Set-up of equipment;Supervision for safety;Verbal cueing  (FWW and gait belt)   Standing Lower Body Exercises   Standing Lower Body Exercises Yes  (3-4 min seated rest breaks between sets)   Hip Flexion 2 sets of 10   Hip Abduction 2 sets of 10   Heel Rise 2 sets of 10   Toe Rise 2 sets of 10   Bed Mobility    Sit to Supine Minimal Assist   Sit to Stand Contact Guard Assist   Scooting Stand by Assist   Rolling Unable to Participate   Interdisciplinary Plan of Care Collaboration   Patient Position at End of Therapy In Bed;Bed Alarm On;Call Light within Reach;Tray Table within Reach;Phone within Reach   PT Total Time Spent   PT Individual Total Time Spent (Mins) 60   PT  Charge Group   PT Gait Training 2   PT Therapeutic Exercise 2     Education provided on posterior hip precautions, healing process, and treatment goals.    Assessment    Patient has improving endurance with ambulation, limited by antalgia and quality; no LOB though; improving activity tolerance in standing positions this session; antalgia present but slowly improving with structured tasks this session; pt benefits from cueing for motivation; pleasant.    Strengths: Able to follow instructions,Adequate strength,Effective communication skills,Good insight into deficits/needs,Independent prior level of function,Making steady progress towards goals,Pleasant and cooperative,Supportive family,Willingly participates in therapeutic activities  Barriers: Decreased endurance,Fatigue,Generalized weakness,Home accessibility,Impaired activity tolerance,Impaired balance,Pain,Limited mobility (L knee valgus, posterior hip precautions)    Plan    Progressive gait tolerance with FWW, bed mobility/ transfers and LE strength training. Review posterior hip precautions and use of abduction pillow.     Passport items to be completed:  Passport items to be completed:  Get in/out of bed safely, in/out of a vehicle, safely use mobility device, walk or wheel around home/community, navigate up and down stairs, show how to get up/down from the ground, ensure home is accessible, demonstrate HEP, complete caregiver training    Physical Therapy Problems (Active)     Problem: Mobility     Dates: Start: 11/27/21       Goal: STG-Within one week, patient will ambulate household distance CGA with FWW 50 ft x 2     Dates: Start: 11/27/21             Problem: Mobility Transfers     Dates: Start: 11/27/21       Goal: STG-Within one week, patient will perform bed mobility SBA with leg  as needed and compliance to posterior hip precautions LLE     Dates: Start: 11/27/21          Goal: STG-Within one week, patient will transfer bed to chair SBA with  FWW after set-up     Dates: Start: 11/27/21             Problem: PT-Long Term Goals     Dates: Start: 11/27/21       Goal: LTG-By discharge, patient will ambulate with FWW and SPV / modified independent 100 ft x 2     Dates: Start: 11/27/21          Goal: LTG-By discharge, patient will transfer one surface to another with FWW and SPV / modified independent     Dates: Start: 11/27/21          Goal: LTG-By discharge, patient will ambulate up/down 4-6 stairs with hand rails and CGA     Dates: Start: 11/27/21          Goal: LTG-By discharge, patient will transfer in/out of a car with FWW and SBA/ CGA to maintain posterior hip precautions LLE     Dates: Start: 11/27/21

## 2021-12-03 NOTE — THERAPY
Physical Therapy   Daily Treatment     Patient Name: Aaliyah Fulton  Age:  81 y.o., Sex:  female  Medical Record #: [id number]  Today's Date: 12/3/2021     Precautions  Precautions: Fall Risk,Posterior Hip Precautions,Weight Bearing As Tolerated Left Lower Extremity  Comments: Hydrocephalus baseline    Subjective    Pt up in , willing to participate     Objective       12/03/21 1401   Gait Functional Level of Assist    Gait Level Of Assist Contact Guard Assist   Assistive Device Front Wheel Walker   Distance (Feet) 75   # of Times Distance was Traveled 1   Deviation Antalgic;Step To;Decreased Base Of Support;Bradykinetic   Transfer Functional Level of Assist   Bed, Chair, Wheelchair Transfer Contact Guard Assist   Sitting Lower Body Exercises   Ankle Pumps 2 sets of 15   Hip Flexion 2 sets of 15   Hip Abduction 2 sets of 15   Long Arc Quad 2 sets of 15   PT Total Time Spent   PT Individual Total Time Spent (Mins) 30   PT Charge Group   PT Gait Training 1   PT Therapeutic Exercise 1       Assessment    Pt limited with gait tolerance this pm due to fatigue and UE pain/soreness. Improving bed mobility with SPV only.     Strengths: Able to follow instructions,Adequate strength,Effective communication skills,Good insight into deficits/needs,Independent prior level of function,Making steady progress towards goals,Pleasant and cooperative,Supportive family,Willingly participates in therapeutic activities  Barriers: Decreased endurance,Fatigue,Generalized weakness,Home accessibility,Impaired activity tolerance,Impaired balance,Pain,Limited mobility (L knee valgus, posterior hip precautions)    Plan      Progressive gait tolerance with FWW, bed mobility/ transfers and LE strength training. Review posterior hip precautions and use of abduction pillow.      Passport items to be completed:  Get in/out of bed safely, in/out of a vehicle, safely use mobility device, walk or wheel around home/community, navigate up and down  stairs, show how to get up/down from the ground, ensure home is accessible, demonstrate HEP, complete caregiver training      Physical Therapy Problems (Active)     Problem: Mobility     Dates: Start: 11/27/21       Goal: STG-Within one week, patient will ambulate household distance CGA with FWW 50 ft x 2     Dates: Start: 11/27/21             Problem: Mobility Transfers     Dates: Start: 11/27/21       Goal: STG-Within one week, patient will perform bed mobility SBA with leg  as needed and compliance to posterior hip precautions LLE     Dates: Start: 11/27/21          Goal: STG-Within one week, patient will transfer bed to chair SBA with FWW after set-up     Dates: Start: 11/27/21             Problem: PT-Long Term Goals     Dates: Start: 11/27/21       Goal: LTG-By discharge, patient will ambulate with FWW and SPV / modified independent 100 ft x 2     Dates: Start: 11/27/21          Goal: LTG-By discharge, patient will transfer one surface to another with FWW and SPV / modified independent     Dates: Start: 11/27/21          Goal: LTG-By discharge, patient will ambulate up/down 4-6 stairs with hand rails and CGA     Dates: Start: 11/27/21          Goal: LTG-By discharge, patient will transfer in/out of a car with FWW and SBA/ CGA to maintain posterior hip precautions LLE     Dates: Start: 11/27/21

## 2021-12-03 NOTE — PROGRESS NOTES
"Rehab Progress Note     Encounter Date: 12/3/2021    CC: Decreased mobility, hip pain    Interval Events (Subjective)  Patient sitting up in room. She had fallen asleep in chair; reports fatigued. Discussed about falling asleep in chair and neck pain. SBP improved today. Noted to have low Vitamin D, will start supplement. Denies NVD.    IDT Team Meeting 11/30/2021  DC/Disposition:  12/10/21    Objective:  VITAL SIGNS: /58   Pulse 88   Temp 36.9 °C (98.4 °F) (Temporal)   Resp 16   Ht 1.727 m (5' 8\")   Wt 79.5 kg (175 lb 4.3 oz)   SpO2 93%   BMI 26.65 kg/m²   Gen: NAD  Psych: Mood and affect appropriate  CV: RRR, no edema  Resp: CTAB, no upper airway sounds  Abd: NTND  Neuro: AOx3, following commands    No results found for this or any previous visit (from the past 72 hour(s)).    Current Facility-Administered Medications   Medication Frequency   • amLODIPine (NORVASC) tablet 10 mg Q DAY   • oxyCODONE immediate-release (ROXICODONE) tablet 5 mg BID   • enoxaparin (LOVENOX) inj 40 mg QDAY   • valsartan (DIOVAN) tablet 160 mg DAILY   • Respiratory Therapy Consult Continuous RT   • hydrALAZINE (APRESOLINE) tablet 25 mg Q8HRS PRN   • acetaminophen (Tylenol) tablet 650 mg Q4HRS PRN   • senna-docusate (PERICOLACE or SENOKOT S) 8.6-50 MG per tablet 2 Tablet BID    And   • polyethylene glycol/lytes (MIRALAX) PACKET 1 Packet QDAY PRN    And   • magnesium hydroxide (MILK OF MAGNESIA) suspension 30 mL QDAY PRN    And   • bisacodyl (DULCOLAX) suppository 10 mg QDAY PRN   • omeprazole (PRILOSEC) capsule 20 mg DAILY   • artificial tears ophthalmic solution 1 Drop PRN   • benzocaine-menthol (CEPACOL) lozenge 1 Lozenge Q2HRS PRN   • mag hydrox-al hydrox-simeth (MAALOX PLUS ES or MYLANTA DS) suspension 20 mL Q2HRS PRN   • ondansetron (ZOFRAN ODT) dispertab 4 mg 4X/DAY PRN    Or   • ondansetron (ZOFRAN) syringe/vial injection 4 mg 4X/DAY PRN   • traZODone (DESYREL) tablet 50 mg QHS PRN   • sodium chloride (OCEAN) 0.65 % " nasal spray 2 Spray PRN   • oxyCODONE immediate-release (ROXICODONE) tablet 5 mg Q3HRS PRN    Or   • oxyCODONE immediate release (ROXICODONE) tablet 10 mg Q3HRS PRN   • scopolamine (TRANSDERM-SCOP) patch 1 Patch Q72HRS PRN   • oxybutynin SR (DITROPAN-XL) tablet 5 mg DAILY   • venlafaxine XR (EFFEXOR XR) capsule 150 mg DAILY   • acetaminophen (TYLENOL) tablet 1,000 mg TID       Orders Placed This Encounter   Procedures   • Diet Order Diet: Regular     Standing Status:   Standing     Number of Occurrences:   1     Order Specific Question:   Diet:     Answer:   Regular [1]       Assessment:  Active Hospital Problems    Diagnosis    • *Closed left hip fracture (HCC)    • Other specified anemias    • Overactive bladder    • Hydrocephalus (HCC)    • Systolic murmur    • Depression    • OA (osteoarthritis) of knee        Medical Decision Making and Plan:  Left Hip fracture - Patient with GLF with L hip fracture s/p fixation with Dr. Culver on 11/24/21. WBAT per ortho  -PT and OT for mobility and ADLs  -Follow-up Dr. Culver  -Schedule Tylenol TID for pain control. PRN Oxycodone     Hydrocephalus - Followed by Dr. Blanchard's office. Possible intervention in January. May benefit from SLP will wait on OT evaluation.      HTN - Labile SBP post-surgery. Not on medications. Into 170s, start Valsartan 80 mg. Ongoing elevation, will increase to 160  -Into 150s, will monitor another day. Add amlodipine 5 mg ongoing, will increase to 10 mg. Improved     Asthma - PRN Albuterol.      Anemia - Check AM CBC - 11.8, stable  -Recheck 12/6/21    Bladder spasms - Brought in Oxybutynin, OK to start.     GERD - Patient on Prilosec     Depression - Patient on Effexor 150 mg daily     Vitamin D - 20 on labs. Start 2000 U daily    DVT Ppx - Patient on Lovenox    Total time:  26 minutes.  I spent greater than 50% of the time for patient care, counseling, and coordination on this date, including unit/floor time, and face-to-face time with the  patient as per interval events and assessment and plan above. Topics discussed included fatigue, low vitamin D, start 2000 U, and improved SBP.     Prem Gibbons M.D.

## 2021-12-03 NOTE — THERAPY
Physical Therapy   Daily Treatment     Patient Name: Aaliyah Fulton  Age:  81 y.o., Sex:  female  Medical Record #: [id number]  Today's Date: 12/3/2021     Precautions  Precautions: Fall Risk,Posterior Hip Precautions,Weight Bearing As Tolerated Left Lower Extremity  Comments: Hydrocephalus baseline    Subjective    Pt up in , reports feeling fatigued today, willing to participate     Objective       12/03/21 0831   Gait Functional Level of Assist    Gait Level Of Assist Contact Guard Assist   Assistive Device Front Wheel Walker   Distance (Feet) 75   # of Times Distance was Traveled 4   Deviation Antalgic;Step To;Decreased Base Of Support;Bradykinetic   Stairs Functional Level of Assist   Level of Assist with Stairs Minimal Assist   # of Stairs Climbed 4   Stairs Description Extra time;Hand rails;Limited by fatigue;Verbal cueing   Transfer Functional Level of Assist   Bed, Chair, Wheelchair Transfer Contact Guard Assist   Bed Chair Wheelchair Transfer Description Adaptive equipment;Increased time;Set-up of equipment   Supine Lower Body Exercise   Bridges Two Legged;3 sets of 10   Hip Abduction Hook Lying;3 sets of 10   Short Arc Quad 3 sets of 10   Heel Slide 3 sets of 10   Ankle Pumps 3 sets of 10   Quadriceps Isometrics 3 sets of 10;Left   Bed Mobility    Supine to Sit Stand by Assist   Sit to Supine Stand by Assist   Sit to Stand Contact Guard Assist   PT Total Time Spent   PT Individual Total Time Spent (Mins) 60   PT Charge Group   PT Gait Training 2   PT Therapeutic Exercise 2       Assessment    Improving overall gait tolerance and mobility, able to complete sit<>supine without assist today on therapy mat. Continues to require encouragement throughout therapy.    Strengths: Able to follow instructions,Adequate strength,Effective communication skills,Good insight into deficits/needs,Independent prior level of function,Making steady progress towards goals,Pleasant and cooperative,Supportive family,Willingly  participates in therapeutic activities  Barriers: Decreased endurance,Fatigue,Generalized weakness,Home accessibility,Impaired activity tolerance,Impaired balance,Pain,Limited mobility (L knee valgus, posterior hip precautions)    Plan    Progressive gait tolerance with FWW, bed mobility/ transfers and LE strength training. Review posterior hip precautions and use of abduction pillow.      Passport items to be completed:  Get in/out of bed safely, in/out of a vehicle, safely use mobility device, walk or wheel around home/community, navigate up and down stairs, show how to get up/down from the ground, ensure home is accessible, demonstrate HEP, complete caregiver training      Physical Therapy Problems (Active)     Problem: Mobility     Dates: Start: 11/27/21       Goal: STG-Within one week, patient will ambulate household distance CGA with FWW 50 ft x 2     Dates: Start: 11/27/21             Problem: Mobility Transfers     Dates: Start: 11/27/21       Goal: STG-Within one week, patient will perform bed mobility SBA with leg  as needed and compliance to posterior hip precautions LLE     Dates: Start: 11/27/21          Goal: STG-Within one week, patient will transfer bed to chair SBA with FWW after set-up     Dates: Start: 11/27/21             Problem: PT-Long Term Goals     Dates: Start: 11/27/21       Goal: LTG-By discharge, patient will ambulate with FWW and SPV / modified independent 100 ft x 2     Dates: Start: 11/27/21          Goal: LTG-By discharge, patient will transfer one surface to another with FWW and SPV / modified independent     Dates: Start: 11/27/21          Goal: LTG-By discharge, patient will ambulate up/down 4-6 stairs with hand rails and CGA     Dates: Start: 11/27/21          Goal: LTG-By discharge, patient will transfer in/out of a car with FWW and SBA/ CGA to maintain posterior hip precautions LLE     Dates: Start: 11/27/21

## 2021-12-03 NOTE — THERAPY
Recreational Therapy  Daily Treatment     Patient Name: Aaliyah Fulton  AGE:  81 y.o., SEX:  female  Medical Record #: [id number]  Today's Date: 12/2/2021       Subjective         Objective       12/02/21 1101   Functional Ability Status - Cognitive   Attention Span Remains on Task   Comprehension Follows Three Step Commands   Judgment Able to Make Independent Decisions   Functional Ability Status - Emotional    Affect Appropriate;Bright   Mood Appropriate   Behavior Appropriate   Skilled Intervention    Skilled Intervention Relaxation / Coping Skills;Gross Motor Leisure   Skilled Intervention Comments 36 piece puzzle seated.    Interdisciplinary Plan of Care Collaboration   IDT Collaboration with  Family / Caregiver   Patient Position at End of Therapy Seated;Call Light within Reach;Tray Table within Reach   Collaboration Comments  attended the second half of the session.    Strengths & Barriers   Strengths Able to follow instructions;Alert and oriented;Manages pain appropriately;Motivated for self care and independence;Pleasant and cooperative;Supportive family   Barriers Decreased endurance;Impaired activity tolerance   Treatment Time   Total Time Spent (mins) 30   Procedural Tracking   Procedural Tracking Community Re-Integration;Community Skills Development;Leisure Skills Awareness;Leisure Skills Development       Assessment    Pt was able to independently put the 36 piece puzzle together. Pt was social with SO attending the session while continuing to work.     Strengths: (P) Able to follow instructions,Alert and oriented,Manages pain appropriately,Motivated for self care and independence,Pleasant and cooperative,Supportive family  Barriers: (P) Decreased endurance,Impaired activity tolerance    Plan    Positive leisure participation    Passport items to be completed:  Passport items to be completed:  Verbalize two positive leisure activities, discuss returning to work, hobbies, community groups or  volunteer activities, explore community resources

## 2021-12-04 PROCEDURE — 770010 HCHG ROOM/CARE - REHAB SEMI PRIVAT*

## 2021-12-04 PROCEDURE — A9270 NON-COVERED ITEM OR SERVICE: HCPCS | Performed by: PHYSICAL MEDICINE & REHABILITATION

## 2021-12-04 PROCEDURE — 700111 HCHG RX REV CODE 636 W/ 250 OVERRIDE (IP): Performed by: PHYSICAL MEDICINE & REHABILITATION

## 2021-12-04 PROCEDURE — 700102 HCHG RX REV CODE 250 W/ 637 OVERRIDE(OP): Performed by: PHYSICAL MEDICINE & REHABILITATION

## 2021-12-04 RX ADMIN — ACETAMINOPHEN 1000 MG: 500 TABLET, FILM COATED ORAL at 19:57

## 2021-12-04 RX ADMIN — SENNOSIDES AND DOCUSATE SODIUM 2 TABLET: 50; 8.6 TABLET ORAL at 19:58

## 2021-12-04 RX ADMIN — POLYETHYLENE GLYCOL 3350 1 PACKET: 17 POWDER, FOR SOLUTION ORAL at 19:58

## 2021-12-04 RX ADMIN — SENNOSIDES AND DOCUSATE SODIUM 2 TABLET: 50; 8.6 TABLET ORAL at 09:16

## 2021-12-04 RX ADMIN — VENLAFAXINE HYDROCHLORIDE 150 MG: 75 CAPSULE, EXTENDED RELEASE ORAL at 09:16

## 2021-12-04 RX ADMIN — Medication 2000 UNITS: at 09:16

## 2021-12-04 RX ADMIN — OXYCODONE HYDROCHLORIDE 5 MG: 5 TABLET ORAL at 11:46

## 2021-12-04 RX ADMIN — ENOXAPARIN SODIUM 40 MG: 40 INJECTION SUBCUTANEOUS at 09:15

## 2021-12-04 RX ADMIN — VALSARTAN 160 MG: 80 TABLET, FILM COATED ORAL at 09:15

## 2021-12-04 RX ADMIN — OXYCODONE HYDROCHLORIDE 5 MG: 5 TABLET ORAL at 09:16

## 2021-12-04 RX ADMIN — ACETAMINOPHEN 1000 MG: 500 TABLET, FILM COATED ORAL at 09:16

## 2021-12-04 RX ADMIN — OXYBUTYNIN CHLORIDE 5 MG: 5 TABLET, EXTENDED RELEASE ORAL at 09:16

## 2021-12-04 RX ADMIN — OMEPRAZOLE 20 MG: 20 CAPSULE, DELAYED RELEASE ORAL at 09:16

## 2021-12-04 RX ADMIN — ACETAMINOPHEN 1000 MG: 500 TABLET, FILM COATED ORAL at 15:52

## 2021-12-04 RX ADMIN — TRAZODONE HYDROCHLORIDE 50 MG: 50 TABLET ORAL at 19:57

## 2021-12-04 RX ADMIN — AMLODIPINE BESYLATE 10 MG: 5 TABLET ORAL at 09:15

## 2021-12-04 ASSESSMENT — PAIN DESCRIPTION - PAIN TYPE: TYPE: ACUTE PAIN

## 2021-12-04 NOTE — THERAPY
Recreational Therapy  Daily Treatment     Patient Name: Aaliyah Fulton  AGE:  81 y.o., SEX:  female  Medical Record #: [id number]  Today's Date: 12/3/2021       Subjective    Pt sharing fatigue and how she does better in the afternoon due to fatigue.      Objective       12/03/21 1001   Functional Ability Status - Cognitive   Attention Span Remains on Task   Comprehension Follows Three Step Commands   Judgment Able to Make Independent Decisions   Functional Ability Status - Emotional    Affect Appropriate   Mood Appropriate   Behavior Appropriate   Skilled Intervention    Skilled Intervention Gross Motor Leisure;Fine Motor Leisure;Cognitive Leisure;Relaxation / Coping Skills   Skilled Intervention Comments Seated puzzle   Interdisciplinary Plan of Care Collaboration   Patient Position at End of Therapy Seated;Call Light within Reach;Tray Table within Reach   Strengths & Barriers   Strengths Able to follow instructions;Alert and oriented;Making steady progress towards goals;Motivated for self care and independence;Pleasant and cooperative;Willingly participates in therapeutic activities;Supportive family   Barriers Decreased endurance;Fatigue;Generalized weakness;Impaired balance;Impaired activity tolerance   Treatment Time   Total Time Spent (mins) 30   Procedural Tracking   Procedural Tracking Community Re-Integration;Community Skills Development;Leisure Skills Awareness;Leisure Skills Development;Gross Motor Functional Leisure Skills       Assessment    Pt was given a puzzle during session. Pt sharing that she plans on doing puzzles with her family post dc. No need for staff intervention.     Strengths: (P) Able to follow instructions,Alert and oriented,Making steady progress towards goals,Motivated for self care and independence,Pleasant and cooperative,Willingly participates in therapeutic activities,Supportive family  Barriers: (P) Decreased endurance,Fatigue,Generalized weakness,Impaired balance,Impaired  activity tolerance    Plan    Post dc leisure plan    Passport items to be completed:  Passport items to be completed:  Verbalize two positive leisure activities, discuss returning to work, hobbies, community groups or volunteer activities, explore community resources

## 2021-12-04 NOTE — CARE PLAN
"The patient is Stable - Low risk of patient condition declining or worsening    Shift Goals  Clinical Goals: safety  Patient Goals: strength, sleep well       Problem: Pain - Standard  Goal: Alleviation of pain or a reduction in pain to the patient’s comfort goal  Outcome: Progressing: pt was given her scheduled Tylenol at bedtime. Pt was asked if she needed anything stronger and she replied \"no, this should be just fine.\" Pt informed to ring the call light anytime during the night if she needs any pain medication, pt stated she understood.      Problem: Fall Risk - Rehab  Goal: Patient will remain free from falls  Outcome: Progressing: Pt uses call light consistently and appropriately. Waits for assistance does not attempt self transfer this shift. Able to verbalize needs.           "

## 2021-12-05 PROCEDURE — A9270 NON-COVERED ITEM OR SERVICE: HCPCS | Performed by: PHYSICAL MEDICINE & REHABILITATION

## 2021-12-05 PROCEDURE — 770010 HCHG ROOM/CARE - REHAB SEMI PRIVAT*

## 2021-12-05 PROCEDURE — 700111 HCHG RX REV CODE 636 W/ 250 OVERRIDE (IP): Performed by: PHYSICAL MEDICINE & REHABILITATION

## 2021-12-05 PROCEDURE — 700102 HCHG RX REV CODE 250 W/ 637 OVERRIDE(OP): Performed by: PHYSICAL MEDICINE & REHABILITATION

## 2021-12-05 RX ADMIN — VALSARTAN 160 MG: 80 TABLET, FILM COATED ORAL at 08:32

## 2021-12-05 RX ADMIN — OXYBUTYNIN CHLORIDE 5 MG: 5 TABLET, EXTENDED RELEASE ORAL at 08:31

## 2021-12-05 RX ADMIN — VENLAFAXINE HYDROCHLORIDE 150 MG: 75 CAPSULE, EXTENDED RELEASE ORAL at 08:31

## 2021-12-05 RX ADMIN — SENNOSIDES AND DOCUSATE SODIUM 2 TABLET: 50; 8.6 TABLET ORAL at 08:32

## 2021-12-05 RX ADMIN — SENNOSIDES AND DOCUSATE SODIUM 2 TABLET: 50; 8.6 TABLET ORAL at 20:42

## 2021-12-05 RX ADMIN — ACETAMINOPHEN 1000 MG: 500 TABLET, FILM COATED ORAL at 08:31

## 2021-12-05 RX ADMIN — OMEPRAZOLE 20 MG: 20 CAPSULE, DELAYED RELEASE ORAL at 08:32

## 2021-12-05 RX ADMIN — ACETAMINOPHEN 1000 MG: 500 TABLET, FILM COATED ORAL at 20:42

## 2021-12-05 RX ADMIN — AMLODIPINE BESYLATE 10 MG: 5 TABLET ORAL at 08:31

## 2021-12-05 RX ADMIN — ENOXAPARIN SODIUM 40 MG: 40 INJECTION SUBCUTANEOUS at 08:32

## 2021-12-05 RX ADMIN — ACETAMINOPHEN 1000 MG: 500 TABLET, FILM COATED ORAL at 14:57

## 2021-12-05 RX ADMIN — Medication 2000 UNITS: at 08:31

## 2021-12-05 RX ADMIN — OXYCODONE HYDROCHLORIDE 5 MG: 5 TABLET ORAL at 12:24

## 2021-12-05 RX ADMIN — OXYCODONE HYDROCHLORIDE 5 MG: 5 TABLET ORAL at 08:32

## 2021-12-05 ASSESSMENT — PATIENT HEALTH QUESTIONNAIRE - PHQ9
2. FEELING DOWN, DEPRESSED, IRRITABLE, OR HOPELESS: NOT AT ALL
1. LITTLE INTEREST OR PLEASURE IN DOING THINGS: NOT AT ALL
SUM OF ALL RESPONSES TO PHQ9 QUESTIONS 1 AND 2: 0

## 2021-12-05 ASSESSMENT — PAIN DESCRIPTION - PAIN TYPE: TYPE: ACUTE PAIN

## 2021-12-05 NOTE — CARE PLAN
Problem: Bladder / Voiding  Goal: Patient will establish and maintain bladder regimen  Outcome: Progressing  Note: Patient is continent of bladder this shift.  Will continue to monitor.    The patient is Stable - Low risk of patient condition declining or worsening    Shift Goals  Clinical Goals: safety  Patient Goals: strength, sleep well     Progress made toward(s) clinical / shift goals:  wnl    Patient is not progressing towards the following goals:

## 2021-12-05 NOTE — CARE PLAN
Problem: Bladder / Voiding  Goal: Patient will establish and maintain regular urinary output  Outcome: Progressing  Note: Patient is continent of bladder this shift. Patient voiding adequate amounts of clear, yellow urine. Denies dysuria and flank pain: afebrile.        Problem: Bowel Elimination  Goal: Patient will participate in bowel management program  Outcome: Not Progressing  Note: Patient has not had BM since 12/1, bowel medication PRN given at dinner.

## 2021-12-05 NOTE — CARE PLAN
"The patient is Stable - Low risk of patient condition declining or worsening    Shift Goals  Clinical Goals: safety  Patient Goals: strength, sleep well       Problem: Pain - Standard  Goal: Alleviation of pain or a reduction in pain to the patient’s comfort goal  Outcome: Progressing: Pt has scheduled Tylenol in which she said she \"doesn't need any other pain medication than that for now.\" Pt infomred to ring the call light anytime during the night if she needs anything, pt stated she understood.      Problem: Fall Risk - Rehab  Goal: Patient will remain free from falls  Outcome: Progressing: Pt uses call light consistently and appropriately. Waits for assistance does not attempt self transfer this shift. Able to verbalize needs.           "

## 2021-12-06 LAB
ALBUMIN SERPL BCP-MCNC: 3.2 G/DL (ref 3.2–4.9)
ALBUMIN/GLOB SERPL: 0.9 G/DL
ALP SERPL-CCNC: 183 U/L (ref 30–99)
ALT SERPL-CCNC: 16 U/L (ref 2–50)
ANION GAP SERPL CALC-SCNC: 8 MMOL/L (ref 7–16)
APPEARANCE UR: ABNORMAL
AST SERPL-CCNC: 18 U/L (ref 12–45)
BACTERIA #/AREA URNS HPF: ABNORMAL /HPF
BASOPHILS # BLD AUTO: 1.4 % (ref 0–1.8)
BASOPHILS # BLD: 0.09 K/UL (ref 0–0.12)
BILIRUB SERPL-MCNC: 0.3 MG/DL (ref 0.1–1.5)
BILIRUB UR QL STRIP.AUTO: NEGATIVE
BUN SERPL-MCNC: 15 MG/DL (ref 8–22)
CALCIUM SERPL-MCNC: 8.9 MG/DL (ref 8.5–10.5)
CHLORIDE SERPL-SCNC: 107 MMOL/L (ref 96–112)
CO2 SERPL-SCNC: 26 MMOL/L (ref 20–33)
COLOR UR: YELLOW
CREAT SERPL-MCNC: 0.51 MG/DL (ref 0.5–1.4)
EOSINOPHIL # BLD AUTO: 0.7 K/UL (ref 0–0.51)
EOSINOPHIL NFR BLD: 11 % (ref 0–6.9)
EPI CELLS #/AREA URNS HPF: ABNORMAL /HPF
ERYTHROCYTE [DISTWIDTH] IN BLOOD BY AUTOMATED COUNT: 46.6 FL (ref 35.9–50)
GLOBULIN SER CALC-MCNC: 3.4 G/DL (ref 1.9–3.5)
GLUCOSE SERPL-MCNC: 101 MG/DL (ref 65–99)
GLUCOSE UR STRIP.AUTO-MCNC: NEGATIVE MG/DL
HCT VFR BLD AUTO: 35.9 % (ref 37–47)
HGB BLD-MCNC: 11.8 G/DL (ref 12–16)
HYALINE CASTS #/AREA URNS LPF: ABNORMAL /LPF
IMM GRANULOCYTES # BLD AUTO: 0.1 K/UL (ref 0–0.11)
IMM GRANULOCYTES NFR BLD AUTO: 1.6 % (ref 0–0.9)
KETONES UR STRIP.AUTO-MCNC: NEGATIVE MG/DL
LEUKOCYTE ESTERASE UR QL STRIP.AUTO: ABNORMAL
LYMPHOCYTES # BLD AUTO: 2.03 K/UL (ref 1–4.8)
LYMPHOCYTES NFR BLD: 31.8 % (ref 22–41)
MCH RBC QN AUTO: 32.3 PG (ref 27–33)
MCHC RBC AUTO-ENTMCNC: 32.9 G/DL (ref 33.6–35)
MCV RBC AUTO: 98.4 FL (ref 81.4–97.8)
MICRO URNS: ABNORMAL
MONOCYTES # BLD AUTO: 0.52 K/UL (ref 0–0.85)
MONOCYTES NFR BLD AUTO: 8.1 % (ref 0–13.4)
NEUTROPHILS # BLD AUTO: 2.95 K/UL (ref 2–7.15)
NEUTROPHILS NFR BLD: 46.1 % (ref 44–72)
NITRITE UR QL STRIP.AUTO: POSITIVE
NRBC # BLD AUTO: 0 K/UL
NRBC BLD-RTO: 0 /100 WBC
PH UR STRIP.AUTO: 6.5 [PH] (ref 5–8)
PLATELET # BLD AUTO: 432 K/UL (ref 164–446)
PMV BLD AUTO: 10.2 FL (ref 9–12.9)
POTASSIUM SERPL-SCNC: 4.3 MMOL/L (ref 3.6–5.5)
PROT SERPL-MCNC: 6.6 G/DL (ref 6–8.2)
PROT UR QL STRIP: NEGATIVE MG/DL
RBC # BLD AUTO: 3.65 M/UL (ref 4.2–5.4)
RBC # URNS HPF: ABNORMAL /HPF
RBC UR QL AUTO: ABNORMAL
RENAL EPI CELLS #/AREA URNS HPF: ABNORMAL /HPF
SODIUM SERPL-SCNC: 141 MMOL/L (ref 135–145)
SP GR UR STRIP.AUTO: 1.01
UROBILINOGEN UR STRIP.AUTO-MCNC: 1 MG/DL
WBC # BLD AUTO: 6.4 K/UL (ref 4.8–10.8)
WBC #/AREA URNS HPF: ABNORMAL /HPF

## 2021-12-06 PROCEDURE — 80053 COMPREHEN METABOLIC PANEL: CPT

## 2021-12-06 PROCEDURE — 99232 SBSQ HOSP IP/OBS MODERATE 35: CPT | Performed by: PHYSICAL MEDICINE & REHABILITATION

## 2021-12-06 PROCEDURE — 85025 COMPLETE CBC W/AUTO DIFF WBC: CPT

## 2021-12-06 PROCEDURE — A9270 NON-COVERED ITEM OR SERVICE: HCPCS | Performed by: PHYSICAL MEDICINE & REHABILITATION

## 2021-12-06 PROCEDURE — 97530 THERAPEUTIC ACTIVITIES: CPT

## 2021-12-06 PROCEDURE — 770010 HCHG ROOM/CARE - REHAB SEMI PRIVAT*

## 2021-12-06 PROCEDURE — 97535 SELF CARE MNGMENT TRAINING: CPT

## 2021-12-06 PROCEDURE — 700111 HCHG RX REV CODE 636 W/ 250 OVERRIDE (IP): Performed by: PHYSICAL MEDICINE & REHABILITATION

## 2021-12-06 PROCEDURE — 36415 COLL VENOUS BLD VENIPUNCTURE: CPT

## 2021-12-06 PROCEDURE — 97116 GAIT TRAINING THERAPY: CPT

## 2021-12-06 PROCEDURE — 700102 HCHG RX REV CODE 250 W/ 637 OVERRIDE(OP): Performed by: PHYSICAL MEDICINE & REHABILITATION

## 2021-12-06 PROCEDURE — 97110 THERAPEUTIC EXERCISES: CPT

## 2021-12-06 PROCEDURE — 81001 URINALYSIS AUTO W/SCOPE: CPT

## 2021-12-06 PROCEDURE — 97110 THERAPEUTIC EXERCISES: CPT | Mod: CO

## 2021-12-06 RX ORDER — VENLAFAXINE HYDROCHLORIDE 75 MG/1
225 CAPSULE, EXTENDED RELEASE ORAL DAILY
Status: DISCONTINUED | OUTPATIENT
Start: 2021-12-07 | End: 2021-12-12 | Stop reason: HOSPADM

## 2021-12-06 RX ADMIN — ACETAMINOPHEN 1000 MG: 500 TABLET, FILM COATED ORAL at 19:53

## 2021-12-06 RX ADMIN — VALSARTAN 160 MG: 80 TABLET, FILM COATED ORAL at 08:52

## 2021-12-06 RX ADMIN — Medication 2000 UNITS: at 08:52

## 2021-12-06 RX ADMIN — SENNOSIDES AND DOCUSATE SODIUM 2 TABLET: 50; 8.6 TABLET ORAL at 08:52

## 2021-12-06 RX ADMIN — TRAZODONE HYDROCHLORIDE 50 MG: 50 TABLET ORAL at 19:55

## 2021-12-06 RX ADMIN — ACETAMINOPHEN 1000 MG: 500 TABLET, FILM COATED ORAL at 08:51

## 2021-12-06 RX ADMIN — OXYCODONE HYDROCHLORIDE 5 MG: 5 TABLET ORAL at 13:04

## 2021-12-06 RX ADMIN — OXYBUTYNIN CHLORIDE 5 MG: 5 TABLET, EXTENDED RELEASE ORAL at 08:52

## 2021-12-06 RX ADMIN — ACETAMINOPHEN 1000 MG: 500 TABLET, FILM COATED ORAL at 14:10

## 2021-12-06 RX ADMIN — ENOXAPARIN SODIUM 40 MG: 40 INJECTION SUBCUTANEOUS at 08:54

## 2021-12-06 RX ADMIN — VENLAFAXINE HYDROCHLORIDE 150 MG: 75 CAPSULE, EXTENDED RELEASE ORAL at 08:52

## 2021-12-06 RX ADMIN — OMEPRAZOLE 20 MG: 20 CAPSULE, DELAYED RELEASE ORAL at 08:52

## 2021-12-06 RX ADMIN — OXYCODONE HYDROCHLORIDE 5 MG: 5 TABLET ORAL at 08:55

## 2021-12-06 RX ADMIN — AMLODIPINE BESYLATE 10 MG: 5 TABLET ORAL at 08:51

## 2021-12-06 RX ADMIN — SENNOSIDES AND DOCUSATE SODIUM 2 TABLET: 50; 8.6 TABLET ORAL at 19:52

## 2021-12-06 ASSESSMENT — PATIENT HEALTH QUESTIONNAIRE - PHQ9
SUM OF ALL RESPONSES TO PHQ9 QUESTIONS 1 AND 2: 0
1. LITTLE INTEREST OR PLEASURE IN DOING THINGS: NOT AT ALL
2. FEELING DOWN, DEPRESSED, IRRITABLE, OR HOPELESS: NOT AT ALL
2. FEELING DOWN, DEPRESSED, IRRITABLE, OR HOPELESS: NOT AT ALL
SUM OF ALL RESPONSES TO PHQ9 QUESTIONS 1 AND 2: 0
1. LITTLE INTEREST OR PLEASURE IN DOING THINGS: NOT AT ALL

## 2021-12-06 ASSESSMENT — GAIT ASSESSMENTS
GAIT LEVEL OF ASSIST: STAND BY ASSIST
ASSISTIVE DEVICE: FRONT WHEEL WALKER
DEVIATION: ANTALGIC;STEP TO
DISTANCE (FEET): 50
DISTANCE (FEET): 80
GAIT LEVEL OF ASSIST: CONTACT GUARD ASSIST
DEVIATION: ANTALGIC;STEP TO
ASSISTIVE DEVICE: FRONT WHEEL WALKER

## 2021-12-06 ASSESSMENT — ACTIVITIES OF DAILY LIVING (ADL): BED_CHAIR_WHEELCHAIR_TRANSFER_DESCRIPTION: ADAPTIVE EQUIPMENT;INCREASED TIME;SET-UP OF EQUIPMENT;VERBAL CUEING

## 2021-12-06 ASSESSMENT — PAIN DESCRIPTION - PAIN TYPE: TYPE: ACUTE PAIN

## 2021-12-06 NOTE — THERAPY
"Occupational Therapy  Daily Treatment     Patient Name: Aaliyah Fulton  Age:  81 y.o., Sex:  female  Medical Record #: [id number]  Today's Date: 12/6/2021     Precautions  Precautions: (P) Fall Risk,Posterior Hip Precautions,Weight Bearing As Tolerated Left Lower Extremity  Comments: Hydrocephalus baseline    Safety   ADL Safety : Requires Physical Assist for Safety,Requires Supervision for Safety,Requires Cueing for Safety  Bathroom Safety: Requires Physical Assist for Safety,Requires Supervision for Safety,Requires Cuing for Safety    Subjective    \"My back hurts today.\"       Objective       12/06/21 1031   Precautions   Precautions Fall Risk;Posterior Hip Precautions;Weight Bearing As Tolerated Left Lower Extremity   Pain 0 - 10 Group   Location Back   Location Orientation Left;Lower   Therapist Pain Assessment Prior to Activity;During Activity;Post Activity;6   Sitting Upper Body Exercises   Upper Extremity Bike Level 3 Resistance  (x 5 minutes x 2   hydro cycle )   Interdisciplinary Plan of Care Collaboration   Patient Position at End of Therapy Seated;Self Releasing Lap Belt Applied;Call Light within Reach;Tray Table within Reach   OT Total Time Spent   OT Individual Total Time Spent (Mins) 30   OT Charge Group   OT Therapy Activity 1   OT Therapeutic Exercise  1      light STM  X 5 minutes to left lower back.   Followed by application of  Ice pack to the area.   Aaliyah reported having  Pain med prior to therapy.      Folded blanket utilized to provide improved sitting posture.   Assessment     limited this session  by  Reports of lower  Left back pain  And fatigue     Strengths: Able to follow instructions,Alert and oriented,Effective communication skills,Good insight into deficits/needs,Independent prior level of function,Motivated for self care and independence,Pleasant and cooperative,Supportive family,Willingly participates in therapeutic activities  Barriers: Decreased endurance,Generalized " weakness,Impaired activity tolerance,Impaired balance,Limited mobility,Pain    Plan    ADLs with AE, transfers, continue posterior hip precaution education, functional mobility, fall prevention education, balance, UE strengthening      Passport items to be completed:  Perform bathroom transfers, complete dressing, complete feeding, get ready for the day, prepare a simple meal, participate in household tasks, adapt home for safety needs, demonstrate home exercise program, complete caregiver training        Occupational Therapy Goals (Active)     Problem: Functional Transfers     Dates: Start: 11/27/21       Goal: STG-Within one week, patient will perform bathroom transfers with SBA and use of AE as needed.      Dates: Start: 11/27/21       Goal Note filed on 11/30/21 1138 by Kamala Bustamante OT     SBA to CGA                Problem: OT Long Term Goals     Dates: Start: 11/27/21       Goal: LTG-By discharge, patient will complete basic self care tasks with Prince to modified independence and use of AE as needed.      Dates: Start: 11/27/21          Goal: LTG-By discharge, patient will perform bathroom transfers with supervision to modified independence with use of AE as needed.      Dates: Start: 11/27/21

## 2021-12-06 NOTE — THERAPY
"Occupational Therapy  Daily Treatment     Patient Name: Aaliyah Fulton  Age:  81 y.o., Sex:  female  Medical Record #: [id number]  Today's Date: 12/6/2021     Precautions  Precautions: Fall Risk,Posterior Hip Precautions,Weight Bearing As Tolerated Left Lower Extremity  Comments: Hydrocephalus baseline    Safety   ADL Safety : Requires Physical Assist for Safety,Requires Supervision for Safety,Requires Cueing for Safety  Bathroom Safety: Requires Physical Assist for Safety,Requires Supervision for Safety,Requires Cuing for Safety    Subjective    \"Should she not be bending at all?\" daughter re: hip prec. Education with visual demo provided.     \"How would I know if something is going to cause me to break my hip precautions?\"     Objective       12/06/21 1401   Vitals   Vitals Comments no s/s of distress   Cognition    Level of Consciousness Alert   Ability To Follow Commands 3 Step   IADL Treatments   IADL Treatments Kitchen mobility education   Kitchen Mobility Education FWW CGA: 20 ft while retrieving cup from cabinet, filling with water, and walking with it. Pt initially was going to walk with unilateral UE support, but felt nervous that she would drop cup. Walked with cup sliding on counter initially and then walked with unilateral support and CGA on return to sink. Pt able to place cup in top rack of  without breaking precautions. Retrieved item from middle shelf and door of fridge. placed item in/out of microwave and oven with cues for safety.   Comments Simulated grocery task: breakfast items within $15 budget. Needed reminder for budget. Walked with grocery cart 50 feet. CGA   Interdisciplinary Plan of Care Collaboration   IDT Collaboration with  Family / Caregiver   Patient Position at End of Therapy Tray Table within Reach;Phone within Reach;Call Light within Reach;In Bed   Collaboration Comments re: sock aid, area rugs, tub transfer bench   OT Total Time Spent   OT Individual Total Time Spent " (Mins) 60   OT Charge Group   OT Self Care / ADL 1   OT Therapy Activity 3     Required re-education on sock aid. Pt had difficulty. Pt's feet do have some fluid, however, seemed more so that sweat caused sock aid to stick to feet.      Assessment    Pt required further education on hip precautions and safe walker use. Pt feels that bending while standing is less safe. Discussed that it is less safe when it comes to fall risk, but that bending is allowed more due to hip starting in neutral position vs. Hip already in 90 when seated.     Strengths: Able to follow instructions,Alert and oriented,Effective communication skills,Good insight into deficits/needs,Independent prior level of function,Motivated for self care and independence,Pleasant and cooperative,Supportive family,Willingly participates in therapeutic activities  Barriers: Decreased endurance,Generalized weakness,Impaired activity tolerance,Impaired balance,Limited mobility,Pain    Plan    ADLs with AE, transfers, continue posterior hip precaution education, functional mobility, fall prevention education, balance, UE strengthening      Passport items to be completed:  Perform bathroom transfers, complete dressing, complete feeding, get ready for the day, prepare a simple meal, participate in household tasks    Occupational Therapy Goals (Active)     Problem: Functional Transfers     Dates: Start: 11/27/21       Goal: STG-Within one week, patient will perform bathroom transfers with SBA and use of AE as needed.      Dates: Start: 11/27/21       Goal Note filed on 11/30/21 1138 by Kamala Bustamante, OT     SBA to CGA                Problem: OT Long Term Goals     Dates: Start: 11/27/21       Goal: LTG-By discharge, patient will complete basic self care tasks with Prince to modified independence and use of AE as needed.      Dates: Start: 11/27/21          Goal: LTG-By discharge, patient will perform bathroom transfers with supervision to modified independence  with use of AE as needed.      Dates: Start: 11/27/21

## 2021-12-06 NOTE — PROGRESS NOTES
0715: Bedside report received, assumed care for this patient.  Patient is A&O x 4.  VSS.  Communication board updated, call light and belongings are within reach.  Bed is in low position. Patient appears in no distress, and has no complaints of SOB and 5/10 of pain.  Will be medicated per MAR.  Plan of care discussed and agreed upon with patient.

## 2021-12-06 NOTE — PROGRESS NOTES
"Rehab Progress Note     Encounter Date: 12/6/2021    CC: Decreased mobility, hip pain    Interval Events (Subjective)  Patient sitting up in room. She reports she is doing well but still needing additional help. She reports the therapists will say she is doing great but she is concerned about her progress. Discussed with daughter that we would have IDT tomorrow and I would sit down with therapy to discuss her safety. She is very concerned about a second fall. Denies SOB. Denies dizziness.     IDT Team Meeting 11/30/2021  DC/Disposition:  12/10/21    Objective:  VITAL SIGNS: /82   Pulse 66   Temp 36.6 °C (97.9 °F) (Oral)   Resp 16   Ht 1.727 m (5' 8\")   Wt 79.5 kg (175 lb 4.3 oz)   SpO2 96%   BMI 26.65 kg/m²   Gen: NAD  Psych: Mood and affect appropriate  CV: RRR, no edema  Resp: CTAB, no upper airway sounds  Abd: NTND  Neuro: AOx3, following commands    Recent Results (from the past 72 hour(s))   CBC WITH DIFFERENTIAL    Collection Time: 12/06/21  5:27 AM   Result Value Ref Range    WBC 6.4 4.8 - 10.8 K/uL    RBC 3.65 (L) 4.20 - 5.40 M/uL    Hemoglobin 11.8 (L) 12.0 - 16.0 g/dL    Hematocrit 35.9 (L) 37.0 - 47.0 %    MCV 98.4 (H) 81.4 - 97.8 fL    MCH 32.3 27.0 - 33.0 pg    MCHC 32.9 (L) 33.6 - 35.0 g/dL    RDW 46.6 35.9 - 50.0 fL    Platelet Count 432 164 - 446 K/uL    MPV 10.2 9.0 - 12.9 fL    Neutrophils-Polys 46.10 44.00 - 72.00 %    Lymphocytes 31.80 22.00 - 41.00 %    Monocytes 8.10 0.00 - 13.40 %    Eosinophils 11.00 (H) 0.00 - 6.90 %    Basophils 1.40 0.00 - 1.80 %    Immature Granulocytes 1.60 (H) 0.00 - 0.90 %    Nucleated RBC 0.00 /100 WBC    Neutrophils (Absolute) 2.95 2.00 - 7.15 K/uL    Lymphs (Absolute) 2.03 1.00 - 4.80 K/uL    Monos (Absolute) 0.52 0.00 - 0.85 K/uL    Eos (Absolute) 0.70 (H) 0.00 - 0.51 K/uL    Baso (Absolute) 0.09 0.00 - 0.12 K/uL    Immature Granulocytes (abs) 0.10 0.00 - 0.11 K/uL    NRBC (Absolute) 0.00 K/uL   Comp Metabolic Panel    Collection Time: 12/06/21  5:27 " AM   Result Value Ref Range    Sodium 141 135 - 145 mmol/L    Potassium 4.3 3.6 - 5.5 mmol/L    Chloride 107 96 - 112 mmol/L    Co2 26 20 - 33 mmol/L    Anion Gap 8.0 7.0 - 16.0    Glucose 101 (H) 65 - 99 mg/dL    Bun 15 8 - 22 mg/dL    Creatinine 0.51 0.50 - 1.40 mg/dL    Calcium 8.9 8.5 - 10.5 mg/dL    AST(SGOT) 18 12 - 45 U/L    ALT(SGPT) 16 2 - 50 U/L    Alkaline Phosphatase 183 (H) 30 - 99 U/L    Total Bilirubin 0.3 0.1 - 1.5 mg/dL    Albumin 3.2 3.2 - 4.9 g/dL    Total Protein 6.6 6.0 - 8.2 g/dL    Globulin 3.4 1.9 - 3.5 g/dL    A-G Ratio 0.9 g/dL   ESTIMATED GFR    Collection Time: 12/06/21  5:27 AM   Result Value Ref Range    GFR If African American >60 >60 mL/min/1.73 m 2    GFR If Non African American >60 >60 mL/min/1.73 m 2       Current Facility-Administered Medications   Medication Frequency   • vitamin D3 (cholecalciferol) tablet 2,000 Units DAILY   • amLODIPine (NORVASC) tablet 10 mg Q DAY   • oxyCODONE immediate-release (ROXICODONE) tablet 5 mg BID   • enoxaparin (LOVENOX) inj 40 mg QDAY   • valsartan (DIOVAN) tablet 160 mg DAILY   • Respiratory Therapy Consult Continuous RT   • hydrALAZINE (APRESOLINE) tablet 25 mg Q8HRS PRN   • acetaminophen (Tylenol) tablet 650 mg Q4HRS PRN   • senna-docusate (PERICOLACE or SENOKOT S) 8.6-50 MG per tablet 2 Tablet BID    And   • polyethylene glycol/lytes (MIRALAX) PACKET 1 Packet QDAY PRN    And   • magnesium hydroxide (MILK OF MAGNESIA) suspension 30 mL QDAY PRN    And   • bisacodyl (DULCOLAX) suppository 10 mg QDAY PRN   • omeprazole (PRILOSEC) capsule 20 mg DAILY   • artificial tears ophthalmic solution 1 Drop PRN   • benzocaine-menthol (CEPACOL) lozenge 1 Lozenge Q2HRS PRN   • mag hydrox-al hydrox-simeth (MAALOX PLUS ES or MYLANTA DS) suspension 20 mL Q2HRS PRN   • ondansetron (ZOFRAN ODT) dispertab 4 mg 4X/DAY PRN    Or   • ondansetron (ZOFRAN) syringe/vial injection 4 mg 4X/DAY PRN   • traZODone (DESYREL) tablet 50 mg QHS PRN   • sodium chloride (OCEAN) 0.65  % nasal spray 2 Spray PRN   • oxyCODONE immediate-release (ROXICODONE) tablet 5 mg Q3HRS PRN    Or   • oxyCODONE immediate release (ROXICODONE) tablet 10 mg Q3HRS PRN   • scopolamine (TRANSDERM-SCOP) patch 1 Patch Q72HRS PRN   • oxybutynin SR (DITROPAN-XL) tablet 5 mg DAILY   • venlafaxine XR (EFFEXOR XR) capsule 150 mg DAILY   • acetaminophen (TYLENOL) tablet 1,000 mg TID       Orders Placed This Encounter   Procedures   • Diet Order Diet: Regular     Standing Status:   Standing     Number of Occurrences:   1     Order Specific Question:   Diet:     Answer:   Regular [1]       Assessment:  Active Hospital Problems    Diagnosis    • *Closed left hip fracture (HCC)    • Other specified anemias    • Overactive bladder    • Hydrocephalus (HCC)    • Systolic murmur    • Depression    • OA (osteoarthritis) of knee        Medical Decision Making and Plan:  Left Hip fracture - Patient with GLF with L hip fracture s/p fixation with Dr. Culver on 11/24/21. WBAT per ortho  -PT and OT for mobility and ADLs  -Follow-up Dr. Culver  -Schedule Tylenol TID for pain control. PRN Oxycodone. Added scheduled Oxycodone     Hydrocephalus - Followed by Dr. Blanchard's office. Possible intervention in January. May benefit from SLP will wait on OT evaluation.      HTN - Labile SBP post-surgery. Not on medications. Into 170s, start Valsartan 80 mg. Ongoing elevation, will increase to 160  -Into 150s, will monitor another day. Add amlodipine 5 mg ongoing, will increase to 10 mg. Improved     Asthma - PRN Albuterol.      Anemia - Check AM CBC - 11.8, stable  -Recheck 12/6/21 - 11.8, stable.     Bladder spasms - Brought in Oxybutynin, OK to start.     GERD - Patient on Prilosec     Depression - Patient on Effexor 150 mg daily. Family reports Effexor dose is 225, will monitor for side effects     Vitamin D - 20 on labs. Start 2000 U daily    DVT Ppx - Patient on Lovenox    Total time:  26 minutes.  I spent greater than 50% of the time for  patient care, counseling, and coordination on this date, including unit/floor time, and face-to-face time with the patient as per interval events and assessment and plan above. Topics discussed included discharge planning, increase effexor, family safety concerns, elevated SBP and monitor another day as concern for orthostatic hypotension.     Prem Gibbons M.D.

## 2021-12-06 NOTE — THERAPY
Physical Therapy   Daily Treatment     Patient Name: Aaliyah Fulton  Age:  81 y.o., Sex:  female  Medical Record #: [id number]  Today's Date: 12/6/2021     Precautions  Precautions: Fall Risk,Posterior Hip Precautions,Weight Bearing As Tolerated Left Lower Extremity  Comments: Hydrocephalus baseline    Subjective    Pt up in wc, willing to participate.     Objective       12/06/21 0931   Gait Functional Level of Assist    Gait Level Of Assist Contact Guard Assist  (light CGA/ close SBA)   Assistive Device Front Wheel Walker   Distance (Feet) 80   # of Times Distance was Traveled 2   Deviation Antalgic;Step To   Transfer Functional Level of Assist   Bed, Chair, Wheelchair Transfer Contact Guard Assist  (light CGA to close SBA)   Bed Chair Wheelchair Transfer Description Adaptive equipment;Increased time;Set-up of equipment;Verbal cueing   Supine Lower Body Exercise   Bridges Two Legged;3 sets of 10   Hip Abduction Hook Lying;3 sets of 10   Short Arc Quad 3 sets of 10   Heel Slide 3 sets of 10   Ankle Pumps 3 sets of 10   Gluteal Isometrics 3 sets of 10   Quadriceps Isometrics 3 sets of 10;Left   Bed Mobility    Supine to Sit Supervised   Sit to Supine Supervised   Sit to Stand Supervised   Neuro-Muscular Treatments   Neuro-Muscular Treatments Biofeedback;Verbal Cuing   Comments gait training with // bars and mirror for visual feedback 75 ft x 2, verbal cues for stride through gait patterning.    Interdisciplinary Plan of Care Collaboration   IDT Collaboration with  Occupational Therapist   Patient Position at End of Therapy Seated;Chair Alarm On;Call Light within Reach;Tray Table within Reach;Phone within Reach   PT Total Time Spent   PT Individual Total Time Spent (Mins) 60   PT Charge Group   PT Gait Training 2   PT Therapeutic Exercise 2       Assessment    Pt tolerated gait and supine exercises well, on-going weakness and mild pain at L hip but improving overall mobility at CGA to SBA functional level with  FWW.    Strengths: Able to follow instructions,Adequate strength,Effective communication skills,Good insight into deficits/needs,Independent prior level of function,Making steady progress towards goals,Pleasant and cooperative,Supportive family,Willingly participates in therapeutic activities  Barriers: Decreased endurance,Fatigue,Generalized weakness,Home accessibility,Impaired activity tolerance,Impaired balance,Pain,Limited mobility (L knee valgus, posterior hip precautions)    Plan      Progressive gait tolerance with FWW, bed mobility/ transfers and LE strength training. Review posterior hip precautions and use of abduction pillow.      Passport items to be completed:  Get in/out of bed safely, in/out of a vehicle, safely use mobility device, walk or wheel around home/community, navigate up and down stairs, show how to get up/down from the ground, ensure home is accessible, demonstrate HEP, complete caregiver training    Physical Therapy Problems (Active)     Problem: Mobility     Dates: Start: 11/27/21       Goal: STG-Within one week, patient will ambulate household distance CGA with FWW 50 ft x 2     Dates: Start: 11/27/21             Problem: Mobility Transfers     Dates: Start: 11/27/21       Goal: STG-Within one week, patient will perform bed mobility SBA with leg  as needed and compliance to posterior hip precautions LLE     Dates: Start: 11/27/21          Goal: STG-Within one week, patient will transfer bed to chair SBA with FWW after set-up     Dates: Start: 11/27/21             Problem: PT-Long Term Goals     Dates: Start: 11/27/21       Goal: LTG-By discharge, patient will ambulate with FWW and SPV / modified independent 100 ft x 2     Dates: Start: 11/27/21          Goal: LTG-By discharge, patient will transfer one surface to another with FWW and SPV / modified independent     Dates: Start: 11/27/21          Goal: LTG-By discharge, patient will ambulate up/down 4-6 stairs with hand rails and  CGA     Dates: Start: 11/27/21          Goal: LTG-By discharge, patient will transfer in/out of a car with FWW and SBA/ CGA to maintain posterior hip precautions LLE     Dates: Start: 11/27/21

## 2021-12-06 NOTE — THERAPY
"Physical Therapy   Daily Treatment     Patient Name: Aaliyah Fulton  Age:  81 y.o., Sex:  female  Medical Record #: [id number]  Today's Date: 12/6/2021     Precautions  Precautions: (P) Fall Risk,Posterior Hip Precautions,Weight Bearing As Tolerated Left Lower Extremity  Comments: Hydrocephalus baseline    Subjective    Pt up in , daughter present for observation and initiation of training     Objective       12/06/21 1331   Gait Functional Level of Assist    Gait Level Of Assist Stand by Assist   Assistive Device Front Wheel Walker   Distance (Feet) 50   # of Times Distance was Traveled 2   Deviation Antalgic;Step To   Stairs Functional Level of Assist   Level of Assist with Stairs Contact Guard Assist   # of Stairs Climbed 4   Stairs Description Extra time;Hand rails;Verbal cueing   Bed Mobility    Supine to Sit Supervised   Sit to Supine Supervised   Sit to Stand Stand by Assist   PT Total Time Spent   PT Individual Total Time Spent (Mins) 30   PT Charge Group   PT Gait Training 1   PT Therapeutic Activities 1     Pt completed transfer to 26\" mat for sit<>stand and sit<>supine with SBA to simulate bed height at home.  Pt's daughter provided CGA at gait belt for stairs, pt requires verbal cues for sequencing. Discussed need for B rails, daughter aware and in process of making adaptations at home.    Assessment    Pt continues to improve overall mobility tasks, remains limited by L hip weakness/ generalized pain and fatigue.Daughter aware of physical limitation and home modifgication needs.    Strengths: Able to follow instructions,Adequate strength,Effective communication skills,Good insight into deficits/needs,Independent prior level of function,Making steady progress towards goals,Pleasant and cooperative,Supportive family,Willingly participates in therapeutic activities  Barriers: Decreased endurance,Fatigue,Generalized weakness,Home accessibility,Impaired activity tolerance,Impaired balance,Pain,Limited " mobility (L knee valgus, posterior hip precautions)    Plan    Progressive gait tolerance with FWW, bed mobility/ transfers and LE strength training. Review posterior hip precautions and use of abduction pillow.      Passport items to be completed:  Get in/out of bed safely, in/out of a vehicle, safely use mobility device, walk or wheel around home/community, navigate up and down stairs, show how to get up/down from the ground, ensure home is accessible, demonstrate HEP, complete caregiver training      Physical Therapy Problems (Active)     Problem: Mobility     Dates: Start: 11/27/21       Goal: STG-Within one week, patient will ambulate household distance CGA with FWW 50 ft x 2     Dates: Start: 11/27/21             Problem: Mobility Transfers     Dates: Start: 11/27/21       Goal: STG-Within one week, patient will perform bed mobility SBA with leg  as needed and compliance to posterior hip precautions LLE     Dates: Start: 11/27/21          Goal: STG-Within one week, patient will transfer bed to chair SBA with FWW after set-up     Dates: Start: 11/27/21             Problem: PT-Long Term Goals     Dates: Start: 11/27/21       Goal: LTG-By discharge, patient will ambulate with FWW and SPV / modified independent 100 ft x 2     Dates: Start: 11/27/21          Goal: LTG-By discharge, patient will transfer one surface to another with FWW and SPV / modified independent     Dates: Start: 11/27/21          Goal: LTG-By discharge, patient will ambulate up/down 4-6 stairs with hand rails and CGA     Dates: Start: 11/27/21          Goal: LTG-By discharge, patient will transfer in/out of a car with FWW and SBA/ CGA to maintain posterior hip precautions LLE     Dates: Start: 11/27/21

## 2021-12-06 NOTE — CARE PLAN
"  Problem: Pain - Standard  Goal: Alleviation of pain or a reduction in pain to the patient’s comfort goal  Note: Pain is manageable with scheduled medication.Repositioned with pillows for comfort.Will continue to monitor and assess pain level and medicate as needed.     Problem: Fall Risk - Rehab  Goal: Patient will remain free from falls  Note: Diana Becker Fall risk Assessment Score: 11    Moderate fall risk Interventions  - Bed and strip alarm   - Yellow sign by the door   - Yellow wrist band \"Fall risk\"  - Room near to the nurse station  - Do not leave patient unattended in the bathroom  - Fall risk education provided                   "

## 2021-12-07 PROCEDURE — 99233 SBSQ HOSP IP/OBS HIGH 50: CPT | Performed by: PHYSICAL MEDICINE & REHABILITATION

## 2021-12-07 PROCEDURE — 97110 THERAPEUTIC EXERCISES: CPT

## 2021-12-07 PROCEDURE — 97530 THERAPEUTIC ACTIVITIES: CPT

## 2021-12-07 PROCEDURE — 700111 HCHG RX REV CODE 636 W/ 250 OVERRIDE (IP): Performed by: PHYSICAL MEDICINE & REHABILITATION

## 2021-12-07 PROCEDURE — A9270 NON-COVERED ITEM OR SERVICE: HCPCS | Performed by: PHYSICAL MEDICINE & REHABILITATION

## 2021-12-07 PROCEDURE — 97116 GAIT TRAINING THERAPY: CPT

## 2021-12-07 PROCEDURE — 770010 HCHG ROOM/CARE - REHAB SEMI PRIVAT*

## 2021-12-07 PROCEDURE — 87077 CULTURE AEROBIC IDENTIFY: CPT

## 2021-12-07 PROCEDURE — 87186 SC STD MICRODIL/AGAR DIL: CPT

## 2021-12-07 PROCEDURE — 97535 SELF CARE MNGMENT TRAINING: CPT

## 2021-12-07 PROCEDURE — 87086 URINE CULTURE/COLONY COUNT: CPT

## 2021-12-07 PROCEDURE — 700102 HCHG RX REV CODE 250 W/ 637 OVERRIDE(OP): Performed by: PHYSICAL MEDICINE & REHABILITATION

## 2021-12-07 RX ORDER — CEFDINIR 300 MG/1
300 CAPSULE ORAL EVERY 12 HOURS
Status: DISCONTINUED | OUTPATIENT
Start: 2021-12-07 | End: 2021-12-09

## 2021-12-07 RX ADMIN — Medication 2000 UNITS: at 07:30

## 2021-12-07 RX ADMIN — OMEPRAZOLE 20 MG: 20 CAPSULE, DELAYED RELEASE ORAL at 07:30

## 2021-12-07 RX ADMIN — ACETAMINOPHEN 1000 MG: 500 TABLET, FILM COATED ORAL at 19:40

## 2021-12-07 RX ADMIN — ACETAMINOPHEN 1000 MG: 500 TABLET, FILM COATED ORAL at 07:30

## 2021-12-07 RX ADMIN — OXYCODONE 5 MG: 5 TABLET ORAL at 05:26

## 2021-12-07 RX ADMIN — SENNOSIDES AND DOCUSATE SODIUM 2 TABLET: 50; 8.6 TABLET ORAL at 19:40

## 2021-12-07 RX ADMIN — OXYCODONE HYDROCHLORIDE 5 MG: 5 TABLET ORAL at 07:29

## 2021-12-07 RX ADMIN — VALSARTAN 160 MG: 80 TABLET, FILM COATED ORAL at 07:30

## 2021-12-07 RX ADMIN — CEFDINIR 300 MG: 300 CAPSULE ORAL at 10:37

## 2021-12-07 RX ADMIN — SENNOSIDES AND DOCUSATE SODIUM 2 TABLET: 50; 8.6 TABLET ORAL at 07:30

## 2021-12-07 RX ADMIN — OXYBUTYNIN CHLORIDE 5 MG: 5 TABLET, EXTENDED RELEASE ORAL at 07:30

## 2021-12-07 RX ADMIN — CEFDINIR 300 MG: 300 CAPSULE ORAL at 19:40

## 2021-12-07 RX ADMIN — ENOXAPARIN SODIUM 40 MG: 40 INJECTION SUBCUTANEOUS at 07:29

## 2021-12-07 RX ADMIN — AMLODIPINE BESYLATE 10 MG: 5 TABLET ORAL at 07:29

## 2021-12-07 RX ADMIN — VENLAFAXINE HYDROCHLORIDE 225 MG: 75 CAPSULE, EXTENDED RELEASE ORAL at 08:07

## 2021-12-07 RX ADMIN — ACETAMINOPHEN 1000 MG: 500 TABLET, FILM COATED ORAL at 15:32

## 2021-12-07 RX ADMIN — OXYCODONE HYDROCHLORIDE 5 MG: 5 TABLET ORAL at 12:09

## 2021-12-07 RX ADMIN — TRAZODONE HYDROCHLORIDE 50 MG: 50 TABLET ORAL at 19:40

## 2021-12-07 ASSESSMENT — GAIT ASSESSMENTS
ASSISTIVE DEVICE: FRONT WHEEL WALKER
GAIT LEVEL OF ASSIST: STAND BY ASSIST
DISTANCE (FEET): 110
ASSISTIVE DEVICE: FRONT WHEEL WALKER
DEVIATION: ANTALGIC;STEP TO;DECREASED BASE OF SUPPORT
DISTANCE (FEET): 55
GAIT LEVEL OF ASSIST: STAND BY ASSIST
DEVIATION: ANTALGIC;STEP TO;DECREASED BASE OF SUPPORT

## 2021-12-07 ASSESSMENT — ACTIVITIES OF DAILY LIVING (ADL)
TOILETING_LEVEL_OF_ASSIST_DESCRIPTION: ADAPTIVE EQUIPMENT;GRAB BAR;INCREASED TIME;INITIAL PREPARATION FOR TASK;SET-UP OF EQUIPMENT;SUPERVISION FOR SAFETY;VERBAL CUEING
TOILET_TRANSFER_DESCRIPTION: GRAB BAR;INCREASED TIME;INITIAL PREPARATION FOR TASK;SET-UP OF EQUIPMENT;SUPERVISION FOR SAFETY;VERBAL CUEING
TUB_SHOWER_TRANSFER_DESCRIPTION: GRAB BAR;SHOWER BENCH;INCREASED TIME;INITIAL PREPARATION FOR TASK;SET-UP OF EQUIPMENT;SUPERVISION FOR SAFETY;VERBAL CUEING
BED_CHAIR_WHEELCHAIR_TRANSFER_DESCRIPTION: ADAPTIVE EQUIPMENT;INCREASED TIME;SET-UP OF EQUIPMENT;VERBAL CUEING

## 2021-12-07 ASSESSMENT — PAIN DESCRIPTION - PAIN TYPE: TYPE: ACUTE PAIN

## 2021-12-07 NOTE — THERAPY
Occupational Therapy  Daily Treatment     Patient Name: Aaliyah Fulton  Age:  81 y.o., Sex:  female  Medical Record #: [id number]  Today's Date: 12/7/2021     Precautions  Precautions: Fall Risk,Posterior Hip Precautions,Weight Bearing As Tolerated Left Lower Extremity  Comments: Hydrocephalus baseline    Safety   ADL Safety : Requires Physical Assist for Safety,Requires Supervision for Safety,Requires Cueing for Safety  Bathroom Safety: Requires Physical Assist for Safety,Requires Supervision for Safety,Requires Cuing for Safety    Subjective    Pt seen from 8:30-9:00 and 10:30-11:30; see below for details. Pt c/o pain and fatigue stating she had a very busy day yesterday.      Objective     12/07/21 0831   Functional Level of Assist   Eating Independent   Bathing Supervision   Bathing Description Adaptive equipment;Grab bar;Hand held shower;Long handled bath tool;Tub bench;Increased time;Initial preparation for task;Supervision for safety;Verbal cueing   Upper Body Dressing Independent   Upper Body Dressing Description Increased time   Lower Body Dressing Stand by Assist   Lower Body Dressing Description Assistive devices;Grab bar;Reacher;Sock aid;Increased time;Initial preparation for task;Set-up of equipment;Supervision for safety;Verbal cueing   Toileting Supervision   Toileting Description Adaptive equipment;Grab bar;Increased time;Initial preparation for task;Set-up of equipment;Supervision for safety;Verbal cueing   Toilet Transfers Supervised  (stand pivot from w/c )   Toilet Transfer Description Grab bar;Increased time;Initial preparation for task;Set-up of equipment;Supervision for safety;Verbal cueing   Tub / Shower Transfers Stand by Assist  (walked from toilet into shower with SBA )   Tub Shower Transfer Description Grab bar;Shower bench;Increased time;Initial preparation for task;Set-up of equipment;Supervision for safety;Verbal cueing   Sitting Upper Body Exercises   Upper Extremity Bike Level 3  Resistance  (10 min on fluidobike for general endurance/UE strengthening )   Interdisciplinary Plan of Care Collaboration   Patient Position at End of Therapy Seated;Chair Alarm On;Call Light within Reach;Tray Table within Reach;Phone within Reach   OT Total Time Spent   OT Individual Total Time Spent (Mins) 90   OT Charge Group   OT Self Care / ADL 3   OT Therapy Activity 2   OT Therapeutic Exercise  1     8:30-9:00: Pt completed puzzle and played checkers in standing to focus on standing tolerance for ADLs and functional cog problem solving/attention.     10:30-11:30: Pt performed above ADLs followed by 10 min on Fluidobike at end of session.     Assessment    Pt's standing tolerance limited to 5 minute increments during standing tolerance activities. Pt demo'd improvement in bathing and LBD tasks with improved recall of use of reacher and sock aid but continues to require cues and supervision for safety. Discussed with pt the possibility of her daughter staying with her the first few days at d/c to assist with the transition; pt stated she will discuss with her daughter but that she should be able to.     Strengths: Able to follow instructions,Alert and oriented,Effective communication skills,Good insight into deficits/needs,Independent prior level of function,Motivated for self care and independence,Pleasant and cooperative,Supportive family,Willingly participates in therapeutic activities  Barriers: Decreased endurance,Generalized weakness,Impaired activity tolerance,Impaired balance,Limited mobility,Pain    Plan    ADLs with AE, transfers, continue posterior hip precaution education, functional mobility, fall prevention education, balance, UE strengthening     Passport items to be completed:  Perform bathroom transfers, complete dressing, complete feeding, get ready for the day, prepare a simple meal, participate in household tasks, adapt home for safety needs, demonstrate home exercise program, complete  caregiver training     Occupational Therapy Goals (Active)     Problem: Functional Transfers     Dates: Start: 11/27/21       Goal: STG-Within one week, patient will perform bathroom transfers with SBA and use of AE as needed.      Dates: Start: 11/27/21       Goal Note filed on 11/30/21 1138 by Kamala Bustamante OT     SBA to CGA                Problem: OT Long Term Goals     Dates: Start: 11/27/21       Goal: LTG-By discharge, patient will complete basic self care tasks with Prince to modified independence and use of AE as needed.      Dates: Start: 11/27/21          Goal: LTG-By discharge, patient will perform bathroom transfers with supervision to modified independence with use of AE as needed.      Dates: Start: 11/27/21

## 2021-12-07 NOTE — THERAPY
Physical Therapy   Daily Treatment     Patient Name: Aaliyah Fulton  Age:  81 y.o., Sex:  female  Medical Record #: [id number]  Today's Date: 12/7/2021     Precautions  Precautions: Fall Risk,Posterior Hip Precautions,Weight Bearing As Tolerated Left Lower Extremity  Comments: Hydrocephalus baseline    Subjective    Pt up in wc, willing to participate but reports sore all over.      Objective       12/07/21 0931   Gait Functional Level of Assist    Gait Level Of Assist Stand by Assist   Assistive Device Front Wheel Walker   Distance (Feet) 55   # of Times Distance was Traveled 4   Deviation Antalgic;Step To;Decreased Base Of Support  (L knee valgus)   Transfer Functional Level of Assist   Bed, Chair, Wheelchair Transfer Stand by Assist  (with FWW)   Bed Chair Wheelchair Transfer Description Adaptive equipment;Increased time;Set-up of equipment;Verbal cueing   Supine Lower Body Exercise   Bridges Two Legged;3 sets of 10   Hip Abduction Hook Lying;3 sets of 10   Short Arc Quad 3 sets of 10   Heel Slide 3 sets of 10   Gluteal Isometrics 3 sets of 10   Quadriceps Isometrics 3 sets of 10;Left   Sitting Lower Body Exercises   Ankle Pumps 2 sets of 15   Hip Flexion 2 sets of 15   Hip Abduction 2 sets of 15   Hip Adduction 2 sets of 15   Long Arc Quad 2 sets of 15   Hamstring Curl 2 sets of 15   Comments hip abduction pillow in place to maintain neutral hip LLE   Bed Mobility    Supine to Sit Supervised   Sit to Supine Supervised   Sit to Stand Stand by Assist   PT Total Time Spent   PT Individual Total Time Spent (Mins) 60   PT Charge Group   PT Gait Training 2   PT Therapeutic Exercise 2       Assessment    Pt progressing well, requires close SBA for safety and verbal cues for sequencing/ safety. Remains antalgic LLE.    Strengths: Able to follow instructions,Adequate strength,Effective communication skills,Good insight into deficits/needs,Independent prior level of function,Making steady progress towards goals,Pleasant  and cooperative,Supportive family,Willingly participates in therapeutic activities  Barriers: Decreased endurance,Fatigue,Generalized weakness,Home accessibility,Impaired activity tolerance,Impaired balance,Pain,Limited mobility (L knee valgus, posterior hip precautions)    Plan    Progressive gait tolerance with FWW, bed mobility/ transfers and LE strength training. Review posterior hip precautions and use of abduction pillow.      Passport items to be completed:  Get in/out of bed safely, in/out of a vehicle, safely use mobility device, walk or wheel around home/community, navigate up and down stairs, show how to get up/down from the ground, ensure home is accessible, demonstrate HEP, complete caregiver training    Physical Therapy Problems (Active)     Problem: PT-Long Term Goals     Dates: Start: 11/27/21       Goal: LTG-By discharge, patient will ambulate with FWW and SPV / modified independent 100 ft x 2     Dates: Start: 11/27/21          Goal: LTG-By discharge, patient will transfer one surface to another with FWW and SPV / modified independent     Dates: Start: 11/27/21          Goal: LTG-By discharge, patient will ambulate up/down 4-6 stairs with hand rails and CGA     Dates: Start: 11/27/21          Goal: LTG-By discharge, patient will transfer in/out of a car with FWW and SBA/ CGA to maintain posterior hip precautions LLE     Dates: Start: 11/27/21

## 2021-12-07 NOTE — CARE PLAN
The patient is Stable - Low risk of patient condition declining or worsening  Problem: Early Mobilization - Post Surgery  Goal: Early mobilization post surgery  Outcome: Progressing  Note: Patient able to perform regular activities this shift.  Pain controlled this shift.  Pain management includes PRN pain meds as well as non-pharmacological measures such as emotional support, rest, and repositioning.  Will continue to monitor.        Shift Goals  Clinical Goals: safety  Patient Goals: strength, sleep well     Progress made toward(s) clinical / shift goals:  wnl    Patient is not progressing towards the following goals:

## 2021-12-07 NOTE — PROGRESS NOTES
"Rehab Progress Note     Encounter Date: 12/7/2021    CC: Decreased mobility, hip pain    Interval Events (Subjective)  Patient sitting up in room. She reports she is doing OK. Discussed UA+ was positive which she thought as much and discussed will start on antibiotics and send cultures.  Discussed will monitor for fever and chills.  Discussed would have IDT later today and discuss discharge planning. Denies NVD.     IDT Team Meeting 12/7/2021    IPrem M.D., was present and led the interdisciplinary team conference on 12/7/2021.  I led the IDT conference and agree with the IDT conference documentation and plan of care as noted below.     RN:  Diet Regular   % Meal     Pain Oxycodone   Sleep    Bowel Continent   Bladder Continent   In's & Out's    On Abx for UTI    PT:  Bed Mobility    Transfers SBA   Mobility SBA 50-80 feet   Stairs CGA   SBA for all mobility  Limited confidence    OT:  Eating    Grooming    Bathing supervs   UB Dressing    LB Dressing SBA   Toileting    Shower & Transfer SBA   Self limiting  Supervs at times    CM:  Continues to work on disposition and DME needs.      DC/Disposition:  12/11/21    Objective:  VITAL SIGNS: /73   Pulse 70   Temp 36.7 °C (98.1 °F) (Temporal)   Resp 17   Ht 1.727 m (5' 8\")   Wt 79.5 kg (175 lb 4.3 oz)   SpO2 93%   BMI 26.65 kg/m²   Gen: NAD  Psych: Mood and affect appropriate  CV: RRR, no edema  Resp: CTAB, no upper airway sounds  Abd: NTND  Neuro: AOx3, following commands  Unchanged from 12/6/21    Recent Results (from the past 72 hour(s))   CBC WITH DIFFERENTIAL    Collection Time: 12/06/21  5:27 AM   Result Value Ref Range    WBC 6.4 4.8 - 10.8 K/uL    RBC 3.65 (L) 4.20 - 5.40 M/uL    Hemoglobin 11.8 (L) 12.0 - 16.0 g/dL    Hematocrit 35.9 (L) 37.0 - 47.0 %    MCV 98.4 (H) 81.4 - 97.8 fL    MCH 32.3 27.0 - 33.0 pg    MCHC 32.9 (L) 33.6 - 35.0 g/dL    RDW 46.6 35.9 - 50.0 fL    Platelet Count 432 164 - 446 K/uL    MPV 10.2 9.0 - 12.9 " fL    Neutrophils-Polys 46.10 44.00 - 72.00 %    Lymphocytes 31.80 22.00 - 41.00 %    Monocytes 8.10 0.00 - 13.40 %    Eosinophils 11.00 (H) 0.00 - 6.90 %    Basophils 1.40 0.00 - 1.80 %    Immature Granulocytes 1.60 (H) 0.00 - 0.90 %    Nucleated RBC 0.00 /100 WBC    Neutrophils (Absolute) 2.95 2.00 - 7.15 K/uL    Lymphs (Absolute) 2.03 1.00 - 4.80 K/uL    Monos (Absolute) 0.52 0.00 - 0.85 K/uL    Eos (Absolute) 0.70 (H) 0.00 - 0.51 K/uL    Baso (Absolute) 0.09 0.00 - 0.12 K/uL    Immature Granulocytes (abs) 0.10 0.00 - 0.11 K/uL    NRBC (Absolute) 0.00 K/uL   Comp Metabolic Panel    Collection Time: 12/06/21  5:27 AM   Result Value Ref Range    Sodium 141 135 - 145 mmol/L    Potassium 4.3 3.6 - 5.5 mmol/L    Chloride 107 96 - 112 mmol/L    Co2 26 20 - 33 mmol/L    Anion Gap 8.0 7.0 - 16.0    Glucose 101 (H) 65 - 99 mg/dL    Bun 15 8 - 22 mg/dL    Creatinine 0.51 0.50 - 1.40 mg/dL    Calcium 8.9 8.5 - 10.5 mg/dL    AST(SGOT) 18 12 - 45 U/L    ALT(SGPT) 16 2 - 50 U/L    Alkaline Phosphatase 183 (H) 30 - 99 U/L    Total Bilirubin 0.3 0.1 - 1.5 mg/dL    Albumin 3.2 3.2 - 4.9 g/dL    Total Protein 6.6 6.0 - 8.2 g/dL    Globulin 3.4 1.9 - 3.5 g/dL    A-G Ratio 0.9 g/dL   ESTIMATED GFR    Collection Time: 12/06/21  5:27 AM   Result Value Ref Range    GFR If African American >60 >60 mL/min/1.73 m 2    GFR If Non African American >60 >60 mL/min/1.73 m 2   URINALYSIS    Collection Time: 12/06/21  4:43 PM    Specimen: Urine, Clean Catch   Result Value Ref Range    Color Yellow     Character Turbid (A)     Specific Gravity 1.013 <1.035    Ph 6.5 5.0 - 8.0    Glucose Negative Negative mg/dL    Ketones Negative Negative mg/dL    Protein Negative Negative mg/dL    Bilirubin Negative Negative    Urobilinogen, Urine 1.0 Negative    Nitrite Positive (A) Negative    Leukocyte Esterase Large (A) Negative    Occult Blood Trace (A) Negative    Micro Urine Req Microscopic    URINE MICROSCOPIC (W/UA)    Collection Time: 12/06/21  4:43 PM    Result Value Ref Range    WBC Packed (A) /hpf    RBC 0-2 /hpf    Bacteria Many (A) None /hpf    Epithelial Cells Few /hpf    Epithelial Cells Renal Few /hpf    Hyaline Cast 0-2 /lpf       Current Facility-Administered Medications   Medication Frequency   • cefdinir (OMNICEF) capsule 300 mg Q12HRS   • venlafaxine XR (EFFEXOR XR) capsule 225 mg DAILY   • vitamin D3 (cholecalciferol) tablet 2,000 Units DAILY   • amLODIPine (NORVASC) tablet 10 mg Q DAY   • oxyCODONE immediate-release (ROXICODONE) tablet 5 mg BID   • enoxaparin (LOVENOX) inj 40 mg QDAY   • valsartan (DIOVAN) tablet 160 mg DAILY   • Respiratory Therapy Consult Continuous RT   • hydrALAZINE (APRESOLINE) tablet 25 mg Q8HRS PRN   • acetaminophen (Tylenol) tablet 650 mg Q4HRS PRN   • senna-docusate (PERICOLACE or SENOKOT S) 8.6-50 MG per tablet 2 Tablet BID    And   • polyethylene glycol/lytes (MIRALAX) PACKET 1 Packet QDAY PRN    And   • magnesium hydroxide (MILK OF MAGNESIA) suspension 30 mL QDAY PRN    And   • bisacodyl (DULCOLAX) suppository 10 mg QDAY PRN   • omeprazole (PRILOSEC) capsule 20 mg DAILY   • artificial tears ophthalmic solution 1 Drop PRN   • benzocaine-menthol (CEPACOL) lozenge 1 Lozenge Q2HRS PRN   • mag hydrox-al hydrox-simeth (MAALOX PLUS ES or MYLANTA DS) suspension 20 mL Q2HRS PRN   • ondansetron (ZOFRAN ODT) dispertab 4 mg 4X/DAY PRN    Or   • ondansetron (ZOFRAN) syringe/vial injection 4 mg 4X/DAY PRN   • traZODone (DESYREL) tablet 50 mg QHS PRN   • sodium chloride (OCEAN) 0.65 % nasal spray 2 Spray PRN   • oxyCODONE immediate-release (ROXICODONE) tablet 5 mg Q3HRS PRN    Or   • oxyCODONE immediate release (ROXICODONE) tablet 10 mg Q3HRS PRN   • scopolamine (TRANSDERM-SCOP) patch 1 Patch Q72HRS PRN   • oxybutynin SR (DITROPAN-XL) tablet 5 mg DAILY   • acetaminophen (TYLENOL) tablet 1,000 mg TID       Orders Placed This Encounter   Procedures   • Diet Order Diet: Regular     Standing Status:   Standing     Number of Occurrences:   1      Order Specific Question:   Diet:     Answer:   Regular [1]       Assessment:  Active Hospital Problems    Diagnosis    • *Closed left hip fracture (HCC)    • Other specified anemias    • Overactive bladder    • Hydrocephalus (HCC)    • Systolic murmur    • Depression    • OA (osteoarthritis) of knee        Medical Decision Making and Plan:  Left Hip fracture - Patient with GLF with L hip fracture s/p fixation with Dr. Culver on 11/24/21. WBAT per ortho  -PT and OT for mobility and ADLs  -Follow-up Dr. Culver  -Schedule Tylenol TID for pain control. PRN Oxycodone. Added scheduled Oxycodone     Hydrocephalus - Followed by Dr. Blanchard's office. Possible intervention in January. May benefit from SLP will wait on OT evaluation.      HTN - Labile SBP post-surgery. Not on medications. Into 170s, start Valsartan 80 mg. Ongoing elevation, will increase to 160  -Into 150s, will monitor another day. Add amlodipine 5 mg ongoing, will increase to 10 mg. Occasionally into 150s on AM check will monitor     Asthma - PRN Albuterol.      Anemia - Check AM CBC - 11.8, stable  -Recheck 12/6/21 - 11.8, stable.     Bladder spasms - Brought in Oxybutynin, OK to start. UA+ on 12/6/21, started on Omnicef and send urine cultures    GERD - Patient on Prilosec     Depression - Patient on Effexor 150 mg daily. Family reports Effexor dose is 225, will monitor for side effects     Vitamin D - 20 on labs. Start 2000 U daily    DVT Ppx - Patient on Lovenox    Total time:  36 minutes.  I spent greater than 50% of the time for patient care, counseling, and coordination on this date, including unit/floor time, and face-to-face time with the patient as per interval events and assessment and plan above. Topics discussed included discharge planning, UTI, check urine culture, start antibiotics, and ongoing elevated SBP.  Patient was discussed separately in IDT today; please see details above.    Prem Gibbons M.D.

## 2021-12-07 NOTE — CARE PLAN
Problem: Mobility  Goal: STG-Within one week, patient will ambulate household distance CGA with FWW 50 ft x 2  Outcome: Met     Problem: Mobility Transfers  Goal: STG-Within one week, patient will perform bed mobility SBA with leg  as needed and compliance to posterior hip precautions LLE  Outcome: Met  Goal: STG-Within one week, patient will transfer bed to chair SBA with FWW after set-up  Outcome: Met

## 2021-12-07 NOTE — CARE PLAN
Problem: Functional Transfers  Goal: STG-Within one week, patient will perform bathroom transfers with SBA and use of AE as needed.   Outcome: Met

## 2021-12-08 PROCEDURE — 700102 HCHG RX REV CODE 250 W/ 637 OVERRIDE(OP): Performed by: PHYSICAL MEDICINE & REHABILITATION

## 2021-12-08 PROCEDURE — 97116 GAIT TRAINING THERAPY: CPT

## 2021-12-08 PROCEDURE — 97535 SELF CARE MNGMENT TRAINING: CPT

## 2021-12-08 PROCEDURE — 770010 HCHG ROOM/CARE - REHAB SEMI PRIVAT*

## 2021-12-08 PROCEDURE — 97110 THERAPEUTIC EXERCISES: CPT

## 2021-12-08 PROCEDURE — 99232 SBSQ HOSP IP/OBS MODERATE 35: CPT | Performed by: PHYSICAL MEDICINE & REHABILITATION

## 2021-12-08 PROCEDURE — 700111 HCHG RX REV CODE 636 W/ 250 OVERRIDE (IP): Performed by: PHYSICAL MEDICINE & REHABILITATION

## 2021-12-08 PROCEDURE — A9270 NON-COVERED ITEM OR SERVICE: HCPCS | Performed by: PHYSICAL MEDICINE & REHABILITATION

## 2021-12-08 PROCEDURE — 97530 THERAPEUTIC ACTIVITIES: CPT

## 2021-12-08 RX ADMIN — ENOXAPARIN SODIUM 40 MG: 40 INJECTION SUBCUTANEOUS at 08:45

## 2021-12-08 RX ADMIN — CEFDINIR 300 MG: 300 CAPSULE ORAL at 19:34

## 2021-12-08 RX ADMIN — VALSARTAN 160 MG: 80 TABLET, FILM COATED ORAL at 08:44

## 2021-12-08 RX ADMIN — OXYCODONE HYDROCHLORIDE 5 MG: 5 TABLET ORAL at 08:44

## 2021-12-08 RX ADMIN — ACETAMINOPHEN 1000 MG: 500 TABLET, FILM COATED ORAL at 08:44

## 2021-12-08 RX ADMIN — VENLAFAXINE HYDROCHLORIDE 225 MG: 75 CAPSULE, EXTENDED RELEASE ORAL at 08:43

## 2021-12-08 RX ADMIN — AMLODIPINE BESYLATE 10 MG: 5 TABLET ORAL at 08:43

## 2021-12-08 RX ADMIN — OXYCODONE HYDROCHLORIDE 5 MG: 5 TABLET ORAL at 11:39

## 2021-12-08 RX ADMIN — CEFDINIR 300 MG: 300 CAPSULE ORAL at 08:43

## 2021-12-08 RX ADMIN — POLYETHYLENE GLYCOL 3350 1 PACKET: 17 POWDER, FOR SOLUTION ORAL at 14:34

## 2021-12-08 RX ADMIN — SENNOSIDES AND DOCUSATE SODIUM 2 TABLET: 50; 8.6 TABLET ORAL at 19:34

## 2021-12-08 RX ADMIN — SENNOSIDES AND DOCUSATE SODIUM 2 TABLET: 50; 8.6 TABLET ORAL at 08:43

## 2021-12-08 RX ADMIN — OXYBUTYNIN CHLORIDE 5 MG: 5 TABLET, EXTENDED RELEASE ORAL at 08:43

## 2021-12-08 RX ADMIN — Medication 2000 UNITS: at 08:43

## 2021-12-08 RX ADMIN — ACETAMINOPHEN 1000 MG: 500 TABLET, FILM COATED ORAL at 14:34

## 2021-12-08 RX ADMIN — ACETAMINOPHEN 1000 MG: 500 TABLET, FILM COATED ORAL at 19:33

## 2021-12-08 RX ADMIN — OMEPRAZOLE 20 MG: 20 CAPSULE, DELAYED RELEASE ORAL at 08:43

## 2021-12-08 ASSESSMENT — PATIENT HEALTH QUESTIONNAIRE - PHQ9
2. FEELING DOWN, DEPRESSED, IRRITABLE, OR HOPELESS: NOT AT ALL
1. LITTLE INTEREST OR PLEASURE IN DOING THINGS: NOT AT ALL
SUM OF ALL RESPONSES TO PHQ9 QUESTIONS 1 AND 2: 0
2. FEELING DOWN, DEPRESSED, IRRITABLE, OR HOPELESS: NOT AT ALL
SUM OF ALL RESPONSES TO PHQ9 QUESTIONS 1 AND 2: 0
1. LITTLE INTEREST OR PLEASURE IN DOING THINGS: NOT AT ALL

## 2021-12-08 ASSESSMENT — GAIT ASSESSMENTS
ASSISTIVE DEVICE: FRONT WHEEL WALKER
ASSISTIVE DEVICE: FRONT WHEEL WALKER
GAIT LEVEL OF ASSIST: STAND BY ASSIST
DISTANCE (FEET): 110
GAIT LEVEL OF ASSIST: STAND BY ASSIST
DEVIATION: ANTALGIC;STEP TO;DECREASED BASE OF SUPPORT;BRADYKINETIC
DISTANCE (FEET): 110

## 2021-12-08 ASSESSMENT — ACTIVITIES OF DAILY LIVING (ADL)
TOILET_TRANSFER_DESCRIPTION: ADAPTIVE EQUIPMENT;GRAB BAR;INCREASED TIME;INITIAL PREPARATION FOR TASK;SET-UP OF EQUIPMENT;SUPERVISION FOR SAFETY;VERBAL CUEING
TOILETING_LEVEL_OF_ASSIST_DESCRIPTION: GRAB BAR;INCREASED TIME;SET-UP OF EQUIPMENT;INITIAL PREPARATION FOR TASK;SUPERVISION FOR SAFETY;VERBAL CUEING
BED_CHAIR_WHEELCHAIR_TRANSFER_DESCRIPTION: ADAPTIVE EQUIPMENT;INCREASED TIME;SET-UP OF EQUIPMENT

## 2021-12-08 ASSESSMENT — PAIN DESCRIPTION - PAIN TYPE: TYPE: ACUTE PAIN

## 2021-12-08 NOTE — THERAPY
Physical Therapy   Daily Treatment     Patient Name: Aaliyah Fulton  Age:  81 y.o., Sex:  female  Medical Record #: [id number]  Today's Date: 12/7/2021     Precautions  Precautions: Fall Risk,Posterior Hip Precautions,Weight Bearing As Tolerated Left Lower Extremity  Comments: Hydrocephalus baseline    Subjective    Pt up in wc, willing to participate     Objective       12/07/21 1501   Gait Functional Level of Assist    Gait Level Of Assist Stand by Assist   Assistive Device Front Wheel Walker   Distance (Feet) 110   # of Times Distance was Traveled 1   Deviation Antalgic;Step To;Decreased Base Of Support  (L knee valgus)   Stairs Functional Level of Assist   Level of Assist with Stairs Contact Guard Assist   # of Stairs Climbed 4   Stairs Description Extra time;Hand rails;Verbal cueing   PT Total Time Spent   PT Individual Total Time Spent (Mins) 30   PT Charge Group   PT Gait Training 1   PT Therapeutic Activities 1     Gait training with // bars and cues for stride through 75 ft x 2.  Standing balance without UE support x 4 minutes for UE balloon volley, cues for midline stance and equal WB to BLE's.    Assessment    Pt continues to require encouragement with all activity, remains antalgic LLE due to L hip weakness and knee valgus.    Strengths: Able to follow instructions,Adequate strength,Effective communication skills,Good insight into deficits/needs,Independent prior level of function,Making steady progress towards goals,Pleasant and cooperative,Supportive family,Willingly participates in therapeutic activities  Barriers: Decreased endurance,Fatigue,Generalized weakness,Home accessibility,Impaired activity tolerance,Impaired balance,Pain,Limited mobility (L knee valgus, posterior hip precautions)    Plan    Progressive gait tolerance with FWW, bed mobility/ transfers and LE strength training. Review posterior hip precautions and use of abduction pillow.      Passport items to be completed:  Get in/out of bed  safely, in/out of a vehicle, safely use mobility device, walk or wheel around home/community, navigate up and down stairs, show how to get up/down from the ground, ensure home is accessible, demonstrate HEP, complete caregiver training      Physical Therapy Problems (Active)     Problem: PT-Long Term Goals     Dates: Start: 11/27/21       Goal: LTG-By discharge, patient will ambulate with FWW and SPV / modified independent 100 ft x 2     Dates: Start: 11/27/21          Goal: LTG-By discharge, patient will transfer one surface to another with FWW and SPV / modified independent     Dates: Start: 11/27/21          Goal: LTG-By discharge, patient will ambulate up/down 4-6 stairs with hand rails and CGA     Dates: Start: 11/27/21          Goal: LTG-By discharge, patient will transfer in/out of a car with FWW and SBA/ CGA to maintain posterior hip precautions LLE     Dates: Start: 11/27/21

## 2021-12-08 NOTE — CARE PLAN
The patient is Stable - Low risk of patient condition declining or worsening    Shift Goals  Clinical Goals: Safety, pain management  Patient Goals: Increase strength and control pain    Progress made toward(s) clinical / shift goals:  Patient's pain is controlled with current pain management, safe transfers completed.    Patient is not progressing towards the following goals:      Problem: Pain - Standard  Goal: Alleviation of pain or a reduction in pain to the patient’s comfort goal  Outcome: Progressing     Problem: Fall Risk - Rehab  Goal: Patient will remain free from falls  Outcome: Progressing

## 2021-12-08 NOTE — PROGRESS NOTES
"Rehab Progress Note     Encounter Date: 12/8/2021    CC: Decreased mobility, hip pain    Interval Events (Subjective)  Patient sitting up in room. She reports she is doing OK. Discussed patient ambulating sufficiently.  Daughter will be able to make home modifications by Saturday. Discussed will set up discharge on Friday. Denies NVD. Denies SOB.     IDT Team Meeting 12/7/2021  DC/Disposition:  12/11/21    Objective:  VITAL SIGNS: /75   Pulse 67   Temp 36.4 °C (97.6 °F) (Tympanic)   Resp 18   Ht 1.727 m (5' 8\")   Wt 79.5 kg (175 lb 4.3 oz)   SpO2 95%   BMI 26.65 kg/m²   Gen: NAD  Psych: Mood and affect appropriate; frustrated  CV: RRR, no edema  Resp: CTAB, no upper airway sounds  Abd: NTND  Neuro: AOx4, following commands    Recent Results (from the past 72 hour(s))   CBC WITH DIFFERENTIAL    Collection Time: 12/06/21  5:27 AM   Result Value Ref Range    WBC 6.4 4.8 - 10.8 K/uL    RBC 3.65 (L) 4.20 - 5.40 M/uL    Hemoglobin 11.8 (L) 12.0 - 16.0 g/dL    Hematocrit 35.9 (L) 37.0 - 47.0 %    MCV 98.4 (H) 81.4 - 97.8 fL    MCH 32.3 27.0 - 33.0 pg    MCHC 32.9 (L) 33.6 - 35.0 g/dL    RDW 46.6 35.9 - 50.0 fL    Platelet Count 432 164 - 446 K/uL    MPV 10.2 9.0 - 12.9 fL    Neutrophils-Polys 46.10 44.00 - 72.00 %    Lymphocytes 31.80 22.00 - 41.00 %    Monocytes 8.10 0.00 - 13.40 %    Eosinophils 11.00 (H) 0.00 - 6.90 %    Basophils 1.40 0.00 - 1.80 %    Immature Granulocytes 1.60 (H) 0.00 - 0.90 %    Nucleated RBC 0.00 /100 WBC    Neutrophils (Absolute) 2.95 2.00 - 7.15 K/uL    Lymphs (Absolute) 2.03 1.00 - 4.80 K/uL    Monos (Absolute) 0.52 0.00 - 0.85 K/uL    Eos (Absolute) 0.70 (H) 0.00 - 0.51 K/uL    Baso (Absolute) 0.09 0.00 - 0.12 K/uL    Immature Granulocytes (abs) 0.10 0.00 - 0.11 K/uL    NRBC (Absolute) 0.00 K/uL   Comp Metabolic Panel    Collection Time: 12/06/21  5:27 AM   Result Value Ref Range    Sodium 141 135 - 145 mmol/L    Potassium 4.3 3.6 - 5.5 mmol/L    Chloride 107 96 - 112 mmol/L    Co2 " 26 20 - 33 mmol/L    Anion Gap 8.0 7.0 - 16.0    Glucose 101 (H) 65 - 99 mg/dL    Bun 15 8 - 22 mg/dL    Creatinine 0.51 0.50 - 1.40 mg/dL    Calcium 8.9 8.5 - 10.5 mg/dL    AST(SGOT) 18 12 - 45 U/L    ALT(SGPT) 16 2 - 50 U/L    Alkaline Phosphatase 183 (H) 30 - 99 U/L    Total Bilirubin 0.3 0.1 - 1.5 mg/dL    Albumin 3.2 3.2 - 4.9 g/dL    Total Protein 6.6 6.0 - 8.2 g/dL    Globulin 3.4 1.9 - 3.5 g/dL    A-G Ratio 0.9 g/dL   ESTIMATED GFR    Collection Time: 12/06/21  5:27 AM   Result Value Ref Range    GFR If African American >60 >60 mL/min/1.73 m 2    GFR If Non African American >60 >60 mL/min/1.73 m 2   URINALYSIS    Collection Time: 12/06/21  4:43 PM    Specimen: Urine, Clean Catch   Result Value Ref Range    Color Yellow     Character Turbid (A)     Specific Gravity 1.013 <1.035    Ph 6.5 5.0 - 8.0    Glucose Negative Negative mg/dL    Ketones Negative Negative mg/dL    Protein Negative Negative mg/dL    Bilirubin Negative Negative    Urobilinogen, Urine 1.0 Negative    Nitrite Positive (A) Negative    Leukocyte Esterase Large (A) Negative    Occult Blood Trace (A) Negative    Micro Urine Req Microscopic    URINE MICROSCOPIC (W/UA)    Collection Time: 12/06/21  4:43 PM   Result Value Ref Range    WBC Packed (A) /hpf    RBC 0-2 /hpf    Bacteria Many (A) None /hpf    Epithelial Cells Few /hpf    Epithelial Cells Renal Few /hpf    Hyaline Cast 0-2 /lpf       Current Facility-Administered Medications   Medication Frequency   • cefdinir (OMNICEF) capsule 300 mg Q12HRS   • venlafaxine XR (EFFEXOR XR) capsule 225 mg DAILY   • vitamin D3 (cholecalciferol) tablet 2,000 Units DAILY   • amLODIPine (NORVASC) tablet 10 mg Q DAY   • oxyCODONE immediate-release (ROXICODONE) tablet 5 mg BID   • enoxaparin (LOVENOX) inj 40 mg QDAY   • valsartan (DIOVAN) tablet 160 mg DAILY   • Respiratory Therapy Consult Continuous RT   • hydrALAZINE (APRESOLINE) tablet 25 mg Q8HRS PRN   • acetaminophen (Tylenol) tablet 650 mg Q4HRS PRN   •  senna-docusate (PERICOLACE or SENOKOT S) 8.6-50 MG per tablet 2 Tablet BID    And   • polyethylene glycol/lytes (MIRALAX) PACKET 1 Packet QDAY PRN    And   • magnesium hydroxide (MILK OF MAGNESIA) suspension 30 mL QDAY PRN    And   • bisacodyl (DULCOLAX) suppository 10 mg QDAY PRN   • omeprazole (PRILOSEC) capsule 20 mg DAILY   • artificial tears ophthalmic solution 1 Drop PRN   • benzocaine-menthol (CEPACOL) lozenge 1 Lozenge Q2HRS PRN   • mag hydrox-al hydrox-simeth (MAALOX PLUS ES or MYLANTA DS) suspension 20 mL Q2HRS PRN   • ondansetron (ZOFRAN ODT) dispertab 4 mg 4X/DAY PRN    Or   • ondansetron (ZOFRAN) syringe/vial injection 4 mg 4X/DAY PRN   • traZODone (DESYREL) tablet 50 mg QHS PRN   • sodium chloride (OCEAN) 0.65 % nasal spray 2 Spray PRN   • oxyCODONE immediate-release (ROXICODONE) tablet 5 mg Q3HRS PRN    Or   • oxyCODONE immediate release (ROXICODONE) tablet 10 mg Q3HRS PRN   • scopolamine (TRANSDERM-SCOP) patch 1 Patch Q72HRS PRN   • oxybutynin SR (DITROPAN-XL) tablet 5 mg DAILY   • acetaminophen (TYLENOL) tablet 1,000 mg TID       Orders Placed This Encounter   Procedures   • Diet Order Diet: Regular     Standing Status:   Standing     Number of Occurrences:   1     Order Specific Question:   Diet:     Answer:   Regular [1]       Assessment:  Active Hospital Problems    Diagnosis    • *Closed left hip fracture (HCC)    • Other specified anemias    • Overactive bladder    • Hydrocephalus (HCC)    • Systolic murmur    • Depression    • OA (osteoarthritis) of knee        Medical Decision Making and Plan:  Left Hip fracture - Patient with GLF with L hip fracture s/p fixation with Dr. Culver on 11/24/21. WBAT per ortho  -PT and OT for mobility and ADLs  -Follow-up Dr. Culver  -Schedule Tylenol TID for pain control. PRN Oxycodone. Added scheduled Oxycodone     Hydrocephalus - Followed by Dr. lBanchard's office. Possible intervention in January. May benefit from SLP will wait on OT evaluation.      HTN  - Labile SBP post-surgery. Not on medications. Into 170s, start Valsartan 80 mg. Ongoing elevation, will increase to 160  -Into 150s, will monitor another day. Add amlodipine 5 mg ongoing, will increase to 10 mg. Occasionally into 150s on AM check will monitor     Asthma - PRN Albuterol.      Anemia - Check AM CBC - 11.8, stable  -Recheck 12/6/21 - 11.8, stable.     Bladder spasms - Brought in Oxybutynin, OK to start. UA+ on 12/6/21, started on Omnicef and send urine cultures    GERD - Patient on Prilosec     Depression - Patient on Effexor 150 mg daily. Family reports Effexor dose is 225, will monitor for side effects     Vitamin D - 20 on labs. Start 2000 U daily    DVT Ppx - Patient on Lovenox. Ambulating > 150 feet. Discontinue    Total time:  26 minutes.  I spent greater than 50% of the time for patient care, counseling, and coordination on this date, including unit/floor time, and face-to-face time with the patient as per interval events and assessment and plan above. Topics discussed included discharge planning, extension to Saturday, improved mobility, discontinue Lovenox, and ongoing elevated SBP.     Prem Gibbons M.D.

## 2021-12-08 NOTE — THERAPY
Occupational Therapy  Daily Treatment     Patient Name: Aaliyah Fulton  Age:  81 y.o., Sex:  female  Medical Record #: [id number]  Today's Date: 12/8/2021     Precautions  Precautions: Fall Risk,Posterior Hip Precautions,Weight Bearing As Tolerated Left Lower Extremity  Comments: Hydrocephalus baseline    Safety   ADL Safety : Requires Physical Assist for Safety,Requires Supervision for Safety,Requires Cueing for Safety  Bathroom Safety: Requires Physical Assist for Safety,Requires Supervision for Safety,Requires Cuing for Safety    Subjective    Pt sitting up in w/c agreeable to OT session.      Objective     12/08/21 1301   Sitting Upper Body Exercises   Chest Press Bilateral;Weight (See Comments for lbs)  (RUE 2x8 10#, LUE 1x8 10# on Equalizer )   Bilateral Row 3 sets of 10;Bilateral;Weight (See Comments for lbs)  (40# on Equalizer )   Tricep Press 3 sets of 10;Bilateral;Weight (See Comments for lbs)  (30# facing fwd, 15# facing bwd 3x10 each on [place] )   Upper Extremity Bike Level 3 Resistance  (15 minutes on Fluidobike to increase endurance/act shameka )   IADL Treatments   Comments Simulated pantry task choosing dinner items from different height shelves to focus on standing tolerance and reaching outside RUFINA    Interdisciplinary Plan of Care Collaboration   IDT Collaboration with  Family / Caregiver   Patient Position at End of Therapy Seated;Call Light within Reach;Tray Table within Reach;Phone within Reach;Family / Friend in Room   Collaboration Comments Pt's daughter present towards end of session    OT Total Time Spent   OT Individual Total Time Spent (Mins) 60   OT Charge Group   OT Therapy Activity 1   OT Therapeutic Exercise  3     Discussed with pt and her daughter possibility of her daughter staying with her at d/c to assist with the transition home. Pt's daughter reported she would be able to and that pt's  is working on ordering recommended DME.     Assessment    Pt tolerated OT session well  focused on above home mgmt activity, endurance and UE strengthening. Pt demo's increased strength progressing with increased weight on weighted exercises above. Pt was only able to perform 1 set of 8 reps on the chest press on the Equalizer at the end of the session due to muscle fatigue.     Strengths: Able to follow instructions,Alert and oriented,Effective communication skills,Good insight into deficits/needs,Independent prior level of function,Motivated for self care and independence,Pleasant and cooperative,Supportive family,Willingly participates in therapeutic activities  Barriers: Decreased endurance,Generalized weakness,Impaired activity tolerance,Impaired balance,Limited mobility,Pain    Plan    UE theraband HEP, ADLs with AE, transfers, continue posterior hip precaution education, functional mobility, fall prevention education, balance, UE strengthening      D/c Saturday 12/11 home with family     Passport items to be completed:  Perform bathroom transfers, complete dressing, complete feeding, get ready for the day, prepare a simple meal, participate in household tasks, adapt home for safety needs, demonstrate home exercise program, complete caregiver training     Occupational Therapy Goals (Active)     Problem: OT Long Term Goals     Dates: Start: 11/27/21       Goal: LTG-By discharge, patient will complete basic self care tasks with Prince to modified independence and use of AE as needed.      Dates: Start: 11/27/21          Goal: LTG-By discharge, patient will perform bathroom transfers with supervision to modified independence with use of AE as needed.      Dates: Start: 11/27/21

## 2021-12-08 NOTE — THERAPY
Physical Therapy   Daily Treatment     Patient Name: Aaliyah Fulton  Age:  81 y.o., Sex:  female  Medical Record #: [id number]  Today's Date: 12/8/2021     Precautions  Precautions: Fall Risk,Posterior Hip Precautions,Weight Bearing As Tolerated Left Lower Extremity  Comments: Hydrocephalus baseline    Subjective    Pt up in wc, willing to participate     Objective       12/08/21 1031   Gait Functional Level of Assist    Gait Level Of Assist Stand by Assist   Assistive Device Front Wheel Walker   Distance (Feet) 110  (in addition to 125 ft x 1)   # of Times Distance was Traveled 4   Transfer Functional Level of Assist   Bed, Chair, Wheelchair Transfer Supervised   Bed Chair Wheelchair Transfer Description Adaptive equipment;Increased time;Set-up of equipment   Supine Lower Body Exercise   Bridges Two Legged;3 sets of 10   Hip Abduction Hook Lying;3 sets of 10   Short Arc Quad 3 sets of 10   Heel Slide 3 sets of 10   Ankle Pumps 3 sets of 10   Gluteal Isometrics 3 sets of 10   Quadriceps Isometrics 3 sets of 10;Left   Sitting Lower Body Exercises   Ankle Pumps 1 set of 15   Hip Flexion 1 set of 15   Hip Abduction 1 set of 15   Hip Adduction 1 set of 15   Long Arc Quad 1 set of 15   Hamstring Curl 1 set of 15   Comments HEP provided   Bed Mobility    Supine to Sit Modified Independent   Sit to Supine Modified Independent   Sit to Stand Stand by Assist   PT Total Time Spent   PT Individual Total Time Spent (Mins) 60   PT Charge Group   PT Gait Training 2   PT Therapeutic Exercise 2       Assessment    Pt reports L knee pain worse than L hip pain with exercises and ambulation but improving overall mobility. Requires manual cues for L LE positioning/ prevention of adduction/internal rotation with exercises.    Strengths: Able to follow instructions,Adequate strength,Effective communication skills,Good insight into deficits/needs,Independent prior level of function,Making steady progress towards goals,Pleasant and  cooperative,Supportive family,Willingly participates in therapeutic activities  Barriers: Decreased endurance,Fatigue,Generalized weakness,Home accessibility,Impaired activity tolerance,Impaired balance,Pain,Limited mobility (L knee valgus, posterior hip precautions)    Plan    Progressive gait tolerance with FWW, bed mobility/ transfers and LE strength training. Review posterior hip precautions and use of abduction pillow.      Passport items to be completed:  Get in/out of bed safely, in/out of a vehicle, safely use mobility device, walk or wheel around home/community, navigate up and down stairs, show how to get up/down from the ground, ensure home is accessible, demonstrate HEP, complete caregiver training      Physical Therapy Problems (Active)     Problem: PT-Long Term Goals     Dates: Start: 11/27/21       Goal: LTG-By discharge, patient will ambulate with FWW and SPV / modified independent 100 ft x 2     Dates: Start: 11/27/21          Goal: LTG-By discharge, patient will transfer one surface to another with FWW and SPV / modified independent     Dates: Start: 11/27/21          Goal: LTG-By discharge, patient will ambulate up/down 4-6 stairs with hand rails and CGA     Dates: Start: 11/27/21          Goal: LTG-By discharge, patient will transfer in/out of a car with FWW and SBA/ CGA to maintain posterior hip precautions LLE     Dates: Start: 11/27/21

## 2021-12-08 NOTE — THERAPY
"Occupational Therapy  Daily Treatment     Patient Name: Aaliyah Fulton  Age:  81 y.o., Sex:  female  Medical Record #: [id number]  Today's Date: 12/8/2021     Precautions  Precautions: Fall Risk,Posterior Hip Precautions,Weight Bearing As Tolerated Left Lower Extremity  Comments: Hydrocephalus baseline    Safety   ADL Safety : Requires Physical Assist for Safety,Requires Supervision for Safety,Requires Cueing for Safety  Bathroom Safety: Requires Physical Assist for Safety,Requires Supervision for Safety,Requires Cuing for Safety    Subjective    \"I need to get dressed for the day.\"      Objective     12/08/21 0931   Functional Level of Assist   Grooming Supervision;Standing   Grooming Description Standing at sink;Initial preparation for task;Increased time   Upper Body Dressing Independent   Lower Body Dressing Stand by Assist  (donned seated in w/c, stood with FWW available to pull up )   Lower Body Dressing Description Reacher;Increased time;Initial preparation for task;Set-up of equipment;Supervision for safety;Verbal cueing   Toileting Stand by Assist  (FWW level )   Toileting Description Grab bar;Increased time;Set-up of equipment;Initial preparation for task;Supervision for safety;Verbal cueing   Toilet Transfers Stand by Assist  (FWW level with GB )   Toilet Transfer Description Adaptive equipment;Grab bar;Increased time;Initial preparation for task;Set-up of equipment;Supervision for safety;Verbal cueing   Interdisciplinary Plan of Care Collaboration   Patient Position at End of Therapy Seated;Call Light within Reach;Tray Table within Reach;Phone within Reach   OT Total Time Spent   OT Individual Total Time Spent (Mins) 30   OT Charge Group   OT Self Care / ADL 2       Assessment    Pt tolerated OT session well focused on progressing ADL performance focused on completing FWW level in preparation for d/c this weekend. Pt continues to require cues during dressing and toileting tasks to maintain hip " precautions but demo's improved use of AE. Will benefit from having BSC placed over toilet at home to improve safety and ease of toilet transfer.     Strengths: Able to follow instructions,Alert and oriented,Effective communication skills,Good insight into deficits/needs,Independent prior level of function,Motivated for self care and independence,Pleasant and cooperative,Supportive family,Willingly participates in therapeutic activities  Barriers: Decreased endurance,Generalized weakness,Impaired activity tolerance,Impaired balance,Limited mobility,Pain    Plan    ADLs with AE, transfers, continue posterior hip precaution education, functional mobility, fall prevention education, balance, UE strengthening     D/c Saturday 12/11 home with family     Passport items to be completed:  Perform bathroom transfers, complete dressing, complete feeding, get ready for the day, prepare a simple meal, participate in household tasks, adapt home for safety needs, demonstrate home exercise program, complete caregiver training     Occupational Therapy Goals (Active)     Problem: OT Long Term Goals     Dates: Start: 11/27/21       Goal: LTG-By discharge, patient will complete basic self care tasks with Prince to modified independence and use of AE as needed.      Dates: Start: 11/27/21          Goal: LTG-By discharge, patient will perform bathroom transfers with supervision to modified independence with use of AE as needed.      Dates: Start: 11/27/21

## 2021-12-08 NOTE — DISCHARGE PLANNING
CM  I met with pt's dtr jeovany confirming dc for Sat 12/11 - close to 12noon.     Home health - no preference; referral made to [facility]; also provided information on contact for installation of single grab bar in garage.     Plan:  Dc on 12/11.

## 2021-12-09 LAB
BACTERIA UR CULT: ABNORMAL
BACTERIA UR CULT: ABNORMAL
SIGNIFICANT IND 70042: ABNORMAL
SITE SITE: ABNORMAL
SOURCE SOURCE: ABNORMAL

## 2021-12-09 PROCEDURE — A9270 NON-COVERED ITEM OR SERVICE: HCPCS | Performed by: PHYSICAL MEDICINE & REHABILITATION

## 2021-12-09 PROCEDURE — 97535 SELF CARE MNGMENT TRAINING: CPT

## 2021-12-09 PROCEDURE — 97116 GAIT TRAINING THERAPY: CPT

## 2021-12-09 PROCEDURE — 99232 SBSQ HOSP IP/OBS MODERATE 35: CPT | Performed by: PHYSICAL MEDICINE & REHABILITATION

## 2021-12-09 PROCEDURE — 700102 HCHG RX REV CODE 250 W/ 637 OVERRIDE(OP): Performed by: PHYSICAL MEDICINE & REHABILITATION

## 2021-12-09 PROCEDURE — 97110 THERAPEUTIC EXERCISES: CPT

## 2021-12-09 PROCEDURE — 97530 THERAPEUTIC ACTIVITIES: CPT

## 2021-12-09 PROCEDURE — 770010 HCHG ROOM/CARE - REHAB SEMI PRIVAT*

## 2021-12-09 RX ORDER — NITROFURANTOIN 25; 75 MG/1; MG/1
100 CAPSULE ORAL 2 TIMES DAILY WITH MEALS
Status: DISCONTINUED | OUTPATIENT
Start: 2021-12-09 | End: 2021-12-12 | Stop reason: HOSPADM

## 2021-12-09 RX ADMIN — NITROFURANTOIN (MONOHYDRATE/MACROCRYSTALS) 100 MG: 75; 25 CAPSULE ORAL at 12:05

## 2021-12-09 RX ADMIN — AMLODIPINE BESYLATE 10 MG: 5 TABLET ORAL at 08:06

## 2021-12-09 RX ADMIN — TRAZODONE HYDROCHLORIDE 50 MG: 50 TABLET ORAL at 20:50

## 2021-12-09 RX ADMIN — OMEPRAZOLE 20 MG: 20 CAPSULE, DELAYED RELEASE ORAL at 08:06

## 2021-12-09 RX ADMIN — MAGNESIUM HYDROXIDE 30 ML: 400 SUSPENSION ORAL at 17:50

## 2021-12-09 RX ADMIN — OXYCODONE HYDROCHLORIDE 5 MG: 5 TABLET ORAL at 08:06

## 2021-12-09 RX ADMIN — ACETAMINOPHEN 1000 MG: 500 TABLET, FILM COATED ORAL at 08:06

## 2021-12-09 RX ADMIN — ACETAMINOPHEN 1000 MG: 500 TABLET, FILM COATED ORAL at 20:45

## 2021-12-09 RX ADMIN — OXYCODONE HYDROCHLORIDE 5 MG: 5 TABLET ORAL at 12:05

## 2021-12-09 RX ADMIN — CEFDINIR 300 MG: 300 CAPSULE ORAL at 08:06

## 2021-12-09 RX ADMIN — VENLAFAXINE HYDROCHLORIDE 225 MG: 75 CAPSULE, EXTENDED RELEASE ORAL at 08:06

## 2021-12-09 RX ADMIN — VALSARTAN 160 MG: 80 TABLET, FILM COATED ORAL at 08:06

## 2021-12-09 RX ADMIN — SENNOSIDES AND DOCUSATE SODIUM 2 TABLET: 50; 8.6 TABLET ORAL at 08:05

## 2021-12-09 RX ADMIN — SENNOSIDES AND DOCUSATE SODIUM 2 TABLET: 50; 8.6 TABLET ORAL at 20:45

## 2021-12-09 RX ADMIN — Medication 2000 UNITS: at 08:06

## 2021-12-09 RX ADMIN — ACETAMINOPHEN 1000 MG: 500 TABLET, FILM COATED ORAL at 14:28

## 2021-12-09 RX ADMIN — OXYBUTYNIN CHLORIDE 5 MG: 5 TABLET, EXTENDED RELEASE ORAL at 08:06

## 2021-12-09 RX ADMIN — NITROFURANTOIN (MONOHYDRATE/MACROCRYSTALS) 100 MG: 75; 25 CAPSULE ORAL at 17:33

## 2021-12-09 ASSESSMENT — GAIT ASSESSMENTS
DISTANCE (FEET): 150
DISTANCE (FEET): 220
ASSISTIVE DEVICE: FRONT WHEEL WALKER
DEVIATION: ANTALGIC;DECREASED BASE OF SUPPORT;BRADYKINETIC
ASSISTIVE DEVICE: FRONT WHEEL WALKER
DEVIATION: ANTALGIC;BRADYKINETIC;DECREASED BASE OF SUPPORT
GAIT LEVEL OF ASSIST: STAND BY ASSIST
GAIT LEVEL OF ASSIST: STAND BY ASSIST

## 2021-12-09 ASSESSMENT — ACTIVITIES OF DAILY LIVING (ADL)
TOILETING_LEVEL_OF_ASSIST_DESCRIPTION: GRAB BAR;INCREASED TIME;SUPERVISION FOR SAFETY;VERBAL CUEING
TOILET_TRANSFER_DESCRIPTION: ADAPTIVE EQUIPMENT;GRAB BAR;INCREASED TIME;INITIAL PREPARATION FOR TASK;SET-UP OF EQUIPMENT;SUPERVISION FOR SAFETY;VERBAL CUEING
BED_CHAIR_WHEELCHAIR_TRANSFER_DESCRIPTION: ADAPTIVE EQUIPMENT;INCREASED TIME;SET-UP OF EQUIPMENT;SUPERVISION FOR SAFETY;VERBAL CUEING
BED_CHAIR_WHEELCHAIR_TRANSFER_DESCRIPTION: ADAPTIVE EQUIPMENT;INCREASED TIME;SET-UP OF EQUIPMENT

## 2021-12-09 NOTE — THERAPY
Occupational Therapy  Daily Treatment     Patient Name: Aaliyah Fulton  Age:  81 y.o., Sex:  female  Medical Record #: [id number]  Today's Date: 12/9/2021     Precautions  Precautions: Fall Risk,Posterior Hip Precautions,Weight Bearing As Tolerated Left Lower Extremity  Comments: Hydrocephalus baseline    Safety   ADL Safety : Requires Physical Assist for Safety,Requires Supervision for Safety,Requires Cueing for Safety  Bathroom Safety: Requires Physical Assist for Safety,Requires Supervision for Safety,Requires Cuing for Safety    Subjective    Pt resting in bed upon arrival, agreeable to OT session.      Objective     12/09/21 0701   Vitals   O2 Delivery Device None - Room Air   Non Verbal Descriptors   Non Verbal Scale  Calm   Functional Level of Assist   Grooming Supervision;Standing   Grooming Description Standing at sink   Upper Body Dressing Independent   Lower Body Dressing Supervision  (FWW level )   Lower Body Dressing Description Assistive devices;Reacher;Increased time;Initial preparation for task;Set-up of equipment;Supervision for safety;Verbal cueing   Toileting Supervision   Toileting Description Grab bar;Increased time;Supervision for safety;Verbal cueing   Bed, Chair, Wheelchair Transfer Supervised   Bed Chair Wheelchair Transfer Description Adaptive equipment;Increased time;Set-up of equipment;Supervision for safety;Verbal cueing   Toilet Transfers Supervised  ( supervision with BSC in ADL bathroom, SBA w/ GB and FWW )   Toilet Transfer Description Adaptive equipment;Grab bar;Increased time;Initial preparation for task;Set-up of equipment;Supervision for safety;Verbal cueing   Tub / Shower Transfers Supervised  (tub bench transfer in ADL bathroom )   Tub Shower Transfer Description Adaptive equipment;Shower bench;Set-up of equipment;Supervision for safety;Verbal cueing   Sitting Upper Body Exercises   Upper Extremity Bike Level 3 Resistance  (Fluidobike 10 min for general endurance/UE strength )    Bed Mobility    Supine to Sit Modified Independent   Scooting Supervised   Interdisciplinary Plan of Care Collaboration   Patient Position at End of Therapy Seated;Chair Alarm On;Call Light within Reach;Tray Table within Reach;Phone within Reach   OT Total Time Spent   OT Individual Total Time Spent (Mins) 60   OT Charge Group   OT Self Care / ADL 2   OT Therapy Activity 1   OT Therapeutic Exercise  1       Assessment    Pt tolerated OT session well focused on ADLs, bathroom transfers, and increasing endurance for daily activities. Pt performs toilet transfers with increased safety and ease with a BSC over the toilet vs GB and FWW; will place BSC in pt's room and her family has supposedly ordered one for home use. Was able to ambulate from bedside into bathroom for above toileting and g/h tasks then back to bedside with SBA.     Strengths: Able to follow instructions,Alert and oriented,Effective communication skills,Good insight into deficits/needs,Independent prior level of function,Motivated for self care and independence,Pleasant and cooperative,Supportive family,Willingly participates in therapeutic activities  Barriers: Decreased endurance,Generalized weakness,Impaired activity tolerance,Impaired balance,Limited mobility,Pain    Plan    ADLs with AE, transfers, continue posterior hip precaution education, functional mobility, fall prevention education, balance, UE strengthening      D/c Saturday 12/11 home with family     Passport items to be completed:  Perform bathroom transfers, complete dressing, complete feeding, get ready for the day, prepare a simple meal, participate in household tasks, adapt home for safety needs, demonstrate home exercise program, complete caregiver training     Occupational Therapy Goals (Active)     Problem: OT Long Term Goals     Dates: Start: 11/27/21       Goal: LTG-By discharge, patient will complete basic self care tasks with Prince to modified independence and use of AE as  needed.      Dates: Start: 11/27/21          Goal: LTG-By discharge, patient will perform bathroom transfers with supervision to modified independence with use of AE as needed.      Dates: Start: 11/27/21

## 2021-12-09 NOTE — THERAPY
Occupational Therapy  Daily Treatment     Patient Name: Aaliyah Fulton  Age:  81 y.o., Sex:  female  Medical Record #: [id number]  Today's Date: 12/9/2021     Precautions  Precautions: Fall Risk,Posterior Hip Precautions,Weight Bearing As Tolerated Left Lower Extremity  Comments: Hydrocephalus baseline, ESBL infection - per nursing, pt can come out of room but all surfaces must be cleaned after use     Safety   ADL Safety : Requires Physical Assist for Safety,Requires Supervision for Safety,Requires Cueing for Safety  Bathroom Safety: Requires Physical Assist for Safety,Requires Supervision for Safety,Requires Cuing for Safety    Subjective    Pt sitting up in w/c upon arrival, agreeable to OT session.      Objective     12/09/21 1301   Sitting Upper Body Exercises   Chest Press 2 sets of 10;Bilateral;Light Resistance Theraband   Front Arm Raise 2 sets of 10;Bilateral;Light Resistance Theraband   Shoulder Extension 2 sets of 10;Bilateral;Light Resistance Theraband   Bicep Curls 2 sets of 10;Bilateral;Light Resistance Theraband   Elbow Extension 2 sets of 10;Bilateral;Light Resistance Theraband   OT Total Time Spent   OT Individual Total Time Spent (Mins) 30   OT Charge Group   OT Therapeutic Exercise  2     Placed BSC over toilet in pt's room to increase safety and independence with toilet transfers.     Assessment    Pt tolerated OT session well focused on HEP (handout provided, pink theraband) as noted above to increase strength for functional transfers and daily activities. Pt initially required max cues for correct technique during first set and mod cues for second set.     Strengths: Able to follow instructions,Alert and oriented,Effective communication skills,Good insight into deficits/needs,Independent prior level of function,Motivated for self care and independence,Pleasant and cooperative,Supportive family,Willingly participates in therapeutic activities  Barriers: Decreased endurance,Generalized  weakness,Impaired activity tolerance,Impaired balance,Limited mobility,Pain    Plan    ADLs with AE, transfers, continue posterior hip precaution education, functional mobility, fall prevention education, balance, UE strengthening      D/c Saturday 12/11 home with family     Passport items to be completed:  Perform bathroom transfers, complete dressing, complete feeding, get ready for the day, prepare a simple meal, participate in household tasks, adapt home for safety needs, demonstrate home exercise program, complete caregiver training     Occupational Therapy Goals (Active)     Problem: OT Long Term Goals     Dates: Start: 11/27/21       Goal: LTG-By discharge, patient will complete basic self care tasks with Prince to modified independence and use of AE as needed.      Dates: Start: 11/27/21          Goal: LTG-By discharge, patient will perform bathroom transfers with supervision to modified independence with use of AE as needed.      Dates: Start: 11/27/21

## 2021-12-09 NOTE — PROGRESS NOTES
"Rehab Progress Note     Encounter Date: 12/9/2021    CC: Decreased mobility, hip pain    Interval Events (Subjective)  Patient sitting up in therapy gym. She reports she is doing OK. Discussed about ESBL in her urine, will need to change to different antibiotics. She wonders if this is why she continually has had UTIs this year.    IDT Team Meeting 12/7/2021  DC/Disposition:  12/11/21    Objective:  VITAL SIGNS: /72   Pulse 67   Temp 37.1 °C (98.7 °F) (Temporal)   Resp 16   Ht 1.727 m (5' 8\")   Wt 79.5 kg (175 lb 4.3 oz)   SpO2 97%   BMI 26.65 kg/m²   Gen: NAD  Psych: Mood and affect appropriate; frustrated  CV: RRR, no edema  Resp: CTAB, no upper airway sounds  Abd: NTND  Neuro: AOx4, following commands  Unchanged from 12/8/21    Recent Results (from the past 72 hour(s))   URINALYSIS    Collection Time: 12/06/21  4:43 PM    Specimen: Urine, Clean Catch   Result Value Ref Range    Color Yellow     Character Turbid (A)     Specific Gravity 1.013 <1.035    Ph 6.5 5.0 - 8.0    Glucose Negative Negative mg/dL    Ketones Negative Negative mg/dL    Protein Negative Negative mg/dL    Bilirubin Negative Negative    Urobilinogen, Urine 1.0 Negative    Nitrite Positive (A) Negative    Leukocyte Esterase Large (A) Negative    Occult Blood Trace (A) Negative    Micro Urine Req Microscopic    URINE MICROSCOPIC (W/UA)    Collection Time: 12/06/21  4:43 PM   Result Value Ref Range    WBC Packed (A) /hpf    RBC 0-2 /hpf    Bacteria Many (A) None /hpf    Epithelial Cells Few /hpf    Epithelial Cells Renal Few /hpf    Hyaline Cast 0-2 /lpf   URINE CULTURE-EXISTING-LESS THAN 48 HOURS    Collection Time: 12/07/21 10:00 AM    Specimen: Urine, Clean Catch   Result Value Ref Range    Significant Indicator POS (POS)     Source UR     Site URINE, CLEAN CATCH     Culture Result Usual skin shannan 10-50,000 cfu/mL (A)     Culture Result (A)      Escherichia coli ESBL  >100,000 cfu/mL  Extended Spectrum Beta-lactamase (ESBL) " isolated.  ESBL's may be clinically resistant to therapy with  Penicillins,Cephalosporins or Aztreonam despite  apparent in vitro susceptibility to some of these agents.  The patient requires contact isolation.  Please contact pharmacy or an Infectious Disease Specialist  if you have any questions about appropriate therapy.         Susceptibility    Escherichia coli esbl - MONA     Ampicillin >16 Resistant mcg/mL     Ceftriaxone >32 Extended Spectrum Beta Lactamase mcg/mL     Cefazolin >16 Resistant mcg/mL     Ciprofloxacin >2 Resistant mcg/mL     Cefepime >16 Resistant mcg/mL     Cefuroxime >16 Resistant mcg/mL     Ampicillin/sulbactam <=4/2 Sensitive mcg/mL     Tobramycin <=2 Sensitive mcg/mL     Nitrofurantoin <=32 Sensitive mcg/mL     Gentamicin 4 Sensitive mcg/mL     Levofloxacin >4 Resistant mcg/mL     Minocycline <=4 Sensitive mcg/mL     Pip/Tazobactam <=8 Sensitive mcg/mL     Trimeth/Sulfa >2/38 Resistant mcg/mL     Tigecycline <=2 Sensitive mcg/mL       Current Facility-Administered Medications   Medication Frequency   • nitrofurantoin (MACROBID) capsule 100 mg BID WITH MEALS   • venlafaxine XR (EFFEXOR XR) capsule 225 mg DAILY   • vitamin D3 (cholecalciferol) tablet 2,000 Units DAILY   • amLODIPine (NORVASC) tablet 10 mg Q DAY   • oxyCODONE immediate-release (ROXICODONE) tablet 5 mg BID   • valsartan (DIOVAN) tablet 160 mg DAILY   • Respiratory Therapy Consult Continuous RT   • hydrALAZINE (APRESOLINE) tablet 25 mg Q8HRS PRN   • acetaminophen (Tylenol) tablet 650 mg Q4HRS PRN   • senna-docusate (PERICOLACE or SENOKOT S) 8.6-50 MG per tablet 2 Tablet BID    And   • polyethylene glycol/lytes (MIRALAX) PACKET 1 Packet QDAY PRN    And   • magnesium hydroxide (MILK OF MAGNESIA) suspension 30 mL QDAY PRN    And   • bisacodyl (DULCOLAX) suppository 10 mg QDAY PRN   • omeprazole (PRILOSEC) capsule 20 mg DAILY   • artificial tears ophthalmic solution 1 Drop PRN   • benzocaine-menthol (CEPACOL) lozenge 1 Lozenge  Q2HRS PRN   • mag hydrox-al hydrox-simeth (MAALOX PLUS ES or MYLANTA DS) suspension 20 mL Q2HRS PRN   • ondansetron (ZOFRAN ODT) dispertab 4 mg 4X/DAY PRN    Or   • ondansetron (ZOFRAN) syringe/vial injection 4 mg 4X/DAY PRN   • traZODone (DESYREL) tablet 50 mg QHS PRN   • sodium chloride (OCEAN) 0.65 % nasal spray 2 Spray PRN   • oxyCODONE immediate-release (ROXICODONE) tablet 5 mg Q3HRS PRN    Or   • oxyCODONE immediate release (ROXICODONE) tablet 10 mg Q3HRS PRN   • scopolamine (TRANSDERM-SCOP) patch 1 Patch Q72HRS PRN   • oxybutynin SR (DITROPAN-XL) tablet 5 mg DAILY   • acetaminophen (TYLENOL) tablet 1,000 mg TID       Orders Placed This Encounter   Procedures   • Diet Order Diet: Regular     Standing Status:   Standing     Number of Occurrences:   1     Order Specific Question:   Diet:     Answer:   Regular [1]       Assessment:  Active Hospital Problems    Diagnosis    • *Closed left hip fracture (HCC)    • Other specified anemias    • Overactive bladder    • Hydrocephalus (HCC)    • Systolic murmur    • Depression    • OA (osteoarthritis) of knee        Medical Decision Making and Plan:  Left Hip fracture - Patient with GLF with L hip fracture s/p fixation with Dr. Culver on 11/24/21. WBAT per ortho  -PT and OT for mobility and ADLs  -Follow-up Dr. Culver  -Schedule Tylenol TID for pain control. PRN Oxycodone. Added scheduled Oxycodone     Hydrocephalus - Followed by Dr. Blanchard's office. Possible intervention in January. May benefit from SLP will wait on OT evaluation.      HTN - Labile SBP post-surgery. Not on medications. Into 170s, start Valsartan 80 mg. Ongoing elevation, will increase to 160  -Into 150s, will monitor another day. Add amlodipine 5 mg ongoing, will increase to 10 mg. Occasionally into 150s on AM check will monito    Asthma - PRN Albuterol.      Anemia - Check AM CBC - 11.8, stable  -Recheck 12/6/21 - 11.8, stable.     Bladder spasms - Brought in Oxybutynin, OK to start. UA+ on  12/6/21, started on Omnicef and send urine cultures. UCx - ESBL E coli. Switch to Nitrofurantoin    GERD - Patient on Prilosec     Depression - Patient on Effexor 150 mg daily. Family reports Effexor dose is 225, will monitor for side effects     Vitamin D - 20 on labs. Start 2000 U daily    DVT Ppx - Patient on Lovenox. Ambulating > 150 feet. Discontinue    Total time:  26 minutes.  I spent greater than 50% of the time for patient care, counseling, and coordination on this date, including unit/floor time, and face-to-face time with the patient as per interval events and assessment and plan above. Topics discussed included discharge planning, UTI with resistant organism, change ABx, and improving mood about going home.      Prem Gibbons M.D.

## 2021-12-09 NOTE — CARE PLAN
Problem: Bowel Elimination  Goal: Patient will participate in bowel management program  Note: Scheduled senna given at hs.Offered milk of magnesia, pt refused and stated was given miralax earlier.LBM 12/06.Will continue to monitor.

## 2021-12-09 NOTE — THERAPY
Physical Therapy   Daily Treatment     Patient Name: Aaliyah Fulton  Age:  81 y.o., Sex:  female  Medical Record #: [id number]  Today's Date: 12/9/2021     Precautions  Precautions: Fall Risk,Posterior Hip Precautions,Weight Bearing As Tolerated Left Lower Extremity  Comments: Hydrocephalus baseline    Subjective    Pt up in wc, willing to participate     Objective       12/09/21 1001   Gait Functional Level of Assist    Gait Level Of Assist Stand by Assist   Assistive Device Front Wheel Walker   Distance (Feet) 220  (in addition to 110 ft x 2)   # of Times Distance was Traveled 1   Deviation Antalgic;Decreased Base Of Support;Bradykinetic  (L knee valgus)   Transfer Functional Level of Assist   Bed, Chair, Wheelchair Transfer Supervised   Bed Chair Wheelchair Transfer Description Adaptive equipment;Increased time;Set-up of equipment   Supine Lower Body Exercise   Bridges Two Legged;2 sets of 15   Hip Abduction Hook Lying;2 sets of 15   Short Arc Quad 2 sets of 15   Heel Slide 2 sets of 15   Ankle Pumps 2 sets of 15   Gluteal Isometrics 2 sets of 15   Quadriceps Isometrics 2 sets of 15;Left   Bed Mobility    Supine to Sit Modified Independent   Sit to Supine Modified Independent   Sit to Stand Supervised   Scooting Modified Independent   Rolling Supervised  (cues for hip abduction pillow/ positioning)   PT Total Time Spent   PT Individual Total Time Spent (Mins) 60   PT Charge Group   PT Gait Training 2   PT Therapeutic Exercise 2       Assessment    Pt continues to improve strength and mobility with FWW, able to compelte standing/ transfers/ gait and bed mobility without physical assist.    Strengths: Able to follow instructions,Adequate strength,Effective communication skills,Good insight into deficits/needs,Independent prior level of function,Making steady progress towards goals,Pleasant and cooperative,Supportive family,Willingly participates in therapeutic activities  Barriers: Decreased  endurance,Fatigue,Generalized weakness,Home accessibility,Impaired activity tolerance,Impaired balance,Pain,Limited mobility (L knee valgus, posterior hip precautions)    Plan    Progressive gait tolerance with FWW, bed mobility/ transfers and LE strength training. Review posterior hip precautions and use of abduction pillow.      Passport items to be completed:  Get in/out of bed safely, in/out of a vehicle, safely use mobility device, walk or wheel around home/community, navigate up and down stairs, show how to get up/down from the ground, ensure home is accessible, demonstrate HEP, complete caregiver training      Physical Therapy Problems (Active)     Problem: PT-Long Term Goals     Dates: Start: 11/27/21       Goal: LTG-By discharge, patient will ambulate with FWW and SPV / modified independent 100 ft x 2     Dates: Start: 11/27/21          Goal: LTG-By discharge, patient will transfer one surface to another with FWW and SPV / modified independent     Dates: Start: 11/27/21          Goal: LTG-By discharge, patient will ambulate up/down 4-6 stairs with hand rails and CGA     Dates: Start: 11/27/21          Goal: LTG-By discharge, patient will transfer in/out of a car with FWW and SBA/ CGA to maintain posterior hip precautions LLE     Dates: Start: 11/27/21

## 2021-12-10 PROCEDURE — 700102 HCHG RX REV CODE 250 W/ 637 OVERRIDE(OP): Performed by: PHYSICAL MEDICINE & REHABILITATION

## 2021-12-10 PROCEDURE — 99233 SBSQ HOSP IP/OBS HIGH 50: CPT | Performed by: PHYSICAL MEDICINE & REHABILITATION

## 2021-12-10 PROCEDURE — 97530 THERAPEUTIC ACTIVITIES: CPT

## 2021-12-10 PROCEDURE — 97116 GAIT TRAINING THERAPY: CPT

## 2021-12-10 PROCEDURE — 97110 THERAPEUTIC EXERCISES: CPT

## 2021-12-10 PROCEDURE — A9270 NON-COVERED ITEM OR SERVICE: HCPCS | Performed by: PHYSICAL MEDICINE & REHABILITATION

## 2021-12-10 PROCEDURE — 97535 SELF CARE MNGMENT TRAINING: CPT

## 2021-12-10 PROCEDURE — 770010 HCHG ROOM/CARE - REHAB SEMI PRIVAT*

## 2021-12-10 RX ORDER — VENLAFAXINE HYDROCHLORIDE 150 MG/1
150 CAPSULE, EXTENDED RELEASE ORAL DAILY
Qty: 30 CAPSULE | Refills: 2 | Status: SHIPPED | OUTPATIENT
Start: 2021-12-10

## 2021-12-10 RX ORDER — VENLAFAXINE HYDROCHLORIDE 75 MG/1
75 TABLET, EXTENDED RELEASE ORAL DAILY
Qty: 30 TABLET | Refills: 2 | Status: SHIPPED | OUTPATIENT
Start: 2021-12-10 | End: 2021-12-13

## 2021-12-10 RX ORDER — VALSARTAN 160 MG/1
160 TABLET ORAL DAILY
Qty: 30 TABLET | Refills: 2 | Status: SHIPPED | OUTPATIENT
Start: 2021-12-11

## 2021-12-10 RX ORDER — OMEPRAZOLE 40 MG/1
40 CAPSULE, DELAYED RELEASE ORAL DAILY
Qty: 30 CAPSULE | Refills: 2 | Status: SHIPPED | OUTPATIENT
Start: 2021-12-10

## 2021-12-10 RX ORDER — NITROFURANTOIN 25; 75 MG/1; MG/1
100 CAPSULE ORAL 2 TIMES DAILY WITH MEALS
Qty: 14 CAPSULE | Refills: 0 | Status: ON HOLD | OUTPATIENT
Start: 2021-12-10 | End: 2022-01-10

## 2021-12-10 RX ORDER — OXYCODONE HYDROCHLORIDE 5 MG/1
5 TABLET ORAL EVERY 8 HOURS PRN
Qty: 20 TABLET | Refills: 0 | Status: SHIPPED | OUTPATIENT
Start: 2021-12-10 | End: 2021-12-24

## 2021-12-10 RX ORDER — AMLODIPINE BESYLATE 10 MG/1
10 TABLET ORAL DAILY
Qty: 30 TABLET | Refills: 2 | Status: SHIPPED | OUTPATIENT
Start: 2021-12-11 | End: 2023-03-21

## 2021-12-10 RX ORDER — OXYBUTYNIN CHLORIDE 15 MG/1
15 TABLET, EXTENDED RELEASE ORAL
Qty: 30 TABLET | Refills: 2 | Status: ON HOLD | OUTPATIENT
Start: 2021-12-10 | End: 2023-04-14

## 2021-12-10 RX ADMIN — OXYBUTYNIN CHLORIDE 5 MG: 5 TABLET, EXTENDED RELEASE ORAL at 08:28

## 2021-12-10 RX ADMIN — BISACODYL 10 MG: 10 SUPPOSITORY RECTAL at 18:30

## 2021-12-10 RX ADMIN — VENLAFAXINE HYDROCHLORIDE 225 MG: 75 CAPSULE, EXTENDED RELEASE ORAL at 08:28

## 2021-12-10 RX ADMIN — OMEPRAZOLE 20 MG: 20 CAPSULE, DELAYED RELEASE ORAL at 08:27

## 2021-12-10 RX ADMIN — VALSARTAN 160 MG: 80 TABLET, FILM COATED ORAL at 08:27

## 2021-12-10 RX ADMIN — NITROFURANTOIN (MONOHYDRATE/MACROCRYSTALS) 100 MG: 75; 25 CAPSULE ORAL at 18:20

## 2021-12-10 RX ADMIN — ACETAMINOPHEN 1000 MG: 500 TABLET, FILM COATED ORAL at 20:56

## 2021-12-10 RX ADMIN — Medication 2000 UNITS: at 08:27

## 2021-12-10 RX ADMIN — TRAZODONE HYDROCHLORIDE 50 MG: 50 TABLET ORAL at 20:58

## 2021-12-10 RX ADMIN — SENNOSIDES AND DOCUSATE SODIUM 2 TABLET: 50; 8.6 TABLET ORAL at 08:27

## 2021-12-10 RX ADMIN — OXYCODONE HYDROCHLORIDE 5 MG: 5 TABLET ORAL at 08:27

## 2021-12-10 RX ADMIN — ACETAMINOPHEN 1000 MG: 500 TABLET, FILM COATED ORAL at 15:59

## 2021-12-10 RX ADMIN — ACETAMINOPHEN 1000 MG: 500 TABLET, FILM COATED ORAL at 08:27

## 2021-12-10 RX ADMIN — NITROFURANTOIN (MONOHYDRATE/MACROCRYSTALS) 100 MG: 75; 25 CAPSULE ORAL at 08:28

## 2021-12-10 RX ADMIN — OXYCODONE HYDROCHLORIDE 5 MG: 5 TABLET ORAL at 11:38

## 2021-12-10 RX ADMIN — SENNOSIDES AND DOCUSATE SODIUM 2 TABLET: 50; 8.6 TABLET ORAL at 20:56

## 2021-12-10 RX ADMIN — AMLODIPINE BESYLATE 10 MG: 5 TABLET ORAL at 08:27

## 2021-12-10 ASSESSMENT — PAIN DESCRIPTION - PAIN TYPE: TYPE: ACUTE PAIN

## 2021-12-10 ASSESSMENT — ACTIVITIES OF DAILY LIVING (ADL)
TOILET_TRANSFER_LEVEL_OF_ASSIST: ABLE TO COMPLETE TOILET TRANSFER WITHOUT ASSIST
TUB_SHOWER_TRANSFER_DESCRIPTION: ADAPTIVE EQUIPMENT;GRAB BAR;SHOWER BENCH;INCREASED TIME;INITIAL PREPARATION FOR TASK;SET-UP OF EQUIPMENT;SUPERVISION FOR SAFETY;VERBAL CUEING
SHOWER_TRANSFER_LEVEL_OF_ASSIST: REQUIRES SUPERVISION WITH SHOWER TRANSFER
BED_CHAIR_WHEELCHAIR_TRANSFER_DESCRIPTION: ADAPTIVE EQUIPMENT;INCREASED TIME;SET-UP OF EQUIPMENT
TOILETING_LEVEL_OF_ASSIST: REQUIRES SUPERVISION WITH TOILETING

## 2021-12-10 ASSESSMENT — GAIT ASSESSMENTS
ASSISTIVE DEVICE: FRONT WHEEL WALKER
DEVIATION: ANTALGIC;DECREASED BASE OF SUPPORT;BRADYKINETIC
GAIT LEVEL OF ASSIST: SUPERVISED
DISTANCE (FEET): 150

## 2021-12-10 NOTE — CARE PLAN
Problem: OT Long Term Goals  Goal: LTG-By discharge, patient will complete basic self care tasks with Prince to modified independence and use of AE as needed.   Outcome: Met  Goal: LTG-By discharge, patient will perform bathroom transfers with supervision to modified independence with use of AE as needed.   Outcome: Met

## 2021-12-10 NOTE — DISCHARGE SUMMARY
Rehab Discharge Summary    Admission Date: 11/26/2021    Discharge Date: 12/11/2021     Attending Provider: Devyn Olvera MD for Prem Gibbons MD/PhD    Admission Diagnosis:   Active Hospital Problems    Diagnosis    • *Closed left hip fracture (HCC)    • Other specified anemias    • Overactive bladder    • Hydrocephalus (HCC)    • Systolic murmur    • Depression    • OA (osteoarthritis) of knee        Discharge Diagnosis:  Active Hospital Problems    Diagnosis    • *Closed left hip fracture (HCC)    • Other specified anemias    • Overactive bladder    • Hydrocephalus (HCC)    • Systolic murmur    • Depression    • OA (osteoarthritis) of knee        HPI per H&P:  Patient is a 81 y.o. female with a PMH of OA< asthma, HTN, hydrocephalus and depression who was admitted on 11/23/21 after a GLF at home. Patient reportedly was taking the trash out when she lost balance, felt dizzy, and fell onto her left side. Patient was found to have left hip fracture on trauma screen. Patient is now s/p surgical fixation on 11/24/21 with Dr. Culver. Of note, patient is followed by Dr. Blanchard for hydrocephalus and scheduled for possible shunt in January.  Post-operative course has been complicated by anemia and labile SBP.  Patient evaluated by PT/OT and recommend ongoing rehabilitation.     Patient was admitted to [facility] on 11/26/2021.     Hospital Course by Problem List:  Left Hip fracture - Patient with GLF with L hip fracture s/p fixation with Dr. Culver on 11/24/21. WBAT per ortho. Patient underwent acute inpatient rehabilitation from 11/26/21 to 12/11/21 with good improvement in mobility and ADLs.   -Follow-up Dr. Culver  -Schedule Tylenol TID for pain control. PRN Oxycodone. Added scheduled Oxycodone  I discussed the risks and benefits of using opiate medications for pain control.  I discussed the risk of addiction, potential for overdose, and respiratory depression (and the potential  need for opiate antagonist therapy if this occurs).  I encouraged the patient to take this medication sparingly with the expressed goal of weaning off the medication as soon as is clinically appropriate.  I informed the patient that we are only able to provide a 14 day supply of these medications at discharge and that they will be responsible for requesting any refills needed from their primary care provider or their surgeon.  We discussed the need to safely secure these medications to prevent theft, inadvertent ingestion, or misuse.  Any unused medication should be immediately disposed of through a sanctioned medication disposal program.  We discussed adjunctive pain medications and conservative therapies at length. I answered the patient's questions regarding this treatment, and the patient indicated understanding and willingness to proceed.     Hydrocephalus - Followed by Dr. Blanchard's office. Possible intervention in January. May benefit from SLP will wait on OT evaluation.      HTN - Labile SBP post-surgery. Not on medications. Into 170s, start Valsartan 80 mg. Ongoing elevation, will increase to 160. Into 150s, will monitor another day. Add amlodipine 5 mg ongoing, will increase to 10 mg. Occasionally into 150s on AM. Rather high than too low in her age group.      Asthma - PRN Albuterol.      Anemia - Check AM CBC - 11.8, stable  -Recheck 12/6/21 - 11.8, stable.      Bladder spasms - Brought in Oxybutynin, OK to start. UA+ on 12/6/21, started on Omnicef and send urine cultures. UCx - ESBL E coli. Switch to Nitrofurantoin     GERD - Patient on Prilosec     Depression - Patient on Effexor 150 mg daily. Family reports Effexor dose is 225, will monitor for side effects     Vitamin D - 20 on labs. Start 2000 U daily     DVT Ppx - Patient on Lovenox. Ambulating > 150 feet. Discontinue       Functional Status at Discharge  Eating:  Independent  Eating Description:   (setup )  Grooming:   Supervision,Standing  Grooming Description:  Standing at sink  Bathing:  Supervision  Bathing Description:  Adaptive equipment,Grab bar,Hand held shower,Long handled bath tool,Tub bench,Increased time,Initial preparation for task,Set-up of equipment,Supervision for safety,Verbal cueing  Upper Body Dressing:  Independent  Upper Body Dressing Description:  Increased time  Lower Body Dressing:  Supervision  Lower Body Dressing Description:  Assistive devices,Reacher,Sock aid,Increased time,Initial preparation for task,Supervision for safety,Verbal cueing,Set-up of equipment  Discharge Location : Home  Patient Discharging with Assist of: Family   Level of Supervision Required: Intermittent Supervision  Recommended Equipment for Discharge: Front-Wheeled Walker;Tub Transfer Bench;3 in 1 Commode;Grab Bars by Toilet;Grab Bars in Tub / Shower;Hand Held Shower Head;Sock Aid;Reacher;Long Handled Shoe Horn;Dressing Stick  Recommended Services Upon Discharge: Home Health Occupational Therapy  Long Term Goals Met: 2  Long Term Goals Not Met: 0  Reason(s) for Goals Not Met: NA   Criteria for Termination of Services: Maximum Function Achieved for Inpatient Rehabilitation  Walk:  Supervised  Distance Walked:  150 (in addition to 200 ft x 2)  Number of Times Distance Was Traveled:  2  Assistive Device:  Front Wheel Walker  Gait Deviation:  Antalgic,Decreased Base Of Support,Bradykinetic (L knee valgus)  Wheelchair:     Distance Propelled:      Wheelchair Description:     Stairs Stand by Assist  Stairs Description Extra time,Hand rails,Verbal cueing     Comprehension:  Modified Independent  Comprehension Description:  Glasses (readers )  Expression:  Independent  Expression Description:     Social Interaction:     Social Interaction Description:     Problem Solving:  Supervision  Problem Solving Description:     Memory:  Supervision  Memory Description:          Devyn SEQUEIRA MD, personally performed a complete drug regimen review  and no potential clinically significant medication issues were identified.   Discharge Medication:     Medication List      ASK your doctor about these medications      Instructions   Calcium 600 600 MG Tabs  Generic drug: Calcium Carbonate   Take 600 mg by mouth every day.  Dose: 600 mg     CRANBERRY EXTRACT PO   Take 650 mg by mouth every day.  Dose: 650 mg     omeprazole 40 MG delayed-release capsule  Commonly known as: PRILOSEC   Take 40 mg by mouth every day.  Dose: 40 mg     oxybutynin 15 MG CR tablet  Commonly known as: DITROPAN XL   Take 15 mg by mouth every day.  Dose: 15 mg     * Venlafaxine HCl 75 MG Tb24   Take 75 mg by mouth every day. Taken with 150 mg for 225mg daily  Dose: 75 mg     * venlafaxine 150 MG extended-release capsule  Commonly known as: EFFEXOR-XR   Take 150 mg by mouth every day. Taken with 75 mg for 225mg daily  Dose: 150 mg     vitamin D 1000 Unit Tabs  Commonly known as: Cholecalciferol   Take 1,000 Units by mouth every day.  Dose: 1,000 Units         * This list has 2 medication(s) that are the same as other medications prescribed for you. Read the directions carefully, and ask your doctor or other care provider to review them with you.                Discharge Diet:  Regular    Discharge Activity:  As tolerated     Disposition:  Patient to discharge home with family support and community resources.     Equipment:  FWW    Follow-up & Discharge Instructions:  Follow up with your primary care provider (PCP) within 7-10 days of discharge to review your medications and take over your care.     If you develop chest pain, fever, chills, change in neurologic function (weakness, sensation changes, vision changes), or other concerning sxs, seek immediate medical attention or call 911.      Condition on Discharge:  Good    More than 34 minutes was spent on discharging this patient, including face-to-face time, prescription management, and the dictation of this note.    Devyn Olvera MD      Date of Service: 12/11/2021

## 2021-12-10 NOTE — PROGRESS NOTES
"Rehab Progress Note     Encounter Date: 12/10/2021    CC: Decreased mobility, hip pain    Interval Events (Subjective)  Patient sitting up in room. She reports she is ready to go home tomorrow. She reports she is very tired but very appreciative of therapy staff. Discussed about discharge medications. Discussed they will be sent in today. Denies NVD. Denies SOB.     IDT Team Meeting 12/7/2021  DC/Disposition:  12/11/21    Objective:  VITAL SIGNS: /71   Pulse 89   Temp 36.4 °C (97.5 °F) (Temporal)   Resp 17   Ht 1.727 m (5' 8\")   Wt 79.5 kg (175 lb 4.3 oz)   SpO2 95%   BMI 26.65 kg/m²   Gen: NAD  Psych: Mood and affect appropriate  CV: RRR, no edema  Resp: CTAB, no upper airway sounds  Abd: NTND  Neuro: AOx4, pushing wheelchair BUE    No results found for this or any previous visit (from the past 72 hour(s)).    Current Facility-Administered Medications   Medication Frequency   • nitrofurantoin (MACROBID) capsule 100 mg BID WITH MEALS   • venlafaxine XR (EFFEXOR XR) capsule 225 mg DAILY   • vitamin D3 (cholecalciferol) tablet 2,000 Units DAILY   • amLODIPine (NORVASC) tablet 10 mg Q DAY   • oxyCODONE immediate-release (ROXICODONE) tablet 5 mg BID   • valsartan (DIOVAN) tablet 160 mg DAILY   • Respiratory Therapy Consult Continuous RT   • hydrALAZINE (APRESOLINE) tablet 25 mg Q8HRS PRN   • acetaminophen (Tylenol) tablet 650 mg Q4HRS PRN   • senna-docusate (PERICOLACE or SENOKOT S) 8.6-50 MG per tablet 2 Tablet BID    And   • polyethylene glycol/lytes (MIRALAX) PACKET 1 Packet QDAY PRN    And   • magnesium hydroxide (MILK OF MAGNESIA) suspension 30 mL QDAY PRN    And   • bisacodyl (DULCOLAX) suppository 10 mg QDAY PRN   • omeprazole (PRILOSEC) capsule 20 mg DAILY   • artificial tears ophthalmic solution 1 Drop PRN   • benzocaine-menthol (CEPACOL) lozenge 1 Lozenge Q2HRS PRN   • mag hydrox-al hydrox-simeth (MAALOX PLUS ES or MYLANTA DS) suspension 20 mL Q2HRS PRN   • ondansetron (ZOFRAN ODT) dispertab 4 mg " 4X/DAY PRN    Or   • ondansetron (ZOFRAN) syringe/vial injection 4 mg 4X/DAY PRN   • traZODone (DESYREL) tablet 50 mg QHS PRN   • sodium chloride (OCEAN) 0.65 % nasal spray 2 Spray PRN   • oxyCODONE immediate-release (ROXICODONE) tablet 5 mg Q3HRS PRN    Or   • oxyCODONE immediate release (ROXICODONE) tablet 10 mg Q3HRS PRN   • scopolamine (TRANSDERM-SCOP) patch 1 Patch Q72HRS PRN   • oxybutynin SR (DITROPAN-XL) tablet 5 mg DAILY   • acetaminophen (TYLENOL) tablet 1,000 mg TID       Orders Placed This Encounter   Procedures   • Diet Order Diet: Regular     Standing Status:   Standing     Number of Occurrences:   1     Order Specific Question:   Diet:     Answer:   Regular [1]       Assessment:  Active Hospital Problems    Diagnosis    • *Closed left hip fracture (HCC)    • Other specified anemias    • Overactive bladder    • Hydrocephalus (HCC)    • Systolic murmur    • Depression    • OA (osteoarthritis) of knee        Medical Decision Making and Plan:  Left Hip fracture - Patient with GLF with L hip fracture s/p fixation with Dr. Culver on 11/24/21. WBAT per ortho  -PT and OT for mobility and ADLs  -Follow-up Dr. Culver  -Schedule Tylenol TID for pain control. PRN Oxycodone. Added scheduled Oxycodone  I discussed the risks and benefits of using opiate medications for pain control.  I discussed the risk of addiction, potential for overdose, and respiratory depression (and the potential need for opiate antagonist therapy if this occurs).  I encouraged the patient to take this medication sparingly with the expressed goal of weaning off the medication as soon as is clinically appropriate.  I informed the patient that we are only able to provide a 14 day supply of these medications at discharge and that they will be responsible for requesting any refills needed from their primary care provider or their surgeon.  We discussed the need to safely secure these medications to prevent theft, inadvertent ingestion, or  misuse.  Any unused medication should be immediately disposed of through a sanctioned medication disposal program.  We discussed adjunctive pain medications and conservative therapies at length. I answered the patient's questions regarding this treatment, and the patient indicated understanding and willingness to proceed.    Hydrocephalus - Followed by Dr. Blanchard's office. Possible intervention in January. May benefit from SLP will wait on OT evaluation.      HTN - Labile SBP post-surgery. Not on medications. Into 170s, start Valsartan 80 mg. Ongoing elevation, will increase to 160. Into 150s, will monitor another day. Add amlodipine 5 mg ongoing, will increase to 10 mg. Occasionally into 150s on AM. Rather high than too low in her age group.     Asthma - PRN Albuterol.      Anemia - Check AM CBC - 11.8, stable  -Recheck 12/6/21 - 11.8, stable.     Bladder spasms - Brought in Oxybutynin, OK to start. UA+ on 12/6/21, started on Omnicef and send urine cultures. UCx - ESBL E coli. Switch to Nitrofurantoin    GERD - Patient on Prilosec     Depression - Patient on Effexor 150 mg daily. Family reports Effexor dose is 225, will monitor for side effects     Vitamin D - 20 on labs. Start 2000 U daily    DVT Ppx - Patient on Lovenox. Ambulating > 150 feet. Discontinue    Total time:  36 minutes.  I spent greater than 50% of the time for patient care, counseling, and coordination on this date, including unit/floor time, and face-to-face time with the patient as per interval events and assessment and plan above. Topics discussed included discharge planning, discharge medications, and opiate counseling.      Prem Gibbons M.D.

## 2021-12-10 NOTE — THERAPY
"Physical Therapy   Daily Treatment     Patient Name: Aaliyah Fulton  Age:  81 y.o., Sex:  female  Medical Record #: [id number]  Today's Date: 12/10/2021     Precautions  Precautions: Fall Risk,Posterior Hip Precautions,Weight Bearing As Tolerated Left Lower Extremity  Comments: Hydrocephalus baseline, ESBL infection - per nursing, pt can come out of room but all surfaces must be cleaned after use    Subjective    Pt up in , willing to participate     Objective       12/10/21 1031   Gait Functional Level of Assist    Gait Level Of Assist Supervised   Assistive Device Front Wheel Walker   Distance (Feet) 150  (in addition to 200 ft x 2)   # of Times Distance was Traveled 2   Deviation Antalgic;Decreased Base Of Support;Bradykinetic  (L knee valgus)   Stairs Functional Level of Assist   Level of Assist with Stairs Stand by Assist   # of Stairs Climbed 4   Stairs Description Extra time;Hand rails;Verbal cueing   Transfer Functional Level of Assist   Bed, Chair, Wheelchair Transfer Supervised   Bed Chair Wheelchair Transfer Description Adaptive equipment;Increased time;Set-up of equipment   Sitting Lower Body Exercises   Ankle Pumps 3 sets of 15   Hip Flexion 3 sets of 15   Hip Abduction 3 sets of 15   Hip Adduction 3 sets of 15   Long Arc Quad 3 sets of 15   Hamstring Curl 3 sets of 15   Comments 1.5# wts/ light resist TB   Bed Mobility    Supine to Sit Modified Independent   Sit to Supine Modified Independent   Sit to Stand Supervised   Scooting Modified Independent   Rolling Supervised  (cues for hip abduction pillow/ positioning)   Interdisciplinary Plan of Care Collaboration   Patient Position at End of Therapy Seated;Call Light within Reach;Tray Table within Reach;Phone within Reach   PT Total Time Spent   PT Individual Total Time Spent (Mins) 60   PT Charge Group   PT Gait Training 2   PT Therapeutic Exercise 1   PT Therapeutic Activities 1     Pt completed 6\" curb step and threshold with FWW and close SBA/ CGA " for steadying with walker management.     Assessment    Pt requires verbal cues for safety/ sequencing on stairs, continues to improve overall household independence with FWW.    Strengths: Able to follow instructions,Adequate strength,Effective communication skills,Good insight into deficits/needs,Independent prior level of function,Making steady progress towards goals,Pleasant and cooperative,Supportive family,Willingly participates in therapeutic activities  Barriers: Decreased endurance,Fatigue,Generalized weakness,Home accessibility,Impaired activity tolerance,Impaired balance,Pain,Limited mobility (L knee valgus, posterior hip precautions)    Plan    Car transfers, review HEP, fall prevention education.        Physical Therapy Problems (Active)     Problem: PT-Long Term Goals     Dates: Start: 11/27/21       Goal: LTG-By discharge, patient will ambulate with FWW and SPV / modified independent 100 ft x 2     Dates: Start: 11/27/21          Goal: LTG-By discharge, patient will transfer one surface to another with FWW and SPV / modified independent     Dates: Start: 11/27/21          Goal: LTG-By discharge, patient will ambulate up/down 4-6 stairs with hand rails and CGA     Dates: Start: 11/27/21          Goal: LTG-By discharge, patient will transfer in/out of a car with FWW and SBA/ CGA to maintain posterior hip precautions LLE     Dates: Start: 11/27/21

## 2021-12-10 NOTE — THERAPY
Occupational Therapy  Daily Treatment     Patient Name: Aaliyah Fulton  Age:  81 y.o., Sex:  female  Medical Record #: [id number]  Today's Date: 12/10/2021     Precautions  Precautions: Fall Risk,Posterior Hip Precautions,Weight Bearing As Tolerated Left Lower Extremity  Comments: Hydrocephalus baseline, ESBL infection - per nursing, pt can come out of room but all surfaces must be cleaned after use    Safety   ADL Safety : Requires Supervision for Safety  Bathroom Safety: Requires Supervision for Safety    Subjective    Pt sitting up in w/c upon arrival, agreeable to OT session.      Objective     12/10/21 1301   Sitting Upper Body Exercises   Upper Extremity Bike Level 3 Resistance  (Fluidobike 10 minutes for general endurance/UE strength )   Interdisciplinary Plan of Care Collaboration   Patient Position at End of Therapy Seated;Call Light within Reach;Tray Table within Reach;Phone within Reach   OT Total Time Spent   OT Individual Total Time Spent (Mins) 30   OT Charge Group   OT Therapy Activity 1   OT Therapeutic Exercise  1     Pt placed ornaments on facility's [place] tree in standing to focus on standing tolerance, dynamic reaching, and functional mobility with FWW.     Assessment    Pt's standing tolerance continues to be limited to around 5 minutes before requiring seated rest break. Has demo'd improved endurance over the course of her stay. Pt reflected on the progress she's made and was very appreciative.     Strengths: Able to follow instructions,Alert and oriented,Effective communication skills,Good insight into deficits/needs,Independent prior level of function,Motivated for self care and independence,Pleasant and cooperative,Supportive family,Willingly participates in therapeutic activities  Barriers: Decreased endurance,Generalized weakness,Impaired activity tolerance,Impaired balance,Limited mobility,Pain    Plan     D/c home with support of family tomorrow 12/11    Occupational Therapy Goals  (Active)     There are no active problems.

## 2021-12-10 NOTE — CARE PLAN
"  Problem: Pain - Standard  Goal: Alleviation of pain or a reduction in pain to the patient’s comfort goal  Outcome: Progressing  Note: Assessed for pain and discomfort ,denies pain, needs anticipated, . Left hip incision lines with steri strips, SABINA.     Problem: Fall Risk - Rehab  Goal: Patient will remain free from falls  Outcome: Progressing  Note: Diana Becker Fall risk Assessment Score: 11    Moderate fall risk Interventions  - Bed and strip alarm   - Yellow sign by the door   - Yellow wrist band \"Fall risk\"  - Room near to the nurse station  - Do not leave patient unattended in the bathroom  - Fall risk education provided         The patient is Stable - Low risk of patient condition declining or worsening    Shift Goals  Clinical Goals: Safety, pain management  Patient Goals: Increase strength and control pain    Progress made toward(s) clinical / shift goals:  Pain under control.    "

## 2021-12-10 NOTE — CARE PLAN
"The patient is Stable - Low risk of patient condition declining or worsening      Problem: Fall Risk - Rehab  Goal: Patient will remain free from falls  Note: Diana Becker Fall risk Assessment Score: 11      Moderate fall risk Interventions  - Bed and strip alarm   - Yellow sign by the door   - Yellow wrist band \"Fall risk\"  - Room near to the nurse station  - Do not leave patient unattended in the bathroom  - Fall risk education provided      Last BM noted 12/6, MOM given awaiting result.  "

## 2021-12-10 NOTE — THERAPY
Physical Therapy   Daily Treatment     Patient Name: Aaliyah Fulton  Age:  81 y.o., Sex:  female  Medical Record #: [id number]  Today's Date: 12/10/2021     Precautions  Precautions: Fall Risk,Posterior Hip Precautions,Weight Bearing As Tolerated Left Lower Extremity  Comments: Hydrocephalus baseline, ESBL infection - per nursing, pt can come out of room but all surfaces must be cleaned after use    Subjective    Pt up in wc, willing to participate     Objective       12/10/21 1431   Supine Lower Body Exercise   Bridges Two Legged;1 set of 10   Hip Abduction Hook Lying;1 set of 10   Short Arc Quad 1 set of 10   Heel Slide 1 set of 10   Ankle Pumps Reciprocal;1 set of 10   Gluteal Isometrics 1 set of 10   Quadriceps Isometrics 1 set of 10;Left   Comments HEP provided   PT Total Time Spent   PT Individual Total Time Spent (Mins) 30   PT Charge Group   PT Therapeutic Exercise 1   PT Therapeutic Activities 1     Pt completed transfer to car with FWW and SBA for safety, ambulated through curb cut-out and pavement with FWW.  Pt completed transfer wc<>therapy mat modified independent after set-up.     Assessment    Pt reports some low confidence with transition home but overall pleased with improvements in rehab. Reports son-in-law will be installing 2 rails for entry to home prior to d/c tomorrow.     Strengths: Able to follow instructions,Adequate strength,Effective communication skills,Good insight into deficits/needs,Independent prior level of function,Making steady progress towards goals,Pleasant and cooperative,Supportive family,Willingly participates in therapeutic activities  Barriers: Decreased endurance,Fatigue,Generalized weakness,Home accessibility,Impaired activity tolerance,Impaired balance,Pain,Limited mobility (L knee valgus, posterior hip precautions)    Plan    D/c with home health PT      Physical Therapy Problems (Active)     Problem: PT-Long Term Goals     Dates: Start: 11/27/21       Goal: LTG-By  discharge, patient will ambulate with FWW and SPV / modified independent 100 ft x 2     Dates: Start: 11/27/21          Goal: LTG-By discharge, patient will transfer one surface to another with FWW and SPV / modified independent     Dates: Start: 11/27/21          Goal: LTG-By discharge, patient will ambulate up/down 4-6 stairs with hand rails and CGA     Dates: Start: 11/27/21          Goal: LTG-By discharge, patient will transfer in/out of a car with FWW and SBA/ CGA to maintain posterior hip precautions LLE     Dates: Start: 11/27/21

## 2021-12-10 NOTE — THERAPY
"Occupational Therapy  Daily Treatment     Patient Name: Aaliyah Fulton  Age:  81 y.o., Sex:  female  Medical Record #: [id number]  Today's Date: 12/10/2021     Precautions  Precautions: (P) Fall Risk,Posterior Hip Precautions,Weight Bearing As Tolerated Left Lower Extremity  Comments: (P) Hydrocephalus baseline, ESBL infection - per nursing, pt can come out of room but all surfaces must be cleaned after use    Safety   ADL Safety : Requires Supervision for Safety  Bathroom Safety: Requires Supervision for Safety    Subjective    \"I feel ready to go home but also not ready.\"     Objective       12/10/21 0831   Precautions   Precautions Fall Risk;Posterior Hip Precautions;Weight Bearing As Tolerated Left Lower Extremity   Comments Hydrocephalus baseline, ESBL infection - per nursing, pt can come out of room but all surfaces must be cleaned after use   Functional Level of Assist   Lower Body Dressing Minimal Assist  (To don R shoe)   Sitting Upper Body Exercises   Tricep Press 3 sets of 10;Bilateral;Weight (See Comments for lbs)  (15#, rickshaw)   Upper Extremity Bike Level 1 Resistance  (Fluids bike, 10 minutes )   Interdisciplinary Plan of Care Collaboration   Patient Position at End of Therapy Seated;Phone within Reach;Tray Table within Reach;Call Light within Reach   OT Total Time Spent   OT Individual Total Time Spent (Mins) 30   OT Charge Group   OT Self Care / ADL 1   OT Therapeutic Exercise  1   Pt participated in conversation about d/c home tomorrow. Pt reports her daughters live near and will be able to assist with some things. Pt reports that she usually assists her  and she is not sure if she is ready to go home to help him.     Assessment    Pt tolerated session well focused on d/c planning and endurance. Pt reports she feels ok going home tomorrow but is still a little nervous.   Strengths: Able to follow instructions,Alert and oriented,Effective communication skills,Good insight into " deficits/needs,Independent prior level of function,Motivated for self care and independence,Pleasant and cooperative,Supportive family,Willingly participates in therapeutic activities  Barriers: Decreased endurance,Generalized weakness,Impaired activity tolerance,Impaired balance,Limited mobility,Pain    Plan    D/c home tomorrow    Occupational Therapy Goals (Active)     There are no active problems.

## 2021-12-10 NOTE — THERAPY
Occupational Therapy  Daily Treatment     Patient Name: Aaliyah Fulton  Age:  81 y.o., Sex:  female  Medical Record #: [id number]  Today's Date: 12/10/2021     Precautions  Precautions: Fall Risk,Posterior Hip Precautions,Weight Bearing As Tolerated Left Lower Extremity  Comments: Hydrocephalus baseline, ESBL infection - per nursing, pt can come out of room but all surfaces must be cleaned after use    Safety   ADL Safety : (P) Requires Supervision for Safety  Bathroom Safety: (P) Requires Supervision for Safety    Subjective    Pt sitting up in w/c in room with her daughter at bedside, agreeable to OT session.      Objective     12/10/21 0931   Safety    ADL Safety  Requires Supervision for Safety   Bathroom Safety Requires Supervision for Safety   Non Verbal Descriptors   Non Verbal Scale  Calm   Functional Level of Assist   Bathing Supervision   Bathing Description Adaptive equipment;Grab bar;Hand held shower;Long handled bath tool;Tub bench;Increased time;Initial preparation for task;Set-up of equipment;Supervision for safety;Verbal cueing   Upper Body Dressing Independent   Lower Body Dressing Supervision   Lower Body Dressing Description Assistive devices;Reacher;Sock aid;Increased time;Initial preparation for task;Supervision for safety;Verbal cueing;Set-up of equipment   Tub / Shower Transfers Supervised   Tub Shower Transfer Description Adaptive equipment;Grab bar;Shower bench;Increased time;Initial preparation for task;Set-up of equipment;Supervision for safety;Verbal cueing   IADL Treatments   Kitchen Mobility Education Pt ambulated around kitchen with FWW and SBA opening cabinets, positioning walker in front of counters when reaching up, and opening fridge.    Interdisciplinary Plan of Care Collaboration   IDT Collaboration with  Family / Caregiver   Patient Position at End of Therapy Seated;Call Light within Reach;Tray Table within Reach;Phone within Reach   Collaboration Comments Pt's daughter present  during beginning of session    OT Total Time Spent   OT Individual Total Time Spent (Mins) 60   OT Charge Group   OT Self Care / ADL 3   OT Therapy Activity 1       Assessment    Pt tolerated OT session well focused on ADLs with AE to maintain hip precautions and kitchen mobility with FWW. Pt continues to require verbal cues to maintain hip precautions during ADLs but has better recall than previously.   Strengths: Able to follow instructions,Alert and oriented,Effective communication skills,Good insight into deficits/needs,Independent prior level of function,Motivated for self care and independence,Pleasant and cooperative,Supportive family,Willingly participates in therapeutic activities  Barriers: Decreased endurance,Generalized weakness,Impaired activity tolerance,Impaired balance,Limited mobility,Pain    Plan    D/c home tomorrow 12/11 with support of family         Occupational Therapy Goals (Active)     Problem: OT Long Term Goals     Dates: Start: 11/27/21       Goal: LTG-By discharge, patient will complete basic self care tasks with Prince to modified independence and use of AE as needed.      Dates: Start: 11/27/21          Goal: LTG-By discharge, patient will perform bathroom transfers with supervision to modified independence with use of AE as needed.      Dates: Start: 11/27/21

## 2021-12-11 VITALS
HEIGHT: 68 IN | RESPIRATION RATE: 16 BRPM | OXYGEN SATURATION: 93 % | BODY MASS INDEX: 26.56 KG/M2 | HEART RATE: 85 BPM | TEMPERATURE: 97.7 F | SYSTOLIC BLOOD PRESSURE: 113 MMHG | DIASTOLIC BLOOD PRESSURE: 57 MMHG | WEIGHT: 175.27 LBS

## 2021-12-11 PROCEDURE — 770010 HCHG ROOM/CARE - REHAB SEMI PRIVAT*

## 2021-12-11 PROCEDURE — A9270 NON-COVERED ITEM OR SERVICE: HCPCS | Performed by: PHYSICAL MEDICINE & REHABILITATION

## 2021-12-11 PROCEDURE — 700102 HCHG RX REV CODE 250 W/ 637 OVERRIDE(OP): Performed by: PHYSICAL MEDICINE & REHABILITATION

## 2021-12-11 PROCEDURE — 99239 HOSP IP/OBS DSCHRG MGMT >30: CPT | Performed by: PHYSICAL MEDICINE & REHABILITATION

## 2021-12-11 RX ADMIN — OMEPRAZOLE 20 MG: 20 CAPSULE, DELAYED RELEASE ORAL at 09:05

## 2021-12-11 RX ADMIN — Medication 2000 UNITS: at 09:04

## 2021-12-11 RX ADMIN — NITROFURANTOIN (MONOHYDRATE/MACROCRYSTALS) 100 MG: 75; 25 CAPSULE ORAL at 08:11

## 2021-12-11 RX ADMIN — VALSARTAN 160 MG: 80 TABLET, FILM COATED ORAL at 09:03

## 2021-12-11 RX ADMIN — OXYBUTYNIN CHLORIDE 5 MG: 5 TABLET, EXTENDED RELEASE ORAL at 09:04

## 2021-12-11 RX ADMIN — SENNOSIDES AND DOCUSATE SODIUM 2 TABLET: 50; 8.6 TABLET ORAL at 09:05

## 2021-12-11 RX ADMIN — OXYCODONE HYDROCHLORIDE 5 MG: 5 TABLET ORAL at 08:00

## 2021-12-11 RX ADMIN — AMLODIPINE BESYLATE 10 MG: 5 TABLET ORAL at 09:05

## 2021-12-11 RX ADMIN — ACETAMINOPHEN 1000 MG: 500 TABLET, FILM COATED ORAL at 09:04

## 2021-12-11 RX ADMIN — OXYCODONE HYDROCHLORIDE 5 MG: 5 TABLET ORAL at 12:09

## 2021-12-11 RX ADMIN — VENLAFAXINE HYDROCHLORIDE 225 MG: 75 CAPSULE, EXTENDED RELEASE ORAL at 09:03

## 2021-12-11 NOTE — DISCHARGE INSTRUCTIONS
[facility] NURSING DISCHARGE INSTRUCTIONS    Blood Pressure : 100/58  Weight: 79.5 kg (175 lb 4.3 oz)  Nursing recommendations for Aaliyah Fulton at time of discharge are as follows:  Client verbalized understanding of all discharge instructions and prescriptions.     Review all your home medications and newly ordered medications with your doctor and/or pharmacist. Follow medication instructions as directed by your doctor and/or pharmacist.    Pain Management:   Discharge Pain Medication Instructions:  Comfort Goal: Comfort with Movement,Perform Activity  Notify your primary care provider if pain is unrelieved with these measures, if the pain is new, or increased in intensity.    Discharge Skin Characteristics:    Discharge Skin Exam:    Wound 11/24/21 Incision Hip Left mepilex silver (Active)   Wound Image    12/08/21 1201   Site Assessment Clean;Dry;Pink;Red 12/10/21 2056   Periwound Assessment Clean;Dry;Pink;Red 12/10/21 2056   Margins Attached edges 12/09/21 2000   Closure Closure strips 12/10/21 2056   Drainage Amount None 12/10/21 2056   Drainage Description SHALINI 12/04/21 1910   Treatments Cleansed 12/08/21 1201   Wound Cleansing Normal Saline Irrigation 12/08/21 1201   Periwound Protectant Not Applicable 11/30/21 2013   Dressing Cleansing/Solutions Not Applicable 11/30/21 2013   Dressing Options Silicone Adhesive Foam 12/08/21 1000   Dressing Changed Observed 12/10/21 0827   Dressing Status Open to Air 12/10/21 2056   Dressing Change/Treatment Frequency As Needed 12/08/21 1201   NEXT Dressing Change/Treatment Date 12/01/21 11/29/21 1500   NEXT Weekly Photo (Inpatient Only) 12/11/21 12/03/21 2350     Skin / Wound Care Instructions: Please contact your primary care physician for any change in skin integrity. Left hip incision.    If You Have Surgical Incisions / Wounds:  Monitor surgical site(s) for signs of increased swelling, redness or symptoms of drainage from the site or fever  as this could indicate signs and symptoms of infection. If these symptoms are noted, notifiy your primary care provider.      Discharge Safety Instructions: Should Not Be Left Alone In The House     Discharge Safety Concerns: History Of Falls  The interdisciplinary team has made recommendation that you should not be left alone  in the house due to history of falls  Anti-embolic stockings are not required to increase circulation to the lower extremities.    Discharge Diet: Regular     Discharge Liquids: Thin  Discharge Bowel Function: Continent  Please contact your primary care physician for any changes in bowel habits.  Discharge Bowel Program:    Discharge Bladder Function: Continent  Discharge Urinary Devices:        Nursing Discharge Plan:   Influenza Vaccine Indication: Not indicated: Previously immunized this influenza season and > 8 years of age    Case Management Discharge Instructions:   Discharge Location:    Agency Name/Address/Phone:    Home Health:    Outpatient Services:    DME Provider/Phone:    Medical Equipment Ordered:    Prescription Faxed to:        Discharge Medication Instructions:  Below are the medications your physician expects you to take upon discharge:      Fall Prevention in the Home, Adult  Falls can cause injuries and can affect people from all age groups. There are many simple things that you can do to make your home safe and to help prevent falls. Ask for help when making these changes, if needed.  What actions can I take to prevent falls?  General instructions  · Use good lighting in all rooms. Replace any light bulbs that burn out.  · Turn on lights if it is dark. Use night-lights.  · Place frequently used items in easy-to-reach places. Lower the shelves around your home if necessary.  · Set up furniture so that there are clear paths around it. Avoid moving your furniture around.  · Remove throw rugs and other tripping hazards from the floor.  · Avoid walking on wet floors.  · Fix  any uneven floor surfaces.  · Add color or contrast paint or tape to grab bars and handrails in your home. Place contrasting color strips on the first and last steps of stairways.  · When you use a stepladder, make sure that it is completely opened and that the sides are firmly locked. Have someone hold the ladder while you are using it. Do not climb a closed stepladder.  · Be aware of any and all pets.  What can I do in the bathroom?         · Keep the floor dry. Immediately clean up any water that spills onto the floor.  · Remove soap buildup in the tub or shower on a regular basis.  · Use non-skid mats or decals on the floor of the tub or shower.  · Attach bath mats securely with double-sided, non-slip rug tape.  · If you need to sit down while you are in the shower, use a plastic, non-slip stool.  · Install grab bars by the toilet and in the tub and shower. Do not use towel bars as grab bars.  What can I do in the bedroom?  · Make sure that a bedside light is easy to reach.  · Do not use oversized bedding that drapes onto the floor.  · Have a firm chair that has side arms to use for getting dressed.  What can I do in the kitchen?  · Clean up any spills right away.  · If you need to reach for something above you, use a sturdy step stool that has a grab bar.  · Keep electrical cables out of the way.  · Do not use floor polish or wax that makes floors slippery. If you must use wax, make sure that it is non-skid floor wax.  What can I do in the stairways?  · Do not leave any items on the stairs.  · Make sure that you have a light switch at the top of the stairs and the bottom of the stairs. Have them installed if you do not have them.  · Make sure that there are handrails on both sides of the stairs. Fix handrails that are broken or loose. Make sure that handrails are as long as the stairways.  · Install non-slip stair treads on all stairs in your home.  · Avoid having throw rugs at the top or bottom of stairways,  or secure the rugs with carpet tape to prevent them from moving.  · Choose a carpet design that does not hide the edge of steps on the stairway.  · Check any carpeting to make sure that it is firmly attached to the stairs. Fix any carpet that is loose or worn.  What can I do on the outside of my home?  · Use bright outdoor lighting.  · Regularly repair the edges of walkways and driveways and fix any cracks.  · Remove high doorway thresholds.  · Trim any shrubbery on the main path into your home.  · Regularly check that handrails are securely fastened and in good repair. Both sides of any steps should have handrails.  · Install guardrails along the edges of any raised decks or porches.  · Clear walkways of debris and clutter, including tools and rocks.  · Have leaves, snow, and ice cleared regularly.  · Use sand or salt on walkways during winter months.  · In the garage, clean up any spills right away, including grease or oil spills.  What other actions can I take?  · Wear closed-toe shoes that fit well and support your feet. Wear shoes that have rubber soles or low heels.  · Use mobility aids as needed, such as canes, walkers, scooters, and crutches.  · Review your medicines with your health care provider. Some medicines can cause dizziness or changes in blood pressure, which increase your risk of falling.  Talk with your health care provider about other ways that you can decrease your risk of falls. This may include working with a physical therapist or  to improve your strength, balance, and endurance.  Where to find more information  · Centers for Disease Control and Prevention, [place]: https://www.cdc.gov  · National [place] on Aging: https://mz1ekvz.oliver.nih.gov  Contact a health care provider if:  · You are afraid of falling at home.  · You feel weak, drowsy, or dizzy at home.  · You fall at home.  Summary  · There are many simple things that you can do to make your home safe and to help prevent  "falls.  · Ways to make your home safe include removing tripping hazards and installing grab bars in the bathroom.  · Ask for help when making these changes in your home.  This information is not intended to replace advice given to you by your health care provider. Make sure you discuss any questions you have with your health care provider.  Document Released: 12/08/2003 Document Revised: 11/30/2018 Document Reviewed: 08/02/2018  [employer] Patient Education © 2020 [employer]    Prevent Falls in Your Home    \"Falling once doubles your chance of falling again\"        -Center for Disease Control and Prevention    Falls in the home can lead to serious injury (fractures, brain injuries), hospitalizations, increased medical costs, and could even be fatal.  The good news is, there are many precautions you can take to avoid falls in your home and help keep you safe:     · If prescribed an assistive device (walker, crutches), use as instructed by the healthcare provider\"   · Remove any tripping hazards from your home, including loose cords, throw rugs and clutter  · Keep a nightlight on in dark (hallways, bathrooms, etc)   · Get up slowly, to make sure you feel okay before getting up  · Be aware of any side effects of your medications: some medications may make you dizzy  · Place a non-skid rubber mat in your shower or tub-consider a shower bench or chair if unsteady on your feet  · Wear supportive shoes or non-skid socks when moving around  · Start an exercise program once approved by your provider.  If you are feeling weak following a hospital stay, talk to your doctor about home health or outpatient therapy programs designed to help rebuild your strength and endurance      Depression / Suicide Risk    As you are discharged from this [facility] facility, it is important to learn how to keep safe from harming yourself.    Recognize the warning signs:  · Abrupt changes in personality, positive or negative- including " increase in energy   · Giving away possessions  · Change in eating patterns- significant weight changes-  positive or negative  · Change in sleeping patterns- unable to sleep or sleeping all the time   · Unwillingness or inability to communicate  · Depression  · Unusual sadness, discouragement and loneliness  · Talk of wanting to die  · Neglect of personal appearance   · Rebelliousness- reckless behavior  · Withdrawal from people/activities they love  · Confusion- inability to concentrate     If you or a loved one observes any of these behaviors or has concerns about self-harm, here's what you can do:  · Talk about it- your feelings and reasons for harming yourself  · Remove any means that you might use to hurt yourself (examples: pills, rope, extension cords, firearm)  · Get professional help from the community (Mental Health, Substance Abuse, psychological counseling)  · Do not be alone:Call your Safe Contact- someone whom you trust who will be there for you.  · Call your local CRISIS HOTLINE 948-2145 or 540-409-6930  · Call your local Children's Mobile Crisis Response Team [place] (980) 444-5221 or www[employer]  · Call the toll free National Suicide Prevention Hotlines   · National Suicide Prevention Lifeline 793-159-QTNN (6045)  · National Hope Line Network 800-SUICIDE (449-3554)                  Occupational Therapy Discharge Instructions for Aaliyah Fulton    12/10/2021    Level of Assist Required for Eating: Able to Complete Eating without Assist  Level of Assist Required for Grooming: Able to Complete Grooming without Assist  Level of Assist Required for Dressing: Requires Supervision with Dressing  Equipment for Dressing: Sock Aid,Reacher,Long Handled Shoe Horn  Level of Assist Required for Toileting: Requires Supervision with Toileting  Level of Assist Required for Toilet Transfer: Able to Complete Toilet Transfer without Assist  Equipment for Toilet Transfer: Commode over Toilet  Level of  Assist Required for Bathing: Requires Supervision with Bathing  Equipment for Bathing: Tub Transfer Bench,Hand Held Shower Head,Long Handled Sponge  Level of Assist Required for Shower Transfer: Requires Supervision with Shower Transfer  Equipment for Shower Transfer: Tub Transfer Bench,Grab Bars in Tub / Shower  Level of Assist Required for Home Mgmt: Requires Physical Assist with Home Management  Level of Assist Required for Meal Prep: Requires Physical Assist with Meal Preparation  Driving: Please Contact Physician Prior to Driving  Home Exercise Program: Refer to Home Exercise Program Handout for Details  Comments: It has been a pleasure to work with you, Aaliyah! You have made great progress and I wish you the best with your continued recovery. Take care :) Kamala Bustamante, DENIS, OTR/L     Physical Therapy Discharge Instructions for Aaliyah Dyllan Fulton    12/10/2021    Weight Bearing Status - Patient Should: Bear Weight as Tolerated Left Leg  Level of Assist Required for Ambulation: No Assist on Flat Surfaces,Assist for Balance on Curbs,Assist for Balance on Stairs  Distance Patient May Ambulate: as tolerated up to 200 ft  Device Recommended for Ambulation: Front-Wheeled Walker  Level of Assist Required for Transfers: Requires No Assist  Device Recommended for Transfers: Front-Wheeled Walker  Home Exercise Program: Refer to Home Exercise Program Handout for Details

## 2021-12-11 NOTE — CARE PLAN
"The patient is Watcher - Medium risk of patient condition declining or worsening    Shift Goals  Clinical Goals: Safety, Comfort      Problem: Bowel Elimination  Goal: Patient will participate in bowel management program  Note: Patient last BM 12/6, patient received milk of mag yesterday, no results today. Administered suppository, will continue to monitor and pass on information to night shift nurse.     Problem: Fall Risk - Rehab  Goal: Patient will remain free from falls  Note: Diana Becker Fall risk Assessment Score: 11  Moderate fall risk Interventions  - Bed and strip alarm   - Yellow sign by the door   - Yellow wrist band \"Fall risk\"  - Room near to the nurse station  - Do not leave patient unattended in the bathroom  - Fall risk education provided       "

## 2021-12-11 NOTE — PROGRESS NOTES
For discharge home 12/11/21. Left hip fracture, s/p hip replacement. OA, Depression, Hydrocephalus, Systolic murmur, Anemia, Overactive bladder. Contact isolation ESBL urine. ABT/UTI per orem. Continent of bladder and bowel. Regular, thin liquids diet. Able to make needs known. Forgetful. Will monitor.

## 2021-12-11 NOTE — CARE PLAN
"  Problem: Fall Risk - Rehab  Goal: Patient will remain free from falls  Note: Diana Becker Fall risk Assessment Score: 11      Moderate fall risk Interventions  - Bed and strip alarm   - Yellow sign by the door   - Yellow wrist band \"Fall risk\"  - Room near to the nurse station  - Do not leave patient unattended in the bathroom  - Fall risk education provided    Continues ABT/ P.O., encouraged increase fluids intake. No adverse reaction. No c/o pain at this time. Will monitor.     The patient is Stable - Low risk of patient condition declining or worsening    Shift Goals  Clinical Goals: Safety, Comfort  Patient Goals: Increase strength and control pain      "

## 2021-12-13 RX ORDER — VENLAFAXINE HYDROCHLORIDE 75 MG/1
75 CAPSULE, EXTENDED RELEASE ORAL DAILY
Qty: 30 CAPSULE | Refills: 2 | Status: ON HOLD | OUTPATIENT
Start: 2021-12-13 | End: 2023-04-14

## 2022-01-03 ENCOUNTER — PRE-ADMISSION TESTING (OUTPATIENT)
Dept: ADMISSIONS | Facility: MEDICAL CENTER | Age: 82
DRG: 033 | End: 2022-01-03
Attending: NEUROLOGICAL SURGERY
Payer: MEDICARE

## 2022-01-03 DIAGNOSIS — Z01.812 PRE-OPERATIVE LABORATORY EXAMINATION: ICD-10-CM

## 2022-01-03 DIAGNOSIS — Z01.811 PRE-OPERATIVE RESPIRATORY EXAMINATION: ICD-10-CM

## 2022-01-03 DIAGNOSIS — Z01.810 PRE-OPERATIVE CARDIOVASCULAR EXAMINATION: ICD-10-CM

## 2022-01-03 LAB
ABO GROUP BLD: ABNORMAL
ANION GAP SERPL CALC-SCNC: 12 MMOL/L (ref 7–16)
APPEARANCE UR: CLEAR
APTT PPP: 31.2 SEC (ref 24.7–36)
BACTERIA #/AREA URNS HPF: NEGATIVE /HPF
BASOPHILS # BLD AUTO: 1.4 % (ref 0–1.8)
BASOPHILS # BLD: 0.08 K/UL (ref 0–0.12)
BILIRUB UR QL STRIP.AUTO: NEGATIVE
BLD GP AB INVEST PLASRBC-IMP: ABNORMAL
BLD GP AB INVEST PLASRBC-IMP: ABNORMAL
BLD GP AB SCN SERPL QL: ABNORMAL
BUN SERPL-MCNC: 12 MG/DL (ref 8–22)
CALCIUM SERPL-MCNC: 9.6 MG/DL (ref 8.5–10.5)
CHLORIDE SERPL-SCNC: 104 MMOL/L (ref 96–112)
CO2 SERPL-SCNC: 22 MMOL/L (ref 20–33)
COLOR UR: YELLOW
CREAT SERPL-MCNC: 0.7 MG/DL (ref 0.5–1.4)
EKG IMPRESSION: NORMAL
EOSINOPHIL # BLD AUTO: 0.43 K/UL (ref 0–0.51)
EOSINOPHIL NFR BLD: 7.4 % (ref 0–6.9)
EPI CELLS #/AREA URNS HPF: NORMAL /HPF
ERYTHROCYTE [DISTWIDTH] IN BLOOD BY AUTOMATED COUNT: 44.7 FL (ref 35.9–50)
GLUCOSE SERPL-MCNC: 95 MG/DL (ref 65–99)
GLUCOSE UR STRIP.AUTO-MCNC: NEGATIVE MG/DL
HCT VFR BLD AUTO: 39.7 % (ref 37–47)
HGB BLD-MCNC: 12.9 G/DL (ref 12–16)
HYALINE CASTS #/AREA URNS LPF: NORMAL /LPF
IMM GRANULOCYTES # BLD AUTO: 0.01 K/UL (ref 0–0.11)
IMM GRANULOCYTES NFR BLD AUTO: 0.2 % (ref 0–0.9)
INR PPP: 1.07 (ref 0.87–1.13)
KETONES UR STRIP.AUTO-MCNC: NEGATIVE MG/DL
LEUKOCYTE ESTERASE UR QL STRIP.AUTO: ABNORMAL
LYMPHOCYTES # BLD AUTO: 1.77 K/UL (ref 1–4.8)
LYMPHOCYTES NFR BLD: 30.5 % (ref 22–41)
MCH RBC QN AUTO: 31.7 PG (ref 27–33)
MCHC RBC AUTO-ENTMCNC: 32.5 G/DL (ref 33.6–35)
MCV RBC AUTO: 97.5 FL (ref 81.4–97.8)
MICRO URNS: ABNORMAL
MONOCYTES # BLD AUTO: 0.58 K/UL (ref 0–0.85)
MONOCYTES NFR BLD AUTO: 10 % (ref 0–13.4)
NEUTROPHILS # BLD AUTO: 2.94 K/UL (ref 2–7.15)
NEUTROPHILS NFR BLD: 50.5 % (ref 44–72)
NITRITE UR QL STRIP.AUTO: NEGATIVE
NRBC # BLD AUTO: 0 K/UL
NRBC BLD-RTO: 0 /100 WBC
PH UR STRIP.AUTO: 7 [PH] (ref 5–8)
PLATELET # BLD AUTO: 299 K/UL (ref 164–446)
PMV BLD AUTO: 10.7 FL (ref 9–12.9)
POTASSIUM SERPL-SCNC: 4 MMOL/L (ref 3.6–5.5)
PROT UR QL STRIP: NEGATIVE MG/DL
PROTHROMBIN TIME: 13.6 SEC (ref 12–14.6)
RBC # BLD AUTO: 4.07 M/UL (ref 4.2–5.4)
RBC # URNS HPF: NORMAL /HPF
RBC UR QL AUTO: NEGATIVE
RH BLD: ABNORMAL
SODIUM SERPL-SCNC: 138 MMOL/L (ref 135–145)
SP GR UR STRIP.AUTO: 1.01
TRANS CELLS #/AREA URNS HPF: NORMAL /HPF
UROBILINOGEN UR STRIP.AUTO-MCNC: 0.2 MG/DL
WBC # BLD AUTO: 5.8 K/UL (ref 4.8–10.8)
WBC #/AREA URNS HPF: NORMAL /HPF

## 2022-01-03 PROCEDURE — 86850 RBC ANTIBODY SCREEN: CPT

## 2022-01-03 PROCEDURE — 85025 COMPLETE CBC W/AUTO DIFF WBC: CPT

## 2022-01-03 PROCEDURE — C9803 HOPD COVID-19 SPEC COLLECT: HCPCS

## 2022-01-03 PROCEDURE — 93005 ELECTROCARDIOGRAM TRACING: CPT

## 2022-01-03 PROCEDURE — 86902 BLOOD TYPE ANTIGEN DONOR EA: CPT

## 2022-01-03 PROCEDURE — 85730 THROMBOPLASTIN TIME PARTIAL: CPT

## 2022-01-03 PROCEDURE — U0003 INFECTIOUS AGENT DETECTION BY NUCLEIC ACID (DNA OR RNA); SEVERE ACUTE RESPIRATORY SYNDROME CORONAVIRUS 2 (SARS-COV-2) (CORONAVIRUS DISEASE [COVID-19]), AMPLIFIED PROBE TECHNIQUE, MAKING USE OF HIGH THROUGHPUT TECHNOLOGIES AS DESCRIBED BY CMS-2020-01-R: HCPCS

## 2022-01-03 PROCEDURE — 36415 COLL VENOUS BLD VENIPUNCTURE: CPT

## 2022-01-03 PROCEDURE — 81001 URINALYSIS AUTO W/SCOPE: CPT

## 2022-01-03 PROCEDURE — U0005 INFEC AGEN DETEC AMPLI PROBE: HCPCS

## 2022-01-03 PROCEDURE — 86870 RBC ANTIBODY IDENTIFICATION: CPT

## 2022-01-03 PROCEDURE — 93010 ELECTROCARDIOGRAM REPORT: CPT | Performed by: INTERNAL MEDICINE

## 2022-01-03 PROCEDURE — 86900 BLOOD TYPING SEROLOGIC ABO: CPT

## 2022-01-03 PROCEDURE — 86901 BLOOD TYPING SEROLOGIC RH(D): CPT

## 2022-01-03 PROCEDURE — 85610 PROTHROMBIN TIME: CPT

## 2022-01-03 PROCEDURE — 80048 BASIC METABOLIC PNL TOTAL CA: CPT

## 2022-01-03 RX ORDER — CEFDINIR 300 MG/1
300 CAPSULE ORAL 2 TIMES DAILY
Status: ON HOLD | COMMUNITY
End: 2022-01-10

## 2022-01-03 ASSESSMENT — FIBROSIS 4 INDEX: FIB4 SCORE: 0.86

## 2022-01-04 LAB
SARS-COV-2 RNA RESP QL NAA+PROBE: NOTDETECTED
SPECIMEN SOURCE: NORMAL

## 2022-01-10 ENCOUNTER — ANESTHESIA (OUTPATIENT)
Dept: SURGERY | Facility: MEDICAL CENTER | Age: 82
DRG: 033 | End: 2022-01-10
Payer: MEDICARE

## 2022-01-10 ENCOUNTER — ANESTHESIA EVENT (OUTPATIENT)
Dept: SURGERY | Facility: MEDICAL CENTER | Age: 82
DRG: 033 | End: 2022-01-10
Payer: MEDICARE

## 2022-01-10 ENCOUNTER — APPOINTMENT (OUTPATIENT)
Dept: RADIOLOGY | Facility: MEDICAL CENTER | Age: 82
DRG: 033 | End: 2022-01-10
Attending: NEUROLOGICAL SURGERY
Payer: MEDICARE

## 2022-01-10 ENCOUNTER — HOSPITAL ENCOUNTER (INPATIENT)
Facility: MEDICAL CENTER | Age: 82
LOS: 1 days | DRG: 033 | End: 2022-01-11
Attending: NEUROLOGICAL SURGERY | Admitting: NEUROLOGICAL SURGERY
Payer: MEDICARE

## 2022-01-10 DIAGNOSIS — G91.2 NORMAL PRESSURE HYDROCEPHALUS (HCC): ICD-10-CM

## 2022-01-10 PROCEDURE — 700111 HCHG RX REV CODE 636 W/ 250 OVERRIDE (IP): Performed by: NURSE PRACTITIONER

## 2022-01-10 PROCEDURE — 110372 HCHG SHELL REV 278: Performed by: NEUROLOGICAL SURGERY

## 2022-01-10 PROCEDURE — 500800 HCHG LAPAROSCOPIC J/L HOOK: Performed by: NEUROLOGICAL SURGERY

## 2022-01-10 PROCEDURE — 160009 HCHG ANES TIME/MIN: Performed by: NEUROLOGICAL SURGERY

## 2022-01-10 PROCEDURE — 770001 HCHG ROOM/CARE - MED/SURG/GYN PRIV*

## 2022-01-10 PROCEDURE — A9270 NON-COVERED ITEM OR SERVICE: HCPCS | Performed by: NURSE PRACTITIONER

## 2022-01-10 PROCEDURE — 70450 CT HEAD/BRAIN W/O DYE: CPT | Mod: MG

## 2022-01-10 PROCEDURE — 160035 HCHG PACU - 1ST 60 MINS PHASE I: Performed by: NEUROLOGICAL SURGERY

## 2022-01-10 PROCEDURE — 700102 HCHG RX REV CODE 250 W/ 637 OVERRIDE(OP): Performed by: NURSE PRACTITIONER

## 2022-01-10 PROCEDURE — 700111 HCHG RX REV CODE 636 W/ 250 OVERRIDE (IP): Performed by: NEUROLOGICAL SURGERY

## 2022-01-10 PROCEDURE — 00163J6 BYPASS CEREBRAL VENTRICLE TO PERITONEAL CAVITY WITH SYNTHETIC SUBSTITUTE, PERCUTANEOUS APPROACH: ICD-10-PCS | Performed by: NEUROLOGICAL SURGERY

## 2022-01-10 PROCEDURE — 501574 HCHG TROCAR, SMTH CAN&SEAL 5: Performed by: NEUROLOGICAL SURGERY

## 2022-01-10 PROCEDURE — 160029 HCHG SURGERY MINUTES - 1ST 30 MINS LEVEL 4: Performed by: NEUROLOGICAL SURGERY

## 2022-01-10 PROCEDURE — 700111 HCHG RX REV CODE 636 W/ 250 OVERRIDE (IP): Performed by: ANESTHESIOLOGY

## 2022-01-10 PROCEDURE — 0WJG4ZZ INSPECTION OF PERITONEAL CAVITY, PERCUTANEOUS ENDOSCOPIC APPROACH: ICD-10-PCS | Performed by: NEUROLOGICAL SURGERY

## 2022-01-10 PROCEDURE — 501570 HCHG TROCAR, SEPARATOR: Performed by: NEUROLOGICAL SURGERY

## 2022-01-10 PROCEDURE — 700105 HCHG RX REV CODE 258: Performed by: NEUROLOGICAL SURGERY

## 2022-01-10 PROCEDURE — 160036 HCHG PACU - EA ADDL 30 MINS PHASE I: Performed by: NEUROLOGICAL SURGERY

## 2022-01-10 PROCEDURE — 500075 HCHG BLADE, CLIPPER NEURO: Performed by: NEUROLOGICAL SURGERY

## 2022-01-10 PROCEDURE — 700111 HCHG RX REV CODE 636 W/ 250 OVERRIDE (IP)

## 2022-01-10 PROCEDURE — 160048 HCHG OR STATISTICAL LEVEL 1-5: Performed by: NEUROLOGICAL SURGERY

## 2022-01-10 PROCEDURE — 160002 HCHG RECOVERY MINUTES (STAT): Performed by: NEUROLOGICAL SURGERY

## 2022-01-10 PROCEDURE — 110454 HCHG SHELL REV 250: Performed by: NEUROLOGICAL SURGERY

## 2022-01-10 PROCEDURE — A9270 NON-COVERED ITEM OR SERVICE: HCPCS | Performed by: ANESTHESIOLOGY

## 2022-01-10 PROCEDURE — 500002 HCHG ADHESIVE, DERMABOND: Performed by: NEUROLOGICAL SURGERY

## 2022-01-10 PROCEDURE — 501838 HCHG SUTURE GENERAL: Performed by: NEUROLOGICAL SURGERY

## 2022-01-10 PROCEDURE — 700101 HCHG RX REV CODE 250: Performed by: NEUROLOGICAL SURGERY

## 2022-01-10 PROCEDURE — 700101 HCHG RX REV CODE 250: Performed by: ANESTHESIOLOGY

## 2022-01-10 PROCEDURE — 700102 HCHG RX REV CODE 250 W/ 637 OVERRIDE(OP): Performed by: ANESTHESIOLOGY

## 2022-01-10 PROCEDURE — 160041 HCHG SURGERY MINUTES - EA ADDL 1 MIN LEVEL 4: Performed by: NEUROLOGICAL SURGERY

## 2022-01-10 DEVICE — SHUNT VALVE MEDTRONIC: Type: IMPLANTABLE DEVICE | Status: FUNCTIONAL

## 2022-01-10 DEVICE — CATHETER VENTRICULAR ANTIMICROBIAL IMPREGNATED WITH RIFAMPICIN AND CLINDAMYCIN: Type: IMPLANTABLE DEVICE | Status: FUNCTIONAL

## 2022-01-10 DEVICE — CATHETER DISTAL ANTIMICROBIAL: Type: IMPLANTABLE DEVICE | Status: FUNCTIONAL

## 2022-01-10 RX ORDER — AMOXICILLIN 250 MG
1 CAPSULE ORAL
Status: DISCONTINUED | OUTPATIENT
Start: 2022-01-10 | End: 2022-01-11 | Stop reason: HOSPADM

## 2022-01-10 RX ORDER — CEFAZOLIN SODIUM 1 G/3ML
INJECTION, POWDER, FOR SOLUTION INTRAMUSCULAR; INTRAVENOUS PRN
Status: DISCONTINUED | OUTPATIENT
Start: 2022-01-10 | End: 2022-01-10 | Stop reason: SURG

## 2022-01-10 RX ORDER — CLONIDINE HYDROCHLORIDE 0.1 MG/1
0.1 TABLET ORAL EVERY 4 HOURS PRN
Status: DISCONTINUED | OUTPATIENT
Start: 2022-01-10 | End: 2022-01-11 | Stop reason: HOSPADM

## 2022-01-10 RX ORDER — ACETAMINOPHEN 10 MG/ML
1000 INJECTION, SOLUTION INTRAVENOUS ONCE
Status: COMPLETED | OUTPATIENT
Start: 2022-01-10 | End: 2022-01-10

## 2022-01-10 RX ORDER — ONDANSETRON 2 MG/ML
4 INJECTION INTRAMUSCULAR; INTRAVENOUS EVERY 4 HOURS PRN
Status: DISCONTINUED | OUTPATIENT
Start: 2022-01-10 | End: 2022-01-11 | Stop reason: HOSPADM

## 2022-01-10 RX ORDER — BISACODYL 10 MG
10 SUPPOSITORY, RECTAL RECTAL
Status: DISCONTINUED | OUTPATIENT
Start: 2022-01-10 | End: 2022-01-11 | Stop reason: HOSPADM

## 2022-01-10 RX ORDER — AZITHROMYCIN 250 MG/1
250-500 TABLET, FILM COATED ORAL DAILY
COMMUNITY
Start: 2021-12-23 | End: 2022-08-17

## 2022-01-10 RX ORDER — PHENYLEPHRINE HCL IN 0.9% NACL 0.5 MG/5ML
SYRINGE (ML) INTRAVENOUS PRN
Status: DISCONTINUED | OUTPATIENT
Start: 2022-01-10 | End: 2022-01-10 | Stop reason: SURG

## 2022-01-10 RX ORDER — OXYCODONE HCL 5 MG/5 ML
5 SOLUTION, ORAL ORAL
Status: COMPLETED | OUTPATIENT
Start: 2022-01-10 | End: 2022-01-10

## 2022-01-10 RX ORDER — DEXMEDETOMIDINE HYDROCHLORIDE 100 UG/ML
INJECTION, SOLUTION INTRAVENOUS PRN
Status: DISCONTINUED | OUTPATIENT
Start: 2022-01-10 | End: 2022-01-10 | Stop reason: SURG

## 2022-01-10 RX ORDER — ACETAMINOPHEN 500 MG
1000 TABLET ORAL EVERY 6 HOURS PRN
COMMUNITY

## 2022-01-10 RX ORDER — SODIUM CHLORIDE, SODIUM LACTATE, POTASSIUM CHLORIDE, CALCIUM CHLORIDE 600; 310; 30; 20 MG/100ML; MG/100ML; MG/100ML; MG/100ML
INJECTION, SOLUTION INTRAVENOUS CONTINUOUS
Status: ACTIVE | OUTPATIENT
Start: 2022-01-10 | End: 2022-01-10

## 2022-01-10 RX ORDER — ENEMA 19; 7 G/133ML; G/133ML
1 ENEMA RECTAL
Status: DISCONTINUED | OUTPATIENT
Start: 2022-01-10 | End: 2022-01-11 | Stop reason: HOSPADM

## 2022-01-10 RX ORDER — ACETAMINOPHEN 500 MG
1000 TABLET ORAL EVERY 6 HOURS
Status: DISCONTINUED | OUTPATIENT
Start: 2022-01-10 | End: 2022-01-11 | Stop reason: HOSPADM

## 2022-01-10 RX ORDER — LIDOCAINE HYDROCHLORIDE 10 MG/ML
INJECTION, SOLUTION EPIDURAL; INFILTRATION; INTRACAUDAL; PERINEURAL
Status: COMPLETED
Start: 2022-01-10 | End: 2022-01-10

## 2022-01-10 RX ORDER — OXYBUTYNIN CHLORIDE 5 MG/1
15 TABLET, EXTENDED RELEASE ORAL
Status: DISCONTINUED | OUTPATIENT
Start: 2022-01-11 | End: 2022-01-11 | Stop reason: HOSPADM

## 2022-01-10 RX ORDER — OXYCODONE HCL 5 MG/5 ML
10 SOLUTION, ORAL ORAL
Status: COMPLETED | OUTPATIENT
Start: 2022-01-10 | End: 2022-01-10

## 2022-01-10 RX ORDER — OXYCODONE HYDROCHLORIDE 5 MG/1
5 TABLET ORAL
Status: DISCONTINUED | OUTPATIENT
Start: 2022-01-10 | End: 2022-01-11 | Stop reason: HOSPADM

## 2022-01-10 RX ORDER — ACETAMINOPHEN 500 MG
1000 TABLET ORAL EVERY 6 HOURS PRN
Status: DISCONTINUED | OUTPATIENT
Start: 2022-01-15 | End: 2022-01-11 | Stop reason: HOSPADM

## 2022-01-10 RX ORDER — DOCUSATE SODIUM 100 MG/1
100 CAPSULE, LIQUID FILLED ORAL 2 TIMES DAILY
Status: DISCONTINUED | OUTPATIENT
Start: 2022-01-10 | End: 2022-01-11 | Stop reason: HOSPADM

## 2022-01-10 RX ORDER — DIPHENHYDRAMINE HYDROCHLORIDE 50 MG/ML
12.5 INJECTION INTRAMUSCULAR; INTRAVENOUS
Status: DISCONTINUED | OUTPATIENT
Start: 2022-01-10 | End: 2022-01-10

## 2022-01-10 RX ORDER — HALOPERIDOL 5 MG/ML
1 INJECTION INTRAMUSCULAR
Status: DISCONTINUED | OUTPATIENT
Start: 2022-01-10 | End: 2022-01-10

## 2022-01-10 RX ORDER — CEFAZOLIN SODIUM 1 G/3ML
INJECTION, POWDER, FOR SOLUTION INTRAMUSCULAR; INTRAVENOUS
Status: DISCONTINUED | OUTPATIENT
Start: 2022-01-10 | End: 2022-01-10 | Stop reason: HOSPADM

## 2022-01-10 RX ORDER — VENLAFAXINE 75 MG/1
225 TABLET ORAL DAILY
Status: DISCONTINUED | OUTPATIENT
Start: 2022-01-11 | End: 2022-01-11 | Stop reason: HOSPADM

## 2022-01-10 RX ORDER — AMOXICILLIN 250 MG
1 CAPSULE ORAL NIGHTLY
Status: DISCONTINUED | OUTPATIENT
Start: 2022-01-10 | End: 2022-01-11 | Stop reason: HOSPADM

## 2022-01-10 RX ORDER — AMLODIPINE BESYLATE 10 MG/1
10 TABLET ORAL DAILY
Status: DISCONTINUED | OUTPATIENT
Start: 2022-01-11 | End: 2022-01-11 | Stop reason: HOSPADM

## 2022-01-10 RX ORDER — LABETALOL HYDROCHLORIDE 5 MG/ML
10 INJECTION, SOLUTION INTRAVENOUS
Status: DISCONTINUED | OUTPATIENT
Start: 2022-01-10 | End: 2022-01-11 | Stop reason: HOSPADM

## 2022-01-10 RX ORDER — LIDOCAINE HYDROCHLORIDE 40 MG/ML
SOLUTION TOPICAL PRN
Status: DISCONTINUED | OUTPATIENT
Start: 2022-01-10 | End: 2022-01-10 | Stop reason: SURG

## 2022-01-10 RX ORDER — NITROFURANTOIN 25; 75 MG/1; MG/1
100 CAPSULE ORAL DAILY
Status: ON HOLD | COMMUNITY
End: 2022-01-10

## 2022-01-10 RX ORDER — MAGNESIUM HYDROXIDE 1200 MG/15ML
LIQUID ORAL
Status: COMPLETED | OUTPATIENT
Start: 2022-01-10 | End: 2022-01-10

## 2022-01-10 RX ORDER — DIPHENHYDRAMINE HYDROCHLORIDE 50 MG/ML
25 INJECTION INTRAMUSCULAR; INTRAVENOUS EVERY 6 HOURS PRN
Status: DISCONTINUED | OUTPATIENT
Start: 2022-01-10 | End: 2022-01-11 | Stop reason: HOSPADM

## 2022-01-10 RX ORDER — NITROFURANTOIN MACROCRYSTALS 50 MG/1
50 CAPSULE ORAL DAILY
Status: DISCONTINUED | OUTPATIENT
Start: 2022-01-11 | End: 2022-01-11

## 2022-01-10 RX ORDER — SODIUM CHLORIDE, SODIUM LACTATE, POTASSIUM CHLORIDE, CALCIUM CHLORIDE 600; 310; 30; 20 MG/100ML; MG/100ML; MG/100ML; MG/100ML
INJECTION, SOLUTION INTRAVENOUS CONTINUOUS
Status: DISCONTINUED | OUTPATIENT
Start: 2022-01-10 | End: 2022-01-10

## 2022-01-10 RX ORDER — VALSARTAN 80 MG/1
160 TABLET ORAL DAILY
Status: DISCONTINUED | OUTPATIENT
Start: 2022-01-11 | End: 2022-01-11 | Stop reason: HOSPADM

## 2022-01-10 RX ORDER — SODIUM CHLORIDE, SODIUM LACTATE, POTASSIUM CHLORIDE, AND CALCIUM CHLORIDE .6; .31; .03; .02 G/100ML; G/100ML; G/100ML; G/100ML
IRRIGANT IRRIGATION
Status: DISCONTINUED | OUTPATIENT
Start: 2022-01-10 | End: 2022-01-10 | Stop reason: HOSPADM

## 2022-01-10 RX ORDER — CEFDINIR 300 MG/1
300 CAPSULE ORAL 2 TIMES DAILY
COMMUNITY
End: 2022-09-30

## 2022-01-10 RX ORDER — CEFAZOLIN SODIUM 2 G/100ML
2 INJECTION, SOLUTION INTRAVENOUS EVERY 8 HOURS
Status: COMPLETED | OUTPATIENT
Start: 2022-01-10 | End: 2022-01-10

## 2022-01-10 RX ORDER — ONDANSETRON 2 MG/ML
INJECTION INTRAMUSCULAR; INTRAVENOUS PRN
Status: DISCONTINUED | OUTPATIENT
Start: 2022-01-10 | End: 2022-01-10 | Stop reason: SURG

## 2022-01-10 RX ORDER — OMEPRAZOLE 20 MG/1
40 CAPSULE, DELAYED RELEASE ORAL DAILY
Refills: 2 | Status: DISCONTINUED | OUTPATIENT
Start: 2022-01-11 | End: 2022-01-11 | Stop reason: HOSPADM

## 2022-01-10 RX ORDER — OXYCODONE HYDROCHLORIDE 10 MG/1
10 TABLET ORAL
Status: DISCONTINUED | OUTPATIENT
Start: 2022-01-10 | End: 2022-01-11 | Stop reason: HOSPADM

## 2022-01-10 RX ORDER — POLYETHYLENE GLYCOL 3350 17 G/17G
1 POWDER, FOR SOLUTION ORAL 2 TIMES DAILY PRN
Status: DISCONTINUED | OUTPATIENT
Start: 2022-01-10 | End: 2022-01-11 | Stop reason: HOSPADM

## 2022-01-10 RX ORDER — ONDANSETRON 2 MG/ML
4 INJECTION INTRAMUSCULAR; INTRAVENOUS
Status: DISCONTINUED | OUTPATIENT
Start: 2022-01-10 | End: 2022-01-10

## 2022-01-10 RX ORDER — NITROFURANTOIN MACROCRYSTALS 50 MG/1
50 CAPSULE ORAL DAILY
COMMUNITY
End: 2022-08-17

## 2022-01-10 RX ORDER — BUPIVACAINE HYDROCHLORIDE AND EPINEPHRINE 5; 5 MG/ML; UG/ML
INJECTION, SOLUTION EPIDURAL; INTRACAUDAL; PERINEURAL
Status: DISCONTINUED | OUTPATIENT
Start: 2022-01-10 | End: 2022-01-10 | Stop reason: HOSPADM

## 2022-01-10 RX ORDER — DEXAMETHASONE SODIUM PHOSPHATE 4 MG/ML
INJECTION, SOLUTION INTRA-ARTICULAR; INTRALESIONAL; INTRAMUSCULAR; INTRAVENOUS; SOFT TISSUE PRN
Status: DISCONTINUED | OUTPATIENT
Start: 2022-01-10 | End: 2022-01-10 | Stop reason: SURG

## 2022-01-10 RX ORDER — MORPHINE SULFATE 4 MG/ML
2-4 INJECTION INTRAVENOUS
Status: DISCONTINUED | OUTPATIENT
Start: 2022-01-10 | End: 2022-01-11 | Stop reason: HOSPADM

## 2022-01-10 RX ORDER — ROCURONIUM BROMIDE 10 MG/ML
INJECTION, SOLUTION INTRAVENOUS PRN
Status: DISCONTINUED | OUTPATIENT
Start: 2022-01-10 | End: 2022-01-10 | Stop reason: SURG

## 2022-01-10 RX ADMIN — ONDANSETRON 4 MG: 2 INJECTION INTRAMUSCULAR; INTRAVENOUS at 08:58

## 2022-01-10 RX ADMIN — ACETAMINOPHEN 1000 MG: 500 TABLET ORAL at 15:08

## 2022-01-10 RX ADMIN — DEXMEDETOMIDINE 10 MCG: 200 INJECTION, SOLUTION INTRAVENOUS at 08:13

## 2022-01-10 RX ADMIN — ACETAMINOPHEN 1000 MG: 10 INJECTION, SOLUTION INTRAVENOUS at 09:40

## 2022-01-10 RX ADMIN — ROCURONIUM BROMIDE 30 MG: 10 INJECTION, SOLUTION INTRAVENOUS at 07:41

## 2022-01-10 RX ADMIN — LIDOCAINE HYDROCHLORIDE 4 ML: 40 SOLUTION TOPICAL at 07:42

## 2022-01-10 RX ADMIN — FENTANYL CITRATE 100 MCG: 50 INJECTION, SOLUTION INTRAMUSCULAR; INTRAVENOUS at 07:41

## 2022-01-10 RX ADMIN — OXYCODONE HYDROCHLORIDE 5 MG: 5 SOLUTION ORAL at 09:35

## 2022-01-10 RX ADMIN — DEXMEDETOMIDINE 20 MCG: 200 INJECTION, SOLUTION INTRAVENOUS at 08:33

## 2022-01-10 RX ADMIN — Medication 0.5 ML: at 06:32

## 2022-01-10 RX ADMIN — LIDOCAINE HYDROCHLORIDE 0.5 ML: 10 INJECTION, SOLUTION EPIDURAL; INFILTRATION; INTRACAUDAL; PERINEURAL at 06:32

## 2022-01-10 RX ADMIN — SODIUM CHLORIDE, POTASSIUM CHLORIDE, SODIUM LACTATE AND CALCIUM CHLORIDE: 600; 310; 30; 20 INJECTION, SOLUTION INTRAVENOUS at 06:33

## 2022-01-10 RX ADMIN — FENTANYL CITRATE 25 MCG: 50 INJECTION INTRAMUSCULAR; INTRAVENOUS at 09:42

## 2022-01-10 RX ADMIN — CEFAZOLIN 2 G: 330 INJECTION, POWDER, FOR SOLUTION INTRAMUSCULAR; INTRAVENOUS at 07:41

## 2022-01-10 RX ADMIN — ACETAMINOPHEN 1000 MG: 500 TABLET ORAL at 23:21

## 2022-01-10 RX ADMIN — Medication 200 MCG: at 08:07

## 2022-01-10 RX ADMIN — CEFAZOLIN SODIUM 2 G: 2 INJECTION, SOLUTION INTRAVENOUS at 16:22

## 2022-01-10 RX ADMIN — DEXAMETHASONE SODIUM PHOSPHATE 4 MG: 4 INJECTION, SOLUTION INTRA-ARTICULAR; INTRALESIONAL; INTRAMUSCULAR; INTRAVENOUS; SOFT TISSUE at 07:57

## 2022-01-10 RX ADMIN — Medication 200 MCG: at 08:24

## 2022-01-10 RX ADMIN — OXYCODONE 5 MG: 5 TABLET ORAL at 21:56

## 2022-01-10 RX ADMIN — FENTANYL CITRATE 25 MCG: 50 INJECTION INTRAMUSCULAR; INTRAVENOUS at 09:36

## 2022-01-10 RX ADMIN — FENTANYL CITRATE 50 MCG: 50 INJECTION, SOLUTION INTRAMUSCULAR; INTRAVENOUS at 08:36

## 2022-01-10 RX ADMIN — SUGAMMADEX 200 MG: 100 INJECTION, SOLUTION INTRAVENOUS at 08:58

## 2022-01-10 RX ADMIN — CEFAZOLIN SODIUM 2 G: 2 INJECTION, SOLUTION INTRAVENOUS at 23:21

## 2022-01-10 RX ADMIN — SENNOSIDES AND DOCUSATE SODIUM 1 TABLET: 50; 8.6 TABLET ORAL at 21:57

## 2022-01-10 RX ADMIN — FENTANYL CITRATE 50 MCG: 50 INJECTION, SOLUTION INTRAMUSCULAR; INTRAVENOUS at 09:52

## 2022-01-10 RX ADMIN — PROPOFOL 120 MG: 10 INJECTION, EMULSION INTRAVENOUS at 07:41

## 2022-01-10 ASSESSMENT — PAIN DESCRIPTION - PAIN TYPE
TYPE: SURGICAL PAIN

## 2022-01-10 ASSESSMENT — PATIENT HEALTH QUESTIONNAIRE - PHQ9
2. FEELING DOWN, DEPRESSED, IRRITABLE, OR HOPELESS: NOT AT ALL
SUM OF ALL RESPONSES TO PHQ9 QUESTIONS 1 AND 2: 0
1. LITTLE INTEREST OR PLEASURE IN DOING THINGS: NOT AT ALL

## 2022-01-10 ASSESSMENT — FIBROSIS 4 INDEX: FIB4 SCORE: 1.25

## 2022-01-10 ASSESSMENT — COGNITIVE AND FUNCTIONAL STATUS - GENERAL
SUGGESTED CMS G CODE MODIFIER MOBILITY: CK
WALKING IN HOSPITAL ROOM: A LITTLE
TURNING FROM BACK TO SIDE WHILE IN FLAT BAD: A LITTLE
MOVING FROM LYING ON BACK TO SITTING ON SIDE OF FLAT BED: A LITTLE
DRESSING REGULAR LOWER BODY CLOTHING: A LITTLE
STANDING UP FROM CHAIR USING ARMS: A LITTLE
DAILY ACTIVITIY SCORE: 18
CLIMB 3 TO 5 STEPS WITH RAILING: A LITTLE
DRESSING REGULAR UPPER BODY CLOTHING: A LITTLE
TOILETING: A LITTLE
SUGGESTED CMS G CODE MODIFIER DAILY ACTIVITY: CK
HELP NEEDED FOR BATHING: A LITTLE
MOBILITY SCORE: 18
EATING MEALS: A LITTLE
MOVING TO AND FROM BED TO CHAIR: A LITTLE
PERSONAL GROOMING: A LITTLE

## 2022-01-10 ASSESSMENT — PAIN SCALES - GENERAL: PAIN_LEVEL: 2

## 2022-01-10 ASSESSMENT — LIFESTYLE VARIABLES
HOW MANY TIMES IN THE PAST YEAR HAVE YOU HAD 5 OR MORE DRINKS IN A DAY: 0
CONSUMPTION TOTAL: NEGATIVE
TOTAL SCORE: 0
EVER FELT BAD OR GUILTY ABOUT YOUR DRINKING: NO
ALCOHOL_USE: NO
TOTAL SCORE: 0
ON A TYPICAL DAY WHEN YOU DRINK ALCOHOL HOW MANY DRINKS DO YOU HAVE: 0
DOES PATIENT WANT TO STOP DRINKING: NO
TOTAL SCORE: 0
HAVE PEOPLE ANNOYED YOU BY CRITICIZING YOUR DRINKING: NO
HAVE YOU EVER FELT YOU SHOULD CUT DOWN ON YOUR DRINKING: NO
EVER HAD A DRINK FIRST THING IN THE MORNING TO STEADY YOUR NERVES TO GET RID OF A HANGOVER: NO
AVERAGE NUMBER OF DAYS PER WEEK YOU HAVE A DRINK CONTAINING ALCOHOL: 0

## 2022-01-10 NOTE — ANESTHESIA TIME REPORT
Anesthesia Start and Stop Event Times     Date Time Event    1/10/2022 0720 Ready for Procedure     0738 Anesthesia Start     0911 Anesthesia Stop        Responsible Staff  01/10/22    Name Role Begin End    Brodie Jenkins M.D. Anesth 0738 0911        Preop Diagnosis (Free Text):  Pre-op Diagnosis     NORMAL PRESSURE HYDROCEPHALUS        Preop Diagnosis (Codes):    Premium Reason  Non-Premium    Comments:                                                                       
no

## 2022-01-10 NOTE — ANESTHESIA POSTPROCEDURE EVALUATION
Patient: Aaliyah Mijares Ilenavneet    Procedure Summary     Date: 01/10/22 Room / Location: [facility] 05 / SURGERY [facility]    Anesthesia Start: 0738 Anesthesia Stop: 0911    Procedure: INSERTION, SHUNT, VENTRICULOPERITONEAL, LAPAROSCOPIC PERITONEAL CATHETER - STEALTH GUIDED (Head) Diagnosis: (NORMAL PRESSURE HYDROCEPHALUS)    Surgeons: Gregory Blanchard M.D. Responsible Provider: Brodie Jenkins M.D.    Anesthesia Type: general ASA Status: 2          Final Anesthesia Type: general  Last vitals  BP   Blood Pressure : 115/70    Temp   36.3 °C (97.4 °F)    Pulse   78   Resp   20    SpO2   97 %      Anesthesia Post Evaluation    Patient location during evaluation: PACU  Patient participation: complete - patient participated  Level of consciousness: awake and alert  Pain score: 2    Airway patency: patent  Anesthetic complications: no  Cardiovascular status: hemodynamically stable  Respiratory status: acceptable  Hydration status: euvolemic    PONV: none          No complications documented.     Nurse Pain Score: 0 (NPRS)

## 2022-01-10 NOTE — OP REPORT
DATE OF SERVICE:  01/10/2022     PREOPERATIVE DIAGNOSIS:  Hydrocephalus.     POSTOPERATIVE DIAGNOSIS:  Hydrocephalus.     PROCEDURE:  Ventriculoperitoneal shunt placement.     SURGEON:  Gregory Blanchard MD     CO-SURGEON:  Ru Olmos MD     ANESTHESIA:  General endotracheal anesthesia.     ANESTHESIOLOGIST:  Brodie Jenkins MD     ESTIMATED BLOOD LOSS:  5 mL.     SPECIMENS:  None.     COMPLICATIONS:  None.     CONDITION:  Stable.     INDICATIONS FOR PROCEDURE:  This is an 81-year-old female with symptomatic   hydrocephalus here for a ventriculoperitoneal shunt placement.  Risks,   benefits and alternatives of surgery were explained to her before proceeding.    See separate dictated operative report for the cranioencephalic portion of   the surgery.  This dictation covers the abdominal portion of the procedure.     OPERATIVE FINDINGS:   shunt in place above the liver with hemostasis,   functioning well.     OPERATIVE TECHNIQUE:  After informed consent was obtained, the patient was   taken to the operating room and placed in the supine position.  After adequate   endotracheal anesthesia was achieved, the head, neck, and chest and abdomen   were all prepped and draped in sterile fashion.  See separate dictated   operative report for the cranial and cephalic portions of the procedure.  Dr. Blanchard completed the shunt placement and then used a tunneling device to pass   the shunt tubing down to the anterior abdominal wall.  An incision was made   and the tubing was retrieved.  We then placed two 5 mm incisions and inserted   a 5 mm trocar into the abdomen using Optiview technique.  After   pneumoperitoneum was achieved, a J hook was used to take down omental   adhesions in the right upper quadrant until the liver was clearly visible.  We   then used a hemostat that bluntly passed the shunt tubing into the abdomen.    This was then pulled into the correct position.     After the shunt tubing was attached to  the pump device, it was tested and   noted to be flowing CSF without any evidence of kinks or obstructions.  The   tubing was then coiled above the liver and pneumoperitoneum was reduced.    Incisions were closed with 4-0 Monocryl and Dermabond and the patient was   returned to the PACU in stable condition.  All instrument counts were correct   at the end of the procedure.        ______________________________  MD SENG Patel/KAREN/CHRISTINE    DD:  01/10/2022 09:10  DT:  01/10/2022 09:28    Job#:  [id number]

## 2022-01-10 NOTE — PROGRESS NOTES
Med rec updated and complete, per daughter read list of medications to this writer  Allergies reviewed, per daughter   Interviewed pt with daughter at bedside with permission from pt.

## 2022-01-10 NOTE — ANESTHESIA PREPROCEDURE EVALUATION
Case: [id number] Date/Time: 01/10/22 0715    Procedure: INSERTION, SHUNT, VENTRICULOPERITONEAL, LAPAROSCOPIC PERITONEAL CATHETER - STEALTH GUIDED    Pre-op diagnosis: NORMAL PRESSURE HYDROCEPHALUS    Location: [facility]  / SURGERY [facility]    Surgeons: Gregory Blanchard M.D.          Relevant Problems   CARDIAC   (positive) Systolic murmur      Other   (positive) Hand arthritis       Physical Exam    Airway   Mallampati: II  TM distance: >3 FB  Neck ROM: full       Cardiovascular - normal exam  Rhythm: regular  Rate: normal  (+) murmur     Dental - normal exam           Pulmonary - normal exam  Breath sounds clear to auscultation     Abdominal    Neurological - normal exam                 Anesthesia Plan    ASA 2       Plan - general       Airway plan will be ETT        Plan Factors:   Patient was previously instructed to abstain from smoking on day of procedure.  Patient did not smoke on day of procedure.      Induction: intravenous    Postoperative Plan: Postoperative administration of opioids is intended.    Pertinent diagnostic labs and testing reviewed    Informed Consent:    Anesthetic plan and risks discussed with patient.    Use of blood products discussed with: patient whom consented to blood products.

## 2022-01-10 NOTE — OP REPORT
NEUROSURGERY OPERATIVE NOTE    TIME:  9:06 AM   DATE: 1/10/2022    Patient name:  Aaliyah Fulton  MRN:  [id number]    Procedure:  Right frontal ventriculoperitoneal shunt placement with STEALTH intraoperative guidance, laparoscopic peritoneal catheter placement (Dr. Olmos)    Surgeon:  Gregory Blanchard MD PhD  CoSurgeon:  Dr. Amarilis MD General Surgery.  Utilization of co-surgeon was critical for the performance of this procedure.  He provided laparoscopic access to the peritoneal space, and laparoscopic placement of the peritoneal catheter.    Anesthesia:  GETA    Diagnosis: Normal Pressure Hydrocephalus    Indication: 81-year-old female with NPH.  Delayed CSF clearance over the convexities as noted on CSF nuclear medicine cisternography.  Transependymal flow with ventricular dilatation is noted on imaging.   shunt placement is planned.    Procedure:  The patient was identified in the holding area; the operative site was marked, and consent obtained.  They were intubated by anesthesia service.  Two gram Ancef was administered intravenously.  Her head was registered to the Axium 9Flava intraoperative guidance system.  Her head, neck and torso were prepped with hair clipping, chlorhexidine scrub, betadine scrub, and Chloroprep.  Sterile drapes were applied including a layer of Ioban.  The preplanned entry point in her midpupillary line just anterior to the ear, was identified.  The scalp was infiltrated with 0.5% Marcaine with epinephrine.  A curved incision was created using the [place] tip cautery and dissection carried to the pericranium in like manner.  A subcutaneous pouch was created posteriorly using curved Mary Jo forceps.  A disposable tunneler was passed from the scalp incision to the abdomen, using a counter incision in her lower lateral neck.  The peritoneal catheter was brought subcutaneously in this manner to the abdomen.  Laparoscopic intraperitoneal access had already been obtained by Dr. Olmos;  the catheter was placed over the liver.  A bur hole was created in the right frontal bone with the high speed drill/AM8 bit.  The bone edges were waxed.  The dural was cauterized with bipolar cautery and opened sharply with an 11 blade scalpel in a cruciate manner.  The lion was cauterized and opened in like manner.  A 23 cm ventricular catheter was advanced over the STEALTH Axium stylet, under neuronavigation, and positioned at the right foramen on [place]ro.  Brisk flow of clear CSF was obtained.  The ventricular catheter was secured to a regular STRATA II valve using 2-0 Neurolon ties.  The valve was set to Performance Level 1.0  The slack in the catheter was taken up intraperitoneally, and the valve positioned within the subcutaneous pouch.  CSF flow was noted intraperitoneally.  Gelfoam was placed in the bur hole.  The ninety degree catheter anchor was secured to the pericranium using 4-0 Neurolon suture.  The dermis was closed with 3-0 Vicryl suture in an inverted interrupted manner.  The skin was reapproximated with 3-0 Nylon suture in a running manner.  The neck counter incision was closed in like manner.  Bacitracin ointment, Xeroform, and a sterile dressing were placed.  The abdominal incisions were closed by Dr. Olmos.  Final counts were correct.    EBL:  Minimal  Specimen:  None  Drains:  None  Complications:  None apparent at end of procedure.

## 2022-01-10 NOTE — ANESTHESIA PROCEDURE NOTES
Airway    Date/Time: 1/10/2022 7:42 AM  Performed by: Brodie Jenkins M.D.  Authorized by: Brodie Jenkins M.D.     Location:  OR  Urgency:  Elective  Indications for Airway Management:  Anesthesia      Spontaneous Ventilation: absent    Sedation Level:  Deep  Preoxygenated: Yes    Patient Position:  Sniffing  Final Airway Type:  Endotracheal airway  Final Endotracheal Airway:  ETT  Cuffed: Yes    Technique Used for Successful ETT Placement:  Direct laryngoscopy    Insertion Site:  Oral  Blade Type:  Ida  Laryngoscope Blade/Videolaryngoscope Blade Size:  3  ETT Size (mm):  6.5  Measured from:  Teeth  ETT to Teeth (cm):  22  Placement Verified by: auscultation and capnometry    Cormack-Lehane Classification:  Grade I - full view of glottis  Number of Attempts at Approach:  1

## 2022-01-11 VITALS
OXYGEN SATURATION: 96 % | WEIGHT: 166.67 LBS | RESPIRATION RATE: 15 BRPM | BODY MASS INDEX: 25.26 KG/M2 | HEART RATE: 68 BPM | TEMPERATURE: 97.2 F | DIASTOLIC BLOOD PRESSURE: 58 MMHG | SYSTOLIC BLOOD PRESSURE: 106 MMHG | HEIGHT: 68 IN

## 2022-01-11 LAB
ANION GAP SERPL CALC-SCNC: 8 MMOL/L (ref 7–16)
BUN SERPL-MCNC: 11 MG/DL (ref 8–22)
CALCIUM SERPL-MCNC: 9.2 MG/DL (ref 8.5–10.5)
CHLORIDE SERPL-SCNC: 107 MMOL/L (ref 96–112)
CO2 SERPL-SCNC: 26 MMOL/L (ref 20–33)
CREAT SERPL-MCNC: 0.6 MG/DL (ref 0.5–1.4)
ERYTHROCYTE [DISTWIDTH] IN BLOOD BY AUTOMATED COUNT: 43.8 FL (ref 35.9–50)
GLUCOSE SERPL-MCNC: 98 MG/DL (ref 65–99)
HCT VFR BLD AUTO: 35.7 % (ref 37–47)
HGB BLD-MCNC: 11.7 G/DL (ref 12–16)
MCH RBC QN AUTO: 32.1 PG (ref 27–33)
MCHC RBC AUTO-ENTMCNC: 32.8 G/DL (ref 33.6–35)
MCV RBC AUTO: 97.8 FL (ref 81.4–97.8)
PLATELET # BLD AUTO: 218 K/UL (ref 164–446)
PMV BLD AUTO: 10.8 FL (ref 9–12.9)
POTASSIUM SERPL-SCNC: 5 MMOL/L (ref 3.6–5.5)
RBC # BLD AUTO: 3.65 M/UL (ref 4.2–5.4)
SODIUM SERPL-SCNC: 141 MMOL/L (ref 135–145)
WBC # BLD AUTO: 8.7 K/UL (ref 4.8–10.8)

## 2022-01-11 PROCEDURE — 85027 COMPLETE CBC AUTOMATED: CPT

## 2022-01-11 PROCEDURE — A9270 NON-COVERED ITEM OR SERVICE: HCPCS | Performed by: NURSE PRACTITIONER

## 2022-01-11 PROCEDURE — 80048 BASIC METABOLIC PNL TOTAL CA: CPT

## 2022-01-11 PROCEDURE — 97161 PT EVAL LOW COMPLEX 20 MIN: CPT

## 2022-01-11 PROCEDURE — 97165 OT EVAL LOW COMPLEX 30 MIN: CPT

## 2022-01-11 PROCEDURE — 700102 HCHG RX REV CODE 250 W/ 637 OVERRIDE(OP): Performed by: NURSE PRACTITIONER

## 2022-01-11 RX ORDER — NITROFURANTOIN 25; 75 MG/1; MG/1
100 CAPSULE ORAL
Status: DISCONTINUED | OUTPATIENT
Start: 2022-01-11 | End: 2022-01-11 | Stop reason: HOSPADM

## 2022-01-11 RX ADMIN — VENLAFAXINE 225 MG: 75 TABLET ORAL at 09:22

## 2022-01-11 RX ADMIN — OXYBUTYNIN CHLORIDE 15 MG: 5 TABLET, EXTENDED RELEASE ORAL at 09:22

## 2022-01-11 RX ADMIN — ACETAMINOPHEN 1000 MG: 500 TABLET ORAL at 12:13

## 2022-01-11 RX ADMIN — NITROFURANTOIN MONOHYDRATE/MACROCRYSTALLINE 100 MG: 25; 75 CAPSULE ORAL at 09:22

## 2022-01-11 RX ADMIN — DOCUSATE SODIUM 100 MG: 100 CAPSULE ORAL at 05:56

## 2022-01-11 RX ADMIN — ACETAMINOPHEN 1000 MG: 500 TABLET ORAL at 05:56

## 2022-01-11 RX ADMIN — OMEPRAZOLE 40 MG: 20 CAPSULE, DELAYED RELEASE ORAL at 05:56

## 2022-01-11 ASSESSMENT — GAIT ASSESSMENTS
DEVIATION: SHUFFLED GAIT
ASSISTIVE DEVICE: FRONT WHEEL WALKER
DISTANCE (FEET): 150
GAIT LEVEL OF ASSIST: SUPERVISED

## 2022-01-11 ASSESSMENT — COGNITIVE AND FUNCTIONAL STATUS - GENERAL
TURNING FROM BACK TO SIDE WHILE IN FLAT BAD: A LITTLE
MOBILITY SCORE: 18
WALKING IN HOSPITAL ROOM: A LITTLE
CLIMB 3 TO 5 STEPS WITH RAILING: A LITTLE
STANDING UP FROM CHAIR USING ARMS: A LITTLE
SUGGESTED CMS G CODE MODIFIER DAILY ACTIVITY: CH
MOVING TO AND FROM BED TO CHAIR: A LITTLE
DAILY ACTIVITIY SCORE: 24
SUGGESTED CMS G CODE MODIFIER MOBILITY: CK
MOVING FROM LYING ON BACK TO SITTING ON SIDE OF FLAT BED: A LITTLE

## 2022-01-11 ASSESSMENT — ACTIVITIES OF DAILY LIVING (ADL): TOILETING: INDEPENDENT

## 2022-01-11 NOTE — THERAPY
"Physical Therapy   Initial Evaluation     Patient Name: Aaliyah Fulton  Age:  81 y.o., Sex:  female  Medical Record #: [id number]  Today's Date: 1/11/2022     Precautions  Precautions: Fall Risk  Comments: memory deficits    Assessment  Patient is 81 y.o. female POD #1  shunt placement. Of note, pt with recent fall requiring L hip carlos (WBAT, posterior hip precautions) and IPR stay. Pt has since been home and receiving assistance from daughter and SO. During evaluation, pt frequently asking who therapist were despite education provided and how she ended up in the hospital. Frequent reorientation required. Pt appears to be at her baseline for functional mobility using FWW for gait. No further acute PT needs.     Plan    Recommend Physical Therapy for Evaluation only     DC Equipment Recommendations: None  Discharge Recommendations: Recommend home health for continued physical therapy services (pt would benefit from supervision with mobility )       Subjective    \"I thought I was lost and a nice male nurse found me and took me under his wing\"  (pt unaware of how she ended up in the hospital despite this being an elective/scheduled sx)       01/11/22 0810   Prior Living Situation   Prior Services Intermittent Physical Support for ADL Per Family   Housing / Facility 1 Story House   Steps Into Home 2   Steps In Home 0   Equipment Owned 4-Wheel Walker;Wheelchair   Lives with - Patient's Self Care Capacity Spouse   Comments daughters assist as needed with IADLs   Prior Level of Functional Mobility   Bed Mobility Independent   Transfer Status Independent   Ambulation Independent   Distance Ambulation (Feet)   (household distances)   Assistive Devices Used 4-Wheel Walker   Stairs Independent   Comments pt uses WC for community distances   History of Falls   History of Falls Yes   Cognition    Cognition / Consciousness X   Level of Consciousness Alert   New Learning Impaired   Attention Impaired   Comments short term memory " deficits noted, pt kept asking who therapist was and how we found her   Active ROM Lower Body    Active ROM Lower Body  WDL   Strength Lower Body   Lower Body Strength  WDL   Sensation Lower Body   Lower Extremity Sensation   WDL   Balance Assessment   Sitting Balance (Static) Fair +   Sitting Balance (Dynamic) Fair +   Standing Balance (Static) Fair   Standing Balance (Dynamic) Fair   Weight Shift Sitting Good   Weight Shift Standing Fair   Comments FWW   Gait Analysis   Gait Level Of Assist Supervised   Assistive Device Front Wheel Walker   Distance (Feet) 150   # of Times Distance was Traveled 1   Deviation Shuffled Gait   # of Stairs Climbed 0   Weight Bearing Status no restrictions   Comments no LOB noted   Bed Mobility    Supine to Sit Supervised   Scooting Supervised   Comments in chair post eval   Functional Mobility   Sit to Stand Supervised   Bed, Chair, Wheelchair Transfer Supervised   Toilet Transfers Supervised   Transfer Method Stand Step   Mobility in room and hallway with FWW   Anticipated Discharge Equipment and Recommendations   DC Equipment Recommendations None   Discharge Recommendations Recommend home health for continued physical therapy services  (pt would benefit from supervision with mobility )

## 2022-01-11 NOTE — FACE TO FACE
Face to Face Supporting Documentation - Home Health    The encounter with this patient was in whole or in part the primary reason for home health admission.    Date of encounter:   Patient:                    MRN:                       YOB: 2024  Aaliyah Fulton  [id number]  1940     Home health to see patient for:  Physical Therapy evaluation and treatment    Skilled need for:  Surgical aftercare     Skilled nursing interventions to include:  Comment: physical therapy     Homebound status evidenced by:  Needs the assistance of another person in order to leave the home. Leaving home requires a considerable and taxing effort. There is a normal inability to leave the home.    Community Physician to provide follow up care: Katerina Yanes M.D.     Optional Interventions? No      I certify the face to face encounter for this home health care referral meets the CMS requirements and the encounter/clinical assessment with the patient was, in whole, or in part, for the medical condition(s) listed above, which is the primary reason for home health care. Based on my clinical findings: the service(s) are medically necessary, support the need for home health care, and the homebound criteria are met.  I certify that this patient has had a face to face encounter by myself.  KIMMIE Gutierrez. - NPI: [id number]

## 2022-01-11 NOTE — PROGRESS NOTES
Neurosurgery Progress Note    Subjective:  No events overnight  Sitting chairside, PT just finished recs HH.  No complaints       Exam:  AAO3, fluent speech   PERRL, EOMI   -drift  TEJADA, sensation intact   Incision CDI   Eating, drinking, voiding    BP  Min: 100/57  Max: 122/60  Pulse  Av.6  Min: 70  Max: 82  Resp  Av.3  Min: 10  Max: 20  Temp  Av.2 °C (97.2 °F)  Min: 36.1 °C (96.9 °F)  Max: 36.3 °C (97.3 °F)  SpO2  Av.3 %  Min: 92 %  Max: 99 %    No data recorded    Recent Labs     22  0339   WBC 8.7   RBC 3.65*   HEMOGLOBIN 11.7*   HEMATOCRIT 35.7*   MCV 97.8   MCH 32.1   MCHC 32.8*   RDW 43.8   PLATELETCT 218   MPV 10.8     Recent Labs     22  0339   SODIUM 141   POTASSIUM 5.0   CHLORIDE 107   CO2 26   GLUCOSE 98   BUN 11   CREATININE 0.60   CALCIUM 9.2               Intake/Output                       01/10/22 0700 - 22 0659 22 0700 - 22 0659     [id number] 7166-0838 Total 4939-3107 2247-1391 Total                 Intake    P.O.  --  300 300  --  -- --    P.O. -- 300 300 -- -- --    I.V.  760.8  -- 760.8  --  -- --    Volume (mL) (lactated ringers infusion) 760.8 -- 760.8 -- -- --    Total Intake 760.8 300 1060.8 -- -- --       Output    Urine  --  -- --  --  -- --    Number of Times Voided -- 2 x 2 x -- -- --    Total Output -- -- -- -- -- --       Net I/O     760.8 300 1060.8 -- -- --            Intake/Output Summary (Last 24 hours) at 2022 0912  Last data filed at 1/10/2022 2351  Gross per 24 hour   Intake 300 ml   Output --   Net 300 ml            • nitrofurantoin  100 mg Q48HRS   • Pharmacy Consult Request  1 Each PHARMACY TO DOSE   • MD ALERT...DO NOT ADMINISTER NSAIDS or ASPIRIN unless ORDERED By Neurosurgery  1 Each PRN   • ondansetron  4 mg Q4HRS PRN   • diphenhydrAMINE  25 mg Q6HRS PRN   • labetalol  10 mg Q HOUR PRN   • cloNIDine  0.1 mg Q4HRS PRN   • docusate sodium  100 mg BID   • senna-docusate  1 Tablet Nightly   • senna-docusate  1 Tablet  Q24HRS PRN   • polyethylene glycol/lytes  1 Packet BID PRN   • magnesium hydroxide  30 mL QDAY PRN   • bisacodyl  10 mg Q24HRS PRN   • sodium phosphate  1 Each Once PRN   • artificial tears  1 Application Q8HRS   • acetaminophen  1,000 mg Q6HRS    Followed by   • [START ON 1/15/2022] acetaminophen  1,000 mg Q6HRS PRN   • oxyCODONE immediate-release  5 mg Q3HRS PRN    Or   • oxyCODONE immediate-release  10 mg Q3HRS PRN    Or   • morphine injection  2-4 mg Q3HRS PRN   • benzocaine-menthol  1 Lozenge Q2HRS PRN   • amLODIPine  10 mg DAILY   • omeprazole  40 mg DAILY   • oxybutynin SR  15 mg QDAY   • valsartan  160 mg DAILY   • venlafaxine  225 mg DAILY       Assessment and Plan:  Hospital day #2  POD #1  shunt   Prophylactic anticoagulation: NO          Start date/time: TBD     Plan;  Stable neuro  DC home with HH   Scripts sent to [facility] pharmacy ([facility])   Shower daily, keep incision clean and dry   F/u 2 weeks for postop appt.

## 2022-01-11 NOTE — CARE PLAN
The patient is Stable - Low risk of patient condition declining or worsening    Shift Goals  Clinical Goals: Pain Control, Safety, Room Orientation  Patient Goals: Pain Control, Rest  Family Goals: N/A    Progress made toward(s) clinical / shift goals:    Problem: Pain - Standard  Goal: Alleviation of pain or a reduction in pain to the patient’s comfort goal  Outcome: Progressing     Problem: Fall Risk  Goal: Patient will remain free from falls  Outcome: Progressing     Problem: Knowledge Deficit - Standard  Goal: Patient and family/care givers will demonstrate understanding of plan of care, disease process/condition, diagnostic tests and medications  Outcome: Progressing    Patient is able to communicate needs effectively. Uses call light appropriately. Updated patient on plan of care and medications. Fall precautions in place. All patient's questions answered at this time. Call light in reach.     Patient is not progressing towards the following goals:

## 2022-01-11 NOTE — DISCHARGE INSTRUCTIONS
Discharge Instructions    Discharged to home by car with relative. Discharged via wheelchair, hospital escort: Yes.  Special equipment needed: Not Applicable    Be sure to schedule a follow-up appointment with your primary care doctor or any specialists as instructed.     Discharge Plan:        I understand that a diet low in cholesterol, fat, and sodium is recommended for good health. Unless I have been given specific instructions below for another diet, I accept this instruction as my diet prescription.   Other diet: Regular    Special Instructions: Keep incision clean and dry. No dressing required over incision.   OK to shower and pat dry.   Do not soak incision in bath, hot tub, etc.   Take over the counter stool softener daily with pain medication.   No Aspirin or anticoagulants until cleared by Neurosurgery.   No driving if taking narcotic medication.   Follow up with [facility]surgery in 2 weeks as scheduled, Or call for an appointment [phone]      Ventriculoperitoneal Shunt Home Guide    A ventriculoperitoneal () shunt is a small plastic tube that is used to drain the cerebrospinal fluid (CSF) from your brain into the space in your abdomen (peritoneum). The peritoneum absorbs this fluid and gets rid of it.  The CSF cushions your brain and spine. Normally, your brain releases this fluid and then reabsorbs it through drainage channels. If your brain's drainage channels are not working properly, this fluid builds up and will need to be redirected with a shunt. You may need a  shunt if you have too much CSF inside your brain (hydrocephalus).  Your health care provider determines how much fluid needs to be drained and adjusts the settings on the shunt. Some shunt settings cannot be changed after they have been set (nonprogrammable shunt). Others can be adjusted by your health care provider (programmable shunt). You may feel the tube behind your ear and under your skin where it passes down your neck and  your chest before it enters your abdomen.  If you have a shunt, you need to take certain precautions and be aware of signs that may indicate a problem with the shunt. After your shunt is placed, it is important to have the following information with you:  · The contact information for the surgeon who placed your shunt.  · The name and type of  shunt that you have.  When will I have my shunt removed?  Your shunt may be temporary or permanent, depending on your condition. For some people, a  shunt is a lifelong device.  What precautions must I follow?  · Contact your health care provider if you have a programmable shunt and need to have an MRI for any reason. This is very important because many programmable shunts are sensitive to magnets in MRI machines.  · Tell your health care provider about your shunt before you have surgery, especially abdominal surgery. You may need to take antibiotic medicines before having a procedure.  · Do not wear tight-fitting hats or headgear.  · Ask your health care provider which activities are safe for you.  What are the warning signs of a shunt malfunction?  A  shunt can malfunction or become clogged. If the shunt is not working properly, it will not drain the CSF. This can cause an increase in brain pressure. It is important to know the warning signs of a shunt malfunction because they can start suddenly.  Warning signs of a malfunction include:  · A headache that gets worse over time.  · Vomiting without cause.  · Feeling sleepier than usual.  · Loss of appetite.  · Low energy.  · Irritability.  · Personality change or confusion.  · Vision changes, such as blurry vision, double vision, or loss of vision.  · Swelling of the skin that runs along the path of the shunt.  · A return of your original symptoms.  · Trouble walking.  · Inability to control your bladder (urinary incontinence).  · Having a seizure.  What are the warning signs of a shunt infection?  If germs (bacteria)  get into the tissue around the shunt, you can develop an infection. This can cause your shunt to stop working properly.  Watch for signs of infection, such as:  · Fever.  · Redness or swelling of the skin along the shunt path.  · Pain around the shunt or shunt tubing.  · A headache or a stiff neck.  · Nausea or vomiting.  Get help right away if you:  · Are sleepier than usual or have trouble waking up.  · Vomit for no reason.  · Have a fever.  · Notice redness or swelling along the shunt path.  · Have a headache that is getting worse.  · Start to twitch or shake (seizure).  · Develop vision problems.  · Lose coordination or balance.  · Become irritable or start to behave abnormally.  These symptoms may represent a serious problem that is an emergency. Do not wait to see if the symptoms will go away. Get medical help right away. Call your local emergency services (911 in the [place] Do not drive yourself to the hospital.  Summary  · A ventriculoperitoneal () shunt is a small plastic tube used to drain the cerebrospinal fluid (CSF) from your brain into your peritoneum.  · You may need a  shunt if you have hydrocephalus. Your shunt may be temporary or permanent, depending on your condition.  · A  shunt can malfunction or become clogged. If the shunt is not working properly, it will not drain the CSF. The shunt can also get infected.  · Warning signs of shunt malfunction include headache, vomiting, drowsiness, loss of appetite, low energy, irritability, vision changes, urinary incontinence, and seizures.  · Warning signs of shunt infection include fever, redness or swelling of skin along the shunt path, pain around the shunt, headache, stiff neck, nausea, or vomiting.  This information is not intended to replace advice given to you by your health care provider. Make sure you discuss any questions you have with your health care provider.  Document Released: 07/07/2006 Document Revised: 01/31/2019 Document Reviewed:  01/24/2019  Elsevier Patient Education © 2020 Elsevier Inc.      · Is patient discharged on Warfarin / Coumadin?   No     Depression / Suicide Risk    As you are discharged from this [facility] facility, it is important to learn how to keep safe from harming yourself.    Recognize the warning signs:  · Abrupt changes in personality, positive or negative- including increase in energy   · Giving away possessions  · Change in eating patterns- significant weight changes-  positive or negative  · Change in sleeping patterns- unable to sleep or sleeping all the time   · Unwillingness or inability to communicate  · Depression  · Unusual sadness, discouragement and loneliness  · Talk of wanting to die  · Neglect of personal appearance   · Rebelliousness- reckless behavior  · Withdrawal from people/activities they love  · Confusion- inability to concentrate     If you or a loved one observes any of these behaviors or has concerns about self-harm, here's what you can do:  · Talk about it- your feelings and reasons for harming yourself  · Remove any means that you might use to hurt yourself (examples: pills, rope, extension cords, firearm)  · Get professional help from the community (Mental Health, Substance Abuse, psychological counseling)  · Do not be alone:Call your Safe Contact- someone whom you trust who will be there for you.  · Call your local CRISIS HOTLINE 900-4460 or 421-643-7550  · Call your local Children's Mobile Crisis Response Team [place] (767) 168-1429 or www.[employer]  · Call the toll free National Suicide Prevention Hotlines   · National Suicide Prevention Lifeline 686-144-UUPC (9591)  · National Hope Line Network 800-SUICIDE (737-1570)

## 2022-01-11 NOTE — THERAPY
Occupational Therapy   Initial Evaluation     Patient Name: Aaliyah Fulton  Age:  81 y.o., Sex:  female  Medical Record #: [id number]  Today's Date: 1/11/2022     Precautions  Precautions: Fall Risk  Comments: memory deficits    Assessment  Patient is 81 y.o. female with a diagnosis of hydrocephalus s/p shunt placement.   Pt is at or near his/her functional baseline. Pt with no further skilled OT needs in the acute care setting at this time.      Plan    Occupational Therapy for Evaluation only .       Discharge Recommendations: (P) Recommend home health for continued occupational therapy services        01/11/22 1706   Prior Living Situation   Prior Services Intermittent Physical Support for ADL Per Family   Housing / Facility 1 Story House   Steps Into Home 2   Steps In Home 0   Equipment Owned 4-Wheel Walker;Wheelchair   Lives with - Patient's Self Care Capacity Spouse  (DTR lives nearby and assists with IADLs)   Prior Level of ADL Function   Self Feeding Independent   Grooming / Hygiene Independent   Bathing Independent   Dressing Independent   Toileting Independent   ADL Assessment   Grooming Supervision   Upper Body Dressing Supervision   Lower Body Dressing Supervision   Toileting Supervision   Functional Mobility   Sit to Stand Supervised   Bed, Chair, Wheelchair Transfer Supervised   Toilet Transfers Supervised   Session Information   Priority 0

## 2022-01-11 NOTE — PROGRESS NOTES
Arrive to dc lounge via wheelchair. A/O, dc instructions reviewed w/ pt and daughter, verbalized understanding.

## 2022-01-11 NOTE — DISCHARGE PLANNING
Anticipated Discharge Disposition: Home with home health.    Action: Patient will require HH for safe discharge home. LSW met with patient and daughter at bedside to discuss discharge plan. Patient agreeable to sending HH referral to [facility] and signed choice form. DPA tasked with sending out HH referral.    Barriers to Discharge: HH acceptance.    Plan: Home with [facility].    Update 1235: DPA confirmed [facility] has accepted patient into their services.

## 2022-01-11 NOTE — CARE PLAN
The patient is Stable - Low risk of patient condition declining or worsening    Shift Goals  Clinical Goals: PT/OT thereseals  Patient Goals: discharge  Family Goals: N/A    Progress made toward(s) clinical / shift goals:    Problem: Pain - Standard  Goal: Alleviation of pain or a reduction in pain to the patient’s comfort goal  Outcome: Progressing     Problem: Fall Risk  Goal: Patient will remain free from falls  Outcome: Progressing     Problem: Knowledge Deficit - Standard  Goal: Patient and family/care givers will demonstrate understanding of plan of care, disease process/condition, diagnostic tests and medications  Outcome: Progressing       Patient is not progressing towards the following goals:

## 2022-01-11 NOTE — DISCHARGE PLANNING
Agency/Facility Name: [facility]   Spoke To: Fax  Outcome: Pt accepted once medically cleared.     CM informed.

## 2022-09-02 ENCOUNTER — HOSPITAL ENCOUNTER (OUTPATIENT)
Dept: LAB | Facility: MEDICAL CENTER | Age: 82
End: 2022-09-02
Attending: PSYCHIATRY & NEUROLOGY
Payer: MEDICARE

## 2022-09-02 ENCOUNTER — HOSPITAL ENCOUNTER (OUTPATIENT)
Dept: LAB | Facility: MEDICAL CENTER | Age: 82
End: 2022-09-02
Attending: ORTHOPAEDIC SURGERY
Payer: MEDICARE

## 2022-09-02 ENCOUNTER — OFFICE VISIT (OUTPATIENT)
Dept: NEUROLOGY | Facility: MEDICAL CENTER | Age: 82
End: 2022-09-02
Attending: PSYCHIATRY & NEUROLOGY
Payer: MEDICARE

## 2022-09-02 VITALS
DIASTOLIC BLOOD PRESSURE: 70 MMHG | WEIGHT: 178.35 LBS | RESPIRATION RATE: 16 BRPM | TEMPERATURE: 98 F | SYSTOLIC BLOOD PRESSURE: 138 MMHG | BODY MASS INDEX: 27.03 KG/M2 | HEART RATE: 81 BPM | HEIGHT: 68 IN | OXYGEN SATURATION: 95 %

## 2022-09-02 DIAGNOSIS — Z98.2 S/P VP SHUNT: ICD-10-CM

## 2022-09-02 DIAGNOSIS — B96.20 E-COLI UTI: ICD-10-CM

## 2022-09-02 DIAGNOSIS — F03.A0 MILD DEMENTIA (HCC): ICD-10-CM

## 2022-09-02 DIAGNOSIS — R47.1 DYSARTHRIA: ICD-10-CM

## 2022-09-02 DIAGNOSIS — M17.12 PRIMARY OSTEOARTHRITIS OF LEFT KNEE: ICD-10-CM

## 2022-09-02 DIAGNOSIS — N39.0 E-COLI UTI: ICD-10-CM

## 2022-09-02 PROBLEM — I35.0 NONRHEUMATIC AORTIC VALVE STENOSIS: Status: ACTIVE | Noted: 2019-03-26

## 2022-09-02 PROBLEM — G91.2 NORMAL PRESSURE HYDROCEPHALUS (HCC): Status: ACTIVE | Noted: 2021-06-16

## 2022-09-02 PROBLEM — Z87.440 PERSONAL HISTORY UTI: Status: ACTIVE | Noted: 2022-02-24

## 2022-09-02 PROBLEM — R26.9 ABNORMAL GAIT: Status: ACTIVE | Noted: 2021-03-29

## 2022-09-02 PROBLEM — Z86.73 HISTORY OF CVA (CEREBROVASCULAR ACCIDENT): Status: ACTIVE | Noted: 2021-12-12

## 2022-09-02 PROBLEM — M25.562 PAIN IN LEFT KNEE: Status: ACTIVE | Noted: 2021-05-04

## 2022-09-02 PROBLEM — R41.3 POOR SHORT-TERM MEMORY: Status: ACTIVE | Noted: 2017-09-28

## 2022-09-02 PROBLEM — R41.3 MEMORY DEFICIT: Status: ACTIVE | Noted: 2021-04-16

## 2022-09-02 PROBLEM — E78.49 OTHER HYPERLIPIDEMIA: Status: ACTIVE | Noted: 2021-12-12

## 2022-09-02 PROBLEM — K21.9 GASTROESOPHAGEAL REFLUX DISEASE WITHOUT ESOPHAGITIS: Status: ACTIVE | Noted: 2021-12-12

## 2022-09-02 PROBLEM — Z01.89 OTHER SPECIFIED EXAMINATION: Status: ACTIVE | Noted: 2021-03-12

## 2022-09-02 PROBLEM — R73.03 PREDIABETES: Status: ACTIVE | Noted: 2019-09-23

## 2022-09-02 PROBLEM — I10 BENIGN ESSENTIAL HYPERTENSION: Status: ACTIVE | Noted: 2022-03-24

## 2022-09-02 PROBLEM — M81.0 OSTEOPOROSIS, POSTMENOPAUSAL: Status: ACTIVE | Noted: 2021-12-12

## 2022-09-02 LAB
ANION GAP SERPL CALC-SCNC: 9 MMOL/L (ref 7–16)
APTT PPP: 28.7 SEC (ref 24.7–36)
BASOPHILS # BLD AUTO: 1.1 % (ref 0–1.8)
BASOPHILS # BLD: 0.07 K/UL (ref 0–0.12)
BUN SERPL-MCNC: 13 MG/DL (ref 8–22)
CALCIUM SERPL-MCNC: 9 MG/DL (ref 8.5–10.5)
CHLORIDE SERPL-SCNC: 107 MMOL/L (ref 96–112)
CO2 SERPL-SCNC: 25 MMOL/L (ref 20–33)
CREAT SERPL-MCNC: 0.55 MG/DL (ref 0.5–1.4)
EOSINOPHIL # BLD AUTO: 0.9 K/UL (ref 0–0.51)
EOSINOPHIL NFR BLD: 14.3 % (ref 0–6.9)
ERYTHROCYTE [DISTWIDTH] IN BLOOD BY AUTOMATED COUNT: 45.3 FL (ref 35.9–50)
FOLATE SERPL-MCNC: 9.9 NG/ML
GFR SERPLBLD CREATININE-BSD FMLA CKD-EPI: 90 ML/MIN/1.73 M 2
GLUCOSE SERPL-MCNC: 145 MG/DL (ref 65–99)
HCT VFR BLD AUTO: 39.3 % (ref 37–47)
HGB BLD-MCNC: 12.8 G/DL (ref 12–16)
HIV 1+2 AB+HIV1 P24 AG SERPL QL IA: NORMAL
IMM GRANULOCYTES # BLD AUTO: 0.02 K/UL (ref 0–0.11)
IMM GRANULOCYTES NFR BLD AUTO: 0.3 % (ref 0–0.9)
INR PPP: 1.05 (ref 0.87–1.13)
LYMPHOCYTES # BLD AUTO: 1.71 K/UL (ref 1–4.8)
LYMPHOCYTES NFR BLD: 27.1 % (ref 22–41)
MCH RBC QN AUTO: 32 PG (ref 27–33)
MCHC RBC AUTO-ENTMCNC: 32.6 G/DL (ref 33.6–35)
MCV RBC AUTO: 98.3 FL (ref 81.4–97.8)
MONOCYTES # BLD AUTO: 0.43 K/UL (ref 0–0.85)
MONOCYTES NFR BLD AUTO: 6.8 % (ref 0–13.4)
NEUTROPHILS # BLD AUTO: 3.17 K/UL (ref 2–7.15)
NEUTROPHILS NFR BLD: 50.4 % (ref 44–72)
NRBC # BLD AUTO: 0 K/UL
NRBC BLD-RTO: 0 /100 WBC
PLATELET # BLD AUTO: 187 K/UL (ref 164–446)
PMV BLD AUTO: 11.4 FL (ref 9–12.9)
POTASSIUM SERPL-SCNC: 4.1 MMOL/L (ref 3.6–5.5)
PROTHROMBIN TIME: 13.6 SEC (ref 12–14.6)
RBC # BLD AUTO: 4 M/UL (ref 4.2–5.4)
SODIUM SERPL-SCNC: 141 MMOL/L (ref 135–145)
TSH SERPL DL<=0.005 MIU/L-ACNC: 2.04 UIU/ML (ref 0.38–5.33)
VIT B12 SERPL-MCNC: 337 PG/ML (ref 211–911)
WBC # BLD AUTO: 6.3 K/UL (ref 4.8–10.8)

## 2022-09-02 PROCEDURE — 99212 OFFICE O/P EST SF 10 MIN: CPT | Performed by: PSYCHIATRY & NEUROLOGY

## 2022-09-02 PROCEDURE — 80048 BASIC METABOLIC PNL TOTAL CA: CPT

## 2022-09-02 PROCEDURE — 84425 ASSAY OF VITAMIN B-1: CPT

## 2022-09-02 PROCEDURE — 82607 VITAMIN B-12: CPT

## 2022-09-02 PROCEDURE — 84443 ASSAY THYROID STIM HORMONE: CPT

## 2022-09-02 PROCEDURE — 85730 THROMBOPLASTIN TIME PARTIAL: CPT | Mod: GA

## 2022-09-02 PROCEDURE — G2212 PROLONG OUTPT/OFFICE VIS: HCPCS | Performed by: PSYCHIATRY & NEUROLOGY

## 2022-09-02 PROCEDURE — G0475 HIV COMBINATION ASSAY: HCPCS | Mod: GA

## 2022-09-02 PROCEDURE — 85025 COMPLETE CBC W/AUTO DIFF WBC: CPT | Mod: GA

## 2022-09-02 PROCEDURE — 36415 COLL VENOUS BLD VENIPUNCTURE: CPT | Mod: GA

## 2022-09-02 PROCEDURE — 82746 ASSAY OF FOLIC ACID SERUM: CPT

## 2022-09-02 PROCEDURE — 85610 PROTHROMBIN TIME: CPT | Mod: GA

## 2022-09-02 PROCEDURE — 99205 OFFICE O/P NEW HI 60 MIN: CPT | Performed by: PSYCHIATRY & NEUROLOGY

## 2022-09-02 RX ORDER — ATORVASTATIN CALCIUM 40 MG/1
40 TABLET, FILM COATED ORAL NIGHTLY
Qty: 90 TABLET | Refills: 3 | Status: SHIPPED | OUTPATIENT
Start: 2022-09-02 | End: 2023-10-12

## 2022-09-02 RX ORDER — ATORVASTATIN CALCIUM 10 MG/1
10 TABLET, FILM COATED ORAL DAILY
COMMUNITY
Start: 2022-07-13 | End: 2023-03-21

## 2022-09-02 RX ORDER — AMOXICILLIN AND CLAVULANATE POTASSIUM 875; 125 MG/1; MG/1
TABLET, FILM COATED ORAL
Qty: 20 TABLET | Refills: 1 | Status: SHIPPED | OUTPATIENT
Start: 2022-09-02 | End: 2022-09-30

## 2022-09-02 RX ORDER — BUSPIRONE HYDROCHLORIDE 15 MG/1
15 TABLET ORAL
COMMUNITY
Start: 2022-06-30 | End: 2022-09-28

## 2022-09-02 ASSESSMENT — MONTREAL COGNITIVE ASSESSMENT (MOCA)
1. ALTERNATING TRAIL MAKING: 0/1
10. [FLUENCY] NAME WORDS STARTING WITH DESIGNATED LETTER: 1/1
4. NAME EACH OF THE THREE ANIMALS SHOWN: 3/3
3. DRAW A CLOCK: CONTOUR, NUMBERS, HANDS: 1/3
7. [VIGILENCE] TAP WHEN HEARING DESIGNATED LETTER: 1/1
11. FOR EACH PAIR OF WORDS, WHAT CATEGORY DO THEY BELONG TO (OUT OF 2): 2/2
5. MEMORY TRIALS: SECOND TRIAL
WHAT IS THE VERSION OF MOCA ADMINISTERED: 8.3
2. COPY DRAWING: 1/1
DELAYED RECALL SUBSCORE: 3/5
WHAT IS THE TOTAL SCORE (OUT OF 30): 23
8. SERIAL SUBTRACTION OF 7S: 4 OR 5/5
9. REPEAT EACH SENTENCE: 1/2
6. READ LIST OF DIGITS [FORWARD/BACKWARD]: 2/2
ORIENTATION SUBSCORE: 5/6

## 2022-09-02 ASSESSMENT — FIBROSIS 4 INDEX: FIB4 SCORE: 1.73

## 2022-09-02 NOTE — PROGRESS NOTES
"Reason for Neurology Consult:  S/P  Shunt    History of present illness:    Aaliyah Fulton 82 y.o. female right handed woman  who lives with  and was a  at [facility] who is here with her daughter. She had a  Shunt Placed in 1/2022- about 1 month later daughter noticed that she was having more better improvement (\"more on top of things\") and she was not having to use her walker as much but still requiring a cane. She was consistently improved for months per daughter but still requiring a cane.    This last week was near [place]- that morning speech \"off\" (not pronouncing words correctly) and there was an increased imbalanced. Still impaired speech- 75% back to pre event baseline.  Balance is close to normal.    Over the last year to 1.5 years  before her  shunt placed, daughter noticed about 6-12 hours to 1 day later Aaliyah was forgetting information told to her. Her daughter would work with her on this issue. The forgetfulness did not improve did not shunt during the shunt placement.    Communication ability has not changed much at a all.    She has a chronic (left)- she can't walk far for the last 2 years.  The right knee placed.    No falls or near falls in the last 3 months.    No ongoing headache(s),visual disturbances, involuntary movements of the limbs in the recent months.    No dysarthria,. Dysphagia,diplopia in the recent weeks.    No focal subjective weakness of the limbs or ataxia of the limbs in the recent weeks.      No smoker.  No significant alcohol use.      August 31st evaluation at [facility] - Head CT- right  catheter in place;no discrete abnormalities. 12 lead EKG - NSR  Blood work- troponin (normal) and electrolytes normal.         Shunt Placed on 1/2022: Dr. Blanchard  Patient Active Problem List    Diagnosis Date Noted    Normal pressure hydrocephalus (HCC) 01/10/2022    Other specified anemias 11/26/2021    Overactive bladder 11/26/2021    " "Hydrocephalus (HCC) 11/24/2021    Systolic murmur 11/24/2021    Closed left hip fracture (HCC) 11/24/2021    Hand arthritis 03/04/2015    Depression 03/04/2015    Breast cancer in situ 03/04/2015    OA (osteoarthritis) of knee 11/14/2013    Thoracic kyphosis 10/17/2012    S/P laminectomy 10/17/2012       Past medical history:   Past Medical History:   Diagnosis Date    Anesthesia     nausea    Arthritis     ASTHMA     CHEMICAL INDUCED    Breast cancer ([facility])     stage 0 - Dr. Naqvi    Cancer ([facility]) 2010    right breast    Cataract     silver iol    Dental disorder     upper implants    Heart burn     High cholesterol     Hypertension 01/03/2022    states well controlled on meds    Indigestion     Osteoporosis     Pain     \"everywhere\"    Pneumonia 12/2021    on antibiotics    Psychiatric problem     depression on zoloft    Unspecified urinary incontinence     wears depends       Past surgical history:   Past Surgical History:   Procedure Laterality Date    RI CREATE SHUNT:VENTRIC-PERITONEAL  1/10/2022    Procedure: INSERTION, SHUNT, VENTRICULOPERITONEAL, LAPAROSCOPIC PERITONEAL CATHETER - STEALTH GUIDED;  Surgeon: Gregory Blanchard M.D.;  Location: SURGERY [facility];  Service: Neurosurgery    PB PARTIAL HIP REPLACEMENT Left 11/24/2021    Procedure: HEMIARTHROPLASTY, HIP;  Surgeon: Marlon Culver M.D.;  Location: SURGERY [facility];  Service: Orthopedics    RECOVERY  5/10/2016    Procedure: [id number]-ANESTHESIA;  Surgeon: Recoveryonly Surgery;  Location: SURGERY PRE-POST PROC UNIT [facility];  Service:     LUMBAR LAMINECTOMY DISKECTOMY  10/17/2012    Performed by Daksha Melendez M.D. at SURGERY [facility] ORS    FORAMINOTOMY  10/17/2012    Performed by Daksha Melendez M.D. at [facility] ORS    LUMBAR DECOMPRESSION  10/17/2012    Performed by Daksha Melendez M.D. at SURGERY [facility]    KNEE ARTHROPLASTY TOTAL  9/19/2011    Performed by KISHAN CARDENAS at SURGERY [facility] ORS    BREAST " BIOPSY  5/21/2010    Performed by ROB TOMAS at SURGERY [facility]S    KYLEE BY LAPAROSCOPY      LUMPECTOMY      OTHER      rt breast lumpectomy    OTHER ORTHOPEDIC SURGERY      foot, shoulder, and knee    MI RADIATION THERAPY PLAN SIMPLE      SINUSCOPY           Social history:   Social History     Socioeconomic History    Marital status:      Spouse name: Not on file    Number of children: Not on file    Years of education: Not on file    Highest education level: Not on file   Occupational History    Not on file   Tobacco Use    Smoking status: Never    Smokeless tobacco: Never   Vaping Use    Vaping Use: Never used   Substance and Sexual Activity    Alcohol use: No     Comment: very rare    Drug use: No    Sexual activity: Not on file     Comment: ; two daughters; retired ( @ [employer])   Other Topics Concern    Not on file   Social History Narrative    Not on file     Social Determinants of Health     Financial Resource Strain: Not on file   Food Insecurity: Not on file   Transportation Needs: Not on file   Physical Activity: Not on file   Stress: Not on file   Social Connections: Not on file   Intimate Partner Violence: Not on file   Housing Stability: Not on file       Family history:   Family History   Problem Relation Age of Onset    Lung Disease Mother         copd         Current medications:   Current Outpatient Medications   Medication    meloxicam (MOBIC) 7.5 MG Tab    atorvastatin (LIPITOR) 10 MG Tab    aspirin (MINIPRIN LOW DOSE) 81 MG EC tablet    Denosumab (PROLIA SC)    Turmeric 400 MG Cap    busPIRone (BUSPAR) 15 MG tablet    Calcium Carbonate 1500 (600 Ca) MG Tab    nitrofurantoin (MACRODANTIN) 50 MG Cap    VENTOLIN  (90 Base) MCG/ACT Aero Soln inhalation aerosol    Cholecalciferol (D3 PO)    cefdinir (OMNICEF) 300 MG Cap    acetaminophen (TYLENOL) 500 MG Tab    D-MANNOSE PO    venlafaxine XR (EFFEXOR XR) 75 MG CAPSULE SR 24 HR     "amLODIPine (NORVASC) 10 MG Tab    omeprazole (PRILOSEC) 40 MG delayed-release capsule    oxybutynin (DITROPAN XL) 15 MG CR tablet    valsartan (DIOVAN) 160 MG Tab    venlafaxine (EFFEXOR-XR) 150 MG extended-release capsule    CRANBERRY EXTRACT PO    Calcium Carbonate 600 MG Tab    vitamin D (CHOLECALCIFEROL) 1000 Unit Tab     No current facility-administered medications for this visit.       Medication Allergy:  No Known Allergies        Physical examination:   Vitals:    09/02/22 1110   Weight: 80.9 kg (178 lb 5.6 oz)   Height: 1.727 m (5' 8\")       Normal cephalic atraumatic.  There is full range of movement around the neck in all directions without restrictions or discrete pain evoked triggers.  No lower extremity edema.      Neurological  Exam:      Zac Cognitive Assessment (MOCA) Version 7.1    Years of Education: High School Grad    TOTAL SCORE: 23/30  (to be scanned into the MEDIA section in the E.M.R.)        Mental status: Awake, alert and fully oriented to person, place, time, and situation. Normal attention, concentration, and fund of knowledge for education level.  Did not appear/act combative,irritable,anxious,paranoid/delusional or aggressive to or with me.    Speech and language: Speech is fluent without errors, clear, and intact to repetition.     Follows 3 step motor commands in sequence without significant delay and correctly.    Cranial nerve exam:  II: Pupils are equally round and reactive to light. Visual fields are intact by confrontation.  III, IV, VI: EOMI, no diplopia, no ptosis.  V: Sensation to light touch is normal over V1-3 distributions bilaterally.  .  VII: Facial movements are symmetrical. There is no facial droop. .  VIII: Hearing intact to soft speech and finger rub bilaterally  IX: Palate elevates symmetrically, uvula is midline. Dysarthria is not present.  XI: Shoulder shrug are symmetrical and strong.   XII: Tongue protrudes midline.      Motor exam:  Muscle tone is normal " in all 4 limbs. and No abnormal movements appreciated.    Muscle strength:    Neck Flexors/Extensors: 5/5       Right  Left  Deltoid   5/5  5/5      Biceps   5/5  5/5  Triceps           5/5  5/5   Wrist extensors 5/5  5/5  Wrist flexors  5/5  5/5     5/5  5/5  Interossei  5/5  5/5  Thenar (APB)  5/5  5/5   Hip flexors  5/5  5/5  Quadriceps  5/5  5/5    Hamstrings  5/5  5/5  Dorsiflexors  5/5  5/5  Plantarflexors  5/5  5/5  Toe extension  5/5  5/5        Reflexes:       Right  Left  Biceps   2/4  2/4  Triceps             2/4  2/4  Brachioradialis    2/4  2/4  Knee jerk  2/4  2/4  Ankle jerk  2/4  2/4     Frontal release signs are absent    bilaterally toes are downgoing to plantar stimulation..    Coordination (finger-to-nose, heel/knee/shin, rapid alternating movements) was normal.     There was no ataxia, no tremors, and no dysmetria.     Station and gait > intorted left knee but easily stands up from exam chair without retropulsion,veering,leaning,swaying (to either side).       Labs and Tests:    DIFFUSELY ENLARGED INTRACRANIAL VENTRICLES CONSISTENT WITH HYDROCEPHALUS WITH A RIGHT-SIDED VENTRICULOPERITONEAL SHUNT IN PLACE.   PERIVENTRICULAR DECREASED DENSITY CONSISTENT WITH TRANSEPENDYMAL CSF MIGRATION.   VENTRICULAR SIZE IS SIMILAR TO PREVIOUS SHUNT PLACEMENT CT SCAN OF DECEMBER 2006.   THESE FINDINGS ARE SUSPICIOUS FOR VENTRICULOPERITONEAL SHUNT MALFUNCTION.       NEUROIMAGING:     COMPARISON: CT head 7/2/2019     FINDINGS:  Immediate images show prompt entry of activity into the lateral ventricles.  Persistent lateral ventricular activity is seen on 24-hour and 48-hour images.  No significant activity is seen over the convexities.     IMPRESSION:     Abnormal CSF dynamics with persistent activity in the lateral ventricles on delayed images, compatible with normal pressure hydrocephalus.           Exam Ended: 07/19/21  4:30 PM Last Resulted: 07/21/21 12:18 PM             10/2012:    Discharge Summary  Daksha  DALTON Melendez M.D. (Physician)   Neurosurgery  FINAL DIAGNOSES:  1.  Multilevel lumbar degenerative disk disease.  2.  Moderate lateral recess stenosis, L3-L4, L4-L5, L5-S1.     OPERATIONS:  On 10/17/2012, L3-L4, L4-L5, L5-S1 decompressive laminectomy and   bilateral foraminotomies, no complications.     HISTORY OF PRESENT ILLNESS:  The patient is a 72-year-old woman, she has had   issues with her neck and her back, but in essence she has been troubled by   right leg sciatica.  This has been there for the last 12 months.  When she   walks, she had lot of pain down the lateral aspect of the calf.  The pain was   severe.  She failed conservative therapy.  Preoperative physical examination   revealed thoracic kyphosis.  She had degenerative disk disease through her   cervical spine.  She had no neurological deficit.  So the procedure was as   above.     HOSPITAL COURSE:  Postoperatively, her leg pain was good.  She was slow to   mobilize.  The wound looked good.  There were no complications in the   hospital.     CONSULTATIONS:  Nil.     CONDITION AT DISCHARGE:  She is neurologically intact.  The wound looked good.            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    This well could be from a neurodegenerative condition given the type of memory disturbances noticed by daughter.    MOCA of 23/30 today.  FAQ score of 13 today.  Global Deterioration score of 4 per daughter.      2.  Minor Ischemic Stroke- onset 1 week ago- residual being very mild dysarthria.    Risk factors include long standing HTN and age.    Suspect lacunar infarct.    3. HTN- known.    4. Unsteady Gait- multifactorial including chronic left knee deformity (intorsion).    Today, I reviewed the clinical criteria and  "reviewed several  scenarios of the differences being using the medical terms of normal brain aging (age associated memory impairment),  Mild Cognitive Impairment (MCI) and Dementia.    Dementia  is a syndrome but statistically and for the majority of patients  occurs due  to a more rapid aging of the brain tissue or potentially from injury to certain parts of one's brain ( often from such contributing factors as  the cumulative effects of alcohol, from one or more ischemic or hemmorhagic stroke(s), from neurodegeneration or long standing with/without suboptimally controlled Hypertension). It is for some of these potential factors as to why I recommend a brain imaging test (Head CT or Brain MRI) be done for the 1st time or in certain circumstances repeated for comparison purposes  as such imaging can suggest one or more factors as to the reason(s) for the person's cognitive/memory changes. In fact, a normal or \"age related\" finding on a brain imaging test in and of itself is useful clinical and objective information to have in the evaluation of a person who has either an acute, evolving or even chronic (months to years) long cognitive/memory complaint.    Additional factors or contributors to Brain Health issues can be summarized in several books/references which I discussed as well today.     Goals going forwards include:    A. Paying attention to one's risk factors and reducing their prevalence or potential impact on one's changing memory/thinking> an excellent example would be to stop smoking, reduce or eliminate alcohol use (depending on the amount and frequency of usage), maintain good blood pressure control by buying a digital arm blood pressure cuff such as an OMRON Series 3 or 5 and checking one's blood pressure atleast 3 days per week (in the am and early afternoon) that the numbers are under 140/90 and working as needed with the primary care doctor  to optimize blood pressure control).    B. Encouraging " proper sleep hygiene which for most adults is 7 to 8 hours of uninterrupted sleep per night.      C. Encouraging some form of exercise preferable aerobic forms to be done (4 to 5 days per week- 30 minutes minimum per day)> 150 minutes per week as a goal. Example activities could include fast walking (up a slight incline), jogging, cycling (road or stationary), swimming,tennis. Essentially, even basic walking on a flat surface or a treadmill would be better than doing nothing.    D. Maintaining or forming new social contacts with family and friends  and a positive attitude despite the concerns and/or ongoing issues with thinking and/or memory.    E. Eating well which means a diet low in salt  (under 2 grams per day), sugar and saturated fat.    F. Maintaining one's BMI (Body Mass Index) under 30.    G. Consideration of the use of cognitive enhancers (acetylcholinerase inhibitors such as Aricept vs an NMDA Receptor agent like Namenda). Pros and cons of such compounds in terms of predicted efficacy and side effects profiles reviewed. At this time will  hold off on such mediations.    H. Today will be ordering:    A Brain MRI for stroke issue and to access CSF Flow (4th ventricular area) due to NPH issue.    B Increase atorvastatin to 40 mg a day for stroke prevention> risks of myalgias and rhabdo.    C. 81 mg ASA daily for long term stroke prevention    D.. Carotid US to be done.    E. Brain PET to be done in the future to evaluate for signs of Neurodegenerative disorder such as Alz Disease. Daughter in support of Aaliyah doing this test.    F. OT guided driving simulator to be done to gauge risk at this point.    G. UTI based on information from [facility]-  ED MD     (E Coli- resistant to Keflex)> so will prescribe Augmentin 875 mg (1 PO BID x 10 days) per recommendation by ED MD at [facility] (via telephone call to him by daughter today).      I have performed  a history and  physical exam and a directed /focused  ROS today.    Total time spent today or this patient's care was 90  minutes  and included reviewing  the diagnostic workup to date (such as labs and imaging as well as interpreting such tests relevant to this patient's neurological condition),  reviewing/obtaining separately obtained history (from patient and/or accompanying ffamily member)  for today's neurological problem(s) ,counseling and educating the patient and family member on issues related to cognition/memory and cognitive health factors and documenting  the clinical information in the EMR.    Follow up PRN at this time.        Jeromy Cruz MD  [facility].   [facility]

## 2022-09-08 LAB — VIT B1 BLD-MCNC: 97 NMOL/L (ref 70–180)

## 2022-09-12 PROBLEM — M17.12 OSTEOARTHRITIS OF LEFT KNEE: Status: ACTIVE | Noted: 2022-08-17

## 2022-09-13 ENCOUNTER — APPOINTMENT (OUTPATIENT)
Dept: RADIOLOGY | Facility: MEDICAL CENTER | Age: 82
End: 2022-09-13
Attending: INTERNAL MEDICINE
Payer: MEDICARE

## 2022-09-13 ENCOUNTER — HOSPITAL ENCOUNTER (OUTPATIENT)
Facility: MEDICAL CENTER | Age: 82
End: 2022-09-13
Attending: ORTHOPAEDIC SURGERY
Payer: MEDICARE

## 2022-09-13 LAB
APPEARANCE UR: ABNORMAL
BACTERIA #/AREA URNS HPF: ABNORMAL /HPF
BILIRUB UR QL STRIP.AUTO: NEGATIVE
CAOX CRY #/AREA URNS HPF: ABNORMAL /HPF
COLOR UR: YELLOW
EPI CELLS #/AREA URNS HPF: ABNORMAL /HPF
GLUCOSE UR STRIP.AUTO-MCNC: NEGATIVE MG/DL
HYALINE CASTS #/AREA URNS LPF: ABNORMAL /LPF
KETONES UR STRIP.AUTO-MCNC: NEGATIVE MG/DL
LEUKOCYTE ESTERASE UR QL STRIP.AUTO: ABNORMAL
MICRO URNS: ABNORMAL
NITRITE UR QL STRIP.AUTO: POSITIVE
PH UR STRIP.AUTO: 6 [PH] (ref 5–8)
PROT UR QL STRIP: NEGATIVE MG/DL
RBC # URNS HPF: ABNORMAL /HPF
RBC UR QL AUTO: NEGATIVE
SP GR UR STRIP.AUTO: 1.02
UROBILINOGEN UR STRIP.AUTO-MCNC: 1 MG/DL
WBC #/AREA URNS HPF: ABNORMAL /HPF

## 2022-09-13 PROCEDURE — 81001 URINALYSIS AUTO W/SCOPE: CPT

## 2022-09-13 PROCEDURE — 87086 URINE CULTURE/COLONY COUNT: CPT

## 2022-09-13 PROCEDURE — 87186 SC STD MICRODIL/AGAR DIL: CPT

## 2022-09-13 PROCEDURE — 87077 CULTURE AEROBIC IDENTIFY: CPT

## 2022-09-25 ENCOUNTER — APPOINTMENT (OUTPATIENT)
Dept: RADIOLOGY | Facility: MEDICAL CENTER | Age: 82
End: 2022-09-25
Attending: PSYCHIATRY & NEUROLOGY
Payer: MEDICARE

## 2022-09-26 ENCOUNTER — HOSPITAL ENCOUNTER (OUTPATIENT)
Dept: RADIOLOGY | Facility: MEDICAL CENTER | Age: 82
End: 2022-09-26
Attending: INTERNAL MEDICINE
Payer: MEDICARE

## 2022-09-26 DIAGNOSIS — Z12.31 VISIT FOR SCREENING MAMMOGRAM: ICD-10-CM

## 2022-09-26 PROCEDURE — 77063 BREAST TOMOSYNTHESIS BI: CPT

## 2022-09-27 ENCOUNTER — HOSPITAL ENCOUNTER (OUTPATIENT)
Dept: LAB | Facility: MEDICAL CENTER | Age: 82
End: 2022-09-27
Attending: INTERNAL MEDICINE
Payer: MEDICARE

## 2022-09-27 LAB
APPEARANCE UR: ABNORMAL
BACTERIA #/AREA URNS HPF: ABNORMAL /HPF
BILIRUB UR QL STRIP.AUTO: NEGATIVE
COLOR UR: YELLOW
EPI CELLS #/AREA URNS HPF: NEGATIVE /HPF
GLUCOSE UR STRIP.AUTO-MCNC: NEGATIVE MG/DL
HYALINE CASTS #/AREA URNS LPF: ABNORMAL /LPF
KETONES UR STRIP.AUTO-MCNC: NEGATIVE MG/DL
LEUKOCYTE ESTERASE UR QL STRIP.AUTO: ABNORMAL
MICRO URNS: ABNORMAL
NITRITE UR QL STRIP.AUTO: POSITIVE
PH UR STRIP.AUTO: 7.5 [PH] (ref 5–8)
PROT UR QL STRIP: NEGATIVE MG/DL
RBC # URNS HPF: ABNORMAL /HPF
RBC UR QL AUTO: NEGATIVE
SP GR UR STRIP.AUTO: 1.01
UROBILINOGEN UR STRIP.AUTO-MCNC: 0.2 MG/DL
WBC #/AREA URNS HPF: ABNORMAL /HPF

## 2022-09-27 PROCEDURE — 87077 CULTURE AEROBIC IDENTIFY: CPT

## 2022-09-27 PROCEDURE — 87186 SC STD MICRODIL/AGAR DIL: CPT

## 2022-09-27 PROCEDURE — 81001 URINALYSIS AUTO W/SCOPE: CPT

## 2022-09-27 PROCEDURE — 87086 URINE CULTURE/COLONY COUNT: CPT

## 2022-09-30 ENCOUNTER — APPOINTMENT (OUTPATIENT)
Dept: RADIOLOGY | Facility: MEDICAL CENTER | Age: 82
End: 2022-09-30
Attending: EMERGENCY MEDICINE
Payer: MEDICARE

## 2022-09-30 ENCOUNTER — HOSPITAL ENCOUNTER (EMERGENCY)
Facility: MEDICAL CENTER | Age: 82
End: 2022-09-30
Attending: EMERGENCY MEDICINE
Payer: MEDICARE

## 2022-09-30 VITALS
BODY MASS INDEX: 27.06 KG/M2 | DIASTOLIC BLOOD PRESSURE: 69 MMHG | RESPIRATION RATE: 18 BRPM | TEMPERATURE: 97 F | HEART RATE: 83 BPM | SYSTOLIC BLOOD PRESSURE: 149 MMHG | OXYGEN SATURATION: 94 % | WEIGHT: 178 LBS

## 2022-09-30 DIAGNOSIS — S09.90XA CLOSED HEAD INJURY, INITIAL ENCOUNTER: ICD-10-CM

## 2022-09-30 DIAGNOSIS — S12.031A CLOSED NONDISPLACED FRACTURE OF POSTERIOR ARCH OF FIRST CERVICAL VERTEBRA, INITIAL ENCOUNTER (HCC): ICD-10-CM

## 2022-09-30 LAB
ALBUMIN SERPL BCP-MCNC: 3.8 G/DL (ref 3.2–4.9)
ALBUMIN/GLOB SERPL: 1.1 G/DL
ALP SERPL-CCNC: 119 U/L (ref 30–99)
ALT SERPL-CCNC: 10 U/L (ref 2–50)
ANION GAP SERPL CALC-SCNC: 9 MMOL/L (ref 7–16)
AST SERPL-CCNC: 18 U/L (ref 12–45)
BACTERIA UR CULT: ABNORMAL
BACTERIA UR CULT: ABNORMAL
BASOPHILS # BLD AUTO: 0.6 % (ref 0–1.8)
BASOPHILS # BLD: 0.03 K/UL (ref 0–0.12)
BILIRUB SERPL-MCNC: 0.4 MG/DL (ref 0.1–1.5)
BUN SERPL-MCNC: 14 MG/DL (ref 8–22)
CALCIUM SERPL-MCNC: 8.8 MG/DL (ref 8.5–10.5)
CHLORIDE SERPL-SCNC: 107 MMOL/L (ref 96–112)
CO2 SERPL-SCNC: 24 MMOL/L (ref 20–33)
CREAT SERPL-MCNC: 0.61 MG/DL (ref 0.5–1.4)
EOSINOPHIL # BLD AUTO: 0.64 K/UL (ref 0–0.51)
EOSINOPHIL NFR BLD: 13.2 % (ref 0–6.9)
ERYTHROCYTE [DISTWIDTH] IN BLOOD BY AUTOMATED COUNT: 44.5 FL (ref 35.9–50)
GFR SERPLBLD CREATININE-BSD FMLA CKD-EPI: 88 ML/MIN/1.73 M 2
GLOBULIN SER CALC-MCNC: 3.5 G/DL (ref 1.9–3.5)
GLUCOSE SERPL-MCNC: 104 MG/DL (ref 65–99)
HCT VFR BLD AUTO: 42.4 % (ref 37–47)
HGB BLD-MCNC: 14 G/DL (ref 12–16)
IMM GRANULOCYTES # BLD AUTO: 0.01 K/UL (ref 0–0.11)
IMM GRANULOCYTES NFR BLD AUTO: 0.2 % (ref 0–0.9)
LYMPHOCYTES # BLD AUTO: 1.23 K/UL (ref 1–4.8)
LYMPHOCYTES NFR BLD: 25.3 % (ref 22–41)
MCH RBC QN AUTO: 32 PG (ref 27–33)
MCHC RBC AUTO-ENTMCNC: 33 G/DL (ref 33.6–35)
MCV RBC AUTO: 97 FL (ref 81.4–97.8)
MONOCYTES # BLD AUTO: 0.41 K/UL (ref 0–0.85)
MONOCYTES NFR BLD AUTO: 8.4 % (ref 0–13.4)
NEUTROPHILS # BLD AUTO: 2.54 K/UL (ref 2–7.15)
NEUTROPHILS NFR BLD: 52.3 % (ref 44–72)
NRBC # BLD AUTO: 0 K/UL
NRBC BLD-RTO: 0 /100 WBC
PLATELET # BLD AUTO: 188 K/UL (ref 164–446)
PMV BLD AUTO: 11.2 FL (ref 9–12.9)
POTASSIUM SERPL-SCNC: 4.4 MMOL/L (ref 3.6–5.5)
PROT SERPL-MCNC: 7.3 G/DL (ref 6–8.2)
RBC # BLD AUTO: 4.37 M/UL (ref 4.2–5.4)
SIGNIFICANT IND 70042: ABNORMAL
SITE SITE: ABNORMAL
SODIUM SERPL-SCNC: 140 MMOL/L (ref 135–145)
SOURCE SOURCE: ABNORMAL
WBC # BLD AUTO: 4.9 K/UL (ref 4.8–10.8)

## 2022-09-30 PROCEDURE — 80053 COMPREHEN METABOLIC PANEL: CPT

## 2022-09-30 PROCEDURE — 99285 EMERGENCY DEPT VISIT HI MDM: CPT

## 2022-09-30 PROCEDURE — 70498 CT ANGIOGRAPHY NECK: CPT

## 2022-09-30 PROCEDURE — 96374 THER/PROPH/DIAG INJ IV PUSH: CPT

## 2022-09-30 PROCEDURE — 700111 HCHG RX REV CODE 636 W/ 250 OVERRIDE (IP)

## 2022-09-30 PROCEDURE — 36415 COLL VENOUS BLD VENIPUNCTURE: CPT

## 2022-09-30 PROCEDURE — A9270 NON-COVERED ITEM OR SERVICE: HCPCS | Performed by: EMERGENCY MEDICINE

## 2022-09-30 PROCEDURE — 700102 HCHG RX REV CODE 250 W/ 637 OVERRIDE(OP): Performed by: EMERGENCY MEDICINE

## 2022-09-30 PROCEDURE — 72125 CT NECK SPINE W/O DYE: CPT

## 2022-09-30 PROCEDURE — 700117 HCHG RX CONTRAST REV CODE 255: Performed by: EMERGENCY MEDICINE

## 2022-09-30 PROCEDURE — 70450 CT HEAD/BRAIN W/O DYE: CPT

## 2022-09-30 PROCEDURE — 85025 COMPLETE CBC W/AUTO DIFF WBC: CPT

## 2022-09-30 RX ORDER — HYDROCODONE BITARTRATE AND ACETAMINOPHEN 5; 325 MG/1; MG/1
1 TABLET ORAL ONCE
Status: COMPLETED | OUTPATIENT
Start: 2022-09-30 | End: 2022-09-30

## 2022-09-30 RX ORDER — ONDANSETRON 2 MG/ML
4 INJECTION INTRAMUSCULAR; INTRAVENOUS ONCE
Status: COMPLETED | OUTPATIENT
Start: 2022-09-30 | End: 2022-09-30

## 2022-09-30 RX ADMIN — ONDANSETRON 4 MG: 2 INJECTION INTRAMUSCULAR; INTRAVENOUS at 12:30

## 2022-09-30 RX ADMIN — IOHEXOL 80 ML: 350 INJECTION, SOLUTION INTRAVENOUS at 15:00

## 2022-09-30 RX ADMIN — HYDROCODONE BITARTRATE AND ACETAMINOPHEN 1 TABLET: 5; 325 TABLET ORAL at 10:37

## 2022-09-30 ASSESSMENT — FIBROSIS 4 INDEX: FIB4 SCORE: 2.02

## 2022-09-30 NOTE — DISCHARGE INSTRUCTIONS
Please wear your collar until you are able to follow-up with the neurosurgeon listed above.  Return to the emergency department if you develop any new or worsening symptoms including numbness or tingling in the arms or legs, weakness, headaches, nausea or vomiting, or any further concerns.  Please call the neurosurgery clinic listed above this afternoon, or the opening of business hours on Monday.  Let them know you are seen in the emergency department, and that the emergency physician spoke with Dr. Warren who would like to see you in clinic for follow-up.

## 2022-09-30 NOTE — ED NOTES
Pt rounded on. Acute nausea reported by patient. Orders received from [facility] for PIV zofran, 4mg. Pt medicated per MAR

## 2022-09-30 NOTE — ED NOTES
Pt discharged home. IV discontinued and gauze placed, pt in possession of belongings. Pt provided discharge education and information pertaining to medications and follow up appointments. Pt received copy of discharge instructions and verbalized understanding. BP (!) 149/69   Pulse 83   Temp 36.1 °C (97 °F) (Temporal)   Resp 18   Wt 80.7 kg (178 lb)   SpO2 94%   BMI 27.06 kg/m²

## 2022-09-30 NOTE — ED TRIAGE NOTES
Pt to charge desk for TBI .  Chief Complaint   Patient presents with    T-5000 Head Injury     Pt had a glf last night tripped on her slippers hitting her head.    Neck Pain

## 2022-09-30 NOTE — ED PROVIDER NOTES
ED Physician Note    Chief Concern:   Closed head injury, neck injury    HPI:  Aaliyah Fulton is a very pleasant 82-year-old woman who presents to the emergency department as a code TBI.  Yesterday she fell backward striking her head in the occipital region.  She reports severe pain that occurred after the fall, has been persistent since that time.  She states she did not get very much sleep last night as a result.  She does not report any paresthesias in the upper nor lower extremities.  She does have some pain localized to the occipital area and neck.  She states that she struck the front of her head, and does have a mild frontal headache as well.  She has no vision changes, no tenderness, no disequilibrium.  She has had no nausea or vomiting.  She did not lose consciousness at the time of the fall.  She is not currently on any anticoagulation or antiplatelet agents.  She is unable to identify any reliably exacerbating or alleviating factors.    Review of Systems:  See HPI for pertinent positives and negatives. All other systems negative.    Past Medical History:   has a past medical history of Anesthesia, Arthritis, ASTHMA, Breast cancer (HCC), Cancer (HCC) (2010), Cataract, Dental disorder, Heart burn, High cholesterol, Hypertension (01/03/2022), Indigestion, Osteoporosis, Pain, Pneumonia (12/2021), Psychiatric problem, and Unspecified urinary incontinence.    Social History:  Social History     Tobacco Use    Smoking status: Never    Smokeless tobacco: Never   Vaping Use    Vaping Use: Never used   Substance and Sexual Activity    Alcohol use: No     Comment: very rare    Drug use: No    Sexual activity: Not on file     Comment: ; two daughters; retired ( @ [employer] [facility] [employer] / [employer])       Surgical History:   has a past surgical history that includes gilbert by laparoscopy; sinuscopy; breast biopsy (5/21/2010); other orthopedic surgery; knee arthroplasty total (9/19/2011); lumbar laminectomy  diskectomy (10/17/2012); foraminotomy (10/17/2012); radiation therapy plan simple; lumpectomy; recovery (5/10/2016); partial hip replacement (Left, 11/24/2021); lumbar decompression (10/17/2012); other; and create shunt:ventric-peritoneal (1/10/2022).    Current Medications:  Home Medications       Reviewed by Priyanka Corley R.N. (Registered Nurse) on 09/30/22 at 0839  Med List Status: Partial     Medication Last Dose Status   acetaminophen (TYLENOL) 500 MG Tab  Active   amLODIPine (NORVASC) 10 MG Tab 9/29/2022 Active   aspirin 81 MG EC tablet 9/30/2022 Active   atorvastatin (LIPITOR) 10 MG Tab 9/29/2022 Active   atorvastatin (LIPITOR) 10 MG Tab  Active   atorvastatin (LIPITOR) 40 MG Tab  Active   busPIRone (BUSPAR) 15 MG tablet  Active   Calcium Carbonate 1500 (600 Ca) MG Tab  Active   Calcium Carbonate 600 MG Tab  Active   Cholecalciferol (D3 PO)  Active   CRANBERRY EXTRACT PO  Active   D-MANNOSE PO  Active   omeprazole (PRILOSEC) 40 MG delayed-release capsule  Active   oxybutynin (DITROPAN XL) 15 MG CR tablet  Active   Turmeric 400 MG Cap  Active   valsartan (DIOVAN) 160 MG Tab 9/30/2022 Active   venlafaxine (EFFEXOR-XR) 150 MG extended-release capsule 9/30/2022 Active   venlafaxine XR (EFFEXOR XR) 75 MG CAPSULE SR 24 HR  Active   VENTOLIN  (90 Base) MCG/ACT Aero Soln inhalation aerosol  Active   vitamin D (CHOLECALCIFEROL) 1000 Unit Tab  Active                    Allergies:  No Known Allergies    Physical Exam:  Vital Signs: /63   Pulse 71   Temp 36.2 °C (97.2 °F) (Temporal)   Resp 15   Wt 80.7 kg (178 lb)   SpO2 95%   BMI 27.06 kg/m²   Constitutional: Alert, no acute distress  HENT: Normocephalic, mask in place  Eyes: Pupils equal and reactive, normal conjunctiva  Neck: Supple, she does have some tenderness to palpation around C1-C2, as well as the occipital  Cardiovascular: Extremities are warm and well perfused, no murmur appreciated, normal cardiac auscultation  Pulmonary: No respiratory  distress, normal work of breathing, no accessory muscule usage, breath sounds clear and equal bilaterally  Abdomen: Soft, non-distended, non-tender to palpation, no peritoneal signs  Skin: Warm, dry, no rashes or lesions  Musculoskeletal: Normal range of motion in all extremities, abnormalities as documented in neck exam.  Neurologic: Alert, oriented, normal speech, normal motor function, normal finger-to-nose testing, 5 out of 5 bilateral upper and lower extremity strength, no facial droop, no slurred speech  Psychiatric: Normal and appropriate mood and affect    Medical records reviewed for continuity of care.  No recent visits for similar symptoms.  She was seen in neurology clinic 9/2/2022.  Neurologist note reviewed from that visit.  Noted that she had a  shunt placed in 1/2022, did have an episode 1 week prior where she seemed as though she was not pronouncing words correctly.  She has a past medical history of early dementia, minor ischemic stroke, suspected lacunar infarct.  She also has a history of unsteady gait.  MRI ordered at this visit to assess CSF flow.    Labs:  Labs Reviewed   CBC WITH DIFFERENTIAL - Abnormal; Notable for the following components:       Result Value    MCHC 33.0 (*)     Eosinophils 13.20 (*)     Eos (Absolute) 0.64 (*)     All other components within normal limits   COMP METABOLIC PANEL - Abnormal; Notable for the following components:    Glucose 104 (*)     Alkaline Phosphatase 119 (*)     All other components within normal limits   ESTIMATED GFR       Radiology:  CT-CTA NECK WITH & W/O-POST PROCESSING   Final Result      CT angiogram of the neck within normal limits for age.      CT-HEAD W/O   Final Result      1.  C1 arch acute fractures. See also CT cervical spine report from today.   2.  Right frontal ventriculoperitoneal shunt catheter in place with mild hydrocephalus.   3.  Mild cerebral atrophy.   4.  Moderate supratentorial white matter disease.   5.  Old lacunar  infarcts bilateral head of caudate nuclei, left greater than right.   6.  No evidence of acute intra-axial or extra-axial hemorrhage.         CT-CSPINE WITHOUT PLUS RECONS   Final Result      1.  Acute C1 fracture involving the anterior arch and left lamina.   2.  Multilevel degenerative changes of the cervical spine.      Findings were discussed with Dr. RICHARD MINOR on 9/30/2022 9:36 AM.           ED Medications Administered:  Medications   iohexol (OMNIPAQUE) 350 mg/mL (IV) (has no administration in time range)   HYDROcodone-acetaminophen (NORCO) 5-325 MG per tablet 1 Tablet (1 Tablet Oral Given 9/30/22 1037)   ondansetron (ZOFRAN) syringe/vial injection 4 mg (4 mg Intravenous Given 9/30/22 1230)       Differential diagnosis:  Intracranial injury, skull fracture, intracranial bleed, cervical spine fracture, sprain, strain, musculoskeletal injury    MDM:  Ms. Fulton presents to the emergency department today as a code TBI activated at triage due to a fall from standing last night.  She has had persistent pain since the time of the fall.  She has not had any neurologic deficits.  She reports no lightheadedness, no chest pain, no shortness of breath, fall seems to be purely mechanical due to losing her balance.    CT head demonstrates multiple chronic findings including mild hydrocephalus with right ventriculoperitoneal shunt catheter in place, mild cerebral atrophy, and mild supratentorial white matter disease.  Old lacunar infarcts present.  No evidence of hemorrhage.    CT cervical spine demonstrates a nondisplaced fracture of the anterior arch of C1 vertebral body.  Additional nondisplaced fracture of the left lamina.    I discussed this patient's imaging and clinical presentation with Dr. Warren, neurosurgeon on spine call, who reviewed the imaging.  Coronal images were not visible, I did call the film room to have those images formatted uploaded.  His recommendation is for CTA, as well as hard collar.  Aspen  collar was placed in the emergency department.  If CTA is within normal limits, patient can be discharged home with neurosurgical follow-up.    CT was obtained in the emergency department, this is within normal limits for age.  No indication for emergent vascular intervention.  She is counseled to follow-up with Dr. Warren, and to use the hard collar until she follows up with neurosurgery. Return precautions were discussed with the patient, and provided in written form with the patient's discharge instructions.     Personal protective equipment including N95 surgical respirator, goggles, and gloves were used during this encounter.       Disposition:  Discharge home in stable condition    Final Impression:  1. Closed head injury, initial encounter    2. Closed nondisplaced fracture of posterior arch of first cervical vertebra, initial encounter (HCC)        Electronically signed by: Jennifer Díaz MD, 9/30/2022 12:47 PM

## 2022-10-09 ENCOUNTER — APPOINTMENT (OUTPATIENT)
Dept: RADIOLOGY | Facility: MEDICAL CENTER | Age: 82
End: 2022-10-09
Attending: PSYCHIATRY & NEUROLOGY
Payer: MEDICARE

## 2022-10-19 ENCOUNTER — APPOINTMENT (OUTPATIENT)
Dept: OCCUPATIONAL THERAPY | Facility: REHABILITATION | Age: 82
End: 2022-10-19
Attending: PSYCHIATRY & NEUROLOGY
Payer: MEDICARE

## 2022-11-11 ENCOUNTER — PATIENT MESSAGE (OUTPATIENT)
Dept: HEALTH INFORMATION MANAGEMENT | Facility: OTHER | Age: 82
End: 2022-11-11

## 2022-11-23 ENCOUNTER — HOSPITAL ENCOUNTER (OUTPATIENT)
Dept: RADIOLOGY | Facility: MEDICAL CENTER | Age: 82
End: 2022-11-23
Attending: PSYCHIATRY & NEUROLOGY
Payer: MEDICARE

## 2022-11-23 DIAGNOSIS — Z98.2 S/P VP SHUNT: ICD-10-CM

## 2022-11-23 DIAGNOSIS — R47.1 DYSARTHRIA: ICD-10-CM

## 2022-11-23 PROCEDURE — 70551 MRI BRAIN STEM W/O DYE: CPT

## 2022-11-23 PROCEDURE — 72141 MRI NECK SPINE W/O DYE: CPT

## 2023-01-31 ENCOUNTER — HOSPITAL ENCOUNTER (OUTPATIENT)
Dept: RADIOLOGY | Facility: MEDICAL CENTER | Age: 83
End: 2023-01-31
Attending: PSYCHIATRY & NEUROLOGY
Payer: MEDICARE

## 2023-01-31 DIAGNOSIS — Z98.2 S/P VP SHUNT: ICD-10-CM

## 2023-01-31 PROCEDURE — 70450 CT HEAD/BRAIN W/O DYE: CPT

## 2023-02-07 ENCOUNTER — DOCUMENTATION (OUTPATIENT)
Dept: NEUROLOGY | Facility: MEDICAL CENTER | Age: 83
End: 2023-02-07
Payer: MEDICARE

## 2023-02-07 DIAGNOSIS — G30.9 ALZHEIMER'S DISEASE, UNSPECIFIED (CODE) (HCC): ICD-10-CM

## 2023-03-20 ENCOUNTER — APPOINTMENT (OUTPATIENT)
Dept: ADMISSIONS | Facility: MEDICAL CENTER | Age: 83
End: 2023-03-20
Attending: ORTHOPAEDIC SURGERY
Payer: MEDICARE

## 2023-03-21 ENCOUNTER — PRE-ADMISSION TESTING (OUTPATIENT)
Dept: ADMISSIONS | Facility: MEDICAL CENTER | Age: 83
End: 2023-03-21
Attending: ORTHOPAEDIC SURGERY
Payer: MEDICARE

## 2023-03-21 VITALS — BODY MASS INDEX: 28.77 KG/M2 | HEIGHT: 64 IN

## 2023-03-21 DIAGNOSIS — M17.12 PRIMARY OSTEOARTHRITIS OF LEFT KNEE: ICD-10-CM

## 2023-03-21 LAB
ANION GAP SERPL CALC-SCNC: 7 MMOL/L (ref 7–16)
APPEARANCE UR: ABNORMAL
APTT PPP: 29.1 SEC (ref 24.7–36)
BACTERIA #/AREA URNS HPF: ABNORMAL /HPF
BASOPHILS # BLD AUTO: 0.9 % (ref 0–1.8)
BASOPHILS # BLD: 0.05 K/UL (ref 0–0.12)
BILIRUB UR QL STRIP.AUTO: NEGATIVE
BUN SERPL-MCNC: 14 MG/DL (ref 8–22)
CALCIUM SERPL-MCNC: 8.9 MG/DL (ref 8.4–10.2)
CHLORIDE SERPL-SCNC: 107 MMOL/L (ref 96–112)
CO2 SERPL-SCNC: 25 MMOL/L (ref 20–33)
COLOR UR: YELLOW
CREAT SERPL-MCNC: 0.66 MG/DL (ref 0.5–1.4)
EKG IMPRESSION: NORMAL
EOSINOPHIL # BLD AUTO: 0.38 K/UL (ref 0–0.51)
EOSINOPHIL NFR BLD: 6.8 % (ref 0–6.9)
EPI CELLS #/AREA URNS HPF: ABNORMAL /HPF
ERYTHROCYTE [DISTWIDTH] IN BLOOD BY AUTOMATED COUNT: 46.5 FL (ref 35.9–50)
GFR SERPLBLD CREATININE-BSD FMLA CKD-EPI: 86 ML/MIN/1.73 M 2
GLUCOSE SERPL-MCNC: 107 MG/DL (ref 65–99)
GLUCOSE UR STRIP.AUTO-MCNC: NEGATIVE MG/DL
HCT VFR BLD AUTO: 40.2 % (ref 37–47)
HGB BLD-MCNC: 13 G/DL (ref 12–16)
HIV 1+2 AB+HIV1 P24 AG SERPL QL IA: NORMAL
IMM GRANULOCYTES # BLD AUTO: 0.01 K/UL (ref 0–0.11)
IMM GRANULOCYTES NFR BLD AUTO: 0.2 % (ref 0–0.9)
INR PPP: 1.16 (ref 0.87–1.13)
KETONES UR STRIP.AUTO-MCNC: NEGATIVE MG/DL
LEUKOCYTE ESTERASE UR QL STRIP.AUTO: NEGATIVE
LYMPHOCYTES # BLD AUTO: 2.02 K/UL (ref 1–4.8)
LYMPHOCYTES NFR BLD: 36.3 % (ref 22–41)
MCH RBC QN AUTO: 32.1 PG (ref 27–33)
MCHC RBC AUTO-ENTMCNC: 32.3 G/DL (ref 33.6–35)
MCV RBC AUTO: 99.3 FL (ref 81.4–97.8)
MICRO URNS: ABNORMAL
MONOCYTES # BLD AUTO: 0.42 K/UL (ref 0–0.85)
MONOCYTES NFR BLD AUTO: 7.5 % (ref 0–13.4)
NEUTROPHILS # BLD AUTO: 2.69 K/UL (ref 2–7.15)
NEUTROPHILS NFR BLD: 48.3 % (ref 44–72)
NITRITE UR QL STRIP.AUTO: POSITIVE
NRBC # BLD AUTO: 0 K/UL
NRBC BLD-RTO: 0 /100 WBC
PH UR STRIP.AUTO: 6 [PH] (ref 5–8)
PLATELET # BLD AUTO: 178 K/UL (ref 164–446)
PMV BLD AUTO: 12.2 FL (ref 9–12.9)
POTASSIUM SERPL-SCNC: 4 MMOL/L (ref 3.6–5.5)
PROT UR QL STRIP: NEGATIVE MG/DL
PROTHROMBIN TIME: 14.6 SEC (ref 12–14.6)
RBC # BLD AUTO: 4.05 M/UL (ref 4.2–5.4)
RBC # URNS HPF: ABNORMAL /HPF
RBC UR QL AUTO: NEGATIVE
SCCMEC + MECA PNL NOSE NAA+PROBE: NEGATIVE
SCCMEC + MECA PNL NOSE NAA+PROBE: POSITIVE
SODIUM SERPL-SCNC: 139 MMOL/L (ref 135–145)
SP GR UR STRIP.AUTO: 1.02
WBC # BLD AUTO: 5.6 K/UL (ref 4.8–10.8)
WBC #/AREA URNS HPF: ABNORMAL /HPF

## 2023-03-21 PROCEDURE — 87641 MR-STAPH DNA AMP PROBE: CPT

## 2023-03-21 PROCEDURE — 87640 STAPH A DNA AMP PROBE: CPT | Mod: XU

## 2023-03-21 PROCEDURE — G0475 HIV COMBINATION ASSAY: HCPCS

## 2023-03-21 PROCEDURE — 85610 PROTHROMBIN TIME: CPT

## 2023-03-21 PROCEDURE — 85025 COMPLETE CBC W/AUTO DIFF WBC: CPT

## 2023-03-21 PROCEDURE — 80048 BASIC METABOLIC PNL TOTAL CA: CPT

## 2023-03-21 PROCEDURE — 93010 ELECTROCARDIOGRAM REPORT: CPT | Performed by: INTERNAL MEDICINE

## 2023-03-21 PROCEDURE — 81001 URINALYSIS AUTO W/SCOPE: CPT

## 2023-03-21 PROCEDURE — 93005 ELECTROCARDIOGRAM TRACING: CPT

## 2023-03-21 PROCEDURE — 85730 THROMBOPLASTIN TIME PARTIAL: CPT

## 2023-03-21 PROCEDURE — 36415 COLL VENOUS BLD VENIPUNCTURE: CPT

## 2023-03-21 RX ORDER — CEFDINIR 300 MG/1
300 CAPSULE ORAL
COMMUNITY
End: 2023-03-21

## 2023-03-21 RX ORDER — METHENAMINE HIPPURATE 1000 MG/1
TABLET ORAL 2 TIMES DAILY
COMMUNITY
Start: 2023-02-07

## 2023-03-21 RX ORDER — GRANULES FOR ORAL 3 G/1
3 POWDER ORAL
COMMUNITY
End: 2023-03-21

## 2023-03-21 RX ORDER — ESTRADIOL 0.1 MG/G
2 CREAM VAGINAL DAILY
COMMUNITY

## 2023-03-21 RX ORDER — ATORVASTATIN CALCIUM 40 MG/1
40 TABLET, FILM COATED ORAL DAILY
COMMUNITY
End: 2023-03-21

## 2023-03-21 RX ORDER — VITAMIN B COMPLEX
1000 TABLET ORAL DAILY
COMMUNITY

## 2023-03-21 NOTE — PREPROCEDURE INSTRUCTIONS
Pre-admit appointment completed with daughter.  Pt instructed to continue regularly prescribed medications through the day before surgery. Pt instructed to take the following medications the day of surgery with a sip of water, per anesthesia protocol;  buspar, prilosec, ditropan, effexor, and if needed-tylenol, albuterol.    Daughter will verify with pharmacist if needs to hold hipprex prior to procedure as she states it can interfer with antibiotics.

## 2023-03-21 NOTE — DISCHARGE PLANNING
DISCHARGE PLANNING NOTE - TOTAL JOINT    Procedure: Procedure(s):  LEFT TOTAL KNEE ARTHROPLASTY  Procedure Date: 3/28/2023  Insurance: Payor: MEDICARE / Plan: MEDICARE RAILROAD / Product Type: *No Product type* /    Equipment currently available at home?  raised toilet seat and tub transfer bench  Steps into the home? 2  Steps within the home? 0  Toilet height? Standard  Type of shower? walk-in shower  Who will be with you during your recovery? daughter  Is Outpatient Physical Therapy set up after surgery? Yes  Did you take the Total Joint Class and where? Yes  Planning same day discharge?No     This writer met with pt and daughter during there preadmission appt and would like to go to [facility] post op like she did after her hip surgery.Pt educated to preop showers, nasal mrsa screen and potential for overnight stay.Home safety checklist reviewed and copy given to pt. Pt educated to dc criteria. All questions answered and verbalizes understanding of all instructions. No dc needs identified at this time. Anticipate dc to home without barriers.

## 2023-03-28 ENCOUNTER — ANESTHESIA EVENT (OUTPATIENT)
Dept: SURGERY | Facility: MEDICAL CENTER | Age: 83
End: 2023-03-28
Payer: MEDICARE

## 2023-03-28 ENCOUNTER — ANESTHESIA (OUTPATIENT)
Dept: SURGERY | Facility: MEDICAL CENTER | Age: 83
End: 2023-03-28
Payer: MEDICARE

## 2023-03-28 ENCOUNTER — HOSPITAL ENCOUNTER (OUTPATIENT)
Facility: MEDICAL CENTER | Age: 83
End: 2023-03-31
Attending: ORTHOPAEDIC SURGERY | Admitting: ORTHOPAEDIC SURGERY
Payer: MEDICARE

## 2023-03-28 DIAGNOSIS — G91.9 HYDROCEPHALUS, UNSPECIFIED TYPE (HCC): Chronic | ICD-10-CM

## 2023-03-28 DIAGNOSIS — K21.9 GASTROESOPHAGEAL REFLUX DISEASE WITHOUT ESOPHAGITIS: ICD-10-CM

## 2023-03-28 DIAGNOSIS — R01.1 SYSTOLIC MURMUR: Chronic | ICD-10-CM

## 2023-03-28 DIAGNOSIS — R73.03 PREDIABETES: ICD-10-CM

## 2023-03-28 DIAGNOSIS — M40.204 KYPHOSIS OF THORACIC REGION, UNSPECIFIED KYPHOSIS TYPE: ICD-10-CM

## 2023-03-28 DIAGNOSIS — E78.49 OTHER HYPERLIPIDEMIA: ICD-10-CM

## 2023-03-28 DIAGNOSIS — N32.81 OAB (OVERACTIVE BLADDER): ICD-10-CM

## 2023-03-28 DIAGNOSIS — I35.0 NONRHEUMATIC AORTIC VALVE STENOSIS: ICD-10-CM

## 2023-03-28 DIAGNOSIS — Z78.0 MENOPAUSE PRESENT: ICD-10-CM

## 2023-03-28 DIAGNOSIS — M89.9 DISORDER OF BONE AND ARTICULAR CARTILAGE: ICD-10-CM

## 2023-03-28 DIAGNOSIS — Z96.652 PRESENCE OF LEFT ARTIFICIAL KNEE JOINT: ICD-10-CM

## 2023-03-28 DIAGNOSIS — Z86.73 HISTORY OF CVA (CEREBROVASCULAR ACCIDENT): ICD-10-CM

## 2023-03-28 DIAGNOSIS — M94.9 DISORDER OF BONE AND ARTICULAR CARTILAGE: ICD-10-CM

## 2023-03-28 DIAGNOSIS — R73.01 IMPAIRED FASTING GLUCOSE: ICD-10-CM

## 2023-03-28 DIAGNOSIS — M19.049 HAND ARTHRITIS: ICD-10-CM

## 2023-03-28 DIAGNOSIS — E55.9 VITAMIN D DEFICIENCY: ICD-10-CM

## 2023-03-28 DIAGNOSIS — Z96.652 STATUS POST LEFT KNEE REPLACEMENT: Primary | ICD-10-CM

## 2023-03-28 DIAGNOSIS — R26.9 ABNORMAL GAIT: ICD-10-CM

## 2023-03-28 DIAGNOSIS — S72.002A CLOSED FRACTURE OF LEFT HIP, INITIAL ENCOUNTER (HCC): ICD-10-CM

## 2023-03-28 DIAGNOSIS — Z87.440 PERSONAL HISTORY UTI: ICD-10-CM

## 2023-03-28 DIAGNOSIS — R41.3 POOR SHORT-TERM MEMORY: ICD-10-CM

## 2023-03-28 DIAGNOSIS — Z98.890 S/P LAMINECTOMY: ICD-10-CM

## 2023-03-28 DIAGNOSIS — M81.0 OSTEOPOROSIS, POSTMENOPAUSAL: ICD-10-CM

## 2023-03-28 DIAGNOSIS — F33.2 ENDOGENOUS DEPRESSION (HCC): ICD-10-CM

## 2023-03-28 DIAGNOSIS — R41.3 MEMORY DEFICIT: ICD-10-CM

## 2023-03-28 DIAGNOSIS — D62 ANEMIA DUE TO ACUTE BLOOD LOSS: ICD-10-CM

## 2023-03-28 DIAGNOSIS — M17.12 PRIMARY OSTEOARTHRITIS OF LEFT KNEE: ICD-10-CM

## 2023-03-28 DIAGNOSIS — G91.2 NORMAL PRESSURE HYDROCEPHALUS (HCC): ICD-10-CM

## 2023-03-28 DIAGNOSIS — Z87.440 HISTORY OF RECURRENT URINARY TRACT INFECTION: ICD-10-CM

## 2023-03-28 DIAGNOSIS — I10 BENIGN ESSENTIAL HYPERTENSION: ICD-10-CM

## 2023-03-28 PROBLEM — Z96.659 ARTIFICIAL KNEE JOINT PRESENT: Status: ACTIVE | Noted: 2023-03-28

## 2023-03-28 PROCEDURE — 160035 HCHG PACU - 1ST 60 MINS PHASE I: Performed by: ORTHOPAEDIC SURGERY

## 2023-03-28 PROCEDURE — 01402 ANES OPN/ARTH TOT KNE ARTHRP: CPT | Performed by: ANESTHESIOLOGY

## 2023-03-28 PROCEDURE — C1713 ANCHOR/SCREW BN/BN,TIS/BN: HCPCS | Performed by: ORTHOPAEDIC SURGERY

## 2023-03-28 PROCEDURE — 160002 HCHG RECOVERY MINUTES (STAT): Performed by: ORTHOPAEDIC SURGERY

## 2023-03-28 PROCEDURE — 27447 TOTAL KNEE ARTHROPLASTY: CPT | Mod: ASROC,22,LT | Performed by: PHYSICIAN ASSISTANT

## 2023-03-28 PROCEDURE — C1776 JOINT DEVICE (IMPLANTABLE): HCPCS | Performed by: ORTHOPAEDIC SURGERY

## 2023-03-28 PROCEDURE — 700105 HCHG RX REV CODE 258: Performed by: PHYSICIAN ASSISTANT

## 2023-03-28 PROCEDURE — 64447 NJX AA&/STRD FEMORAL NRV IMG: CPT | Mod: 59,LT | Performed by: ANESTHESIOLOGY

## 2023-03-28 PROCEDURE — 700102 HCHG RX REV CODE 250 W/ 637 OVERRIDE(OP): Performed by: ANESTHESIOLOGY

## 2023-03-28 PROCEDURE — 160029 HCHG SURGERY MINUTES - 1ST 30 MINS LEVEL 4: Performed by: ORTHOPAEDIC SURGERY

## 2023-03-28 PROCEDURE — A9270 NON-COVERED ITEM OR SERVICE: HCPCS | Performed by: PHYSICIAN ASSISTANT

## 2023-03-28 PROCEDURE — 64447 NJX AA&/STRD FEMORAL NRV IMG: CPT | Performed by: ORTHOPAEDIC SURGERY

## 2023-03-28 PROCEDURE — 160009 HCHG ANES TIME/MIN: Performed by: ORTHOPAEDIC SURGERY

## 2023-03-28 PROCEDURE — 700101 HCHG RX REV CODE 250: Performed by: PHYSICIAN ASSISTANT

## 2023-03-28 PROCEDURE — 700101 HCHG RX REV CODE 250: Performed by: ORTHOPAEDIC SURGERY

## 2023-03-28 PROCEDURE — 110371 HCHG SHELL REV 272: Performed by: ORTHOPAEDIC SURGERY

## 2023-03-28 PROCEDURE — 700101 HCHG RX REV CODE 250: Performed by: ANESTHESIOLOGY

## 2023-03-28 PROCEDURE — 502000 HCHG MISC OR IMPLANTS RC 0278: Performed by: ORTHOPAEDIC SURGERY

## 2023-03-28 PROCEDURE — 700111 HCHG RX REV CODE 636 W/ 250 OVERRIDE (IP): Performed by: ANESTHESIOLOGY

## 2023-03-28 PROCEDURE — 700102 HCHG RX REV CODE 250 W/ 637 OVERRIDE(OP): Performed by: PHYSICIAN ASSISTANT

## 2023-03-28 PROCEDURE — 99100 ANES PT EXTEME AGE<1 YR&>70: CPT | Performed by: ANESTHESIOLOGY

## 2023-03-28 PROCEDURE — G0378 HOSPITAL OBSERVATION PER HR: HCPCS

## 2023-03-28 PROCEDURE — 700111 HCHG RX REV CODE 636 W/ 250 OVERRIDE (IP): Performed by: ORTHOPAEDIC SURGERY

## 2023-03-28 PROCEDURE — 94760 N-INVAS EAR/PLS OXIMETRY 1: CPT

## 2023-03-28 PROCEDURE — 502240 HCHG MISC OR SUPPLY RC 0272: Performed by: ORTHOPAEDIC SURGERY

## 2023-03-28 PROCEDURE — 700105 HCHG RX REV CODE 258: Performed by: ORTHOPAEDIC SURGERY

## 2023-03-28 PROCEDURE — 700111 HCHG RX REV CODE 636 W/ 250 OVERRIDE (IP): Performed by: PHYSICIAN ASSISTANT

## 2023-03-28 PROCEDURE — 160048 HCHG OR STATISTICAL LEVEL 1-5: Performed by: ORTHOPAEDIC SURGERY

## 2023-03-28 PROCEDURE — 27447 TOTAL KNEE ARTHROPLASTY: CPT | Mod: 22,LT | Performed by: ORTHOPAEDIC SURGERY

## 2023-03-28 PROCEDURE — 700102 HCHG RX REV CODE 250 W/ 637 OVERRIDE(OP): Performed by: ORTHOPAEDIC SURGERY

## 2023-03-28 PROCEDURE — A9270 NON-COVERED ITEM OR SERVICE: HCPCS | Performed by: ORTHOPAEDIC SURGERY

## 2023-03-28 PROCEDURE — A9270 NON-COVERED ITEM OR SERVICE: HCPCS | Performed by: ANESTHESIOLOGY

## 2023-03-28 PROCEDURE — 160041 HCHG SURGERY MINUTES - EA ADDL 1 MIN LEVEL 4: Performed by: ORTHOPAEDIC SURGERY

## 2023-03-28 DEVICE — IMPLANT GII OVAL RESURFACING PAT 32MM (1EA): Type: IMPLANTABLE DEVICE | Site: KNEE | Status: FUNCTIONAL

## 2023-03-28 DEVICE — CEMENT BONE 40GM HIGH VISCOSITY RALLY: Type: IMPLANTABLE DEVICE | Site: KNEE | Status: FUNCTIONAL

## 2023-03-28 DEVICE — BASE TIBIAL NONPOROUS JOURNEY II LEFT SIZE 4 (1EA): Type: IMPLANTABLE DEVICE | Site: KNEE | Status: FUNCTIONAL

## 2023-03-28 DEVICE — FEMUR BCS OXINIUM JOURNEY II LEFT SIZE 6 (1EA): Type: IMPLANTABLE DEVICE | Site: KNEE | Status: FUNCTIONAL

## 2023-03-28 DEVICE — IMPLANTABLE DEVICE: Type: IMPLANTABLE DEVICE | Site: KNEE | Status: FUNCTIONAL

## 2023-03-28 RX ORDER — TRANEXAMIC ACID 100 MG/ML
INJECTION, SOLUTION INTRAVENOUS PRN
Status: DISCONTINUED | OUTPATIENT
Start: 2023-03-28 | End: 2023-03-28 | Stop reason: SURG

## 2023-03-28 RX ORDER — VENLAFAXINE HYDROCHLORIDE 150 MG/1
150 CAPSULE, EXTENDED RELEASE ORAL DAILY
Status: DISCONTINUED | OUTPATIENT
Start: 2023-03-28 | End: 2023-03-28

## 2023-03-28 RX ORDER — TRAMADOL HYDROCHLORIDE 50 MG/1
50 TABLET ORAL EVERY 4 HOURS PRN
Status: DISCONTINUED | OUTPATIENT
Start: 2023-03-28 | End: 2023-03-31 | Stop reason: HOSPADM

## 2023-03-28 RX ORDER — BUPIVACAINE HYDROCHLORIDE 5 MG/ML
INJECTION, SOLUTION EPIDURAL; INTRACAUDAL PRN
Status: DISCONTINUED | OUTPATIENT
Start: 2023-03-28 | End: 2023-03-28 | Stop reason: SURG

## 2023-03-28 RX ORDER — HYDRALAZINE HYDROCHLORIDE 20 MG/ML
5 INJECTION INTRAMUSCULAR; INTRAVENOUS
Status: DISCONTINUED | OUTPATIENT
Start: 2023-03-28 | End: 2023-03-28 | Stop reason: HOSPADM

## 2023-03-28 RX ORDER — HYDROMORPHONE HYDROCHLORIDE 1 MG/ML
0.4 INJECTION, SOLUTION INTRAMUSCULAR; INTRAVENOUS; SUBCUTANEOUS
Status: DISCONTINUED | OUTPATIENT
Start: 2023-03-28 | End: 2023-03-28 | Stop reason: HOSPADM

## 2023-03-28 RX ORDER — SODIUM CHLORIDE, SODIUM LACTATE, POTASSIUM CHLORIDE, CALCIUM CHLORIDE 600; 310; 30; 20 MG/100ML; MG/100ML; MG/100ML; MG/100ML
INJECTION, SOLUTION INTRAVENOUS CONTINUOUS
Status: ACTIVE | OUTPATIENT
Start: 2023-03-28 | End: 2023-03-28

## 2023-03-28 RX ORDER — ATORVASTATIN CALCIUM 40 MG/1
40 TABLET, FILM COATED ORAL NIGHTLY
Status: DISCONTINUED | OUTPATIENT
Start: 2023-03-28 | End: 2023-03-31 | Stop reason: HOSPADM

## 2023-03-28 RX ORDER — HYDROMORPHONE HYDROCHLORIDE 1 MG/ML
0.25 INJECTION, SOLUTION INTRAMUSCULAR; INTRAVENOUS; SUBCUTANEOUS
Status: DISCONTINUED | OUTPATIENT
Start: 2023-03-28 | End: 2023-03-31 | Stop reason: HOSPADM

## 2023-03-28 RX ORDER — EPHEDRINE SULFATE 50 MG/ML
5 INJECTION, SOLUTION INTRAVENOUS
Status: DISCONTINUED | OUTPATIENT
Start: 2023-03-28 | End: 2023-03-28 | Stop reason: HOSPADM

## 2023-03-28 RX ORDER — OXYCODONE HCL 5 MG/5 ML
5 SOLUTION, ORAL ORAL
Status: COMPLETED | OUTPATIENT
Start: 2023-03-28 | End: 2023-03-28

## 2023-03-28 RX ORDER — BUSPIRONE HYDROCHLORIDE 5 MG/1
15 TABLET ORAL 3 TIMES DAILY
Status: DISCONTINUED | OUTPATIENT
Start: 2023-03-28 | End: 2023-03-31 | Stop reason: HOSPADM

## 2023-03-28 RX ORDER — POLYETHYLENE GLYCOL 3350 17 G/17G
1 POWDER, FOR SOLUTION ORAL 2 TIMES DAILY PRN
Status: DISCONTINUED | OUTPATIENT
Start: 2023-03-28 | End: 2023-03-31 | Stop reason: HOSPADM

## 2023-03-28 RX ORDER — HYDROMORPHONE HYDROCHLORIDE 1 MG/ML
0.2 INJECTION, SOLUTION INTRAMUSCULAR; INTRAVENOUS; SUBCUTANEOUS
Status: DISCONTINUED | OUTPATIENT
Start: 2023-03-28 | End: 2023-03-28 | Stop reason: HOSPADM

## 2023-03-28 RX ORDER — HALOPERIDOL 5 MG/ML
1 INJECTION INTRAMUSCULAR
Status: DISCONTINUED | OUTPATIENT
Start: 2023-03-28 | End: 2023-03-28 | Stop reason: HOSPADM

## 2023-03-28 RX ORDER — SCOLOPAMINE TRANSDERMAL SYSTEM 1 MG/1
1 PATCH, EXTENDED RELEASE TRANSDERMAL
Status: DISCONTINUED | OUTPATIENT
Start: 2023-03-28 | End: 2023-03-31 | Stop reason: HOSPADM

## 2023-03-28 RX ORDER — OMEPRAZOLE 40 MG/1
40 CAPSULE, DELAYED RELEASE ORAL DAILY
Status: DISCONTINUED | OUTPATIENT
Start: 2023-03-28 | End: 2023-03-28

## 2023-03-28 RX ORDER — BUPIVACAINE HYDROCHLORIDE AND EPINEPHRINE 2.5; 5 MG/ML; UG/ML
INJECTION, SOLUTION EPIDURAL; INFILTRATION; INTRACAUDAL; PERINEURAL
Status: DISCONTINUED | OUTPATIENT
Start: 2023-03-28 | End: 2023-03-28 | Stop reason: HOSPADM

## 2023-03-28 RX ORDER — DIPHENHYDRAMINE HYDROCHLORIDE 50 MG/ML
25 INJECTION INTRAMUSCULAR; INTRAVENOUS EVERY 6 HOURS PRN
Status: DISCONTINUED | OUTPATIENT
Start: 2023-03-28 | End: 2023-03-31 | Stop reason: HOSPADM

## 2023-03-28 RX ORDER — MIDAZOLAM HYDROCHLORIDE 1 MG/ML
INJECTION INTRAMUSCULAR; INTRAVENOUS PRN
Status: DISCONTINUED | OUTPATIENT
Start: 2023-03-28 | End: 2023-03-28 | Stop reason: SURG

## 2023-03-28 RX ORDER — ASPIRIN 81 MG/1
81 TABLET ORAL 2 TIMES DAILY WITH MEALS
Qty: 90 TABLET | Refills: 0 | Status: ON HOLD | COMMUNITY
Start: 2023-03-28 | End: 2023-04-14

## 2023-03-28 RX ORDER — ONDANSETRON 2 MG/ML
INJECTION INTRAMUSCULAR; INTRAVENOUS PRN
Status: DISCONTINUED | OUTPATIENT
Start: 2023-03-28 | End: 2023-03-28 | Stop reason: HOSPADM

## 2023-03-28 RX ORDER — VALSARTAN 80 MG/1
160 TABLET ORAL DAILY
Status: DISCONTINUED | OUTPATIENT
Start: 2023-03-28 | End: 2023-03-31 | Stop reason: HOSPADM

## 2023-03-28 RX ORDER — OXYCODONE HYDROCHLORIDE 5 MG/1
5 TABLET ORAL EVERY 4 HOURS PRN
Qty: 42 TABLET | Refills: 0 | Status: ON HOLD | OUTPATIENT
Start: 2023-03-28 | End: 2023-04-14

## 2023-03-28 RX ORDER — KETOROLAC TROMETHAMINE 30 MG/ML
INJECTION, SOLUTION INTRAMUSCULAR; INTRAVENOUS
Status: DISCONTINUED | OUTPATIENT
Start: 2023-03-28 | End: 2023-03-28 | Stop reason: HOSPADM

## 2023-03-28 RX ORDER — OXYCODONE HYDROCHLORIDE 5 MG/1
5 TABLET ORAL
Status: DISCONTINUED | OUTPATIENT
Start: 2023-03-28 | End: 2023-03-31 | Stop reason: HOSPADM

## 2023-03-28 RX ORDER — OMEPRAZOLE 20 MG/1
20 CAPSULE, DELAYED RELEASE ORAL 2 TIMES DAILY PRN
Status: DISCONTINUED | OUTPATIENT
Start: 2023-03-28 | End: 2023-03-31 | Stop reason: HOSPADM

## 2023-03-28 RX ORDER — ENEMA 19; 7 G/133ML; G/133ML
1 ENEMA RECTAL
Status: DISCONTINUED | OUTPATIENT
Start: 2023-03-28 | End: 2023-03-31 | Stop reason: HOSPADM

## 2023-03-28 RX ORDER — CEFAZOLIN SODIUM 1 G/3ML
2 INJECTION, POWDER, FOR SOLUTION INTRAMUSCULAR; INTRAVENOUS ONCE
Status: DISCONTINUED | OUTPATIENT
Start: 2023-03-28 | End: 2023-03-28 | Stop reason: HOSPADM

## 2023-03-28 RX ORDER — DEXTROSE MONOHYDRATE, SODIUM CHLORIDE, AND POTASSIUM CHLORIDE 50; 1.49; 4.5 G/1000ML; G/1000ML; G/1000ML
INJECTION, SOLUTION INTRAVENOUS CONTINUOUS
Status: DISPENSED | OUTPATIENT
Start: 2023-03-28 | End: 2023-03-28

## 2023-03-28 RX ORDER — TRANEXAMIC ACID 100 MG/ML
1000 INJECTION, SOLUTION INTRAVENOUS ONCE
Status: DISCONTINUED | OUTPATIENT
Start: 2023-03-28 | End: 2023-03-28 | Stop reason: HOSPADM

## 2023-03-28 RX ORDER — CEFAZOLIN SODIUM 1 G/3ML
INJECTION, POWDER, FOR SOLUTION INTRAMUSCULAR; INTRAVENOUS PRN
Status: DISCONTINUED | OUTPATIENT
Start: 2023-03-28 | End: 2023-03-28 | Stop reason: SURG

## 2023-03-28 RX ORDER — PHENYLEPHRINE HCL IN 0.9% NACL 0.5 MG/5ML
SYRINGE (ML) INTRAVENOUS PRN
Status: DISCONTINUED | OUTPATIENT
Start: 2023-03-28 | End: 2023-03-28 | Stop reason: SURG

## 2023-03-28 RX ORDER — OMEPRAZOLE 20 MG/1
40 CAPSULE, DELAYED RELEASE ORAL DAILY
Status: DISCONTINUED | OUTPATIENT
Start: 2023-03-28 | End: 2023-03-31 | Stop reason: HOSPADM

## 2023-03-28 RX ORDER — AMOXICILLIN 250 MG
1 CAPSULE ORAL
Status: DISCONTINUED | OUTPATIENT
Start: 2023-03-28 | End: 2023-03-31 | Stop reason: HOSPADM

## 2023-03-28 RX ORDER — KETOROLAC TROMETHAMINE 30 MG/ML
INJECTION, SOLUTION INTRAMUSCULAR; INTRAVENOUS PRN
Status: DISCONTINUED | OUTPATIENT
Start: 2023-03-28 | End: 2023-03-28 | Stop reason: SURG

## 2023-03-28 RX ORDER — BISACODYL 10 MG
10 SUPPOSITORY, RECTAL RECTAL
Status: DISCONTINUED | OUTPATIENT
Start: 2023-03-28 | End: 2023-03-31 | Stop reason: HOSPADM

## 2023-03-28 RX ORDER — OXYCODONE HCL 5 MG/5 ML
10 SOLUTION, ORAL ORAL
Status: COMPLETED | OUTPATIENT
Start: 2023-03-28 | End: 2023-03-28

## 2023-03-28 RX ORDER — ONDANSETRON 2 MG/ML
4 INJECTION INTRAMUSCULAR; INTRAVENOUS
Status: DISCONTINUED | OUTPATIENT
Start: 2023-03-28 | End: 2023-03-28 | Stop reason: HOSPADM

## 2023-03-28 RX ORDER — VENLAFAXINE HYDROCHLORIDE 75 MG/1
227 CAPSULE, EXTENDED RELEASE ORAL
Status: DISCONTINUED | OUTPATIENT
Start: 2023-03-29 | End: 2023-03-31 | Stop reason: HOSPADM

## 2023-03-28 RX ORDER — DIPHENHYDRAMINE HCL 25 MG
25 TABLET ORAL EVERY 6 HOURS PRN
Status: DISCONTINUED | OUTPATIENT
Start: 2023-03-28 | End: 2023-03-31 | Stop reason: HOSPADM

## 2023-03-28 RX ORDER — ONDANSETRON 2 MG/ML
4 INJECTION INTRAMUSCULAR; INTRAVENOUS EVERY 4 HOURS PRN
Status: DISCONTINUED | OUTPATIENT
Start: 2023-03-28 | End: 2023-03-31 | Stop reason: HOSPADM

## 2023-03-28 RX ORDER — METHENAMINE HIPPURATE 1000 MG/1
1 TABLET ORAL 4 TIMES DAILY
Status: DISCONTINUED | OUTPATIENT
Start: 2023-03-28 | End: 2023-03-28

## 2023-03-28 RX ORDER — ROCURONIUM BROMIDE 10 MG/ML
INJECTION, SOLUTION INTRAVENOUS PRN
Status: DISCONTINUED | OUTPATIENT
Start: 2023-03-28 | End: 2023-03-28 | Stop reason: SURG

## 2023-03-28 RX ORDER — VENLAFAXINE HYDROCHLORIDE 75 MG/1
75 CAPSULE, EXTENDED RELEASE ORAL DAILY
Status: DISCONTINUED | OUTPATIENT
Start: 2023-03-28 | End: 2023-03-28

## 2023-03-28 RX ORDER — LABETALOL HYDROCHLORIDE 5 MG/ML
5 INJECTION, SOLUTION INTRAVENOUS
Status: DISCONTINUED | OUTPATIENT
Start: 2023-03-28 | End: 2023-03-28 | Stop reason: HOSPADM

## 2023-03-28 RX ORDER — OXYCODONE HYDROCHLORIDE 5 MG/1
2.5 TABLET ORAL
Status: DISCONTINUED | OUTPATIENT
Start: 2023-03-28 | End: 2023-03-31 | Stop reason: HOSPADM

## 2023-03-28 RX ORDER — AMOXICILLIN 250 MG
1 CAPSULE ORAL NIGHTLY
Status: DISCONTINUED | OUTPATIENT
Start: 2023-03-28 | End: 2023-03-31 | Stop reason: HOSPADM

## 2023-03-28 RX ORDER — OXYBUTYNIN CHLORIDE 5 MG/1
15 TABLET, EXTENDED RELEASE ORAL EVERY EVENING
Status: DISCONTINUED | OUTPATIENT
Start: 2023-03-28 | End: 2023-03-31 | Stop reason: HOSPADM

## 2023-03-28 RX ORDER — ACETAMINOPHEN 500 MG
1000 TABLET ORAL ONCE
Status: COMPLETED | OUTPATIENT
Start: 2023-03-28 | End: 2023-03-28

## 2023-03-28 RX ORDER — HALOPERIDOL 5 MG/ML
1 INJECTION INTRAMUSCULAR EVERY 6 HOURS PRN
Status: DISCONTINUED | OUTPATIENT
Start: 2023-03-28 | End: 2023-03-31 | Stop reason: HOSPADM

## 2023-03-28 RX ORDER — DEXAMETHASONE SODIUM PHOSPHATE 4 MG/ML
INJECTION, SOLUTION INTRA-ARTICULAR; INTRALESIONAL; INTRAMUSCULAR; INTRAVENOUS; SOFT TISSUE PRN
Status: DISCONTINUED | OUTPATIENT
Start: 2023-03-28 | End: 2023-03-28 | Stop reason: SURG

## 2023-03-28 RX ORDER — DOCUSATE SODIUM 100 MG/1
100 CAPSULE, LIQUID FILLED ORAL 2 TIMES DAILY
Status: DISCONTINUED | OUTPATIENT
Start: 2023-03-28 | End: 2023-03-31 | Stop reason: HOSPADM

## 2023-03-28 RX ORDER — METHENAMINE HIPPURATE 1000 MG/1
1 TABLET ORAL 2 TIMES DAILY
Status: DISCONTINUED | OUTPATIENT
Start: 2023-03-28 | End: 2023-03-31 | Stop reason: HOSPADM

## 2023-03-28 RX ORDER — CELECOXIB 200 MG/1
200 CAPSULE ORAL ONCE
Status: COMPLETED | OUTPATIENT
Start: 2023-03-28 | End: 2023-03-28

## 2023-03-28 RX ORDER — OXYBUTYNIN CHLORIDE 15 MG/1
15 TABLET, EXTENDED RELEASE ORAL
Status: DISCONTINUED | OUTPATIENT
Start: 2023-03-28 | End: 2023-03-28

## 2023-03-28 RX ORDER — HYDROMORPHONE HYDROCHLORIDE 1 MG/ML
0.1 INJECTION, SOLUTION INTRAMUSCULAR; INTRAVENOUS; SUBCUTANEOUS
Status: DISCONTINUED | OUTPATIENT
Start: 2023-03-28 | End: 2023-03-28 | Stop reason: HOSPADM

## 2023-03-28 RX ORDER — LIDOCAINE HYDROCHLORIDE 20 MG/ML
INJECTION, SOLUTION EPIDURAL; INFILTRATION; INTRACAUDAL; PERINEURAL PRN
Status: DISCONTINUED | OUTPATIENT
Start: 2023-03-28 | End: 2023-03-28 | Stop reason: SURG

## 2023-03-28 RX ORDER — LABETALOL HYDROCHLORIDE 5 MG/ML
INJECTION, SOLUTION INTRAVENOUS PRN
Status: DISCONTINUED | OUTPATIENT
Start: 2023-03-28 | End: 2023-03-28 | Stop reason: HOSPADM

## 2023-03-28 RX ORDER — TRAMADOL HYDROCHLORIDE 50 MG/1
50 TABLET ORAL EVERY 6 HOURS PRN
Qty: 28 TABLET | Refills: 0 | Status: ON HOLD | OUTPATIENT
Start: 2023-03-28 | End: 2023-04-14

## 2023-03-28 RX ORDER — DEXAMETHASONE SODIUM PHOSPHATE 4 MG/ML
4 INJECTION, SOLUTION INTRA-ARTICULAR; INTRALESIONAL; INTRAMUSCULAR; INTRAVENOUS; SOFT TISSUE
Status: DISCONTINUED | OUTPATIENT
Start: 2023-03-28 | End: 2023-03-31 | Stop reason: HOSPADM

## 2023-03-28 RX ADMIN — ACETAMINOPHEN 1000 MG: 500 TABLET ORAL at 08:41

## 2023-03-28 RX ADMIN — DEXAMETHASONE SODIUM PHOSPHATE 10 MG: 4 INJECTION, SOLUTION INTRA-ARTICULAR; INTRALESIONAL; INTRAMUSCULAR; INTRAVENOUS; SOFT TISSUE at 10:03

## 2023-03-28 RX ADMIN — ROCURONIUM BROMIDE 50 MG: 10 INJECTION, SOLUTION INTRAVENOUS at 09:59

## 2023-03-28 RX ADMIN — DEXTROSE MONOHYDRATE, SODIUM CHLORIDE, AND POTASSIUM CHLORIDE: 50; 4.5; 1.49 INJECTION, SOLUTION INTRAVENOUS at 13:37

## 2023-03-28 RX ADMIN — OXYBUTYNIN CHLORIDE 15 MG: 5 TABLET, EXTENDED RELEASE ORAL at 17:17

## 2023-03-28 RX ADMIN — DOCUSATE SODIUM 100 MG: 100 CAPSULE, LIQUID FILLED ORAL at 17:18

## 2023-03-28 RX ADMIN — ONDANSETRON 4 MG: 2 INJECTION INTRAMUSCULAR; INTRAVENOUS at 11:06

## 2023-03-28 RX ADMIN — SODIUM CHLORIDE, POTASSIUM CHLORIDE, SODIUM LACTATE AND CALCIUM CHLORIDE: 600; 310; 30; 20 INJECTION, SOLUTION INTRAVENOUS at 09:56

## 2023-03-28 RX ADMIN — PROPOFOL 150 MG: 10 INJECTION, EMULSION INTRAVENOUS at 09:59

## 2023-03-28 RX ADMIN — ATORVASTATIN CALCIUM 40 MG: 40 TABLET, FILM COATED ORAL at 20:32

## 2023-03-28 RX ADMIN — BUSPIRONE HYDROCHLORIDE 15 MG: 5 TABLET ORAL at 14:46

## 2023-03-28 RX ADMIN — METHENAMINE HIPPURATE 1 G: 1 TABLET ORAL at 17:17

## 2023-03-28 RX ADMIN — FENTANYL CITRATE 100 MCG: 50 INJECTION, SOLUTION INTRAMUSCULAR; INTRAVENOUS at 09:59

## 2023-03-28 RX ADMIN — FENTANYL CITRATE 100 MCG: 50 INJECTION, SOLUTION INTRAMUSCULAR; INTRAVENOUS at 10:19

## 2023-03-28 RX ADMIN — Medication 100 MCG: at 11:04

## 2023-03-28 RX ADMIN — MIDAZOLAM HYDROCHLORIDE 0.5 MG: 1 INJECTION, SOLUTION INTRAMUSCULAR; INTRAVENOUS at 09:38

## 2023-03-28 RX ADMIN — CEFAZOLIN 2 G: 330 INJECTION, POWDER, FOR SOLUTION INTRAMUSCULAR; INTRAVENOUS at 10:01

## 2023-03-28 RX ADMIN — OXYCODONE HYDROCHLORIDE 5 MG: 5 SOLUTION ORAL at 11:49

## 2023-03-28 RX ADMIN — ASPIRIN 81 MG: 81 TABLET, COATED ORAL at 22:47

## 2023-03-28 RX ADMIN — OMEPRAZOLE 40 MG: 20 CAPSULE, DELAYED RELEASE ORAL at 13:41

## 2023-03-28 RX ADMIN — KETOROLAC TROMETHAMINE 30 MG: 30 INJECTION, SOLUTION INTRAMUSCULAR at 11:06

## 2023-03-28 RX ADMIN — SUGAMMADEX 200 MG: 100 INJECTION, SOLUTION INTRAVENOUS at 11:15

## 2023-03-28 RX ADMIN — CEFAZOLIN 2 G: 2 INJECTION, POWDER, FOR SOLUTION INTRAMUSCULAR; INTRAVENOUS at 13:40

## 2023-03-28 RX ADMIN — Medication 100 MCG: at 11:05

## 2023-03-28 RX ADMIN — LIDOCAINE HYDROCHLORIDE 100 MG: 20 INJECTION, SOLUTION EPIDURAL; INFILTRATION; INTRACAUDAL at 09:39

## 2023-03-28 RX ADMIN — SENNOSIDES AND DOCUSATE SODIUM 1 TABLET: 50; 8.6 TABLET ORAL at 20:32

## 2023-03-28 RX ADMIN — TRANEXAMIC ACID 1000 MG: 100 INJECTION, SOLUTION INTRAVENOUS at 10:03

## 2023-03-28 RX ADMIN — OXYCODONE 5 MG: 5 TABLET ORAL at 20:44

## 2023-03-28 RX ADMIN — BUPIVACAINE HYDROCHLORIDE 20 ML: 5 INJECTION, SOLUTION EPIDURAL; INTRACAUDAL; PERINEURAL at 09:39

## 2023-03-28 RX ADMIN — GLYCOPYRROLATE 0.2 MG: 0.2 INJECTION INTRAMUSCULAR; INTRAVENOUS at 10:25

## 2023-03-28 RX ADMIN — TRANEXAMIC ACID 1000 MG: 100 INJECTION, SOLUTION INTRAVENOUS at 11:12

## 2023-03-28 RX ADMIN — BUSPIRONE HYDROCHLORIDE 15 MG: 5 TABLET ORAL at 20:32

## 2023-03-28 RX ADMIN — LABETALOL HYDROCHLORIDE 5 MG: 5 INJECTION, SOLUTION INTRAVENOUS at 10:37

## 2023-03-28 RX ADMIN — CELECOXIB 200 MG: 200 CAPSULE ORAL at 08:41

## 2023-03-28 ASSESSMENT — FIBROSIS 4 INDEX
FIB4 SCORE: 2.72
FIB4 SCORE: 2.72

## 2023-03-28 ASSESSMENT — LIFESTYLE VARIABLES
TOTAL SCORE: 0
TOTAL SCORE: 0
HAVE PEOPLE ANNOYED YOU BY CRITICIZING YOUR DRINKING: NO
ON A TYPICAL DAY WHEN YOU DRINK ALCOHOL HOW MANY DRINKS DO YOU HAVE: 0
EVER HAD A DRINK FIRST THING IN THE MORNING TO STEADY YOUR NERVES TO GET RID OF A HANGOVER: NO
EVER FELT BAD OR GUILTY ABOUT YOUR DRINKING: NO
ALCOHOL_USE: NO
TOTAL SCORE: 0
CONSUMPTION TOTAL: NEGATIVE
AVERAGE NUMBER OF DAYS PER WEEK YOU HAVE A DRINK CONTAINING ALCOHOL: 0
HAVE YOU EVER FELT YOU SHOULD CUT DOWN ON YOUR DRINKING: NO
HOW MANY TIMES IN THE PAST YEAR HAVE YOU HAD 5 OR MORE DRINKS IN A DAY: 0

## 2023-03-28 ASSESSMENT — COGNITIVE AND FUNCTIONAL STATUS - GENERAL
HELP NEEDED FOR BATHING: A LITTLE
CLIMB 3 TO 5 STEPS WITH RAILING: A LITTLE
MOVING TO AND FROM BED TO CHAIR: A LITTLE
DAILY ACTIVITIY SCORE: 22
TURNING FROM BACK TO SIDE WHILE IN FLAT BAD: A LITTLE
MOVING FROM LYING ON BACK TO SITTING ON SIDE OF FLAT BED: A LITTLE
DRESSING REGULAR LOWER BODY CLOTHING: A LITTLE
SUGGESTED CMS G CODE MODIFIER MOBILITY: CK
MOBILITY SCORE: 18
WALKING IN HOSPITAL ROOM: A LITTLE
SUGGESTED CMS G CODE MODIFIER DAILY ACTIVITY: CJ
STANDING UP FROM CHAIR USING ARMS: A LITTLE

## 2023-03-28 ASSESSMENT — PAIN DESCRIPTION - PAIN TYPE
TYPE: SURGICAL PAIN
TYPE: CHRONIC PAIN

## 2023-03-28 ASSESSMENT — PATIENT HEALTH QUESTIONNAIRE - PHQ9
SUM OF ALL RESPONSES TO PHQ9 QUESTIONS 1 AND 2: 0
2. FEELING DOWN, DEPRESSED, IRRITABLE, OR HOPELESS: NOT AT ALL
1. LITTLE INTEREST OR PLEASURE IN DOING THINGS: NOT AT ALL

## 2023-03-28 ASSESSMENT — PAIN SCALES - GENERAL: PAIN_LEVEL: 4

## 2023-03-28 NOTE — ANESTHESIA TIME REPORT
Anesthesia Start and Stop Event Times     Date Time Event    3/28/2023 0950 Ready for Procedure     0956 Anesthesia Start     1122 Anesthesia Stop        Responsible Staff  03/28/23    Name Role Begin End    May Zhang M.D. Anesth 0956 1122        Overtime Reason:  no overtime (within assigned shift)    Comments:

## 2023-03-28 NOTE — CARE PLAN
Problem: Pain - Standard  Goal: Alleviation of pain or a reduction in pain to the patient’s comfort goal  3/28/2023 1625 by Elizabeth Espino R.N.  Outcome: Progressing  Problem: Fall Risk  Goal: Patient will remain free from falls  3/28/2023 1632 by Elizabeth Espino R.N.  Outcome: Progressing  Problem: Post-Operative Knee Replacement  Goal: Patient's neurovascular status will be maintained or improve  Outcome: Progressing  Goal: Early mobilization post surgery  Outcome: Progressing     Problem: Pain - Post Surgery  Goal: Alleviation or reduction of pain post surgery  Outcome: Progressing   The patient is Stable - Low risk of patient condition declining or worsening    Shift Goals  Clinical Goals: patient pain level will be lesser than 3/10, free from fall, and will be able to wean off oxygen and maintains Sp02 >90%  Patient Goals: pain management, Rest comfortably  Family Goals: n/a    Progress made toward(s) clinical / shift goals:  patient declines pain 0/10, free from fall. Patient is currently on 1L oxygen via nasal cannula, Sp02 of 95%. Patient refused to ambulate, patient wanted to rest.     Patient is not progressing towards the following goals: progressing on other goals

## 2023-03-28 NOTE — H&P
"Surgery Orthopedic History & Physical Note    Date  3/27/2023    Primary Care Physician  He Trivedi M.D.    CC  Pre-Op Diagnosis Codes:     * Osteoarthritis of left knee [M17.12]    HPI  This is a 83 y.o. female who presented with a several year history of left knee pain which has been getting worse over the last 6 months.  She is done cortisone injections physical therapy and several other types of injections and none of this is helped.  She started to fall now due to the deformity in her leg.  She notices that she is limping quite a bit and has become quite knock kneed.  She was set up for knee replacement but ended up breaking her neck.  This is now better.  She would like to proceed with knee replacement.  Pain Assessment  Pain Assessment: 0-10  Pain Score: 7    Past Medical History:   Diagnosis Date    Anesthesia     nausea    Arthritis     ASTHMA     CHEMICAL INDUCED    Breast cancer ([facility])     stage 0 - Dr. Naqvi    Cancer ([facility]) 2010    right breast-lumpectomy. Radiation, no chemo    Cataract     silver iol    Dental disorder     upper implants    Heart burn     taking omeprazole    Heart murmur     High cholesterol     History of cervical fracture 2022    No surgery. Had to wear brace only.    Hypertension 01/03/2022    states well controlled on meds    Indigestion     taking omeprazole    Osteoporosis     Pain     \"everywhere\"    Pneumonia 12/2021    on antibiotics    PONV (postoperative nausea and vomiting)     Have always had this.    Psychiatric problem     depression on zoloft    Stroke ([facility]) 2021    Some memory problems and expressive aphasia.    Unspecified urinary incontinence     wears depends       Past Surgical History:   Procedure Laterality Date    DC CREATE SHUNT:VENTRIC-PERITONEAL  01/10/2022    Procedure: INSERTION, SHUNT, VENTRICULOPERITONEAL, LAPAROSCOPIC PERITONEAL CATHETER - STEALTH GUIDED;  Surgeon: Gregory Blanchard M.D.;  Location: SURGERY [facility];  Service: Neurosurgery    PB " PARTIAL HIP REPLACEMENT Left 11/24/2021    Procedure: HEMIARTHROPLASTY, HIP;  Surgeon: Marlon Culver M.D.;  Location: SURGERY [facility];  Service: Orthopedics    RECOVERY  05/10/2016    Procedure: [id number];  Surgeon: Recoveryonshania Surgery;  Location: SURGERY PRE-POST PROC UNIT [facility];  Service:     LUMBAR LAMINECTOMY DISKECTOMY  10/17/2012    Performed by Daksha Melendez M.D. at SURGERY [facility] ORS    FORAMINOTOMY  10/17/2012    Performed by Daksha Melendez M.D. at SURGERY [facility] ORS    LUMBAR DECOMPRESSION  10/17/2012    Performed by Daksha Melendez M.D. at SURGERY [facility] ORS    KNEE ARTHROPLASTY TOTAL  09/19/2011    Performed by KISHAN CARDENAS at SURGERY [facility] ORS    BREAST BIOPSY  05/21/2010    Performed by ROB TOMAS at SURGERY SAME DAY [facility] ORS    CHOLECYSTECTOMY      HAMMERTOE CORRECTION Bilateral     with bunions    KNEE ARTHROSCOPY      OTHER ORTHOPEDIC SURGERY      foot, shoulder, and knee    NC RADIATION THERAPY PLAN SIMPLE      SHOULDER SURGERY      SINUSCOPY         No current facility-administered medications for this encounter.     Current Outpatient Medications   Medication Sig Dispense Refill    mupirocin (BACTROBAN) 2 % Ointment Swab 2x daily in each nostril for 5 days 22 g 0    methenamine hip (HIPPREX) 1 GM Tab LONG TERM TREATMENT FOR CHRONIC UTI.      estradiol (ESTRACE) 0.1 MG/GM vaginal cream Insert 2 g into the vagina every day.      Calcium Carbonate-Vitamin D (CALCIUM-VITAMIN D3 PO) Take 600 mg by mouth every day.      vitamin D3 (CHOLECALCIFEROL) 1000 Unit (25 mcg) Tab Take 1,000 Units by mouth every day.      atorvastatin (LIPITOR) 40 MG Tab Take 1 Tablet by mouth every evening for 364 days. 90 Tablet 3    aspirin 81 MG EC tablet Take  by mouth every day.      busPIRone (BUSPAR) 15 MG tablet Take 15 mg by mouth 3 times a day.      VENTOLIN  (90 Base) MCG/ACT Aero Soln inhalation aerosol 2 Puffs every 6 hours as needed.       acetaminophen (TYLENOL) 500 MG Tab Take 1,000 mg by mouth every 6 hours as needed for Moderate Pain.      D-MANNOSE PO Take 1 Capsule by mouth every evening.      venlafaxine XR (EFFEXOR XR) 75 MG CAPSULE SR 24 HR Take 1 Capsule by mouth every day. (Patient taking differently: Take 75 mg by mouth every day. Taken with 150 mg for 225mg daily) 30 Capsule 2    omeprazole (PRILOSEC) 40 MG delayed-release capsule Take 1 Capsule by mouth every day. 30 Capsule 2    oxybutynin (DITROPAN XL) 15 MG CR tablet Take 1 Tablet by mouth every day. 30 Tablet 2    valsartan (DIOVAN) 160 MG Tab Take 1 Tablet by mouth every day. 30 Tablet 2    venlafaxine (EFFEXOR-XR) 150 MG extended-release capsule Take 1 Capsule by mouth every day. Taken with 75 mg for 225mg daily 30 Capsule 2    CRANBERRY EXTRACT PO Take 1 Capsule by mouth every day.         Social History     Occupational History    Not on file   Tobacco Use    Smoking status: Never    Smokeless tobacco: Never   Vaping Use    Vaping Use: Never used   Substance and Sexual Activity    Alcohol use: No     Comment: very rare    Drug use: Never    Sexual activity: Not on file     Comment: ; two daughters; retired ( @ [employer])       Family History   Problem Relation Age of Onset    Lung Disease Mother         copd    Stroke Mother     Cancer Daughter         Breast    Breast Cancer Daughter     Psychiatric Illness Daughter         Anxiety    Psychiatric Illness Daughter         Anxiety and Depression       Allergies  Patient has no known allergies.    Review of Systems  Pertinent ROS in HPI. Other ROS reviewed and found to be otherwise unremarkable.       Physical Exam  Cardiovascular:      Pulses: Normal pulses.   Pulmonary:      Effort: Pulmonary effort is normal.   Ortho Exam  Walks an antalgic gait favoring left side  Severe valgus deformity left knee  10 degree flexion contracture left knee  Stable varus valgus stress left knee  Moderate  joint effusion left knee  Neurovascular intact left lower extremity    Radiology:  Severe degenerative arthritis loss of joint space ossified production cyst formation and valgus deformity left knee      Assessment/Plan:  Pre-Op Diagnosis Codes:     * Osteoarthritis of left knee [M17.12]  Procedure(s):  LEFT TOTAL KNEE ARTHROPLASTY    Plan:  We will go and set her up for total knee arthroplasty as she is failed conservative measures.  Risk and benefits of surgery were discussed patient as well as implant choice.       Patient has been educated about choice for planned prosthesis and rationale for its use.  Patient understands and wishes to proceed.

## 2023-03-28 NOTE — ANESTHESIA PROCEDURE NOTES
Peripheral Block    Date/Time: 3/28/2023 9:39 AM  Performed by: May Zhang M.D.  Authorized by: May Zhang M.D.     Patient Location:  Pre-op  Start Time:  3/28/2023 9:39 AM  Reason for Block: at surgeon's request and post-op pain management ONLY    patient identified, IV checked, site marked, risks and benefits discussed, surgical consent, monitors and equipment checked, pre-op evaluation and timeout performed    Patient Position:  Supine  Prep: ChloraPrep    Monitoring:  Heart rate, continuous pulse ox and cardiac monitor  Block Region:  Lower Extremity  Lower Extremity - Block Type:  Selective FEMORAL nerve block at the Adductor Canal    Laterality:  Left  Procedures: ultrasound guided  Image captured, interpreted and electronically stored.  Local Infiltration:  Lidocaine  Strength:  1 %  Dose:  3 ml  Block Type:  Single-shot  Needle Length:  100mm  Needle Gauge:  21 G  Needle Localization:  Ultrasound guidance  Injection Assessment:  Negative aspiration for heme, no paresthesia on injection, incremental injection and local visualized surrounding nerve on ultrasound  Evidence of intravascular injection: No

## 2023-03-28 NOTE — LETTER
March 20, 2023    Patient Name: Aaliyah Fulton  Surgeon Name: Marcus Hall M.D.  Surgery Facility: [facility] ([address] [facility])  Surgery Date: 3/28/2023    The time of your surgery is not final and may change up to and until the day of your surgery. You will be contacted 24-48 hours prior to your surgery date with your check-in and surgery time.    If you will not be at one of the below numbers please call the surgery scheduler at [phone]  Preferred Phone: [phone]    BEFORE YOUR SURGERY   Pre Registration and/or Lab Work must be done within and no earlier than 28 days prior to your surgery date. Please call [facility] at [phone] for an appointment as soon as possible.    DAY OF YOUR SURGERY  Nothing to eat or drink after midnight     Refrain from smoking any substance after midnight prior to surgery. Smoking may interfere with the anesthetic and frequently produces nausea during the recovery period.    Continue taking all lifesaving medications. Including the morning of your surgery with small sip of water.    Please do NOT take on the day of surgery:  Diuretics: examples- furosemide (Lasix), spironolactone, hydrochlorothiazide  ACE-inhibitors: examples- lisinopril, ramipril, enalapril  “ARBs”: examples- losartan, Olmesartan, valsartan    Please arrive at the hospital/surgery center at the check-in time provided.     An adult will need to bring you and take you home after your surgery.     AFTER YOUR SURGERY  Post op Appointment:   Date: 4/13   Time: 2:15 PM   With: Marcus Hall M.D.   Location: [address] [place] [id number]    - Therapy- Your first appointment should be 1  week(s) after your surgery. For your convenience we have 4 Physical Therapy locations: [facility], [place], and [facility]. Call our office ASAP to schedule an appointment at [phone] or take the enclosed Therapy Prescription to a  facility of your choice.  - No dental work for 3-6 months after your surgery.  - You must have someone provide transportation post surgery and someone to monitor you for at least 24 hours post-surgery. If you don't have either of these your appointment will be canceled.

## 2023-03-28 NOTE — OR NURSING
1120: Patient arrived from OR via gurney.  LLE dressing/Ace Wrap CDI; pedal pulse +2. Cold pack placed. +Block    Sedation/Resp Status: Drowsy, eye opening to verbal.  Respirations spontaneous and non-labored.    HR 70s SR; VSS on 6L 02 via mask.     1125: Report to Rosibel BURT.

## 2023-03-28 NOTE — OR NURSING
1125: Report from Jessica BURT. Patient resting in stretcher, equal unlabored respirations, VSS on 6L. Dressing CDI.    1140: Admission order released and RTOC called. Pt currently denies pain or nausea. Dressing remains CDI with ice pack in place.    1149: Pt c/o 4/10 pain, PO analgesia given; see MAR. Denies nausea at this time.     1200: Family updated and made aware of pending inpatient bed assignment. Pt resting comfortably with eyes closed equal unlabored respirations.     1220: Recovery completed at this time. Patient denies pain or nausea. Resting with eyes closed equal unlabored respirations. Awaiting inpatient bed placement.     1243: Report given to [facility] RN.

## 2023-03-28 NOTE — PROGRESS NOTES
Received report from PACU. Pt on floor. AAOx4, VSS. Dressing in place to Left knee , CDI with polar ice in place. Pt states pain 3/10; . Denies any numbness or tingling to extremities at this time. Updated pt on plan of care for the day. Call light within reach, bed locked and in lowest position. Fall precautions in place. Pt educated to call for assistance. Hourly rounding in place.

## 2023-03-28 NOTE — ANESTHESIA PREPROCEDURE EVALUATION
Case: [id number] Date/Time: 03/28/23 0945    Procedure: LEFT TOTAL KNEE ARTHROPLASTY    Diagnosis: Osteoarthritis of left knee [M17.12]    Pre-op diagnosis: Osteoarthritis of left knee [M17.12]    Location:  OR  / SURGERY [facility]    Surgeons: Marcus Hall M.D.          Relevant Problems   CARDIAC   (positive) Benign essential hypertension   (positive) Nonrheumatic aortic valve stenosis   (positive) Systolic murmur      GI   (positive) Gastroesophageal reflux disease without esophagitis      Other   (positive) Hand arthritis       Physical Exam    Airway   Mallampati: I  TM distance: >3 FB  Neck ROM: full       Cardiovascular - normal exam  (+) murmur     Dental - normal exam           Pulmonary   Breath sounds clear to auscultation     Abdominal    Neurological - normal exam                 Anesthesia Plan    ASA 3   ASA physical status 3 criteria: CVA or TIA - history (> 3 months)    Plan - general and peripheral nerve block     Peripheral nerve block will be post-op pain control  Airway plan will be ETT          Induction: intravenous    Postoperative Plan: Postoperative administration of opioids is intended.    Pertinent diagnostic labs and testing reviewed    Informed Consent:    Anesthetic plan and risks discussed with patient.

## 2023-03-28 NOTE — OP REPORT
DIAGNOSIS: left knee osteoarthritis.    PROCEDURE: Total knee arthroplasty, left side.  22 modifier will be added to the case due to the soft bone 2 screws had to be placed medially as the bone under the MCL was trying to peel off the femur.  This added another  20 minutes to a typically 40-minute case    ANESTHESIA: General.    COMPLICATIONS: None.    SURGEON: Marcus Hall MD    ASSISTANT: Erum Yu    INDICATIONS: This is a patient with severe pain secondary to osteoarthritis having failed conservative treatments.    DESCRIPTION OF PROCEDURE: Patient was identified in the preop area, site was   marked, taken back to the operating room and underwent general anesthesia.   The left lower extremity was prepped and draped in sterile manner.   Preoperative timeout was held and antibiotics were given. Incision was made   over the anterior knee, soft tissue was dissected down to the fascia. The   fascia was split in medial parapatellar approach. The step drill was placed,   cut was made at 6 degrees and then a femoral cut guide was placed. All cuts   were made for the 6.  This found when the femur was flexed up portion of the medial condyle attached to the MCL was trying to peel off of the soft bone.  2 screws were then placed using washers for fixation of this bone.  The tibia was cut and sized to the 4. The   patella was cut and sized to a 32. All the trials were removed and then the   6 BCS J2 S&N femur, 4 tibia and 32 patellar button were all cemented into  place. Final trialing showed that a 9 poly provided stability throughout   the entire arc of motion and 9 poly was placed.  The constrained poly was used due to the fixation of the medial condyle. The wound was soaked with   dilute Betadine solution and was injected with Marcaine. Vicryl was used for   the fascia, Monocryl, soft tissue, skin and Dermabond for the final skin   layer. Patient was woken up, taken back to PACU, will be weightbear as    tolerated.

## 2023-03-28 NOTE — ANESTHESIA PROCEDURE NOTES
Airway    Date/Time: 3/28/2023 10:00 AM  Performed by: May Zhang M.D.  Authorized by: May Zhang M.D.     Location:  OR  Urgency:  Elective  Indications for Airway Management:  Anesthesia      Spontaneous Ventilation: absent    Sedation Level:  Deep  Preoxygenated: Yes    Patient Position:  Sniffing  Mask Difficulty Assessment:  0 - not attempted  Final Airway Type:  Endotracheal airway  Final Endotracheal Airway:  ETT  Cuffed: Yes    Technique Used for Successful ETT Placement:  Direct laryngoscopy    Insertion Site:  Oral  Blade Type:  Ida  Laryngoscope Blade/Videolaryngoscope Blade Size:  3  ETT Size (mm):  7.0  Measured from:  Teeth  ETT to Teeth (cm):  21  Placement Verified by: auscultation and capnometry    Cormack-Lehane Classification:  Grade IIb - view of arytenoids or posterior of glottis only  Number of Attempts at Approach:  1

## 2023-03-28 NOTE — DISCHARGE INSTR - OTHER INFO
WBAT.  Leave dressing in place until follow-up in 2 weeks.  Ok to get bandage wet in shower.   Start PT in 1 week-call to make appt.

## 2023-03-28 NOTE — PROGRESS NOTES
4 Eyes Skin Assessment Completed by JAVED Jarrett and JAVED Wiley.    Head WDL  Ears WDL  Nose WDL  Mouth WDL  Neck WDL  Breast/Chest WDL  Shoulder Blades WDL  Spine Lower back redness and scab  (R) Arm/Elbow/Hand WDL  (L) Arm/Elbow/Hand WDL  Abdomen WDL  Groin WDL  Scrotum/Coccyx/Buttocks WDL  (R) Leg Scab  (L) Leg Incision Left knee arthroplasty, dressing in place with ace wrap, with polar ice   (R) Heel/Foot/Toe Redness, blanching   (L) Heel/Foot/Toe WDL          Devices In Places Blood Pressure Cuff, SCD's, and Nasal Cannula      Interventions In Place Gray Ear Foams    Possible Skin Injury No    Pictures Uploaded Into Epic: yes  Wound Consult Placed N/A  RN Wound Prevention Protocol Ordered No

## 2023-03-28 NOTE — ANESTHESIA POSTPROCEDURE EVALUATION
Patient: Aaliyah Fulton    Procedure Summary     Date: 03/28/23 Room / Location:  OR 02 / SURGERY [facility]    Anesthesia Start: 0956 Anesthesia Stop: 1122    Procedure: LEFT TOTAL KNEE ARTHROPLASTY (Left: Knee) Diagnosis:       Osteoarthritis of left knee      (Osteoarthritis of left knee)    Surgeons: Marcus Hall M.D. Responsible Provider: May Zhang M.D.    Anesthesia Type: general, peripheral nerve block ASA Status: 3          Final Anesthesia Type: general, peripheral nerve block  Last vitals  BP   Blood Pressure : 131/68    Temp   36.2 °C (97.2 °F)    Pulse   68   Resp   14    SpO2   97 %      Anesthesia Post Evaluation    Patient location during evaluation: PACU  Patient participation: complete - patient participated  Level of consciousness: awake and alert  Pain score: 4    Airway patency: patent  Anesthetic complications: no  Cardiovascular status: adequate  Respiratory status: acceptable  Hydration status: acceptable    PONV: none          There were no known notable events for this encounter.     Nurse Pain Score: 4 (NPRS)

## 2023-03-29 LAB
HCT VFR BLD AUTO: 34.8 % (ref 37–47)
HGB BLD-MCNC: 11.4 G/DL (ref 12–16)

## 2023-03-29 PROCEDURE — 85018 HEMOGLOBIN: CPT

## 2023-03-29 PROCEDURE — G0378 HOSPITAL OBSERVATION PER HR: HCPCS

## 2023-03-29 PROCEDURE — 85014 HEMATOCRIT: CPT

## 2023-03-29 PROCEDURE — 700102 HCHG RX REV CODE 250 W/ 637 OVERRIDE(OP): Performed by: PHYSICIAN ASSISTANT

## 2023-03-29 PROCEDURE — 97162 PT EVAL MOD COMPLEX 30 MIN: CPT

## 2023-03-29 PROCEDURE — A9270 NON-COVERED ITEM OR SERVICE: HCPCS | Performed by: ORTHOPAEDIC SURGERY

## 2023-03-29 PROCEDURE — 94760 N-INVAS EAR/PLS OXIMETRY 1: CPT

## 2023-03-29 PROCEDURE — 700111 HCHG RX REV CODE 636 W/ 250 OVERRIDE (IP): Performed by: PHYSICIAN ASSISTANT

## 2023-03-29 PROCEDURE — A9270 NON-COVERED ITEM OR SERVICE: HCPCS | Performed by: PHYSICIAN ASSISTANT

## 2023-03-29 PROCEDURE — 99024 POSTOP FOLLOW-UP VISIT: CPT | Performed by: PHYSICIAN ASSISTANT

## 2023-03-29 PROCEDURE — 96365 THER/PROPH/DIAG IV INF INIT: CPT

## 2023-03-29 PROCEDURE — 97165 OT EVAL LOW COMPLEX 30 MIN: CPT

## 2023-03-29 PROCEDURE — 36415 COLL VENOUS BLD VENIPUNCTURE: CPT

## 2023-03-29 PROCEDURE — 97535 SELF CARE MNGMENT TRAINING: CPT

## 2023-03-29 PROCEDURE — 700102 HCHG RX REV CODE 250 W/ 637 OVERRIDE(OP): Performed by: ORTHOPAEDIC SURGERY

## 2023-03-29 RX ADMIN — HYDROMORPHONE HYDROCHLORIDE 0.25 MG: 1 INJECTION, SOLUTION INTRAMUSCULAR; INTRAVENOUS; SUBCUTANEOUS at 15:29

## 2023-03-29 RX ADMIN — OXYBUTYNIN CHLORIDE 15 MG: 5 TABLET, EXTENDED RELEASE ORAL at 17:22

## 2023-03-29 RX ADMIN — OXYCODONE 5 MG: 5 TABLET ORAL at 20:28

## 2023-03-29 RX ADMIN — METHENAMINE HIPPURATE 1 G: 1 TABLET ORAL at 17:22

## 2023-03-29 RX ADMIN — BUSPIRONE HYDROCHLORIDE 15 MG: 5 TABLET ORAL at 08:06

## 2023-03-29 RX ADMIN — BUSPIRONE HYDROCHLORIDE 15 MG: 5 TABLET ORAL at 14:02

## 2023-03-29 RX ADMIN — BUSPIRONE HYDROCHLORIDE 15 MG: 5 TABLET ORAL at 20:28

## 2023-03-29 RX ADMIN — SENNOSIDES AND DOCUSATE SODIUM 1 TABLET: 50; 8.6 TABLET ORAL at 20:28

## 2023-03-29 RX ADMIN — ATORVASTATIN CALCIUM 40 MG: 40 TABLET, FILM COATED ORAL at 20:27

## 2023-03-29 RX ADMIN — METHENAMINE HIPPURATE 1 G: 1 TABLET ORAL at 05:18

## 2023-03-29 RX ADMIN — ASPIRIN 81 MG: 81 TABLET, COATED ORAL at 11:02

## 2023-03-29 RX ADMIN — VENLAFAXINE HYDROCHLORIDE 225 MG: 75 CAPSULE, EXTENDED RELEASE ORAL at 08:06

## 2023-03-29 RX ADMIN — HYDROMORPHONE HYDROCHLORIDE 0.25 MG: 1 INJECTION, SOLUTION INTRAMUSCULAR; INTRAVENOUS; SUBCUTANEOUS at 22:09

## 2023-03-29 RX ADMIN — OMEPRAZOLE 40 MG: 20 CAPSULE, DELAYED RELEASE ORAL at 05:17

## 2023-03-29 RX ADMIN — ASPIRIN 81 MG: 81 TABLET, COATED ORAL at 22:08

## 2023-03-29 RX ADMIN — DOCUSATE SODIUM 100 MG: 100 CAPSULE, LIQUID FILLED ORAL at 05:18

## 2023-03-29 RX ADMIN — DOCUSATE SODIUM 100 MG: 100 CAPSULE, LIQUID FILLED ORAL at 17:22

## 2023-03-29 RX ADMIN — OXYCODONE 5 MG: 5 TABLET ORAL at 09:39

## 2023-03-29 RX ADMIN — OXYCODONE 5 MG: 5 TABLET ORAL at 14:03

## 2023-03-29 ASSESSMENT — PAIN DESCRIPTION - PAIN TYPE
TYPE: SURGICAL PAIN

## 2023-03-29 ASSESSMENT — COGNITIVE AND FUNCTIONAL STATUS - GENERAL
CLIMB 3 TO 5 STEPS WITH RAILING: A LITTLE
SUGGESTED CMS G CODE MODIFIER MOBILITY: CJ
TOILETING: A LITTLE
MOBILITY SCORE: 20
MOVING FROM LYING ON BACK TO SITTING ON SIDE OF FLAT BED: A LITTLE
STANDING UP FROM CHAIR USING ARMS: A LITTLE
WALKING IN HOSPITAL ROOM: A LITTLE
SUGGESTED CMS G CODE MODIFIER DAILY ACTIVITY: CJ
DRESSING REGULAR LOWER BODY CLOTHING: A LOT
DAILY ACTIVITIY SCORE: 20
HELP NEEDED FOR BATHING: A LITTLE

## 2023-03-29 ASSESSMENT — PATIENT HEALTH QUESTIONNAIRE - PHQ9
1. LITTLE INTEREST OR PLEASURE IN DOING THINGS: NOT AT ALL
2. FEELING DOWN, DEPRESSED, IRRITABLE, OR HOPELESS: NOT AT ALL
SUM OF ALL RESPONSES TO PHQ9 QUESTIONS 1 AND 2: 0
1. LITTLE INTEREST OR PLEASURE IN DOING THINGS: NOT AT ALL
2. FEELING DOWN, DEPRESSED, IRRITABLE, OR HOPELESS: NOT AT ALL
SUM OF ALL RESPONSES TO PHQ9 QUESTIONS 1 AND 2: 0

## 2023-03-29 ASSESSMENT — GAIT ASSESSMENTS
ASSISTIVE DEVICE: FRONT WHEEL WALKER
DEVIATION: DECREASED BASE OF SUPPORT
GAIT LEVEL OF ASSIST: CONTACT GUARD ASSIST
DISTANCE (FEET): 100

## 2023-03-29 ASSESSMENT — ACTIVITIES OF DAILY LIVING (ADL): TOILETING: INDEPENDENT

## 2023-03-29 NOTE — THERAPY
Occupational Therapy   Initial Evaluation     Patient Name: Aaliyah Fulton  Age:  83 y.o., Sex:  female  Medical Record #: [id number]  Today's Date: 3/29/2023     Precautions  Precautions: (P) Weight Bearing As Tolerated Left Lower Extremity, Fall Risk    Assessment  Patient is 83 y.o. female s/p R TKA, POD 1. A&Ox4. Performs ADL transfers with Lei, FWW, v/c's. Grooming at sink with CGA. Walks in room with CGA,FWW. LB dressing with ModA. Toileting with Lei. Education on role of OT- acute vs post-acute, home safety, DME/AE use, fall prec's. Tolerates UIC post session. Pt lives with spouse who is unable to provide assistance. Pt indep with I/ADL's prior and is not at her baseline; OT will follow while in house.                Plan  Occupational Therapy Initial Treatment Plan   Treatment Interventions: Self Care / Activities of Daily Living, Neuro Re-Education / Balance, Therapeutic Activity  Treatment Frequency: 3 Times per Week  Duration: Until Therapy Goals Met    DC Equipment Recommendations: Unable to determine at this time  Discharge Recommendations: Recommend post-acute placement for additional occupational therapy services prior to discharge home     Subjective  Pleasant and cooperative     Objective     03/29/23 1330   Prior Living Situation   Prior Services None   Housing / Facility 1 Story House   Steps Into Home 2   Steps In Home 0   Bathroom Set up Walk In Shower;Tub Transfer Bench   Equipment Owned 4-Wheel Walker;Tub Transfer Bench;Hand Held Shower;Raised Toilet Seat With Arms   Lives with - Patient's Self Care Capacity Spouse   Comments spouse unable to assist.   Prior Level of ADL Function   Self Feeding Independent   Grooming / Hygiene Independent   Bathing Independent   Dressing Independent   Toileting Independent   Prior Level of IADL Function   Medication Management Independent   Laundry Independent   Kitchen Mobility Independent   Finances Independent   Home Management Independent   Shopping  Requires Assist   Prior Level Of Mobility Independent With Device in Home   Driving / Transportation Unable To Determine At This Time   Occupation (Pre-Hospital Vocational) Retired Due To Age   Leisure Interests Unable To Determine At This Time   Precautions   Precautions Weight Bearing As Tolerated Left Lower Extremity;Fall Risk   Vitals   O2 Delivery Device None - Room Air   Pain 0 - 10 Group   Location Knee   Location Orientation Left   Therapist Pain Assessment Post Activity Pain Same as Prior to Activity;Nurse Notified   Cognition    Cognition / Consciousness WDL   Comments cues for safety   Passive ROM Upper Body   Passive ROM Upper Body WDL   Active ROM Upper Body   Active ROM Upper Body  WDL   Strength Upper Body   Upper Body Strength  WDL   Upper Body Muscle Tone   Upper Body Muscle Tone  WDL   Coordination Upper Body   Coordination WDL   Balance Assessment   Sitting Balance (Static) Good   Sitting Balance (Dynamic) Good   Standing Balance (Static) Fair   Standing Balance (Dynamic) Fair   Weight Shift Sitting Good   Weight Shift Standing Fair   Bed Mobility    Supine to Sit Supervised   ADL Assessment   Eating Independent   Grooming Contact Guard Assist;Standing   Lower Body Dressing Moderate Assist   Toileting Contact Guard Assist   How much help from another person does the patient currently need...   Putting on and taking off regular lower body clothing? 2   Bathing (including washing, rinsing, and drying)? 3   Toileting, which includes using a toilet, bedpan, or urinal? 3   Putting on and taking off regular upper body clothing? 4   Taking care of personal grooming such as brushing teeth? 4   Eating meals? 4   6 Clicks Daily Activity Score 20   Functional Mobility   Sit to Stand Contact Guard Assist   Bed, Chair, Wheelchair Transfer Contact Guard Assist   Toilet Transfers Contact Guard Assist   Transfer Method Portable Patient Lift   Visual Perception   Visual Perception  Not Tested   Activity Tolerance    Sitting in Chair 1hr   Sitting Edge of Bed 12   Standing 7   Short Term Goals   Short Term Goal # 1 ADL transfers with SBA within 5 days   Short Term Goal # 2 Toileting in br with SBA within 5 days   Short Term Goal # 3 LB dressing with SBA within 5 days   Education Group   Education Provided Activities of Daily Living   Role of Occupational Therapist Patient Response Patient;Acceptance;Explanation;Verbal Demonstration   ADL Patient Response Patient;Acceptance;Explanation;Action Demonstration   Occupational Therapy Initial Treatment Plan    Treatment Interventions Self Care / Activities of Daily Living;Neuro Re-Education / Balance;Therapeutic Activity   Treatment Frequency 3 Times per Week   Duration Until Therapy Goals Met   Problem List   Problem List Decreased Active Daily Living Skills;Decreased Homemaking Skills;Decreased Functional Mobility;Decreased Activity Tolerance;Impaired Postural Control / Balance   Anticipated Discharge Equipment and Recommendations   DC Equipment Recommendations Unable to determine at this time   Discharge Recommendations Recommend post-acute placement for additional occupational therapy services prior to discharge home

## 2023-03-29 NOTE — PROGRESS NOTES
"Patient seen and examined  Doing okay.  Pain tolerable.     /66   Pulse 76   Temp 37 °C (98.6 °F) (Oral)   Resp 18   Ht 1.633 m (5' 4.29\")   Wt 79.4 kg (175 lb 0.7 oz)   SpO2 92%     Recent Labs     03/29/23  0208   HEMOGLOBIN 11.4*   HEMATOCRIT 34.8*       No acute distress  Dressing clean dry and intact  Neurovascularly intact    POD#1 L TKA      Plan:  Patient with slow mobility and weakness.    Is indicated for SNF.   Appreciate case coordination for discharge.   Cleared for discharge once bed available.         "

## 2023-03-29 NOTE — CARE PLAN
The patient is Stable - Low risk of patient condition declining or worsening    Shift Goals  Clinical Goals: Patient's pain less than 4, cleared by PT/OT  Patient Goals: Work with PT/OT  Family Goals: n/a    Progress made toward(s) clinical / shift goals:  Patient complained of pain twice during shift, but controlled with PRN pain medication. Patient currently resting comfortably. Patient seen by PT/OT today.     Problem: Pain - Standard  Goal: Alleviation of pain or a reduction in pain to the patient’s comfort goal  Outcome: Progressing     Problem: Fall Risk  Goal: Patient will remain free from falls  Outcome: Progressing     Problem: Incision Care  Goal: Optimal post surgical incision care  Outcome: Progressing       Patient is not progressing towards the following goals:

## 2023-03-29 NOTE — PROGRESS NOTES
Bedside report received from JAVED Traylor. Assumed care of patient. Daily plan of care discussed. Pt resting comfortably in bed with no signs of distress noted. Breathing even and unlabored. Patient waiting PT/OT eval. Patient reports no pain at this time. Is requesting pain medication prior to PT/OT. Patient reports no further needs. Hourly rounding in place.

## 2023-03-29 NOTE — DISCHARGE PLANNING
Received Choice form at [phone]  Agency/Facility Name: [facility]  Referral sent per Choice form @ [phone]

## 2023-03-29 NOTE — DISCHARGE PLANNING
Case Management Discharge Planning    Admission Date: 3/28/2023  GMLOS:    ALOS: 0    6-Clicks ADL Score: 22  6-Clicks Mobility Score: 20      Anticipated Discharge Dispo: Discharge Disposition: D/T to SNF with Medicare cert in anticipation of skilled care (03)    DME Needed: No    Action(s) Taken: Updated Provider/Nurse on Discharge Plan    1100: Patient stating she prefers to go to [facility].  Choice faxed to [facility]. RN CM let patient know that blanket SNF would be send out if [facility] does not accept.     Escalations Completed: Pending Discharge Destination    Medically Clear: No    Next Steps: SNF acceptance & bed availability     Barriers to Discharge: Medical clearance and Pending Placement

## 2023-03-29 NOTE — THERAPY
Physical Therapy   Initial Evaluation     Patient Name: Aaliyah Fulton  Age:  83 y.o., Sex:  female  Medical Record #: [id number]  Today's Date: 3/29/2023     Precautions  Precautions: (P) Weight Bearing As Tolerated Left Lower Extremity    Assessment  Patient is 83 y.o. female with a diagnosis of L TKR Pt lives at home with  who is blind and unable to help her.Pt is unsteady on feet and impulsive she is a significant fall risk.Not safe for home.Acute Pt needed to improve transfers,ambulation and stairs    Plan    Physical Therapy Initial Treatment Plan   Treatment Plan : (P) Gait Training, Manual Therapy, Neuro Re-Education / Balance, Therapeutic Activities, Therapeutic Exercise  Treatment Frequency: (P) Daily  Duration: (P) Until Therapy Goals Met    DC Equipment Recommendations: (P) None  Discharge Recommendations: (P) Recommend post-acute placement for additional physical therapy services prior to discharge home       Objective       03/29/23 1000   Charge Group   PT Evaluation PT Evaluation Mod   Total Time Spent   PT Evaluation Time Spent (Mins) 45   Initial Contact Note    Initial Contact Note Order Received and Verified, Physical Therapy Evaluation in Progress with Full Report to Follow.   Precautions   Precautions Weight Bearing As Tolerated Left Lower Extremity   Prior Living Situation   Prior Services None   Housing / Facility 1 Story House   Steps Into Home 2   Steps In Home 0   Equipment Owned 4-Wheel Walker   Lives with - Patient's Self Care Capacity Spouse   Comments souse unable to help much   Prior Level of Functional Mobility   Bed Mobility Independent   Transfer Status Independent   Ambulation Independent   Ambulation Distance limited community   Assistive Devices Used 4-Wheel Walker   Stairs Independent   Cognition    Cognition / Consciousness WDL   Comments Impulsive   Passive ROM Lower Body   Passive ROM Lower Body X   Lt Knee Flexion Degrees 90   Lt Knee Extension Degrees 0   Active ROM  Lower Body    Active ROM Lower Body  X   Strength Lower Body   Lower Body Strength  X   Sensation Lower Body   Lower Extremity Sensation   WDL   Coordination Lower Body    Coordination Lower Body  WDL   Balance Assessment   Sitting Balance (Static) Good   Sitting Balance (Dynamic) Good   Standing Balance (Static) Fair +   Standing Balance (Dynamic) Fair   Weight Shift Sitting Good   Weight Shift Standing Good   Bed Mobility    Supine to Sit Modified Independent   Sit to Supine Modified Independent   Scooting Modified Independent   Gait Analysis   Gait Level Of Assist Contact Guard Assist   Assistive Device Front Wheel Walker   Distance (Feet) 100   # of Times Distance was Traveled 1   Deviation Decreased Base Of Support   # of Stairs Climbed 0   Weight Bearing Status wbat L   Functional Mobility   Sit to Stand Contact Guard Assist   Bed, Chair, Wheelchair Transfer Contact Guard Assist   Transfer Method Stand Step   How much difficulty does the patient currently have...   Turning over in bed (including adjusting bedclothes, sheets and blankets)? 4   Sitting down on and standing up from a chair with arms (e.g., wheelchair, bedside commode, etc.) 3   Moving from lying on back to sitting on the side of the bed? 4   How much help from another person does the patient currently need...   Moving to and from a bed to a chair (including a wheelchair)? 3   Need to walk in a hospital room? 3   Climbing 3-5 steps with a railing? 3   6 clicks Mobility Score 20   Activity Tolerance   Sitting Edge of Bed 10   Standing 10   Patient / Family Goals    Patient / Family Goal #1 Rehab   Short Term Goals    Short Term Goal # 1 S with transfers in 4 V   Short Term Goal # 2 S with amb x 200 feet in 4 V   Short Term Goal # 3 S x 2 stairs in 4 V   Physical Therapy Initial Treatment Plan    Treatment Plan  Gait Training;Manual Therapy;Neuro Re-Education / Balance;Therapeutic Activities;Therapeutic Exercise   Treatment Frequency Daily    Duration Until Therapy Goals Met   Problem List    Problems Impaired Transfers;Impaired Ambulation;Functional ROM Deficit;Functional Strength Deficit;Impaired Balance;Decreased Activity Tolerance   Anticipated Discharge Equipment and Recommendations   DC Equipment Recommendations None   Discharge Recommendations Recommend post-acute placement for additional physical therapy services prior to discharge home   Interdisciplinary Plan of Care Collaboration   IDT Collaboration with  Nursing   Session Information   Date / Session Number  3/29-1 1/7 4/3   Priority 4

## 2023-03-29 NOTE — CARE PLAN
The patient is Stable - Low risk of patient condition declining or worsening    Shift Goals  Clinical Goals: pt will remain at stated comfort goal of 3/10 pain this shift.  Patient Goals: sleep comfortably  Family Goals: n/a    Progress made toward(s) clinical / shift goals:      Pt remained at stated comfort goal of 3/10 pain this shift.    Problem: Pain - Standard  Goal: Alleviation of pain or a reduction in pain to the patient’s comfort goal  Outcome: Progressing     Problem: Fall Risk  Goal: Patient will remain free from falls  Outcome: Progressing     Problem: Knowledge Deficit - Standard  Goal: Patient and family/care givers will demonstrate understanding of plan of care, disease process/condition, diagnostic tests and medications  Outcome: Progressing       Patient is not progressing towards the following goals:

## 2023-03-29 NOTE — PROGRESS NOTES
Pt is awake in bed. VSS. Pt c/o 2/10 pain in R knee. No n/v. Dressing to L knee is clean, dry, and intact. CMS is intact to LLE. Pt has ambulated and voided post-op. RN discussed POC with pt for the evening. All questions answered. Fall precautions in place.

## 2023-03-29 NOTE — DISCHARGE INSTRUCTIONS
Total Knee Replacement, Care After  This sheet gives you information about how to care for yourself after your procedure. Your doctor may also give you more specific instructions. If you have problems or questions, contact your doctor.  What can I expect after the procedure?  After the procedure, it is common to have:  Pain.  Swelling.  A small amount of blood coming from your cut from surgery (incision).  Clear fluid coming from your cut from surgery.  Limited movement of your knee.  Follow these instructions at home:  Medicines  Take over-the-counter and prescription medicines only as told by your doctor.  If you were prescribed a blood thinner (anticoagulant), take it as told by your doctor.  Ask your doctor if the medicine prescribed to you:  Requires you to avoid driving or using heavy machinery.  Can cause trouble pooping (constipation). You may need to take steps to prevent or treat trouble pooping:  Drink enough fluid to keep your pee (urine) pale yellow.  Take over-the-counter or prescription medicines.  Eat foods that are high in fiber. These include beans, whole grains, and fresh fruits and vegetables.  Limit foods that are high in fat and sugar. These include fried or sweet foods.  Bathing  Do not take baths, swim, or use a hot tub until your doctor approves. Ask your doctor if you may take showers. You may only be allowed to take sponge baths.  Keep your bandage (dressing) dry until your doctor says it can be taken off.  Incision care and drain care    Follow instructions from your doctor about how to take care of your cut from surgery. Make sure you:  Wash your hands with soap and water before and after you change your bandage. If you cannot use soap and water, use hand .  Change your bandage as told by your doctor.  Leave stitches (sutures), skin glue, or skin tape (adhesive) strips in place. They may need to stay in place for 2 weeks or longer. If tape strips get loose and curl up, you may  trim the loose edges. Do not remove tape strips completely unless your doctor says it is okay.  Check your cut from surgery and your drain site every day for signs of infection. Check for:  More redness, swelling, or pain.  More fluid or blood.  Warmth.  Pus or a bad smell.  If you have a drain, follow instructions from your doctor about caring for it.  Managing pain, stiffness, and swelling         If told, put ice on your knee.  Put ice in a plastic bag or use the icing device (cold flow pad or cryocuff) that you were given. Follow your doctor's directions about how to use the icing device.  Place a towel between your skin and the bag or between your skin and the icing device.  Leave the ice on for 20 minutes, 2-3 times per day.  If told, put heat on your knee before you exercise. Use the heat source that your doctor recommends, such as a moist heat pack or a heating pad.  Place a towel between your skin and the heat source.  Leave the heat on for 20-30 minutes.  Remove the heat if your skin turns bright red. This is very important if you are unable to feel pain, heat, or cold. You may have a greater risk of getting burned.  Move your toes often.  Raise (elevate) your knee above the level of your heart while you are sitting or lying down.  Use several pillows to keep your leg straight.  Do not put a pillow just under the knee. If the knee is bent for a long time, this may make the knee stiff.  Wear elastic knee support as told by your doctor.  Activity  Rest as told by your doctor.  Do not sit for a long time without moving. Get up to take short walks every 1-2 hours. This is important. Ask for help if you feel weak or unsteady.  Ask your doctor what activities are safe for you.  Avoid activities that put stress on your knees. These include running, jumping rope, and jumping jacks.  Do not play contact sports until your doctor says it is okay.  Do exercises as told by your physical therapist.  If you have been  sent home with a knee joint motion machine (continuous passive motion machine), use it as told by your doctor.  Safety    Do not use your leg to support your body weight until your doctor says that you can. Use crutches or a walker as told by your doctor.  Do not drive until your doctor says it is okay. Ask your doctor when it is safe to drive.  General instructions  Do not use any products that contain nicotine or tobacco, such as cigarettes, e-cigarettes, and chewing tobacco. These can delay healing. If you need help quitting, ask your doctor.  Wear special socks (compression stockings) as told by your doctor.  Tell your doctor if you plan to have dental work. Also, tell your dentist about your knee replacement.  Keep all follow-up visits as told by your doctor. This is important.  Contact a doctor if:  You have more redness, swelling, or pain around your cut from surgery or your drain.  You have more fluid or blood coming from your cut from surgery or your drain.  You have pus or a bad smell coming from your cut from surgery or your drain.  Your cut from surgery or your drain area feels warm to the touch.  You have a fever.  Your cut breaks open.  You have knee pain that does not go away.  The movement of your knee is getting worse.  Your new joint feels loose.  Get help right away if you have:  Pain in your calf or thigh.  Swelling in your calf or thigh.  Shortness of breath.  Trouble breathing.  Chest pain.  Summary  After the procedure, it is common to have pain and swelling, blood or fluid coming from your cut from surgery, and trouble moving your knee.  Follow instructions from your doctor about how to take care of your cut from surgery.  Use crutches or a walker as told by your doctor.  If you were prescribed a blood thinner, take it as told by your doctor.  Keep all follow-up visits as told by your doctor. This is important.  This information is not intended to replace advice given to you by your health  care provider. Make sure you discuss any questions you have with your health care provider.  Document Released: 03/11/2013 Document Revised: 01/10/2020 Document Reviewed: 08/01/2019  Elsevier Interactive Patient Education © 2020 [employer]

## 2023-03-30 PROCEDURE — 97116 GAIT TRAINING THERAPY: CPT

## 2023-03-30 PROCEDURE — 700102 HCHG RX REV CODE 250 W/ 637 OVERRIDE(OP): Performed by: ORTHOPAEDIC SURGERY

## 2023-03-30 PROCEDURE — 96375 TX/PRO/DX INJ NEW DRUG ADDON: CPT

## 2023-03-30 PROCEDURE — 96376 TX/PRO/DX INJ SAME DRUG ADON: CPT

## 2023-03-30 PROCEDURE — 94760 N-INVAS EAR/PLS OXIMETRY 1: CPT

## 2023-03-30 PROCEDURE — 700102 HCHG RX REV CODE 250 W/ 637 OVERRIDE(OP): Performed by: PHYSICIAN ASSISTANT

## 2023-03-30 PROCEDURE — A9270 NON-COVERED ITEM OR SERVICE: HCPCS | Performed by: PHYSICIAN ASSISTANT

## 2023-03-30 PROCEDURE — A9270 NON-COVERED ITEM OR SERVICE: HCPCS | Performed by: ORTHOPAEDIC SURGERY

## 2023-03-30 PROCEDURE — 97110 THERAPEUTIC EXERCISES: CPT

## 2023-03-30 PROCEDURE — G0378 HOSPITAL OBSERVATION PER HR: HCPCS

## 2023-03-30 RX ADMIN — SENNOSIDES AND DOCUSATE SODIUM 1 TABLET: 50; 8.6 TABLET ORAL at 20:50

## 2023-03-30 RX ADMIN — BUSPIRONE HYDROCHLORIDE 15 MG: 5 TABLET ORAL at 09:16

## 2023-03-30 RX ADMIN — ASPIRIN 81 MG: 81 TABLET, COATED ORAL at 22:53

## 2023-03-30 RX ADMIN — DOCUSATE SODIUM 100 MG: 100 CAPSULE, LIQUID FILLED ORAL at 17:53

## 2023-03-30 RX ADMIN — OXYBUTYNIN CHLORIDE 15 MG: 5 TABLET, EXTENDED RELEASE ORAL at 17:53

## 2023-03-30 RX ADMIN — OMEPRAZOLE 40 MG: 20 CAPSULE, DELAYED RELEASE ORAL at 04:48

## 2023-03-30 RX ADMIN — ASPIRIN 81 MG: 81 TABLET, COATED ORAL at 11:09

## 2023-03-30 RX ADMIN — BUSPIRONE HYDROCHLORIDE 15 MG: 5 TABLET ORAL at 20:50

## 2023-03-30 RX ADMIN — OXYCODONE 5 MG: 5 TABLET ORAL at 04:53

## 2023-03-30 RX ADMIN — OXYCODONE 5 MG: 5 TABLET ORAL at 11:09

## 2023-03-30 RX ADMIN — METHENAMINE HIPPURATE 1 G: 1 TABLET ORAL at 04:48

## 2023-03-30 RX ADMIN — VALSARTAN 160 MG: 80 TABLET ORAL at 04:49

## 2023-03-30 RX ADMIN — DOCUSATE SODIUM 100 MG: 100 CAPSULE, LIQUID FILLED ORAL at 04:48

## 2023-03-30 RX ADMIN — BUSPIRONE HYDROCHLORIDE 15 MG: 5 TABLET ORAL at 15:59

## 2023-03-30 RX ADMIN — OXYCODONE 5 MG: 5 TABLET ORAL at 07:59

## 2023-03-30 RX ADMIN — ATORVASTATIN CALCIUM 40 MG: 40 TABLET, FILM COATED ORAL at 20:50

## 2023-03-30 RX ADMIN — METHENAMINE HIPPURATE 1 G: 1 TABLET ORAL at 17:53

## 2023-03-30 RX ADMIN — VENLAFAXINE HYDROCHLORIDE 225 MG: 75 CAPSULE, EXTENDED RELEASE ORAL at 07:57

## 2023-03-30 ASSESSMENT — PAIN DESCRIPTION - PAIN TYPE
TYPE: SURGICAL PAIN

## 2023-03-30 ASSESSMENT — GAIT ASSESSMENTS
DEVIATION: ANTALGIC
DISTANCE (FEET): 25
ASSISTIVE DEVICE: FRONT WHEEL WALKER
GAIT LEVEL OF ASSIST: CONTACT GUARD ASSIST

## 2023-03-30 ASSESSMENT — PATIENT HEALTH QUESTIONNAIRE - PHQ9
1. LITTLE INTEREST OR PLEASURE IN DOING THINGS: NOT AT ALL
2. FEELING DOWN, DEPRESSED, IRRITABLE, OR HOPELESS: NOT AT ALL
SUM OF ALL RESPONSES TO PHQ9 QUESTIONS 1 AND 2: 0

## 2023-03-30 NOTE — DISCHARGE SUMMARY
Patient was admitted for a Total Knee Arthroplasty.  Had no complications during the surgery. Was slow to mobilize and needed assist.  Has no help at home.         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     Medication List        START taking these medications        Instructions   oxyCODONE immediate-release 5 MG Tabs  Commonly known as: ROXICODONE   Take 1 Tablet by mouth every four hours as needed for Severe Pain for up to 7 days.  Dose: 5 mg     traMADol 50 MG Tabs  Commonly known as: Ultram   Take 1 Tablet by mouth every 6 hours as needed for Moderate Pain for up to 7 days.  Dose: 50 mg            CHANGE how you take these medications        Instructions   aspirin 81 MG EC tablet  What changed:   how much to take  when to take this   Take 1 Tablet by mouth 2 times a day with meals.  Dose: 81 mg            CONTINUE taking these medications        Instructions   acetaminophen 500 MG Tabs  Commonly known as: TYLENOL   Take 1,000 mg by mouth every 6 hours as needed for Moderate Pain.  Dose: 1,000 mg     atorvastatin 40 MG Tabs  Commonly known as: LIPITOR   Doctor's comments: New Start- for stroke prevention  Take 1 Tablet by mouth every evening for 364 days.  Dose: 40 mg     busPIRone 15 MG tablet  Commonly known as: BUSPAR   Take 15 mg by mouth 3 times a day.  Dose: 15 mg     CALCIUM-VITAMIN D3 PO   Take 600 mg by mouth every day.  Dose: 600 mg     CRANBERRY EXTRACT PO   Take 1 Capsule by mouth every day.  Dose: 1 Capsule     D-MANNOSE PO   Take 1 Capsule by mouth every evening.  Dose: 1 Capsule     estradiol 0.1 MG/GM vaginal cream  Commonly known as: ESTRACE   Insert 2 g into the vagina every day.  Dose: 2 g     methenamine hip 1 GM Tabs  Commonly known as:  HIPPREX   2 times a day. LONG TERM TREATMENT FOR CHRONIC UTI.  Indications: Urinary Tract Infection     mupirocin 2 % Oint  Commonly known as: BACTROBAN   Doctor's comments: Swab 2x daily in each nostril for 5 days  Swab 2x daily in each nostril for 5 days     omeprazole 40 MG delayed-release capsule  Commonly known as: PRILOSEC   Take 1 Capsule by mouth every day.  Dose: 40 mg     oxybutynin 15 MG CR tablet  Commonly known as: DITROPAN XL   Take 1 Tablet by mouth every day.  Dose: 15 mg     valsartan 160 MG Tabs  Commonly known as: DIOVAN   Take 1 Tablet by mouth every day.  Dose: 160 mg     * venlafaxine 150 MG extended-release capsule  Commonly known as: EFFEXOR-XR   Take 1 Capsule by mouth every day. Taken with 75 mg for 225mg daily  Dose: 150 mg     Ventolin  (90 Base) MCG/ACT Aers inhalation aerosol  Generic drug: albuterol   2 Puffs every 6 hours as needed.  Dose: 2 Puff     vitamin D3 1000 Unit (25 mcg) Tabs  Commonly known as: cholecalciferol   Take 1,000 Units by mouth every day.  Dose: 1,000 Units           * This list has 1 medication(s) that are the same as other medications prescribed for you. Read the directions carefully, and ask your doctor or other care provider to review them with you.                ASK your doctor about these medications        Instructions   * venlafaxine XR 75 MG Cp24  Commonly known as: EFFEXOR XR   Doctor's comments: Venlafaxine 75+150 for 225 mg  Take 1 Capsule by mouth every day.  Dose: 75 mg           * This list has 1 medication(s) that are the same as other medications prescribed for you. Read the directions carefully, and ask your doctor or other care provider to review them with you.                    Patient will be discharged to [facility] for further rehab and therapies.  She is WBAT and encourage ambulation and ROM.   Leave dressing in place until F/U at [facility] in 10-14 days.

## 2023-03-30 NOTE — CARE PLAN
The patient is Stable - Low risk of patient condition declining or worsening    Shift Goals  Clinical Goals: Patient's pain will remain less than 5/10.  Patient Goals: rest and sleep  Family Goals: n/a    Progress made toward(s) clinical / shift goals:  Patient's pain is managed with PRN pain medication. Call light and belongings within reach.    Patient is not progressing towards the following goals:

## 2023-03-30 NOTE — THERAPY
Physical Therapy   Daily Treatment     Patient Name: Aaliyah Fulton  Age:  83 y.o., Sex:  female  Medical Record #: [id number]  Today's Date: 3/30/2023     Precautions  Precautions: Weight Bearing As Tolerated Left Lower Extremity;Fall Risk    Assessment    Pt c/o increased pain today did not do as well as yesterday     Plan    Treatment Plan Status:    Type of Treatment: Gait Training, Manual Therapy, Neuro Re-Education / Balance, Therapeutic Activities, Therapeutic Exercise  Treatment Frequency: Daily  Treatment Duration: Until Therapy Goals Met    DC Equipment Recommendations: None  Discharge Recommendations: Recommend post-acute placement for additional physical therapy services prior to discharge home     Objective       03/30/23 1000   Charge Group   PT Gait Training (Units) 1   PT Therapeutic Exercise (Units) 1   Cognition    Level of Consciousness Alert   Supine Lower Body Exercise   Supine Lower Body Exercises Yes   Sitting Lower Body Exercises   Sitting Lower Body Exercises Yes   Balance   Sitting Balance (Static) Good   Sitting Balance (Dynamic) Good   Standing Balance (Static) Fair   Standing Balance (Dynamic) Fair -   Weight Shift Sitting Good   Weight Shift Standing Fair   Bed Mobility    Supine to Sit Minimal Assist   Sit to Supine Minimal Assist   Scooting Modified Independent   Gait Analysis   Gait Level Of Assist Contact Guard Assist   Assistive Device Front Wheel Walker   Distance (Feet) 25   # of Times Distance was Traveled 2   Deviation Antalgic   # of Stairs Climbed 0   Functional Mobility   Sit to Stand Minimal Assist   Bed, Chair, Wheelchair Transfer Contact Guard Assist   Toilet Transfers Contact Guard Assist   Activity Tolerance   Sitting Edge of Bed 10   Standing 10   Short Term Goals    Short Term Goal # 1 S with transfers in 4 V   Goal Outcome # 1 Progressing as expected   Short Term Goal # 2 S with amb x 200 feet in 4 V   Goal Outcome # 2 Progressing as expected   Short Term Goal # 3 S  x 2 stairs in 4 V   Goal Outcome # 3 Goal not met   Interdisciplinary Plan of Care Collaboration   IDT Collaboration with  Nursing   Session Information   Date / Session Number  3/30-2 2/7 4/4

## 2023-03-30 NOTE — PROGRESS NOTES
Received bedside report from night RN. Patient alert and oriented. Denies any complains at this time. Discussed plan of care and understood. Assessment per GSU. Falls precaution in place. Call light placed within reach. Will continue to monitor.

## 2023-03-30 NOTE — PROGRESS NOTES
Received report from day shift nurse. Assumed pt care at 1915. Pt is A&O x 4, resting comfortably in bed. Pt on room air. No signs of SOB/respiratory distress.  Fall precautions in place. Bed at lowest position. Call light and personal belongings within reach.

## 2023-03-30 NOTE — CARE PLAN
The patient is Stable - Low risk of patient condition declining or worsening    Shift Goals  Clinical Goals: Pain control 3/10 or less  Patient Goals: pain control  Family Goals: n/a    Progress made toward(s) clinical / shift goals:  pain controlled 3/10 or less; Ambulated hallways    Patient is not progressing towards the following goals:

## 2023-03-30 NOTE — DISCHARGE PLANNING
Case Management Discharge Planning    Admission Date: 3/28/2023  GMLOS:    ALOS: 0    6-Clicks ADL Score: 20  6-Clicks Mobility Score: 20      Anticipated Discharge Dispo: Discharge Disposition: D/T to SNF with Medicare cert in anticipation of skilled care (03)    DME Needed: No    Action(s) Taken: RNCM called Tamara from [facility] to inquire about acceptance, no answer, left VM.    Received call back from Tamara. Tamara, they can accept and  pt today at 1500 via their WC van. Gigi Frank RN notified via Voalte. Pt updated at bedside. Zac RN notified RNCM that pt's daughter Angi would like to speak to RNCM.    RNCM spoke to Angi via phone call. Per Angi, pt did not mean to give choice for [facility], she meant to give choice for [facility] but could not remember the name of facility at time choice was obtained.     RNCM sent PMR referral to [facility] and called [facility] to notify of new referral.    RNCM called Tamara from [facility] to cancel transport.    Escalations Completed: None    Medically Clear: Yes    Next Steps: f/u with [facility] regarding acceptance    Barriers to Discharge: Pending Placement

## 2023-03-31 ENCOUNTER — HOSPITAL ENCOUNTER (INPATIENT)
Facility: REHABILITATION | Age: 83
LOS: 15 days | DRG: 560 | End: 2023-04-15
Attending: PHYSICAL MEDICINE & REHABILITATION | Admitting: PHYSICAL MEDICINE & REHABILITATION
Payer: MEDICARE

## 2023-03-31 VITALS
BODY MASS INDEX: 29.88 KG/M2 | HEIGHT: 64 IN | WEIGHT: 175.04 LBS | SYSTOLIC BLOOD PRESSURE: 100 MMHG | TEMPERATURE: 99.1 F | OXYGEN SATURATION: 92 % | HEART RATE: 82 BPM | RESPIRATION RATE: 18 BRPM | DIASTOLIC BLOOD PRESSURE: 65 MMHG

## 2023-03-31 DIAGNOSIS — Z96.652 S/P TOTAL KNEE ARTHROPLASTY, LEFT: ICD-10-CM

## 2023-03-31 DIAGNOSIS — T50.905A DRUG-INDUCED XEROSTOMIA: ICD-10-CM

## 2023-03-31 DIAGNOSIS — R47.1 DYSARTHRIA: ICD-10-CM

## 2023-03-31 DIAGNOSIS — K11.7 DRUG-INDUCED XEROSTOMIA: ICD-10-CM

## 2023-03-31 LAB
FLUAV RNA SPEC QL NAA+PROBE: NEGATIVE
FLUBV RNA SPEC QL NAA+PROBE: NEGATIVE
RSV RNA SPEC QL NAA+PROBE: NEGATIVE
SARS-COV-2 RNA RESP QL NAA+PROBE: NOTDETECTED
SPECIMEN SOURCE: NORMAL

## 2023-03-31 PROCEDURE — 99223 1ST HOSP IP/OBS HIGH 75: CPT | Performed by: PHYSICAL MEDICINE & REHABILITATION

## 2023-03-31 PROCEDURE — A9270 NON-COVERED ITEM OR SERVICE: HCPCS | Performed by: ORTHOPAEDIC SURGERY

## 2023-03-31 PROCEDURE — A9270 NON-COVERED ITEM OR SERVICE: HCPCS | Performed by: PHYSICAL MEDICINE & REHABILITATION

## 2023-03-31 PROCEDURE — 700102 HCHG RX REV CODE 250 W/ 637 OVERRIDE(OP): Performed by: ORTHOPAEDIC SURGERY

## 2023-03-31 PROCEDURE — 94760 N-INVAS EAR/PLS OXIMETRY 1: CPT

## 2023-03-31 PROCEDURE — 700102 HCHG RX REV CODE 250 W/ 637 OVERRIDE(OP): Performed by: PHYSICAL MEDICINE & REHABILITATION

## 2023-03-31 PROCEDURE — 770010 HCHG ROOM/CARE - REHAB SEMI PRIVAT*

## 2023-03-31 PROCEDURE — 97112 NEUROMUSCULAR REEDUCATION: CPT

## 2023-03-31 PROCEDURE — A9270 NON-COVERED ITEM OR SERVICE: HCPCS | Performed by: PHYSICIAN ASSISTANT

## 2023-03-31 PROCEDURE — 97535 SELF CARE MNGMENT TRAINING: CPT

## 2023-03-31 PROCEDURE — 700102 HCHG RX REV CODE 250 W/ 637 OVERRIDE(OP): Performed by: PHYSICIAN ASSISTANT

## 2023-03-31 PROCEDURE — 0241U HCHG SARS-COV-2 COVID-19 NFCT DS RESP RNA 4 TRGT MIC: CPT

## 2023-03-31 PROCEDURE — G0378 HOSPITAL OBSERVATION PER HR: HCPCS

## 2023-03-31 RX ORDER — OXYCODONE HYDROCHLORIDE 5 MG/1
2.5 TABLET ORAL
Status: CANCELLED | OUTPATIENT
Start: 2023-03-31

## 2023-03-31 RX ORDER — OMEPRAZOLE 20 MG/1
40 CAPSULE, DELAYED RELEASE ORAL DAILY
Status: DISCONTINUED | OUTPATIENT
Start: 2023-04-01 | End: 2023-03-31

## 2023-03-31 RX ORDER — BISACODYL 10 MG
10 SUPPOSITORY, RECTAL RECTAL
Status: DISCONTINUED | OUTPATIENT
Start: 2023-03-31 | End: 2023-04-15 | Stop reason: HOSPADM

## 2023-03-31 RX ORDER — ATORVASTATIN CALCIUM 40 MG/1
40 TABLET, FILM COATED ORAL NIGHTLY
Status: CANCELLED | OUTPATIENT
Start: 2023-03-31

## 2023-03-31 RX ORDER — METHENAMINE HIPPURATE 1000 MG/1
1 TABLET ORAL 2 TIMES DAILY
Status: CANCELLED | OUTPATIENT
Start: 2023-03-31

## 2023-03-31 RX ORDER — ACETAMINOPHEN 500 MG
1000 TABLET ORAL 3 TIMES DAILY
Status: DISCONTINUED | OUTPATIENT
Start: 2023-03-31 | End: 2023-04-01

## 2023-03-31 RX ORDER — ALUMINA, MAGNESIA, AND SIMETHICONE 2400; 2400; 240 MG/30ML; MG/30ML; MG/30ML
20 SUSPENSION ORAL
Status: DISCONTINUED | OUTPATIENT
Start: 2023-03-31 | End: 2023-04-15 | Stop reason: HOSPADM

## 2023-03-31 RX ORDER — HYDROMORPHONE HYDROCHLORIDE 2 MG/ML
0.25 INJECTION, SOLUTION INTRAMUSCULAR; INTRAVENOUS; SUBCUTANEOUS
Status: DISCONTINUED | OUTPATIENT
Start: 2023-03-31 | End: 2023-03-31

## 2023-03-31 RX ORDER — VENLAFAXINE HYDROCHLORIDE 75 MG/1
227 CAPSULE, EXTENDED RELEASE ORAL
Status: DISCONTINUED | OUTPATIENT
Start: 2023-04-01 | End: 2023-04-15 | Stop reason: HOSPADM

## 2023-03-31 RX ORDER — AMOXICILLIN 250 MG
2 CAPSULE ORAL 2 TIMES DAILY
Status: DISCONTINUED | OUTPATIENT
Start: 2023-03-31 | End: 2023-04-15 | Stop reason: HOSPADM

## 2023-03-31 RX ORDER — LACTULOSE 20 G/30ML
30 SOLUTION ORAL
Status: DISCONTINUED | OUTPATIENT
Start: 2023-03-31 | End: 2023-04-15 | Stop reason: HOSPADM

## 2023-03-31 RX ORDER — METHENAMINE HIPPURATE 1000 MG/1
1 TABLET ORAL 2 TIMES DAILY
Status: DISCONTINUED | OUTPATIENT
Start: 2023-03-31 | End: 2023-04-15 | Stop reason: HOSPADM

## 2023-03-31 RX ORDER — OXYCODONE HYDROCHLORIDE 5 MG/1
5 TABLET ORAL
Status: DISCONTINUED | OUTPATIENT
Start: 2023-03-31 | End: 2023-04-04

## 2023-03-31 RX ORDER — ACETAMINOPHEN 325 MG/1
650 TABLET ORAL EVERY 4 HOURS PRN
Status: DISCONTINUED | OUTPATIENT
Start: 2023-03-31 | End: 2023-04-01

## 2023-03-31 RX ORDER — OXYBUTYNIN CHLORIDE 5 MG/1
15 TABLET, EXTENDED RELEASE ORAL EVERY EVENING
Status: DISCONTINUED | OUTPATIENT
Start: 2023-03-31 | End: 2023-04-04

## 2023-03-31 RX ORDER — OXYBUTYNIN CHLORIDE 5 MG/1
15 TABLET, EXTENDED RELEASE ORAL EVERY EVENING
Status: CANCELLED | OUTPATIENT
Start: 2023-03-31

## 2023-03-31 RX ORDER — POLYETHYLENE GLYCOL 3350 17 G/17G
1 POWDER, FOR SOLUTION ORAL
Status: DISCONTINUED | OUTPATIENT
Start: 2023-03-31 | End: 2023-04-15 | Stop reason: HOSPADM

## 2023-03-31 RX ORDER — HYDROXYZINE HYDROCHLORIDE 25 MG/1
50 TABLET, FILM COATED ORAL EVERY 6 HOURS PRN
Status: DISCONTINUED | OUTPATIENT
Start: 2023-03-31 | End: 2023-04-15 | Stop reason: HOSPADM

## 2023-03-31 RX ORDER — VENLAFAXINE HYDROCHLORIDE 75 MG/1
227 CAPSULE, EXTENDED RELEASE ORAL
Status: CANCELLED | OUTPATIENT
Start: 2023-04-01

## 2023-03-31 RX ORDER — ATORVASTATIN CALCIUM 40 MG/1
40 TABLET, FILM COATED ORAL NIGHTLY
Status: DISCONTINUED | OUTPATIENT
Start: 2023-03-31 | End: 2023-04-15 | Stop reason: HOSPADM

## 2023-03-31 RX ORDER — OMEPRAZOLE 20 MG/1
40 CAPSULE, DELAYED RELEASE ORAL
Status: DISCONTINUED | OUTPATIENT
Start: 2023-03-31 | End: 2023-04-15 | Stop reason: HOSPADM

## 2023-03-31 RX ORDER — OXYCODONE HYDROCHLORIDE 5 MG/1
5 TABLET ORAL
Status: CANCELLED | OUTPATIENT
Start: 2023-03-31

## 2023-03-31 RX ORDER — VALSARTAN 80 MG/1
160 TABLET ORAL DAILY
Status: CANCELLED | OUTPATIENT
Start: 2023-04-01

## 2023-03-31 RX ORDER — OMEPRAZOLE 20 MG/1
20 CAPSULE, DELAYED RELEASE ORAL
Status: DISCONTINUED | OUTPATIENT
Start: 2023-04-01 | End: 2023-03-31

## 2023-03-31 RX ORDER — ONDANSETRON 4 MG/1
4 TABLET, ORALLY DISINTEGRATING ORAL 4 TIMES DAILY PRN
Status: DISCONTINUED | OUTPATIENT
Start: 2023-03-31 | End: 2023-04-15 | Stop reason: HOSPADM

## 2023-03-31 RX ORDER — ONDANSETRON 2 MG/ML
4 INJECTION INTRAMUSCULAR; INTRAVENOUS 4 TIMES DAILY PRN
Status: DISCONTINUED | OUTPATIENT
Start: 2023-03-31 | End: 2023-04-15 | Stop reason: HOSPADM

## 2023-03-31 RX ORDER — HYDROMORPHONE HYDROCHLORIDE 1 MG/ML
0.25 INJECTION, SOLUTION INTRAMUSCULAR; INTRAVENOUS; SUBCUTANEOUS
Status: CANCELLED | OUTPATIENT
Start: 2023-03-31

## 2023-03-31 RX ORDER — LANOLIN ALCOHOL/MO/W.PET/CERES
3 CREAM (GRAM) TOPICAL NIGHTLY PRN
Status: DISCONTINUED | OUTPATIENT
Start: 2023-03-31 | End: 2023-04-15 | Stop reason: HOSPADM

## 2023-03-31 RX ORDER — VALSARTAN 80 MG/1
160 TABLET ORAL DAILY
Status: DISCONTINUED | OUTPATIENT
Start: 2023-04-01 | End: 2023-04-15 | Stop reason: HOSPADM

## 2023-03-31 RX ORDER — BUSPIRONE HYDROCHLORIDE 5 MG/1
15 TABLET ORAL 3 TIMES DAILY
Status: CANCELLED | OUTPATIENT
Start: 2023-03-31

## 2023-03-31 RX ORDER — OXYCODONE HYDROCHLORIDE 5 MG/1
2.5 TABLET ORAL
Status: DISCONTINUED | OUTPATIENT
Start: 2023-03-31 | End: 2023-04-04

## 2023-03-31 RX ORDER — OMEPRAZOLE 20 MG/1
40 CAPSULE, DELAYED RELEASE ORAL DAILY
Status: CANCELLED | OUTPATIENT
Start: 2023-04-01

## 2023-03-31 RX ORDER — CARBOXYMETHYLCELLULOSE SODIUM 5 MG/ML
1 SOLUTION/ DROPS OPHTHALMIC PRN
Status: DISCONTINUED | OUTPATIENT
Start: 2023-03-31 | End: 2023-04-15 | Stop reason: HOSPADM

## 2023-03-31 RX ORDER — ECHINACEA PURPUREA EXTRACT 125 MG
2 TABLET ORAL PRN
Status: DISCONTINUED | OUTPATIENT
Start: 2023-03-31 | End: 2023-04-15 | Stop reason: HOSPADM

## 2023-03-31 RX ADMIN — OMEPRAZOLE 40 MG: 20 CAPSULE, DELAYED RELEASE ORAL at 17:46

## 2023-03-31 RX ADMIN — BUSPIRONE HYDROCHLORIDE 15 MG: 10 TABLET ORAL at 14:51

## 2023-03-31 RX ADMIN — ACETAMINOPHEN 1000 MG: 500 TABLET ORAL at 17:46

## 2023-03-31 RX ADMIN — BUSPIRONE HYDROCHLORIDE 15 MG: 5 TABLET ORAL at 08:17

## 2023-03-31 RX ADMIN — ACETAMINOPHEN 1000 MG: 500 TABLET ORAL at 21:11

## 2023-03-31 RX ADMIN — OXYBUTYNIN CHLORIDE 15 MG: 5 TABLET, EXTENDED RELEASE ORAL at 21:09

## 2023-03-31 RX ADMIN — OMEPRAZOLE 40 MG: 20 CAPSULE, DELAYED RELEASE ORAL at 05:01

## 2023-03-31 RX ADMIN — ASPIRIN 81 MG: 81 TABLET, COATED ORAL at 21:08

## 2023-03-31 RX ADMIN — OXYCODONE 5 MG: 5 TABLET ORAL at 09:41

## 2023-03-31 RX ADMIN — BUSPIRONE HYDROCHLORIDE 15 MG: 10 TABLET ORAL at 21:08

## 2023-03-31 RX ADMIN — METHENAMINE HIPPURATE 1 G: 1 TABLET ORAL at 05:02

## 2023-03-31 RX ADMIN — OXYCODONE HYDROCHLORIDE 5 MG: 5 TABLET ORAL at 13:42

## 2023-03-31 RX ADMIN — SENNOSIDES AND DOCUSATE SODIUM 2 TABLET: 50; 8.6 TABLET ORAL at 13:43

## 2023-03-31 RX ADMIN — ASPIRIN 81 MG: 81 TABLET, COATED ORAL at 12:02

## 2023-03-31 RX ADMIN — SENNOSIDES AND DOCUSATE SODIUM 2 TABLET: 50; 8.6 TABLET ORAL at 21:11

## 2023-03-31 RX ADMIN — VENLAFAXINE HYDROCHLORIDE 225 MG: 75 CAPSULE, EXTENDED RELEASE ORAL at 08:17

## 2023-03-31 RX ADMIN — VALSARTAN 160 MG: 80 TABLET ORAL at 05:02

## 2023-03-31 RX ADMIN — METHENAMINE HIPPURATE 1 G: 1 TABLET ORAL at 21:08

## 2023-03-31 RX ADMIN — DOCUSATE SODIUM 100 MG: 100 CAPSULE, LIQUID FILLED ORAL at 05:02

## 2023-03-31 RX ADMIN — ATORVASTATIN CALCIUM 40 MG: 40 TABLET, FILM COATED ORAL at 21:08

## 2023-03-31 ASSESSMENT — PAIN DESCRIPTION - PAIN TYPE
TYPE: CHRONIC PAIN;SURGICAL PAIN
TYPE: SURGICAL PAIN
TYPE: SURGICAL PAIN
TYPE: CHRONIC PAIN;SURGICAL PAIN

## 2023-03-31 ASSESSMENT — PATIENT HEALTH QUESTIONNAIRE - PHQ9
1. LITTLE INTEREST OR PLEASURE IN DOING THINGS: NOT AT ALL
2. FEELING DOWN, DEPRESSED, IRRITABLE, OR HOPELESS: NOT AT ALL
2. FEELING DOWN, DEPRESSED, IRRITABLE, OR HOPELESS: NOT AT ALL
1. LITTLE INTEREST OR PLEASURE IN DOING THINGS: NOT AT ALL
SUM OF ALL RESPONSES TO PHQ9 QUESTIONS 1 AND 2: 0
SUM OF ALL RESPONSES TO PHQ9 QUESTIONS 1 AND 2: 0

## 2023-03-31 ASSESSMENT — LIFESTYLE VARIABLES
TOTAL SCORE: 0
AVERAGE NUMBER OF DAYS PER WEEK YOU HAVE A DRINK CONTAINING ALCOHOL: 0
EVER HAD A DRINK FIRST THING IN THE MORNING TO STEADY YOUR NERVES TO GET RID OF A HANGOVER: NO
EVER FELT BAD OR GUILTY ABOUT YOUR DRINKING: NO
ON A TYPICAL DAY WHEN YOU DRINK ALCOHOL HOW MANY DRINKS DO YOU HAVE: 0
HAVE YOU EVER FELT YOU SHOULD CUT DOWN ON YOUR DRINKING: NO
CONSUMPTION TOTAL: NEGATIVE
HOW MANY TIMES IN THE PAST YEAR HAVE YOU HAD 5 OR MORE DRINKS IN A DAY: 0
HAVE PEOPLE ANNOYED YOU BY CRITICIZING YOUR DRINKING: NO
ALCOHOL_USE: NO
TOTAL SCORE: 0
TOTAL SCORE: 0

## 2023-03-31 ASSESSMENT — FIBROSIS 4 INDEX: FIB4 SCORE: 2.72

## 2023-03-31 ASSESSMENT — COGNITIVE AND FUNCTIONAL STATUS - GENERAL
DAILY ACTIVITIY SCORE: 20
DRESSING REGULAR LOWER BODY CLOTHING: A LOT
SUGGESTED CMS G CODE MODIFIER DAILY ACTIVITY: CJ
HELP NEEDED FOR BATHING: A LITTLE
TOILETING: A LITTLE

## 2023-03-31 NOTE — DISCHARGE PLANNING
[facility] Transitional Care Coordination    Referral from:  Rafael  Insurance Provider on Facesheet:MCR  Potential Rehab Diagnosis: total L knee    Chart review indicates patient may have on going medical management and may have therapy needs to possibly meet inpatient rehab facility criteria with the goal of returning to community.    D/C support: daughters per face sheet , will need to verify d/c support     Physiatry consultation forwarded for chart review  per protocol.        Thank you for the referral.

## 2023-03-31 NOTE — THERAPY
Occupational Therapy  Daily Treatment     Patient Name: Aaliyah Fulton  Age:  83 y.o., Sex:  female  Medical Record #: [id number]  Today's Date: 3/31/2023     Precautions  Precautions: Weight Bearing As Tolerated Left Lower Extremity, Fall Risk    Assessment  Pt is A&Ox4, motivated for OOB activity. Shows increased difficulty with STS today, requires ModA, FWW. Walks 10' to chair with CGA,FWW. LB dressing with MaxA. Seated grooming with SBA. Tolerates UIC for lunch meal. Per RN, pt to transfer to rehab today. DC OT, recommend continued OT at post-acute setting.       Plan  Treatment Plan Status: (P) Modify Current Treatment Plan  DC acute OT.    DC Equipment Recommendations: (P) Unable to determine at this time  Discharge Recommendations: (P) Recommend post-acute placement for additional occupational therapy services prior to discharge home    Subjective  AGreeable     Objective     03/31/23 1139   Pain 0 - 10 Group   Location Knee   Location Orientation Left   Therapist Pain Assessment Post Activity Pain Same as Prior to Activity;Nurse Notified   Cognition    Cognition / Consciousness WDL   Level of Consciousness Alert   Balance   Sitting Balance (Static) Good   Sitting Balance (Dynamic) Fair +   Standing Balance (Static) Fair   Standing Balance (Dynamic) Fair -   Weight Shift Sitting Good   Weight Shift Standing Fair   Skilled Intervention Verbal Cuing;Tactile Cuing   Bed Mobility    Supine to Sit Contact Guard Assist   Skilled Intervention Verbal Cuing   Activities of Daily Living   Eating Independent   Grooming Standby Assist;Seated   Upper Body Dressing Supervision   Lower Body Dressing Maximal Assist   Skilled Intervention Verbal Cuing;Compensatory Strategies   How much help from another person does the patient currently need...   Putting on and taking off regular lower body clothing? 2   Bathing (including washing, rinsing, and drying)? 3   Toileting, which includes using a toilet, bedpan, or urinal? 3    Putting on and taking off regular upper body clothing? 4   Taking care of personal grooming such as brushing teeth? 4   Eating meals? 4   6 Clicks Daily Activity Score 20   Functional Mobility   Sit to Stand Moderate Assist   Bed, Chair, Wheelchair Transfer Contact Guard Assist   Transfer Method Stand Step   Mobility fww   Comments difficulty with STS today   Activity Tolerance   Sitting in Chair yes   Sitting Edge of Bed 12   Standing 7   Short Term Goals   Goal Outcome # 1 Progressing as expected   Goal Outcome # 2 Progressing as expected   Goal Outcome # 3 Progressing as expected   Education Group   Role of Occupational Therapist Patient Response Patient;Acceptance;Explanation;Verbal Demonstration   ADL Patient Response Patient;Acceptance;Explanation;Action Demonstration   Occupational Therapy Treatment Plan    O.T. Treatment Plan Modify Current Treatment Plan   Reason For Discharge Patient Discharged from Facility   Anticipated Discharge Equipment and Recommendations   DC Equipment Recommendations Unable to determine at this time   Discharge Recommendations Recommend post-acute placement for additional occupational therapy services prior to discharge home   Interdisciplinary Plan of Care Collaboration   IDT Collaboration with  Nursing   Patient Position at End of Therapy Seated;Call Light within Reach;Tray Table within Reach;Phone within Reach   Collaboration Comments Aware of visit.   Session Information   Date / Session Number  3/31, #2 (2/3, 4/4)   Priority 0   OTHER   Modified Plan Patient Discharged from Facility

## 2023-03-31 NOTE — PROGRESS NOTES
4 Eyes Skin Assessment Completed by JAVED Malloy and JAVED Rosales.    Head WDL  Ears WDL  Nose WDL  Mouth WDL  Neck WDL  Breast/Chest WDL  Shoulder Blades WDL  Spine WDL  (R) Arm/Elbow/Hand WDL  (L) Arm/Elbow/Hand WDL  Abdomen WDL  Groin WDL  Scrotum/Coccyx/Buttocks Redness and Non-Blanching  (R) Leg WDL  (L) Leg Swelling and Incision  (R) Heel/Foot/Toe WDL  (L) Heel/Foot/Toe WDL          Devices In Places: ice pack to Left knee      Interventions In Place Pillows    Possible Skin Injury No    Pictures Uploaded Into Epic Yes  Wound Consult Placed N/A  RN Wound Prevention Protocol Ordered No

## 2023-03-31 NOTE — PROGRESS NOTES
Received report from day shift nurse. Assumed pt care at 1915. Pt is A&O x 4, resting comfortably in bed. Pt on room air. No signs of SOB/respiratory distress. Pt c/o no pain at this moment. Fall precautions in place. Bed at lowest position. Call light and personal belongings within reach.

## 2023-03-31 NOTE — DISCHARGE PLANNING
Case Management Discharge Planning    Admission Date: 3/28/2023  GMLOS:    ALOS: 0    6-Clicks ADL Score: 20  6-Clicks Mobility Score: 20      Anticipated Discharge Dispo: Discharge Disposition: Disch to IP rehab facility or distinct part unit (62)    DME Needed: No    Action(s) Taken: Updated Provider/Nurse on Discharge Plan    1000: RN CM reached out to [place] with [facility] to request update on acceptance.     Escalations Completed: Pending Discharge Destination    Medically Clear: Yes    Next Steps: [facility] acceptance    Barriers to Discharge: None

## 2023-03-31 NOTE — CARE PLAN
The patient is Stable - Low risk of patient condition declining or worsening    Shift Goals  Clinical Goals: Patient's pain will remain less than 3/10.  Patient Goals: Rest and sleep  Family Goals: n/a    Progress made toward(s) clinical / shift goals:  Patient did not complain of pain and was able to rest and sleep throughout the night. Call light and belongings within reach.    Patient is not progressing towards the following goals:

## 2023-03-31 NOTE — CARE PLAN
The patient is Watcher - Medium risk of patient condition declining or worsening     Shift Goals: safety, pain  Clinical Goals: safety     Problem: Knowledge Deficit - Standard  Goal: Patient and family/care givers will demonstrate understanding of plan of care, disease process/condition, diagnostic tests and medications  Note: Patient is a new admit. Educated on safety precautions and fall prevention measures. Covid testing is done, patient is to remain in isolation untill negative test result.

## 2023-03-31 NOTE — DISCHARGE PLANNING
Spoke with daughter nAgi.  Advised of possible admission to [facility].  Angi and family agreeable to admission.  Verified d/c support.  Patient lives at home with  who is partially disabled.  Both daughters Angi and Abby will be staying with patient after discharge until she no longer needs help.  She lives in a single story home with three steps to enter.  Advised of covid 19 and visiting policies.  Advised of Case Management involvement during stay.  Family is agreeable  to family training.  Will bring clothes and personal belongings for patient when appropriate.

## 2023-03-31 NOTE — H&P
REHABILITATION HISTORY AND PHYSICAL/POST ADMISSION EVALUATION    3/31/2023  3:36 PM  Aaliyah Fulton  [id number]  Admission  3/31/2023  1:00 PM  [facility] Code/Reason for admission: 0008.61 - Orthopaedic Disorders: Status Post Unilateral Knee Replacement   Etiologic diagnosis/problem: S/P total knee arthroplasty, left  Chief Complaint: left knee pain    HPI:  Patient is a 83 y.o. female  with a past medical history of breast cancer, cervical fracture, hip fracture, hypertension, depression, stroke, chronic urinary tract infections, overactive bladder, normal pressure hydrocephalus s/p  shunt placement, admitted to [facility] on 3/28, for an elective left total knee replacement due to severe osteoarthritis.  Surgery occurred on 3/28 by Dr. Marcus Hall.  She is weightbearing as tolerated.  Postoperatively patient with acute blood loss anemia with hemoglobin drop from 13.0-11.4.    Patient currently reports left knee pain with movement, none at rest. She reports prior to this surgery she was in so much pain that she wasn't moving much at home. She hasn't driven in that last year or so due to a hip fracture after a fall. She has a history of depression after leaving her full time job, but this improved after she started working part time again. She last moved her bowels yesterday and confirms over active bladder. She is also reporting dry mouth, which is a chronic issue for her.  Patient reports her surgeon was concerned about her coming here due to risk of getting COVID, but patient feels she needs this in order to get more functional prior to returning home.    Patient was evaluated by Rehab Medicine physician and Physical Therapy and determined to be appropriate for acute inpatient rehab and was transferred to [facility] on 3/31/2023  1:00 PM.    With this acute therapeutic intervention, this patient hopes to improve her functional status, and return to independent living with the  "supportive care of family.    REVIEW OF SYSTEMS:     A complete review of systems was performed and was negative in detail with the exception of items mentioned elsewhere in this document.    PMH:  Past Medical History:   Diagnosis Date    Anesthesia     nausea    Arthritis     ASTHMA     CHEMICAL INDUCED    Breast cancer ([facility])     stage 0 - Dr. Naqvi    Cancer ([facility]) 2010    right breast-lumpectomy. Radiation, no chemo    Cataract     silver iol    Dental disorder     upper implants    Heart burn     taking omeprazole    Heart murmur     High cholesterol     History of cervical fracture 2022    No surgery. Had to wear brace only.    Hypertension 01/03/2022    states well controlled on meds    Indigestion     taking omeprazole    Osteoporosis     Pain     \"everywhere\"    Pneumonia 12/2021    on antibiotics    PONV (postoperative nausea and vomiting)     Have always had this.    Psychiatric problem     depression on zoloft    Stroke ([facility]) 2021    Some memory problems and expressive aphasia.    Unspecified urinary incontinence     wears depends       PSH:  Past Surgical History:   Procedure Laterality Date    PB TOTAL KNEE ARTHROPLASTY Left 3/28/2023    Procedure: LEFT TOTAL KNEE ARTHROPLASTY;  Surgeon: Marcus Hall M.D.;  Location: SURGERY [facility];  Service: Orthopedics    KY CREATE SHUNT:VENTRIC-PERITONEAL  01/10/2022    Procedure: INSERTION, SHUNT, VENTRICULOPERITONEAL, LAPAROSCOPIC PERITONEAL CATHETER - STEALTH GUIDED;  Surgeon: Gregory Blanchard M.D.;  Location: SURGERY [facility];  Service: Neurosurgery    PB PARTIAL HIP REPLACEMENT Left 11/24/2021    Procedure: HEMIARTHROPLASTY, HIP;  Surgeon: Marlon Culver M.D.;  Location: SURGERY [facility];  Service: Orthopedics    RECOVERY  05/10/2016    Procedure: [id number]-ANESTHESIA;  Surgeon: Recoveryonshania Surgery;  Location[place] [facility] PROC [facility];  Service:     LUMBAR LAMINECTOMY DISKECTOMY  10/17/2012    Performed by Daksha Melendez, " M.D. at SURGERY [facility] ORS    FORAMINOTOMY  10/17/2012    Performed by Daksha Melendez M.D. at SURGERY [facility]S    LUMBAR DECOMPRESSION  10/17/2012    Performed by Daskha Melendez M.D. at SURGERY [facility]    KNEE ARTHROPLASTY TOTAL  09/19/2011    Performed by KISHAN CARDENAS at SURGERY [facility]    BREAST BIOPSY  05/21/2010    Performed by ROB TOMAS at SURGERY SAME DAY [facility]    CHOLECYSTECTOMY      HAMMERTOE CORRECTION Bilateral     with bunions    KNEE ARTHROSCOPY      OTHER ORTHOPEDIC SURGERY      foot, shoulder, and knee    LA RADIATION THERAPY PLAN SIMPLE      SHOULDER SURGERY      SINUSCOPY         Family History   Problem Relation Age of Onset    Lung Disease Mother         copd    Stroke Mother     Cancer Daughter         Breast    Breast Cancer Daughter     Psychiatric Illness Daughter         Anxiety    Psychiatric Illness Daughter         Anxiety and Depression        MEDICATIONS:  Scheduled Medications   Medication Dose Frequency    Pharmacy Consult Request  1 Each PHARMACY TO DOSE    senna-docusate  2 Tablet BID    aspirin EC  81 mg BID    atorvastatin  40 mg Nightly    busPIRone  15 mg TID    methenamine hip  1 g BID    [START ON 4/1/2023] omeprazole  40 mg DAILY    oxybutynin SR  15 mg Q EVENING    [START ON 4/1/2023] valsartan  160 mg DAILY    [START ON 4/1/2023] venlafaxine XR  225 mg QDAY with Breakfast       ALLERGIES:  Patient has no known allergies.    PSYCHOSOCIAL HISTORY:  Pre-mobidly, the patient lived in a single level home with 3 steps to enter, in [place] with spouse. They have been  for a very long time. She has 2 grown adult daughters that will assist her at discharge. 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     LEVEL OF FUNCTION PRIOR TO DISABILTY:  Independent with 4WW    LEVEL OF FUNCTION PRIOR TO ADMISSION to [facility]:  PT 3/30:    Balance   Sitting Balance (Static) Good  "  Sitting Balance (Dynamic) Good   Standing Balance (Static) Fair   Standing Balance (Dynamic) Fair -   Weight Shift Sitting Good   Weight Shift Standing Fair   Bed Mobility    Supine to Sit Minimal Assist   Sit to Supine Minimal Assist   Scooting Modified Independent   Gait Analysis   Gait Level Of Assist Contact Guard Assist   Assistive Device Front Wheel Walker   Distance (Feet) 25   # of Times Distance was Traveled 2   Deviation Antalgic   # of Stairs Climbed 0   Functional Mobility   Sit to Stand Minimal Assist   Bed, Chair, Wheelchair Transfer Contact Guard Assist   Toilet Transfers Contact Guard Assist   Activity Tolerance   Sitting Edge of Bed 10   Standing 10       OT 3/31:    Balance   Sitting Balance (Static) Good   Sitting Balance (Dynamic) Fair +   Standing Balance (Static) Fair   Standing Balance (Dynamic) Fair -   Weight Shift Sitting Good   Weight Shift Standing Fair   Skilled Intervention Verbal Cuing;Tactile Cuing   Bed Mobility    Supine to Sit Contact Guard Assist   Skilled Intervention Verbal Cuing   Activities of Daily Living   Eating Independent   Grooming Standby Assist;Seated   Upper Body Dressing Supervision   Lower Body Dressing Maximal Assist   Skilled Intervention Verbal Cuing;Compensatory Strategies       CURRENT LEVEL OF FUNCTION:   Same as level of function prior to admission to [facility]    PHYSICAL EXAM:     VITAL SIGNS:   height is 1.727 m (5' 8\") and weight is 76.7 kg (169 lb). Her oral temperature is 37.3 °C (99.2 °F). Her blood pressure is 108/56 and her pulse is 84. Her respiration is 16 and oxygen saturation is 95%.     GENERAL: No apparent distress  HEENT: Normocephalic/atraumatic, very dry mucous membranes  CARDIAC: Regular rate and rhythm, normal S1, S2, no murmurs, no peripheral edema   LUNGS: Clear to auscultation, normal respiratory effort, on room air   ABDOMINAL: bowel sounds present, soft, nontender, and nondistended    EXTREMITIES: no edema or 2+ " bilateral DP/PT pulses  MSK: Scar on right knee, ice and dressing on left knee    NEURO:    Mental status: alert    Motor:  Shoulder flexors:  Right -  5/5, Left -  5/5  Elbow flexors:  Right -  5/5, Left -  5/5  Elbow extensors:  Right -  5/5, Left -  5/5  Symmetrical   Hip flexors:  Right -  3/5, Left -  deferred  Dorsiflexors:  Right -  5/5, Left -  4/5  Plantar flexors:  Right -  5/5, Left -  5/5       RADIOLOGY:    AP weightbearing lateral and sunrise views of the left knee show severe   degenerative arthritis loss of joint space ossified production cyst   Formation            LABS:      Recent Labs     03/29/23  0208   HEMOGLOBIN 11.4*   HEMATOCRIT 34.8*         PRIMARY REHAB DIAGNOSIS:    This patient is a 83 y.o. female admitted for acute inpatient rehabilitation with   S/P total knee arthroplasty, left.    IMPAIRMENTS:   ADLs/IADLs  Mobility    SECONDARY DIAGNOSIS/MEDICAL CO-MORBIDITIES AFFECTING FUNCTION:    Normal pressure hydrocephalus  Hypertension  Overactive bladder  Frequent UTIs  History of depression  Hyperlipidemia  History of stroke  GERD    RELEVANT CHANGES SINCE PREADMISSION EVALUATION:    Status unchanged    The patient's rehabilitation potential is Very Good  The patient's medical prognosis is good    PLAN:   Discussion and Recommendations, discussed with the patient and/or family:   1. The patient requires an acute inpatient rehabilitation program with a coordinated program of care at an intensity and frequency not available at a lower level of care. This recommendation is substantiated by the patient's medical physicians who recommend that the patient's intervention and assessment of medical issues needs to be done at an acute level of care for patient's safety and maximum outcome.     2. A coordinated program of care will be supplied by an interdisciplinary team of physical therapy, occupational therapy, rehab physician, rehab nursing, and, if needed, speech therapy and rehab  psychology. Rehab team presents a patient-specific rehabilitation and education program concentrating on prevention of future problems related to accessibility, mobility, skin, bowel, bladder, sexuality, and psychosocial and medical/surgical problems.     3. Need for Rehabilitation Physician: The rehab physician will be evaluating the patient on a multi-weekly basis to help coordinate the program of care. The rehab physician communicates between medical physicians, therapists, and nurses to maximize the patient's potential outcome. Specific areas in which the rehab physician will be providing daily assessment include the following:   A. Assessing the patient's heart rate and blood pressure response (vitals monitoring) to activity and making adjustments in medications or conservative measures as needed.   B. The rehab physician will be assessing the frequency at which the program can be increased to allow the patient to reach optimal functional outcome.   C. The rehab physician will also provide assessments in daily skin care, especially in light of patient's impairments in mobility.   D. The rehab physician will provide special expertise in understanding how to work with functional impairment and recommend appropriate interventions, compensatory techniques, and education that will facilitate the patient's outcome.     4. Rehab R.N.   The rehab RN will be working with patient to carry over in room mobility and activities of daily living when the patient is not in 3 hours of skilled therapy. Rehab nursing will be working in conjunction with rehab physician to address all the medical issues above and continue to assess laboratory work and discuss abnormalities with the treating physicians, assess vitals, and response to activity, and discuss and report abnormalities with the rehab physician. Rehab RN will also continue daily skin care, supervise bladder/bowel program, instruct in medication administration, and ensure  patient safety.     5. Therapies to treat at intensity and frequency of (may change after completion of evaluation by all therapeutic disciplines):       PT:  Physical therapy to address mobility, transfer, gait training and evaluation for adaptive equipment needs 1hour/day at least 5 days/week for the duration of the ELOS (see below)       OT:  Occupational therapy to address ADLs, self-care, home management training, functional mobility/transfers and assistive device evaluation, and community re-integration 1hour/day at least 5 days/week for the duration of the ELOS (see below).        ST/Dysphagia:  Speech therapy to address speech, language, and cognitive deficits as well as swallowing difficulties with retraining/dysphagia management and community re-integration with comprehension, expression, cognitive training 1hour/day at least 5 days/week for the duration of the ELOS (see below).     6. Medical management / Rehabilitation Issues/Adverse Potential affecting function as part of rehabilitation plan.    Normal pressure hydrocephalus  Gait impairments  Short-term memory impairments  Status post  shunt placement 1/2022 with Dr. Blanchard  Per review of outpatient neurology documentations, last imaging in November 2022 showed decreased in size of ventricles, and patient's memory/gait had slightly improved  Speech therapy assessment/treatment    Hypertension  Continue valsartan    Overactive bladder  Continue oxybutynin    Frequent UTIs  Continue methenamine    History of depression  Continue buspirone  Continue venlafaxine    Hyperlipidemia  Continue atorvastatin    History of stroke  Continue aspirin and atorvastatin    GERD  Continue omeprazole    Normal pressure hydrocephalus  Gait impairments  Short-term memory impairments  Status post  shunt placement 1/2022 with Dr. Blanchard  Per review of outpatient neurology documentations, last imaging in November 2022 showed decreased in size of ventricles, and  patient's memory/gait had slightly improved  Speech therapy assessment/treatment    I performed a complete drug regimen review and did not identify any potential clinically significant medication issues.    The patient's CODE STATUS was confirmed as FULL CODE on admission, with the patient and/or family at bedside.    REHABILITATION ISSUES/ADVERSE POTENTIAL:  1.  Left total hip arthroplasty: Patient demonstrates functional deficits in strength, balance, coordination, and ADL's. Patient is admitted to [facility] for comprehensive rehabilitation therapy as described below.   Rehabilitation nursing monitors bowel and bladder control, educates on medication administration, co-morbidities and monitors patient safety.    2.  DVT prophylaxis:  Patient is on aspirin upon transfer per surgery. Encourage OOB. Monitor daily for signs and symptoms of DVT including but not limited to swelling and pain to prevent the development of DVT that may interfere with therapies.    3.  Pain: Scheduled Tylenol.  As needed oxycodone.    4.  Nutrition/Dysphagia: Dietician monitors nutrient intake, recommend supplements prn and provide nutrition education to pt/family to promote optimal nutrition for wound healing/recovery.     5.  Bladder/bowel:  Start bowel and bladder program, to prevent constipation, urinary retention (which may lead to UTI), and urinary incontinence (which will impact upon pt's functional independence).   - TV Q3h while awake with post void bladder scans, I&O cath for PVRs >400  - up to commode after meal     6.  Skin/dermal ulcer prophylaxis: Monitor for new skin conditions with q.2 h. turns as required to prevent the development of skin breakdown.     7.  GI prophylaxis: Omeprazole to prevent gastritis or GI bleed that would interfere with therapies.    8. Respiratory therapy: RT performs O2 management prn, breathing retraining, pulmonary hygiene and bronchospasm management prn to optimize  participation in therapies.    Pt was seen today for 75 min, and entire time spent in face-to-face contact was >50% in counseling and coordination of care as detailed in A/P above.        GOALS/EXPECTED LEVEL OF FUNCTION BASED ON CURRENT MEDICAL AND FUNCTIONAL STATUS (may change based on patient's medical status and rate of impairment recovery):  Transfers:   Modified Independent  Mobility/Gait:   Modified Independent  ADL's:   Supervision    DISPOSITION: Discharge to pre-morbid independent living setting with the supportive care of patient's family.      ELOS: 7-10 days    Ursula Fall M.D.  Physical Medicine and Rehabilitation

## 2023-03-31 NOTE — PREADMISSION SCREENING NOTE
Pre-Admission Screening Form    Patient Information:   Name: Aaliyah Fulton     MRN: [id number]       : 1940      Age: 83 y.o.   Gender: female      Race: White [7]       Marital Status:  [2]  Family Contact: lAexAngi  Tejeda,Abby FultonBertin        Relationship: Daughter [2]  Daughter [2]  Spouse [17]  Home Phone: [phone]           Cell Phone: [phone]  Advanced Directives: None  Code Status:  FULL  Current Attending Provider: Marcus Hall M.D.  Referring Physician: Dr. Hall      Physiatrist Consult: Dr. Fall       Referral Date: 3/31/23  Primary Payor Source:  MEDICARE  Secondary Payor Source:  [facility]    Medical Information:   Date of Admission to Acute Care Setting:3/28/2023  Room Number: 1117/01  Rehabilitation Diagnosis: 0008.61 - Orthopaedic Disorders: Status Post Unilateral Knee Replacement  Immunization History   Administered Date(s) Administered    Hep A/HEP B Combined Vaccine (TwinRix) 2006, 2006    Influenza Seasonal Injectable - Historical Data 10/18/2018, 10/20/2019, 2020    Influenza Vaccine Adult HD 2014, 2015, 10/23/2019    Influenza Vaccine Pediatric Split - Historical Data 2009    Influenza Vaccine Quad Inj (Pf) 10/20/2021    Influenza, Unspecified - HISTORICAL DATA 2020, 10/20/2021    PFIZER PURPLE CAP SARS-COV-2 VACCINATION (12+) 2021, 2021, 10/02/2021    Pneumococcal Conjugate Vaccine (Prevnar/PCV-13) 10/03/2017, 10/03/2017    Pneumococcal polysaccharide vaccine (PPSV-23) 2016, 2016    TD Vaccine 1996    Tdap Vaccine 2016    Zoster - HISTORICAL DATA 2007     No Known Allergies  Past Medical History:   Diagnosis Date    Anesthesia     nausea    Arthritis     ASTHMA     CHEMICAL INDUCED    Breast cancer (HCC)     stage 0 - Dr. Naqvi    Cancer (HCC)     right breast-lumpectomy. Radiation, no chemo    Cataract     silver iol    Dental  "disorder     upper implants    Heart burn     taking omeprazole    Heart murmur     High cholesterol     History of cervical fracture 2022    No surgery. Had to wear brace only.    Hypertension 01/03/2022    states well controlled on meds    Indigestion     taking omeprazole    Osteoporosis     Pain     \"everywhere\"    Pneumonia 12/2021    on antibiotics    PONV (postoperative nausea and vomiting)     Have always had this.    Psychiatric problem     depression on zoloft    Stroke (HCC) 2021    Some memory problems and expressive aphasia.    Unspecified urinary incontinence     wears depends     Past Surgical History:   Procedure Laterality Date    PB TOTAL KNEE ARTHROPLASTY Left 3/28/2023    Procedure: LEFT TOTAL KNEE ARTHROPLASTY;  Surgeon: Marcus Hall M.D.;  Location: SURGERY [facility];  Service: Orthopedics    [place] CREATE SHUNT:VENTRIC-PERITONEAL  01/10/2022    Procedure: INSERTION, SHUNT, VENTRICULOPERITONEAL, LAPAROSCOPIC PERITONEAL CATHETER - STEALTH GUIDED;  Surgeon: Gregory Blanchard M.D.;  Location: SURGERY [facility];  Service: Neurosurgery    PB PARTIAL HIP REPLACEMENT Left 11/24/2021    Procedure: HEMIARTHROPLASTY, HIP;  Surgeon: Marlon Culver M.D.;  Location: SURGERY [facility];  Service: Orthopedics    RECOVERY  05/10/2016    Procedure: [id number]-ANESTHESIA;  Surgeon: [facility];  Location: SURGERY PRE-POST PROC UNIT [facility];  Service:     LUMBAR LAMINECTOMY DISKECTOMY  10/17/2012    Performed by Daksha Melendez M.D. at SURGERY [facility] ORS    FORAMINOTOMY  10/17/2012    Performed by Daksha Melendez M.D. at SURGERY [facility] ORS    LUMBAR DECOMPRESSION  10/17/2012    Performed by Daksha Melendez M.D. at SURGERY [facility] ORS    KNEE ARTHROPLASTY TOTAL  09/19/2011    Performed by KISHAN CARDENAS at SURGERY [facility] ORS    BREAST BIOPSY  05/21/2010    Performed by ROB TOMAS at SURGERY [facility] DAY [facility] ORS    CHOLECYSTECTOMY      HAMMERTOE CORRECTION " Bilateral     with bunions    KNEE ARTHROSCOPY      OTHER ORTHOPEDIC SURGERY      foot, shoulder, and knee    [place] RADIATION THERAPY PLAN SIMPLE      SHOULDER SURGERY      SINUSCOPY         History Leading to Admission, Conditions that Caused the Need for Rehab (CMS): s/p knee replacement  Co-morbidities: as listed above and below  Potential Risk - Complications: Deep Vein Thrombosis, Dysphagia, Incontinence, Malnutrition, Pain, Paralysis, Perceptual Impairment, Pneumonia, Pressure Ulcer, Seizures, and Urinary Tract Infection  Level of Risk: High    Ongoing Medical Management Needed (Medical/Nursing Needs):   Patient Active Problem List    Diagnosis Date Noted    Artificial knee joint present 03/28/2023    Osteoarthritis of left knee 08/17/2022    Benign essential hypertension 03/24/2022    Personal history UTI 02/24/2022    Gastroesophageal reflux disease without esophagitis 12/12/2021    History of CVA (cerebrovascular accident) 12/12/2021    Osteoporosis, postmenopausal 12/12/2021    Other hyperlipidemia 12/12/2021    Other specified anemias 11/26/2021    OAB (overactive bladder) 11/26/2021    Hydrocephalus (HCC) 11/24/2021    Systolic murmur 11/24/2021    Closed left hip fracture (HCC) 11/24/2021    Normal pressure hydrocephalus (HCC) 06/16/2021    Pain in left knee 05/04/2021    Memory deficit 04/16/2021    Abnormal gait 03/29/2021    Other specified examination 03/12/2021    Prediabetes 09/23/2019    Nonrheumatic aortic valve stenosis 03/26/2019    Poor short-term memory 09/28/2017    Disorder of bone and articular cartilage 09/29/2016    Vitamin D deficiency 09/29/2016    History of recurrent urinary tract infection 06/28/2016    Impaired fasting glucose 05/20/2016    Menopause present 05/20/2016    Urinary tract infection 04/21/2016    Hand arthritis 03/04/2015    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 female  with a past medical history of asthma, breast cancer, cervical fracture, hip fracture, hypertension, depression, stroke, chronic urinary tract infections, overactive bladder, normal pressure hydrocephalus, admitted to [facility] on 3/28, for an elective left total knee replacement due to severe osteoarthritis.  Surgery occurred on 3/28 by Dr. Marcus Hall.  She is weightbearing as tolerated.  Postoperatively patient with acute blood loss anemia with hemoglobin drop from 13.0-11.4.     Patient with multiple co-morbidities(including but not limited to history of stroke, hydrocephalus, depression, overactive bladder, hypertension, postoperative anemia); with cognitive deficits and functional deficits in mobility/self-cares, and Severe de-conditioning.      Pre-morbidly, this patient lived in a single level home with Three steps to enter, with spouse.  Patient will be supported at discharge by her 2 daughters.  The patient was evaluated by acute care Physical Therapy and Occupational Therapy; currently requiring minimal assistance for mobility and contact-guard to moderate assistance for ADLs.     The patient is an excellent candidate for an acute inpatient rehabilitation program with a coordinated program of care at an intensity and frequency not available at a lower level of care.      Note: if the patient continues to progress while waiting for medical clearance, and no longer requires 2 out of 3 therapy services (PT/OT/SLP), then the patient would no longer meet the criteria for acute inpatient rehabilitation.     This recommendation is substantiated by the patient's current medical condition with intervention and assessment of medical issues requiring an acute level of care for patient's safety and maximum outcome. A coordinated program of care will be provided by an interdisciplinary team including physical therapy, occupational therapy,  "speech language pathology, physiatry, and rehab nursing. Rehab goals include improved cognition, mobility, self-care management, strength and conditioning/endurance, pain management, bowel and bladder management, mood and affect, and safety with independent home management including caregiver training. Estimated length of stay is approximately 7-10 days. Rehab potential: Excellent. Disposition: to pre-morbid independent living setting with supportive care of patient's family. We will continue to follow with you in anticipation of discharge to acute inpatient rehabilitation when medically stable to do so at the discretion of her attending physician.      Thank you for allowing us to participate in her care.     Ursula Fall M.D.  Physical Medicine and Rehabilitation            Current Vital Signs:   Temperature: 37.3 °C (99.1 °F) Pulse: 82 Respiration: 18 Blood Pressure : 100/65  Weight: 79.4 kg (175 lb 0.7 oz) Height: 163.3 cm (5' 4.29\")  Pulse Oximetry: 92 % O2 (LPM): 0      Completed Laboratory Reports:  Recent Labs     03/29/23  0208   HEMOGLOBIN 11.4*   HEMATOCRIT 34.8*     Additional Labs: Not Applicable    Prior Living Situation:   Housing / Facility: 1 Story House  Steps Into Home: 2  Steps In Home: 0  Lives with - Patient's Self Care Capacity: Spouse  Equipment Owned: 4-Wheel Walker, Tub Transfer Bench, Hand Held Shower, Raised Toilet Seat With Arms    Prior Level of Function / Living Situation:   Physical Therapy: Prior Services: None  Housing / Facility: 1 Story House  Steps Into Home: 2  Steps In Home: 0  Bathroom Set up: Walk In Shower, Tub Transfer Bench  Equipment Owned: 4-Wheel Walker, Tub Transfer Bench, Hand Held Shower, Raised Toilet Seat With Arms  Lives with - Patient's Self Care Capacity: Spouse  Bed Mobility: Independent  Transfer Status: Independent  Ambulation: Independent  Assistive Devices Used: 4-Wheel Walker  Stairs: Independent  Current Level of Function:   Gait Level Of Assist: " Contact Guard Assist  Assistive Device: Front Wheel Walker  Distance (Feet): 25  Deviation: Antalgic  # of Stairs Climbed: 0  Weight Bearing Status: wbat L  Supine to Sit: Minimal Assist  Sit to Supine: Minimal Assist  Scooting: Modified Independent  Sit to Stand: Minimal Assist  Bed, Chair, Wheelchair Transfer: Contact Guard Assist  Toilet Transfers: Contact Guard Assist  Transfer Method: Portable Patient Lift  Sitting in Chair: 1hr  Sitting Edge of Bed: 10  Standing: 10  Occupational Therapy:   Self Feeding: Independent  Grooming / Hygiene: Independent  Bathing: Independent  Dressing: Independent  Toileting: Independent  Medication Management: Independent  Laundry: Independent  Kitchen Mobility: Independent  Finances: Independent  Home Management: Independent  Shopping: Requires Assist  Prior Level Of Mobility: Independent With Device in Home  Driving / Transportation: Unable To Determine At This Time  Prior Services: None  Housing / Facility: [address] [place] [address] House  Occupation (Pre-Hospital Vocational): Retired Due To Age  Leisure Interests: Unable To Determine At This Time  Current Level of Function:   Eating: Independent  Lower Body Dressing: Moderate Assist  Toileting: Contact Guard Assist  Speech Language Pathology:      Rehabilitation Prognosis/Potential: Good  Estimated Length of Stay: 10-14 days    Nursing:      Uses Bedpan and Uses Bedside Commode    Scope/Intensity of Services Recommended:  Physical Therapy: 1.5 hr / day  5 days / week. Therapeutic Interventions Required: Maximize Endurance, Mobility, Strength, and Safety  Occupational Therapy: 1.5 hr / day 5 days / week. Therapeutic Interventions Required: Maximize Self Care, ADLs, IADLs, and Energy Conservation  Rehabilitation Nursin/7. Therapeutic Interventions Required: Monitor Pain, Skin, Wound(s), Vital Signs, Intake and Output, Labs, Safety, Aspiration Risk, and Family Training  Rehabilitation Physician: 3 - 5 days / week. Therapeutic Interventions  Required: Medical Management  Respiratory Care: consult. Therapeutic Interventions Required: Pulmonary Toileting  Dietician: consult. Therapeutic Interventions Required: access for a diet that is both healthy and aides in healing    She requires 24-hour rehabilitation nursing to manage skin care, surgical incision, wound, nutrition and fluid intake, pulmonary hygiene, pain control, safety, medication management, and patient/family goals. In addition, rehabilitation nursing will reiterate and reinforce therapy skills and equipment use, including ADLs, as well as provide education to the patient and family. Aaliyah Fulton is willing to participate in and is able to tolerate the proposed plan of care.    Rehabilitation Goals and Plan (Expected frequency & duration of treatment in the IRF):   Return to the Community and Family Able to Provide 24/7 Assistance  Anticipated Date of Rehabilitation Admission: 3/31/23  Patient/Family oriented IRF level of care/facility/plan: Yes  Patient/Family willing to participate in IRF care/facility/plan: Yes  Patient able to tolerate IRF level of care proposed: Yes  Patient has potential to benefit IRF level of care proposed: Yes  Comments:              Spoke with daughter Angi.  Advised of possible admission to [facility].  Angi and family agreeable to admission.  Verified d/c support.  Patient lives at home with  who is partially disabled.  Both daughters Angi and Abby will be staying with patient after discharge until she no longer needs help.  She lives in a single story home with three steps to enter.  Advised of covid 19 and visiting policies.  Advised of Case Management involvement during stay.  Family is agreeable  to family training.  Will bring clothes and personal belongings for patient when appropriate.                Special Needs or Precautions - Medical Necessity:  Safety Concerns/Precautions:  Fall Risk / High Risk for Falls and Balance  Diet:   DIET ORDERS (From  admission to next 24h)       Start     Ordered    03/28/23 1304  Diet Order Diet: Regular  ALL MEALS        Question:  Diet:  Answer:  Regular    03/28/23 1303                    Anticipated Discharge Destination / Patient/Family Goal:  Destination: Home with Supervision Support System: Spouse and Family   Anticipated home health services: OT, PT, and Nursing  Previously used HH service/ provider: Not Applicable  Anticipated DME Needs: Walker  Outpatient Services: OT and PT  Alternative resources to address additional identified needs:   Future Appointments   Date Time Provider Department Center   4/13/2023 10:00 AM JOHNNA De La RosaMT [facility]       Pre-Screen Completed: 3/31/2023 10:56 AM Augustina Carmen L.P.N.

## 2023-03-31 NOTE — CONSULTS
Medical chart review completed.     Patient is a 83 y.o. female  with a past medical history of asthma, breast cancer, cervical fracture, hip fracture, hypertension, depression, stroke, chronic urinary tract infections, overactive bladder, normal pressure hydrocephalus, admitted to [facility] on 3/28, for an elective left total knee replacement due to severe osteoarthritis.  Surgery occurred on 3/28 by Dr. Marcus Hall.  She is weightbearing as tolerated.  Postoperatively patient with acute blood loss anemia with hemoglobin drop from 13.0-11.4.    Patient with multiple co-morbidities(including but not limited to history of stroke, hydrocephalus, depression, overactive bladder, hypertension, postoperative anemia); with cognitive deficits and functional deficits in mobility/self-cares, and Severe de-conditioning.     Pre-morbidly, this patient lived in a single level home with Three steps to enter, with spouse.  Patient will be supported at discharge by her 2 daughters.  The patient was evaluated by acute care Physical Therapy and Occupational Therapy; currently requiring minimal assistance for mobility and contact-guard to moderate assistance for ADLs.    The patient is an excellent candidate for an acute inpatient rehabilitation program with a coordinated program of care at an intensity and frequency not available at a lower level of care.     Note: if the patient continues to progress while waiting for medical clearance, and no longer requires 2 out of 3 therapy services (PT/OT/SLP), then the patient would no longer meet the criteria for acute inpatient rehabilitation.    This recommendation is substantiated by the patient's current medical condition with intervention and assessment of medical issues requiring an acute level of care for patient's safety and maximum outcome. A coordinated program of care will be provided by an interdisciplinary team including physical therapy, occupational therapy,  speech language pathology, physiatry, and rehab nursing. Rehab goals include improved cognition, mobility, self-care management, strength and conditioning/endurance, pain management, bowel and bladder management, mood and affect, and safety with independent home management including caregiver training. Estimated length of stay is approximately 7-10 days. Rehab potential: Excellent. Disposition: to pre-morbid independent living setting with supportive care of patient's family. We will continue to follow with you in anticipation of discharge to acute inpatient rehabilitation when medically stable to do so at the discretion of her attending physician.     Thank you for allowing us to participate in her care.    Ursula Fall M.D.  Physical Medicine and Rehabilitation

## 2023-03-31 NOTE — PROGRESS NOTES
Patient admitted to facility at 1250 via wheelchair; accompanied by hospital transport.  Patient assisted to room and positioned in bed for comfort and safety; call light within reach.  Patient assisted with stowing belongings and oriented to room and facility.  Admission assessment performed and documented in computer. Patient received and reviewed education binder. Admission paperwork completed; signed copies placed in chart.  Will continue to monitor.

## 2023-04-01 ENCOUNTER — APPOINTMENT (OUTPATIENT)
Dept: SPEECH THERAPY | Facility: REHABILITATION | Age: 83
DRG: 560 | End: 2023-04-01
Attending: PHYSICAL MEDICINE & REHABILITATION
Payer: MEDICARE

## 2023-04-01 ENCOUNTER — APPOINTMENT (OUTPATIENT)
Dept: OCCUPATIONAL THERAPY | Facility: REHABILITATION | Age: 83
DRG: 560 | End: 2023-04-01
Attending: PHYSICAL MEDICINE & REHABILITATION
Payer: MEDICARE

## 2023-04-01 ENCOUNTER — APPOINTMENT (OUTPATIENT)
Dept: PHYSICAL THERAPY | Facility: REHABILITATION | Age: 83
DRG: 560 | End: 2023-04-01
Attending: PHYSICAL MEDICINE & REHABILITATION
Payer: MEDICARE

## 2023-04-01 LAB
ALBUMIN SERPL BCP-MCNC: 2.7 G/DL (ref 3.2–4.9)
ALBUMIN/GLOB SERPL: 0.7 G/DL
ALP SERPL-CCNC: 261 U/L (ref 30–99)
ALT SERPL-CCNC: 177 U/L (ref 2–50)
ANION GAP SERPL CALC-SCNC: 10 MMOL/L (ref 7–16)
AST SERPL-CCNC: 106 U/L (ref 12–45)
BASOPHILS # BLD AUTO: 0.9 % (ref 0–1.8)
BASOPHILS # BLD: 0.06 K/UL (ref 0–0.12)
BILIRUB SERPL-MCNC: 1.1 MG/DL (ref 0.1–1.5)
BUN SERPL-MCNC: 19 MG/DL (ref 8–22)
CALCIUM ALBUM COR SERPL-MCNC: 9.5 MG/DL (ref 8.5–10.5)
CALCIUM SERPL-MCNC: 8.5 MG/DL (ref 8.5–10.5)
CHLORIDE SERPL-SCNC: 105 MMOL/L (ref 96–112)
CO2 SERPL-SCNC: 22 MMOL/L (ref 20–33)
CREAT SERPL-MCNC: 0.7 MG/DL (ref 0.5–1.4)
EOSINOPHIL # BLD AUTO: 0.87 K/UL (ref 0–0.51)
EOSINOPHIL NFR BLD: 12.7 % (ref 0–6.9)
ERYTHROCYTE [DISTWIDTH] IN BLOOD BY AUTOMATED COUNT: 46.5 FL (ref 35.9–50)
GFR SERPLBLD CREATININE-BSD FMLA CKD-EPI: 85 ML/MIN/1.73 M 2
GLOBULIN SER CALC-MCNC: 3.9 G/DL (ref 1.9–3.5)
GLUCOSE SERPL-MCNC: 107 MG/DL (ref 65–99)
HCT VFR BLD AUTO: 34.8 % (ref 37–47)
HGB BLD-MCNC: 11.4 G/DL (ref 12–16)
IMM GRANULOCYTES # BLD AUTO: 0.03 K/UL (ref 0–0.11)
IMM GRANULOCYTES NFR BLD AUTO: 0.4 % (ref 0–0.9)
LYMPHOCYTES # BLD AUTO: 1.38 K/UL (ref 1–4.8)
LYMPHOCYTES NFR BLD: 20.1 % (ref 22–41)
MAGNESIUM SERPL-MCNC: 2.1 MG/DL (ref 1.5–2.5)
MCH RBC QN AUTO: 32.2 PG (ref 27–33)
MCHC RBC AUTO-ENTMCNC: 32.8 G/DL (ref 33.6–35)
MCV RBC AUTO: 98.3 FL (ref 81.4–97.8)
MONOCYTES # BLD AUTO: 0.73 K/UL (ref 0–0.85)
MONOCYTES NFR BLD AUTO: 10.7 % (ref 0–13.4)
NEUTROPHILS # BLD AUTO: 3.78 K/UL (ref 2–7.15)
NEUTROPHILS NFR BLD: 55.2 % (ref 44–72)
NRBC # BLD AUTO: 0 K/UL
NRBC BLD-RTO: 0 /100 WBC
PLATELET # BLD AUTO: 143 K/UL (ref 164–446)
PMV BLD AUTO: 11.5 FL (ref 9–12.9)
POTASSIUM SERPL-SCNC: 3.6 MMOL/L (ref 3.6–5.5)
PROT SERPL-MCNC: 6.6 G/DL (ref 6–8.2)
RBC # BLD AUTO: 3.54 M/UL (ref 4.2–5.4)
SODIUM SERPL-SCNC: 137 MMOL/L (ref 135–145)
WBC # BLD AUTO: 6.9 K/UL (ref 4.8–10.8)

## 2023-04-01 PROCEDURE — 80053 COMPREHEN METABOLIC PANEL: CPT

## 2023-04-01 PROCEDURE — 700102 HCHG RX REV CODE 250 W/ 637 OVERRIDE(OP): Performed by: PHYSICAL MEDICINE & REHABILITATION

## 2023-04-01 PROCEDURE — A9270 NON-COVERED ITEM OR SERVICE: HCPCS | Performed by: PHYSICAL MEDICINE & REHABILITATION

## 2023-04-01 PROCEDURE — 92523 SPEECH SOUND LANG COMPREHEN: CPT

## 2023-04-01 PROCEDURE — 85025 COMPLETE CBC W/AUTO DIFF WBC: CPT

## 2023-04-01 PROCEDURE — 36415 COLL VENOUS BLD VENIPUNCTURE: CPT

## 2023-04-01 PROCEDURE — 97535 SELF CARE MNGMENT TRAINING: CPT

## 2023-04-01 PROCEDURE — 83735 ASSAY OF MAGNESIUM: CPT

## 2023-04-01 PROCEDURE — 99232 SBSQ HOSP IP/OBS MODERATE 35: CPT | Performed by: PHYSICAL MEDICINE & REHABILITATION

## 2023-04-01 PROCEDURE — 97162 PT EVAL MOD COMPLEX 30 MIN: CPT

## 2023-04-01 PROCEDURE — 97166 OT EVAL MOD COMPLEX 45 MIN: CPT

## 2023-04-01 PROCEDURE — 770010 HCHG ROOM/CARE - REHAB SEMI PRIVAT*

## 2023-04-01 RX ORDER — ACETAMINOPHEN 500 MG
500 TABLET ORAL 3 TIMES DAILY
Status: DISCONTINUED | OUTPATIENT
Start: 2023-04-01 | End: 2023-04-15 | Stop reason: HOSPADM

## 2023-04-01 RX ADMIN — ATORVASTATIN CALCIUM 40 MG: 40 TABLET, FILM COATED ORAL at 19:55

## 2023-04-01 RX ADMIN — ASPIRIN 81 MG: 81 TABLET, COATED ORAL at 08:12

## 2023-04-01 RX ADMIN — POLYETHYLENE GLYCOL 3350 1 PACKET: 17 POWDER, FOR SOLUTION ORAL at 08:15

## 2023-04-01 RX ADMIN — ACETAMINOPHEN 500 MG: 500 TABLET ORAL at 19:55

## 2023-04-01 RX ADMIN — VENLAFAXINE HYDROCHLORIDE 225 MG: 75 CAPSULE, EXTENDED RELEASE ORAL at 08:12

## 2023-04-01 RX ADMIN — BUSPIRONE HYDROCHLORIDE 15 MG: 10 TABLET ORAL at 08:12

## 2023-04-01 RX ADMIN — OMEPRAZOLE 40 MG: 20 CAPSULE, DELAYED RELEASE ORAL at 08:12

## 2023-04-01 RX ADMIN — SENNOSIDES AND DOCUSATE SODIUM 2 TABLET: 50; 8.6 TABLET ORAL at 19:54

## 2023-04-01 RX ADMIN — OXYBUTYNIN CHLORIDE 15 MG: 5 TABLET, EXTENDED RELEASE ORAL at 19:54

## 2023-04-01 RX ADMIN — BUSPIRONE HYDROCHLORIDE 15 MG: 10 TABLET ORAL at 19:55

## 2023-04-01 RX ADMIN — METHENAMINE HIPPURATE 1 G: 1 TABLET ORAL at 19:55

## 2023-04-01 RX ADMIN — ACETAMINOPHEN 500 MG: 500 TABLET ORAL at 14:20

## 2023-04-01 RX ADMIN — SENNOSIDES AND DOCUSATE SODIUM 2 TABLET: 50; 8.6 TABLET ORAL at 08:12

## 2023-04-01 RX ADMIN — METHENAMINE HIPPURATE 1 G: 1 TABLET ORAL at 08:12

## 2023-04-01 RX ADMIN — ACETAMINOPHEN 1000 MG: 500 TABLET ORAL at 08:10

## 2023-04-01 RX ADMIN — ASPIRIN 81 MG: 81 TABLET, COATED ORAL at 19:55

## 2023-04-01 RX ADMIN — BUSPIRONE HYDROCHLORIDE 15 MG: 10 TABLET ORAL at 14:20

## 2023-04-01 RX ADMIN — OXYCODONE HYDROCHLORIDE 5 MG: 5 TABLET ORAL at 08:10

## 2023-04-01 RX ADMIN — HYDROXYZINE HYDROCHLORIDE 50 MG: 25 TABLET, FILM COATED ORAL at 00:00

## 2023-04-01 ASSESSMENT — BRIEF INTERVIEW FOR MENTAL STATUS (BIMS)
BIMS SUMMARY SCORE: 9
INITIAL REPETITION OF BED BLUE SOCK - FIRST ATTEMPT: 3
ASKED TO RECALL BED: YES, AFTER CUEING (A PIECE OF FURNITURE")"
ASKED TO RECALL BLUE: NO, COULD NOT RECALL
BIMS SUMMARY SCORE: 15
INITIAL REPETITION OF BED BLUE SOCK - FIRST ATTEMPT: 3
WHAT DAY OF THE WEEK IS IT: NO ANSWER
ASKED TO RECALL BED: YES, NO CUE REQUIRED
ASKED TO RECALL BLUE: YES, NO CUE REQUIRED
ASKED TO RECALL SOCK: YES, NO CUE REQUIRED
WHAT DAY OF THE WEEK IS IT: CORRECT
ASKED TO RECALL SOCK: NO, COULD NOT RECALL
WHAT MONTH IS IT: ACCURATE WITHIN 5 DAYS
WHAT YEAR IS IT: CORRECT
WHAT MONTH IS IT: ACCURATE WITHIN 5 DAYS
WHAT YEAR IS IT: CORRECT

## 2023-04-01 ASSESSMENT — ACTIVITIES OF DAILY LIVING (ADL)
TOILETING_LEVEL_OF_ASSIST_DESCRIPTION: ASSIST FOR STANDING BALANCE;GRAB BAR;INCREASED TIME;INITIAL PREPARATION FOR TASK;SET-UP OF EQUIPMENT;SUPERVISION FOR SAFETY;VERBAL CUEING
TOILET_TRANSFER_DESCRIPTION: GRAB BAR;INITIAL PREPARATION FOR TASK;SET-UP OF EQUIPMENT;SUPERVISION FOR SAFETY;VERBAL CUEING
BED_CHAIR_WHEELCHAIR_TRANSFER_DESCRIPTION: SET-UP OF EQUIPMENT;SQUAT PIVOT TRANSFER TO WHEELCHAIR;SUPERVISION FOR SAFETY;VERBAL CUEING
TOILETING: INDEPENDENT
TUB_SHOWER_TRANSFER_DESCRIPTION: GRAB BAR;SHOWER BENCH;INITIAL PREPARATION FOR TASK;SET-UP OF EQUIPMENT;SUPERVISION FOR SAFETY;VERBAL CUEING

## 2023-04-01 ASSESSMENT — PAIN DESCRIPTION - PAIN TYPE: TYPE: ACUTE PAIN;SURGICAL PAIN

## 2023-04-01 ASSESSMENT — GAIT ASSESSMENTS
DISTANCE (FEET): 5
DEVIATION: ANTALGIC;DECREASED BASE OF SUPPORT;STEP TO
ASSISTIVE DEVICE: PARALLEL BARS
GAIT LEVEL OF ASSIST: MODERATE ASSIST

## 2023-04-01 NOTE — CARE PLAN
The patient is Watcher - Medium risk of patient condition declining or worsening    Shift Goals  Clinical Goals: safety, monitor PVRs    Problem: Pain - Standard  Goal: Alleviation of pain or a reduction in pain to the patient’s comfort goal  Note: Patient is having left knee pain, PRN oxy given, ice pack provided, will continue to monitor.      Problem: Hemodynamics  Goal: Patient's hemodynamics, fluid balance and neurologic status will be stable or improve  Note: Patient experiencing low BP, legs elevated while in bed, fluid intake encouraged, will continue to monitor.

## 2023-04-01 NOTE — PROGRESS NOTES
"Rehab Progress Note     Encounter Date: 4/1/2023    CC: L knee pain     Interval Events (Subjective)  Vitals reviewed, hypotensive, but asymptomatic   Labs reviewed: Anemia stable, elevated AST and ALT.  Patient seen and examined at bedside. Denies light headedness or dizziness. Reports pain is relatively controlled, does not impair function with therapy. Reports needing to adjust her leg in bed frequnetly due to stiffness and discomfort. Improves with adjusting pillow under knee.   Reviewed patient's hypotension, denies fatigue. Reports feeling well. Enjoying therapy, no complaints.   Does not report HA, lightheadedness, SOB, CP, abdominal pain, or changes in numbness/tingling/weakness.       Objective:  Physical Exam:  Vitals: BP 98/51   Pulse 67   Temp 36.8 °C (98.2 °F) (Oral)   Resp 17   Ht 1.727 m (5' 8\")   Wt 76.7 kg (169 lb)   SpO2 93%   Gen: NAD  Head:  NC/AT  Eyes/ Nose/ Mouth: PERRLA, moist mucous membranes  Cardio: RRR, good distal perfusion, warm extremities  Pulm: normal respiratory effort, no cyanosis, on RA   Abd: Soft NTND,   Ext: No peripheral edema. + left knee swelling No calf tenderness. No clubbing.    Mental status:  A&Ox4 (person, place, date, situation) answers questions appropriately follows commands  Speech: fluent, no aphasia or dysarthria          Recent Results (from the past 72 hour(s))   COV-2, FLU A/B, AND RSV BY PCR (2-4 HOURS CEPHEID): Collect NP swab in [facility]    Collection Time: 03/31/23  1:50 PM    Specimen: Respirate   Result Value Ref Range    Influenza virus A RNA Negative Negative    Influenza virus B, PCR Negative Negative    RSV, PCR Negative Negative    SARS-CoV-2 by PCR NotDetected     SARS-CoV-2 Source NP Swab    CBC with Differential    Collection Time: 04/01/23  5:44 AM   Result Value Ref Range    WBC 6.9 4.8 - 10.8 K/uL    RBC 3.54 (L) 4.20 - 5.40 M/uL    Hemoglobin 11.4 (L) 12.0 - 16.0 g/dL    Hematocrit 34.8 (L) 37.0 - 47.0 %    MCV 98.3 (H) 81.4 - 97.8 fL    MCH " 32.2 27.0 - 33.0 pg    MCHC 32.8 (L) 33.6 - 35.0 g/dL    RDW 46.5 35.9 - 50.0 fL    Platelet Count 143 (L) 164 - 446 K/uL    MPV 11.5 9.0 - 12.9 fL    Neutrophils-Polys 55.20 44.00 - 72.00 %    Lymphocytes 20.10 (L) 22.00 - 41.00 %    Monocytes 10.70 0.00 - 13.40 %    Eosinophils 12.70 (H) 0.00 - 6.90 %    Basophils 0.90 0.00 - 1.80 %    Immature Granulocytes 0.40 0.00 - 0.90 %    Nucleated RBC 0.00 /100 WBC    Neutrophils (Absolute) 3.78 2.00 - 7.15 K/uL    Lymphs (Absolute) 1.38 1.00 - 4.80 K/uL    Monos (Absolute) 0.73 0.00 - 0.85 K/uL    Eos (Absolute) 0.87 (H) 0.00 - 0.51 K/uL    Baso (Absolute) 0.06 0.00 - 0.12 K/uL    Immature Granulocytes (abs) 0.03 0.00 - 0.11 K/uL    NRBC (Absolute) 0.00 K/uL   Comp Metabolic Panel (CMP)    Collection Time: 04/01/23  5:44 AM   Result Value Ref Range    Sodium 137 135 - 145 mmol/L    Potassium 3.6 3.6 - 5.5 mmol/L    Chloride 105 96 - 112 mmol/L    Co2 22 20 - 33 mmol/L    Anion Gap 10.0 7.0 - 16.0    Glucose 107 (H) 65 - 99 mg/dL    Bun 19 8 - 22 mg/dL    Creatinine 0.70 0.50 - 1.40 mg/dL    Calcium 8.5 8.5 - 10.5 mg/dL    AST(SGOT) 106 (H) 12 - 45 U/L    ALT(SGPT) 177 (H) 2 - 50 U/L    Alkaline Phosphatase 261 (H) 30 - 99 U/L    Total Bilirubin 1.1 0.1 - 1.5 mg/dL    Albumin 2.7 (L) 3.2 - 4.9 g/dL    Total Protein 6.6 6.0 - 8.2 g/dL    Globulin 3.9 (H) 1.9 - 3.5 g/dL    A-G Ratio 0.7 g/dL   Magnesium    Collection Time: 04/01/23  5:44 AM   Result Value Ref Range    Magnesium 2.1 1.5 - 2.5 mg/dL   CORRECTED CALCIUM    Collection Time: 04/01/23  5:44 AM   Result Value Ref Range    Correct Calcium 9.5 8.5 - 10.5 mg/dL   ESTIMATED GFR    Collection Time: 04/01/23  5:44 AM   Result Value Ref Range    GFR (CKD-EPI) 85 >60 mL/min/1.73 m 2       Current Facility-Administered Medications   Medication Frequency    hydrOXYzine HCl (ATARAX) tablet 50 mg Q6HRS PRN    melatonin tablet 3 mg HS PRN    Respiratory Therapy Consult Continuous RT    acetaminophen (Tylenol) tablet 650 mg  Q4HRS PRN    lactulose 20 GM/30ML solution 30 mL QDAY PRN    docusate sodium (ENEMEEZ) enema 283 mg QDAY PRN    carboxymethylcellulose (REFRESH TEARS) 0.5 % ophthalmic drops 1 Drop PRN    benzocaine-menthol (Cepacol) lozenge 1 Lozenge Q2HRS PRN    mag hydrox-al hydrox-simeth (MAALOX PLUS ES or MYLANTA DS) suspension 20 mL Q2HRS PRN    ondansetron (ZOFRAN ODT) dispertab 4 mg 4X/DAY PRN    Or    ondansetron (ZOFRAN) syringe/vial injection 4 mg 4X/DAY PRN    sodium chloride (OCEAN) 0.65 % nasal spray 2 Spray PRN    senna-docusate (PERICOLACE or SENOKOT S) 8.6-50 MG per tablet 2 Tablet BID    And    polyethylene glycol/lytes (MIRALAX) PACKET 1 Packet QDAY PRN    And    magnesium hydroxide (MILK OF MAGNESIA) suspension 30 mL QDAY PRN    And    bisacodyl (DULCOLAX) suppository 10 mg QDAY PRN    aspirin EC (ECOTRIN) tablet 81 mg BID    atorvastatin (LIPITOR) tablet 40 mg Nightly    busPIRone (BUSPAR) tablet 15 mg TID    methenamine hip (HIPPREX) tablet 1 g BID    oxybutynin SR (DITROPAN-XL) tablet 15 mg Q EVENING    oxyCODONE immediate-release (ROXICODONE) tablet 2.5 mg Q3HRS PRN    Or    oxyCODONE immediate-release (ROXICODONE) tablet 5 mg Q3HRS PRN    valsartan (DIOVAN) tablet 160 mg DAILY    venlafaxine XR (EFFEXOR XR) capsule 225 mg QDAY with Breakfast    omeprazole (PRILOSEC) capsule 40 mg QAM AC    acetaminophen (TYLENOL) tablet 1,000 mg TID       Orders Placed This Encounter   Procedures    Diet Order Diet: Regular     Standing Status:   Standing     Number of Occurrences:   1     Order Specific Question:   Diet:     Answer:   Regular [1]       Assessment:  Active Hospital Problems    Diagnosis     *S/P total knee arthroplasty, left     Benign essential hypertension     Gastroesophageal reflux disease without esophagitis     History of CVA (cerebrovascular accident)     OAB (overactive bladder)     Other specified anemias     Normal pressure hydrocephalus (HCC)     History of recurrent urinary tract infection      Endogenous depression (HCC)        Medical Decision Making and Plan:  S/p L knee arthoplasty   - 3/28 L TKA performed by Dr. Marcus PIKE WBAT   - initiate comprehensive IRF level therapy with 3hrs of therapy 5 days a week   - continue with PT/OT     Normal pressure hydrocephalus  Gait impairments  Short-term memory impairments  Status post  shunt placement 1/2022 with Dr. Blanchard  Per review of outpatient neurology documentations, last imaging in November 2022 showed decreased in size of ventricles, and patient's memory/gait had slightly improved  Speech therapy assessment/treatment     Hypertension  Continue valsartan  -4/1 was held in AM due to hypotension, may need decreased dose tomorrow   - asymptomatic hypotension     ABLA   - Hgb stable at 11.4   - recheck labs on Monday      Transaminates   - AST an ALT evleated on admit labs  - decreased tyelnol dose   - recheck CMP on Sunday     Overactive bladder  Continue oxybutynin     Frequent UTIs  Continue methenamine     History of depression  Continue buspirone  Continue venlafaxine     Hyperlipidemia  Continue atorvastatin     History of stroke  Continue aspirin and atorvastatin     GERD  Continue omeprazole    Bowel   - constipation prevention, continue with scheduled bowel meds   - last BM 3/30     DVT ppx: ASA 81 mg BID     Total time:  36 minutes.  I spent greater than 50% of the time for patient care and coordination on this date, including unit/floor time, and face-to-face time with the patient as per assessment and plan above including discussing plans for repeat labs tomorrow to check LFTs .    Corrie Colby D.O.

## 2023-04-01 NOTE — CARE PLAN
"  Problem: Knowledge Deficit - Standard  Goal: Patient and family/care givers will demonstrate understanding of plan of care, disease process/condition, diagnostic tests and medications  Note: D/C droplet isolation for negative covid test result, verified with CN. Patient notified. Received patient with BP-81/48, 78/49. Patient responsive and asymptomatic. Gave 120 ml of water and re-check BP manual 90/50 supine. CN and on call Dr. Colby notified. Re-check V/S (see flow sheet) after giving due Ditropan and on call MD notified. No new orders received. CN notified. Will continue to monitor.     Problem: Infection  Goal: Patient will remain free from infection  Note: ABT for UTI, encouraged increase fluids intake, will monitor.     Problem: Fall Risk - Rehab  Goal: Patient will remain free from falls  Note: Diana Becker Fall risk Assessment Score: 11      Moderate fall risk Interventions  - Bed and strip alarm   - Yellow sign by the door   - Yellow wrist band \"Fall risk\"  - Room near to the nurse station  - Do not leave patient unattended in the bathroom  - Fall risk education provided           The patient is Watcher - Medium risk of patient condition declining or worsening    Shift Goals  Clinical Goals: safety  Patient Goals: pain mangement        "

## 2023-04-01 NOTE — THERAPY
"Occupational Therapy   Initial Evaluation     Patient Name: Aaliyah Fulton  Age:  83 y.o., Sex:  female  Medical Record #: [id number]  Today's Date: 4/1/2023     Subjective    \"You must have the patience of Job\"  Pt pleasant and motivated to participate in OT     Objective     04/01/23 0701   OT Charge Group   Charges Yes   OT Self Care / ADL (Units) 1   OT Evaluation OT Evaluation Mod   OT Total Time Spent   OT Individual Total Time Spent (Mins) 60   Prior Living Situation   Housing / Facility 1 Story House   Steps Into Home 2   Rail Both Rail (Steps into Home)   Bathroom Set up Bathtub / Shower Combination;Grab Bars   Equipment Owned 4-Wheel Walker;Single Point Cane   Lives with - Patient's Self Care Capacity Spouse   Prior Level of ADL Function   Self Feeding Independent   Grooming / Hygiene Independent   Bathing Independent   Dressing Independent   Toileting Independent   Prior Level of IADL Function   Laundry Independent   Kitchen Mobility Independent   Home Management Independent   Prior Level Of Mobility Independent With Device in Community   Prior Functioning: Everyday Activities   Self Care Independent   Indoor Mobility (Ambulation) Independent   Stairs Independent   Functional Cognition Independent   Prior Device Use Walker  (Cane)   Pain 0 - 10 Group   Location Knee   Location Orientation Left   ABS (Agitated Behavior Scale)   Agitated Behavior Scale Performed No   Cognitive Pattern Assessment   Cognitive Pattern Assessment Used BIMS   Brief Interview for Mental Status (BIMS)   Repetition of Three Words (First Attempt) 3   Temporal Orientation: Year Correct   Temporal Orientation: Month Accurate within 5 days   Temporal Orientation: Day No answer   Recall: \"Sock\" No, could not recall   Recall: \"Blue\" No, could not recall   Recall: \"Bed\" Yes, after cueing (\"a piece of furniture\")   BIMS Summary Score 9   Confusion Assessment Method (CAM)   Is there evidence of an acute change in mental status from the " patient's baseline? No   Inattention Behavior not present   Disorganized thinking Behavior not present   Altered level of consciousness Behavior not present   Vision Screen   Vision Not tested   Balance Assessment   Sitting Balance (Static) Good   Sitting Balance (Dynamic) Good   Standing Balance (Static) Fair +   Standing Balance (Dynamic) Fair +   Bed Mobility    Supine to Sit Minimal Assist   Sit to Supine   (Pt left up in chair)   Sit to Stand Minimal Assist   Scooting Contact Guard Assist   Rolling Minimal Assist to Rt.   Coordination Upper Body   Coordination WDL   Eating   Assistance Needed Set-up / clean-up   CARE Score - Eating 5   Eating Discharge Goal   Discharge Goal 6   Oral Hygiene   Assistance Needed Set-up / clean-up   CARE Score - Oral Hygiene 5   Oral Hygiene Discharge Goal   Discharge Goal 6   Shower/Bathe Self   Assistance Needed Verbal cues   CARE Score - Shower/Bathe Self 4   Shower/Bathe Self Discharge Goal   Discharge Goal 5   Upper Body Dressing   Assistance Needed Set-up / clean-up   CARE Score - Upper Body Dressing 5   Upper Body Dressing Discharge Goal   Discharge Goal 6   Lower Body Dressing   Assistance Needed Physical assistance   Physical Assistance Level 76% or more   CARE Score - Lower Body Dressing 2   Lower Body Dressing Discharge Goal   Discharge Goal 5   Putting On/Taking Off Footwear   Assistance Needed Physical assistance   Physical Assistance Level 76% or more   CARE Score - Putting On/Taking Off Footwear 2   Putting On/Taking Off Footwear Discharge Goal   Discharge Goal 5   Toileting Hygiene   Assistance Needed Physical assistance   Physical Assistance Level 25% or less   CARE Score - Toileting Hygiene 3   Toileting Hygiene Discharge Goal   Discharge Goal 5   Toilet Transfer   Assistance Needed Physical assistance   Physical Assistance Level 25% or less   CARE Score - Toilet Transfer 3   Toilet Transfer Discharge Goal   Discharge Goal 5   Hearing, Speech, and Vision    Ability to Hear Adequate   Ability to See in Adequate Light Adequate   Expression of Ideas and Wants Without difficulty   Understanding Verbal and Non-Verbal Content Understands   Functional Level of Assist   Eating Stand by Assist   Eating Description Supervision for safety   Grooming Standby Assist;Seated   Grooming Description Increased time;Initial preparation for task;Seated in wheelchair at sink;Supervision for safety;Verbal cueing   Bathing Standby Assist   Bathing Description Grab bar;Hand held shower;Increased time;Initial preparation for task;Set-up of equipment;Set up for shower sleeve;Supervision for safety;Verbal cueing   Upper Body Dressing Stand by Assist   Upper Body Dressing Description Increased time;Initial preparation for task;Supervision for safety;Verbal cueing   Lower Body Dressing Maximal Assist   Lower Body Dressing Description Increased time;Initial preparation for task;Set-up of equipment;Supervision for safety;Verbal cueing  (Seated in w/c; grab bar use for standing balance in pt bathroom)   Toileting Minimal Assist   Toileting Description Assist for standing balance;Grab bar;Increased time;Initial preparation for task;Set-up of equipment;Supervision for safety;Verbal cueing   Bed, Chair, Wheelchair Transfer Minimal Assist   Bed Chair Wheelchair Transfer Description Set-up of equipment;Squat pivot transfer to wheelchair;Supervision for safety;Verbal cueing   Toilet Transfers Minimal Assist   Toilet Transfer Description Grab bar;Initial preparation for task;Set-up of equipment;Supervision for safety;Verbal cueing   Tub / Shower Transfers Minimal Assist   Tub Shower Transfer Description Grab bar;Shower bench;Initial preparation for task;Set-up of equipment;Supervision for safety;Verbal cueing   Problem List   Problem List Decreased Active Daily Living Skills;Decreased Homemaking Skills;Decreased Functional Mobility;Decreased Activity Tolerance;Impaired Postural Control / Balance    Precautions   Precautions Weight Bearing As Tolerated Left Lower Extremity   Comments s/p L TKA   Current Discharge Plan   Current Discharge Plan Return to Prior Living Situation   Benefit    Therapy Benefit Patient Would Benefit from Inpatient Rehab Occupational Therapy to Maximize [place] with ADLs, IADLs and Functional Mobility.   Interdisciplinary Plan of Care Collaboration   Patient Position at End of Therapy Seated;Chair Alarm On;Self Releasing Lap Belt Applied;Call Light within Reach;Tray Table within Reach   Strengths & Barriers   Strengths Able to follow instructions;Alert and oriented;Effective communication skills;Independent prior level of function;Motivated for self care and independence;Pleasant and cooperative;Willingly participates in therapeutic activities   Barriers Decreased endurance;Fatigue;Generalized weakness;Impaired activity tolerance;Impaired balance;Limited mobility;Pain     Assessment  Patient is 83 y.o. female with a diagnosis of L TKA. Additional factors influencing patient status / progress (ie: cognitive factors, co-morbidities, social support, etc): breast cancer, cervical fracture, hip fracture, hypertension, depression, stroke, chronic urinary tract infections, overactive bladder, normal pressure hydrocephalus s/p  shunt placement.  Pt lives w/ her  and has supportive children. Pt was I w/ AD prior to admission. Pt demonstrates deficits in functional transfers, standing balance, and LB dressing. Pt would benefit from OT in order to address noted deficits. Continue OT POC.    Plan  Recommend Occupational Therapy 30-60 minutes per day 5-7 days per week for 2 weeks for the following treatments:  OT Group Therapy, OT Self Care/ADL, OT Manual Ther Technique, OT Neuro Re-Ed/Balance, OT Therapeutic Activity, and OT Therapeutic Exercise.    Passport items to be completed:  Perform bathroom transfers, complete dressing, complete feeding, get ready for the day, prepare a  simple meal, participate in household tasks, adapt home for safety needs, demonstrate home exercise program, complete caregiver training     Goals:  Long term and short term goals have been discussed with patient and they are in agreement.    Occupational Therapy Goals (Active)       Problem: Dressing       Dates: Start: 04/01/23         Goal: STG-Within one week, patient will dress LB w/ mod A and AE       Dates: Start: 04/01/23               Problem: Functional Transfers       Dates: Start: 04/01/23         Goal: STG-Within one week, patient will complete transfers w/ CGA       Dates: Start: 04/01/23               Problem: IADL's       Dates: Start: 04/01/23         Goal: STG-Within one week, patient will utilize washer and dryer w/ min A       Dates: Start: 04/01/23               Problem: OT Long Term Goals       Dates: Start: 04/01/23         Goal: LTG-By discharge, patient will complete basic self care tasks w/ SBA and AE as needed       Dates: Start: 04/01/23            Goal: LTG-By discharge, patient will complete basic home management w/ SBA       Dates: Start: 04/01/23               Problem: Toileting       Dates: Start: 04/01/23         Goal: STG-Within one week, patient will complete toileting tasks w/ CGA       Dates: Start: 04/01/23

## 2023-04-01 NOTE — THERAPY
"Speech Language Pathology   Initial Assessment     Patient Name: Aaliyah Fulton  AGE:  83 y.o., SEX:  female  Medical Record #: [id number]  Today's Date: 4/1/2023     Subjective    Patient seen at bedside.  She was pleasant and agreeable.     Objective       04/01/23 1301   Evaluation Charges   Charges Yes   SLP Total Time Spent   SLP Individual Total Time Spent (Mins) 60   Precautions   Precautions Weight Bearing As Tolerated Left Lower Extremity   Prior Living Situation   Prior Services Home With Outpatient Therapy   Housing / Facility 1 Story House   Lives with - Patient's Self Care Capacity Spouse   Prior Level Of Function   Communication Impaired  (Patient reports she is worried about her memory and that it has been declining for past 3-4 mos or longer.)   Hearing Impaired Both Ears  (mild hearing impairment.)   Vision Reading    Patient's Primary Language English   Cognition   Cognitive-Linguistic (WDL) X   Prospective Memory Severe (2)   ABS (Agitated Behavior Scale)   Agitated Behavior Scale Performed No   Cognitive Pattern Assessment   Cognitive Pattern Assessment Used BIMS   Brief Interview for Mental Status (BIMS)   Repetition of Three Words (First Attempt) 3   Temporal Orientation: Year Correct   Temporal Orientation: Month Accurate within 5 days   Temporal Orientation: Day Correct   Recall: \"Sock\" Yes, no cue required   Recall: \"Blue\" Yes, no cue required   Recall: \"Bed\" Yes, no cue required   BIMS Summary Score 15   Swallowing/Nutritional Status   Swallowing/Nutritional Status Regular food   Functional Level of Assist   Comprehension Supervision   Comprehension Description Magnifying glass;Verbal cues   Expression Modified Independent   Expression Description Other (comment)  (extra time)   Social Interaction Independent   Problem Solving Minimal Assist   Problem Solving Description Bed/chair alarm;Increased time;Seat belt;Supervision;Therapy schedule;Verbal cueing   Memory Moderate Assist   Memory " Description Bed/chair alarm;Increased time;Seat belt;Supervision;Therapy schedule;Verbal cueing   Outcome Measures   Outcome Measures Utilized SCCAN   SCCAN (Scales of Cognitive and Communicative Ability for Neurorehabilitation)   Oral Expression - Raw Score 17   Oral Expression - Scale Performance Score 89   Orientation - Raw Score 11   Orientation - Scale Performance Score 92   Memory - Raw Score 7   Memory - Scale Performance Score 37   Speech Comprehension - Raw Score 11   Speech Comprehension - Scale Performance Score 85   Reading Comprehension - Raw Score 11   Reading Comprehension - Scale Performance Score 92   Writing - Raw Score 6   Writing - Scale Performance Score 86   Attention - Raw Score 12   Attention - Scale Performance Score 75   Problem Solving - Raw Score 20   Problem Solving - Scale Performance Score 87   SCCAN Total Raw Score 74   SCCAN Degree of Severity Mild Impairment   Problem List   Problem List Attention Deficit;Memory Deficit   Current Discharge Plan   Current Discharge Plan Return to Prior Living Situation   Benefit   Therapy Benefit Patient would benefit from Inpatient Rehab Speech-Language Pathology to address above identified deficits.   Interdisciplinary Plan of Care Collaboration   IDT Collaboration with  Family / Caregiver   Collaboration Comments Daughter came in at end of session.  She reports that she fills patient's medication box and that patient takes them.  Daughter double checks.   Strengths & Barriers   Strengths Alert and oriented;Willingly participates in therapeutic activities;Pleasant and cooperative;Supportive family   Barriers Impaired carryover of learning;Impaired functional cognition   Speech Language Pathologist Assigned   Assigned SLP / Extension LM 30 cog         Assessment    Patient is a 83 y.o. female  with a past medical history of breast cancer, cervical fracture, hip fracture, hypertension, depression, stroke, chronic urinary tract infections, overactive  bladder, normal pressure hydrocephalus s/p  shunt placement, admitted to [facility] on 3/28, for an elective left total knee replacement due to severe osteoarthritis.  Surgery occurred on 3/28 by Dr. Marcus Hall.  She is weightbearing as tolerated.  Postoperatively patient with acute blood loss anemia with hemoglobin drop from 13.0-11.4.Additional factors influencing patient status/progress (ie: cognitive factors, co-morbidities, social support, etc): Patient reports that she is concerned about declining memory skills for past 3-4 mos. Daughter came in at end of session.  She reports that she fills patient's medication box and that patient takes them.  Daughter double checks.  Family supportive.     Patient was seen for cognitive linguistic evaluation.  SCCAN administered.  Patient achieved a total raw score of 74 characteristic of an overall mild cognitive linguistic impairement.  Patient achieved the following percentage scores for given subtests:  Oral Expression 89, Orientation 92, Memory 37, Speech Comprehension 85, Reading Comprehension 92, Writing 86, Attention 75, Problem solving 87.  Patient displayed severe impairment of memory skill.   She also displays mild impairment of attention.     Plan  Recommend Speech Therapy 30-60 minutes per day 5-6 days per week for 1-2 weeks for the following treatments:  SLP Self Care / ADL Training , SLP Cognitive Skill Development, and SLP Group Treatment.    Passport items to be completed:  Express basic needs, understand food/liquid recommendations, consistently follow swallow precautions, manage finances, manage medications, arrive to therapy appointments on time, complete daily memory log entries, solve problems related to safety situations, review education related to hospitalization, complete caregiver training     Goals:  Long term and short term goals have been discussed with patient and they are in agreement.    Speech Therapy Problems (Active)        Problem: Memory STGs       Dates: Start: 04/01/23         Goal: STG-Within one week, patient will recall daily events with use of external memory aide and compensatory strategies with 50% acc with mod A.       Dates: Start: 04/01/23               Problem: Problem Solving STGs       Dates: Start: 04/01/23         Goal: STG-Within one week, patient will perform medication replica sorting execises with 80% acc with min A.       Dates: Start: 04/01/23               Problem: Speech/Swallowing LTGs       Dates: Start: 04/01/23         Goal: LTG-By discharge, patient will solve basic problems with 80% acc min A. for safe discharge home.       Dates: Start: 04/01/23

## 2023-04-01 NOTE — THERAPY
"Physical Therapy   Initial Evaluation     Patient Name: Aaliyah Fulton  Age:  83 y.o., Sex:  female  Medical Record #: [id number]  Today's Date: 4/1/2023     Subjective    \"I'm okay\"     Objective       04/01/23 0831   PT Charge Group   PT Evaluation PT Evaluation Mod   PT Total Time Spent   PT Individual Total Time Spent (Mins) 60   Prior Living Situation   Prior Services Home With Outpatient Therapy   Housing / Facility 1 Story House   Steps Into Home 2   Steps In Home 0   Rail Both Rail (Steps into Home)   Equipment Owned 4-Wheel Walker;Single Point Cane   Lives with - Patient's Self Care Capacity Spouse   Comments pt utilized 4WW for all functional mobility   Prior Level of Functional Mobility   Bed Mobility Independent   Transfer Status Independent   Ambulation Independent   Distance Ambulation (Feet)   (community distances)   Assistive Devices Used 4-Wheel Walker   Stairs Independent   Comments pt reports frequently falling prior to using 4WW   Prior Functioning: Everyday Activities   Self Care Independent   Indoor Mobility (Ambulation) Independent   Stairs Independent   Functional Cognition Independent   Prior Device Use Walker   Pain 0 - 10 Group   Location Knee   Location Orientation Left   Pain Rating Scale (NPRS) 0  (at rest)   Therapist Pain Assessment   (pt with discomfort and pain during all mobility, relief only when supine with LLE elevated)   Passive ROM Lower Body   Lt Knee Flexion Degrees 75   Lt Knee Extension Degrees   (lack 15-20 deg)   Strength Lower Body   Rt Hip Flexion Strength 5 (N)   Rt Knee Extension Strength 5 (N)   Rt Ankle Dorsiflexion Strength 5 (N)   Lt Hip Flexion Strength 3 (F)   Lt Knee Extension Strength 3- (F-)   Lt Ankle Dorsiflexion Strength 5 (N)   Sensation Lower Body   Lower Extremity Sensation   WDL   Comments skin C/D/I, swelling at L knee but no pitting edema   Balance Assessment   Sitting Balance (Static) Normal   Standing Balance (Static) Fair +   Bed Mobility  "   Supine to Sit Minimal Assist   Sit to Supine Standby Assist   Sit to Stand Moderate Assist  (to FWW)   Scooting Contact Guard Assist   Rolling Moderate Assist to Rt.;Minimum Assist to Lt.   Roll Left and Right   Assistance Needed Physical assistance   Physical Assistance Level 26%-50%   CARE Score - Roll Left and Right 3   Roll Left and Right Discharge Goal   Discharge Goal 6   Sit to Lying   Assistance Needed Supervision   CARE Score - Sit to Lying 4   Sit to Lying Discharge Goal   Discharge Goal 6   Lying to Sitting on Side of Bed   Assistance Needed Physical assistance   Physical Assistance Level 25% or less   CARE Score - Lying to Sitting on Side of Bed 3   Lying to Sitting on Side of Bed Discharge Goal   Discharge Goal 6   Sit to Stand   Assistance Needed Physical assistance   Physical Assistance Level 26%-50%   CARE Score - Sit to Stand 3   Sit to Stand Discharge Goal   Discharge Goal 6   Chair/Bed-to-Chair Transfer   Assistance Needed Physical assistance   Physical Assistance Level 26%-50%   CARE Score - Chair/Bed-to-Chair Transfer 3   Chair/Bed-to-Chair Transfer Discharge Goal   Discharge Goal 6   Car Transfer   Reason if not Attempted Medical concerns   CARE Score - Car Transfer 88   Car Transfer Discharge Goal   Discharge Goal 6   Walk 10 Feet   Reason if not Attempted Medical concerns   CARE Score - Walk 10 Feet 88   Walk 10 Feet Discharge Goal   Discharge Goal 6   Walk 50 Feet with Two Turns   Reason if not Attempted Medical concerns   CARE Score - Walk 50 Feet with Two Turns 88   Walk 50 Feet with Two Turns Discharge Goal   Discharge Goal 6   Walk 150 Feet   Reason if not Attempted Medical concerns   CARE Score - Walk 150 Feet 88   Walk 150 Feet Discharge Goal   Discharge Goal 6   Walking 10 Feet on Uneven Surfaces   Reason if not Attempted Medical concerns   CARE Score - Walking 10 Feet on Uneven Surfaces 88   Walking 10 Feet on Uneven Surfaces Discharge Goal   Discharge Goal 6   1 Step (Curb)    Reason if not Attempted Medical concerns   CARE Score - 1 Step (Curb) 88   1 Step (Curb) Discharge Goal   Discharge Goal 6   4 Steps   Reason if not Attempted Medical concerns   CARE Score - 4 Steps 88   4 Steps Discharge Goal   Discharge Goal 4   12 Steps   Reason if not Attempted Medical concerns   CARE Score - 12 Steps 88   12 Steps Discharge Goal   Discharge Goal 4   Picking Up Object   Reason if not Attempted Medical concerns   CARE Score - Picking Up Object 88   Picking Up Object Discharge Goal   Discharge Goal 6   Wheel 50 Feet with Two Turns   Assistance Needed Physical assistance   Physical Assistance Level Total assistance   CARE Score - Wheel 50 Feet with Two Turns 1   Wheel 50 Feet with Two Turns Discharge Goal   Discharge Goal 6   Wheel 150 Feet   Assistance Needed Physical assistance   Physical Assistance Level Total assistance   CARE Score - Wheel 150 Feet 1   Wheel 150 Feet Discharge Goal   Discharge Goal 6   Gait Functional Level of Assist    Gait Level Of Assist Moderate Assist  (attempted 1x bout of ambulation with FWW prior to // bar use, pt takes 1 step and needs to sit, limited by pain and poor LLE weight acceptance)   Assistive Device Parallel Bars   Distance (Feet) 5   # of Times Distance was Traveled 1   Deviation Antalgic;Decreased Base Of Support;Step To  (pt with minimal weight through LLE, often hopping forward or multiple R steps for every left step. MAX cues for encouragement, pt highly pain limited)   Wheelchair Functional Level of Assist   Wheelchair Assist Total Assist   Stairs Functional Level of Assist   Level of Assist with Stairs Unable to Participate   Transfer Functional Level of Assist   Bed, Chair, Wheelchair Transfer Moderate Assist  (SPT with FWW)   Bed Chair Wheelchair Transfer Description Assist with two limbs;Increased time;Initial preparation for task;Set-up of equipment;Verbal cueing  (cues for encouragement)   Problem List    Problems Pain;Impaired Bed  Mobility;Impaired Transfers;Impaired Ambulation;Functional ROM Deficit;Functional Strength Deficit;Impaired Balance;Decreased Activity Tolerance   Precautions   Precautions Weight Bearing As Tolerated Left Lower Extremity;Fall Risk   Current Discharge Plan   Current Discharge Plan Return to Prior Living Situation   Interdisciplinary Plan of Care Collaboration   IDT Collaboration with  Certified Nursing Assistant   Patient Position at End of Therapy In Bed;Bed Alarm On;Call Light within Reach   Benefit   Therapy Benefit Patient Would Benefit from Inpatient Rehabilitation Physical Therapy to Maximize Functional [place] with ADLs, IADLs and Mobility.   Strengths & Barriers   Strengths Able to follow instructions;Alert and oriented;Effective communication skills;Good carryover of learning;Good insight into deficits/needs;Independent prior level of function;Motivated for self care and independence;Pleasant and cooperative;Willingly participates in therapeutic activities   Barriers Fatigue;Generalized weakness;Impaired balance;Pain       Assessment  Patient is 83 y.o. female with a diagnosis of L TKA.  Additional factors influencing patient status / progress (ie: cognitive factors, co-morbidities, social support, etc): history of breast cancer, HTN, depression, CVA.      Plan  Recommend Physical Therapy  minutes per day 5-7 days per week for 10-14 days for the following treatments:  PT E Stim Attended, PT Gait Training, PT Therapeutic Exercises, PT Neuro Re-Ed/Balance, PT Therapeutic Activity, PT Manual Therapy, and PT Evaluation.    Passport items to be completed:  Get in/out of bed safely, in/out of a vehicle, safely use mobility device, walk or wheel around home/community, navigate up and down stairs, show how to get up/down from the ground, ensure home is accessible, demonstrate HEP, complete caregiver training    Goals:  Long term and short term goals have been discussed with patient and they are in  "agreement.    Physical Therapy Problems (Active)       Problem: Mobility       Dates: Start: 04/01/23         Goal: STG-Within one week, patient will ambulate household distance of 50ft with FWW and Prince with w/c follow       Dates: Start: 04/01/23            Goal: STG-Within one week, patient will ambulate up/down a curb with FWW and modA       Dates: Start: 04/01/23            Goal: STG-Within one week, patient will ascend and descend four 6\" stairs with Prince and B HR       Dates: Start: 04/01/23               Problem: Mobility Transfers       Dates: Start: 04/01/23         Goal: STG-Within one week, patient will sit to stand to FWW with CG consistently       Dates: Start: 04/01/23               Problem: PT-Long Term Goals       Dates: Start: 04/01/23         Goal: LTG-By discharge, patient will ambulate 150ft with FWW Sid       Dates: Start: 04/01/23            Goal: LTG-By discharge, patient will transfer one surface to another with FWW Sid       Dates: Start: 04/01/23            Goal: LTG-By discharge, patient will ambulate up/down flight of stairs with B HR and SV       Dates: Start: 04/01/23            Goal: LTG-By discharge, patient will transfer in/out of a car with FWW Sid       Dates: Start: 04/01/23              "

## 2023-04-02 ENCOUNTER — APPOINTMENT (OUTPATIENT)
Dept: OCCUPATIONAL THERAPY | Facility: REHABILITATION | Age: 83
DRG: 560 | End: 2023-04-02
Attending: PHYSICAL MEDICINE & REHABILITATION
Payer: MEDICARE

## 2023-04-02 LAB
ALBUMIN SERPL BCP-MCNC: 3.2 G/DL (ref 3.2–4.9)
ALBUMIN/GLOB SERPL: 0.9 G/DL
ALP SERPL-CCNC: 262 U/L (ref 30–99)
ALT SERPL-CCNC: 125 U/L (ref 2–50)
ANION GAP SERPL CALC-SCNC: 11 MMOL/L (ref 7–16)
AST SERPL-CCNC: 60 U/L (ref 12–45)
BILIRUB SERPL-MCNC: 0.8 MG/DL (ref 0.1–1.5)
BUN SERPL-MCNC: 21 MG/DL (ref 8–22)
CALCIUM ALBUM COR SERPL-MCNC: 9.2 MG/DL (ref 8.5–10.5)
CALCIUM SERPL-MCNC: 8.6 MG/DL (ref 8.5–10.5)
CHLORIDE SERPL-SCNC: 107 MMOL/L (ref 96–112)
CO2 SERPL-SCNC: 21 MMOL/L (ref 20–33)
CREAT SERPL-MCNC: 0.63 MG/DL (ref 0.5–1.4)
ERYTHROCYTE [DISTWIDTH] IN BLOOD BY AUTOMATED COUNT: 47.2 FL (ref 35.9–50)
GFR SERPLBLD CREATININE-BSD FMLA CKD-EPI: 87 ML/MIN/1.73 M 2
GLOBULIN SER CALC-MCNC: 3.4 G/DL (ref 1.9–3.5)
GLUCOSE SERPL-MCNC: 105 MG/DL (ref 65–99)
HCT VFR BLD AUTO: 37.1 % (ref 37–47)
HGB BLD-MCNC: 11.9 G/DL (ref 12–16)
MCH RBC QN AUTO: 32.2 PG (ref 27–33)
MCHC RBC AUTO-ENTMCNC: 32.1 G/DL (ref 33.6–35)
MCV RBC AUTO: 100.3 FL (ref 81.4–97.8)
PLATELET # BLD AUTO: 189 K/UL (ref 164–446)
PMV BLD AUTO: 10.9 FL (ref 9–12.9)
POTASSIUM SERPL-SCNC: 4.1 MMOL/L (ref 3.6–5.5)
PROT SERPL-MCNC: 6.6 G/DL (ref 6–8.2)
RBC # BLD AUTO: 3.7 M/UL (ref 4.2–5.4)
SODIUM SERPL-SCNC: 139 MMOL/L (ref 135–145)
WBC # BLD AUTO: 6.4 K/UL (ref 4.8–10.8)

## 2023-04-02 PROCEDURE — 85027 COMPLETE CBC AUTOMATED: CPT

## 2023-04-02 PROCEDURE — A9270 NON-COVERED ITEM OR SERVICE: HCPCS | Performed by: PHYSICAL MEDICINE & REHABILITATION

## 2023-04-02 PROCEDURE — 97530 THERAPEUTIC ACTIVITIES: CPT

## 2023-04-02 PROCEDURE — 700102 HCHG RX REV CODE 250 W/ 637 OVERRIDE(OP): Performed by: PHYSICAL MEDICINE & REHABILITATION

## 2023-04-02 PROCEDURE — 94760 N-INVAS EAR/PLS OXIMETRY 1: CPT

## 2023-04-02 PROCEDURE — 770010 HCHG ROOM/CARE - REHAB SEMI PRIVAT*

## 2023-04-02 PROCEDURE — 80053 COMPREHEN METABOLIC PANEL: CPT

## 2023-04-02 PROCEDURE — 36415 COLL VENOUS BLD VENIPUNCTURE: CPT

## 2023-04-02 RX ADMIN — ACETAMINOPHEN 500 MG: 500 TABLET ORAL at 08:14

## 2023-04-02 RX ADMIN — SENNOSIDES AND DOCUSATE SODIUM 2 TABLET: 50; 8.6 TABLET ORAL at 21:38

## 2023-04-02 RX ADMIN — VALSARTAN 160 MG: 80 TABLET, FILM COATED ORAL at 08:09

## 2023-04-02 RX ADMIN — METHENAMINE HIPPURATE 1 G: 1 TABLET ORAL at 21:38

## 2023-04-02 RX ADMIN — ASPIRIN 81 MG: 81 TABLET, COATED ORAL at 08:10

## 2023-04-02 RX ADMIN — METHENAMINE HIPPURATE 1 G: 1 TABLET ORAL at 08:10

## 2023-04-02 RX ADMIN — OMEPRAZOLE 40 MG: 20 CAPSULE, DELAYED RELEASE ORAL at 08:09

## 2023-04-02 RX ADMIN — ACETAMINOPHEN 500 MG: 500 TABLET ORAL at 21:38

## 2023-04-02 RX ADMIN — OXYBUTYNIN CHLORIDE 15 MG: 5 TABLET, EXTENDED RELEASE ORAL at 21:38

## 2023-04-02 RX ADMIN — BUSPIRONE HYDROCHLORIDE 15 MG: 10 TABLET ORAL at 08:10

## 2023-04-02 RX ADMIN — BUSPIRONE HYDROCHLORIDE 15 MG: 10 TABLET ORAL at 21:38

## 2023-04-02 RX ADMIN — SENNOSIDES AND DOCUSATE SODIUM 2 TABLET: 50; 8.6 TABLET ORAL at 08:10

## 2023-04-02 RX ADMIN — ASPIRIN 81 MG: 81 TABLET, COATED ORAL at 21:38

## 2023-04-02 RX ADMIN — ACETAMINOPHEN 500 MG: 500 TABLET ORAL at 14:27

## 2023-04-02 RX ADMIN — BENZOCAINE AND MENTHOL 1 LOZENGE: 15; 3.6 LOZENGE ORAL at 13:33

## 2023-04-02 RX ADMIN — BUSPIRONE HYDROCHLORIDE 15 MG: 10 TABLET ORAL at 14:27

## 2023-04-02 RX ADMIN — ATORVASTATIN CALCIUM 40 MG: 40 TABLET, FILM COATED ORAL at 21:38

## 2023-04-02 RX ADMIN — VENLAFAXINE HYDROCHLORIDE 225 MG: 75 CAPSULE, EXTENDED RELEASE ORAL at 08:09

## 2023-04-02 ASSESSMENT — LIFESTYLE VARIABLES: EVER_SMOKED: NEVER

## 2023-04-02 ASSESSMENT — PAIN DESCRIPTION - PAIN TYPE
TYPE: ACUTE PAIN
TYPE: ACUTE PAIN

## 2023-04-02 NOTE — FLOWSHEET NOTE
04/02/23 1119   Events/Summary/Plan   Events/Summary/Plan RT Assessment   Vital Signs   Pulse 76   Respiration 16   Pulse Oximetry 93 %   $ Pulse Oximetry (Spot Check) Yes   Respiratory Assessment   Respiratory Pattern Within Normal Limits   Level of Consciousness Alert   Chest Exam   Work Of Breathing / Effort Within Normal Limits   Oxygen   O2 Delivery Device None - Room Air   Smoking History   Have you ever smoked Never        04/02/23 1119   Events/Summary/Plan   Events/Summary/Plan RT Assessment   Vital Signs   Pulse 76   Respiration 16   Pulse Oximetry 93 %   $ Pulse Oximetry (Spot Check) Yes   Respiratory Assessment   Respiratory Pattern Within Normal Limits   Level of Consciousness Alert   Chest Exam   Work Of Breathing / Effort Within Normal Limits   Oxygen   O2 Delivery Device None - Room Air   Smoking History   Have you ever smoked Never

## 2023-04-02 NOTE — CARE PLAN
"The patient is Stable - Low risk of patient condition declining or worsening    Shift Goals  Clinical Goals: safety  Patient Goals: pain mangement    Progress made toward(s) clinical / shift goals:    Problem: Fall Risk - Rehab  Goal: Patient will remain free from falls  Outcome: Progressing     Diana Becker Fall risk Assessment : 11    Moderate fall risk Interventions  - Bed and strip alarm   - Yellow sign by the door   - Yellow wrist band \"Fall risk\"  - Room near to the nurse station  - Do not leave patient unattended in the bathroom  - Fall risk education provided    Pt uses call light consistently and appropriately. Waits for assistance does not attempt self transfer this shift. Able to verbalize needs.    "

## 2023-04-02 NOTE — CARE PLAN
"  Problem: Infection  Goal: Patient will remain free from infection  Note: ABT for UTI, encouraged increase fluids intake. Will monitor.     Problem: Fall Risk - Rehab  Goal: Patient will remain free from falls  Note: Diana Becker Fall risk Assessment Score: 11      Moderate fall risk Interventions  - Bed and strip alarm   - Yellow sign by the door   - Yellow wrist band \"Fall risk\"  - Room near to the nurse station  - Do not leave patient unattended in the bathroom  - Fall risk education provided           The patient is Watcher - Medium risk of patient condition declining or worsening    Shift Goals  Clinical Goals: safety, monitor PVRs  Patient Goals: pain mangement      "

## 2023-04-02 NOTE — THERAPY
Occupational Therapy  Daily Treatment     Patient Name: Aaliyah Fulton  Age:  83 y.o., Sex:  female  Medical Record #: [id number]  Today's Date: 4/2/2023     Precautions  Precautions: Weight Bearing As Tolerated Left Lower Extremity  Comments: s/p L TKA         Subjective    Pt seated in wc upon arrival, agreeable to OT session.     Objective       04/02/23 0931   OT Charge Group   OT Therapy Activity (Units) 2   OT Total Time Spent   OT Individual Total Time Spent (Mins) 30   Precautions   Precautions Weight Bearing As Tolerated Left Lower Extremity   Comments s/p L TKA   Balance   Comments Completed standing balance activity in // bars this date with pt tossing ladder balls with 1 UE for 1 rounds and then completing second round with pt complete tossing with 1 UE adn passing with the other prior to toss   Bed Mobility    Sit to Supine Standby Assist   Interdisciplinary Plan of Care Collaboration   Patient Position at End of Therapy In Bed;Bed Alarm On;Call Light within Reach;Tray Table within Reach;Phone within Reach         Assessment    Pt completed session with little weight put throughout LLE when standing and during transfers, req Min-CGA for transfer and standing overall. Pt would cont to benefit from work on overall standing tolerance with cueing to put weight through LLE as able.    Strengths: Able to follow instructions, Alert and oriented, Effective communication skills, Independent prior level of function, Motivated for self care and independence, Pleasant and cooperative, Willingly participates in therapeutic activities  Barriers: Decreased endurance, Fatigue, Generalized weakness, Impaired activity tolerance, Impaired balance, Limited mobility, Pain    Plan    LB dressing with possible use of AE, overall strength and endurance, standing tolerance and balance      Occupational Therapy Goals (Active)       Problem: Dressing       Dates: Start: 04/01/23         Goal: STG-Within one week, patient will  dress LB w/ mod A and AE       Dates: Start: 04/01/23               Problem: Functional Transfers       Dates: Start: 04/01/23         Goal: STG-Within one week, patient will complete transfers w/ CGA       Dates: Start: 04/01/23               Problem: IADL's       Dates: Start: 04/01/23         Goal: STG-Within one week, patient will utilize washer and dryer w/ min A       Dates: Start: 04/01/23               Problem: OT Long Term Goals       Dates: Start: 04/01/23         Goal: LTG-By discharge, patient will complete basic self care tasks w/ SBA and AE as needed       Dates: Start: 04/01/23            Goal: LTG-By discharge, patient will complete basic home management w/ SBA       Dates: Start: 04/01/23               Problem: Toileting       Dates: Start: 04/01/23         Goal: STG-Within one week, patient will complete toileting tasks w/ CGA       Dates: Start: 04/01/23

## 2023-04-02 NOTE — FLOWSHEET NOTE
04/02/23 1128   Protocol Assessment   Initial Assessment Yes   Patient History   Pulmonary Diagnosis None   Procedures Relevant to Respiratory Status None   Home O2 No   Nocturnal CPAP No   Home Treatments/Frequency No   Protocol Pathways   Protocol Pathways None        73 y/o F with PMH of HTN presented with the complaint of substernal chest pain and bradycardia. R/o ACS

## 2023-04-03 ENCOUNTER — APPOINTMENT (OUTPATIENT)
Dept: OCCUPATIONAL THERAPY | Facility: REHABILITATION | Age: 83
DRG: 560 | End: 2023-04-03
Attending: PHYSICAL MEDICINE & REHABILITATION
Payer: MEDICARE

## 2023-04-03 ENCOUNTER — APPOINTMENT (OUTPATIENT)
Dept: PHYSICAL THERAPY | Facility: REHABILITATION | Age: 83
DRG: 560 | End: 2023-04-03
Attending: PHYSICAL MEDICINE & REHABILITATION
Payer: MEDICARE

## 2023-04-03 ENCOUNTER — APPOINTMENT (OUTPATIENT)
Dept: SPEECH THERAPY | Facility: REHABILITATION | Age: 83
DRG: 560 | End: 2023-04-03
Attending: PHYSICAL MEDICINE & REHABILITATION
Payer: MEDICARE

## 2023-04-03 LAB
ALBUMIN SERPL BCP-MCNC: 3.1 G/DL (ref 3.2–4.9)
ALBUMIN/GLOB SERPL: 0.9 G/DL
ALP SERPL-CCNC: 228 U/L (ref 30–99)
ALT SERPL-CCNC: 85 U/L (ref 2–50)
ANION GAP SERPL CALC-SCNC: 11 MMOL/L (ref 7–16)
AST SERPL-CCNC: 36 U/L (ref 12–45)
BILIRUB SERPL-MCNC: 0.7 MG/DL (ref 0.1–1.5)
BUN SERPL-MCNC: 17 MG/DL (ref 8–22)
CALCIUM ALBUM COR SERPL-MCNC: 9.6 MG/DL (ref 8.5–10.5)
CALCIUM SERPL-MCNC: 8.9 MG/DL (ref 8.5–10.5)
CHLORIDE SERPL-SCNC: 107 MMOL/L (ref 96–112)
CO2 SERPL-SCNC: 21 MMOL/L (ref 20–33)
CREAT SERPL-MCNC: 0.53 MG/DL (ref 0.5–1.4)
ERYTHROCYTE [DISTWIDTH] IN BLOOD BY AUTOMATED COUNT: 48.4 FL (ref 35.9–50)
GFR SERPLBLD CREATININE-BSD FMLA CKD-EPI: 91 ML/MIN/1.73 M 2
GLOBULIN SER CALC-MCNC: 3.6 G/DL (ref 1.9–3.5)
GLUCOSE SERPL-MCNC: 123 MG/DL (ref 65–99)
HCT VFR BLD AUTO: 36.4 % (ref 37–47)
HGB BLD-MCNC: 11.7 G/DL (ref 12–16)
MCH RBC QN AUTO: 32.4 PG (ref 27–33)
MCHC RBC AUTO-ENTMCNC: 32.1 G/DL (ref 33.6–35)
MCV RBC AUTO: 100.8 FL (ref 81.4–97.8)
PLATELET # BLD AUTO: 214 K/UL (ref 164–446)
PMV BLD AUTO: 10.5 FL (ref 9–12.9)
POTASSIUM SERPL-SCNC: 4.2 MMOL/L (ref 3.6–5.5)
PROT SERPL-MCNC: 6.7 G/DL (ref 6–8.2)
RBC # BLD AUTO: 3.61 M/UL (ref 4.2–5.4)
SODIUM SERPL-SCNC: 139 MMOL/L (ref 135–145)
WBC # BLD AUTO: 7 K/UL (ref 4.8–10.8)

## 2023-04-03 PROCEDURE — 97116 GAIT TRAINING THERAPY: CPT

## 2023-04-03 PROCEDURE — 700102 HCHG RX REV CODE 250 W/ 637 OVERRIDE(OP): Performed by: PHYSICAL MEDICINE & REHABILITATION

## 2023-04-03 PROCEDURE — 85027 COMPLETE CBC AUTOMATED: CPT

## 2023-04-03 PROCEDURE — 99232 SBSQ HOSP IP/OBS MODERATE 35: CPT | Performed by: PHYSICAL MEDICINE & REHABILITATION

## 2023-04-03 PROCEDURE — 97530 THERAPEUTIC ACTIVITIES: CPT

## 2023-04-03 PROCEDURE — 770010 HCHG ROOM/CARE - REHAB SEMI PRIVAT*

## 2023-04-03 PROCEDURE — A9270 NON-COVERED ITEM OR SERVICE: HCPCS | Performed by: PHYSICAL MEDICINE & REHABILITATION

## 2023-04-03 PROCEDURE — 80053 COMPREHEN METABOLIC PANEL: CPT

## 2023-04-03 PROCEDURE — 97110 THERAPEUTIC EXERCISES: CPT

## 2023-04-03 PROCEDURE — 36415 COLL VENOUS BLD VENIPUNCTURE: CPT

## 2023-04-03 PROCEDURE — 97130 THER IVNTJ EA ADDL 15 MIN: CPT

## 2023-04-03 PROCEDURE — 97129 THER IVNTJ 1ST 15 MIN: CPT

## 2023-04-03 RX ADMIN — METHENAMINE HIPPURATE 1 G: 1 TABLET ORAL at 20:37

## 2023-04-03 RX ADMIN — VALSARTAN 160 MG: 80 TABLET, FILM COATED ORAL at 08:17

## 2023-04-03 RX ADMIN — ACETAMINOPHEN 500 MG: 500 TABLET ORAL at 20:35

## 2023-04-03 RX ADMIN — VENLAFAXINE HYDROCHLORIDE 225 MG: 75 CAPSULE, EXTENDED RELEASE ORAL at 08:17

## 2023-04-03 RX ADMIN — BUSPIRONE HYDROCHLORIDE 15 MG: 10 TABLET ORAL at 08:16

## 2023-04-03 RX ADMIN — ACETAMINOPHEN 500 MG: 500 TABLET ORAL at 08:17

## 2023-04-03 RX ADMIN — OXYBUTYNIN CHLORIDE 15 MG: 5 TABLET, EXTENDED RELEASE ORAL at 20:35

## 2023-04-03 RX ADMIN — OXYCODONE HYDROCHLORIDE 5 MG: 5 TABLET ORAL at 06:16

## 2023-04-03 RX ADMIN — OMEPRAZOLE 40 MG: 20 CAPSULE, DELAYED RELEASE ORAL at 08:17

## 2023-04-03 RX ADMIN — BUSPIRONE HYDROCHLORIDE 15 MG: 10 TABLET ORAL at 14:17

## 2023-04-03 RX ADMIN — ATORVASTATIN CALCIUM 40 MG: 40 TABLET, FILM COATED ORAL at 20:34

## 2023-04-03 RX ADMIN — BUSPIRONE HYDROCHLORIDE 15 MG: 10 TABLET ORAL at 20:35

## 2023-04-03 RX ADMIN — METHENAMINE HIPPURATE 1 G: 1 TABLET ORAL at 08:17

## 2023-04-03 RX ADMIN — ACETAMINOPHEN 500 MG: 500 TABLET ORAL at 14:17

## 2023-04-03 RX ADMIN — BENZOCAINE AND MENTHOL 1 LOZENGE: 15; 3.6 LOZENGE ORAL at 06:13

## 2023-04-03 RX ADMIN — BENZOCAINE AND MENTHOL 1 LOZENGE: 15; 3.6 LOZENGE ORAL at 12:44

## 2023-04-03 RX ADMIN — ASPIRIN 81 MG: 81 TABLET, COATED ORAL at 20:35

## 2023-04-03 RX ADMIN — SENNOSIDES AND DOCUSATE SODIUM 2 TABLET: 50; 8.6 TABLET ORAL at 20:34

## 2023-04-03 RX ADMIN — SENNOSIDES AND DOCUSATE SODIUM 2 TABLET: 50; 8.6 TABLET ORAL at 08:16

## 2023-04-03 RX ADMIN — ASPIRIN 81 MG: 81 TABLET, COATED ORAL at 08:16

## 2023-04-03 SDOH — ECONOMIC STABILITY: TRANSPORTATION INSECURITY
IN THE PAST 12 MONTHS, HAS LACK OF RELIABLE TRANSPORTATION KEPT YOU FROM MEDICAL APPOINTMENTS, MEETINGS, WORK OR FROM GETTING THINGS NEEDED FOR DAILY LIVING?: NO

## 2023-04-03 SDOH — ECONOMIC STABILITY: TRANSPORTATION INSECURITY
IN THE PAST 12 MONTHS, HAS THE LACK OF TRANSPORTATION KEPT YOU FROM MEDICAL APPOINTMENTS OR FROM GETTING MEDICATIONS?: NO

## 2023-04-03 ASSESSMENT — GAIT ASSESSMENTS
ASSISTIVE DEVICE: FRONT WHEEL WALKER
GAIT LEVEL OF ASSIST: MINIMAL ASSIST
DISTANCE (FEET): 60
DEVIATION: ANTALGIC;BRADYKINETIC

## 2023-04-03 ASSESSMENT — ACTIVITIES OF DAILY LIVING (ADL)
BED_CHAIR_WHEELCHAIR_TRANSFER_DESCRIPTION: ADAPTIVE EQUIPMENT;INCREASED TIME;INITIAL PREPARATION FOR TASK;SET-UP OF EQUIPMENT;SUPERVISION FOR SAFETY;VERBAL CUEING
BED_CHAIR_WHEELCHAIR_TRANSFER_DESCRIPTION: ADAPTIVE EQUIPMENT;SET-UP OF EQUIPMENT

## 2023-04-03 ASSESSMENT — PAIN SCALES - WONG BAKER
WONGBAKER_NUMERICALRESPONSE: HURTS JUST A LITTLE BIT
WONGBAKER_NUMERICALRESPONSE: HURTS JUST A LITTLE BIT

## 2023-04-03 ASSESSMENT — PAIN DESCRIPTION - PAIN TYPE
TYPE: ACUTE PAIN;SURGICAL PAIN
TYPE: ACUTE PAIN

## 2023-04-03 NOTE — THERAPY
Speech Language Pathology  Daily Treatment     Patient Name: Aaliyah Fulton  Age:  83 y.o., Sex:  female  Medical Record #: [id number]  Today's Date: 4/3/2023     Precautions  Precautions: Weight Bearing As Tolerated Left Lower Extremity  Comments: s/p L TKA    Subjective    Patient agreeable to therapy.     Objective       04/03/23 1101   Treatment Charges   SLP Cognitive Skill Development First 15 Minutes 1   SLP Cognitive Skill Development Additional 15 Minutes 1   SLP Total Time Spent   SLP Individual Total Time Spent (Mins) 30   Cognition   Simple Attention Minimal (4)   Prospective Memory Severe (2)       Assessment    Educated patient on use of memory log and RWAVS memory strategies.   Patient recalled 1/5 RWAVS after 10 min delay,  2/5 recalled after 2 min delay.    Strengths: Alert and oriented, Willingly participates in therapeutic activities, Pleasant and cooperative, Supportive family  Barriers: Impaired carryover of learning, Impaired functional cognition    Plan    Target recall, medication management.    Passport items to be completed:  Express basic needs, understand food/liquid recommendations, consistently follow swallow precautions, manage finances, manage medications, arrive to therapy appointments on time, complete daily memory log entries, solve problems related to safety situations, review education related to hospitalization, complete caregiver training     Speech Therapy Problems (Active)       Problem: Memory STGs       Dates: Start: 04/01/23         Goal: STG-Within one week, patient will recall daily events with use of external memory aide and compensatory strategies with 50% acc with mod A.       Dates: Start: 04/01/23               Problem: Problem Solving STGs       Dates: Start: 04/01/23         Goal: STG-Within one week, patient will perform medication replica sorting execises with 80% acc with min A.       Dates: Start: 04/01/23               Problem: Speech/Swallowing LTGs        Dates: Start: 04/01/23         Goal: LTG-By discharge, patient will solve basic problems with 80% acc min A. for safe discharge home.       Dates: Start: 04/01/23

## 2023-04-03 NOTE — CARE PLAN
The patient is Stable - Low risk of patient condition declining or worsening    Shift Goals  Clinical Goals: Pain management  Patient Goals: pain mangement    Progress made toward(s) clinical / shift goals:    Problem: Pain - Standard  Goal: Alleviation of pain or a reduction in pain to the patient’s comfort goal  Outcome: Progressing     Patient reports satisfactory pain control and decrease intensity after pharmacological pain management.

## 2023-04-03 NOTE — THERAPY
Occupational Therapy  Daily Treatment     Patient Name: Aaliyah Fulton  Age:  83 y.o., Sex:  female  Medical Record #: [id number]  Today's Date: 4/3/2023     Precautions  Precautions: Weight Bearing As Tolerated Left Lower Extremity  Comments: s/p L TKA         Subjective    Pt up in w/c upon arrival, agreeable to OT session.      Objective     04/03/23 1431   OT Charge Group   OT Therapy Activity (Units) 1   OT Therapeutic Exercise (Units) 1   OT Total Time Spent   OT Individual Total Time Spent (Mins) 30   Functional Level of Assist   Bed, Chair, Wheelchair Transfer Minimal Assist  (Ambulated ~5' from w/c to EOB with FWW)   Bed Chair Wheelchair Transfer Description Adaptive equipment;Increased time;Initial preparation for task;Set-up of equipment;Supervision for safety;Verbal cueing   Sitting Upper Body Exercises   Upper Extremity Bike Level 1 Resistance  (Hydrocycle 10 min to progress endurance and UE strength)   Bed Mobility    Sit to Supine Standby Assist   Interdisciplinary Plan of Care Collaboration   Patient Position at End of Therapy In Bed;Bed Alarm On;Tray Table within Reach;Call Light within Reach;Phone within Reach       Assessment    Pt tolerated OT session well though reports generalized fatigue and L knee pain at the end of the day. Had no complaints with hydrocycle exercise and distracts self with conversation during. Demo's difficulty weightbearing through LLE during w/c to bed transfer. LLE positioned in extension in bed.     Strengths: Able to follow instructions, Alert and oriented, Effective communication skills, Independent prior level of function, Motivated for self care and independence, Pleasant and cooperative, Willingly participates in therapeutic activities  Barriers: Decreased endurance, Fatigue, Generalized weakness, Impaired activity tolerance, Impaired balance, Limited mobility, Pain    Plan    LB dressing with possible use of AE, overall strength and endurance, standing tolerance  and balance, fall prevention     Passport items to be completed:  Perform bathroom transfers, complete dressing, complete feeding, get ready for the day, prepare a simple meal, participate in household tasks, adapt home for safety needs, demonstrate home exercise program, complete caregiver training     Occupational Therapy Goals (Active)       Problem: Dressing       Dates: Start: 04/01/23         Goal: STG-Within one week, patient will dress LB w/ mod A and AE       Dates: Start: 04/01/23               Problem: Functional Transfers       Dates: Start: 04/01/23         Goal: STG-Within one week, patient will complete transfers w/ CGA       Dates: Start: 04/01/23               Problem: IADL's       Dates: Start: 04/01/23         Goal: STG-Within one week, patient will utilize washer and dryer w/ min A       Dates: Start: 04/01/23               Problem: OT Long Term Goals       Dates: Start: 04/01/23         Goal: LTG-By discharge, patient will complete basic self care tasks w/ SBA and AE as needed       Dates: Start: 04/01/23            Goal: LTG-By discharge, patient will complete basic home management w/ SBA       Dates: Start: 04/01/23               Problem: Toileting       Dates: Start: 04/01/23         Goal: STG-Within one week, patient will complete toileting tasks w/ CGA       Dates: Start: 04/01/23

## 2023-04-03 NOTE — CARE PLAN
"  Problem: Infection  Goal: Patient will remain free from infection  Note: ABT for UTI, encouraged increase fluids intake. Will monitor.     Problem: Fall Risk - Rehab  Goal: Patient will remain free from falls  Note: Diana Becker Fall risk Assessment Score: 11      Moderate fall risk Interventions  - Bed and strip alarm   - Yellow sign by the door   - Yellow wrist band \"Fall risk\"  - Room near to the nurse station  - Do not leave patient unattended in the bathroom  - Fall risk education provided           The patient is Watcher - Medium risk of patient condition declining or worsening    Shift Goals  Clinical Goals: Safety  Patient Goals: pain mangement        "

## 2023-04-03 NOTE — THERAPY
Physical Therapy   Daily Treatment     Patient Name: Aaliyah Fluton  Age:  83 y.o., Sex:  female  Medical Record #: [id number]  Today's Date: 4/3/2023     Precautions  Precautions: Weight Bearing As Tolerated Left Lower Extremity  Comments: s/p L TKA    Subjective    Patient agreeable to session. Has no complaints     Objective       04/03/23 0831   PT Charge Group   PT Therapeutic Exercise (Units) 1   PT Therapeutic Activities (Units) 1   PT Total Time Spent   PT Individual Total Time Spent (Mins) 30   Transfer Functional Level of Assist   Bed, Chair, Wheelchair Transfer Moderate Assist   Bed Chair Wheelchair Transfer Description Assist with two limbs;Set-up of equipment;Supervision for safety;Verbal cueing  (FWW SPT to w/c)   Supine Lower Body Exercise   Heel Slide 1 set of 10;Left  (PT completes on self w/ gait belt for PROM overpressure, 5s holds)   Quadriceps Isometrics 1 set of 10;Left  (10s holds)   Other Exercises heel prop x 3 minutes for Ext ROM.   Comments edu on quad sets hourly throughout day, importance of regaining knee extension/flexion and quad strength. visual reminder on board   Bed Mobility    Supine to Sit Contact Guard Assist   Sit to Stand Moderate Assist   Interdisciplinary Plan of Care Collaboration   Patient Position at End of Therapy Seated;Chair Alarm On;Call Light within Reach;Tray Table within Reach;Phone within Reach     Edu on bed positioning and LLE placement RE: pts foot of bed bent upon arrival causing knee flexion resting position. Discussed TKA recovery and goals.     Assessment    Pt w/ lack of ext ROM LLE, and fair quad contraction during exercises. Will need reinforcement and self completion to improve ROM and strength. Very pleasant and motivated during session.     Strengths: Able to follow instructions, Alert and oriented, Effective communication skills, Good carryover of learning, Good insight into deficits/needs, Independent prior level of function, Motivated for self  "care and independence, Pleasant and cooperative, Willingly participates in therapeutic activities  Barriers: Fatigue, Generalized weakness, Impaired balance, Pain    Plan    STS training  Improve standing tolerance on LLE  LLE TKA protocol  Gait training in parallel bars vs FWW  Reinforce quad set and LLE positioning to improve ROM and strength      Passport items to be completed:  Get in/out of bed safely, in/out of a vehicle, safely use mobility device, walk or wheel around home/community, navigate up and down stairs, show how to get up/down from the ground, ensure home is accessible, demonstrate HEP, complete caregiver training    Physical Therapy Problems (Active)       Problem: Mobility       Dates: Start: 04/01/23         Goal: STG-Within one week, patient will ambulate household distance of 50ft with FWW and Prince with w/c follow       Dates: Start: 04/01/23            Goal: STG-Within one week, patient will ambulate up/down a curb with FWW and modA       Dates: Start: 04/01/23            Goal: STG-Within one week, patient will ascend and descend four 6\" stairs with Prince and B HR       Dates: Start: 04/01/23               Problem: Mobility Transfers       Dates: Start: 04/01/23         Goal: STG-Within one week, patient will sit to stand to FWW with CG consistently       Dates: Start: 04/01/23               Problem: PT-Long Term Goals       Dates: Start: 04/01/23         Goal: LTG-By discharge, patient will ambulate 150ft with FWW Sid       Dates: Start: 04/01/23            Goal: LTG-By discharge, patient will transfer one surface to another with FWW Sid       Dates: Start: 04/01/23            Goal: LTG-By discharge, patient will ambulate up/down flight of stairs with B HR and SV       Dates: Start: 04/01/23            Goal: LTG-By discharge, patient will transfer in/out of a car with FWW Sid       Dates: Start: 04/01/23              "

## 2023-04-03 NOTE — DISCHARGE PLANNING
CASE MANAGEMENT INITIAL ASSESSMENT    Admit Date:  3/31/2023     CM to meet with patient to discuss role of case management / discharge planning / team conference.   Patient is a  83 y.o. female transferred from [facility].    Diagnosis: Osteoarthritis of left knee [M17.12]  S/P total knee arthroplasty, left [Z96.652]    Co-morbidities:   Patient Active Problem List    Diagnosis Date Noted    S/P total knee arthroplasty, left 03/31/2023    Artificial knee joint present 03/28/2023    Osteoarthritis of left knee 08/17/2022    Benign essential hypertension 03/24/2022    Personal history UTI 02/24/2022    Gastroesophageal reflux disease without esophagitis 12/12/2021    History of CVA (cerebrovascular accident) 12/12/2021    Osteoporosis, postmenopausal 12/12/2021    Other hyperlipidemia 12/12/2021    Other specified anemias 11/26/2021    OAB (overactive bladder) 11/26/2021    Hydrocephalus (HCC) 11/24/2021    Systolic murmur 11/24/2021    Closed left hip fracture (HCC) 11/24/2021    Normal pressure hydrocephalus (HCC) 06/16/2021    Pain in left knee 05/04/2021    Memory deficit 04/16/2021    Abnormal gait 03/29/2021    Other specified examination 03/12/2021    Prediabetes 09/23/2019    Nonrheumatic aortic valve stenosis 03/26/2019    Poor short-term memory 09/28/2017    Disorder of bone and articular cartilage 09/29/2016    Vitamin D deficiency 09/29/2016    History of recurrent urinary tract infection 06/28/2016    Impaired fasting glucose 05/20/2016    Menopause present 05/20/2016    Urinary tract infection 04/21/2016    Hand arthritis 03/04/2015    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    Breast cancer in situ 03/04/2015    Osteoarthrosis 11/14/2013    Thoracic kyphosis 10/17/2012    S/P laminectomy 10/17/2012     Prior Living Situation:  Housing / Facility: 1 Story House  Lives with - Patient's Self Care Capacity: Spouse    Prior Level of Function:  Home Management: Independent  Prior Level Of Mobility: Independent With  Device in Community    Support Systems:  Primary : Angi Johnson-daughter: [phone] or [phone] or Abby Tejeda-daughter: [phone]         Previous Services Utilized:   Equipment Owned: 4-Wheel Walker, Single Point Cane  Prior Services: Home With Outpatient Therapy    Other Information:        Primary Payor Source: Medicare A, Medicare B  Secondary Payor Source:  ([employer])  Primary Care Practitioner : He Trivedi MD    Patient / Family Goal:  Patient / Family Goal: 1. Gain mobility    Plan:  1. Continue to follow patient through hospitalization and provide discharge planning in collaboration with patient, family, physicians and ancillary services.     2. Utilize community resources to ensure a safe discharge.

## 2023-04-03 NOTE — THERAPY
"Occupational Therapy  Daily Treatment     Patient Name: Aaliyah Fulton  Age:  83 y.o., Sex:  female  Medical Record #: [id number]  Today's Date: 4/3/2023     Precautions  Precautions: Weight Bearing As Tolerated Left Lower Extremity  Comments: s/p L TKA         Subjective    Pt seated in w/c with family present upon arrival, pleasant and cooperative, agreeable to therapy. \"PT worked me hard, but I did it because I know its good for me\"       Objective       04/03/23 1301   OT Charge Group   OT Therapeutic Exercise (Units) 2   OT Total Time Spent   OT Individual Total Time Spent (Mins) 30   Sitting Upper Body Exercises   Chest Press 2 sets of 10;Bilateral;Weight (See Comments for lbs)  (3lb dumbbell)   Internal Shoulder Rotation 2 sets of 10;Bilateral;Weight (See Comments for lbs)  (3lb dumbbell)   External Shoulder Rotation 2 sets of 10;Bilateral;Weight (See Comments for lbs)  (3lb dumbbell)   Bilateral Row 2 sets of 10;Bilateral;Weight (See Comments for lbs)  (3lb dumbbell)   Bicep Curls 2 sets of 10;Bilateral;Weight (See Comments for lbs)  (3lb dumbbell)   Interdisciplinary Plan of Care Collaboration   Patient Position at End of Therapy Seated;Call Light within Reach;Tray Table within Reach;Chair Alarm On;Family / Friend in Room         Assessment    Pt completed UB exercises to improve overall strength and cardiovascular endurance in order to maximize independence and safety with ADL's and functional mobility tasks. Pt completed to the best of their abilities with no complaints of pain.     Strengths: Able to follow instructions, Alert and oriented, Effective communication skills, Independent prior level of function, Motivated for self care and independence, Pleasant and cooperative, Willingly participates in therapeutic activities  Barriers: Decreased endurance, Fatigue, Generalized weakness, Impaired activity tolerance, Impaired balance, Limited mobility, Pain    Plan    Refer to Primary OT POC/goals "     Occupational Therapy Goals (Active)       Problem: Dressing       Dates: Start: 04/01/23         Goal: STG-Within one week, patient will dress LB w/ mod A and AE       Dates: Start: 04/01/23               Problem: Functional Transfers       Dates: Start: 04/01/23         Goal: STG-Within one week, patient will complete transfers w/ CGA       Dates: Start: 04/01/23               Problem: IADL's       Dates: Start: 04/01/23         Goal: STG-Within one week, patient will utilize washer and dryer w/ min A       Dates: Start: 04/01/23               Problem: OT Long Term Goals       Dates: Start: 04/01/23         Goal: LTG-By discharge, patient will complete basic self care tasks w/ SBA and AE as needed       Dates: Start: 04/01/23            Goal: LTG-By discharge, patient will complete basic home management w/ SBA       Dates: Start: 04/01/23               Problem: Toileting       Dates: Start: 04/01/23         Goal: STG-Within one week, patient will complete toileting tasks w/ CGA       Dates: Start: 04/01/23

## 2023-04-03 NOTE — THERAPY
Physical Therapy   Daily Treatment     Patient Name: Aaliyah Fulton  Age:  83 y.o., Sex:  female  Medical Record #: [id number]  Today's Date: 4/3/2023     Precautions  Precautions: Weight Bearing As Tolerated Left Lower Extremity  Comments: s/p L TKA    Subjective    Pt up in wc, willing to participate     Objective       04/03/23 0931   PT Charge Group   PT Gait Training (Units) 2   PT Therapeutic Exercise (Units) 2   PT Total Time Spent   PT Individual Total Time Spent (Mins) 60   Gait Functional Level of Assist    Gait Level Of Assist Minimal Assist   Assistive Device Front Wheel Walker   Distance (Feet) 60  (in addition to 20 ft x 4)   # of Times Distance was Traveled 2   Deviation Antalgic;Bradykinetic   Transfer Functional Level of Assist   Bed, Chair, Wheelchair Transfer Minimal Assist   Bed Chair Wheelchair Transfer Description Adaptive equipment;Set-up of equipment   Supine Lower Body Exercise   Short Arc Quad 2 sets of 10   Gluteal Isometrics 2 sets of 10   Quadriceps Isometrics 2 sets of 10   Sitting Lower Body Exercises   Sitting Lower Body Exercises Yes   Long Arc Quad 2 sets of 10   Nustep Resistance Level 2  (5 minutes for AAROM BLE's)   Bed Mobility    Supine to Sit Contact Guard Assist   Sit to Supine Contact Guard Assist   Sit to Stand Minimal Assist         Assessment    Pt tolerated improved gait endurance and WB support through LLE since admit, limited by pain.   Strengths: Able to follow instructions, Alert and oriented, Effective communication skills, Good carryover of learning, Good insight into deficits/needs, Independent prior level of function, Motivated for self care and independence, Pleasant and cooperative, Willingly participates in therapeutic activities  Barriers: Fatigue, Generalized weakness, Impaired balance, Pain    Plan    STS training  Improve standing tolerance on LLE  LLE TKA protocol  Gait training with FWW  Reinforce quad set and LLE positioning to improve ROM and  "strength        Passport items to be completed:  Get in/out of bed safely, in/out of a vehicle, safely use mobility device, walk or wheel around home/community, navigate up and down stairs, show how to get up/down from the ground, ensure home is accessible, demonstrate HEP, complete caregiver training      Physical Therapy Problems (Active)       Problem: Mobility       Dates: Start: 04/01/23         Goal: STG-Within one week, patient will ambulate household distance of 50ft with FWW and Prince with w/c follow       Dates: Start: 04/01/23            Goal: STG-Within one week, patient will ambulate up/down a curb with FWW and modA       Dates: Start: 04/01/23            Goal: STG-Within one week, patient will ascend and descend four 6\" stairs with Prince and B HR       Dates: Start: 04/01/23               Problem: Mobility Transfers       Dates: Start: 04/01/23         Goal: STG-Within one week, patient will sit to stand to FWW with CG consistently       Dates: Start: 04/01/23               Problem: PT-Long Term Goals       Dates: Start: 04/01/23         Goal: LTG-By discharge, patient will ambulate 150ft with FWW Sid       Dates: Start: 04/01/23            Goal: LTG-By discharge, patient will transfer one surface to another with FWW Sid       Dates: Start: 04/01/23            Goal: LTG-By discharge, patient will ambulate up/down flight of stairs with B HR and SV       Dates: Start: 04/01/23            Goal: LTG-By discharge, patient will transfer in/out of a car with FWW Sid       Dates: Start: 04/01/23              "

## 2023-04-03 NOTE — PROGRESS NOTES
"Rehab Progress Note     Date of Service: 4/3/2023  Chief Complaint: Follow-up knee replacement    Interval Events (Subjective)    Patient seen and examined today in her room.  At rest she has no pain in her knee.  With movement and range of movement pain does increase.  She reports a very dry mouth which is not new.  She also reports a sore throat which she has had ever since she has been here.  She last moved her bowels yesterday.    Patient has no other new questions, concerns, or complaints today.     ROS: No changes to bowel, bladder, pain, mood, or sleep.       Objective:  VITAL SIGNS: /60   Pulse 86   Temp 36.7 °C (98.1 °F)   Resp 18   Ht 1.727 m (5' 8\")   Wt 76.7 kg (169 lb)   SpO2 94%   BMI 25.70 kg/m²   Gen: alert, no apparent distress  HEENT: Pharyngeal erythema, very dry mucous membranes  CV: Regular rate, regular rhythm  Resp: Clear to auscultation bilaterally  MSK: Dressing and mild swelling around her left knee    Recent Results (from the past 72 hour(s))   COV-2, FLU A/B, AND RSV BY PCR (2-4 HOURS Scytl): Collect NP swab in VTM    Collection Time: 03/31/23  1:50 PM    Specimen: Respirate   Result Value Ref Range    Influenza virus A RNA Negative Negative    Influenza virus B, PCR Negative Negative    RSV, PCR Negative Negative    SARS-CoV-2 by PCR NotDetected     SARS-CoV-2 Source NP Swab    CBC with Differential    Collection Time: 04/01/23  5:44 AM   Result Value Ref Range    WBC 6.9 4.8 - 10.8 K/uL    RBC 3.54 (L) 4.20 - 5.40 M/uL    Hemoglobin 11.4 (L) 12.0 - 16.0 g/dL    Hematocrit 34.8 (L) 37.0 - 47.0 %    MCV 98.3 (H) 81.4 - 97.8 fL    MCH 32.2 27.0 - 33.0 pg    MCHC 32.8 (L) 33.6 - 35.0 g/dL    RDW 46.5 35.9 - 50.0 fL    Platelet Count 143 (L) 164 - 446 K/uL    MPV 11.5 9.0 - 12.9 fL    Neutrophils-Polys 55.20 44.00 - 72.00 %    Lymphocytes 20.10 (L) 22.00 - 41.00 %    Monocytes 10.70 0.00 - 13.40 %    Eosinophils 12.70 (H) 0.00 - 6.90 %    Basophils 0.90 0.00 - 1.80 %    " Immature Granulocytes 0.40 0.00 - 0.90 %    Nucleated RBC 0.00 /100 WBC    Neutrophils (Absolute) 3.78 2.00 - 7.15 K/uL    Lymphs (Absolute) 1.38 1.00 - 4.80 K/uL    Monos (Absolute) 0.73 0.00 - 0.85 K/uL    Eos (Absolute) 0.87 (H) 0.00 - 0.51 K/uL    Baso (Absolute) 0.06 0.00 - 0.12 K/uL    Immature Granulocytes (abs) 0.03 0.00 - 0.11 K/uL    NRBC (Absolute) 0.00 K/uL   Comp Metabolic Panel (CMP)    Collection Time: 04/01/23  5:44 AM   Result Value Ref Range    Sodium 137 135 - 145 mmol/L    Potassium 3.6 3.6 - 5.5 mmol/L    Chloride 105 96 - 112 mmol/L    Co2 22 20 - 33 mmol/L    Anion Gap 10.0 7.0 - 16.0    Glucose 107 (H) 65 - 99 mg/dL    Bun 19 8 - 22 mg/dL    Creatinine 0.70 0.50 - 1.40 mg/dL    Calcium 8.5 8.5 - 10.5 mg/dL    AST(SGOT) 106 (H) 12 - 45 U/L    ALT(SGPT) 177 (H) 2 - 50 U/L    Alkaline Phosphatase 261 (H) 30 - 99 U/L    Total Bilirubin 1.1 0.1 - 1.5 mg/dL    Albumin 2.7 (L) 3.2 - 4.9 g/dL    Total Protein 6.6 6.0 - 8.2 g/dL    Globulin 3.9 (H) 1.9 - 3.5 g/dL    A-G Ratio 0.7 g/dL   Magnesium    Collection Time: 04/01/23  5:44 AM   Result Value Ref Range    Magnesium 2.1 1.5 - 2.5 mg/dL   CORRECTED CALCIUM    Collection Time: 04/01/23  5:44 AM   Result Value Ref Range    Correct Calcium 9.5 8.5 - 10.5 mg/dL   ESTIMATED GFR    Collection Time: 04/01/23  5:44 AM   Result Value Ref Range    GFR (CKD-EPI) 85 >60 mL/min/1.73 m 2   CBC WITHOUT DIFFERENTIAL    Collection Time: 04/02/23  5:23 AM   Result Value Ref Range    WBC 6.4 4.8 - 10.8 K/uL    RBC 3.70 (L) 4.20 - 5.40 M/uL    Hemoglobin 11.9 (L) 12.0 - 16.0 g/dL    Hematocrit 37.1 37.0 - 47.0 %    .3 (H) 81.4 - 97.8 fL    MCH 32.2 27.0 - 33.0 pg    MCHC 32.1 (L) 33.6 - 35.0 g/dL    RDW 47.2 35.9 - 50.0 fL    Platelet Count 189 164 - 446 K/uL    MPV 10.9 9.0 - 12.9 fL   Comp Metabolic Panel    Collection Time: 04/02/23  5:23 AM   Result Value Ref Range    Sodium 139 135 - 145 mmol/L    Potassium 4.1 3.6 - 5.5 mmol/L    Chloride 107 96 - 112  mmol/L    Co2 21 20 - 33 mmol/L    Anion Gap 11.0 7.0 - 16.0    Glucose 105 (H) 65 - 99 mg/dL    Bun 21 8 - 22 mg/dL    Creatinine 0.63 0.50 - 1.40 mg/dL    Calcium 8.6 8.5 - 10.5 mg/dL    AST(SGOT) 60 (H) 12 - 45 U/L    ALT(SGPT) 125 (H) 2 - 50 U/L    Alkaline Phosphatase 262 (H) 30 - 99 U/L    Total Bilirubin 0.8 0.1 - 1.5 mg/dL    Albumin 3.2 3.2 - 4.9 g/dL    Total Protein 6.6 6.0 - 8.2 g/dL    Globulin 3.4 1.9 - 3.5 g/dL    A-G Ratio 0.9 g/dL   CORRECTED CALCIUM    Collection Time: 04/02/23  5:23 AM   Result Value Ref Range    Correct Calcium 9.2 8.5 - 10.5 mg/dL   ESTIMATED GFR    Collection Time: 04/02/23  5:23 AM   Result Value Ref Range    GFR (CKD-EPI) 87 >60 mL/min/1.73 m 2   Comp Metabolic Panel    Collection Time: 04/03/23  5:29 AM   Result Value Ref Range    Sodium 139 135 - 145 mmol/L    Potassium 4.2 3.6 - 5.5 mmol/L    Chloride 107 96 - 112 mmol/L    Co2 21 20 - 33 mmol/L    Anion Gap 11.0 7.0 - 16.0    Glucose 123 (H) 65 - 99 mg/dL    Bun 17 8 - 22 mg/dL    Creatinine 0.53 0.50 - 1.40 mg/dL    Calcium 8.9 8.5 - 10.5 mg/dL    AST(SGOT) 36 12 - 45 U/L    ALT(SGPT) 85 (H) 2 - 50 U/L    Alkaline Phosphatase 228 (H) 30 - 99 U/L    Total Bilirubin 0.7 0.1 - 1.5 mg/dL    Albumin 3.1 (L) 3.2 - 4.9 g/dL    Total Protein 6.7 6.0 - 8.2 g/dL    Globulin 3.6 (H) 1.9 - 3.5 g/dL    A-G Ratio 0.9 g/dL   CBC WITHOUT DIFFERENTIAL    Collection Time: 04/03/23  5:29 AM   Result Value Ref Range    WBC 7.0 4.8 - 10.8 K/uL    RBC 3.61 (L) 4.20 - 5.40 M/uL    Hemoglobin 11.7 (L) 12.0 - 16.0 g/dL    Hematocrit 36.4 (L) 37.0 - 47.0 %    .8 (H) 81.4 - 97.8 fL    MCH 32.4 27.0 - 33.0 pg    MCHC 32.1 (L) 33.6 - 35.0 g/dL    RDW 48.4 35.9 - 50.0 fL    Platelet Count 214 164 - 446 K/uL    MPV 10.5 9.0 - 12.9 fL   CORRECTED CALCIUM    Collection Time: 04/03/23  5:29 AM   Result Value Ref Range    Correct Calcium 9.6 8.5 - 10.5 mg/dL   ESTIMATED GFR    Collection Time: 04/03/23  5:29 AM   Result Value Ref Range    GFR  (CKD-EPI) 91 >60 mL/min/1.73 m 2       Scheduled Medications   Medication Dose Frequency    acetaminophen  500 mg TID    senna-docusate  2 Tablet BID    aspirin EC  81 mg BID    atorvastatin  40 mg Nightly    busPIRone  15 mg TID    methenamine hip  1 g BID    oxybutynin SR  15 mg Q EVENING    valsartan  160 mg DAILY    venlafaxine XR  225 mg QDAY with Breakfast    omeprazole  40 mg QAM AC       Current Diet Order   Procedures    Diet Order Diet: Regular       Assessment:    This patient is a 83 y.o. female admitted for acute inpatient rehabilitation with   S/P total knee arthroplasty, left.    Problem List/Medical Decision Making and Plan:    Left knee replacement  3/28 with Dr. Marcus Hall  Weightbearing as tolerated  Continue full rehab program  PT/OT/SLP, 1 hr each discipline, 5 days per week    Pain management  Continue scheduled Tylenol  Continue as needed oxycodone    Normal pressure hydrocephalus  Gait impairments  Short-term memory impairments  Status post  shunt placement 1/2022 with Dr. Blanchard  Per review of outpatient neurology documentations, last imaging in November 2022 showed decreased in size of ventricles, and patient's memory/gait had slightly improved  Speech therapy assessment/treatment     Hypertension  Continue valsartan     Overactive bladder  Continue oxybutynin    Xerostomia, chronic  Likely from oxybutynin    Sore throat  Likely from xerostomia  COVID-negative on 3/31     Frequent UTIs  Continue methenamine     History of depression  Continue buspirone and venlafaxine     Hyperlipidemia  Continue atorvastatin     History of stroke  Continue aspirin and atorvastatin    Macrocytic anemia  Check B12    Transaminitis, improved  Tylenol dose decreased  Monitor with intermittent labs  Likely secondary to surgery    GERD  Continue omeprazole    DVT prophylaxis  Continue aspirin twice daily per surgery       Ursula Fall M.D.  Physical Medicine and Rehabilitation

## 2023-04-04 ENCOUNTER — APPOINTMENT (OUTPATIENT)
Dept: PHYSICAL THERAPY | Facility: REHABILITATION | Age: 83
DRG: 560 | End: 2023-04-04
Attending: PHYSICAL MEDICINE & REHABILITATION
Payer: MEDICARE

## 2023-04-04 ENCOUNTER — APPOINTMENT (OUTPATIENT)
Dept: SPEECH THERAPY | Facility: REHABILITATION | Age: 83
DRG: 560 | End: 2023-04-04
Attending: PHYSICAL MEDICINE & REHABILITATION
Payer: MEDICARE

## 2023-04-04 ENCOUNTER — APPOINTMENT (OUTPATIENT)
Dept: OCCUPATIONAL THERAPY | Facility: REHABILITATION | Age: 83
DRG: 560 | End: 2023-04-04
Attending: PHYSICAL MEDICINE & REHABILITATION
Payer: MEDICARE

## 2023-04-04 LAB — VIT B12 SERPL-MCNC: 905 PG/ML (ref 211–911)

## 2023-04-04 PROCEDURE — 99232 SBSQ HOSP IP/OBS MODERATE 35: CPT | Performed by: PHYSICAL MEDICINE & REHABILITATION

## 2023-04-04 PROCEDURE — 97116 GAIT TRAINING THERAPY: CPT

## 2023-04-04 PROCEDURE — A9270 NON-COVERED ITEM OR SERVICE: HCPCS | Performed by: PHYSICAL MEDICINE & REHABILITATION

## 2023-04-04 PROCEDURE — 97130 THER IVNTJ EA ADDL 15 MIN: CPT

## 2023-04-04 PROCEDURE — 97535 SELF CARE MNGMENT TRAINING: CPT

## 2023-04-04 PROCEDURE — 770010 HCHG ROOM/CARE - REHAB SEMI PRIVAT*

## 2023-04-04 PROCEDURE — 97110 THERAPEUTIC EXERCISES: CPT

## 2023-04-04 PROCEDURE — 700102 HCHG RX REV CODE 250 W/ 637 OVERRIDE(OP): Performed by: PHYSICAL MEDICINE & REHABILITATION

## 2023-04-04 PROCEDURE — 97129 THER IVNTJ 1ST 15 MIN: CPT

## 2023-04-04 PROCEDURE — 82607 VITAMIN B-12: CPT

## 2023-04-04 PROCEDURE — 36415 COLL VENOUS BLD VENIPUNCTURE: CPT

## 2023-04-04 RX ORDER — OXYCODONE HYDROCHLORIDE 5 MG/1
5 TABLET ORAL 2 TIMES DAILY
Status: DISCONTINUED | OUTPATIENT
Start: 2023-04-04 | End: 2023-04-06

## 2023-04-04 RX ORDER — OXYCODONE HYDROCHLORIDE 5 MG/1
5 TABLET ORAL
Status: DISCONTINUED | OUTPATIENT
Start: 2023-04-04 | End: 2023-04-15 | Stop reason: HOSPADM

## 2023-04-04 RX ORDER — OXYCODONE HYDROCHLORIDE 10 MG/1
10 TABLET ORAL
Status: DISCONTINUED | OUTPATIENT
Start: 2023-04-04 | End: 2023-04-15 | Stop reason: HOSPADM

## 2023-04-04 RX ORDER — OXYCODONE HYDROCHLORIDE 5 MG/1
5 TABLET ORAL ONCE
Status: COMPLETED | OUTPATIENT
Start: 2023-04-04 | End: 2023-04-04

## 2023-04-04 RX ADMIN — OMEPRAZOLE 40 MG: 20 CAPSULE, DELAYED RELEASE ORAL at 08:50

## 2023-04-04 RX ADMIN — VENLAFAXINE HYDROCHLORIDE 225 MG: 75 CAPSULE, EXTENDED RELEASE ORAL at 08:51

## 2023-04-04 RX ADMIN — VALSARTAN 160 MG: 80 TABLET, FILM COATED ORAL at 08:51

## 2023-04-04 RX ADMIN — ATORVASTATIN CALCIUM 40 MG: 40 TABLET, FILM COATED ORAL at 20:01

## 2023-04-04 RX ADMIN — OXYCODONE HYDROCHLORIDE 5 MG: 5 TABLET ORAL at 08:50

## 2023-04-04 RX ADMIN — OXYCODONE HYDROCHLORIDE 5 MG: 5 TABLET ORAL at 12:14

## 2023-04-04 RX ADMIN — ACETAMINOPHEN 500 MG: 500 TABLET ORAL at 20:00

## 2023-04-04 RX ADMIN — ACETAMINOPHEN 500 MG: 500 TABLET ORAL at 14:26

## 2023-04-04 RX ADMIN — SENNOSIDES AND DOCUSATE SODIUM 2 TABLET: 50; 8.6 TABLET ORAL at 20:00

## 2023-04-04 RX ADMIN — BUSPIRONE HYDROCHLORIDE 15 MG: 10 TABLET ORAL at 20:01

## 2023-04-04 RX ADMIN — OXYCODONE 5 MG: 5 TABLET ORAL at 10:15

## 2023-04-04 RX ADMIN — ACETAMINOPHEN 500 MG: 500 TABLET ORAL at 08:55

## 2023-04-04 RX ADMIN — BUSPIRONE HYDROCHLORIDE 15 MG: 10 TABLET ORAL at 14:26

## 2023-04-04 RX ADMIN — METHENAMINE HIPPURATE 1 G: 1 TABLET ORAL at 08:51

## 2023-04-04 RX ADMIN — ASPIRIN 81 MG: 81 TABLET, COATED ORAL at 20:01

## 2023-04-04 RX ADMIN — BUSPIRONE HYDROCHLORIDE 15 MG: 10 TABLET ORAL at 08:51

## 2023-04-04 RX ADMIN — SENNOSIDES AND DOCUSATE SODIUM 2 TABLET: 50; 8.6 TABLET ORAL at 08:51

## 2023-04-04 RX ADMIN — BENZOCAINE AND MENTHOL 1 LOZENGE: 15; 3.6 LOZENGE ORAL at 20:03

## 2023-04-04 RX ADMIN — METHENAMINE HIPPURATE 1 G: 1 TABLET ORAL at 20:00

## 2023-04-04 RX ADMIN — ASPIRIN 81 MG: 81 TABLET, COATED ORAL at 08:51

## 2023-04-04 ASSESSMENT — ACTIVITIES OF DAILY LIVING (ADL)
BED_CHAIR_WHEELCHAIR_TRANSFER_DESCRIPTION: ADAPTIVE EQUIPMENT;INCREASED TIME;SET-UP OF EQUIPMENT
BED_CHAIR_WHEELCHAIR_TRANSFER_DESCRIPTION: INCREASED TIME;VERBAL CUEING;SUPERVISION FOR SAFETY

## 2023-04-04 ASSESSMENT — GAIT ASSESSMENTS
ASSISTIVE DEVICE: PARALLEL BARS
GAIT LEVEL OF ASSIST: MINIMAL ASSIST
DISTANCE (FEET): 15
DISTANCE (FEET): 50
GAIT LEVEL OF ASSIST: CONTACT GUARD ASSIST
ASSISTIVE DEVICE: FRONT WHEEL WALKER
DEVIATION: ANTALGIC;STEP TO;BRADYKINETIC
DEVIATION: ANTALGIC;BRADYKINETIC

## 2023-04-04 ASSESSMENT — PAIN DESCRIPTION - PAIN TYPE: TYPE: ACUTE PAIN

## 2023-04-04 NOTE — THERAPY
"Physical Therapy   Daily Treatment     Patient Name: Aaliyah Fulton  Age:  83 y.o., Sex:  female  Medical Record #: [id number]  Today's Date: 4/4/2023     Precautions  Precautions: Weight Bearing As Tolerated Left Lower Extremity  Comments: s/p L TKA    Subjective    \"Oh, it hurts so bad\"     Objective       04/04/23 0901   Pain 0 - 10 Group   Location Knee   Location Orientation Left   Pain Rating Scale (NPRS) 7   Description Aching   Comfort Goal Perform Activity   Therapist Pain Assessment Prior to Activity;Nurse Notified   Gait Functional Level of Assist    Gait Level Of Assist Minimal Assist   Assistive Device Front Wheel Walker   Distance (Feet) 15   # of Times Distance was Traveled 2   Deviation Antalgic;Bradykinetic   Transfer Functional Level of Assist   Bed, Chair, Wheelchair Transfer Contact Guard Assist   Bed Chair Wheelchair Transfer Description Increased time;Verbal cueing;Supervision for safety  (stand-pivot to mat w/ FWW)   Supine Lower Body Exercise   Hip Abduction Side Lying;Bilateral;2 sets of 15  (clamshells. 2 sets of 10 w/ smaller range in R lying d/t weakness/fatigue. blocking hips)   Short Arc Quad 2 sets of 10;Left   Heel Slide 1 set of 10   Interdisciplinary Plan of Care Collaboration   IDT Collaboration with  Physician;Nursing;Physical Therapist   Patient Position at End of Therapy Seated;Chair Alarm On;Self Releasing Lap Belt Applied;Call Light within Reach;Tray Table within Reach   Collaboration Comments CLOF, pain level, supervising     Use of controlled breathing to manage pain when attempting gravity-assisted stretching into knee extension. Pt's guarding decreased significantly as a result.    Assessment  Pt has significant pain, limited ROM, and weakness in the L LE resulting in poor activity tolerance. However, she demonstrates good quad activation and participation w/ therapy despite reluctance d/t pain.  Strengths: Able to follow instructions, Alert and oriented, Effective " "communication skills, Good carryover of learning, Good insight into deficits/needs, Independent prior level of function, Motivated for self care and independence, Pleasant and cooperative, Willingly participates in therapeutic activities  Barriers: Fatigue, Generalized weakness, Impaired balance, Pain    Plan    STS training, Improve standing tolerance on LLE, Gait training with FWW, Reinforce quad set and LLE positioning to improve ROM and strength        Passport items to be completed:  Get in/out of bed safely, in/out of a vehicle, safely use mobility device, walk or wheel around home/community, navigate up and down stairs, show how to get up/down from the ground, ensure home is accessible, demonstrate HEP, complete caregiver training        Physical Therapy Problems (Active)       Problem: Mobility       Dates: Start: 04/01/23         Goal: STG-Within one week, patient will ambulate household distance of 50ft with FWW and Prince with w/c follow       Dates: Start: 04/01/23            Goal: STG-Within one week, patient will ambulate up/down a curb with FWW and modA       Dates: Start: 04/01/23            Goal: STG-Within one week, patient will ascend and descend four 6\" stairs with Prince and B HR       Dates: Start: 04/01/23               Problem: Mobility Transfers       Dates: Start: 04/01/23         Goal: STG-Within one week, patient will sit to stand to FWW with CG consistently       Dates: Start: 04/01/23               Problem: PT-Long Term Goals       Dates: Start: 04/01/23         Goal: LTG-By discharge, patient will ambulate 150ft with FWW Sid       Dates: Start: 04/01/23            Goal: LTG-By discharge, patient will transfer one surface to another with FWW Sid       Dates: Start: 04/01/23            Goal: LTG-By discharge, patient will ambulate up/down flight of stairs with B HR and SV       Dates: Start: 04/01/23            Goal: LTG-By discharge, patient will transfer in/out of a car with FWW Sid  "      Dates: Start: 04/01/23

## 2023-04-04 NOTE — PROGRESS NOTES
"Rehab Progress Note     Date of Service: 4/4/2023  Chief Complaint: Follow-up knee replacement    Interval Events (Subjective)    Patient seen and examined today in the therapy gym.  She is working with physical therapy on lower body exercises.  She currently is reporting quite severe pain and would like her pain medication scheduled prior to her therapies.  Patient also continues to report a very dry mouth.  She is agreeable to discontinuing her oxybutynin for her bladder as this is a very common side effect.  Patient last moved her bowels this morning.    Patient has no other new questions, concerns, or complaints today.       Objective:  VITAL SIGNS: /75   Pulse 73   Temp 36.9 °C (98.4 °F) (Oral)   Resp 18   Ht 1.727 m (5' 8\")   Wt 76.7 kg (169 lb)   SpO2 92%   BMI 25.70 kg/m²   Gen: alert, no apparent distress  HEENT: Dry mucous membranes  CV: Regular rate, regular rhythm, loud systolic murmur  Resp: Clear to auscultation bilaterally      Recent Results (from the past 72 hour(s))   CBC WITHOUT DIFFERENTIAL    Collection Time: 04/02/23  5:23 AM   Result Value Ref Range    WBC 6.4 4.8 - 10.8 K/uL    RBC 3.70 (L) 4.20 - 5.40 M/uL    Hemoglobin 11.9 (L) 12.0 - 16.0 g/dL    Hematocrit 37.1 37.0 - 47.0 %    .3 (H) 81.4 - 97.8 fL    MCH 32.2 27.0 - 33.0 pg    MCHC 32.1 (L) 33.6 - 35.0 g/dL    RDW 47.2 35.9 - 50.0 fL    Platelet Count 189 164 - 446 K/uL    MPV 10.9 9.0 - 12.9 fL   Comp Metabolic Panel    Collection Time: 04/02/23  5:23 AM   Result Value Ref Range    Sodium 139 135 - 145 mmol/L    Potassium 4.1 3.6 - 5.5 mmol/L    Chloride 107 96 - 112 mmol/L    Co2 21 20 - 33 mmol/L    Anion Gap 11.0 7.0 - 16.0    Glucose 105 (H) 65 - 99 mg/dL    Bun 21 8 - 22 mg/dL    Creatinine 0.63 0.50 - 1.40 mg/dL    Calcium 8.6 8.5 - 10.5 mg/dL    AST(SGOT) 60 (H) 12 - 45 U/L    ALT(SGPT) 125 (H) 2 - 50 U/L    Alkaline Phosphatase 262 (H) 30 - 99 U/L    Total Bilirubin 0.8 0.1 - 1.5 mg/dL    Albumin 3.2 3.2 - " 4.9 g/dL    Total Protein 6.6 6.0 - 8.2 g/dL    Globulin 3.4 1.9 - 3.5 g/dL    A-G Ratio 0.9 g/dL   CORRECTED CALCIUM    Collection Time: 04/02/23  5:23 AM   Result Value Ref Range    Correct Calcium 9.2 8.5 - 10.5 mg/dL   ESTIMATED GFR    Collection Time: 04/02/23  5:23 AM   Result Value Ref Range    GFR (CKD-EPI) 87 >60 mL/min/1.73 m 2   Comp Metabolic Panel    Collection Time: 04/03/23  5:29 AM   Result Value Ref Range    Sodium 139 135 - 145 mmol/L    Potassium 4.2 3.6 - 5.5 mmol/L    Chloride 107 96 - 112 mmol/L    Co2 21 20 - 33 mmol/L    Anion Gap 11.0 7.0 - 16.0    Glucose 123 (H) 65 - 99 mg/dL    Bun 17 8 - 22 mg/dL    Creatinine 0.53 0.50 - 1.40 mg/dL    Calcium 8.9 8.5 - 10.5 mg/dL    AST(SGOT) 36 12 - 45 U/L    ALT(SGPT) 85 (H) 2 - 50 U/L    Alkaline Phosphatase 228 (H) 30 - 99 U/L    Total Bilirubin 0.7 0.1 - 1.5 mg/dL    Albumin 3.1 (L) 3.2 - 4.9 g/dL    Total Protein 6.7 6.0 - 8.2 g/dL    Globulin 3.6 (H) 1.9 - 3.5 g/dL    A-G Ratio 0.9 g/dL   CBC WITHOUT DIFFERENTIAL    Collection Time: 04/03/23  5:29 AM   Result Value Ref Range    WBC 7.0 4.8 - 10.8 K/uL    RBC 3.61 (L) 4.20 - 5.40 M/uL    Hemoglobin 11.7 (L) 12.0 - 16.0 g/dL    Hematocrit 36.4 (L) 37.0 - 47.0 %    .8 (H) 81.4 - 97.8 fL    MCH 32.4 27.0 - 33.0 pg    MCHC 32.1 (L) 33.6 - 35.0 g/dL    RDW 48.4 35.9 - 50.0 fL    Platelet Count 214 164 - 446 K/uL    MPV 10.5 9.0 - 12.9 fL   CORRECTED CALCIUM    Collection Time: 04/03/23  5:29 AM   Result Value Ref Range    Correct Calcium 9.6 8.5 - 10.5 mg/dL   ESTIMATED GFR    Collection Time: 04/03/23  5:29 AM   Result Value Ref Range    GFR (CKD-EPI) 91 >60 mL/min/1.73 m 2   VITAMIN B12    Collection Time: 04/04/23  5:26 AM   Result Value Ref Range    Vitamin B12 -True Cobalamin 905 211 - 911 pg/mL       Scheduled Medications   Medication Dose Frequency    acetaminophen  500 mg TID    senna-docusate  2 Tablet BID    aspirin EC  81 mg BID    atorvastatin  40 mg Nightly    busPIRone  15 mg TID     methenamine hip  1 g BID    oxybutynin SR  15 mg Q EVENING    valsartan  160 mg DAILY    venlafaxine XR  225 mg QDAY with Breakfast    omeprazole  40 mg QAM AC       Current Diet Order   Procedures    Diet Order Diet: Regular       Assessment:    This patient is a 83 y.o. female admitted for acute inpatient rehabilitation with   S/P total knee arthroplasty, left.    We will have her first weekly conference tomorrow to discuss a discharge date    Problem List/Medical Decision Making and Plan:    Left knee replacement  3/28 with Dr. Marcus Hall  Weightbearing as tolerated  Continue full rehab program  PT/OT/SLP, 1 hr each discipline, 5 days per week    Staples out 2 weeks postoperatively, 4/11  Outpatient follow-up with surgery    Pain management  Continue scheduled Tylenol  Schedule 5 mg oxycodone prior to therapies  Continue as needed oxycodone the rest of the day    Normal pressure hydrocephalus  Gait impairments  Short-term memory impairments  Status post  shunt placement 1/2022 with Dr. Blanchard  Per review of outpatient neurology documentations, last imaging in November 2022 showed decreased in size of ventricles, and patient's memory/gait had slightly improved  Speech therapy assessment/treatment     Hypertension  Continue valsartan     Overactive bladder  Discontinue oxybutynin    Xerostomia, chronic  Discontinue oxybutynin    Sore throat  Likely from xerostomia  COVID-negative on 3/31     Frequent UTIs  Continue methenamine     History of depression  Continue venlafaxine and buspirone     Hyperlipidemia  Continue atorvastatin     History of stroke  Continue aspirin and atorvastatin    Macrocytic anemia  Normal B12  Mild, monitor with intermittent labs    Transaminitis, improved  Tylenol dose decreased  Monitor with intermittent labs  Likely secondary to surgery  Recheck labs in the morning    GERD  Continue omeprazole    DVT prophylaxis  Continue 81 mg aspirin twice daily per surgery       Ursula GIMENEZ  MIRYAM Fall.  Physical Medicine and Rehabilitation

## 2023-04-04 NOTE — THERAPY
Speech Language Pathology  Daily Treatment     Patient Name: Aaliyah Fulton  Age:  83 y.o., Sex:  female  Medical Record #: [id number]  Today's Date: 4/4/2023     Precautions  Precautions: Weight Bearing As Tolerated Left Lower Extremity  Comments: s/p L TKA    Subjective    Pt seen in room with daughter and spouse present. Appeared in good spirits.      Objective       04/04/23 1304   Treatment Charges   SLP Cognitive Skill Development First 15 Minutes 1   SLP Cognitive Skill Development Additional 15 Minutes 1   SLP Total Time Spent   SLP Individual Total Time Spent (Mins) 30   Cognition   Functional Memory Activities Minimal (4)   Interdisciplinary Plan of Care Collaboration   IDT Collaboration with  Family / Caregiver   Patient Position at End of Therapy Seated;Family / Friend in Room   Collaboration Comments dtr and spouse present for session         Assessment    Dtr and patient confirm daughter will continue to manage meds and neither felt it necessary to review meds/complete med mngt tasks during session this date.   Ongoing education on use of memory strategies specific to written aids. Dtr confirms pt has large white board calendar at home and uses frequently. Initiated memory notebook this session with pt able to recall name of this therapist and physiatrist. Pt also recalled items eaten for meals, visitors and activities completed in ST therapy session IND. Pt encouraged to document frequently throughout day to aid in recall and retention of novel training.     Strengths: Alert and oriented, Willingly participates in therapeutic activities, Pleasant and cooperative, Supportive family  Barriers: Impaired carryover of learning, Impaired functional cognition    Plan    Continue training in compensatory strategies for memory    Passport items to be completed:  complete daily memory log entries, solve problems related to safety situations, review education related to hospitalization, complete caregiver  training     Speech Therapy Problems (Active)       Problem: Memory STGs       Dates: Start: 04/01/23         Goal: STG-Within one week, patient will recall daily events with use of external memory aide and compensatory strategies with 50% acc with mod A.       Dates: Start: 04/01/23               Problem: Problem Solving STGs       Dates: Start: 04/01/23         Goal: STG-Within one week, patient will perform medication replica sorting execises with 80% acc with min A.       Dates: Start: 04/01/23               Problem: Speech/Swallowing LTGs       Dates: Start: 04/01/23         Goal: LTG-By discharge, patient will solve basic problems with 80% acc min A. for safe discharge home.       Dates: Start: 04/01/23

## 2023-04-04 NOTE — THERAPY
"Physical Therapy   Daily Treatment     Patient Name: Aaliyah Fulton  Age:  83 y.o., Sex:  female  Medical Record #: [id number]  Today's Date: 4/4/2023     Precautions  Precautions: Weight Bearing As Tolerated Left Lower Extremity  Comments: s/p L TKA    Subjective    Pt up in wc, reports being tired but willing to participate. \"I'll take a nap when we are finished\"     Objective       04/04/23 1501   PT Charge Group   PT Gait Training (Units) 1   PT Therapeutic Exercise (Units) 1   PT Total Time Spent   PT Individual Total Time Spent (Mins) 30   Gait Functional Level of Assist    Gait Level Of Assist Contact Guard Assist  (manual cues for upright posture and termianl hip ext LLE)   Assistive Device Parallel Bars   Distance (Feet) 50   # of Times Distance was Traveled 3   Deviation Antalgic;Step To;Bradykinetic   Transfer Functional Level of Assist   Bed, Chair, Wheelchair Transfer Contact Guard Assist   Bed Chair Wheelchair Transfer Description Adaptive equipment;Increased time;Set-up of equipment   Sitting Lower Body Exercises   Ankle Pumps 3 sets of 10;Left   Long Arc Quad 3 sets of 10;Left   Bed Mobility    Sit to Supine Standby Assist   Sit to Stand Contact Guard Assist         Assessment    Pt tolerated gait training well, slightly less antalgic with increased UE support at // bars and cues for upright posture/ terminal hip/knee ext.    Strengths: Able to follow instructions, Alert and oriented, Effective communication skills, Good carryover of learning, Good insight into deficits/needs, Independent prior level of function, Motivated for self care and independence, Pleasant and cooperative, Willingly participates in therapeutic activities  Barriers: Fatigue, Generalized weakness, Impaired balance, Pain    Plan    STS training, Improve standing tolerance on LLE, Gait training with FWW, Reinforce quad set and LLE positioning to improve ROM and strength        Passport items to be completed:  Get in/out of bed " "safely, in/out of a vehicle, safely use mobility device, walk or wheel around home/community, navigate up and down stairs, show how to get up/down from the ground, ensure home is accessible, demonstrate HEP, complete caregiver training      Physical Therapy Problems (Active)       Problem: Mobility       Dates: Start: 04/01/23         Goal: STG-Within one week, patient will ambulate household distance of 50ft with FWW and Prince with w/c follow       Dates: Start: 04/01/23            Goal: STG-Within one week, patient will ambulate up/down a curb with FWW and modA       Dates: Start: 04/01/23            Goal: STG-Within one week, patient will ascend and descend four 6\" stairs with Prince and B HR       Dates: Start: 04/01/23               Problem: Mobility Transfers       Dates: Start: 04/01/23         Goal: STG-Within one week, patient will sit to stand to FWW with CG consistently       Dates: Start: 04/01/23               Problem: PT-Long Term Goals       Dates: Start: 04/01/23         Goal: LTG-By discharge, patient will ambulate 150ft with FWW Sid       Dates: Start: 04/01/23            Goal: LTG-By discharge, patient will transfer one surface to another with FWW Sid       Dates: Start: 04/01/23            Goal: LTG-By discharge, patient will ambulate up/down flight of stairs with B HR and SV       Dates: Start: 04/01/23            Goal: LTG-By discharge, patient will transfer in/out of a car with FWW Sid       Dates: Start: 04/01/23              "

## 2023-04-04 NOTE — CARE PLAN
The patient is Watcher - Medium risk of patient condition declining or worsening    Shift Goals  Clinical Goals: Pain management  Patient Goals: pain mangement    Progress made toward(s) clinical / shift goals:    Problem: Knowledge Deficit - Standard  Goal: Patient and family/care givers will demonstrate understanding of plan of care, disease process/condition, diagnostic tests and medications  Outcome: Progressing     Problem: Pain - Standard  Goal: Alleviation of pain or a reduction in pain to the patient’s comfort goal  Outcome: Progressing  Flowsheets (Taken 4/3/2023 2159)  OB Pain Intervention: Medication - See MAR  Note: Patient able to verbalize pain level and verbalize an acceptable level of pain. Schedule Tylenol given for knee pain with relief.     Problem: Skin Integrity  Goal: Skin integrity is maintained or improved  Note: Patient's skin remains intact and free from new or accidental injury this shift.  Will continue to monitor.      Problem: Psychosocial  Goal: Patient's level of anxiety will decrease  Outcome: Progressing  Note: No signs of anxiety, sleeping comfortably, cooperative.        Patient is not progressing towards the following goals:

## 2023-04-04 NOTE — THERAPY
Occupational Therapy  Daily Treatment     Patient Name: Aaliyah Fulton  Age:  83 y.o., Sex:  female  Medical Record #: [id number]  Today's Date: 4/4/2023     Precautions  Precautions: Weight Bearing As Tolerated Left Lower Extremity  Comments: s/p L TKA         Subjective    Pt seated in w/c upon arrival, pleasant and cooperative, agreeable to therapy       Objective       04/04/23 1031   OT Charge Group   OT Self Care / ADL (Units) 2   OT Therapeutic Exercise (Units) 2   OT Total Time Spent   OT Individual Total Time Spent (Mins) 60   Functional Level of Assist   Grooming Modified Independent;Seated   Toileting Contact Guard Assist   Toilet Transfers Contact Guard Assist  (Stand pivot w/c<>toilet with GB)   Sitting Upper Body Exercises   Chest Press 2 sets of 10;Bilateral;Weight (See Comments for lbs)  (3lb dumbbell)   Bilateral Row 2 sets of 10;Bilateral;Weight (See Comments for lbs)  (3lb dumbbell)   Bicep Curls 2 sets of 10;Bilateral;Weight (See Comments for lbs)  (3lb dumbbell)     Functional mobility room<>bathroom at w/c Max A    Assessment    Pt completed UB exercises to improve overall strength and cardiovascular endurance in order to maximize independence and safety with ADL's and functional mobility tasks. Pt completed to the best of their abilities with no complaints of pain.     Strengths: Able to follow instructions, Alert and oriented, Effective communication skills, Independent prior level of function, Motivated for self care and independence, Pleasant and cooperative, Willingly participates in therapeutic activities  Barriers: Decreased endurance, Fatigue, Generalized weakness, Impaired activity tolerance, Impaired balance, Limited mobility, Pain    Plan    Refer to Primary OT POC/goals     Occupational Therapy Goals (Active)       Problem: Dressing       Dates: Start: 04/01/23         Goal: STG-Within one week, patient will dress LB w/ mod A and AE       Dates: Start: 04/01/23                Problem: Functional Transfers       Dates: Start: 04/01/23         Goal: STG-Within one week, patient will complete transfers w/ CGA       Dates: Start: 04/01/23               Problem: IADL's       Dates: Start: 04/01/23         Goal: STG-Within one week, patient will utilize washer and dryer w/ min A       Dates: Start: 04/01/23               Problem: OT Long Term Goals       Dates: Start: 04/01/23         Goal: LTG-By discharge, patient will complete basic self care tasks w/ SBA and AE as needed       Dates: Start: 04/01/23            Goal: LTG-By discharge, patient will complete basic home management w/ SBA       Dates: Start: 04/01/23               Problem: Toileting       Dates: Start: 04/01/23         Goal: STG-Within one week, patient will complete toileting tasks w/ CGA       Dates: Start: 04/01/23

## 2023-04-05 ENCOUNTER — APPOINTMENT (OUTPATIENT)
Dept: PHYSICAL THERAPY | Facility: REHABILITATION | Age: 83
DRG: 560 | End: 2023-04-05
Attending: PHYSICAL MEDICINE & REHABILITATION
Payer: MEDICARE

## 2023-04-05 ENCOUNTER — APPOINTMENT (OUTPATIENT)
Dept: SPEECH THERAPY | Facility: REHABILITATION | Age: 83
DRG: 560 | End: 2023-04-05
Attending: PHYSICAL MEDICINE & REHABILITATION
Payer: MEDICARE

## 2023-04-05 ENCOUNTER — APPOINTMENT (OUTPATIENT)
Dept: OCCUPATIONAL THERAPY | Facility: REHABILITATION | Age: 83
DRG: 560 | End: 2023-04-05
Attending: PHYSICAL MEDICINE & REHABILITATION
Payer: MEDICARE

## 2023-04-05 LAB
ALBUMIN SERPL BCP-MCNC: 3.1 G/DL (ref 3.2–4.9)
ALP SERPL-CCNC: 196 U/L (ref 30–99)
ALT SERPL-CCNC: 50 U/L (ref 2–50)
AST SERPL-CCNC: 25 U/L (ref 12–45)
BILIRUB CONJ SERPL-MCNC: <0.2 MG/DL (ref 0.1–0.5)
BILIRUB INDIRECT SERPL-MCNC: ABNORMAL MG/DL (ref 0–1)
BILIRUB SERPL-MCNC: 0.6 MG/DL (ref 0.1–1.5)
PROT SERPL-MCNC: 6.6 G/DL (ref 6–8.2)

## 2023-04-05 PROCEDURE — 97130 THER IVNTJ EA ADDL 15 MIN: CPT

## 2023-04-05 PROCEDURE — A9270 NON-COVERED ITEM OR SERVICE: HCPCS | Performed by: PHYSICAL MEDICINE & REHABILITATION

## 2023-04-05 PROCEDURE — 700102 HCHG RX REV CODE 250 W/ 637 OVERRIDE(OP): Performed by: PHYSICAL MEDICINE & REHABILITATION

## 2023-04-05 PROCEDURE — 97530 THERAPEUTIC ACTIVITIES: CPT

## 2023-04-05 PROCEDURE — 97535 SELF CARE MNGMENT TRAINING: CPT

## 2023-04-05 PROCEDURE — 97110 THERAPEUTIC EXERCISES: CPT

## 2023-04-05 PROCEDURE — 97129 THER IVNTJ 1ST 15 MIN: CPT

## 2023-04-05 PROCEDURE — 99232 SBSQ HOSP IP/OBS MODERATE 35: CPT | Performed by: PHYSICAL MEDICINE & REHABILITATION

## 2023-04-05 PROCEDURE — 97116 GAIT TRAINING THERAPY: CPT

## 2023-04-05 PROCEDURE — 770010 HCHG ROOM/CARE - REHAB SEMI PRIVAT*

## 2023-04-05 PROCEDURE — 80076 HEPATIC FUNCTION PANEL: CPT

## 2023-04-05 PROCEDURE — 36415 COLL VENOUS BLD VENIPUNCTURE: CPT

## 2023-04-05 RX ADMIN — OXYCODONE HYDROCHLORIDE 5 MG: 5 TABLET ORAL at 08:20

## 2023-04-05 RX ADMIN — ASPIRIN 81 MG: 81 TABLET, COATED ORAL at 08:22

## 2023-04-05 RX ADMIN — OMEPRAZOLE 40 MG: 20 CAPSULE, DELAYED RELEASE ORAL at 08:19

## 2023-04-05 RX ADMIN — OXYCODONE HYDROCHLORIDE 10 MG: 10 TABLET ORAL at 20:13

## 2023-04-05 RX ADMIN — VALSARTAN 160 MG: 80 TABLET, FILM COATED ORAL at 08:22

## 2023-04-05 RX ADMIN — BUSPIRONE HYDROCHLORIDE 15 MG: 10 TABLET ORAL at 08:21

## 2023-04-05 RX ADMIN — ACETAMINOPHEN 500 MG: 500 TABLET ORAL at 08:22

## 2023-04-05 RX ADMIN — METHENAMINE HIPPURATE 1 G: 1 TABLET ORAL at 08:22

## 2023-04-05 RX ADMIN — OXYCODONE HYDROCHLORIDE 10 MG: 10 TABLET ORAL at 15:35

## 2023-04-05 RX ADMIN — BUSPIRONE HYDROCHLORIDE 15 MG: 10 TABLET ORAL at 20:14

## 2023-04-05 RX ADMIN — ASPIRIN 81 MG: 81 TABLET, COATED ORAL at 20:14

## 2023-04-05 RX ADMIN — SENNOSIDES AND DOCUSATE SODIUM 2 TABLET: 50; 8.6 TABLET ORAL at 08:19

## 2023-04-05 RX ADMIN — METHENAMINE HIPPURATE 1 G: 1 TABLET ORAL at 20:13

## 2023-04-05 RX ADMIN — VENLAFAXINE HYDROCHLORIDE 225 MG: 75 CAPSULE, EXTENDED RELEASE ORAL at 08:19

## 2023-04-05 RX ADMIN — OXYCODONE HYDROCHLORIDE 5 MG: 5 TABLET ORAL at 11:54

## 2023-04-05 RX ADMIN — ALUMINUM HYDROXIDE, MAGNESIUM HYDROXIDE, AND DIMETHICONE 20 ML: 400; 400; 40 SUSPENSION ORAL at 12:00

## 2023-04-05 RX ADMIN — ACETAMINOPHEN 500 MG: 500 TABLET ORAL at 20:14

## 2023-04-05 RX ADMIN — BUSPIRONE HYDROCHLORIDE 15 MG: 10 TABLET ORAL at 15:33

## 2023-04-05 RX ADMIN — ACETAMINOPHEN 500 MG: 500 TABLET ORAL at 15:33

## 2023-04-05 RX ADMIN — ATORVASTATIN CALCIUM 40 MG: 40 TABLET, FILM COATED ORAL at 20:13

## 2023-04-05 ASSESSMENT — ACTIVITIES OF DAILY LIVING (ADL)
BED_CHAIR_WHEELCHAIR_TRANSFER_DESCRIPTION: INCREASED TIME;SUPERVISION FOR SAFETY;VERBAL CUEING
TOILETING_LEVEL_OF_ASSIST_DESCRIPTION: ASSIST TO PULL PANTS UP

## 2023-04-05 ASSESSMENT — PAIN DESCRIPTION - PAIN TYPE: TYPE: ACUTE PAIN

## 2023-04-05 ASSESSMENT — GAIT ASSESSMENTS
ASSISTIVE DEVICE: FRONT WHEEL WALKER
DISTANCE (FEET): 25
GAIT LEVEL OF ASSIST: CONTACT GUARD ASSIST
DEVIATION: ANTALGIC;STEP TO;DECREASED BASE OF SUPPORT

## 2023-04-05 NOTE — CARE PLAN
Problem: Dressing  Goal: STG-Within one week, patient will dress LB w/ mod A and AE  Outcome: Met     Problem: Toileting  Goal: STG-Within one week, patient will complete toileting tasks w/ CGA  Outcome: Not Met  Note: Requires Min A     Problem: Functional Transfers  Goal: STG-Within one week, patient will complete transfers w/ CGA  Outcome: Not Met  Note: Occasionally requires Min A     Problem: IADL's  Goal: STG-Within one week, patient will utilize washer and dryer w/ min A  Outcome: Not Met  Note: Not yet addressed

## 2023-04-05 NOTE — CARE PLAN
Problem: Problem Solving STGs  Goal: STG-Within one week, patient will perform medication replica sorting execises with 80% acc with min A.  Outcome: Not Met  Note: Not yet addressed.  Patient's daughter reports that she sets up the patient's medications.     Problem: Memory STGs  Goal: STG-Within one week, patient will recall daily events with use of external memory aide and compensatory strategies with 50% acc with mod A.  Outcome: Met  Note: 50% mod A

## 2023-04-05 NOTE — CARE PLAN
Problem: Knowledge Deficit - Standard  Goal: Patient and family/care givers will demonstrate understanding of plan of care, disease process/condition, diagnostic tests and medications  Outcome: Progressing   Pt education given regarding plan of care with emphasis on adequate hydration, pt shows good understanding, will continue to reinforce education and continue to monitor.         Problem: Fall Risk - Rehab  Goal: Patient will remain free from falls  Outcome: Progressing   Pt education given regarding fall precautions AND safety measures, pt shows good understanding, has not attempted to self transfer this shift, will continue to reinforce education and continue to monitor.

## 2023-04-05 NOTE — THERAPY
"Physical Therapy   Daily Treatment     Patient Name: Aaliyah Fulton  Age:  83 y.o., Sex:  female  Medical Record #: [id number]  Today's Date: 4/5/2023     Precautions  Precautions: Weight Bearing As Tolerated Left Lower Extremity  Comments: s/p L TKA    Subjective    \"I'm so sorry. I'm being a wuss\"     Objective       04/05/23 1501   Pain 0 - 10 Group   Location Knee   Location Orientation Left   Pain Rating Scale (NPRS) 10   Description Aching   Comfort Goal Perform Activity   Therapist Pain Assessment During Activity;Nurse Notified   Wheelchair Functional Level of Assist   Wheelchair Assist Supervised   Distance Wheelchair (Feet or Distance) 30   Wheelchair Description Extra time;Limited by fatigue;Supervision for safety   Transfer Functional Level of Assist   Bed, Chair, Wheelchair Transfer Standby Assist   Bed Chair Wheelchair Transfer Description Increased time;Supervision for safety;Verbal cueing   Supine Lower Body Exercise   Hip Abduction Side Lying;Right   (clamshells. 1 - pain limiting)   Heel Slide 1 set of 10;Left  (pain-limiting)   Sitting Lower Body Exercises   Nustep Resistance Level 1  (7 minutes BLEs)   Bed Mobility    Supine to Sit Standby Assist   Sit to Supine Standby Assist   Sit to Stand Contact Guard Assist  (FWW)   Scooting Supervised   Interdisciplinary Plan of Care Collaboration   IDT Collaboration with  Physical Therapist;Nursing   Patient Position at End of Therapy Seated;Chair Alarm On;Self Releasing Lap Belt Applied;Call Light within Reach;Tray Table within Reach   Collaboration Comments pain mgmt; supervising     Pt demonstrated significantly limited ROM on Nustep  TENS applied to L knee w/ 2 leads crossed set for 30min at 20mA each to address pt's pain. Pt requested removal after 2 min and lowering intensity.  Cold pack placed on L knee during TENS trial    Assessment    Pt's activity tolerance was limited by pain. She was able to perform some light AAROM but pain eventually became " "too significant. Attempts made to physically manage pain such as TENS, cold pack, and controlled breathing with limited success. Nurse was notified.  Strengths: Able to follow instructions, Alert and oriented, Effective communication skills, Good carryover of learning, Good insight into deficits/needs, Independent prior level of function, Motivated for self care and independence, Pleasant and cooperative, Willingly participates in therapeutic activities  Barriers: Fatigue, Generalized weakness, Impaired balance, Pain    Plan    STS training, Improve standing tolerance on LLE, Gait training with FWW, Reinforce quad set and LLE positioning to improve ROM and strength, pre-medicate        Passport items to be completed:  Get in/out of bed safely, in/out of a vehicle, safely use mobility device, walk or wheel around home/community, navigate up and down stairs, show how to get up/down from the ground, ensure home is accessible, demonstrate HEP, complete caregiver training        Physical Therapy Problems (Active)       Problem: Mobility       Dates: Start: 04/01/23         Goal: STG-Within one week, patient will ambulate up/down a curb with FWW and modA       Dates: Start: 04/01/23            Goal: STG-Within one week, patient will ascend and descend four 6\" stairs with Prince and B HR       Dates: Start: 04/01/23               Problem: Mobility Transfers       Dates: Start: 04/01/23         Goal: STG-Within one week, patient will sit to stand to FWW with CG consistently       Dates: Start: 04/01/23               Problem: PT-Long Term Goals       Dates: Start: 04/01/23         Goal: LTG-By discharge, patient will ambulate 150ft with FWW Sid       Dates: Start: 04/01/23            Goal: LTG-By discharge, patient will transfer one surface to another with FWW Sid       Dates: Start: 04/01/23            Goal: LTG-By discharge, patient will ambulate up/down flight of stairs with B HR and SV       Dates: Start: 04/01/23       "      Goal: LTG-By discharge, patient will transfer in/out of a car with FWW Sid       Dates: Start: 04/01/23

## 2023-04-05 NOTE — CARE PLAN
"  Problem: Mobility  Goal: STG-Within one week, patient will ambulate up/down a curb with FWW and modA  Outcome: Progressing  Has not attempted d/t pain  Goal: STG-Within one week, patient will ascend and descend four 6\" stairs with Prince and B HR  Outcome: Progressing    Has not attempted d/t pain  Problem: Mobility Transfers  Goal: STG-Within one week, patient will sit to stand to FWW with CG consistently  Outcome: Progressing    Currently Prince  Problem: Mobility  Goal: STG-Within one week, patient will ambulate household distance of 50ft with FWW and Prince with w/c follow  Outcome: Met     "

## 2023-04-05 NOTE — THERAPY
"Physical Therapy   Daily Treatment     Patient Name: Aaliyah Fulton  Age:  83 y.o., Sex:  female  Medical Record #: [id number]  Today's Date: 4/5/2023     Precautions  Precautions: Weight Bearing As Tolerated Left Lower Extremity  Comments: s/p L TKA    Subjective    Patient was found in bed, just finishing eating breakfast in bed. RN provided meds. Patient has pillow underneath surgical knee. Educated re: importance of not having a pillow under her new knee as she will have increased difficulty getting it straight with weight bearing, she reports nursing last night put it under, and \"I thought I wasn't supposed to have it, but I figured the person knew better than I did. I would have also liked to be up for breakfast, but I don't want to put anybody out\". Educated re: importance of advocating for herself, especially when it concerns her mobility and long term functional progress.      Objective       04/05/23 0831   PT Charge Group   PT Gait Training (Units) 1   PT Therapeutic Activities (Units) 1   PT Total Time Spent   PT Individual Total Time Spent (Mins) 30   Gait Functional Level of Assist    Gait Level Of Assist Contact Guard Assist   Assistive Device Front Wheel Walker   Distance (Feet) 25   # of Times Distance was Traveled 2   Deviation Antalgic;Step To;Decreased Base Of Support  (slowed, crouched at surgical knee)   Bed Mobility    Supine to Sit Contact Guard Assist   Sit to Stand Contact Guard Assist  (verbal cues for hand placement)   Interdisciplinary Plan of Care Collaboration   Patient Position at End of Therapy Seated;Chair Alarm On;Self Releasing Lap Belt Applied;Call Light within Reach;Tray Table within Reach;Phone within Reach   Collaboration Comments nursing provided meds prior to therapy session     Ambulated to/ from bathroom, dressed with set up assist. Requires max assist for pulling up pants after coming to stand.     Reviewed seated long arc quads after morning ADLs completed. Patient " "verbalizes/ demonstrates proper performance.    Assessment    Patient verbalizes understanding that it is important to be out of bed for meals and to not have pillow underneath her knee. She demonstrates difficulties with step through gait pattern as she is unable to fully extend knee with stance phase. Instability in stance phase also due to difficulty with full extension. Relies heavily on BUE with walker for stability when weight bearing through left knee, which contributes to overall fatigue.    Strengths: Able to follow instructions, Alert and oriented, Effective communication skills, Good carryover of learning, Good insight into deficits/needs, Independent prior level of function, Motivated for self care and independence, Pleasant and cooperative, Willingly participates in therapeutic activities  Barriers: Fatigue, Generalized weakness, Impaired balance, Pain    Plan    STS training, Improve standing tolerance on LLE, Gait training with FWW, Reinforce quad set and LLE positioning to improve ROM and strength    Passport items to be completed:  Get in/out of bed safely, in/out of a vehicle, safely use mobility device, walk or wheel around home/community, navigate up and down stairs, show how to get up/down from the ground, ensure home is accessible, demonstrate HEP, complete caregiver training    Physical Therapy Problems (Active)       Problem: Mobility       Dates: Start: 04/01/23         Goal: STG-Within one week, patient will ambulate up/down a curb with FWW and modA       Dates: Start: 04/01/23            Goal: STG-Within one week, patient will ascend and descend four 6\" stairs with Prince and B HR       Dates: Start: 04/01/23               Problem: Mobility Transfers       Dates: Start: 04/01/23         Goal: STG-Within one week, patient will sit to stand to FWW with CG consistently       Dates: Start: 04/01/23               Problem: PT-Long Term Goals       Dates: Start: 04/01/23         Goal: LTG-By " discharge, patient will ambulate 150ft with FWW Sid       Dates: Start: 04/01/23            Goal: LTG-By discharge, patient will transfer one surface to another with FWW Sid       Dates: Start: 04/01/23            Goal: LTG-By discharge, patient will ambulate up/down flight of stairs with B HR and SV       Dates: Start: 04/01/23            Goal: LTG-By discharge, patient will transfer in/out of a car with FWW Sid       Dates: Start: 04/01/23

## 2023-04-05 NOTE — THERAPY
Recreational Therapy   Initial Evaluation     Patient Name: Aaliyah Fulton  Age:  83 y.o., Sex:  female  Medical Record #: [id number]  Today's Date: 4/5/2023     Subjective    Patient was agreeable to recreation therapy evaluation.     Objective       04/05/23 1401   Procedural Tracking   Procedural Tracking Community Re-Integration;Community Skills Development;Leisure Skills Awareness;Leisure Skills Development;Social Skills Training;Cognitive Skills Training;Gross Motor Functional Leisure Skills;Group Treatment;Fine Motor Functional Leisure Skills   Treatment Time   Total Time Spent (mins) 30   Total Time Missed 0   Leisure History   Leisure Interests Card;Crafts;Hobbies  (puzzles, brain games, family)   Pre-Morbid Leisure Lifestyle Individual;Sedentary;Occasionally Active;Group   Prior Living Arrangements   Lives with - Patient's Self Care Capacity Spouse   Steps Into Home 2   Steps In Home 0   Ambulation Independent   Assistive Devices Used 4-Wheel Walker   Functional Ability Status - Physical   Endurance Low   Right  Weak   Left  Weak   Right Arm Weak   Left Arm Weak   Right Leg Weak   Left Leg Weak   Functional Ability Status - Cognitive   Attention Span Remains on Task   Comprehension Follows One Step Commands   Judgment Needs Assistance   Functional Ability Status - Emotional    Affect Appropriate   Mood Appropriate   Behavior Appropriate;Cooperative   Leisure Competence Measure   Leisure Awareness Moderate Assist   Leisure Attitude Moderate Assist   Leisure Skills Moderate Assist   Cultural / Social Behaviors Moderate Assist   Interpersonal Skills Moderate Assist   Community Integration Skills Moderate Assist   Social Contact Moderate Assist   Community Participation Moderate Assist   Clinical Impression   Clinical Impression Impaired Attention Span;Impaired Cognitive Leisure Functioning;Impaired Endurance;Impaired Gross Motor Leisure Functioning;Impaired Fine Motor Leisure Functioning;Impaired  Leisure Skills;Impaired Relaxation and Coping Skills;Impaired Community Skills;Impaired Group Interaction Skills   Current Discharge Plan   Current Discharge Plan Return to Prior Living Situation   Benefit    Benefit Patient would Benefit from Inpatient Recreational Therapy to Maximize Independent Leisure Functioning    Interdisciplinary Plan of Care Collaboration   Patient Position at End of Therapy Seated;Other (Comments)  (with PT)   Strengths & Barriers   Strengths Able to follow instructions;Alert and oriented;Willingly participates in therapeutic activities;Pleasant and cooperative   Barriers Decreased endurance;Fatigue;Emotional lability;Generalized weakness;Pain;Limited mobility;Pain poorly managed     Games, gardening, being outside, family, grand kids, puzzles, crosswords, brain activities.     Assessment  Patient is 83 y.o. female with a diagnosis of total knee arthroplasty.  Additional factors influencing patient status / progress (ie: cognitive factors, co-morbidities, social support, etc): past medical history of breast cancer, cervical fracture, hip fracture, hypertension, depression, stroke, chronic urinary tract infections, overactive bladder, normal pressure hydrocephalus s/p  shunt placement, admitted to [facility] on 3/28, for an elective left total knee replacement due to severe osteoarthritis.  Patient reported 2 falls the past 2 times she's attempted to go outside. Patient reports high levels of anxiety.        Plan  Recommend Recreational Therapy 30-60 minutes per day 3-4 days per week for 10 days for the following treatments:  Community Re-Integration, Community Skills Development, Leisure Skills Awareness, Leisure Skills Development, Social Skills Training, Cognitive Skills Training, Gross Motor Functional Leisure Skills, Fine Motor Functional Leisure Skills, and Group Treatment    Passport items to be completed:  Verbalize two positive leisure activities, discuss returning to  work, hobbies, community groups or volunteer activities, explore community resources     Goals:  Long term and short term goals have been discussed with patient and they are in agreement.    Recreation Therapy Problems (Active)       Problem: Recreation Therapy       Dates: Start: 04/05/23         Goal: STG-Within one week, patient will identify two new coping skills.        Dates: Start: 04/05/23            Goal: STG-Within one week, patient will identify two new leisure activities.        Dates: Start: 04/05/23            Goal: LTG-By discharge, patient will identify three new coping skills and demonstrate how to use them.        Dates: Start: 04/05/23            Goal: LTG-By discharge, patient will identify three new leisure activities.        Dates: Start: 04/05/23

## 2023-04-05 NOTE — PROGRESS NOTES
"Rehab Progress Note     Date of Service: 4/5/2023  Chief Complaint: Follow-up knee replacement    Interval Events (Subjective)    Patient seen and examined today in her room.  Her daughter is present.  Daughter and patient confirms she has had some trouble with her memory including sometimes forgetting to take her medications.  Patient would like to do more physical therapy then speech therapy she is wants to be focus more on walking.  Patient continues to report pain and decreased range of motion in the left knee.  She last moved her bowels yesterday.    Patient has no other new questions, concerns, or complaints today.         Objective:  VITAL SIGNS: /63   Pulse 84   Temp 36.7 °C (98.1 °F)   Resp 20   Ht 1.727 m (5' 8\")   Wt 76.7 kg (169 lb)   SpO2 95%   BMI 25.70 kg/m²   Gen: alert, no apparent distress  CV: Regular rate, regular rhythm  Resp: Clear to auscultation bilaterally  MSK: Left knee with surrounding erythema, decreased range of motion      Recent Results (from the past 72 hour(s))   Comp Metabolic Panel    Collection Time: 04/03/23  5:29 AM   Result Value Ref Range    Sodium 139 135 - 145 mmol/L    Potassium 4.2 3.6 - 5.5 mmol/L    Chloride 107 96 - 112 mmol/L    Co2 21 20 - 33 mmol/L    Anion Gap 11.0 7.0 - 16.0    Glucose 123 (H) 65 - 99 mg/dL    Bun 17 8 - 22 mg/dL    Creatinine 0.53 0.50 - 1.40 mg/dL    Calcium 8.9 8.5 - 10.5 mg/dL    AST(SGOT) 36 12 - 45 U/L    ALT(SGPT) 85 (H) 2 - 50 U/L    Alkaline Phosphatase 228 (H) 30 - 99 U/L    Total Bilirubin 0.7 0.1 - 1.5 mg/dL    Albumin 3.1 (L) 3.2 - 4.9 g/dL    Total Protein 6.7 6.0 - 8.2 g/dL    Globulin 3.6 (H) 1.9 - 3.5 g/dL    A-G Ratio 0.9 g/dL   CBC WITHOUT DIFFERENTIAL    Collection Time: 04/03/23  5:29 AM   Result Value Ref Range    WBC 7.0 4.8 - 10.8 K/uL    RBC 3.61 (L) 4.20 - 5.40 M/uL    Hemoglobin 11.7 (L) 12.0 - 16.0 g/dL    Hematocrit 36.4 (L) 37.0 - 47.0 %    .8 (H) 81.4 - 97.8 fL    MCH 32.4 27.0 - 33.0 pg    MCHC " 32.1 (L) 33.6 - 35.0 g/dL    RDW 48.4 35.9 - 50.0 fL    Platelet Count 214 164 - 446 K/uL    MPV 10.5 9.0 - 12.9 fL   CORRECTED CALCIUM    Collection Time: 04/03/23  5:29 AM   Result Value Ref Range    Correct Calcium 9.6 8.5 - 10.5 mg/dL   ESTIMATED GFR    Collection Time: 04/03/23  5:29 AM   Result Value Ref Range    GFR (CKD-EPI) 91 >60 mL/min/1.73 m 2   VITAMIN B12    Collection Time: 04/04/23  5:26 AM   Result Value Ref Range    Vitamin B12 -True Cobalamin 905 211 - 911 pg/mL   HEPATIC FUNCTION PANEL    Collection Time: 04/05/23  5:37 AM   Result Value Ref Range    Alkaline Phosphatase 196 (H) 30 - 99 U/L    AST(SGOT) 25 12 - 45 U/L    ALT(SGPT) 50 2 - 50 U/L    Total Bilirubin 0.6 0.1 - 1.5 mg/dL    Direct Bilirubin <0.2 0.1 - 0.5 mg/dL    Indirect Bilirubin see below 0.0 - 1.0 mg/dL    Albumin 3.1 (L) 3.2 - 4.9 g/dL    Total Protein 6.6 6.0 - 8.2 g/dL       Scheduled Medications   Medication Dose Frequency    oxyCODONE immediate-release  5 mg BID    acetaminophen  500 mg TID    senna-docusate  2 Tablet BID    aspirin EC  81 mg BID    atorvastatin  40 mg Nightly    busPIRone  15 mg TID    methenamine hip  1 g BID    valsartan  160 mg DAILY    venlafaxine XR  225 mg QDAY with Breakfast    omeprazole  40 mg QAM AC       Current Diet Order   Procedures    Diet Order Diet: Regular       Assessment:    This patient is a 83 y.o. female admitted for acute inpatient rehabilitation with   S/P total knee arthroplasty, left.    I led and attended the weekly conference today, and agree with the IDT conference documentation and plan of care as noted below.    Date of conference: 4/5/2023    Goals and barriers: See IDT note.    Biggest barriers: impaired memory, left leg pain/weakness, decreased range of motion    Goals in next week: improved pain and range of motion    CM/social support: daughters supportive     Anticipated DC date: 4/15    Home health: PT/OT/SLP/RN    Equip: tub bench, commode over toilet    Follow up:  PCP, orthopedic surgery       Problem List/Medical Decision Making and Plan:    Left knee replacement  3/28 with Dr. Marcus Hall  Weightbearing as tolerated  Continue full rehab program  PT/OT/SLP, 1 hr each discipline, 5 days per week    Staples out 2 weeks postoperatively, 4/11  Outpatient follow-up with surgery    Pain management  Continue Tylenol and oxycodone    Normal pressure hydrocephalus  Gait impairments  Short-term memory impairments  Status post  shunt placement 1/2022 with Dr. Blanchard  Per review of outpatient neurology documentations, last imaging in November 2022 showed decreased in size of ventricles, and patient's memory/gait had slightly improved  Speech therapy assessment/treatment     Hypertension  Continue valsartan     History of Overactive bladder  Discontinued oxybutynin    Urinary Retention  Discontinued oxybutynin    Xerostomia, chronic  Discontinued oxybutynin    Sore throat, resolved  Likely from xerostomia  COVID-negative on 3/31 as well as 4/3     Frequent UTIs  Continue methenamine     History of depression  Continue venlafaxine and buspirone     Hyperlipidemia  Continue atorvastatin     History of stroke  Continue aspirin and atorvastatin    Macrocytic anemia  Normal B12  Mild, monitor with intermittent labs    Transaminitis, resolved  Tylenol dose decreased    Elevated alk phos  Likely secondary to surgery  Monitor for abdominal complaints    GERD  Continue omeprazole    DVT prophylaxis  Continue aspirin per surgery       Ursula Fall M.D.  Physical Medicine and Rehabilitation

## 2023-04-05 NOTE — THERAPY
Speech Language Pathology  Daily Treatment     Patient Name: Aaliyah Fulton  Age:  83 y.o., Sex:  female  Medical Record #: [id number]  Today's Date: 4/5/2023     Precautions  Precautions: Weight Bearing As Tolerated Left Lower Extremity  Comments: s/p L TKA    Subjective    Patient pleasant and agreeable to therapy.     Objective       04/05/23 1001   Treatment Charges   SLP Cognitive Skill Development First 15 Minutes 1   SLP Cognitive Skill Development Additional 15 Minutes 1   SLP Total Time Spent   SLP Individual Total Time Spent (Mins) 30   Cognition   Simple Attention Minimal (4)   Prospective Memory Moderate (3)   Functional Memory Activities Minimal (4)   Functional Level of Assist   Comprehension Supervision   Comprehension Description Magnifying glass   Expression Modified Independent   Expression Description Other (comment)  (extra time)   Social Interaction Independent   Problem Solving Minimal Assist   Problem Solving Description Bed/chair alarm;Increased time;Seat belt;Supervision;Therapy schedule;Verbal cueing   Memory Moderate Assist   Memory Description Bed/chair alarm;Increased time;Seat belt;Supervision;Therapy schedule;Verbal cueing         Assessment    Patient needed mod A to identify safety training from recent physical therapies.  Patient recalled therapists name and her room # with set up.  Patient recalled 1/5 memory strategies after 5 min delay from training.    Strengths: Alert and oriented, Willingly participates in therapeutic activities, Pleasant and cooperative, Supportive family  Barriers: Impaired carryover of learning, Impaired functional cognition    Plan    Family concerned that patient some times to forget to take medications after daughter sets her up.  Research  options for med reminders.      Passport items to be completed:  Express basic needs, understand food/liquid recommendations, consistently follow swallow precautions, manage finances, manage medications, arrive to  therapy appointments on time, complete daily memory log entries, solve problems related to safety situations, review education related to hospitalization, complete caregiver training     Speech Therapy Problems (Active)       Problem: Memory STGs       Dates: Start: 04/05/23         Goal: STG-Within one week, patient will  recall daily events with use of external memory aide and compensatory memory strategies with  75% acc with min A.       Dates: Start: 04/05/23               Problem: Problem Solving STGs       Dates: Start: 04/01/23         Goal: STG-Within one week, patient will perform medication replica sorting execises with 80% acc with min A.       Dates: Start: 04/01/23         Goal Note filed on 04/05/23 1254 by Arielle Velasquez MS,CCC-SLP       Not yet addressed.  Patient's daughter reports that she sets up the patient's medications.                 Problem: Speech/Swallowing LTGs       Dates: Start: 04/01/23         Goal: LTG-By discharge, patient will solve basic problems with 80% acc min A. for safe discharge home.       Dates: Start: 04/01/23

## 2023-04-05 NOTE — THERAPY
Occupational Therapy  Daily Treatment     Patient Name: Aaliyah Fulton  Age:  83 y.o., Sex:  female  Medical Record #: [id number]  Today's Date: 4/5/2023     Precautions  Precautions: Weight Bearing As Tolerated Left Lower Extremity  Comments: s/p L TKA         Subjective    Pt seated in w/c upon arrival, pleasant and cooperative, agreeable to therapy and shower     Objective       04/05/23 1031   OT Charge Group   OT Self Care / ADL (Units) 4   OT Total Time Spent   OT Individual Total Time Spent (Mins) 60   Functional Level of Assist   Grooming Modified Independent;Seated   Bathing Minimal Assist  (assist to wash buttocks)   Upper Body Dressing Independent   Lower Body Dressing Contact Guard Assist   Toileting Minimal Assist   Toileting Description Assist to pull pants up   Toilet Transfers Contact Guard Assist  (stand pivot w/c<>toilet with GB)   Tub / Shower Transfers Minimal Assist  (Stand pivot w/c<>fold down bench with GB)   Interdisciplinary Plan of Care Collaboration   Patient Position at End of Therapy Seated;Call Light within Reach;Tray Table within Reach;Chair Alarm On;Self Releasing Lap Belt Applied;Family / Friend in Room     Functional mobility at w/c room<>bathroom Max A    Initiated bathroom DME set-up edu including tub transfer bench for tub shower     Assessment    Pt completed shower routine at Min A overall using w/c, shower bench, GB's. Pt's functional performance was impacted by L knee pain, continued difficulty with weightbearing, and impaired activity tolerance    Strengths: Able to follow instructions, Alert and oriented, Effective communication skills, Independent prior level of function, Motivated for self care and independence, Pleasant and cooperative, Willingly participates in therapeutic activities  Barriers: Decreased endurance, Fatigue, Generalized weakness, Impaired activity tolerance, Impaired balance, Limited mobility, Pain    Plan    LB dressing with possible use of AE, overall  strength and endurance, standing tolerance and balance, fall prevention    edu on bathroom DME set-up including commode over toilet (they currently have a toilet seat riser but no rails), tub transfer with tub transfer bench    Occupational Therapy Goals (Active)       Problem: Dressing       Dates: Start: 04/01/23         Goal: STG-Within one week, patient will dress LB w/ mod A and AE       Dates: Start: 04/01/23               Problem: Functional Transfers       Dates: Start: 04/01/23         Goal: STG-Within one week, patient will complete transfers w/ CGA       Dates: Start: 04/01/23               Problem: IADL's       Dates: Start: 04/01/23         Goal: STG-Within one week, patient will utilize washer and dryer w/ min A       Dates: Start: 04/01/23               Problem: OT Long Term Goals       Dates: Start: 04/01/23         Goal: LTG-By discharge, patient will complete basic self care tasks w/ SBA and AE as needed       Dates: Start: 04/01/23            Goal: LTG-By discharge, patient will complete basic home management w/ SBA       Dates: Start: 04/01/23               Problem: Toileting       Dates: Start: 04/01/23         Goal: STG-Within one week, patient will complete toileting tasks w/ CGA       Dates: Start: 04/01/23

## 2023-04-06 ENCOUNTER — APPOINTMENT (OUTPATIENT)
Dept: PHYSICAL THERAPY | Facility: REHABILITATION | Age: 83
DRG: 560 | End: 2023-04-06
Attending: PHYSICAL MEDICINE & REHABILITATION
Payer: MEDICARE

## 2023-04-06 ENCOUNTER — APPOINTMENT (OUTPATIENT)
Dept: OCCUPATIONAL THERAPY | Facility: REHABILITATION | Age: 83
DRG: 560 | End: 2023-04-06
Attending: PHYSICAL MEDICINE & REHABILITATION
Payer: MEDICARE

## 2023-04-06 ENCOUNTER — APPOINTMENT (OUTPATIENT)
Dept: SPEECH THERAPY | Facility: REHABILITATION | Age: 83
DRG: 560 | End: 2023-04-06
Attending: PHYSICAL MEDICINE & REHABILITATION
Payer: MEDICARE

## 2023-04-06 PROCEDURE — A9270 NON-COVERED ITEM OR SERVICE: HCPCS | Performed by: PHYSICAL MEDICINE & REHABILITATION

## 2023-04-06 PROCEDURE — 700102 HCHG RX REV CODE 250 W/ 637 OVERRIDE(OP): Performed by: PHYSICAL MEDICINE & REHABILITATION

## 2023-04-06 PROCEDURE — 97116 GAIT TRAINING THERAPY: CPT

## 2023-04-06 PROCEDURE — 97129 THER IVNTJ 1ST 15 MIN: CPT

## 2023-04-06 PROCEDURE — 97530 THERAPEUTIC ACTIVITIES: CPT

## 2023-04-06 PROCEDURE — 97130 THER IVNTJ EA ADDL 15 MIN: CPT

## 2023-04-06 PROCEDURE — 770010 HCHG ROOM/CARE - REHAB SEMI PRIVAT*

## 2023-04-06 PROCEDURE — 99232 SBSQ HOSP IP/OBS MODERATE 35: CPT | Performed by: PHYSICAL MEDICINE & REHABILITATION

## 2023-04-06 RX ORDER — OXYCODONE HYDROCHLORIDE 10 MG/1
10 TABLET ORAL 2 TIMES DAILY
Status: DISCONTINUED | OUTPATIENT
Start: 2023-04-06 | End: 2023-04-15 | Stop reason: HOSPADM

## 2023-04-06 RX ADMIN — ATORVASTATIN CALCIUM 40 MG: 40 TABLET, FILM COATED ORAL at 19:29

## 2023-04-06 RX ADMIN — ASPIRIN 81 MG: 81 TABLET, COATED ORAL at 19:28

## 2023-04-06 RX ADMIN — ACETAMINOPHEN 500 MG: 500 TABLET ORAL at 08:52

## 2023-04-06 RX ADMIN — VENLAFAXINE HYDROCHLORIDE 225 MG: 75 CAPSULE, EXTENDED RELEASE ORAL at 09:01

## 2023-04-06 RX ADMIN — ALUMINUM HYDROXIDE, MAGNESIUM HYDROXIDE, AND DIMETHICONE 20 ML: 400; 400; 40 SUSPENSION ORAL at 12:57

## 2023-04-06 RX ADMIN — OXYCODONE HYDROCHLORIDE 10 MG: 10 TABLET ORAL at 21:56

## 2023-04-06 RX ADMIN — VALSARTAN 160 MG: 80 TABLET, FILM COATED ORAL at 08:59

## 2023-04-06 RX ADMIN — OMEPRAZOLE 40 MG: 20 CAPSULE, DELAYED RELEASE ORAL at 08:53

## 2023-04-06 RX ADMIN — BUSPIRONE HYDROCHLORIDE 15 MG: 10 TABLET ORAL at 19:28

## 2023-04-06 RX ADMIN — ASPIRIN 81 MG: 81 TABLET, COATED ORAL at 08:54

## 2023-04-06 RX ADMIN — METHENAMINE HIPPURATE 1 G: 1 TABLET ORAL at 08:55

## 2023-04-06 RX ADMIN — ACETAMINOPHEN 500 MG: 500 TABLET ORAL at 14:03

## 2023-04-06 RX ADMIN — BUSPIRONE HYDROCHLORIDE 15 MG: 10 TABLET ORAL at 08:58

## 2023-04-06 RX ADMIN — SENNOSIDES AND DOCUSATE SODIUM 2 TABLET: 50; 8.6 TABLET ORAL at 09:02

## 2023-04-06 RX ADMIN — OXYCODONE HYDROCHLORIDE 5 MG: 5 TABLET ORAL at 08:52

## 2023-04-06 RX ADMIN — METHENAMINE HIPPURATE 1 G: 1 TABLET ORAL at 19:29

## 2023-04-06 RX ADMIN — OXYCODONE HYDROCHLORIDE 10 MG: 10 TABLET ORAL at 12:52

## 2023-04-06 RX ADMIN — BUSPIRONE HYDROCHLORIDE 15 MG: 10 TABLET ORAL at 14:03

## 2023-04-06 RX ADMIN — ACETAMINOPHEN 500 MG: 500 TABLET ORAL at 19:28

## 2023-04-06 ASSESSMENT — GAIT ASSESSMENTS
GAIT LEVEL OF ASSIST: STANDBY ASSIST
DEVIATION: ANTALGIC;STEP TO;DECREASED BASE OF SUPPORT;DECREASED HEEL STRIKE;DECREASED TOE OFF
ASSISTIVE DEVICE: FRONT WHEEL WALKER
DEVIATION: ANTALGIC;STEP TO;DECREASED BASE OF SUPPORT;DECREASED HEEL STRIKE;DECREASED TOE OFF
ASSISTIVE DEVICE: FRONT WHEEL WALKER
GAIT LEVEL OF ASSIST: CONTACT GUARD ASSIST
DISTANCE (FEET): 45

## 2023-04-06 ASSESSMENT — ACTIVITIES OF DAILY LIVING (ADL)
BED_CHAIR_WHEELCHAIR_TRANSFER_DESCRIPTION: ADAPTIVE EQUIPMENT;INCREASED TIME;INITIAL PREPARATION FOR TASK;SUPERVISION FOR SAFETY

## 2023-04-06 NOTE — THERAPY
Recreational Therapy  Daily Treatment     Patient Name: Aaliyah Fulton  AGE:  83 y.o., SEX:  female  Medical Record #: [id number]  Today's Date: 4/6/2023       Subjective    Patient was ready and agreeable to recreation therapy session.      Objective       04/06/23 1531   Procedural Tracking   Procedural Tracking Community Re-Integration;Community Skills Development;Leisure Skills Awareness;Leisure Skills Development;Social Skills Training;Cognitive Skills Training;Gross Motor Functional Leisure Skills;Fine Motor Functional Leisure Skills;Group Treatment   Treatment Time   Total Time Spent (mins) 30   Total Time Missed 0   Functional Ability Status - Cognitive   Attention Span Remains on Task   Comprehension Follows One Step Commands   Judgment Able to Make Independent Decisions   Functional Ability Status - Emotional    Affect Appropriate   Mood Appropriate   Behavior Appropriate;Cooperative   Skilled Intervention    Skilled Intervention Gross Motor Leisure;Fine Motor Leisure;Cognitive Leisure;Social Skills;Relaxation / Coping Skills   Interdisciplinary Plan of Care Collaboration   Patient Position at End of Therapy Seated;Phone within Reach;Tray Table within Reach;Call Light within Reach   Strengths & Barriers   Strengths Able to follow instructions;Alert and oriented;Willingly participates in therapeutic activities;Pleasant and cooperative   Barriers Decreased endurance;Generalized weakness;Fatigue;Impaired activity tolerance;Impaired balance;Limited mobility;Pain         Assessment    Patient went outside to back garden for recreation therapy session. Patient and CTRS discussed different plants for the garden beds.     Strengths: (P) Able to follow instructions, Alert and oriented, Willingly participates in therapeutic activities, Pleasant and cooperative  Barriers: (P) Decreased endurance, Generalized weakness, Fatigue, Impaired activity tolerance, Impaired balance, Limited mobility, Pain    Plan    Patient  will benefit from continued recreation therapy sessions.     Passport items to be completed:  Verbalize two positive leisure activities, discuss returning to work, hobbies, community groups or volunteer activities, explore community resources

## 2023-04-06 NOTE — CARE PLAN
Problem: Pain - Standard  Goal: Alleviation of pain or a reduction in pain to the patient’s comfort goal  Outcome: Progressing  Patient on scheduled Roxicodone wit good relief.     Problem: Skin Integrity  Goal: Skin integrity is maintained or improved  Outcome: Progressing   The patient is Stable - Low risk of patient condition declining or worsening    Shift Goals  Clinical Goals: Pain management  Patient Goals: pain mangement

## 2023-04-06 NOTE — THERAPY
Speech Language Pathology  Daily Treatment     Patient Name: Aaliyah Fulton  Age:  83 y.o., Sex:  female  Medical Record #: [id number]  Today's Date: 4/6/2023     Precautions  Precautions: Weight Bearing As Tolerated Right Lower Extremity  Comments: s/p L TKA    Subjective    Patient agreeable to therapy.  Patient is talkative and easily distracts self with conversation.     Objective       04/06/23 1001   Treatment Charges   SLP Cognitive Skill Development First 15 Minutes 1   SLP Cognitive Skill Development Additional 15 Minutes 1   SLP Total Time Spent   SLP Individual Total Time Spent (Mins) 30   Cognition   Simple Attention Minimal (4)   Prospective Memory Moderate (3)         Assessment    Patient recalls this therapists name and her room number.  Patient recalled 1/5 reviewed RWAVS memory strategies. Patient reports that she does not use a smart phone when discussing setting alarms for times to take meds. Patient educated on some options for automatic medication distribution devices and handouts provided.      Strengths: Alert and oriented, Willingly participates in therapeutic activities, Pleasant and cooperative, Supportive family  Barriers: Impaired carryover of learning, Impaired functional cognition    Plan    Target recall, education on options to improve accuracy in medication administration.     Passport items to be completed:  Express basic needs, understand food/liquid recommendations, consistently follow swallow precautions, manage finances, manage medications, arrive to therapy appointments on time, complete daily memory log entries, solve problems related to safety situations, review education related to hospitalization, complete caregiver training     Speech Therapy Problems (Active)       Problem: Memory STGs       Dates: Start: 04/05/23         Goal: STG-Within one week, patient will  recall daily events with use of external memory aide and compensatory memory strategies with  75% acc with min  A.       Dates: Start: 04/05/23               Problem: Problem Solving STGs       Dates: Start: 04/01/23         Goal: STG-Within one week, patient will perform medication replica sorting execises with 80% acc with min A.       Dates: Start: 04/01/23         Goal Note filed on 04/05/23 1254 by Arielle Velasquez MS,CCC-SLP       Not yet addressed.  Patient's daughter reports that she sets up the patient's medications.                 Problem: Speech/Swallowing LTGs       Dates: Start: 04/01/23         Goal: LTG-By discharge, patient will solve basic problems with 80% acc min A. for safe discharge home.       Dates: Start: 04/01/23

## 2023-04-06 NOTE — THERAPY
Physical Therapy   Daily Treatment     Patient Name: Aaliyah Fulton  Age:  83 y.o., Sex:  female  Medical Record #: [id number]  Today's Date: 4/6/2023     Precautions  Precautions: Weight Bearing As Tolerated Left Lower Extremity  Comments: s/p L TKA    Subjective    Pt was resting in bed upon arrival, agreeable to session.     Objective       04/06/23 0831   PT Charge Group   PT Gait Training (Units) 2   PT Therapeutic Activities (Units) 2   Supervising Physical Therapist Cass Perdomo   PT Total Time Spent   PT Individual Total Time Spent (Mins) 60   Pain 0 - 10 Group   Location Knee   Therapist Pain Assessment Prior to Activity  (provided ice pack at the end of session.)   Cognition    Level of Consciousness Alert   Gait Functional Level of Assist    Gait Level Of Assist Contact Guard Assist  (to SBA)   Assistive Device Front Wheel Walker   Distance (Feet)   (35+15+20)   Deviation Antalgic;Step To;Decreased Base Of Support;Decreased Heel Strike;Decreased Toe Off   Wheelchair Functional Level of Assist   Wheelchair Assist Supervised   Distance Wheelchair (Feet or Distance) 150   Wheelchair Description Extra time;Leg rest management;Limited by fatigue;Supervision for safety   Transfer Functional Level of Assist   Bed, Chair, Wheelchair Transfer Standby Assist   Bed Chair Wheelchair Transfer Description Adaptive equipment;Increased time;Initial preparation for task;Supervision for safety  (FWW)   Bed Mobility    Supine to Sit Standby Assist  (leg )   Sit to Supine Standby Assist  (leg )   Sit to Stand Standby Assist  (FWW)   Neuro-Muscular Treatments   Neuro-Muscular Treatments Postural Facilitation   Comments static standing x2(2:30, 1:30) w/ FWW, SBA no LOB throughout   Interdisciplinary Plan of Care Collaboration   IDT Collaboration with  Other (See Comments)  (SPT)   Patient Position at End of Therapy Seated;Call Light within Reach;Tray Table within Reach;Phone within Reach     Introduce leg   "for bed mob to increase independency.    Assessment    Pt tolerated session well, is progressing in activity tolerance despite pain. Is easily distracted during session but willing to participate. Good hand placement during xfer but required occasional cues for FWW proximity during xfer.    Strengths: Able to follow instructions, Alert and oriented, Effective communication skills, Good carryover of learning, Good insight into deficits/needs, Independent prior level of function, Motivated for self care and independence, Pleasant and cooperative, Willingly participates in therapeutic activities  Barriers: Fatigue, Generalized weakness, Impaired balance, Pain    Plan    STS training, Improve standing tolerance on LLE, Gait training with FWW, Reinforce quad set and LLE positioning to improve ROM and strength, pre-medicate        Passport items to be completed:  Get in/out of bed safely, in/out of a vehicle, safely use mobility device, walk or wheel around home/community, navigate up and down stairs, show how to get up/down from the ground, ensure home is accessible, demonstrate HEP, complete caregiver training    Physical Therapy Problems (Active)       Problem: Mobility       Dates: Start: 04/01/23         Goal: STG-Within one week, patient will ambulate up/down a curb with FWW and modA       Dates: Start: 04/01/23            Goal: STG-Within one week, patient will ascend and descend four 6\" stairs with Prince and B HR       Dates: Start: 04/01/23               Problem: Mobility Transfers       Dates: Start: 04/01/23         Goal: STG-Within one week, patient will sit to stand to FWW with CG consistently       Dates: Start: 04/01/23               Problem: PT-Long Term Goals       Dates: Start: 04/01/23         Goal: LTG-By discharge, patient will ambulate 150ft with FWW Sid       Dates: Start: 04/01/23            Goal: LTG-By discharge, patient will transfer one surface to another with FWW Sid       Dates: Start: " 04/01/23            Goal: LTG-By discharge, patient will ambulate up/down flight of stairs with B HR and SV       Dates: Start: 04/01/23            Goal: LTG-By discharge, patient will transfer in/out of a car with FWW Sid       Dates: Start: 04/01/23

## 2023-04-06 NOTE — THERAPY
"Occupational Therapy  Daily Treatment     Patient Name: Aaliyah Fulton  Age:  83 y.o., Sex:  female  Medical Record #: [id number]  Today's Date: 4/6/2023     Precautions  Precautions: Weight Bearing As Tolerated Right Lower Extremity  Comments: s/p L TKA         Subjective    Pt seated in w/c upon arrival, pleasant and cooperative, agreeable to therapy. PT reported that pt had hypotension earlier with standing tasks. Pt currently reports that she is starting to feel better, but not completely better yet and she is exhausted    When asked why she was in rehab last time, pt reported \"Oh I have no idea. I fall all the time. I have no clue\"      Objective       04/06/23 1401   OT Charge Group   OT Therapy Activity (Units) 2   OT Total Time Spent   OT Individual Total Time Spent (Mins) 30   Interdisciplinary Plan of Care Collaboration   IDT Collaboration with  Physical Therapist   Patient Position at End of Therapy Seated;Chair Alarm On;Self Releasing Lap Belt Applied;Call Light within Reach;Tray Table within Reach   Collaboration Comments re: pt's hypotension with standing in PT session     Continuing edu provided on bathroom DME set-up re: tub transfer bench, commode over toilet, GB's. Pt reports that she does have a commode over her toilet, 1 GB on the far wall in the shower, but does not have a tub transfer bench as she was completing standing showers at home prior to her fx. Demo was provided on tub transfer with the use of tub transfer bench. Unable to practice tub transfer d/t fatigue     Assessment    Continued edu and reinforcement on bathroom DME recommendations. Will need to follow up with daughter confirming current bathroom home set-up and to purchase tub transfer bench prior to d/c    Strengths: Able to follow instructions, Alert and oriented, Effective communication skills, Independent prior level of function, Motivated for self care and independence, Pleasant and cooperative, Willingly participates in " therapeutic activities  Barriers: Decreased endurance, Fatigue, Generalized weakness, Impaired activity tolerance, Impaired balance, Limited mobility, Pain    Plan    Confirm with daughter bathroom set-up    LB dressing with possible use of AE, overall strength and endurance, standing tolerance and balance, fall prevention     Current set-up is possibly: commode over toilet (they currently have a toilet seat riser but no rails however pt has also stated that they have a commode with rails over the toilet - need to confirm with daughter on this), GB in 1 of the showers, no shower seat    tub transfer with tub transfer bench at FWW level    Occupational Therapy Goals (Active)       Problem: Functional Transfers       Dates: Start: 04/01/23         Goal: STG-Within one week, patient will complete transfers w/ CGA       Dates: Start: 04/01/23         Goal Note filed on 04/05/23 1142 by Isatu Maldonado MS,OTR/L       Occasionally requires Min A                 Problem: IADL's       Dates: Start: 04/01/23         Goal: STG-Within one week, patient will utilize washer and dryer w/ min A       Dates: Start: 04/01/23         Goal Note filed on 04/05/23 1142 by Isatu Maldonado MS,OTR/L       Not yet addressed                  Problem: OT Long Term Goals       Dates: Start: 04/01/23         Goal: LTG-By discharge, patient will complete basic self care tasks w/ SBA and AE as needed       Dates: Start: 04/01/23            Goal: LTG-By discharge, patient will complete basic home management w/ SBA       Dates: Start: 04/01/23               Problem: Toileting       Dates: Start: 04/01/23         Goal: STG-Within one week, patient will complete toileting tasks w/ CGA       Dates: Start: 04/01/23         Goal Note filed on 04/05/23 1142 by Isatu Maldonado MS,OTR/L       Requires Min A

## 2023-04-06 NOTE — CARE PLAN
The patient is Stable - Low risk of patient condition declining or worsening    Shift Goals  Clinical Goals: Pain management  Patient Goals: pain mangement    Progress made toward(s) clinical / shift goals:    Problem: Bladder / Voiding  Goal: Patient will establish and maintain regular urinary output  Outcome: Progressing  Note: Patient is continent of bladder this shift. Patient voiding adequate amounts of clear, yellow urine. Denies dysuria and flank pain.      Problem: Skin Integrity  Goal: Patient's skin integrity will be maintained or improve  Outcome: Progressing  Note: Patient's skin remains intact and free from new or accidental injury this shift.  Will continue to monitor.

## 2023-04-06 NOTE — PROGRESS NOTES
"Rehab Progress Note     Date of Service: 4/6/2023  Chief Complaint: Follow-up knee replacement    Interval Events (Subjective)    Patient seen and examined today in her room.  She reports severe pain in her left knee.  She reports yesterday it was so bad during therapy that she cried.  We discussed increasing her dose of scheduled oxycodone.  She last moved her bowels yesterday.  She reports her mood is stable.  Pain does not seem to interfere with her sleeping.  Pain worsens with movement and therapy.  We discussed her discharge date which has been set for the 15th.    Patient has no other new questions, concerns, or complaints today.             Objective:  VITAL SIGNS: /62   Pulse 71   Temp 37.2 °C (98.9 °F) (Axillary)   Resp 16   Ht 1.727 m (5' 8\")   Wt 76.7 kg (169 lb)   SpO2 93%   BMI 25.70 kg/m²   Gen: alert, no apparent distress  CV: Regular rate, regular rhythm  Resp: Clear to auscultation bilaterally  MSK: Left knee with surrounding erythema, dressing intact, decreased range of motion  Neuro: Impaired short-term memory      Recent Results (from the past 72 hour(s))   VITAMIN B12    Collection Time: 04/04/23  5:26 AM   Result Value Ref Range    Vitamin B12 -True Cobalamin 905 211 - 911 pg/mL   HEPATIC FUNCTION PANEL    Collection Time: 04/05/23  5:37 AM   Result Value Ref Range    Alkaline Phosphatase 196 (H) 30 - 99 U/L    AST(SGOT) 25 12 - 45 U/L    ALT(SGPT) 50 2 - 50 U/L    Total Bilirubin 0.6 0.1 - 1.5 mg/dL    Direct Bilirubin <0.2 0.1 - 0.5 mg/dL    Indirect Bilirubin see below 0.0 - 1.0 mg/dL    Albumin 3.1 (L) 3.2 - 4.9 g/dL    Total Protein 6.6 6.0 - 8.2 g/dL       Scheduled Medications   Medication Dose Frequency    oxyCODONE immediate-release  5 mg BID    acetaminophen  500 mg TID    senna-docusate  2 Tablet BID    aspirin EC  81 mg BID    atorvastatin  40 mg Nightly    busPIRone  15 mg TID    methenamine hip  1 g BID    valsartan  160 mg DAILY    venlafaxine XR  225 mg QDAY with " Breakfast    omeprazole  40 mg QAM AC       Current Diet Order   Procedures    Diet Order Diet: Regular       Assessment:    This patient is a 83 y.o. female admitted for acute inpatient rehabilitation with   S/P total knee arthroplasty, left.    I led and attended the weekly conference, and agree with the IDT conference documentation and plan of care as noted below.    Date of conference: 4/5/2023    Goals and barriers: See IDT note.    Biggest barriers: impaired memory, left leg pain/weakness, decreased range of motion    Goals in next week: improved pain and range of motion    CM/social support: daughters supportive     Anticipated DC date: 4/15    Home health: PT/OT/SLP/RN    Equip: tub bench, commode over toilet    Follow up: PCP, orthopedic surgery       Problem List/Medical Decision Making and Plan:    Left knee replacement  3/28 with Dr. Marcus Hall  Weightbearing as tolerated  Continue full rehab program  PT/OT/SLP, 1 hr each discipline, 5 days per week    Staples out 2 weeks postoperatively, 4/11  Outpatient follow-up with surgery    Pain management  Continue Tylenol  Increase dose of scheduled oxycodone prior to therapies    Normal pressure hydrocephalus  Gait impairments  Short-term memory impairments  Status post  shunt placement 1/2022 with Dr. Blanchard  Per review of outpatient neurology documentations, last imaging in November 2022 showed decreased in size of ventricles, and patient's memory/gait had slightly improved  Speech therapy assessment/treatment     Hypertension  Continue valsartan     History of Overactive bladder  Discontinued oxybutynin, unsafe medication for 83-year-old patient    Urinary Retention, resolved  Discontinued oxybutynin which was the likely culprit    Xerostomia, improved  Discontinued oxybutynin which was the likely culprit    Sore throat, resolved  Likely from xerostomia  COVID-negative on 3/31 as well as 4/3     Frequent UTIs  Continue methenamine     History of  depression  Continue venlafaxine and buspirone     Hyperlipidemia  Continue atorvastatin     History of stroke  Continue aspirin and atorvastatin    Macrocytic anemia  Normal B12  Mild, monitor with intermittent labs    Elevated alk phos, improved  Likely secondary to surgery  Monitor for abdominal complaints    GERD  Continue omeprazole    DVT prophylaxis  Continue aspirin per surgery       Ursula Fall M.D.  Physical Medicine and Rehabilitation

## 2023-04-06 NOTE — THERAPY
Physical Therapy   Daily Treatment     Patient Name: Aaliyah Fulton  Age:  83 y.o., Sex:  female  Medical Record #: [id number]  Today's Date: 4/6/2023     Precautions  Precautions: Weight Bearing As Tolerated Right Lower Extremity  Comments: s/p L TKA    Subjective    Pt was seated in her w/c with her spouse and daughter present. Pt was pleasant and agreeable for therapy session. Pt expressed dizziness during session requiring vital monitoring (See below for values).     Objective       04/06/23 1301   PT Charge Group   PT Gait Training (Units) 2   PT Therapeutic Activities (Units) 2   PT Total Time Spent   PT Individual Total Time Spent (Mins) 60   Cosignature   Documentation Review Approved    Precautions   Precautions Weight Bearing As Tolerated Right Lower Extremity   Vitals   Blood Pressure  93/56  (during activity; pt c/o dizziness, vitals monitored.)   Pain 0 - 10 Group   Therapist Pain Assessment During Activity;Post Activity  (pt provided ice as alternative pain relieving technique.)   Gait Functional Level of Assist    Gait Level Of Assist Standby Assist   Assistive Device Front Wheel Walker   Distance (Feet) 45   # of Times Distance was Traveled 4   Deviation Antalgic;Step To;Decreased Base Of Support;Decreased Heel Strike;Decreased Toe Off   Interdisciplinary Plan of Care Collaboration   IDT Collaboration with  Occupational Therapist   Patient Position at End of Therapy Seated;Chair Alarm On;Self Releasing Lap Belt Applied;Call Light within Reach;Tray Table within Reach;Phone within Reach   Collaboration Comments CLOF   Strengths & Barriers   Strengths Able to follow instructions;Alert and oriented;Effective communication skills;Good carryover of learning;Good insight into deficits/needs;Independent prior level of function;Motivated for self care and independence;Pleasant and cooperative;Willingly participates in therapeutic activities   Barriers Fatigue;Generalized weakness;Impaired balance;Pain     Pt  "required extra time and rest during ambulation/gait training. Pt required vital monitoring d/t c/o dizziness during session.     Assessment    Pt demonstrated good progress with therapy session today. Pt improved her ambulation and standing tolerance along with improvements in L knee flexion tolerance making it easier for her to STS. Pt had a c/o of dizziness requiring BP monitoring and cessation of activity as patient response was consistent with orthostatic hypotension. Pt primarily limited by decreased activity tolerance/endurance and pain.     Strengths: Able to follow instructions, Alert and oriented, Effective communication skills, Good carryover of learning, Good insight into deficits/needs, Independent prior level of function, Motivated for self care and independence, Pleasant and cooperative, Willingly participates in therapeutic activities  Barriers: Fatigue, Generalized weakness, Impaired balance, Pain    Plan    STS training, Improve standing tolerance on LLE, Gait training with FWW, Reinforce quad set and LLE positioning to improve ROM and strength, pre-medicate    Passport items to be completed:  Get in/out of bed safely, in/out of a vehicle, safely use mobility device, walk or wheel around home/community, navigate up and down stairs, show how to get up/down from the ground, ensure home is accessible, demonstrate HEP, complete caregiver training    Physical Therapy Problems (Active)       Problem: Mobility       Dates: Start: 04/01/23         Goal: STG-Within one week, patient will ambulate up/down a curb with FWW and modA       Dates: Start: 04/01/23            Goal: STG-Within one week, patient will ascend and descend four 6\" stairs with Prince and B HR       Dates: Start: 04/01/23               Problem: Mobility Transfers       Dates: Start: 04/01/23         Goal: STG-Within one week, patient will sit to stand to FWW with CG consistently       Dates: Start: 04/01/23               Problem: PT-Long Term " Goals       Dates: Start: 04/01/23         Goal: LTG-By discharge, patient will ambulate 150ft with FWW Sid       Dates: Start: 04/01/23            Goal: LTG-By discharge, patient will transfer one surface to another with FWW Sid       Dates: Start: 04/01/23            Goal: LTG-By discharge, patient will ambulate up/down flight of stairs with B HR and SV       Dates: Start: 04/01/23            Goal: LTG-By discharge, patient will transfer in/out of a car with FWW Sid       Dates: Start: 04/01/23

## 2023-04-07 ENCOUNTER — APPOINTMENT (OUTPATIENT)
Dept: SPEECH THERAPY | Facility: REHABILITATION | Age: 83
End: 2023-04-07
Attending: PHYSICAL MEDICINE & REHABILITATION
Payer: MEDICARE

## 2023-04-07 ENCOUNTER — APPOINTMENT (OUTPATIENT)
Dept: PHYSICAL THERAPY | Facility: REHABILITATION | Age: 83
DRG: 560 | End: 2023-04-07
Attending: PHYSICAL MEDICINE & REHABILITATION
Payer: MEDICARE

## 2023-04-07 ENCOUNTER — APPOINTMENT (OUTPATIENT)
Dept: OCCUPATIONAL THERAPY | Facility: REHABILITATION | Age: 83
DRG: 560 | End: 2023-04-07
Attending: PHYSICAL MEDICINE & REHABILITATION
Payer: MEDICARE

## 2023-04-07 PROCEDURE — A9270 NON-COVERED ITEM OR SERVICE: HCPCS | Performed by: PHYSICAL MEDICINE & REHABILITATION

## 2023-04-07 PROCEDURE — 97110 THERAPEUTIC EXERCISES: CPT

## 2023-04-07 PROCEDURE — 97130 THER IVNTJ EA ADDL 15 MIN: CPT

## 2023-04-07 PROCEDURE — 99232 SBSQ HOSP IP/OBS MODERATE 35: CPT | Performed by: PHYSICAL MEDICINE & REHABILITATION

## 2023-04-07 PROCEDURE — 700102 HCHG RX REV CODE 250 W/ 637 OVERRIDE(OP): Performed by: PHYSICAL MEDICINE & REHABILITATION

## 2023-04-07 PROCEDURE — 97535 SELF CARE MNGMENT TRAINING: CPT

## 2023-04-07 PROCEDURE — 97116 GAIT TRAINING THERAPY: CPT

## 2023-04-07 PROCEDURE — 97129 THER IVNTJ 1ST 15 MIN: CPT

## 2023-04-07 PROCEDURE — 770010 HCHG ROOM/CARE - REHAB SEMI PRIVAT*

## 2023-04-07 RX ADMIN — BUSPIRONE HYDROCHLORIDE 15 MG: 10 TABLET ORAL at 15:05

## 2023-04-07 RX ADMIN — OXYCODONE HYDROCHLORIDE 5 MG: 5 TABLET ORAL at 15:04

## 2023-04-07 RX ADMIN — ACETAMINOPHEN 500 MG: 500 TABLET ORAL at 15:05

## 2023-04-07 RX ADMIN — OMEPRAZOLE 40 MG: 20 CAPSULE, DELAYED RELEASE ORAL at 08:52

## 2023-04-07 RX ADMIN — OXYCODONE HYDROCHLORIDE 10 MG: 10 TABLET ORAL at 12:02

## 2023-04-07 RX ADMIN — SENNOSIDES AND DOCUSATE SODIUM 2 TABLET: 50; 8.6 TABLET ORAL at 08:53

## 2023-04-07 RX ADMIN — ACETAMINOPHEN 500 MG: 500 TABLET ORAL at 21:30

## 2023-04-07 RX ADMIN — OXYCODONE HYDROCHLORIDE 5 MG: 5 TABLET ORAL at 21:41

## 2023-04-07 RX ADMIN — BUSPIRONE HYDROCHLORIDE 15 MG: 10 TABLET ORAL at 21:29

## 2023-04-07 RX ADMIN — ASPIRIN 81 MG: 81 TABLET, COATED ORAL at 21:30

## 2023-04-07 RX ADMIN — BUSPIRONE HYDROCHLORIDE 15 MG: 10 TABLET ORAL at 08:53

## 2023-04-07 RX ADMIN — METHENAMINE HIPPURATE 1 G: 1 TABLET ORAL at 21:29

## 2023-04-07 RX ADMIN — SENNOSIDES AND DOCUSATE SODIUM 2 TABLET: 50; 8.6 TABLET ORAL at 21:30

## 2023-04-07 RX ADMIN — ASPIRIN 81 MG: 81 TABLET, COATED ORAL at 08:52

## 2023-04-07 RX ADMIN — VALSARTAN 160 MG: 80 TABLET, FILM COATED ORAL at 08:53

## 2023-04-07 RX ADMIN — ATORVASTATIN CALCIUM 40 MG: 40 TABLET, FILM COATED ORAL at 21:30

## 2023-04-07 RX ADMIN — METHENAMINE HIPPURATE 1 G: 1 TABLET ORAL at 08:54

## 2023-04-07 RX ADMIN — OXYCODONE HYDROCHLORIDE 10 MG: 10 TABLET ORAL at 08:51

## 2023-04-07 RX ADMIN — ACETAMINOPHEN 500 MG: 500 TABLET ORAL at 08:52

## 2023-04-07 RX ADMIN — VENLAFAXINE HYDROCHLORIDE 225 MG: 75 CAPSULE, EXTENDED RELEASE ORAL at 08:53

## 2023-04-07 ASSESSMENT — GAIT ASSESSMENTS
GAIT LEVEL OF ASSIST: STANDBY ASSIST
ASSISTIVE DEVICE: FRONT WHEEL WALKER
ASSISTIVE DEVICE: FRONT WHEEL WALKER
DEVIATION: ANTALGIC;STEP TO;DECREASED BASE OF SUPPORT;DECREASED HEEL STRIKE;DECREASED TOE OFF
GAIT LEVEL OF ASSIST: STANDBY ASSIST
DISTANCE (FEET): 50
DISTANCE (FEET): 40
DEVIATION: ANTALGIC;STEP TO;DECREASED BASE OF SUPPORT;DECREASED HEEL STRIKE;DECREASED TOE OFF

## 2023-04-07 ASSESSMENT — PAIN DESCRIPTION - PAIN TYPE: TYPE: ACUTE PAIN

## 2023-04-07 ASSESSMENT — ACTIVITIES OF DAILY LIVING (ADL): BED_CHAIR_WHEELCHAIR_TRANSFER_DESCRIPTION: INCREASED TIME;VERBAL CUEING;SUPERVISION FOR SAFETY

## 2023-04-07 NOTE — THERAPY
Speech Language Pathology  Daily Treatment     Patient Name: Aaliyah Fulton  Age:  83 y.o., Sex:  female  Medical Record #: [id number]  Today's Date: 4/7/2023     Precautions  Precautions: Weight Bearing As Tolerated Right Lower Extremity  Comments: s/p L TKA    Subjective    Patient in bed, reported pain from posture in bed with food placed off to the side.  Patient's nurse notified and in to administer medications during session.     Objective       04/07/23 0831   Treatment Charges   SLP Cognitive Skill Development First 15 Minutes 1   SLP Cognitive Skill Development Additional 15 Minutes 1   SLP Total Time Spent   SLP Individual Total Time Spent (Mins) 30   Cognition   Simple Attention Minimal (4)   Prospective Memory Moderate (3)         Assessment    Patient recalled 1/5 RWAVS from training yesterday, recalled  2/5 after 5 min delay.  Patient reports that she thinks she will be fine to take her medications ( related to discussion re alarmed pill boxes.  Reported that this was a concern related to doctor from her daughter.  Reviewed safety sequencing for bed to chair transfer.  Patient needed mod cues to recall and mod cues for safety planning.   Assisted her to record in her memory log.   Patient given 5 unrelated words to recall with 3/5 recalled after 5 min delay.    Strengths: Alert and oriented, Willingly participates in therapeutic activities, Pleasant and cooperative, Supportive family  Barriers: Impaired carryover of learning, Impaired functional cognition    Plan    Target  memory, mental manipulation.  Daughter assists with med sorting, but patient needs to recall when to take.    Passport items to be completed:  Express basic needs, understand food/liquid recommendations, consistently follow swallow precautions, manage finances, manage medications, arrive to therapy appointments on time, complete daily memory log entries, solve problems related to safety situations, review education related to  hospitalization, complete caregiver training     Speech Therapy Problems (Active)       Problem: Memory STGs       Dates: Start: 04/05/23         Goal: STG-Within one week, patient will  recall daily events with use of external memory aide and compensatory memory strategies with  75% acc with min A.       Dates: Start: 04/05/23               Problem: Problem Solving STGs       Dates: Start: 04/01/23         Goal: STG-Within one week, patient will perform medication replica sorting execises with 80% acc with min A.       Dates: Start: 04/01/23         Goal Note filed on 04/05/23 1254 by Arielle Velasquez MS,CCC-SLP       Not yet addressed.  Patient's daughter reports that she sets up the patient's medications.                 Problem: Speech/Swallowing LTGs       Dates: Start: 04/01/23         Goal: LTG-By discharge, patient will solve basic problems with 80% acc min A. for safe discharge home.       Dates: Start: 04/01/23

## 2023-04-07 NOTE — PROGRESS NOTES
"Rehab Progress Note     Date of Service: 4/7/2023  Chief Complaint: Follow-up knee replacement    Interval Events (Subjective)    Patient seen today and examined in her room.  She reports her pain is much better on the higher dose of scheduled oxycodone.  She moved her bowels today.  She is sleeping well.    Patient has no other new questions, concerns, or complaints today.               Objective:  VITAL SIGNS: /64   Pulse 76   Temp 36.5 °C (97.7 °F) (Oral)   Resp 18   Ht 1.727 m (5' 8\")   Wt 76.7 kg (169 lb)   SpO2 93%   BMI 25.70 kg/m²   Gen: alert, no apparent distress  CV: Regular rate, regular rhythm  Resp: Clear to auscultation bilaterally  MSK: Dressing on left knee with mild surrounding edema      Recent Results (from the past 72 hour(s))   HEPATIC FUNCTION PANEL    Collection Time: 04/05/23  5:37 AM   Result Value Ref Range    Alkaline Phosphatase 196 (H) 30 - 99 U/L    AST(SGOT) 25 12 - 45 U/L    ALT(SGPT) 50 2 - 50 U/L    Total Bilirubin 0.6 0.1 - 1.5 mg/dL    Direct Bilirubin <0.2 0.1 - 0.5 mg/dL    Indirect Bilirubin see below 0.0 - 1.0 mg/dL    Albumin 3.1 (L) 3.2 - 4.9 g/dL    Total Protein 6.6 6.0 - 8.2 g/dL       Scheduled Medications   Medication Dose Frequency    oxyCODONE immediate-release  10 mg BID    acetaminophen  500 mg TID    senna-docusate  2 Tablet BID    aspirin EC  81 mg BID    atorvastatin  40 mg Nightly    busPIRone  15 mg TID    methenamine hip  1 g BID    valsartan  160 mg DAILY    venlafaxine XR  225 mg QDAY with Breakfast    omeprazole  40 mg QAM AC       Current Diet Order   Procedures    Diet Order Diet: Regular       Assessment:    This patient is a 83 y.o. female admitted for acute inpatient rehabilitation with   S/P total knee arthroplasty, left.    I led and attended the weekly conference, and agree with the IDT conference documentation and plan of care as noted below.    Date of conference: 4/5/2023    Goals and barriers: See IDT note.    Biggest barriers: " impaired memory, left leg pain/weakness, decreased range of motion    Goals in next week: improved pain and range of motion    CM/social support: daughters supportive     Anticipated DC date: 4/15    Home health: PT/OT/SLP/RN    Equip: tub bench, commode over toilet    Follow up: PCP, orthopedic surgery       Problem List/Medical Decision Making and Plan:    Left knee replacement  3/28 with Dr. Marcus Hall  Weightbearing as tolerated  Continue full rehab program  PT/OT/SLP, 1 hr each discipline, 5 days per week    Staples out 2 weeks postoperatively, 4/11  Outpatient follow-up with surgery    Pain management  Continue Tylenol  Continue scheduled oxycodone 10 mg prior to therapies  Continue as needed oxycodone    Normal pressure hydrocephalus  Gait impairments  Short-term memory impairments  Status post  shunt placement 1/2022 with Dr. Blanchard  Per review of outpatient neurology documentations, last imaging in November 2022 showed decreased in size of ventricles, and patient's memory/gait had slightly improved  Speech therapy assessment/treatment     Hypertension  Continue valsartan     History of Overactive bladder  Discontinued oxybutynin, unsafe medication for 83-year-old patient    Urinary Retention, resolved  Discontinued oxybutynin which was the likely culprit    Xerostomia, improved  Discontinued oxybutynin which was the likely culprit    Sore throat, resolved  Likely from xerostomia  COVID-negative on 3/31 as well as 4/3     Frequent UTIs  Continue methenamine     History of depression  Continue venlafaxine and buspirone  Mood currently stable     Hyperlipidemia  Continue atorvastatin     History of stroke  Continue aspirin and statin    Macrocytic anemia  Normal B12  Mild, monitor with intermittent labs  Recheck labs on Monday    Elevated alk phos, improved  Likely secondary to surgery  Monitor for abdominal complaints  Recheck labs on Monday    GERD  Continue omeprazole    DVT prophylaxis  Continue  mick Fall M.D.  Physical Medicine and Rehabilitation

## 2023-04-07 NOTE — THERAPY
"Physical Therapy   Daily Treatment     Patient Name: Aaliyah Fulton  Age:  83 y.o., Sex:  female  Medical Record #: [id number]  Today's Date: 4/7/2023     Precautions  Precautions: Weight Bearing As Tolerated Right Lower Extremity  Comments: s/p L TKA    Subjective    \"I'm whirling\"     Objective     04/07/23 1101 04/07/23 1501   PT Total Time Spent   PT Individual Total Time Spent (Mins) 30  60    Vitals   Patient BP Position Sitting  --    Blood Pressure  103/79  --    Vitals Comments post-activity  --    Gait Functional Level of Assist    Gait Level Of Assist Standby Assist Standby Assist   Assistive Device Front Wheel Walker Front Wheel Walker   Distance (Feet) 40 50   # of Times Distance was Traveled 1 2   Deviation Antalgic;Step To;Decreased Base Of Support;Decreased Heel Strike;Decreased Toe Off Antalgic;Step To;Decreased Base Of Support;Decreased Heel Strike;Decreased Toe Off  (progressed to step through)   Transfer Functional Level of Assist   Bed, Chair, Wheelchair Transfer  --  Standby Assist   Bed Chair Wheelchair Transfer Description  --  Increased time;Verbal cueing;Supervision for safety   Supine Lower Body Exercise   Bridges  --  Two Legged;1 set of 10   Hip Abduction  --  Side Lying;1 set of 10;Bilateral  (clamshells. blocking hips. R partial range)   Sitting Lower Body Exercises   Hamstring Curl 1 set of 10;Left  (pink theraband) 3 sets of 10;Left  (pink theraband)   Standing Lower Body Exercises   Hip Extension  --  1 set of 10;Left  (Parallel bars. heavy cuing. tight hip flexors)   Hip Abduction  --  1 set of 10;Left  (Parallel bars, difficuly w/ form. heavy cuing)   Other Exercises TKE  (2 sets of 10 w/ 5 sec holds, ball btw knee and wall)  --    Bed Mobility    Supine to Sit  --  Standby Assist   Sit to Supine  --  Standby Assist   Sit to Stand Standby Assist Standby Assist  (FWW)   Interdisciplinary Plan of Care Collaboration   IDT Collaboration with  Physical Therapist;Family / Caregiver " Physical Therapist   Patient Position at End of Therapy Seated;Chair Alarm On;Call Light within Reach;Tray Table within Reach;Family / Friend in Room Seated;Call Light within Reach;Tray Table within Reach;Self Releasing Lap Belt Applied;Chair Alarm On   Collaboration Comments CLOF; supervising supervising     Pm:  10 minutes spent on pelvic control in standing at parallel bars attempting to activate hip abductors and improve stance stability.    Pt educated on supine exercises she can do independently and provided with handout.    Assessment    Pt's activity tolerance significantly improved. She reported some symptoms of dizziness following extended time in standing but vitals were stable when assessed. Pt was able to progress to a step-through gait pattern during 2nd session, however primary limitations continue to be pain and limited ROM.  Strengths: Able to follow instructions, Alert and oriented, Effective communication skills, Good carryover of learning, Good insight into deficits/needs, Independent prior level of function, Motivated for self care and independence, Pleasant and cooperative, Willingly participates in therapeutic activities  Barriers: Fatigue, Generalized weakness, Impaired balance, Pain    Plan    STS training, Improve standing tolerance on LLE, Gait training with FWW, Reinforce quad set and LLE positioning to improve ROM and strength, PROM/AAROM for L knee flexion and extension, pre-medicate     Passport items to be completed:  Get in/out of bed safely, in/out of a vehicle, safely use mobility device, walk or wheel around home/community, navigate up and down stairs, show how to get up/down from the ground, ensure home is accessible, demonstrate HEP, complete caregiver training      Physical Therapy Problems (Active)       Problem: Mobility       Dates: Start: 04/01/23         Goal: STG-Within one week, patient will ambulate up/down a curb with FWW and modA       Dates: Start: 04/01/23        "     Goal: STG-Within one week, patient will ascend and descend four 6\" stairs with Prince and B HR       Dates: Start: 04/01/23               Problem: Mobility Transfers       Dates: Start: 04/01/23         Goal: STG-Within one week, patient will sit to stand to FWW with CG consistently       Dates: Start: 04/01/23               Problem: PT-Long Term Goals       Dates: Start: 04/01/23         Goal: LTG-By discharge, patient will ambulate 150ft with FWW Sid       Dates: Start: 04/01/23            Goal: LTG-By discharge, patient will transfer one surface to another with FWW Sid       Dates: Start: 04/01/23            Goal: LTG-By discharge, patient will ambulate up/down flight of stairs with B HR and SV       Dates: Start: 04/01/23            Goal: LTG-By discharge, patient will transfer in/out of a car with FWW Sid       Dates: Start: 04/01/23              "

## 2023-04-07 NOTE — CARE PLAN
The patient is Stable - Low risk of patient condition declining or worsening    Shift Goals  Clinical Goals: Pain management  Patient Goals: pain mangement    Progress made toward(s) clinical / shift goals:    Problem: Pain - Standard  Goal: Alleviation of pain or a reduction in pain to the patient’s comfort goal  Note: Patient able to verbalize pain level and verbalize an acceptable level of pain. Oxycodone 10 mg PO given for C/O left leg pain with relief.        Problem: Skin Integrity  Goal: Skin integrity is maintained or improved  Note: Patient's skin remains intact and free from new or accidental injury this shift.  Will continue to monitor.

## 2023-04-07 NOTE — THERAPY
"Occupational Therapy  Daily Treatment     Patient Name: Aaliyah Fulton  Age:  83 y.o., Sex:  female  Medical Record #: [id number]  Today's Date: 4/7/2023     Precautions  Precautions: Weight Bearing As Tolerated Right Lower Extremity  Comments: s/p L TKA         Subjective    Pt seated in w/c upon arrival, pleasant and cooperative, agreeable to therapy. \"How am I fall risk? Someone wants me to ask that (I forget their name). But I really don't care because I know I am fall risk. I fall all the time\"     Objective       04/07/23 0931   OT Charge Group   OT Self Care / ADL (Units) 1   OT Therapeutic Exercise (Units) 3   OT Total Time Spent   OT Individual Total Time Spent (Mins) 60   Functional Level of Assist   Tub / Shower Transfers Contact Guard Assist  (dry tub shower using tub transfer bench at FWW)   Sitting Upper Body Exercises   Bilateral Row 3 sets of 15;Bilateral;Weight (See Comments for lbs)  (20lb equalizer)   Upper Extremity Bike Level 2 Resistance  (BUE motomed, 1.18 mile, 5 min x 2)   Interdisciplinary Plan of Care Collaboration   Patient Position at End of Therapy Seated;Call Light within Reach;Tray Table within Reach;Chair Alarm On;Self Releasing Lap Belt Applied     Edu daughter and spouse on pt's CLOF with tub shower, recommendation for improve bathroom safety is to use the tub transfer bench in 's tub shower with curtains, hand-held shower head, commode over toilet.    Assessment    Pt completed tub transfer with CGA overall at FWW - although with fatigue and pain. Daughter and spouse receptive to [facility] re: bathroom set-up recommendations and are in agreement    Strengths: Able to follow instructions, Alert and oriented, Effective communication skills, Independent prior level of function, Motivated for self care and independence, Pleasant and cooperative, Willingly participates in therapeutic activities  Barriers: Decreased endurance, Fatigue, Generalized weakness, Impaired activity tolerance, " Impaired balance, Limited mobility, Pain    Plan    LB dressing with possible use of AE, overall strength and endurance, standing tolerance and balance, fall prevention, tub transfer with tub transfer bench at FWW level    Occupational Therapy Goals (Active)       Problem: Functional Transfers       Dates: Start: 04/01/23         Goal: STG-Within one week, patient will complete transfers w/ CGA       Dates: Start: 04/01/23         Goal Note filed on 04/05/23 1142 by Isatu Maldonado MS,OTR/L       Occasionally requires Min A                 Problem: IADL's       Dates: Start: 04/01/23         Goal: STG-Within one week, patient will utilize washer and dryer w/ min A       Dates: Start: 04/01/23         Goal Note filed on 04/05/23 1142 by Isatu Maldonado MS,OTR/L       Not yet addressed                  Problem: OT Long Term Goals       Dates: Start: 04/01/23         Goal: LTG-By discharge, patient will complete basic self care tasks w/ SBA and AE as needed       Dates: Start: 04/01/23            Goal: LTG-By discharge, patient will complete basic home management w/ SBA       Dates: Start: 04/01/23               Problem: Toileting       Dates: Start: 04/01/23         Goal: STG-Within one week, patient will complete toileting tasks w/ CGA       Dates: Start: 04/01/23         Goal Note filed on 04/05/23 1142 by Isatu Maldonado MS,OTR/L       Requires Min A

## 2023-04-07 NOTE — DISCHARGE PLANNING
CM spoke with daughter Angi to update on IDT and DC date of 4/15/23.  Answered questions.  CM will continue to monitor for DC needs.

## 2023-04-08 ENCOUNTER — APPOINTMENT (OUTPATIENT)
Dept: SPEECH THERAPY | Facility: REHABILITATION | Age: 83
DRG: 560 | End: 2023-04-08
Attending: PHYSICAL MEDICINE & REHABILITATION
Payer: MEDICARE

## 2023-04-08 PROCEDURE — A9270 NON-COVERED ITEM OR SERVICE: HCPCS | Performed by: PHYSICAL MEDICINE & REHABILITATION

## 2023-04-08 PROCEDURE — 97129 THER IVNTJ 1ST 15 MIN: CPT

## 2023-04-08 PROCEDURE — 700102 HCHG RX REV CODE 250 W/ 637 OVERRIDE(OP): Performed by: PHYSICAL MEDICINE & REHABILITATION

## 2023-04-08 PROCEDURE — 770010 HCHG ROOM/CARE - REHAB SEMI PRIVAT*

## 2023-04-08 PROCEDURE — 97130 THER IVNTJ EA ADDL 15 MIN: CPT

## 2023-04-08 RX ADMIN — VALSARTAN 160 MG: 80 TABLET, FILM COATED ORAL at 08:55

## 2023-04-08 RX ADMIN — METHENAMINE HIPPURATE 1 G: 1 TABLET ORAL at 08:55

## 2023-04-08 RX ADMIN — METHENAMINE HIPPURATE 1 G: 1 TABLET ORAL at 20:27

## 2023-04-08 RX ADMIN — BUSPIRONE HYDROCHLORIDE 15 MG: 10 TABLET ORAL at 20:27

## 2023-04-08 RX ADMIN — OXYCODONE HYDROCHLORIDE 10 MG: 10 TABLET ORAL at 07:15

## 2023-04-08 RX ADMIN — BUSPIRONE HYDROCHLORIDE 15 MG: 10 TABLET ORAL at 08:55

## 2023-04-08 RX ADMIN — VENLAFAXINE HYDROCHLORIDE 225 MG: 75 CAPSULE, EXTENDED RELEASE ORAL at 07:16

## 2023-04-08 RX ADMIN — ACETAMINOPHEN 500 MG: 500 TABLET ORAL at 15:22

## 2023-04-08 RX ADMIN — SENNOSIDES AND DOCUSATE SODIUM 2 TABLET: 50; 8.6 TABLET ORAL at 08:55

## 2023-04-08 RX ADMIN — ASPIRIN 81 MG: 81 TABLET, COATED ORAL at 20:27

## 2023-04-08 RX ADMIN — SENNOSIDES AND DOCUSATE SODIUM 2 TABLET: 50; 8.6 TABLET ORAL at 20:27

## 2023-04-08 RX ADMIN — OXYCODONE HYDROCHLORIDE 5 MG: 5 TABLET ORAL at 16:50

## 2023-04-08 RX ADMIN — HYDROXYZINE HYDROCHLORIDE 50 MG: 25 TABLET, FILM COATED ORAL at 20:29

## 2023-04-08 RX ADMIN — OXYCODONE HYDROCHLORIDE 10 MG: 10 TABLET ORAL at 13:33

## 2023-04-08 RX ADMIN — OMEPRAZOLE 40 MG: 20 CAPSULE, DELAYED RELEASE ORAL at 07:16

## 2023-04-08 RX ADMIN — OXYCODONE HYDROCHLORIDE 10 MG: 10 TABLET ORAL at 12:20

## 2023-04-08 RX ADMIN — ASPIRIN 81 MG: 81 TABLET, COATED ORAL at 07:15

## 2023-04-08 RX ADMIN — ACETAMINOPHEN 500 MG: 500 TABLET ORAL at 08:56

## 2023-04-08 RX ADMIN — BUSPIRONE HYDROCHLORIDE 15 MG: 10 TABLET ORAL at 15:22

## 2023-04-08 RX ADMIN — ACETAMINOPHEN 500 MG: 500 TABLET ORAL at 20:27

## 2023-04-08 RX ADMIN — ATORVASTATIN CALCIUM 40 MG: 40 TABLET, FILM COATED ORAL at 20:27

## 2023-04-08 ASSESSMENT — PAIN DESCRIPTION - PAIN TYPE
TYPE: ACUTE PAIN

## 2023-04-08 NOTE — CARE PLAN
The patient is Stable - Low risk of patient condition declining or worsening    Shift Goals  Clinical Goals: Pain management  Patient Goals: Pain management    Progress made toward(s) clinical / shift goals:    Problem: Pain - Standard  Goal: Alleviation of pain or a reduction in pain to the patient’s comfort goal  Outcome: Progressing   Pain managed well with PRN medication at this time.  Patient uses pharmacological and non pharmacological pain-relief strategies. Patient displays improvement in mood, coping. Pain managed well with PRN medication at this time.  Patient uses pharmacological and non pharmacological pain-relief strategies. Patient displays improvement in mood, coping.  Patient is not progressing towards the following goals:

## 2023-04-08 NOTE — CARE PLAN
"  Problem: Pain - Standard  Goal: Alleviation of pain or a reduction in pain to the patient’s comfort goal  Note: Educate patient of non-pharmacological comfort measures: repositioning, relaxation/breathing technique, cold compress and activities. Elevated BLE in bed.     Problem: Infection  Goal: Patient will remain free from infection  Note: Continues Hipprex, encouraged increase fluids intake. Will monitor.     Problem: Fall Risk - Rehab  Goal: Patient will remain free from falls  Note: Diana Eugenio Fall risk Assessment Score: 11      Moderate fall risk Interventions  - Bed and strip alarm   - Yellow sign by the door   - Yellow wrist band \"Fall risk\"  - Room near to the nurse station  - Do not leave patient unattended in the bathroom  - Fall risk education provided           The patient is Watcher - Medium risk of patient condition declining or worsening    Shift Goals  Clinical Goals: Pain management  Patient Goals: pain mangement        "

## 2023-04-08 NOTE — THERAPY
Speech Language Pathology  Daily Treatment     Patient Name: Aaliyah Fulton  Age:  83 y.o., Sex:  female  Medical Record #: [id number]  Today's Date: 4/8/2023     Precautions  Precautions: Weight Bearing As Tolerated Right Lower Extremity  Comments: s/p L TKA    Subjective    Pt pleasant and cooperative during tx.       Objective       04/08/23 1001   Treatment Charges   SLP Cognitive Skill Development First 15 Minutes 1   SLP Cognitive Skill Development Additional 15 Minutes 1   SLP Total Time Spent   SLP Individual Total Time Spent (Mins) 30   Cognition   Functional Memory Activities Minimal (4)         Assessment    Pt unable to recall yesterday's lunch, and dinner.  Pt recalled breakfast items entered them into her memory book.  Pt unable recall RWAVS memory strategies.  RWAVS was reviewed, and example provided.  Pt verbalized understanding, and wrote RWAVS memory strategies in her memory book.  Pt stated that dtr typically fills her weekly pill box; however, she states she is able to complete the task herself if necessary.     Strengths: Alert and oriented, Willingly participates in therapeutic activities, Pleasant and cooperative, Supportive family  Barriers: Impaired carryover of learning, Impaired functional cognition    Plan    RWAVS, memory book, med management        Speech Therapy Problems (Active)       Problem: Memory STGs       Dates: Start: 04/05/23         Goal: STG-Within one week, patient will  recall daily events with use of external memory aide and compensatory memory strategies with  75% acc with min A.       Dates: Start: 04/05/23               Problem: Problem Solving STGs       Dates: Start: 04/01/23         Goal: STG-Within one week, patient will perform medication replica sorting execises with 80% acc with min A.       Dates: Start: 04/01/23         Goal Note filed on 04/05/23 1254 by Arielle Velasquez MS,CCC-SLP       Not yet addressed.  Patient's daughter reports that she sets up the  patient's medications.                 Problem: Speech/Swallowing LTGs       Dates: Start: 04/01/23         Goal: LTG-By discharge, patient will solve basic problems with 80% acc min A. for safe discharge home.       Dates: Start: 04/01/23

## 2023-04-09 ENCOUNTER — APPOINTMENT (OUTPATIENT)
Dept: PHYSICAL THERAPY | Facility: REHABILITATION | Age: 83
DRG: 560 | End: 2023-04-09
Attending: PHYSICAL MEDICINE & REHABILITATION
Payer: MEDICARE

## 2023-04-09 ENCOUNTER — APPOINTMENT (OUTPATIENT)
Dept: SPEECH THERAPY | Facility: REHABILITATION | Age: 83
DRG: 560 | End: 2023-04-09
Attending: PHYSICAL MEDICINE & REHABILITATION
Payer: MEDICARE

## 2023-04-09 PROCEDURE — 770010 HCHG ROOM/CARE - REHAB SEMI PRIVAT*

## 2023-04-09 PROCEDURE — A9270 NON-COVERED ITEM OR SERVICE: HCPCS | Performed by: PHYSICAL MEDICINE & REHABILITATION

## 2023-04-09 PROCEDURE — 97130 THER IVNTJ EA ADDL 15 MIN: CPT

## 2023-04-09 PROCEDURE — 97129 THER IVNTJ 1ST 15 MIN: CPT

## 2023-04-09 PROCEDURE — 700102 HCHG RX REV CODE 250 W/ 637 OVERRIDE(OP): Performed by: PHYSICAL MEDICINE & REHABILITATION

## 2023-04-09 RX ADMIN — SENNOSIDES AND DOCUSATE SODIUM 2 TABLET: 50; 8.6 TABLET ORAL at 22:00

## 2023-04-09 RX ADMIN — ACETAMINOPHEN 500 MG: 500 TABLET ORAL at 14:15

## 2023-04-09 RX ADMIN — OXYCODONE HYDROCHLORIDE 10 MG: 10 TABLET ORAL at 12:17

## 2023-04-09 RX ADMIN — OXYCODONE HYDROCHLORIDE 10 MG: 10 TABLET ORAL at 08:12

## 2023-04-09 RX ADMIN — METHENAMINE HIPPURATE 1 G: 1 TABLET ORAL at 08:13

## 2023-04-09 RX ADMIN — BUSPIRONE HYDROCHLORIDE 15 MG: 10 TABLET ORAL at 08:12

## 2023-04-09 RX ADMIN — VENLAFAXINE HYDROCHLORIDE 225 MG: 75 CAPSULE, EXTENDED RELEASE ORAL at 08:12

## 2023-04-09 RX ADMIN — BUSPIRONE HYDROCHLORIDE 15 MG: 10 TABLET ORAL at 22:00

## 2023-04-09 RX ADMIN — VALSARTAN 160 MG: 80 TABLET, FILM COATED ORAL at 08:13

## 2023-04-09 RX ADMIN — SENNOSIDES AND DOCUSATE SODIUM 2 TABLET: 50; 8.6 TABLET ORAL at 08:12

## 2023-04-09 RX ADMIN — BUSPIRONE HYDROCHLORIDE 15 MG: 10 TABLET ORAL at 14:15

## 2023-04-09 RX ADMIN — ACETAMINOPHEN 500 MG: 500 TABLET ORAL at 22:00

## 2023-04-09 RX ADMIN — ACETAMINOPHEN 500 MG: 500 TABLET ORAL at 08:13

## 2023-04-09 RX ADMIN — ATORVASTATIN CALCIUM 40 MG: 40 TABLET, FILM COATED ORAL at 22:00

## 2023-04-09 RX ADMIN — POLYETHYLENE GLYCOL 3350 1 PACKET: 17 POWDER, FOR SOLUTION ORAL at 22:09

## 2023-04-09 RX ADMIN — METHENAMINE HIPPURATE 1 G: 1 TABLET ORAL at 22:00

## 2023-04-09 RX ADMIN — ASPIRIN 81 MG: 81 TABLET, COATED ORAL at 08:13

## 2023-04-09 RX ADMIN — ASPIRIN 81 MG: 81 TABLET, COATED ORAL at 21:00

## 2023-04-09 RX ADMIN — OMEPRAZOLE 40 MG: 20 CAPSULE, DELAYED RELEASE ORAL at 08:12

## 2023-04-09 ASSESSMENT — FIBROSIS 4 INDEX: FIB4 SCORE: 1.4

## 2023-04-09 ASSESSMENT — PAIN DESCRIPTION - PAIN TYPE: TYPE: ACUTE PAIN

## 2023-04-09 NOTE — THERAPY
Recreational Therapy  Daily Treatment     Patient Name: Aaliyah Fulton  AGE:  83 y.o., SEX:  female  Medical Record #: [id number]  Today's Date: 4/9/2023       Subjective    Patient was ready and agreeable to recreation therapy session.      Objective       04/09/23 1031   Procedural Tracking   Procedural Tracking Community Re-Integration;Leisure Skills Awareness;Leisure Skills Development;Cognitive Skills Training;Gross Motor Functional Leisure Skills;Fine Motor Functional Leisure Skills;Group Treatment;Social Skills Training;Community Skills Development   Treatment Time   Total Time Spent (mins) 60   Total Time Missed 0   Functional Ability Status - Cognitive   Attention Span Remains on Task   Comprehension Follows One Step Commands;Follows Two Step Commands   Judgment Able to Make Independent Decisions   Functional Ability Status - Emotional    Affect Appropriate   Mood Appropriate   Behavior Appropriate;Cooperative   Skilled Intervention    Skilled Intervention Gross Motor Leisure;Fine Motor Leisure;Cognitive Leisure;Relaxation / Coping Skills;Social Skills;Community Skills;Group Participation   Interdisciplinary Plan of Care Collaboration   IDT Collaboration with  Therapy Tech;Recreation Therapist   Patient Position at End of Therapy Seated;Family / Friend in Room   Strengths & Barriers   Strengths Able to follow instructions;Alert and oriented;Willingly participates in therapeutic activities;Motivated for self care and independence   Barriers Decreased endurance;Fatigue;Generalized weakness;Impaired activity tolerance;Impaired balance;Impaired carryover of learning;Limited mobility;Pain         Assessment    Patient participated in group golf activity during recreation therapy session. Patient required mod cues for participation. Patient's daughter and son in law were present for duration of session. Patient required mod A with standing x 1. Patient required mod cues for holding golf club and hitting golf  balls from a seated position.     Strengths: (P) Able to follow instructions, Alert and oriented, Willingly participates in therapeutic activities, Motivated for self care and independence  Barriers: (P) Decreased endurance, Fatigue, Generalized weakness, Impaired activity tolerance, Impaired balance, Impaired carryover of learning, Limited mobility, Pain    Plan    Patient will benefit from continued recreation therapy sessions.     Passport items to be completed:  Verbalize two positive leisure activities, discuss returning to work, hobbies, community groups or volunteer activities, explore community resources

## 2023-04-09 NOTE — CARE PLAN
"The patient is Stable - Low risk of patient condition declining or worsening    Shift Goals  Clinical Goals: pain control  Patient Goals: Pain management    Progress made toward(s) clinical / shift goals:      Problem: Knowledge Deficit - Standard  Goal: Patient and family/care givers will demonstrate understanding of plan of care, disease process/condition, diagnostic tests and medications  Outcome: Progressing  Patient use call light for assistance.      Problem: Pain - Standard  Goal: Alleviation of pain or a reduction in pain to the patient’s comfort goal  Outcome: Progressing  Patient said her pain is controlled with oxycodone. Up and participated in therapy. Tolerated well.      Problem: Fall Risk - Rehab  Goal: Patient will remain free from falls  Outcome: Progressing  Diana Becker Fall risk Assessment Score: 11    Moderate fall risk Interventions  - Bed and strip alarm   - Yellow sign by the door   - Yellow wrist band \"Fall risk\"  - Room near to the nurse station  - Do not leave patient unattended in the bathroom  - Fall risk education provided         Patient is not progressing towards the following goals:      "

## 2023-04-09 NOTE — THERAPY
Speech Language Pathology  Daily Treatment     Patient Name: Aaliyah Fulton  Age:  83 y.o., Sex:  female  Medical Record #: [id number]  Today's Date: 4/9/2023     Precautions  Precautions: Weight Bearing As Tolerated Right Lower Extremity  Comments: s/p L TKA    Subjective    Pt pleasant and cooperative during ST session. Pt's spouse and dtr present and supportive.      Objective       04/09/23 1331   Treatment Charges   SLP Cognitive Skill Development First 15 Minutes 1   SLP Cognitive Skill Development Additional 15 Minutes 1   SLP Total Time Spent   SLP Individual Total Time Spent (Mins) 30   Interdisciplinary Plan of Care Collaboration   IDT Collaboration with  Nursing   Patient Position at End of Therapy In Bed;Bed Alarm On;Call Light within Reach;Tray Table within Reach;Family / Friend in Room   Collaboration Comments Pt's L ankle is swollen, not red or warm to the touch.         Assessment    Pt entered lunch meal and details regarding rec tx with SPV. Pt could not recall her breakfast meal and required MIN A to log ST activity.     Mental manipulation 3 words in alphabetical order 90% acc with pt consistently requiring repetitions of three words.    Pt completed Easy level calendar organization task with MIN A achieving 100% acc. .     Strengths: Alert and oriented, Willingly participates in therapeutic activities, Pleasant and cooperative, Supportive family  Barriers: Impaired carryover of learning, Impaired functional cognition    Plan    Continue to address recall, medication management.     Passport items to be completed:  Express basic needs, understand food/liquid recommendations, consistently follow swallow precautions, manage finances, manage medications, arrive to therapy appointments on time, complete daily memory log entries, solve problems related to safety situations, review education related to hospitalization, complete caregiver training     Speech Therapy Problems (Active)       Problem:  Memory STGs       Dates: Start: 04/05/23         Goal: STG-Within one week, patient will  recall daily events with use of external memory aide and compensatory memory strategies with  75% acc with min A.       Dates: Start: 04/05/23               Problem: Problem Solving STGs       Dates: Start: 04/01/23         Goal: STG-Within one week, patient will perform medication replica sorting execises with 80% acc with min A.       Dates: Start: 04/01/23         Goal Note filed on 04/05/23 1254 by Arielle Velasquez MS,CCC-SLP       Not yet addressed.  Patient's daughter reports that she sets up the patient's medications.                 Problem: Speech/Swallowing LTGs       Dates: Start: 04/01/23         Goal: LTG-By discharge, patient will solve basic problems with 80% acc min A. for safe discharge home.       Dates: Start: 04/01/23

## 2023-04-09 NOTE — CARE PLAN
"  Problem: Infection  Goal: Patient will remain free from infection  Note: Continues Hipprex, encouraged increase fluids intake. Will monitor.     Problem: Fall Risk - Rehab  Goal: Patient will remain free from falls  Note: Diana Becker Fall risk Assessment Score: 11      Moderate fall risk Interventions  - Bed and strip alarm   - Yellow sign by the door   - Yellow wrist band \"Fall risk\"  - Room near to the nurse station  - Do not leave patient unattended in the bathroom  - Fall risk education provided         The patient is Watcher - Medium risk of patient condition declining or worsening    Shift Goals  Clinical Goals: Pain management  Patient Goals: Pain management        "

## 2023-04-10 ENCOUNTER — APPOINTMENT (OUTPATIENT)
Dept: SPEECH THERAPY | Facility: REHABILITATION | Age: 83
End: 2023-04-10
Attending: PHYSICAL MEDICINE & REHABILITATION
Payer: MEDICARE

## 2023-04-10 ENCOUNTER — APPOINTMENT (OUTPATIENT)
Dept: OCCUPATIONAL THERAPY | Facility: REHABILITATION | Age: 83
DRG: 560 | End: 2023-04-10
Attending: PHYSICAL MEDICINE & REHABILITATION
Payer: MEDICARE

## 2023-04-10 ENCOUNTER — APPOINTMENT (OUTPATIENT)
Dept: PHYSICAL THERAPY | Facility: REHABILITATION | Age: 83
DRG: 560 | End: 2023-04-10
Attending: PHYSICAL MEDICINE & REHABILITATION
Payer: MEDICARE

## 2023-04-10 PROCEDURE — 97116 GAIT TRAINING THERAPY: CPT

## 2023-04-10 PROCEDURE — 97110 THERAPEUTIC EXERCISES: CPT

## 2023-04-10 PROCEDURE — 700102 HCHG RX REV CODE 250 W/ 637 OVERRIDE(OP): Performed by: PHYSICAL MEDICINE & REHABILITATION

## 2023-04-10 PROCEDURE — A9270 NON-COVERED ITEM OR SERVICE: HCPCS | Performed by: PHYSICAL MEDICINE & REHABILITATION

## 2023-04-10 PROCEDURE — 99232 SBSQ HOSP IP/OBS MODERATE 35: CPT | Performed by: PHYSICAL MEDICINE & REHABILITATION

## 2023-04-10 PROCEDURE — 97130 THER IVNTJ EA ADDL 15 MIN: CPT

## 2023-04-10 PROCEDURE — 97129 THER IVNTJ 1ST 15 MIN: CPT

## 2023-04-10 PROCEDURE — 97535 SELF CARE MNGMENT TRAINING: CPT

## 2023-04-10 PROCEDURE — 770010 HCHG ROOM/CARE - REHAB SEMI PRIVAT*

## 2023-04-10 RX ORDER — OXYMETAZOLINE HYDROCHLORIDE 0.05 G/100ML
2 SPRAY NASAL 2 TIMES DAILY PRN
Status: ACTIVE | OUTPATIENT
Start: 2023-04-10 | End: 2023-04-13

## 2023-04-10 RX ADMIN — ACETAMINOPHEN 500 MG: 500 TABLET ORAL at 19:49

## 2023-04-10 RX ADMIN — VALSARTAN 160 MG: 80 TABLET, FILM COATED ORAL at 08:01

## 2023-04-10 RX ADMIN — SENNOSIDES AND DOCUSATE SODIUM 2 TABLET: 50; 8.6 TABLET ORAL at 19:49

## 2023-04-10 RX ADMIN — METHENAMINE HIPPURATE 1 G: 1 TABLET ORAL at 08:01

## 2023-04-10 RX ADMIN — VENLAFAXINE HYDROCHLORIDE 225 MG: 75 CAPSULE, EXTENDED RELEASE ORAL at 08:01

## 2023-04-10 RX ADMIN — ASPIRIN 81 MG: 81 TABLET, COATED ORAL at 08:01

## 2023-04-10 RX ADMIN — OXYCODONE HYDROCHLORIDE 10 MG: 10 TABLET ORAL at 08:00

## 2023-04-10 RX ADMIN — ATORVASTATIN CALCIUM 40 MG: 40 TABLET, FILM COATED ORAL at 19:49

## 2023-04-10 RX ADMIN — BUSPIRONE HYDROCHLORIDE 15 MG: 10 TABLET ORAL at 08:01

## 2023-04-10 RX ADMIN — BUSPIRONE HYDROCHLORIDE 15 MG: 10 TABLET ORAL at 13:58

## 2023-04-10 RX ADMIN — ASPIRIN 81 MG: 81 TABLET, COATED ORAL at 19:49

## 2023-04-10 RX ADMIN — OMEPRAZOLE 40 MG: 20 CAPSULE, DELAYED RELEASE ORAL at 08:00

## 2023-04-10 RX ADMIN — BUSPIRONE HYDROCHLORIDE 15 MG: 10 TABLET ORAL at 19:48

## 2023-04-10 RX ADMIN — OXYCODONE HYDROCHLORIDE 10 MG: 10 TABLET ORAL at 11:36

## 2023-04-10 RX ADMIN — ACETAMINOPHEN 500 MG: 500 TABLET ORAL at 08:01

## 2023-04-10 RX ADMIN — OXYCODONE HYDROCHLORIDE 10 MG: 10 TABLET ORAL at 05:41

## 2023-04-10 RX ADMIN — ACETAMINOPHEN 500 MG: 500 TABLET ORAL at 13:58

## 2023-04-10 RX ADMIN — METHENAMINE HIPPURATE 1 G: 1 TABLET ORAL at 19:49

## 2023-04-10 ASSESSMENT — PATIENT HEALTH QUESTIONNAIRE - PHQ9
SUM OF ALL RESPONSES TO PHQ9 QUESTIONS 1 AND 2: 0
SUM OF ALL RESPONSES TO PHQ9 QUESTIONS 1 AND 2: 0
2. FEELING DOWN, DEPRESSED, IRRITABLE, OR HOPELESS: NOT AT ALL
1. LITTLE INTEREST OR PLEASURE IN DOING THINGS: NOT AT ALL
1. LITTLE INTEREST OR PLEASURE IN DOING THINGS: NOT AT ALL
2. FEELING DOWN, DEPRESSED, IRRITABLE, OR HOPELESS: NOT AT ALL

## 2023-04-10 ASSESSMENT — GAIT ASSESSMENTS
GAIT LEVEL OF ASSIST: STANDBY ASSIST
DISTANCE (FEET): 45
GAIT LEVEL OF ASSIST: CONTACT GUARD ASSIST
DEVIATION: ANTALGIC;STEP TO;DECREASED BASE OF SUPPORT;DECREASED HEEL STRIKE;DECREASED TOE OFF
DEVIATION: ANTALGIC;STEP TO;DECREASED BASE OF SUPPORT;DECREASED HEEL STRIKE;DECREASED TOE OFF
DISTANCE (FEET): 90
ASSISTIVE DEVICE: FRONT WHEEL WALKER
ASSISTIVE DEVICE: FRONT WHEEL WALKER

## 2023-04-10 ASSESSMENT — PAIN DESCRIPTION - PAIN TYPE: TYPE: ACUTE PAIN

## 2023-04-10 ASSESSMENT — ACTIVITIES OF DAILY LIVING (ADL): BED_CHAIR_WHEELCHAIR_TRANSFER_DESCRIPTION: ADAPTIVE EQUIPMENT;INCREASED TIME;SUPERVISION FOR SAFETY;VERBAL CUEING

## 2023-04-10 NOTE — DIETARY
"Nutrition services: Day 10 of admit.  Aaliyah Fulton is a 83 y.o. female with admitting DX of Osteoarthritis of left knee, S/P total knee arthroplasty, left    Inadequate PO intake noted on weekly screen      Assessment:  Height: 172.7 cm (5' 8\")  Weight: 78.5 kg (173 lb)  Body mass index is 26.3 kg/m²., BMI classification: acceptable based on pt's age  Diet/Intake: regular/ 0 to 50-75% w/ avg of 32%.     Evaluation:   History of breast cancer, cervical fracture, hip fracture, hypertension, depression, stroke, chronic urinary tract infections, normal pressure hydrocephalus s/p  shunt placement.   RD met w/ pt,  and dtr in pt's room. Pt admits to diminished appetite. She said she is receiving too much food. She agreed to small portions. Addition of Boost supplement BID encouraged for additional kcals and protein. Pt agreed. RD encouraged meal first and sipping Boost between meals.     Malnutrition Risk: ASPEN criteria not met    Recommendations/Plan:  Add Boost Plus BID   Encourage intake of >50%  Document intake of all meals and supplements  as % taken in ADL's to provide interdisciplinary communication across all shifts.   Monitor weight.  Nutrition rep will continue to see patient for ongoing meal and snack preferences.        RD will follow.  "

## 2023-04-10 NOTE — THERAPY
Occupational Therapy  Daily Treatment     Patient Name: Aaliyah Fulton  Age:  83 y.o., Sex:  female  Medical Record #: [id number]  Today's Date: 4/10/2023     Precautions  Precautions: Weight Bearing As Tolerated Right Lower Extremity  Comments: s/p L TKA         Subjective    Pt seated in w/c upon arrival, pleasant and cooperative, agreeable to therapy       Objective       04/10/23 1431   OT Charge Group   OT Self Care / ADL (Units) 2   OT Total Time Spent   OT Individual Total Time Spent (Mins) 30   Functional Level of Assist   Grooming Modified Independent;Seated   Lower Body Dressing Standby Assist   Toileting Minimal Assist  (urinary incontience requiring change of brief/pants)   Toilet Transfers Standby Assist  (stand pivot w/c<>toilet with GB)   Interdisciplinary Plan of Care Collaboration   Patient Position at End of Therapy Seated;Call Light within Reach;Tray Table within Reach;Self Releasing Lap Belt Applied;Chair Alarm On     Functional mobility completed at w/c level d/t urinary urgency requiring Max A room<>bathroom    Assessment    Pt cont to progress with ADL and functional mobility requiring SBA - Min A. Pt with urinary incontinence requiring assist for floor clean-up but pt able to change her own soiled clothing with set-up assist    Strengths: Able to follow instructions, Alert and oriented, Effective communication skills, Independent prior level of function, Motivated for self care and independence, Pleasant and cooperative, Willingly participates in therapeutic activities  Barriers: Decreased endurance, Fatigue, Generalized weakness, Impaired activity tolerance, Impaired balance, Limited mobility, Pain    Plan    LB dressing with possible use of AE, overall strength and endurance, standing tolerance and balance, fall prevention, tub transfer with tub transfer bench at FWW level    Occupational Therapy Goals (Active)       Problem: Functional Transfers       Dates: Start: 04/01/23         Goal:  STG-Within one week, patient will complete transfers w/ CGA       Dates: Start: 04/01/23         Goal Note filed on 04/05/23 1142 by Isatu Maldonado MS,OTR/L       Occasionally requires Min A                 Problem: IADL's       Dates: Start: 04/01/23         Goal: STG-Within one week, patient will utilize washer and dryer w/ min A       Dates: Start: 04/01/23         Goal Note filed on 04/05/23 1142 by Isatu Maldonado MS,OTR/L       Not yet addressed                  Problem: OT Long Term Goals       Dates: Start: 04/01/23         Goal: LTG-By discharge, patient will complete basic self care tasks w/ SBA and AE as needed       Dates: Start: 04/01/23            Goal: LTG-By discharge, patient will complete basic home management w/ SBA       Dates: Start: 04/01/23               Problem: Toileting       Dates: Start: 04/01/23         Goal: STG-Within one week, patient will complete toileting tasks w/ CGA       Dates: Start: 04/01/23         Goal Note filed on 04/05/23 1142 by Isatu Maldonado MS,OTR/L       Requires Min A

## 2023-04-10 NOTE — PROGRESS NOTES
"Rehab Progress Note     Date of Service: 4/10/2023  Chief Complaint: Follow-up knee replacement    Interval Events (Subjective)      Patient seen and examined in her room today.  Yesterday evening she had a bloody nose for which a rapid response was called.  She reports this happened after a bout of coughing.  She is not sure that her dry mouth is much better.  She reports some swelling in her left ankle.  Pain is currently tolerable.  Per therapy, she still is unable to get full extension on the knee.  Patient is concerned she may not be ready to go home on the 15th.  Advised her we will confirm at her conference on Wednesday.  Patient last moved her bowels this morning.    Patient has no other new questions, concerns, or complaints today.       Objective:  VITAL SIGNS: /69   Pulse 82   Temp 37.1 °C (98.8 °F)   Resp 18   Ht 1.727 m (5' 8\")   Wt 78.5 kg (173 lb)   SpO2 94%   BMI 26.30 kg/m²   Gen: alert, no apparent distress  CV: Regular rate, regular rhythm  Resp: Clear to auscultation bilaterally  MSK: Mild edema in left lower extremity      No results found for this or any previous visit (from the past 72 hour(s)).      Scheduled Medications   Medication Dose Frequency    oxyCODONE immediate-release  10 mg BID    acetaminophen  500 mg TID    senna-docusate  2 Tablet BID    aspirin EC  81 mg BID    atorvastatin  40 mg Nightly    busPIRone  15 mg TID    methenamine hip  1 g BID    valsartan  160 mg DAILY    venlafaxine XR  225 mg QDAY with Breakfast    omeprazole  40 mg QAM AC       Current Diet Order   Procedures    Diet Order Diet: Regular       Assessment:    This patient is a 83 y.o. female admitted for acute inpatient rehabilitation with   S/P total knee arthroplasty, left.    I led and attended the weekly conference, and agree with the IDT conference documentation and plan of care as noted below.    Date of conference: 4/5/2023    Goals and barriers: See IDT note.    Biggest barriers: impaired " memory, left leg pain/weakness, decreased range of motion    Goals in next week: improved pain and range of motion    CM/social support: daughters supportive     Anticipated DC date: 4/15    Home health: PT/OT/SLP/RN    Equip: tub bench, commode over toilet    Follow up: PCP, orthopedic surgery       Problem List/Medical Decision Making and Plan:    Left knee replacement  3/28 with Dr. Marcus Hall  Weightbearing as tolerated  Continue full rehab program  PT/OT/SLP, 1 hr each discipline, 5 days per week    Staples out 2 weeks postoperatively, 4/11  Outpatient follow-up with surgery    Pain management  -Continue Tylenol  -Continue scheduled oxycodone prior to therapies  -Continue as needed oxycodone, last use 4/10    Normal pressure hydrocephalus  Gait impairments  Short-term memory impairments  Status post  shunt placement 1/2022 with Dr. Blanchard  Per review of outpatient neurology documentations, last imaging in November 2022 showed decreased in size of ventricles, and patient's memory/gait had slightly improved  Speech therapy assessment/treatment     Hypertension  -Continue valsartan     History of Overactive bladder  Discontinued oxybutynin, unsafe medication for 83-year-old patient    Urinary Retention, resolved  Discontinued oxybutynin which was the likely culprit    Xerostomia, improved  Discontinued oxybutynin which was the likely culprit    Sore throat, resolved  Likely from xerostomia  COVID-negative on 3/31 as well as 4/3     Frequent UTIs, history of  -Continue methenamine    History of depression  -Continue venlafaxine  -Continue buspirone  Mood currently stable     Hyperlipidemia  -Continue atorvastatin     History of stroke  -Continue aspirin and atorvastatin    Macrocytic anemia  Normal B12  Mild, monitor with intermittent labs  Recheck labs tomorrow    Elevated alk phos, improved  Likely secondary to surgery  Monitor for abdominal complaints  Recheck labs tomorrow    GERD  -Continue  omeprazole    Epistaxis  -Continue Afrin as needed    DVT prophylaxis  -Continue aspirin twice daily       Ursula Fall M.D.  Physical Medicine and Rehabilitation

## 2023-04-10 NOTE — CARE PLAN
The patient is Stable - Low risk of patient condition declining or worsening    Shift Goals  Clinical Goals: pain control  Patient Goals: Pain management    Progress made toward(s) clinical / shift goals:      Problem: Knowledge Deficit - Standard  Goal: Patient and family/care givers will demonstrate understanding of plan of care, disease process/condition, diagnostic tests and medications  Outcome: Progressing     Problem: Pain - Standard  Goal: Alleviation of pain or a reduction in pain to the patient’s comfort goal  Outcome: Progressing       Patient is not progressing towards the following goals:

## 2023-04-10 NOTE — THERAPY
Speech Language Pathology  Daily Treatment     Patient Name: Aaliyah Fulton  Age:  83 y.o., Sex:  female  Medical Record #: [id number]  Today's Date: 4/10/2023     Precautions  Precautions: Weight Bearing As Tolerated Right Lower Extremity  Comments: s/p L TKA    Subjective    Patient agreeable to therapy.     Objective       04/10/23 1001   Treatment Charges   SLP Cognitive Skill Development First 15 Minutes 1   SLP Cognitive Skill Development Additional 15 Minutes 1   SLP Total Time Spent   SLP Individual Total Time Spent (Mins) 30   Cognition   Simple Attention Minimal (4)   Prospective Memory Moderate (3)   Functional Memory Activities Minimal (4)         Assessment    Patient recalled 1/5 RWAVS  , Recalled 1/5 unrelated words previously trained.  Recalled 2/5 after 10 min delay. Mod cues needed to recall remainder.  Spoke with daughter to confirm that she found literature on med management  products with alarms and reminders.  She acknowleged and stated that she liked the regular pill box with a small alarm at the end vs.  Higher tech pill dispensers.  Recommended other methods to remind patient including using phone call, and establishing medication administration around routines.    Strengths: Alert and oriented, Willingly participates in therapeutic activities, Pleasant and cooperative, Supportive family  Barriers: Impaired carryover of learning, Impaired functional cognition    Plan    Target recall, attention.    Passport items to be completed:  Express basic needs, understand food/liquid recommendations, consistently follow swallow precautions, manage finances, manage medications, arrive to therapy appointments on time, complete daily memory log entries, solve problems related to safety situations, review education related to hospitalization, complete caregiver training     Speech Therapy Problems (Active)       Problem: Memory STGs       Dates: Start: 04/05/23         Goal: STG-Within one week, patient  will  recall daily events with use of external memory aide and compensatory memory strategies with  75% acc with min A.       Dates: Start: 04/05/23               Problem: Problem Solving STGs       Dates: Start: 04/01/23         Goal: STG-Within one week, patient will perform medication replica sorting execises with 80% acc with min A.       Dates: Start: 04/01/23         Goal Note filed on 04/05/23 1254 by Arielle Velasquez MS,CCC-SLP       Not yet addressed.  Patient's daughter reports that she sets up the patient's medications.                 Problem: Speech/Swallowing LTGs       Dates: Start: 04/01/23         Goal: LTG-By discharge, patient will solve basic problems with 80% acc min A. for safe discharge home.       Dates: Start: 04/01/23

## 2023-04-10 NOTE — PROGRESS NOTES
CN found patient nose bleeding from left nostril with blood coming out from mouth. Rapid response was called. HOB up 90 degrees. Pressure applied and ice compress given. V/S WNL. CN talked to on call MD. No new order receive. Will monitor.

## 2023-04-10 NOTE — THERAPY
"Physical Therapy   Daily Treatment     Patient Name: Aaliyah Fulton  Age:  83 y.o., Sex:  female  Medical Record #: [id number]  Today's Date: 4/10/2023     Precautions  Precautions: Weight Bearing As Tolerated Right Lower Extremity  Comments: s/p L TKA    Subjective    \"I feel more groggy today for some reason\"     Objective       04/10/23 0930 04/10/23 1301   Gait Functional Level of Assist    Gait Level Of Assist Standby Assist Contact Guard Assist   Assistive Device Front Wheel Walker Front Wheel Walker   Distance (Feet) 45 90  (1 rest break. outside)   # of Times Distance was Traveled 1 1   Deviation Antalgic;Step To;Decreased Base Of Support;Decreased Heel Strike;Decreased Toe Off Antalgic;Step To;Decreased Base Of Support;Decreased Heel Strike;Decreased Toe Off   Transfer Functional Level of Assist   Bed, Chair, Wheelchair Transfer Standby Assist  --    Bed Chair Wheelchair Transfer Description Adaptive equipment;Increased time;Supervision for safety;Verbal cueing  (stand-pivot FWW)  --    Supine Lower Body Exercise   Bridges 2 sets of 10;Two Legged  (LLE biased)  --    Sitting Lower Body Exercises   Hamstring Curl 2 sets of 10;Left  (pink theraband)  --    Standing Lower Body Exercises   Mini Squat 2 sets of 15  (B UE support. compensates with RLE, requires verbal and tactile cuing to correct.)  --    Other Exercises  --  TKE  (1 set of 10, pink theraband)   Bed Mobility    Supine to Sit Supervised  --    Sit to Supine Supervised  --    Sit to Stand Standby Assist Standby Assist   Neuro-Muscular Treatments   Neuro-Muscular Treatments Weight Shift Left;Verbal Cuing;Tactile Cuing Postural Facilitation;Tactile Cuing;Verbal Cuing;Weight Shift Left   Interdisciplinary Plan of Care Collaboration   IDT Collaboration with  Physical Therapist Physical Therapist   Patient Position at End of Therapy Seated;Chair Alarm On;Call Light within Reach;Tray Table within Reach Seated;Chair Alarm On;Self Releasing Lap Belt " "Applied;Call Light within Reach;Tray Table within Reach   Collaboration Comments supervising supervising     Education provided on keeping the L LE elevated when resting.    Assessment    Pt continues to be limited by pain, showing decreased weight acceptance, compensation w/ R LE and L hip, and considerable guarding around L knee.  Strengths: Able to follow instructions, Alert and oriented, Effective communication skills, Good carryover of learning, Good insight into deficits/needs, Independent prior level of function, Motivated for self care and independence, Pleasant and cooperative, Willingly participates in therapeutic activities  Barriers: Fatigue, Generalized weakness, Impaired balance, Pain    Plan    STS training, Improve standing tolerance on LLE, Gait training with FWW, Reinforce quad set and LLE positioning to improve ROM and strength, PROM/AAROM for L knee flexion and extension, pre-medicate    Passport items to be completed:  Get in/out of bed safely, in/out of a vehicle, safely use mobility device, walk or wheel around home/community, navigate up and down stairs, show how to get up/down from the ground, ensure home is accessible, demonstrate HEP, complete caregiver training    Physical Therapy Problems (Active)       Problem: Mobility       Dates: Start: 04/01/23         Goal: STG-Within one week, patient will ambulate up/down a curb with FWW and modA       Dates: Start: 04/01/23            Goal: STG-Within one week, patient will ascend and descend four 6\" stairs with Prince and B HR       Dates: Start: 04/01/23               Problem: Mobility Transfers       Dates: Start: 04/01/23         Goal: STG-Within one week, patient will sit to stand to FWW with CG consistently       Dates: Start: 04/01/23               Problem: PT-Long Term Goals       Dates: Start: 04/01/23         Goal: LTG-By discharge, patient will ambulate 150ft with FWW Sid       Dates: Start: 04/01/23            Goal: LTG-By discharge, " patient will transfer one surface to another with FWW Sid       Dates: Start: 04/01/23            Goal: LTG-By discharge, patient will ambulate up/down flight of stairs with B HR and SV       Dates: Start: 04/01/23            Goal: LTG-By discharge, patient will transfer in/out of a car with FWW Sid       Dates: Start: 04/01/23

## 2023-04-10 NOTE — CARE PLAN
"  Problem: Bowel Elimination  Goal: Patient will participate in bowel management program  Note: PRN Miralax given for last BM 4/7/23. CN notified.     Problem: Bowel Elimination  Goal: Patient will participate in bowel management program  Note: PRN Miralax given for last BM 4/7/23. CN notified.     Problem: Fall Risk - Rehab  Goal: Patient will remain free from falls  Note: Diana eBcker Fall risk Assessment Score: 11      Moderate fall risk Interventions  - Bed and strip alarm   - Yellow sign by the door   - Yellow wrist band \"Fall risk\"  - Room near to the nurse station  - Do not leave patient unattended in the bathroom  - Fall risk education provided             The patient is Watcher - Medium risk of patient condition declining or worsening    Shift Goals  Clinical Goals: pain control  Patient Goals: Pain management      "

## 2023-04-11 ENCOUNTER — APPOINTMENT (OUTPATIENT)
Dept: PHYSICAL THERAPY | Facility: REHABILITATION | Age: 83
DRG: 560 | End: 2023-04-11
Attending: PHYSICAL MEDICINE & REHABILITATION
Payer: MEDICARE

## 2023-04-11 ENCOUNTER — APPOINTMENT (OUTPATIENT)
Dept: OCCUPATIONAL THERAPY | Facility: REHABILITATION | Age: 83
DRG: 560 | End: 2023-04-11
Attending: PHYSICAL MEDICINE & REHABILITATION
Payer: MEDICARE

## 2023-04-11 ENCOUNTER — APPOINTMENT (OUTPATIENT)
Dept: SPEECH THERAPY | Facility: REHABILITATION | Age: 83
DRG: 560 | End: 2023-04-11
Attending: PHYSICAL MEDICINE & REHABILITATION
Payer: MEDICARE

## 2023-04-11 LAB
ALP SERPL-CCNC: 343 U/L (ref 30–99)
ERYTHROCYTE [DISTWIDTH] IN BLOOD BY AUTOMATED COUNT: 46.1 FL (ref 35.9–50)
HCT VFR BLD AUTO: 33.7 % (ref 37–47)
HGB BLD-MCNC: 10.6 G/DL (ref 12–16)
MCH RBC QN AUTO: 31.5 PG (ref 27–33)
MCHC RBC AUTO-ENTMCNC: 31.5 G/DL (ref 33.6–35)
MCV RBC AUTO: 100.3 FL (ref 81.4–97.8)
PLATELET # BLD AUTO: 388 K/UL (ref 164–446)
PMV BLD AUTO: 10.2 FL (ref 9–12.9)
RBC # BLD AUTO: 3.36 M/UL (ref 4.2–5.4)
WBC # BLD AUTO: 7.5 K/UL (ref 4.8–10.8)

## 2023-04-11 PROCEDURE — 99232 SBSQ HOSP IP/OBS MODERATE 35: CPT | Performed by: PHYSICAL MEDICINE & REHABILITATION

## 2023-04-11 PROCEDURE — 770010 HCHG ROOM/CARE - REHAB SEMI PRIVAT*

## 2023-04-11 PROCEDURE — 85027 COMPLETE CBC AUTOMATED: CPT

## 2023-04-11 PROCEDURE — 97530 THERAPEUTIC ACTIVITIES: CPT

## 2023-04-11 PROCEDURE — 700102 HCHG RX REV CODE 250 W/ 637 OVERRIDE(OP): Performed by: PHYSICAL MEDICINE & REHABILITATION

## 2023-04-11 PROCEDURE — 97129 THER IVNTJ 1ST 15 MIN: CPT

## 2023-04-11 PROCEDURE — 84075 ASSAY ALKALINE PHOSPHATASE: CPT

## 2023-04-11 PROCEDURE — 36415 COLL VENOUS BLD VENIPUNCTURE: CPT

## 2023-04-11 PROCEDURE — 97110 THERAPEUTIC EXERCISES: CPT

## 2023-04-11 PROCEDURE — A9270 NON-COVERED ITEM OR SERVICE: HCPCS | Performed by: PHYSICAL MEDICINE & REHABILITATION

## 2023-04-11 PROCEDURE — 97116 GAIT TRAINING THERAPY: CPT

## 2023-04-11 PROCEDURE — 97130 THER IVNTJ EA ADDL 15 MIN: CPT

## 2023-04-11 RX ADMIN — ACETAMINOPHEN 500 MG: 500 TABLET ORAL at 07:57

## 2023-04-11 RX ADMIN — VALSARTAN 160 MG: 80 TABLET, FILM COATED ORAL at 07:56

## 2023-04-11 RX ADMIN — BUSPIRONE HYDROCHLORIDE 15 MG: 10 TABLET ORAL at 16:40

## 2023-04-11 RX ADMIN — ATORVASTATIN CALCIUM 40 MG: 40 TABLET, FILM COATED ORAL at 19:30

## 2023-04-11 RX ADMIN — OMEPRAZOLE 40 MG: 20 CAPSULE, DELAYED RELEASE ORAL at 07:57

## 2023-04-11 RX ADMIN — ACETAMINOPHEN 500 MG: 500 TABLET ORAL at 19:30

## 2023-04-11 RX ADMIN — OXYCODONE HYDROCHLORIDE 10 MG: 10 TABLET ORAL at 11:59

## 2023-04-11 RX ADMIN — OXYCODONE HYDROCHLORIDE 10 MG: 10 TABLET ORAL at 07:57

## 2023-04-11 RX ADMIN — ACETAMINOPHEN 500 MG: 500 TABLET ORAL at 16:40

## 2023-04-11 RX ADMIN — VENLAFAXINE HYDROCHLORIDE 225 MG: 75 CAPSULE, EXTENDED RELEASE ORAL at 07:56

## 2023-04-11 RX ADMIN — SENNOSIDES AND DOCUSATE SODIUM 2 TABLET: 50; 8.6 TABLET ORAL at 07:56

## 2023-04-11 RX ADMIN — ASPIRIN 81 MG: 81 TABLET, COATED ORAL at 19:30

## 2023-04-11 RX ADMIN — METHENAMINE HIPPURATE 1 G: 1 TABLET ORAL at 19:30

## 2023-04-11 RX ADMIN — BUSPIRONE HYDROCHLORIDE 15 MG: 10 TABLET ORAL at 07:57

## 2023-04-11 RX ADMIN — ASPIRIN 81 MG: 81 TABLET, COATED ORAL at 07:57

## 2023-04-11 RX ADMIN — SENNOSIDES AND DOCUSATE SODIUM 2 TABLET: 50; 8.6 TABLET ORAL at 19:30

## 2023-04-11 RX ADMIN — METHENAMINE HIPPURATE 1 G: 1 TABLET ORAL at 07:55

## 2023-04-11 RX ADMIN — BUSPIRONE HYDROCHLORIDE 15 MG: 10 TABLET ORAL at 19:30

## 2023-04-11 ASSESSMENT — ACTIVITIES OF DAILY LIVING (ADL)
TOILET_TRANSFER_DESCRIPTION: GRAB BAR;INCREASED TIME;SUPERVISION FOR SAFETY
BED_CHAIR_WHEELCHAIR_TRANSFER_DESCRIPTION: INCREASED TIME;SET-UP OF EQUIPMENT;VERBAL CUEING;SUPERVISION FOR SAFETY

## 2023-04-11 ASSESSMENT — GAIT ASSESSMENTS
DISTANCE (FEET): 80
DISTANCE (FEET): 60
ASSISTIVE DEVICE: FRONT WHEEL WALKER
DEVIATION: ANTALGIC;STEP TO;DECREASED BASE OF SUPPORT;DECREASED HEEL STRIKE;DECREASED TOE OFF
DEVIATION: ANTALGIC;STEP TO;DECREASED BASE OF SUPPORT;DECREASED HEEL STRIKE;DECREASED TOE OFF
GAIT LEVEL OF ASSIST: STANDBY ASSIST
ASSISTIVE DEVICE: FRONT WHEEL WALKER
GAIT LEVEL OF ASSIST: STANDBY ASSIST

## 2023-04-11 ASSESSMENT — PAIN DESCRIPTION - PAIN TYPE
TYPE: ACUTE PAIN

## 2023-04-11 NOTE — CARE PLAN
Problem: Pain - Standard  Goal: Alleviation of pain or a reduction in pain to the patient’s comfort goal  Outcome: Progressing   Medicated per MAR  Problem: Skin Integrity  Goal: Skin integrity is maintained or improved  Outcome: Progressing     Problem: Self Care  Goal: Patient will have the ability to perform ADLs independently or with assistance  Outcome: Progressing   The patient is Stable - Low risk of patient condition declining or worsening    Shift Goals  Clinical Goals: pain control, mobility  Patient Goals: Pain control  Family Goals: NA    Progress made toward(s) clinical / shift goals:      Patient is not progressing towards the following goals:

## 2023-04-11 NOTE — THERAPY
"Physical Therapy   Daily Treatment     Patient Name: Aaliyah Fulton  Age:  83 y.o., Sex:  female  Medical Record #: [id number]  Today's Date: 4/11/2023     Precautions  Precautions: Weight Bearing As Tolerated Right Lower Extremity  Comments: s/p L TKA    Subjective    \"It hurts but I'm ok\"     Objective     04/11/23 1030   Gait Functional Level of Assist    Gait Level Of Assist Standby Assist   Assistive Device Front Wheel Walker   Distance (Feet) 60  (additional 20)   # of Times Distance was Traveled 2   Deviation Antalgic;Step To;Decreased Base Of Support;Decreased Heel Strike;Decreased Toe Off  (inconsistent step through)   Stairs Functional Level of Assist   Level of Assist with Stairs Contact Guard Assist   # of Stairs Climbed 1   Stairs Description Hand rails;Extra time;Verbal cueing   Transfer Functional Level of Assist   Bed, Chair, Wheelchair Transfer Standby Assist   Bed Chair Wheelchair Transfer Description Increased time;Set-up of equipment;Verbal cueing;Supervision for safety  (stand-pivot FWW)   Supine Lower Body Exercise   Bridges Two Legged;3 sets of 10  (LLE biased)   Hip Abduction Side Lying;2 sets of 10;Bilateral  (clamshells. blocking hips)   Sitting Lower Body Exercises   Nustep Resistance Level 5  (5.18 min, 107 steps. partial range)   Standing Lower Body Exercises   Other Exercises TKE  (2 sets of 10 w/ 5 sec holds, standing, ball behind the L knee)   Neuro-Muscular Treatments   Neuro-Muscular Treatments Postural Facilitation;Tactile Cuing;Verbal Cuing;Weight Shift Left;Facilitation  (supine hamstring stretching. contract-relax)   Interdisciplinary Plan of Care Collaboration   IDT Collaboration with  Physical Therapist;Family / Caregiver   Patient Position at End of Therapy Seated;Chair Alarm On;Family / Friend in Room   Collaboration Comments supervising; CLOF/POC       Assessment    Pt demonstrated significant improved activity tolerance. Her ROM is still limited but slowly improving, and " "pt tolerated moderate stretching into extension. She also continues to be limited by LE weakness, particularly with eccentric activity like descending stairs and sitting.  Strengths: Able to follow instructions, Alert and oriented, Effective communication skills, Good carryover of learning, Good insight into deficits/needs, Independent prior level of function, Motivated for self care and independence, Pleasant and cooperative, Willingly participates in therapeutic activities  Barriers: Fatigue, Generalized weakness, Impaired balance, Pain    Plan    STS training, Improve weight acceptance on LLE, Gait training with FWW, Reinforce quad set and LLE positioning to improve ROM and strength, PROM/AAROM for L knee flexion and extension, pre-medicate     Passport items to be completed:  Get in/out of bed safely, in/out of a vehicle, safely use mobility device, walk or wheel around home/community, navigate up and down stairs, show how to get up/down from the ground, ensure home is accessible, demonstrate HEP, complete caregiver training        Physical Therapy Problems (Active)       Problem: Mobility       Dates: Start: 04/01/23         Goal: STG-Within one week, patient will ambulate up/down a curb with FWW and modA       Dates: Start: 04/01/23            Goal: STG-Within one week, patient will ascend and descend four 6\" stairs with Prince and B HR       Dates: Start: 04/01/23               Problem: Mobility Transfers       Dates: Start: 04/01/23         Goal: STG-Within one week, patient will sit to stand to FWW with CG consistently       Dates: Start: 04/01/23               Problem: PT-Long Term Goals       Dates: Start: 04/01/23         Goal: LTG-By discharge, patient will ambulate 150ft with FWW Sid       Dates: Start: 04/01/23            Goal: LTG-By discharge, patient will transfer one surface to another with FWW Sid       Dates: Start: 04/01/23            Goal: LTG-By discharge, patient will ambulate up/down flight " of stairs with B HR and SV       Dates: Start: 04/01/23            Goal: LTG-By discharge, patient will transfer in/out of a car with FWW Sdi       Dates: Start: 04/01/23

## 2023-04-11 NOTE — DISCHARGE PLANNING
CM met with patient to discuss DC plans.  CM answered questions and discussed home health services and what they provide.  Patient is looking forward to going home on Saturday.  CM will continue to monitor for DC needs.

## 2023-04-11 NOTE — CARE PLAN
"  Problem: Pain - Standard  Goal: Alleviation of pain or a reduction in pain to the patient’s comfort goal  Note: Pain is manageable with scheduled Tylenol.Will continue to monitor and assess pain level and medicate as needed.     Problem: Bowel Elimination  Goal: Patient will participate in bowel management program  Note: Scheduled senna given at hs.Continent of bowel per report.LBM 4/10.Will continue to monitor.     Problem: Fall Risk - Rehab  Goal: Patient will remain free from falls  Note: Diana Becker Fall risk Assessment Score: 11    Moderate fall risk Interventions  - Bed and strip alarm   - Yellow sign by the door   - Yellow wrist band \"Fall risk\"  - Room near to the nurse station  - Do not leave patient unattended in the bathroom  - Fall risk education provided                   "

## 2023-04-11 NOTE — THERAPY
"Physical Therapy   Daily Treatment     Patient Name: Aaliyah Fulton  Age:  83 y.o., Sex:  female  Medical Record #: [id number]  Today's Date: 4/11/2023     Precautions  Precautions: Weight Bearing As Tolerated Right Lower Extremity  Comments: s/p L TKA    Subjective    \"Am I doing it right?\"     Objective       04/11/23 1345   Gait Functional Level of Assist    Gait Level Of Assist Standby Assist   Assistive Device Front Wheel Walker   Distance (Feet) 80   # of Times Distance was Traveled 2   Deviation Antalgic;Step To;Decreased Base Of Support;Decreased Heel Strike;Decreased Toe Off  (inconsistent step through)   Transfer Functional Level of Assist   Bed, Chair, Wheelchair Transfer Standby Assist   Bed Chair Wheelchair Transfer Description Squat pivot transfer to wheelchair;Supervision for safety;Verbal cueing;Initial preparation for task   Toilet Transfers Standby Assist   Toilet Transfer Description Grab bar;Increased time;Supervision for safety   Sitting Lower Body Exercises   Sit to Stand 1 set of 15  (elevated surface, weight shift L, emphasizing eccentric control, CGA)   Bed Mobility    Supine to Sit Supervised   Sit to Stand Standby Assist   Interdisciplinary Plan of Care Collaboration   IDT Collaboration with  Physical Therapist   Patient Position at End of Therapy Seated;Chair Alarm On;Self Releasing Lap Belt Applied;Call Light within Reach   Collaboration Comments supervising     Pt assisted to bathroom and able to manage pants and catia care independently    Assessment    Pt has difficulty with eccentric control of LEs, L being more difficult than R. She avoids using the L LE d/t pain and requires verbal and tactile cuing to weight bear through L LE.  Strengths: Able to follow instructions, Alert and oriented, Effective communication skills, Good carryover of learning, Good insight into deficits/needs, Independent prior level of function, Motivated for self care and independence, Pleasant and cooperative, " "Willingly participates in therapeutic activities  Barriers: Fatigue, Generalized weakness, Impaired balance, Pain    Plan    STS training, Improve weight acceptance on LLE, Gait training with FWW, Reinforce quad set and LLE positioning to improve ROM and strength, PROM/AAROM for L knee flexion and extension, pre-medicate     Passport items to be completed:  Get in/out of bed safely, in/out of a vehicle, safely use mobility device, walk or wheel around home/community, navigate up and down stairs, show how to get up/down from the ground, ensure home is accessible, demonstrate HEP, complete caregiver training        Physical Therapy Problems (Active)       Problem: Mobility       Dates: Start: 04/01/23         Goal: STG-Within one week, patient will ambulate up/down a curb with FWW and modA       Dates: Start: 04/01/23            Goal: STG-Within one week, patient will ascend and descend four 6\" stairs with Prince and B HR       Dates: Start: 04/01/23               Problem: Mobility Transfers       Dates: Start: 04/01/23         Goal: STG-Within one week, patient will sit to stand to FWW with CG consistently       Dates: Start: 04/01/23               Problem: PT-Long Term Goals       Dates: Start: 04/01/23         Goal: LTG-By discharge, patient will ambulate 150ft with FWW Sid       Dates: Start: 04/01/23            Goal: LTG-By discharge, patient will transfer one surface to another with FWW Sid       Dates: Start: 04/01/23            Goal: LTG-By discharge, patient will ambulate up/down flight of stairs with B HR and SV       Dates: Start: 04/01/23            Goal: LTG-By discharge, patient will transfer in/out of a car with FWW Sid       Dates: Start: 04/01/23              "

## 2023-04-11 NOTE — THERAPY
Recreational Therapy  Daily Treatment     Patient Name: Aaliyah Fulton  AGE:  83 y.o., SEX:  female  Medical Record #: [id number]  Today's Date: 4/11/2023       Subjective    Patient was ready and agreeable to recreation therapy session.      Objective       04/11/23 1501   Procedural Tracking   Procedural Tracking Community Re-Integration;Community Skills Development;Leisure Skills Awareness;Leisure Skills Development;Social Skills Training;Cognitive Skills Training;Gross Motor Functional Leisure Skills;Fine Motor Functional Leisure Skills;Group Treatment   Treatment Time   Total Time Spent (mins) 30   Total Time Missed 0   Functional Ability Status - Cognitive   Attention Span Remains on Task with Cueing   Comprehension Follows One Step Commands;Requires Cueing   Judgment Needs Assistance;Impaired   Functional Ability Status - Emotional    Affect Appropriate   Mood Appropriate   Behavior Appropriate;Cooperative   Skilled Intervention    Skilled Intervention Gross Motor Leisure;Fine Motor Leisure;Cognitive Leisure   Interdisciplinary Plan of Care Collaboration   Patient Position at End of Therapy Seated;Phone within Reach;Tray Table within Reach;Call Light within Reach   Strengths & Barriers   Strengths Able to follow instructions;Alert and oriented;Willingly participates in therapeutic activities;Pleasant and cooperative   Barriers Decreased endurance;Fatigue;Generalized weakness;Impaired balance;Impaired activity tolerance;Impaired insight/denial of deficits;Impaired carryover of learning;Pain;Limited mobility         Assessment    Patient participated in new game, [place], during recreation therapy session. Patient required mod cues for scanning, sequencing and attention. Patient required max cues for rule recall. Patient benefited from the use of a card lezama.     Strengths: (P) Able to follow instructions, Alert and oriented, Willingly participates in therapeutic activities, Pleasant and  cooperative  Barriers: (P) Decreased endurance, Fatigue, Generalized weakness, Impaired balance, Impaired activity tolerance, Impaired insight/denial of deficits, Impaired carryover of learning, Pain, Limited mobility    Plan    Patient will benefit from continued recreation therapy sessions.     Passport items to be completed:  Verbalize two positive leisure activities, discuss returning to work, hobbies, community groups or volunteer activities, explore community resources

## 2023-04-11 NOTE — PROGRESS NOTES
"Rehab Progress Note     Date of Service: 4/11/2023  Chief Complaint: Follow-up knee replacement    Interval Events (Subjective)    Patient seen and examined today in the therapy gym.   Her daughter and  are present.   Patient reports pain is currently well controlled.   She denies any abdominal pain.   We discussed her upcoming discharge date need to verify with therapy that she will be ready.  Home pharmacy confirmed.    Returned to patient's room in afternoon. Daughter with questions about labs. Patient has an elevated alk phos, without any abdominal complaints, which means this is coming from her bony injury from her surgery.    Patient has no other new questions, concerns, or complaints today.         Objective:  VITAL SIGNS: /62   Pulse 87   Temp 36.4 °C (97.6 °F)   Resp 18   Ht 1.727 m (5' 8\")   Wt 78.5 kg (173 lb)   SpO2 95%   BMI 26.30 kg/m²   Gen: alert, no apparent distress  CV: Regular rate, regular rhythm, left lower leg edema  Resp: Clear to auscultation bilaterally  Msk: decreased range of motion of left knee with surrounding edema       Recent Results (from the past 72 hour(s))   CBC WITHOUT DIFFERENTIAL    Collection Time: 04/11/23  5:34 AM   Result Value Ref Range    WBC 7.5 4.8 - 10.8 K/uL    RBC 3.36 (L) 4.20 - 5.40 M/uL    Hemoglobin 10.6 (L) 12.0 - 16.0 g/dL    Hematocrit 33.7 (L) 37.0 - 47.0 %    .3 (H) 81.4 - 97.8 fL    MCH 31.5 27.0 - 33.0 pg    MCHC 31.5 (L) 33.6 - 35.0 g/dL    RDW 46.1 35.9 - 50.0 fL    Platelet Count 388 164 - 446 K/uL    MPV 10.2 9.0 - 12.9 fL   ALKALINE PHOSPHATASE    Collection Time: 04/11/23  5:34 AM   Result Value Ref Range    Alkaline Phosphatase 343 (H) 30 - 99 U/L         Scheduled Medications   Medication Dose Frequency    oxyCODONE immediate-release  10 mg BID    acetaminophen  500 mg TID    senna-docusate  2 Tablet BID    aspirin EC  81 mg BID    atorvastatin  40 mg Nightly    busPIRone  15 mg TID    methenamine hip  1 g BID    " valsartan  160 mg DAILY    venlafaxine XR  225 mg QDAY with Breakfast    omeprazole  40 mg QAM AC       Current Diet Order   Procedures    Diet Order Diet: Regular       Assessment:    This patient is a 83 y.o. female admitted for acute inpatient rehabilitation with   S/P total knee arthroplasty, left.    I led and attended the weekly conference, and agree with the IDT conference documentation and plan of care as noted below.    Date of conference: 4/5/2023    Goals and barriers: See IDT note.    Biggest barriers: impaired memory, left leg pain/weakness, decreased range of motion    Goals in next week: improved pain and range of motion    CM/social support: daughters supportive     Anticipated DC date: 4/15    Home health: PT/OT/SLP/RN    Equip: tub bench, commode over toilet    Follow up: PCP, orthopedic surgery       Problem List/Medical Decision Making and Plan:    Left knee replacement  3/28 with Dr. Marcus Hall  Weightbearing as tolerated  Continue full rehab program  PT/OT/SLP, 1 hr each discipline, 5 days per week    Staples out 2 weeks postoperatively, 4/11  Outpatient follow-up with surgery    Pain management  Continue tylenol, and oxycodone     Normal pressure hydrocephalus  Gait impairments  Short-term memory impairments  Status post  shunt placement 1/2022 with Dr. Blanchard  Per review of outpatient neurology documentations, last imaging in November 2022 showed decreased in size of ventricles, and patient's memory/gait had slightly improved  Speech therapy assessment/treatment     Hypertension  Continue valsartan     History of Overactive bladder  Discontinued oxybutynin, unsafe medication for 83-year-old patient    Urinary Retention, resolved  Discontinued oxybutynin which was the likely culprit    Xerostomia, improved  Discontinued oxybutynin which was the likely culprit    Sore throat, resolved  Likely from xerostomia  COVID-negative on 3/31 as well as 4/3     Frequent UTIs, history of  Continue  methenamine     History of depression  Continue venlafaxine and buspirone    Hyperlipidemia  Continue atorvastatin      History of stroke  Continue aspirin and atorvastatin     Macrocytic anemia, continues  Normal B12  Mild, monitor with intermittent labs  11.7 --> 10.6    Elevated alk phos, continues  Likely secondary to surgery.bony injury   Monitor for abdominal complaints - hs none    GERD  Continue omeprazole     Epistaxis  Continue Afrin as needed     DVT prophylaxis  Continue aspirin    Ursula Fall M.D.  Physical Medicine and Rehabilitation

## 2023-04-11 NOTE — THERAPY
Speech Language Pathology  Daily Treatment     Patient Name: Aaliyah Fulton  Age:  83 y.o., Sex:  female  Medical Record #: [id number]  Today's Date: 4/11/2023     Precautions  Precautions: Weight Bearing As Tolerated Right Lower Extremity  Comments: s/p L TKA    Subjective    Patient agreeable to therapy.     Objective       04/11/23 1001   Treatment Charges   SLP Cognitive Skill Development First 15 Minutes 1   SLP Cognitive Skill Development Additional 15 Minutes 1   SLP Total Time Spent   SLP Individual Total Time Spent (Mins) 30   Cognition   Simple Attention Minimal (4)   Prospective Memory Moderate (3)   Functional Memory Activities Moderate (3)         Assessment    Patient recalled 1/5 RWAVS memory strategies from review over mulitple sessions.  Recalled 1/5 unrelated words after 5 min delay.  Patient was able to recall 3/5 after many exposures and review and 5 min delay    Strengths: Alert and oriented, Willingly participates in therapeutic activities, Pleasant and cooperative, Supportive family  Barriers: Impaired carryover of learning, Impaired functional cognition    Plan    Target recall, attention.    Passport items to be completed:  Express basic needs, understand food/liquid recommendations, consistently follow swallow precautions, manage finances, manage medications, arrive to therapy appointments on time, complete daily memory log entries, solve problems related to safety situations, review education related to hospitalization, complete caregiver training     Speech Therapy Problems (Active)       Problem: Memory STGs       Dates: Start: 04/05/23         Goal: STG-Within one week, patient will  recall daily events with use of external memory aide and compensatory memory strategies with  75% acc with min A.       Dates: Start: 04/05/23               Problem: Problem Solving STGs       Dates: Start: 04/01/23         Goal: STG-Within one week, patient will perform medication replica sorting execises  with 80% acc with min A.       Dates: Start: 04/01/23         Goal Note filed on 04/05/23 1254 by Arielle Velasquez MS,CCC-SLP       Not yet addressed.  Patient's daughter reports that she sets up the patient's medications.                 Problem: Speech/Swallowing LTGs       Dates: Start: 04/01/23         Goal: LTG-By discharge, patient will solve basic problems with 80% acc min A. for safe discharge home.       Dates: Start: 04/01/23

## 2023-04-11 NOTE — THERAPY
Occupational Therapy  Daily Treatment     Patient Name: Aaliyah Fulton  Age:  83 y.o., Sex:  female  Medical Record #: [id number]  Today's Date: 4/11/2023     Precautions  Precautions: Weight Bearing As Tolerated Right Lower Extremity  Comments: s/p L TKA         Subjective    Pt seated in w/c upon arrival, pleasant and cooperative, agreeable to therapy. Pt reports that she had a shower earlier today     Objective       04/11/23 0831   OT Charge Group   OT Therapy Activity (Units) 2   OT Therapeutic Exercise (Units) 2   OT Total Time Spent   OT Individual Total Time Spent (Mins) 60   Sitting Upper Body Exercises   Chest Press 2 sets of 10;Bilateral;Weight (See Comments for lbs)  (3lb dumbbell)   Internal Shoulder Rotation 2 sets of 10;Bilateral;Weight (See Comments for lbs)  (3lb dumbbell)   External Shoulder Rotation 2 sets of 10;Bilateral;Weight (See Comments for lbs)  (3lb dumbbell)   Bilateral Row 2 sets of 10;Bilateral;Weight (See Comments for lbs)  (3lb dumbbell)   Bicep Curls 2 sets of 10;Bilateral;Weight (See Comments for lbs)  (3lb dumbbell)   Upper Extremity Bike Level 5 Resistance  (BUE motomed 1mile 10 min, 0 RB)   Interdisciplinary Plan of Care Collaboration   Patient Position at End of Therapy Seated;Tray Table within Reach;Call Light within Reach;Chair Alarm On;Self Releasing Lap Belt Applied     Functional mobility activity at FWW: pt able to retrieve koosh balls and cones throughout gym from lowered mat tables, as well as from ground using reacher. CGA - SBA with no LOB. Sit<>stands to FWW SBA    Assessment    Pt demo slow but steady progress with functional mobility and standing tasks at FWW level, although continues to have significant pain, edema through RLE. Pt reports that  does the cooking and she normally does the laundry, but her  or daughter can assist with that activity    Strengths: Able to follow instructions, Alert and oriented, Effective communication skills, Independent  prior level of function, Motivated for self care and independence, Pleasant and cooperative, Willingly participates in therapeutic activities  Barriers: Decreased endurance, Fatigue, Generalized weakness, Impaired activity tolerance, Impaired balance, Limited mobility, Pain    Plan    LB dressing with possible use of AE, overall strength and endurance, standing tolerance and balance, fall prevention, tub transfer with tub transfer bench at FWW level, laundry task    Occupational Therapy Goals (Active)       Problem: Functional Transfers       Dates: Start: 04/01/23         Goal: STG-Within one week, patient will complete transfers w/ CGA       Dates: Start: 04/01/23         Goal Note filed on 04/05/23 1142 by Isatu Maldonado MS,OTR/L       Occasionally requires Min A                 Problem: IADL's       Dates: Start: 04/01/23         Goal: STG-Within one week, patient will utilize washer and dryer w/ min A       Dates: Start: 04/01/23         Goal Note filed on 04/05/23 1142 by Isatu Maldonado MS,OTR/L       Not yet addressed                  Problem: OT Long Term Goals       Dates: Start: 04/01/23         Goal: LTG-By discharge, patient will complete basic self care tasks w/ SBA and AE as needed       Dates: Start: 04/01/23            Goal: LTG-By discharge, patient will complete basic home management w/ SBA       Dates: Start: 04/01/23               Problem: Toileting       Dates: Start: 04/01/23         Goal: STG-Within one week, patient will complete toileting tasks w/ CGA       Dates: Start: 04/01/23         Goal Note filed on 04/05/23 1142 by Isatu Maldonado MS,OTR/L       Requires Min A

## 2023-04-12 ENCOUNTER — APPOINTMENT (OUTPATIENT)
Dept: PHYSICAL THERAPY | Facility: REHABILITATION | Age: 83
DRG: 560 | End: 2023-04-12
Attending: PHYSICAL MEDICINE & REHABILITATION
Payer: MEDICARE

## 2023-04-12 ENCOUNTER — APPOINTMENT (OUTPATIENT)
Dept: SPEECH THERAPY | Facility: REHABILITATION | Age: 83
DRG: 560 | End: 2023-04-12
Attending: PHYSICAL MEDICINE & REHABILITATION
Payer: MEDICARE

## 2023-04-12 PROCEDURE — 97530 THERAPEUTIC ACTIVITIES: CPT

## 2023-04-12 PROCEDURE — 700102 HCHG RX REV CODE 250 W/ 637 OVERRIDE(OP): Performed by: PHYSICAL MEDICINE & REHABILITATION

## 2023-04-12 PROCEDURE — 97130 THER IVNTJ EA ADDL 15 MIN: CPT

## 2023-04-12 PROCEDURE — 97116 GAIT TRAINING THERAPY: CPT

## 2023-04-12 PROCEDURE — 97110 THERAPEUTIC EXERCISES: CPT

## 2023-04-12 PROCEDURE — A9270 NON-COVERED ITEM OR SERVICE: HCPCS | Performed by: PHYSICAL MEDICINE & REHABILITATION

## 2023-04-12 PROCEDURE — 99232 SBSQ HOSP IP/OBS MODERATE 35: CPT | Performed by: PHYSICAL MEDICINE & REHABILITATION

## 2023-04-12 PROCEDURE — 770010 HCHG ROOM/CARE - REHAB SEMI PRIVAT*

## 2023-04-12 PROCEDURE — 97129 THER IVNTJ 1ST 15 MIN: CPT

## 2023-04-12 RX ADMIN — ASPIRIN 81 MG: 81 TABLET, COATED ORAL at 09:06

## 2023-04-12 RX ADMIN — METHENAMINE HIPPURATE 1 G: 1 TABLET ORAL at 19:56

## 2023-04-12 RX ADMIN — OXYCODONE HYDROCHLORIDE 10 MG: 10 TABLET ORAL at 09:07

## 2023-04-12 RX ADMIN — METHENAMINE HIPPURATE 1 G: 1 TABLET ORAL at 09:05

## 2023-04-12 RX ADMIN — ACETAMINOPHEN 500 MG: 500 TABLET ORAL at 14:31

## 2023-04-12 RX ADMIN — ATORVASTATIN CALCIUM 40 MG: 40 TABLET, FILM COATED ORAL at 19:56

## 2023-04-12 RX ADMIN — VENLAFAXINE HYDROCHLORIDE 225 MG: 75 CAPSULE, EXTENDED RELEASE ORAL at 09:05

## 2023-04-12 RX ADMIN — VALSARTAN 160 MG: 80 TABLET, FILM COATED ORAL at 09:05

## 2023-04-12 RX ADMIN — ASPIRIN 81 MG: 81 TABLET, COATED ORAL at 19:56

## 2023-04-12 RX ADMIN — OXYCODONE HYDROCHLORIDE 10 MG: 10 TABLET ORAL at 19:55

## 2023-04-12 RX ADMIN — ACETAMINOPHEN 500 MG: 500 TABLET ORAL at 19:55

## 2023-04-12 RX ADMIN — ACETAMINOPHEN 500 MG: 500 TABLET ORAL at 09:07

## 2023-04-12 RX ADMIN — BUSPIRONE HYDROCHLORIDE 15 MG: 10 TABLET ORAL at 14:32

## 2023-04-12 RX ADMIN — SENNOSIDES AND DOCUSATE SODIUM 2 TABLET: 50; 8.6 TABLET ORAL at 09:05

## 2023-04-12 RX ADMIN — BUSPIRONE HYDROCHLORIDE 15 MG: 10 TABLET ORAL at 19:56

## 2023-04-12 RX ADMIN — BUSPIRONE HYDROCHLORIDE 15 MG: 10 TABLET ORAL at 09:06

## 2023-04-12 RX ADMIN — OXYCODONE HYDROCHLORIDE 10 MG: 10 TABLET ORAL at 11:07

## 2023-04-12 RX ADMIN — OMEPRAZOLE 40 MG: 20 CAPSULE, DELAYED RELEASE ORAL at 09:07

## 2023-04-12 ASSESSMENT — GAIT ASSESSMENTS
ASSISTIVE DEVICE: PARALLEL BARS
DISTANCE (FEET): 100
DISTANCE (FEET): 150
GAIT LEVEL OF ASSIST: STANDBY ASSIST
DEVIATION: ANTALGIC;DECREASED BASE OF SUPPORT
DEVIATION: ANTALGIC;DECREASED BASE OF SUPPORT
ASSISTIVE DEVICE: FRONT WHEEL WALKER
GAIT LEVEL OF ASSIST: CONTACT GUARD ASSIST

## 2023-04-12 ASSESSMENT — PAIN DESCRIPTION - PAIN TYPE: TYPE: ACUTE PAIN

## 2023-04-12 ASSESSMENT — PAIN SCALES - WONG BAKER: WONGBAKER_NUMERICALRESPONSE: DOESN'T HURT AT ALL

## 2023-04-12 ASSESSMENT — ACTIVITIES OF DAILY LIVING (ADL)
BED_CHAIR_WHEELCHAIR_TRANSFER_DESCRIPTION: ADAPTIVE EQUIPMENT;INCREASED TIME;INITIAL PREPARATION FOR TASK;SUPERVISION FOR SAFETY
TOILET_TRANSFER_DESCRIPTION: ADAPTIVE EQUIPMENT;GRAB BAR;INCREASED TIME;SET-UP OF EQUIPMENT
BED_CHAIR_WHEELCHAIR_TRANSFER_DESCRIPTION: ADAPTIVE EQUIPMENT;INCREASED TIME;SET-UP OF EQUIPMENT

## 2023-04-12 NOTE — CARE PLAN
The patient is Stable - Low risk of patient condition declining or worsening    Shift Goals  Clinical Goals: pain control, mobility  Patient Goals: Pain control  Family Goals: NA    Progress made toward(s) clinical / shift goals:    Problem: Pain - Standard  Goal: Alleviation of pain or a reduction in pain to the patient’s comfort goal  Outcome: Progressing     Patient reports satisfactory pain control and decrease intensity after pharmacological pain management.

## 2023-04-12 NOTE — CARE PLAN
Problem: Problem Solving STGs  Goal: STG-Within one week, patient will perform medication replica sorting execises with 80% acc with min A.  Outcome: Discharged - Not Met  Note: Patients daughter sets up patients medications in a pill box. Patient is to take them. Have provided patient and daughter with options for pill box products with alarms. Have discussed with daughter that she may have to call and remind patient to take medications as well.      Problem: Memory STGs  Goal: STG-Within one week, patient will  recall daily events with use of external memory aide and compensatory memory strategies with  75% acc with min A.  Outcome: Not Met  Note: 50% mod assist.

## 2023-04-12 NOTE — THERAPY
Speech Language Pathology  Daily Treatment     Patient Name: Aaliyah Fulton  Age:  83 y.o., Sex:  female  Medical Record #: [id number]  Today's Date: 4/12/2023     Precautions  Precautions: Weight Bearing As Tolerated Left Lower Extremity  Comments: s/p L TKA    Subjective    Pt agreeable to therapy. Pt reported feeling tired in today's session. Pt with knee pain and ice pack in place. Patient anticipates daughter will be here tomorrow.      Objective       04/12/23 1301   Treatment Charges   SLP Cognitive Skill Development First 15 Minutes 1   SLP Cognitive Skill Development Additional 15 Minutes 1   SLP Total Time Spent   SLP Individual Total Time Spent (Mins) 30   Cognition   Simple Attention Minimal (4)   Prospective Memory Moderate (3)   Functional Memory Activities Moderate (3)         Assessment    Pt recalled 4/5 unrelated word. Pt recalled 5/5 unrelated words following 20 minute delay and mod assist. Pt worked on word list retention with 4 words with exclusion. She was consistently needing assistance. Modified tasks to 3 words with exclusion. For 3 word pt achieved 6/7 independently. Pt targeted word list retention for word placement with 3-4 words with 68% accuracy with verbal cues to improve.     Strengths: Alert and oriented, Willingly participates in therapeutic activities, Pleasant and cooperative, Supportive family  Barriers: Impaired carryover of learning, Impaired functional cognition    Plan    Target memory and family training.     Passport items to be completed:  Express basic needs, understand food/liquid recommendations, consistently follow swallow precautions, manage finances, manage medications, arrive to therapy appointments on time, complete daily memory log entries, solve problems related to safety situations, review education related to hospitalization, complete caregiver training     Speech Therapy Problems (Active)       Problem: Memory STGs       Dates: Start: 04/05/23         Goal:  STG-Within one week, patient will  recall daily events with use of external memory aide and compensatory memory strategies with  75% acc with min A.       Dates: Start: 04/05/23         Goal Note filed on 04/12/23 1201 by Arielle Velasquez MS,CCC-SLP       50% mod assist.                  Problem: Speech/Swallowing LTGs       Dates: Start: 04/01/23         Goal: LTG-By discharge, patient will solve basic problems with 80% acc min A. for safe discharge home.       Dates: Start: 04/01/23

## 2023-04-12 NOTE — CARE PLAN
Problem: Recreation Therapy  Goal: STG-Within one week, patient will identify two new coping skills.   Outcome: Met  Goal: STG-Within one week, patient will identify two new leisure activities.   Outcome: Met  Goal: LTG-By discharge, patient will identify three new coping skills and demonstrate how to use them.   Outcome: Met  Goal: LTG-By discharge, patient will identify three new leisure activities.   Outcome: Met

## 2023-04-12 NOTE — CARE PLAN
Problem: Toileting  Goal: STG-Within one week, patient will complete toileting tasks w/ CGA  Outcome: Met     Problem: Functional Transfers  Goal: STG-Within one week, patient will complete transfers w/ CGA  Outcome: Met     Problem: IADL's  Goal: STG-Within one week, patient will utilize washer and dryer w/ min A  Outcome: Not Met  Note: Not yet addressed

## 2023-04-12 NOTE — THERAPY
Physical Therapy   Daily Treatment     Patient Name: Aaliyah Fulton  Age:  83 y.o., Sex:  female  Medical Record #: [id number]  Today's Date: 4/12/2023     Precautions  Precautions: Weight Bearing As Tolerated Left Lower Extremity  Comments: s/p L TKA    Subjective    Pt up in wc, willing to participate.     Objective       04/12/23 0901   PT Charge Group   PT Gait Training (Units) 1   PT Therapeutic Exercise (Units) 1   PT Total Time Spent   PT Individual Total Time Spent (Mins) 30   Gait Functional Level of Assist    Gait Level Of Assist Standby Assist   Assistive Device Parallel Bars   Distance (Feet) 100   # of Times Distance was Traveled 3   Deviation Antalgic;Decreased Base Of Support   Sitting Lower Body Exercises   Long Arc Quad 3 sets of 15  (light manual traction at end range for terminal knee ext.)         Assessment    Pt remains with limited terminal knee ext LLE and antalgic gait due to pain / weakness.     Strengths: Able to follow instructions, Alert and oriented, Effective communication skills, Good carryover of learning, Good insight into deficits/needs, Independent prior level of function, Motivated for self care and independence, Pleasant and cooperative, Willingly participates in therapeutic activities  Barriers: Fatigue, Generalized weakness, Impaired balance, Pain    Plan    STS training, Improve weight acceptance on LLE, Gait training with FWW, Reinforce quad set and LLE positioning to improve ROM and strength, PROM/AAROM for L knee flexion and extension, pre-medicate     Passport items to be completed:  Get in/out of bed safely, in/out of a vehicle, safely use mobility device, walk or wheel around home/community, navigate up and down stairs, show how to get up/down from the ground, ensure home is accessible, demonstrate HEP, complete caregiver training    Physical Therapy Problems (Active)       Problem: Mobility       Dates: Start: 04/01/23         Goal: STG-Within one week, patient will  "ambulate up/down a curb with FWW and modA       Dates: Start: 04/01/23            Goal: STG-Within one week, patient will ascend and descend four 6\" stairs with Prince and B HR       Dates: Start: 04/01/23               Problem: Mobility Transfers       Dates: Start: 04/01/23         Goal: STG-Within one week, patient will sit to stand to FWW with CG consistently       Dates: Start: 04/01/23               Problem: PT-Long Term Goals       Dates: Start: 04/01/23         Goal: LTG-By discharge, patient will ambulate 150ft with FWW Sid       Dates: Start: 04/01/23            Goal: LTG-By discharge, patient will transfer one surface to another with FWW Sid       Dates: Start: 04/01/23            Goal: LTG-By discharge, patient will ambulate up/down flight of stairs with B HR and SV       Dates: Start: 04/01/23            Goal: LTG-By discharge, patient will transfer in/out of a car with FWW Sid       Dates: Start: 04/01/23              "

## 2023-04-12 NOTE — THERAPY
Occupational Therapy  Daily Treatment     Patient Name: Aaliyah Fulton  Age:  83 y.o., Sex:  female  Medical Record #: [id number]  Today's Date: 4/12/2023     Precautions  Precautions: Weight Bearing As Tolerated Left Lower Extremity  Comments: s/p L TKA         Subjective    Pt seated in w/c upon arrival, pleasant and cooperative, agreeable to therapy       Objective       04/12/23 1401   OT Charge Group   OT Therapy Activity (Units) 2   OT Total Time Spent   OT Individual Total Time Spent (Mins) 30   Interdisciplinary Plan of Care Collaboration   Patient Position at End of Therapy Seated;Call Light within Reach;Tray Table within Reach     Standing tolerance x 8 min before requiring a seated RB, stood at table, SBA. Meanwhile discussed history of falls, problem solving strategies for improve safety at home, edu on fall prevention, wearing Life Alert at all times    Assessment    Pt reports having frequent falls - both inside and outside the home. Reinforced to pt that the recommendation it to have 24 hr supervision, especially with any and all standing mobility tasks. Pt wishes to return to leisure activities such as gardening because she can't stay still. Reinforced that she needs physical assist from others to complete high level tasks and she should work with home health therapists on modifications/strategies prior to attempting on her own. Also reinforced to her that she needs to use her 's bathroom that has the tub transfer bench for ease/safety of shower transfer with daughter present. Pt did not recall these strategies from past conversations, but was receptive and agreeable to [facility]     Strengths: Able to follow instructions, Alert and oriented, Effective communication skills, Independent prior level of function, Motivated for self care and independence, Pleasant and cooperative, Willingly participates in therapeutic activities  Barriers: Decreased endurance, Fatigue, Generalized weakness, Impaired  activity tolerance, Impaired balance, Limited mobility, Pain    Plan    LB dressing with possible use of AE, overall strength and endurance, standing tolerance and balance, fall prevention, tub transfer with tub transfer bench at FWW level, laundry task    FT: tub transfer using tub transfer bench at FWW, review CLOF, 24 hr supervision recommendation d/t high risk for falls with standing/mobility, knee pain, difficulty weightbearing through RLE. Review Passport with daughter.     Occupational Therapy Goals (Active)       Problem: IADL's       Dates: Start: 04/01/23         Goal: STG-Within one week, patient will utilize washer and dryer w/ min A       Dates: Start: 04/01/23         Goal Note filed on 04/12/23 0826 by Isatu Maldonado MS,OTR/L       Not yet addressed                 Problem: OT Long Term Goals       Dates: Start: 04/01/23         Goal: LTG-By discharge, patient will complete basic self care tasks w/ SBA and AE as needed       Dates: Start: 04/01/23            Goal: LTG-By discharge, patient will complete basic home management w/ SBA       Dates: Start: 04/01/23

## 2023-04-12 NOTE — THERAPY
Physical Therapy   Daily Treatment     Patient Name: Aaliyah Fulton  Age:  83 y.o., Sex:  female  Medical Record #: [id number]  Today's Date: 4/12/2023     Precautions  Precautions: (P) Weight Bearing As Tolerated Left Lower Extremity  Comments: (P) s/p L TKA    Subjective    Patient seated in w/c and agreeable to therapy, reporting knee pain.     Objective       04/12/23 1001   PT Charge Group   PT Therapeutic Exercise (Units) 1   PT Therapeutic Activities (Units) 1   PT Total Time Spent   PT Individual Total Time Spent (Mins) 30   Precautions   Precautions Weight Bearing As Tolerated Left Lower Extremity   Comments s/p L TKA   Pain 0 - 10 Group   Location Knee   Location Orientation Left   Transfer Functional Level of Assist   Bed, Chair, Wheelchair Transfer Standby Assist   Bed Chair Wheelchair Transfer Description Adaptive equipment;Increased time;Initial preparation for task;Supervision for safety  (stand step transfer with FWW)   Bed Mobility    Supine to Sit Supervised   Sit to Supine Supervised   Sit to Stand Standby Assist   Scooting Supervised   Rolling Supervised   Interdisciplinary Plan of Care Collaboration   IDT Collaboration with  Physical Therapist;Physician   Patient Position at End of Therapy Seated;Other (Comments)  (with primary PT)   Collaboration Comments CLOF, handoff of care to primary PT     Supine LLE knee extension stretching with ankles on orange bolster and ice pack applied to L knee for pain management and gentle overpressure x20 minutes. Required initial support under knee and gentle hip IR/ER to tolerate position. Contract-relax stretching technique x2 minutes, as well as bridges on bolster x20.    Assessment    Patient tolerated session well requiring encouragement and distraction due to L knee pain. By end of session able to achieve -5 degrees knee extension however will require consistency to maintain.    Strengths: Able to follow instructions, Alert and oriented, Effective  "communication skills, Good carryover of learning, Good insight into deficits/needs, Independent prior level of function, Motivated for self care and independence, Pleasant and cooperative, Willingly participates in therapeutic activities  Barriers: Fatigue, Generalized weakness, Impaired balance, Pain    Plan    STS training, Improve weight acceptance on LLE, Gait training with FWW, Reinforce quad set and LLE positioning to improve ROM and strength, PROM/AAROM for L knee flexion and extension, pre-medicate, try sled for eccentric control  Family training 4/14: standby-CGA for all mobility w/ FWW, stairs to simulate home entrance     Passport items to be completed:  Get in/out of bed safely, in/out of a vehicle, safely use mobility device, walk or wheel around home/community, navigate up and down stairs, show how to get up/down from the ground, ensure home is accessible, demonstrate HEP, complete caregiver training    Physical Therapy Problems (Active)       Problem: Mobility       Dates: Start: 04/01/23         Goal: STG-Within one week, patient will ambulate up/down a curb with FWW and modA       Dates: Start: 04/01/23            Goal: STG-Within one week, patient will ascend and descend four 6\" stairs with Prince and B HR       Dates: Start: 04/01/23               Problem: Mobility Transfers       Dates: Start: 04/01/23         Goal: STG-Within one week, patient will sit to stand to FWW with CG consistently       Dates: Start: 04/01/23               Problem: PT-Long Term Goals       Dates: Start: 04/01/23         Goal: LTG-By discharge, patient will ambulate 150ft with FWW Sid       Dates: Start: 04/01/23            Goal: LTG-By discharge, patient will transfer one surface to another with FWW Sid       Dates: Start: 04/01/23            Goal: LTG-By discharge, patient will ambulate up/down flight of stairs with B HR and SV       Dates: Start: 04/01/23            Goal: LTG-By discharge, patient will transfer in/out " of a car with FWW Sid       Dates: Start: 04/01/23

## 2023-04-12 NOTE — CARE PLAN
"The patient is Stable - Low risk of patient condition declining or worsening    Shift Goals  Clinical Goals: pain control, mobility  Patient Goals: Pain control  Family Goals: NA    Progress made toward(s) clinical / shift goals:      Problem: Pain - Standard  Goal: Alleviation of pain or a reduction in pain to the patient’s comfort goal  Outcome: Progressing  Note: No reports of pain at this time. Will continue to monitor through shift with hourly rounds.      Patient is not progressing towards the following goals:    Problem: Fall Risk - Rehab  Goal: Patient will remain free from falls  Outcome: Not Met  Note: Diana Becker Fall risk Assessment Score: 12    Moderate fall risk Interventions  - Bed and strip alarm   - Yellow sign by the door   - Yellow wrist band \"Fall risk\"  - Fall risk education provided     "

## 2023-04-12 NOTE — THERAPY
"Physical Therapy   Daily Treatment     Patient Name: Aaliyah Fulton  Age:  83 y.o., Sex:  female  Medical Record #: [id number]  Today's Date: 4/12/2023     Precautions  Precautions: Weight Bearing As Tolerated Left Lower Extremity  Comments: s/p L TKA    Subjective    \"Was that right?\"     Objective       04/12/23 1030   Wheelchair Functional Level of Assist   Wheelchair Assist Supervised   Distance Wheelchair (Feet or Distance) 100   Wheelchair Description Extra time;Supervision for safety;Verbal cueing   Transfer Functional Level of Assist   Bed, Chair, Wheelchair Transfer Standby Assist   Bed Chair Wheelchair Transfer Description Verbal cueing;Increased time;Initial preparation for task;Supervision for safety  (stand pivot, FWW)   Sitting Lower Body Exercises   Sit to Stand 1 set of 10  (elevated seat w/ compliant surfact under RLE to facilitate L weight shift.)   Bed Mobility    Supine to Sit Supervised   Sit to Supine Standby Assist   Sit to Stand Standby Assist   Interdisciplinary Plan of Care Collaboration   IDT Collaboration with  Physical Therapist;Physician   Patient Position at End of Therapy Seated;Chair Alarm On;Call Light within Reach;Tray Table within Reach   Collaboration Comments supervising; POC     Quad sets w/ manual over pressure, 1 set of 5 w/ 5 sec holds  Pt encouraged to do gravity assisted LLE extension outside of therapy sessions  Assessment    Pt continues to compensate w/ R LE for all mobility and requires frequent verbal and tactile cuing to encourage functional use of L LE. She responds well to external factors that facilitate L weight shifting.  Strengths: Able to follow instructions, Alert and oriented, Effective communication skills, Good carryover of learning, Good insight into deficits/needs, Independent prior level of function, Motivated for self care and independence, Pleasant and cooperative, Willingly participates in therapeutic activities  Barriers: Fatigue, Generalized " "weakness, Impaired balance, Pain    Plan    STS training, Improve weight acceptance on LLE, Gait training with FWW, Reinforce quad set and LLE positioning to improve ROM and strength, PROM/AAROM for L knee flexion and extension, pre-medicate, try sled for eccentric control  Family training 4/14: standby-CGA for all mobility w/ FWW, stairs to simulate home entrance     Passport items to be completed:  Get in/out of bed safely, in/out of a vehicle, safely use mobility device, walk or wheel around home/community, navigate up and down stairs, show how to get up/down from the ground, ensure home is accessible, demonstrate HEP, complete caregiver training        Physical Therapy Problems (Active)       Problem: Mobility       Dates: Start: 04/01/23         Goal: STG-Within one week, patient will ambulate up/down a curb with FWW and modA       Dates: Start: 04/01/23            Goal: STG-Within one week, patient will ascend and descend four 6\" stairs with Prince and B HR       Dates: Start: 04/01/23               Problem: Mobility Transfers       Dates: Start: 04/01/23         Goal: STG-Within one week, patient will sit to stand to FWW with CG consistently       Dates: Start: 04/01/23               Problem: PT-Long Term Goals       Dates: Start: 04/01/23         Goal: LTG-By discharge, patient will ambulate 150ft with FWW Sid       Dates: Start: 04/01/23            Goal: LTG-By discharge, patient will transfer one surface to another with FWW Sid       Dates: Start: 04/01/23            Goal: LTG-By discharge, patient will ambulate up/down flight of stairs with B HR and SV       Dates: Start: 04/01/23            Goal: LTG-By discharge, patient will transfer in/out of a car with FWW Sid       Dates: Start: 04/01/23              "

## 2023-04-12 NOTE — DISCHARGE PLANNING
CM spoke with patients daughter Angi to update on IDT and DC date of 4/15/23.  Answered questions.  Angi and her sister will be in tomorrow to do family training.  CM will continue to monitor for DC needs.

## 2023-04-12 NOTE — PROGRESS NOTES
"Rehab Progress Note     Date of Service: 4/12/2023  Chief Complaint: Follow-up knee replacement    Interval Events (Subjective)    Patient seen and examined today in the therapy gym.  She is reporting severe pain as the physical therapist are trying to get her knee to full extension.  Weekly conference this afternoon to confirm she will be ready for discharge home on Saturday.    Patient has no other new questions, concerns, or complaints today.           Objective:  VITAL SIGNS: /66   Pulse 74   Temp 36.7 °C (98 °F) (Oral)   Resp 18   Ht 1.727 m (5' 8\")   Wt 78.5 kg (173 lb)   SpO2 96%   BMI 26.30 kg/m²   Gen: alert, no apparent distress  CV: Regular rate, regular rhythm, left lower extremity edema  Resp: Clear to auscultation bilaterally  MSK: Decreased range of motion of left knee, painful with range of motion        Recent Results (from the past 72 hour(s))   CBC WITHOUT DIFFERENTIAL    Collection Time: 04/11/23  5:34 AM   Result Value Ref Range    WBC 7.5 4.8 - 10.8 K/uL    RBC 3.36 (L) 4.20 - 5.40 M/uL    Hemoglobin 10.6 (L) 12.0 - 16.0 g/dL    Hematocrit 33.7 (L) 37.0 - 47.0 %    .3 (H) 81.4 - 97.8 fL    MCH 31.5 27.0 - 33.0 pg    MCHC 31.5 (L) 33.6 - 35.0 g/dL    RDW 46.1 35.9 - 50.0 fL    Platelet Count 388 164 - 446 K/uL    MPV 10.2 9.0 - 12.9 fL   ALKALINE PHOSPHATASE    Collection Time: 04/11/23  5:34 AM   Result Value Ref Range    Alkaline Phosphatase 343 (H) 30 - 99 U/L         Scheduled Medications   Medication Dose Frequency    oxyCODONE immediate-release  10 mg BID    acetaminophen  500 mg TID    senna-docusate  2 Tablet BID    aspirin EC  81 mg BID    atorvastatin  40 mg Nightly    busPIRone  15 mg TID    methenamine hip  1 g BID    valsartan  160 mg DAILY    venlafaxine XR  225 mg QDAY with Breakfast    omeprazole  40 mg QAM AC       Current Diet Order   Procedures    Diet Order Diet: Regular       Assessment:    This patient is a 83 y.o. female admitted for acute inpatient " rehabilitation with   S/P total knee arthroplasty, left.    I led and attended the weekly conference, and agree with the IDT conference documentation and plan of care as noted below.    Date of conference: 4/12/2023    Goals and barriers: See IDT note.    Biggest barriers: mild cognitive impairment (memory), knee pain, impaired range of motion - not progressing as expected     Goals in next week: discharge     CM/social support: daughters supportive     Anticipated DC date: 4/15    Home health: PT/OT/RN    Equip: doesn't need any    Follow up: PCP, orthopedic surgery       Problem List/Medical Decision Making and Plan:    Left knee replacement  3/28 with Dr. 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 Blanchard  Per review of outpatient neurology documentations, last imaging in November 2022 showed decreased in size of ventricles, and patient's memory/gait had slightly improved  Speech therapy assessment/treatment     Hypertension  Continue valsartan     History of Overactive bladder  Discontinued oxybutynin, unsafe medication for 83-year-old patient    Urinary Retention, resolved  Discontinued oxybutynin which was the likely culprit    Xerostomia, improved  Discontinued oxybutynin which was the likely culprit    Sore throat, resolved  Likely from xerostomia  COVID-negative on 3/31 as well as 4/3     Frequent UTIs, history of  Continue methenamine    History of depression  Continue venlafaxine and buspirone    Hyperlipidemia  Continue atorvastatin     History of stroke  Continue aspirin and atorvastatin    Macrocytic anemia, continues  Normal B12  Mild, monitor with intermittent labs  11.7  --> 10.6    Elevated alk phos, continues  Likely secondary to surgery.bony injury   Monitor for abdominal complaints - has none    GERD  Continue omeprazole    Epistaxis, resolved  Continue Afrin as needed    DVT prophylaxis  Continue aspirin    Ursula Fall M.D.  Physical Medicine and Rehabilitation

## 2023-04-12 NOTE — THERAPY
Physical Therapy   Daily Treatment     Patient Name: Aaliyah Fulton  Age:  83 y.o., Sex:  female  Medical Record #: [id number]  Today's Date: 4/12/2023     Precautions  Precautions: Weight Bearing As Tolerated Left Lower Extremity  Comments: s/p L TKA    Subjective    Pt up in wc, reports fatigue but willing to participate.     Objective       04/12/23 1501   PT Charge Group   PT Gait Training (Units) 1   PT Therapeutic Exercise (Units) 2   PT Therapeutic Activities (Units) 1   PT Total Time Spent   PT Individual Total Time Spent (Mins) 60   Gait Functional Level of Assist    Gait Level Of Assist Contact Guard Assist   Assistive Device Front Wheel Walker   Distance (Feet) 150  (in addition to 40 ft x 2)   # of Times Distance was Traveled 1   Deviation Antalgic;Decreased Base Of Support   Transfer Functional Level of Assist   Bed, Chair, Wheelchair Transfer Standby Assist   Bed Chair Wheelchair Transfer Description Adaptive equipment;Increased time;Set-up of equipment   Toilet Transfers Standby Assist   Toilet Transfer Description Adaptive equipment;Grab bar;Increased time;Set-up of equipment   Supine Lower Body Exercise   Bridges Two Legged;3 sets of 10  (LE's on large bolster wedge for pain control)   Short Arc Quad 3 sets of 10;Left  (manual overpressure/ gentle joint traction at end range ext)   Ankle Pumps 3 sets of 10   Quadriceps Isometrics 1 set of 10;Left   Other Exercises manual soft tissue mobilization/ patellar mobilization and fluid mobilization to L knee effusion.   Sitting Lower Body Exercises   Ankle Pumps 3 sets of 10   Hip Abduction 3 sets of 10   Hip Adduction 3 sets of 10   Long Arc Quad 3 sets of 10  (light manual traction at end range for terminal knee ext./ hamstring stretch)   Bed Mobility    Supine to Sit Standby Assist   Sit to Supine Standby Assist   Sit to Stand Standby Assist     Pt instructed with quad sets in bed and use of leg  strap to assist with heel cord stretch/ increased  "passive knee ext stretch. Demonstrated self patellar mobilization to assist with fluid mobilization as well.    Assessment    Pt tolerated increased gait endurance this pm when distracted with conversation and less focus on quality of gait and increased focus on just moving. Pt remains with limited terminal knee ext and significant joint effusion but tolerated manual tx well.     Strengths: Able to follow instructions, Alert and oriented, Effective communication skills, Good carryover of learning, Good insight into deficits/needs, Independent prior level of function, Motivated for self care and independence, Pleasant and cooperative, Willingly participates in therapeutic activities  Barriers: Fatigue, Generalized weakness, Impaired balance, Pain    Plan    STS training, Improve weight acceptance on LLE, Gait training with FWW, Reinforce quad set and LLE positioning to improve ROM and strength, PROM/AAROM for L knee flexion and extension, pre-medicate, try sled for eccentric control  Family training 4/14: standby-CGA for all mobility w/ FWW, stairs to simulate home entrance     Passport items to be completed:  Get in/out of bed safely, in/out of a vehicle, safely use mobility device, walk or wheel around home/community, navigate up and down stairs, show how to get up/down from the ground, ensure home is accessible, demonstrate HEP, complete caregiver training      Physical Therapy Problems (Active)       Problem: Mobility       Dates: Start: 04/01/23         Goal: STG-Within one week, patient will ambulate up/down a curb with FWW and modA       Dates: Start: 04/01/23         Goal Note filed on 04/12/23 1226 by Danielle Ramirez       [facility] w/ handrail on 4\" step              Goal: STG-Within one week, patient will ascend and descend four 6\" stairs with Prince and B HR       Dates: Start: 04/01/23         Goal Note filed on 04/12/23 1226 by Danielle Ramirez       [facility] w/ handrail on 1 4\" step                 Problem: PT-Long " Term Goals       Dates: Start: 04/01/23         Goal: LTG-By discharge, patient will ambulate 150ft with FWW Sid       Dates: Start: 04/01/23            Goal: LTG-By discharge, patient will transfer one surface to another with FWW Sid       Dates: Start: 04/01/23            Goal: LTG-By discharge, patient will ambulate up/down flight of stairs with B HR and SV       Dates: Start: 04/01/23            Goal: LTG-By discharge, patient will transfer in/out of a car with FWW Sid       Dates: Start: 04/01/23

## 2023-04-12 NOTE — CARE PLAN
"  Problem: Mobility  Goal: STG-Within one week, patient will ambulate up/down a curb with FWW and modA  Outcome: Progressing  Note: CGA w/ handrail on 4\" step  Goal: STG-Within one week, patient will ascend and descend four 6\" stairs with Prince and B HR  Outcome: Progressing  Note: CGA w/ handrail on 1 4\" step     Problem: Mobility Transfers  Goal: STG-Within one week, patient will sit to stand to FWW with CG consistently  Outcome: Met     "

## 2023-04-13 ENCOUNTER — APPOINTMENT (OUTPATIENT)
Dept: PHYSICAL THERAPY | Facility: REHABILITATION | Age: 83
DRG: 560 | End: 2023-04-13
Attending: PHYSICAL MEDICINE & REHABILITATION
Payer: MEDICARE

## 2023-04-13 ENCOUNTER — APPOINTMENT (OUTPATIENT)
Dept: SPEECH THERAPY | Facility: REHABILITATION | Age: 83
End: 2023-04-13
Attending: PHYSICAL MEDICINE & REHABILITATION
Payer: MEDICARE

## 2023-04-13 PROCEDURE — A9270 NON-COVERED ITEM OR SERVICE: HCPCS | Performed by: PHYSICAL MEDICINE & REHABILITATION

## 2023-04-13 PROCEDURE — 97110 THERAPEUTIC EXERCISES: CPT

## 2023-04-13 PROCEDURE — 97535 SELF CARE MNGMENT TRAINING: CPT | Mod: CO

## 2023-04-13 PROCEDURE — 97530 THERAPEUTIC ACTIVITIES: CPT | Mod: CO

## 2023-04-13 PROCEDURE — 97130 THER IVNTJ EA ADDL 15 MIN: CPT

## 2023-04-13 PROCEDURE — 97129 THER IVNTJ 1ST 15 MIN: CPT

## 2023-04-13 PROCEDURE — 97110 THERAPEUTIC EXERCISES: CPT | Mod: CO

## 2023-04-13 PROCEDURE — 99232 SBSQ HOSP IP/OBS MODERATE 35: CPT | Performed by: PHYSICAL MEDICINE & REHABILITATION

## 2023-04-13 PROCEDURE — 97140 MANUAL THERAPY 1/> REGIONS: CPT

## 2023-04-13 PROCEDURE — 770010 HCHG ROOM/CARE - REHAB SEMI PRIVAT*

## 2023-04-13 PROCEDURE — 97116 GAIT TRAINING THERAPY: CPT

## 2023-04-13 PROCEDURE — 700102 HCHG RX REV CODE 250 W/ 637 OVERRIDE(OP): Performed by: PHYSICAL MEDICINE & REHABILITATION

## 2023-04-13 RX ADMIN — ACETAMINOPHEN 500 MG: 500 TABLET ORAL at 14:35

## 2023-04-13 RX ADMIN — ASPIRIN 81 MG: 81 TABLET, COATED ORAL at 19:47

## 2023-04-13 RX ADMIN — ACETAMINOPHEN 500 MG: 500 TABLET ORAL at 07:54

## 2023-04-13 RX ADMIN — OXYCODONE HYDROCHLORIDE 10 MG: 10 TABLET ORAL at 21:25

## 2023-04-13 RX ADMIN — METHENAMINE HIPPURATE 1 G: 1 TABLET ORAL at 08:04

## 2023-04-13 RX ADMIN — OMEPRAZOLE 40 MG: 20 CAPSULE, DELAYED RELEASE ORAL at 07:47

## 2023-04-13 RX ADMIN — ACETAMINOPHEN 500 MG: 500 TABLET ORAL at 19:46

## 2023-04-13 RX ADMIN — ATORVASTATIN CALCIUM 40 MG: 40 TABLET, FILM COATED ORAL at 19:47

## 2023-04-13 RX ADMIN — METHENAMINE HIPPURATE 1 G: 1 TABLET ORAL at 19:47

## 2023-04-13 RX ADMIN — BUSPIRONE HYDROCHLORIDE 15 MG: 10 TABLET ORAL at 19:47

## 2023-04-13 RX ADMIN — VALSARTAN 160 MG: 80 TABLET, FILM COATED ORAL at 07:55

## 2023-04-13 RX ADMIN — BUSPIRONE HYDROCHLORIDE 15 MG: 10 TABLET ORAL at 14:37

## 2023-04-13 RX ADMIN — SENNOSIDES AND DOCUSATE SODIUM 2 TABLET: 50; 8.6 TABLET ORAL at 19:47

## 2023-04-13 RX ADMIN — ASPIRIN 81 MG: 81 TABLET, COATED ORAL at 07:55

## 2023-04-13 RX ADMIN — OXYCODONE HYDROCHLORIDE 10 MG: 10 TABLET ORAL at 07:51

## 2023-04-13 RX ADMIN — OXYCODONE HYDROCHLORIDE 10 MG: 10 TABLET ORAL at 12:26

## 2023-04-13 RX ADMIN — BUSPIRONE HYDROCHLORIDE 15 MG: 10 TABLET ORAL at 07:55

## 2023-04-13 RX ADMIN — VENLAFAXINE HYDROCHLORIDE 225 MG: 75 CAPSULE, EXTENDED RELEASE ORAL at 07:54

## 2023-04-13 ASSESSMENT — ACTIVITIES OF DAILY LIVING (ADL)
BED_CHAIR_WHEELCHAIR_TRANSFER_DESCRIPTION: ADAPTIVE EQUIPMENT;INCREASED TIME;SET-UP OF EQUIPMENT
TOILET_TRANSFER_DESCRIPTION: GRAB BAR

## 2023-04-13 ASSESSMENT — PAIN DESCRIPTION - PAIN TYPE
TYPE: ACUTE PAIN

## 2023-04-13 ASSESSMENT — GAIT ASSESSMENTS
GAIT LEVEL OF ASSIST: STANDBY ASSIST
DISTANCE (FEET): 150
ASSISTIVE DEVICE: FRONT WHEEL WALKER
DEVIATION: ANTALGIC;DECREASED BASE OF SUPPORT

## 2023-04-13 NOTE — DISCHARGE INSTRUCTIONS
[facility] NURSING DISCHARGE INSTRUCTIONS    Blood Pressure : 120/86  Weight: 78.5 kg (173 lb)  Nursing recommendations for Aaliyah Fulton at time of discharge are as follows:  Client and Family Member verbalized understanding of all discharge instructions and prescriptions.     Review all your home medications and newly ordered medications with your doctor and/or pharmacist. Follow medication instructions as directed by your doctor and/or pharmacist.    Pain Management:   Discharge Pain Medication Instructions:  Comfort Goal: Comfort with Movement, Perform Activity, Play, Sleep Comfortably, Stay Alert  Notify your primary care provider if pain is unrelieved with these measures, if the pain is new, or increased in intensity.    Discharge Skin Characteristics: Warm, Dry  Discharge Skin Exam:    Skin / Wound Care Instructions: Please contact your primary care physician for any change in skin integrity.     If You Have Surgical Incisions / Wounds:  Monitor surgical site(s) for signs of increased swelling, redness or symptoms of drainage from the site or fever as this could indicate signs and symptoms of infection. If these symptoms are noted, notifiy your primary care provider.      Discharge Safety Instructions: Should Not Be Left Alone In The House     Discharge Safety Concerns: Weakness, Unsteady Gait  The interdisciplinary team has made recommendation that you should not be left alone  in the house due to weakness, unsteady gait, and demonstration of safety with ADL's and IADLS and problem solving skills  Anti-embolic stockings are not required to increase circulation to the lower extremities.    Discharge Diet: Regular diet     Discharge Liquids: thin liquids  Discharge Bowel Function: Continent  Please contact your primary care physician for any changes in bowel habits.  Discharge Bowel Program:    Discharge Bladder Function: Continent  Discharge Urinary Devices: Brief      Nursing  Discharge Plan:   Influenza Vaccine Indication: Not indicated: Previously immunized this influenza season and > 8 years of age    Case Management Discharge Instructions:   Discharge Location:    Agency Name/Address/Phone:    Home Health:    Outpatient Services:    DME Provider/Phone:    Medical Equipment Ordered:    Prescription Faxed to:        Discharge Medication Instructions:  Below are the medications your physician expects you to take upon discharge:    Constipation, Adult  Constipation is when a person:  Poops (has a bowel movement) fewer times in a week than normal.  Has a hard time pooping.  Has poop that is dry, hard, or bigger than normal.  Follow these instructions at home:  Eating and drinking    Eat foods that have a lot of fiber, such as:  Fresh fruits and vegetables.  Whole grains.  Beans.  Eat less of foods that are high in fat, low in fiber, or overly processed, such as:  French fries.  Hamburgers.  Cookies.  Candy.  Soda.  Drink enough fluid to keep your pee (urine) clear or pale yellow.  General instructions  Exercise regularly or as told by your doctor.  Go to the restroom when you feel like you need to poop. Do not hold it in.  Take over-the-counter and prescription medicines only as told by your doctor. These include any fiber supplements.  Do pelvic floor retraining exercises, such as:  Doing deep breathing while relaxing your lower belly (abdomen).  Relaxing your pelvic floor while pooping.  Watch your condition for any changes.  Keep all follow-up visits as told by your doctor. This is important.  Contact a doctor if:  You have pain that gets worse.  You have a fever.  You have not pooped for 4 days.  You throw up (vomit).  You are not hungry.  You lose weight.  You are bleeding from the anus.  You have thin, pencil-like poop (stool).  Get help right away if:  You have a fever, and your symptoms suddenly get worse.  You leak poop or have blood in your poop.  Your belly feels hard or bigger  than normal (is bloated).  You have very bad belly pain.  You feel dizzy or you faint.  This information is not intended to replace advice given to you by your health care provider. Make sure you discuss any questions you have with your health care provider.  Document Released: 06/05/2009 Document Revised: 11/30/2018 Document Reviewed: 06/07/2017  Elsevier Patient Education © 2020 [employer]  Incision Care, Adult  An incision is a cut that a doctor makes in your skin for surgery (for a procedure). Most times, these cuts are closed after surgery. Your cut from surgery may be closed with stitches (sutures), staples, skin glue, or skin tape (adhesive strips). You may need to return to your doctor to have stitches or staples taken out. This may happen many days or many weeks after your surgery. The cut needs to be well cared for so it does not get infected.  How to care for your cut  Cut care    Follow instructions from your doctor about how to take care of your cut. Make sure you:  Wash your hands with soap and water before you change your bandage (dressing). If you cannot use soap and water, use hand .  Change your bandage as told by your doctor.  Leave stitches, skin glue, or skin tape in place. They may need to stay in place for 2 weeks or longer. If tape strips get loose and curl up, you may trim the loose edges. Do not remove tape strips completely unless your doctor says it is okay.  Check your cut area every day for signs of infection. Check for:  More redness, swelling, or pain.  More fluid or blood.  Warmth.  Pus or a bad smell.  Ask your doctor how to clean the cut. This may include:  Using mild soap and water.  Using a clean towel to pat the cut dry after you clean it.  Putting a cream or ointment on the cut. Do this only as told by your doctor.  Covering the cut with a clean bandage.  Ask your doctor when you can leave the cut uncovered.  Do not take baths, swim, or use a hot tub until your  Hide Additional Notes?: No doctor says it is okay. Ask your doctor if you can take showers. You may only be allowed to take sponge baths for bathing.  Medicines  If you were prescribed an antibiotic medicine, cream, or ointment, take the antibiotic or put it on the cut as told by your doctor. Do not stop taking or putting on the antibiotic even if your condition gets better.  Take over-the-counter and prescription medicines only as told by your doctor.  General instructions  Limit movement around your cut. This helps healing.  Avoid straining, lifting, or exercise for the first month, or for as long as told by your doctor.  Follow instructions from your doctor about going back to your normal activities.  Ask your doctor what activities are safe.  Protect your cut from the sun when you are outside for the first 6 months, or for as long as told by your doctor. Put on sunscreen around the scar or cover up the scar.  Keep all follow-up visits as told by your doctor. This is important.  Contact a doctor if:  Your have more redness, swelling, or pain around the cut.  You have more fluid or blood coming from the cut.  Your cut feels warm to the touch.  You have pus or a bad smell coming from the cut.  You have a fever or shaking chills.  You feel sick to your stomach (nauseous) or you throw up (vomit).  You are dizzy.  Your stitches or staples come undone.  Get help right away if:  You have a red streak coming from your cut.  Your cut bleeds through the bandage and the bleeding does not stop with gentle pressure.  The edges of your cut open up and separate.  You have very bad (severe) pain.  You have a rash.  You are confused.  You pass out (faint).  You have trouble breathing and you have a fast heartbeat.  This information is not intended to replace advice given to you by your health care provider. Make sure you discuss any questions you have with your health care provider.  Document Released: 03/11/2013 Document Revised: 05/07/2018 Document  "Reviewed: 08/25/2017  [employer] Patient Education © 2020 [employer]      Prevent Falls in Your Home    \"Falling once doubles your chance of falling again\"        -Center for Disease Control and Prevention    Falls in the home can lead to serious injury (fractures, brain injuries), hospitalizations, increased medical costs, and could even be fatal.  The good news is, there are many precautions you can take to avoid falls in your home and help keep you safe:     If prescribed an assistive device (walker, crutches), use as instructed by the healthcare provider\"   Remove any tripping hazards from your home, including loose cords, throw rugs and clutter  Keep a nightlight on in dark (hallways, bathrooms, etc)   Get up slowly, to make sure you feel okay before getting up  Be aware of any side effects of your medications: some medications may make you dizzy  Place a non-skid rubber mat in your shower or tub-consider a shower bench or chair if unsteady on your feet  Wear supportive shoes or non-skid socks when moving around  Start an exercise program once approved by your provider.  If you are feeling weak following a hospital stay, talk to your doctor about home health or outpatient therapy programs designed to help rebuild your strength and endurance    Depression / Suicide Risk    As you are discharged from this [facility] facility, it is important to learn how to keep safe from harming yourself.    Recognize the warning signs:  Abrupt changes in personality, positive or negative- including increase in energy   Giving away possessions  Change in eating patterns- significant weight changes-  positive or negative  Change in sleeping patterns- unable to sleep or sleeping all the time   Unwillingness or inability to communicate  Depression  Unusual sadness, discouragement and loneliness  Talk of wanting to die  Neglect of personal appearance   Rebelliousness- reckless behavior  Withdrawal from people/activities they " Detail Level: Simple love  Confusion- inability to concentrate     If you or a loved one observes any of these behaviors or has concerns about self-harm, here's what you can do:  Talk about it- your feelings and reasons for harming yourself  Remove any means that you might use to hurt yourself (examples: pills, rope, extension cords, firearm)  Get professional help from the community (Mental Health, Substance Abuse, psychological counseling)  Do not be alone:Call your Safe Contact- someone whom you trust who will be there for you.  Call your local CRISIS HOTLINE 132-7885 or 616-438-3603  Call your local Children's Mobile Crisis Response Team [place] (127) 654-7376 or www.Bingo.com  Call the toll free National Suicide Prevention Hotlines   National Suicide Prevention Lifeline 084-071-VEBI (0708)  [place] Hope Line Network 800-SUICIDE (838-4929)    Speech Therapy Discharge Instructions for Aaliyah Mijares Donaldo    4/13/2023    Supervision: Recommend refer to PT/OT recommendations for supervision needs.  Recommend supervision/ assistance with medication, financial, and health care management.    Cognition / Communication: Help patient improve independence for memory by giving him/her enough extra time to process.  Talk together about potential challenges to transfers and ambulation before performing them, and review strategies for safety.  Use [facility] memory strategies to reinforce new learning.  R - repeat, repeat, repeat.   W - write it down or get it in writing.   A - associate the new information with something that you already know very well.   V - visualize it, practice in your mind's eye, when you aren't able to perform the action physically.  S - say it back, out loud.  This benefits you, as you repeat the information for practice. You also are seeking clarification from the educator, evaluating to see if all of the information was understood, and prompting feedback if needed.  And it slows down the exchange, buying extra  time to process the information. Patient will benefit from establishing routines to lift some memory burden. Budget extra time to complete tasks.    It was a pleasure to work with you.  --- Arielle Velasquez M.S. CCC-SLP    Physical Therapy Discharge Instructions for Aaliyah Fulton    4/14/2023    Level of Assist Required for Ambulation: Supervision on Flat Surfaces, Assist for Balance on Curbs, Assist for Balance on Stairs  Distance Patient May Ambulate: as tolerated, recommend pt get up to ambulate short distances (100+ ft)  at least every 30-60 minutes throughout the day  Device Recommended for Ambulation: Front-Wheeled Walker  Level of Assist Required for Transfers: Supervision  Device Recommended for Transfers: Front-Wheeled Walker

## 2023-04-13 NOTE — THERAPY
"Occupational Therapy  Daily Treatment     Patient Name: Aaliyah Fulton  Age:  83 y.o., Sex:  female  Medical Record #: [id number]  Today's Date: 4/13/2023     Precautions  Precautions: (P) Weight Bearing As Tolerated Left Lower Extremity  Comments: s/p L TKA         Subjective    \" I can talk all day.\"     Objective       04/13/23 0831   OT Charge Group   OT Self Care / ADL (Units) 2   OT Therapy Activity (Units) 1   OT Therapeutic Exercise (Units) 1   OT Total Time Spent   OT Individual Total Time Spent (Mins) 60   Precautions   Precautions Weight Bearing As Tolerated Left Lower Extremity   Functional Level of Assist   Grooming Modified Independent   Grooming Description Seated in wheelchair at sink   Toileting Standby Assist   Toilet Transfers Standby Assist   Toilet Transfer Description Grab bar   Tub / Shower Transfers Standby Assist  (step in shower shower transfer   FWW grab bar and  shower chair)   Sitting Upper Body Exercises   Chest Press 2 sets of 10;Bilateral   Shoulder Press 2 sets of 10;Bilateral   Internal Shoulder Rotation 1 set of 10;Right ;Left   External Shoulder Rotation 1 set of 10;Right ;Left   Bicep Curls 2 sets of 10;Right ;Left   Pronation / Supination 2 sets of 10;Right ;Left   Comments 3lb weight   IADL Treatments   Kitchen Mobility Education supervision usig FWW for  item retrieval placement and transport in  ADL kitchen reminders for  hand placement on solid surface   Interdisciplinary Plan of Care Collaboration   Patient Position at End of Therapy Seated  (hand off to PT for tx)      SBA   tub bench  transfer  in tub      Assessment      Completed tx no complaints      Participates to the best of her ability   motivated to return home       Verbalized frustration that she will need assist upon return home.  Provided active listening and education that  assist that will provided will help decrease falls and hospital visits     Strengths: Able to follow instructions, Alert and oriented, " Effective communication skills, Independent prior level of function, Motivated for self care and independence, Pleasant and cooperative, Willingly participates in therapeutic activities  Barriers: Decreased endurance, Fatigue, Generalized weakness, Impaired activity tolerance, Impaired balance, Limited mobility, Pain    Plan    FT  4-14-23   tub bench transfer  in tub     Occupational Therapy Goals (Active)       Problem: IADL's       Dates: Start: 04/01/23         Goal: STG-Within one week, patient will utilize washer and dryer w/ min A       Dates: Start: 04/01/23         Goal Note filed on 04/12/23 0826 by Isatu Maldonado, MS,OTR/L       Not yet addressed                 Problem: OT Long Term Goals       Dates: Start: 04/01/23         Goal: LTG-By discharge, patient will complete basic self care tasks w/ SBA and AE as needed       Dates: Start: 04/01/23            Goal: LTG-By discharge, patient will complete basic home management w/ SBA       Dates: Start: 04/01/23

## 2023-04-13 NOTE — DIETARY
Nutrition Services Brief Update:    Day 13 of admit.  Aaliyah Fulton is a 83 y.o. 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     Problem: Nutritional:  Goal: Achieve adequate nutritional intake  Description: Patient will consume >50% of meals  Outcome: progressing    RD following.

## 2023-04-13 NOTE — THERAPY
Speech Language Pathology  Daily Treatment     Patient Name: Aaliyah Fulton  Age:  83 y.o., Sex:  female  Medical Record #: [id number]  Today's Date: 4/13/2023     Precautions  Precautions: Weight Bearing As Tolerated Left Lower Extremity  Comments: s/p L TKA    Subjective    Patient agreeable to therapy.      Objective       04/13/23 1101   Treatment Charges   SLP Cognitive Skill Development First 15 Minutes 1   SLP Cognitive Skill Development Additional 15 Minutes 1   SLP Total Time Spent   SLP Individual Total Time Spent (Mins) 30   Cognition   Simple Attention Minimal (4)   Prospective Memory Moderate (3)   Functional Memory Activities Moderate (3)   Functional Level of Assist   Comprehension Supervision   Comprehension Description Magnifying glass   Expression Modified Independent   Expression Description Other (comment)  (extra time)   Social Interaction Independent   Problem Solving Minimal Assist   Problem Solving Description Bed/chair alarm;Increased time;Seat belt;Supervision;Therapy schedule;Verbal cueing   Memory Moderate Assist   Memory Description Bed/chair alarm;Increased time;Seat belt;Supervision;Therapy schedule;Verbal cueing   Interdisciplinary Plan of Care Collaboration   IDT Collaboration with  Family / Caregiver   Patient Position at End of Therapy Seated;Family / Friend in Room  (in dining room.)   Collaboration Comments Patient's spouse and daughter participated in family training.  Provided additional education on options for pill boxes with timers.  Educated them on strategies to improve recall and to improve safety planning and problem solving.  Recommended supervision with medication, financial and health care managment.  Patient's spouse does appear to need assistance as well.  Recommended that they refer to PT/OT recommendations for supervision needs with safety but noted that patient currently needs min A for toileting and CGA for ambulation, necessitating direct supervision if this  level of function persists when they go home.       Assessment    Pt recalled 4/5 unrelated words with min A. Upon review she recalled 5/5 independently immediately and after a 15 minute delay. Patient recalled the steps to standing up and sitting down with min asst. Patient recalled 2/5 memory strategies. Reviewed strategies and practiced using them for assisting in recalling important information. Targeted recall with word retention lists (3-4 words) by placement in which she achieved 88% acc independently.         Strengths: Alert and oriented, Willingly participates in therapeutic activities, Pleasant and cooperative, Supportive family  Barriers: Impaired carryover of learning, Impaired functional cognition    Plan    Family training has been completed.  Target recall, memory log.  Please complete BIMS    Passport items completed:      Speech Therapy Problems (Active)       Problem: Memory STGs       Dates: Start: 04/05/23         Goal: STG-Within one week, patient will  recall daily events with use of external memory aide and compensatory memory strategies with  75% acc with min A.       Dates: Start: 04/05/23         Goal Note filed on 04/12/23 1201 by Arielle Velasquez MS,CCC-SLP       50% mod assist.                  Problem: Speech/Swallowing LTGs       Dates: Start: 04/01/23         Goal: LTG-By discharge, patient will solve basic problems with 80% acc min A. for safe discharge home.       Dates: Start: 04/01/23

## 2023-04-13 NOTE — THERAPY
"Physical Therapy   Daily Treatment     Patient Name: Aaliyah Fulton  Age:  83 y.o., Sex:  female  Medical Record #: [id number]  Today's Date: 4/13/2023     Precautions  Precautions: Weight Bearing As Tolerated Left Lower Extremity  Comments: s/p L TKA    Subjective    Pt up in wc, willing to participate     Objective       04/13/23 1245   PT Charge Group   PT Gait Training (Units) 2   PT Therapeutic Exercise (Units) 2   PT Total Time Spent   PT Individual Total Time Spent (Mins) 60   Gait Functional Level of Assist    Gait Level Of Assist Standby Assist   Assistive Device Front Wheel Walker   Distance (Feet) 150   # of Times Distance was Traveled 3   Deviation Antalgic;Decreased Base Of Support  (limited terminal knee ext L stance)   Stairs Functional Level of Assist   Level of Assist with Stairs Contact Guard Assist   # of Stairs Climbed 4  (in addition to 6\" curb step with FWW x 1)   Stairs Description Extra time;Hand rails   Transfer Functional Level of Assist   Bed, Chair, Wheelchair Transfer Standby Assist   Supine Lower Body Exercise   Bridges Two Legged;3 sets of 10  (LE's on large bolster wedge for pain control)   Short Arc Quad 3 sets of 10;Left  (manual overpressure/ gentle joint traction at end range ext)   Heel Slide 1 set of 10;Left   Ankle Pumps 3 sets of 10   Gluteal Isometrics 1 set of 10   Sitting Lower Body Exercises   Ankle Pumps 3 sets of 10;Left   Long Arc Quad 3 sets of 10  (light manual traction at end range for terminal knee ext./ hamstring stretch)   Bed Mobility    Supine to Sit Standby Assist   Sit to Supine Standby Assist   Sit to Stand Standby Assist   Scooting Standby Assist   Rolling Standby Assist     Cold pack to LLE in supine post PT with elevated LE for edema management    Assessment    Pt remains limited by L knee pain/ swelling and weakness but improved gait endurance with encouragement. Cold pack post tx and encouragement to request pain medications as needed for " "comfort.    Strengths: Able to follow instructions, Alert and oriented, Effective communication skills, Good carryover of learning, Good insight into deficits/needs, Independent prior level of function, Motivated for self care and independence, Pleasant and cooperative, Willingly participates in therapeutic activities  Barriers: Fatigue, Generalized weakness, Impaired balance, Pain    Plan    STS training, Improve weight acceptance on LLE, Gait training with FWW, Reinforce quad set and LLE positioning to improve ROM and strength, PROM/AAROM for L knee flexion and extension, pre-medicate, try sled for eccentric control  Family training 4/14: standby-CGA for all mobility w/ FWW, stairs to simulate home entrance     Passport items to be completed:  Get in/out of bed safely, in/out of a vehicle, safely use mobility device, walk or wheel around home/community, navigate up and down stairs, show how to get up/down from the ground, ensure home is accessible, demonstrate HEP, complete caregiver training    Physical Therapy Problems (Active)       Problem: Mobility       Dates: Start: 04/01/23         Goal: STG-Within one week, patient will ambulate up/down a curb with FWW and modA       Dates: Start: 04/01/23         Goal Note filed on 04/12/23 1226 by Danielle Ramirez       [facility] w/ handrail on 4\" step              Goal: STG-Within one week, patient will ascend and descend four 6\" stairs with Prince and B HR       Dates: Start: 04/01/23         Goal Note filed on 04/12/23 1226 by Danielle Ramirez       [facility] w/ handrail on 1 4\" step                 Problem: PT-Long Term Goals       Dates: Start: 04/01/23         Goal: LTG-By discharge, patient will ambulate 150ft with FWW Sid       Dates: Start: 04/01/23            Goal: LTG-By discharge, patient will transfer one surface to another with FWW Sid       Dates: Start: 04/01/23            Goal: LTG-By discharge, patient will ambulate up/down flight of stairs with B HR and SV       " Dates: Start: 04/01/23            Goal: LTG-By discharge, patient will transfer in/out of a car with FWW Sid       Dates: Start: 04/01/23

## 2023-04-13 NOTE — PROGRESS NOTES
"Rehab Progress Note     Date of Service: 4/13/2023  Chief Complaint: Follow-up knee replacement    Interval Events (Subjective)    Patient seen and examined today in her room.  She reports most of her knee pain happens with therapy.  She last moved her bowels yesterday.  Discussed her discharge date has been confirmed for this Saturday.  She is sleeping well.    Patient has no other new questions, concerns, or complaints today.             Objective:  VITAL SIGNS: /73   Pulse 72   Temp 36.7 °C (98.1 °F) (Oral)   Resp 18   Ht 1.727 m (5' 8\")   Wt 78.5 kg (173 lb)   SpO2 94%   BMI 26.30 kg/m²   Gen: alert, no apparent distress  CV: Regular rate, regular rhythm  Resp: Clear to auscultation bilaterally  MSK: Decreased and painful range of motion of left knee        Recent Results (from the past 72 hour(s))   CBC WITHOUT DIFFERENTIAL    Collection Time: 04/11/23  5:34 AM   Result Value Ref Range    WBC 7.5 4.8 - 10.8 K/uL    RBC 3.36 (L) 4.20 - 5.40 M/uL    Hemoglobin 10.6 (L) 12.0 - 16.0 g/dL    Hematocrit 33.7 (L) 37.0 - 47.0 %    .3 (H) 81.4 - 97.8 fL    MCH 31.5 27.0 - 33.0 pg    MCHC 31.5 (L) 33.6 - 35.0 g/dL    RDW 46.1 35.9 - 50.0 fL    Platelet Count 388 164 - 446 K/uL    MPV 10.2 9.0 - 12.9 fL   ALKALINE PHOSPHATASE    Collection Time: 04/11/23  5:34 AM   Result Value Ref Range    Alkaline Phosphatase 343 (H) 30 - 99 U/L         Scheduled Medications   Medication Dose Frequency    oxyCODONE immediate-release  10 mg BID    acetaminophen  500 mg TID    senna-docusate  2 Tablet BID    aspirin EC  81 mg BID    atorvastatin  40 mg Nightly    busPIRone  15 mg TID    methenamine hip  1 g BID    valsartan  160 mg DAILY    venlafaxine XR  225 mg QDAY with Breakfast    omeprazole  40 mg QAM AC       Current Diet Order   Procedures    Diet Order Diet: Regular       Assessment:    This patient is a 83 y.o. female admitted for acute inpatient rehabilitation with   S/P total knee arthroplasty, left.    I " led and attended the weekly conference, and agree with the IDT conference documentation and plan of care as noted below.    Date of conference: 4/12/2023    Goals and barriers: See IDT note.    Biggest barriers: mild cognitive impairment (memory), knee pain, impaired range of motion - not progressing as expected     Goals in next week: discharge     CM/social support: daughters supportive     Anticipated DC date: 4/15    Home health: PT/OT/RN    [place] [facility] [place] has a mobility limitation that prevents him/her from completing all MRADLs.  He/She cannot sufficiently complete the MRADLs with a walker, cane, or crutches.  A wheelchair will improve his/her ability to complete MRADLs.  The patient is willing and able to self propel the wheelchair.  Patient also has a caregiver who is willing and able to assist with a wheelchair. Patient also has adequate room in his/her home to maneuver a wheelchair.      Follow up: PCP, orthopedic surgery       Problem List/Medical Decision Making and Plan:    Left knee replacement  3/28 with Dr. 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 Blanchard  Per review of outpatient neurology documentations, last imaging in November 2022 showed decreased in size of ventricles, and patient's memory/gait had slightly improved  Speech therapy assessment/treatment     Hypertension  Continue valsartan     History of Overactive bladder  Discontinued oxybutynin, unsafe medication for 83-year-old patient    Urinary Retention, resolved  Discontinued oxybutynin which was the likely culprit    Xerostomia, improved  Discontinued oxybutynin which was the likely culprit    Sore  throat, resolved  Likely from xerostomia  COVID-negative on 3/31 as well as 4/3     Frequent UTIs, history of  Continue methenamine    History of depression  Continue venlafaxine and buspirone    Hyperlipidemia  Continue atorvastatin     History of stroke  Continue aspirin and atorvastatin    Macrocytic anemia, continues  Normal B12  Mild, monitor with intermittent labs  11.7 --> 10.6    Elevated alk phos, continues  Likely secondary to surgery.bony injury   Monitor for abdominal complaints - has none    GERD  Continue omeprazole    Epistaxis, resolved  Continue Afrin as needed    DVT prophylaxis  Continue aspirin    Ursula Fall M.D.  Physical Medicine and Rehabilitation

## 2023-04-13 NOTE — THERAPY
"Physical Therapy   Daily Treatment     Patient Name: Aaliyah Fulton  Age:  83 y.o., Sex:  female  Medical Record #: [id number]  Today's Date: 4/13/2023     Precautions  Precautions: Weight Bearing As Tolerated Left Lower Extremity  Comments: s/p L TKA    Subjective    Pt present and ready to participate. \"You about killed me yesterday, I had a lot of pain last night but I'm better now\"     Objective       04/13/23 0931   PT Charge Group   PT Therapeutic Exercise (Units) 1   PT Manual Therapy (Units) 1   PT Total Time Spent   PT Individual Total Time Spent (Mins) 30   Transfer Functional Level of Assist   Bed, Chair, Wheelchair Transfer Supervised   Bed Chair Wheelchair Transfer Description Adaptive equipment;Increased time;Set-up of equipment   Sitting Lower Body Exercises   Nustep Resistance Level 3  (12 minutes for AAROM / flexibilty training)     Manual soft tissue mobilization/ fluid mobilization and retrograde massage to LLE from foot/ankle to knee x 10 minutes for pain / edema management.    Assessment    Pt tolerated light there-ex and soft tissue mobilization without difficulty despite reports of increased knee pain last night. Remains limited by L knee pain/ edema and weakness.    Strengths: Able to follow instructions, Alert and oriented, Effective communication skills, Good carryover of learning, Good insight into deficits/needs, Independent prior level of function, Motivated for self care and independence, Pleasant and cooperative, Willingly participates in therapeutic activities  Barriers: Fatigue, Generalized weakness, Impaired balance, Pain    Plan    STS training, Improve weight acceptance on LLE, Gait training with FWW, Reinforce quad set and LLE positioning to improve ROM and strength, PROM/AAROM for L knee flexion and extension, pre-medicate, try sled for eccentric control  Family training 4/14: standby-CGA for all mobility w/ FWW, stairs to simulate home entrance     Passport items to be " "completed:  Get in/out of bed safely, in/out of a vehicle, safely use mobility device, walk or wheel around home/community, navigate up and down stairs, show how to get up/down from the ground, ensure home is accessible, demonstrate HEP, complete caregiver training      Physical Therapy Problems (Active)       Problem: Mobility       Dates: Start: 04/01/23         Goal: STG-Within one week, patient will ambulate up/down a curb with FWW and modA       Dates: Start: 04/01/23         Goal Note filed on 04/12/23 1226 by Danielle Ramirez CGA w/ handrail on 4\" step              Goal: STG-Within one week, patient will ascend and descend four 6\" stairs with Prince and B HR       Dates: Start: 04/01/23         Goal Note filed on 04/12/23 1226 by Danielle Ramirez CGA w/ handrail on 1 4\" step                 Problem: PT-Long Term Goals       Dates: Start: 04/01/23         Goal: LTG-By discharge, patient will ambulate 150ft with FWW Sid       Dates: Start: 04/01/23            Goal: LTG-By discharge, patient will transfer one surface to another with FWW Sid       Dates: Start: 04/01/23            Goal: LTG-By discharge, patient will ambulate up/down flight of stairs with B HR and SV       Dates: Start: 04/01/23            Goal: LTG-By discharge, patient will transfer in/out of a car with FWW Sid       Dates: Start: 04/01/23              "

## 2023-04-13 NOTE — THERAPY
Recreational Therapy  Daily Treatment     Patient Name: Aaliyah Fulton  AGE:  83 y.o., SEX:  female  Medical Record #: [id number]  Today's Date: 4/13/2023       Subjective    Patient was agreeable to 15 min of recreation therapy session.      Objective       04/13/23 1431   Procedural Tracking   Procedural Tracking Community Re-Integration;Community Skills Development;Leisure Skills Awareness;Cognitive Skills Training;Gross Motor Functional Leisure Skills;Fine Motor Functional Leisure Skills;Group Treatment;Social Skills Training;Leisure Skills Development   Treatment Time   Total Time Spent (mins) 15   Total Time Missed 15   Reasons for Time Missed Non-Medical-Patient  Refused   Functional Ability Status - Cognitive   Attention Span Remains on Task   Comprehension Follows One Step Commands   Judgment Able to Make Independent Decisions   Functional Ability Status - Emotional    Affect Appropriate   Mood Appropriate   Behavior Appropriate   Skilled Intervention    Skilled Intervention Gross Motor Leisure;Fine Motor Leisure;Cognitive Leisure;Leisure Education    Interdisciplinary Plan of Care Collaboration   IDT Collaboration with  Nursing   Patient Position at End of Therapy Seated;In Bed;Phone within Reach;Tray Table within Reach;Call Light within Reach   Strengths & Barriers   Strengths Able to follow instructions;Alert and oriented;Willingly participates in therapeutic activities;Pleasant and cooperative   Barriers Fatigue;Generalized weakness;Impaired balance;Impaired activity tolerance;Limited mobility;Pain;Pain poorly managed         Assessment    Patient received recreation therapy discharge information from recreation therapist. Patient expressed understanding of all materials. Patient requested to end session 15 min early due to exhaustion.     Strengths: (P) Able to follow instructions, Alert and oriented, Willingly participates in therapeutic activities, Pleasant and cooperative  Barriers: (P) Fatigue,  Generalized weakness, Impaired balance, Impaired activity tolerance, Limited mobility, Pain, Pain poorly managed    Plan    Patient will benefit from continued recreation therapy sessions.     Passport items to be completed:  Verbalize two positive leisure activities, discuss returning to work, hobbies, community groups or volunteer activities, explore community resources

## 2023-04-13 NOTE — CARE PLAN
The patient is Stable - Low risk of patient condition declining or worsening    Shift Goals  Clinical Goals: Pain management  Patient Goals: Pain control  Family Goals: NA    Progress made toward(s) clinical / shift goals:    Problem: Pain - Standard  Goal: Alleviation of pain or a reduction in pain to the patient’s comfort goal  Outcome: Progressing  Flowsheets (Taken 4/13/2023 0310)  OB Pain Intervention: Medication - See MAR  Note: Patient able to verbalize pain level and verbalize an acceptable level of pain.      Problem: Skin Integrity  Goal: Skin integrity is maintained or improved  Note: Patient's skin remains intact and free from new or accidental injury this shift.  Will continue to monitor.

## 2023-04-13 NOTE — CARE PLAN
Problem: Pain - Standard  Goal: Alleviation of pain or a reduction in pain to the patient’s comfort goal  Outcome: Progressing  Patient on scheduled Roxicodone 10 ng BID with good relief.     Problem: Skin Integrity  Goal: Skin integrity is maintained or improved  Outcome: Progressing   The patient is Stable - Low risk of patient condition declining or worsening    Shift Goals  Clinical Goals: Pain management  Patient Goals: Pain control  Family Goals: NA

## 2023-04-14 ENCOUNTER — APPOINTMENT (OUTPATIENT)
Dept: OCCUPATIONAL THERAPY | Facility: REHABILITATION | Age: 83
DRG: 560 | End: 2023-04-14
Attending: PHYSICAL MEDICINE & REHABILITATION
Payer: MEDICARE

## 2023-04-14 ENCOUNTER — APPOINTMENT (OUTPATIENT)
Dept: SPEECH THERAPY | Facility: REHABILITATION | Age: 83
DRG: 560 | End: 2023-04-14
Attending: PHYSICAL MEDICINE & REHABILITATION
Payer: MEDICARE

## 2023-04-14 ENCOUNTER — APPOINTMENT (OUTPATIENT)
Dept: PHYSICAL THERAPY | Facility: REHABILITATION | Age: 83
DRG: 560 | End: 2023-04-14
Attending: PHYSICAL MEDICINE & REHABILITATION
Payer: MEDICARE

## 2023-04-14 PROBLEM — T50.905A DRUG-INDUCED XEROSTOMIA: Status: ACTIVE | Noted: 2023-04-14

## 2023-04-14 PROBLEM — T50.905A DRUG-INDUCED XEROSTOMIA: Status: RESOLVED | Noted: 2023-04-14 | Resolved: 2023-04-14

## 2023-04-14 PROBLEM — N32.81 OAB (OVERACTIVE BLADDER): Status: RESOLVED | Noted: 2021-11-26 | Resolved: 2023-04-14

## 2023-04-14 PROBLEM — K11.7 DRUG-INDUCED XEROSTOMIA: Status: ACTIVE | Noted: 2023-04-14

## 2023-04-14 PROBLEM — K11.7 DRUG-INDUCED XEROSTOMIA: Status: RESOLVED | Noted: 2023-04-14 | Resolved: 2023-04-14

## 2023-04-14 PROCEDURE — 770010 HCHG ROOM/CARE - REHAB SEMI PRIVAT*

## 2023-04-14 PROCEDURE — 97530 THERAPEUTIC ACTIVITIES: CPT

## 2023-04-14 PROCEDURE — 700102 HCHG RX REV CODE 250 W/ 637 OVERRIDE(OP): Performed by: PHYSICAL MEDICINE & REHABILITATION

## 2023-04-14 PROCEDURE — 97129 THER IVNTJ 1ST 15 MIN: CPT

## 2023-04-14 PROCEDURE — 97110 THERAPEUTIC EXERCISES: CPT

## 2023-04-14 PROCEDURE — 99232 SBSQ HOSP IP/OBS MODERATE 35: CPT | Performed by: PHYSICAL MEDICINE & REHABILITATION

## 2023-04-14 PROCEDURE — A9270 NON-COVERED ITEM OR SERVICE: HCPCS | Performed by: PHYSICAL MEDICINE & REHABILITATION

## 2023-04-14 PROCEDURE — 97130 THER IVNTJ EA ADDL 15 MIN: CPT

## 2023-04-14 PROCEDURE — 97116 GAIT TRAINING THERAPY: CPT

## 2023-04-14 PROCEDURE — 97535 SELF CARE MNGMENT TRAINING: CPT

## 2023-04-14 RX ORDER — ASPIRIN 81 MG/1
81 TABLET ORAL 2 TIMES DAILY
Qty: 60 TABLET | Refills: 0 | COMMUNITY
Start: 2023-04-14 | End: 2023-05-15

## 2023-04-14 RX ORDER — OXYCODONE HYDROCHLORIDE 5 MG/1
TABLET ORAL
Qty: 56 TABLET | Refills: 0 | Status: SHIPPED | OUTPATIENT
Start: 2023-04-14 | End: 2023-04-16 | Stop reason: SDUPTHER

## 2023-04-14 RX ORDER — AMOXICILLIN 250 MG
2 CAPSULE ORAL 2 TIMES DAILY
Qty: 120 TABLET | Refills: 0 | COMMUNITY
Start: 2023-04-14

## 2023-04-14 RX ADMIN — ASPIRIN 81 MG: 81 TABLET, COATED ORAL at 20:06

## 2023-04-14 RX ADMIN — BUSPIRONE HYDROCHLORIDE 15 MG: 10 TABLET ORAL at 08:19

## 2023-04-14 RX ADMIN — SENNOSIDES AND DOCUSATE SODIUM 2 TABLET: 50; 8.6 TABLET ORAL at 08:18

## 2023-04-14 RX ADMIN — ATORVASTATIN CALCIUM 40 MG: 40 TABLET, FILM COATED ORAL at 20:07

## 2023-04-14 RX ADMIN — ASPIRIN 81 MG: 81 TABLET, COATED ORAL at 08:19

## 2023-04-14 RX ADMIN — BUSPIRONE HYDROCHLORIDE 15 MG: 10 TABLET ORAL at 20:06

## 2023-04-14 RX ADMIN — OMEPRAZOLE 40 MG: 20 CAPSULE, DELAYED RELEASE ORAL at 08:27

## 2023-04-14 RX ADMIN — SENNOSIDES AND DOCUSATE SODIUM 2 TABLET: 50; 8.6 TABLET ORAL at 20:07

## 2023-04-14 RX ADMIN — ACETAMINOPHEN 500 MG: 500 TABLET ORAL at 14:05

## 2023-04-14 RX ADMIN — BUSPIRONE HYDROCHLORIDE 15 MG: 10 TABLET ORAL at 14:05

## 2023-04-14 RX ADMIN — ACETAMINOPHEN 500 MG: 500 TABLET ORAL at 08:19

## 2023-04-14 RX ADMIN — VENLAFAXINE HYDROCHLORIDE 225 MG: 75 CAPSULE, EXTENDED RELEASE ORAL at 08:18

## 2023-04-14 RX ADMIN — METHENAMINE HIPPURATE 1 G: 1 TABLET ORAL at 08:18

## 2023-04-14 RX ADMIN — VALSARTAN 160 MG: 80 TABLET, FILM COATED ORAL at 08:19

## 2023-04-14 RX ADMIN — METHENAMINE HIPPURATE 1 G: 1 TABLET ORAL at 20:08

## 2023-04-14 RX ADMIN — ACETAMINOPHEN 500 MG: 500 TABLET ORAL at 20:06

## 2023-04-14 RX ADMIN — OXYCODONE HYDROCHLORIDE 10 MG: 10 TABLET ORAL at 08:20

## 2023-04-14 RX ADMIN — POLYETHYLENE GLYCOL 3350 1 PACKET: 17 POWDER, FOR SOLUTION ORAL at 15:22

## 2023-04-14 ASSESSMENT — ACTIVITIES OF DAILY LIVING (ADL)
TOILET_TRANSFER_LEVEL_OF_ASSIST: REQUIRES SUPERVISION WITH TOILET TRANSFER
SHOWER_TRANSFER_LEVEL_OF_ASSIST: REQUIRES SUPERVISION WITH SHOWER TRANSFER
TOILETING_LEVEL_OF_ASSIST: REQUIRES SUPERVISION WITH TOILETING
BED_CHAIR_WHEELCHAIR_TRANSFER_DESCRIPTION: ADAPTIVE EQUIPMENT;SET-UP OF EQUIPMENT

## 2023-04-14 ASSESSMENT — BRIEF INTERVIEW FOR MENTAL STATUS (BIMS)
ASKED TO RECALL BED: YES, AFTER CUEING (A PIECE OF FURNITURE")"
INITIAL REPETITION OF BED BLUE SOCK - FIRST ATTEMPT: 3
WHAT MONTH IS IT: ACCURATE WITHIN 5 DAYS
ASKED TO RECALL SOCK: YES, NO CUE REQUIRED
WHAT YEAR IS IT: CORRECT
ASKED TO RECALL BLUE: YES, AFTER CUEING (A COLOR")"
WHAT DAY OF THE WEEK IS IT: CORRECT
BIMS SUMMARY SCORE: 13

## 2023-04-14 ASSESSMENT — GAIT ASSESSMENTS
ASSISTIVE DEVICE: FRONT WHEEL WALKER
DEVIATION: ANTALGIC;DECREASED BASE OF SUPPORT
GAIT LEVEL OF ASSIST: STANDBY ASSIST
DISTANCE (FEET): 150

## 2023-04-14 ASSESSMENT — PAIN DESCRIPTION - PAIN TYPE
TYPE: ACUTE PAIN
TYPE: ACUTE PAIN

## 2023-04-14 NOTE — THERAPY
Physical Therapy   Daily Treatment     Patient Name: Aaliyah Fulton  Age:  83 y.o., Sex:  female  Medical Record #: [id number]  Today's Date: 4/14/2023     Precautions  Precautions: Weight Bearing As Tolerated Left Lower Extremity  Comments: s/p L TKA    Subjective    Pt / family present for mobility and safety training     Objective       04/14/23 1031   PT Charge Group   PT Gait Training (Units) 1   PT Therapeutic Exercise (Units) 1   PT Therapeutic Activities (Units) 2   PT Total Time Spent   PT Individual Total Time Spent (Mins) 60   Gait Functional Level of Assist    Gait Level Of Assist Standby Assist   Assistive Device Front Wheel Walker   Distance (Feet) 150  (in addition to 30 ft outdoors/ sloped drive way)   # of Times Distance was Traveled 2   Deviation Antalgic;Decreased Base Of Support   Stairs Functional Level of Assist   Level of Assist with Stairs Contact Guard Assist   # of Stairs Climbed 4   Stairs Description Extra time;Hand rails;Requires incidental assist   Transfer Functional Level of Assist   Bed, Chair, Wheelchair Transfer Supervised   Bed Chair Wheelchair Transfer Description Adaptive equipment;Set-up of equipment   Supine Lower Body Exercise   Quadriceps Isometrics 1 set of 10;Left   Sitting Lower Body Exercises   Nustep Resistance Level 2  (10 minutes for AAROM/ flexibility training)   Bed Mobility    Supine to Sit Modified Independent   Sit to Supine Modified Independent   Sit to Stand Standby Assist   Scooting Modified Independent   Rolling Modified Independent     Pt's daughter Angi able to provide CGA for steadying on stairs and outdoor navigation, pt completed car transfer modified independent after set-up from ambulatory level. Reviewed use of ice and elevation 20 minutes not more than every 2 hours for pain management and swelling. Pt / daughter's confident in physical performance with FWW at this time, recommended pt not use her 4WW at this time.      Assessment    Pt ready for d/c  "home with intermittent support from 2 daughters for safety with transition home, recommend FWW and home health PT.    Strengths: Able to follow instructions, Alert and oriented, Effective communication skills, Good carryover of learning, Good insight into deficits/needs, Independent prior level of function, Motivated for self care and independence, Pleasant and cooperative, Willingly participates in therapeutic activities  Barriers: Fatigue, Generalized weakness, Impaired balance, Pain    Plan    D/c tomorrow      Physical Therapy Problems (Active)       Problem: Mobility       Dates: Start: 04/01/23         Goal: STG-Within one week, patient will ambulate up/down a curb with FWW and modA       Dates: Start: 04/01/23         Goal Note filed on 04/12/23 1226 by Danielle Ramirez CGA w/ handrail on 4\" step              Goal: STG-Within one week, patient will ascend and descend four 6\" stairs with Prince and B HR       Dates: Start: 04/01/23         Goal Note filed on 04/12/23 1226 by Danielle Ramirez CGA w/ handrail on 1 4\" step                 Problem: PT-Long Term Goals       Dates: Start: 04/01/23         Goal: LTG-By discharge, patient will ambulate 150ft with FWW Sid       Dates: Start: 04/01/23            Goal: LTG-By discharge, patient will transfer one surface to another with FWW Sid       Dates: Start: 04/01/23            Goal: LTG-By discharge, patient will ambulate up/down flight of stairs with B HR and SV       Dates: Start: 04/01/23            Goal: LTG-By discharge, patient will transfer in/out of a car with FWW Sid       Dates: Start: 04/01/23              "

## 2023-04-14 NOTE — CARE PLAN
Problem: Pain - Standard  Goal: Alleviation of pain or a reduction in pain to the patient’s comfort goal  Outcome: Progressing     Problem: Fall Risk - Rehab  Goal: Patient will remain free from falls  Outcome: Progressing   The patient is Stable - Low risk of patient condition declining or worsening    Shift Goals  Clinical Goals: pain management and therapy  Patient Goals: rest and pain control  Family Goals: jonel    Progress made toward(s) clinical / shift goals:  patient participating in therapy, standing oxycodone admin a/o, no request for prn pain meds, wctm    Patient is not progressing towards the following goals: n/a    Fall precautions/hourly rounding maintained, call light within reach and functioning, all items within reach.  Patient encouraged to call for assistance, poc reviewed with patient, ?'s/concerns answered.   Bed/chair alarm active

## 2023-04-14 NOTE — CARE PLAN
Problem: Pain - Standard  Goal: Alleviation of pain or a reduction in pain to the patient’s comfort goal  Outcome: Progressing     Problem: Skin Integrity  Goal: Skin integrity is maintained or improved  4/13/2023 1817 by Cortney Bernstein V RJose RobertoNJose Roberto  Outcome: Progressing  4/13/2023 1131 by Cortney STEWARD R.N.  Outcome: Progressing   The patient is Stable - Low risk of patient condition declining or worsening    Shift Goals  Clinical Goals: Pain management  Patient Goals: Pain control  Family Goals: NA

## 2023-04-14 NOTE — THERAPY
"Occupational Therapy  Daily Treatment     Patient Name: Aaliyah Fulton  Age:  83 y.o., Sex:  female  Medical Record #: [id number]  Today's Date: 4/14/2023     Precautions  Precautions: Weight Bearing As Tolerated Left Lower Extremity  Comments: s/p L TKA    Safety   ADL Safety : Requires Supervision for Safety    Subjective    \"Gosh, before I know it I will be in my 80s!\" Pt is already 83. Took multiple tries for her to select the correct age.    \"I'm not sure my  should be the one helping me. I think I help him a lot.\" Confirmed with daughters in later session that they will be assisting. Unclear assist for night time. Declined BSC next to bed. Edu on importance of lighted pathway and FWW within reach at a minimum.     Objective       04/14/23 0831 04/14/23 1001   OT Charge Group   OT Self Care / ADL (Units) 4 1   OT Therapy Activity (Units)  --  1   OT Total Time Spent   OT Individual Total Time Spent (Mins) 60 30   Safety    ADL Safety  Requires Supervision for Safety  --    Vitals   Vitals Comments No s/s of distress  --    Cognition    Level of Consciousness Alert  --    Functional Level of Assist   Eating Independent  --    Grooming Modified Independent  --    Bathing Standby Assist  --    Bathing Description Tub bench  --    Upper Body Dressing Modified Independent  --    Lower Body Dressing Supervision   (slippers mod I in w/c)   Toileting Supervision  --    Bed, Chair, Wheelchair Transfer Standby Assist Supervised   Toilet Transfers Standby Assist Supervised   Tub / Shower Transfers Standby Assist Standby Assist   Interdisciplinary Plan of Care Collaboration   IDT Collaboration with   --  Family / Caregiver;Physical Therapist   Patient Position at End of Therapy Seated;Chair Alarm On;Self Releasing Lap Belt Applied;Call Light within Reach;Tray Table within Reach;Phone within Reach Seated;Self Releasing Lap Belt Applied;Call Light within Reach;Tray Table within Reach;Phone within Reach   Collaboration " Comments  --  training       Family Training: OT reviewed pt's CLOF and level of assists needed for each ADL. Pt needs 24/7 SBA . Family was receptive to education and is able to provide this level of care at home. Reviewed AE and DME needs which are BSC over toilet, grab bars, TTB, and transport chair . Family is aware of which items they need to purchase vs. Which items will need to be purchased by insurance. Demo'd  bed and tub transfer in simulated home environment. Family had hands on training.    Assessment    Pt is SBA-Sup level of assist. Pt with poor memory noted today. Pt could not recall her age or that she has been to this rehab more than once.    Strengths: Able to follow instructions, Alert and oriented, Effective communication skills, Independent prior level of function, Motivated for self care and independence, Pleasant and cooperative, Willingly participates in therapeutic activities  Barriers: Decreased endurance, Fatigue, Generalized weakness, Impaired activity tolerance, Impaired balance, Limited mobility, Pain    Plan    D/c    Occupational Therapy Goals (Active)       There are no active problems.

## 2023-04-14 NOTE — THERAPY
Speech Language Pathology  Daily Treatment     Patient Name: Aaliyah Fulton  Age:  83 y.o., Sex:  female  Medical Record #: [id number]  Today's Date: 4/14/2023     Precautions  Precautions: Weight Bearing As Tolerated Left Lower Extremity  Comments: s/p L TKA    Subjective    Pt pleasant and cooperative during tx.  Pt did not report concerns regarding discharge.       Objective       04/14/23 1331   Treatment Charges   SLP Cognitive Skill Development First 15 Minutes 1   SLP Cognitive Skill Development Additional 15 Minutes 1   SLP Total Time Spent   SLP Individual Total Time Spent (Mins) 30   Cognition   Functional Memory Activities Moderate (3)         Assessment    Pt scored 13/25 on the BIMS.  Pt required mod verbal cues to recall names of therapists, meals, and therapy activities.  Pt recalled 4/5 unrelated words given by previous SLP yesterday.      Strengths: Alert and oriented, Willingly participates in therapeutic activities, Pleasant and cooperative, Supportive family  Barriers: Impaired carryover of learning, Impaired functional cognition    Plan    All questions answered at this time.  Discharge scheduled for tomorrow.  Recommend pt continue memory book and calendar at home.          Speech Therapy Problems (Active)       Problem: Memory STGs       Dates: Start: 04/05/23         Goal: STG-Within one week, patient will  recall daily events with use of external memory aide and compensatory memory strategies with  75% acc with min A.       Dates: Start: 04/05/23         Goal Note filed on 04/12/23 1201 by Arielle Velasquez MS,CCC-SLP       50% mod assist.                  Problem: Speech/Swallowing LTGs       Dates: Start: 04/01/23         Goal: LTG-By discharge, patient will solve basic problems with 80% acc min A. for safe discharge home.       Dates: Start: 04/01/23

## 2023-04-14 NOTE — CARE PLAN
"The patient is Stable - Low risk of patient condition declining or worsening        Problem: Pain - Standard  Goal: Alleviation of pain or a reduction in pain to the patient’s comfort goal  Outcome: Progressing     Patient complained of pain at L knee with a pain score of 8/10 and verbalize dan acceptable level of pain after PRN oxycodone 10mg administered.       Problem: Fall Risk - Rehab  Goal: Patient will remain free from falls  Outcome: Progressing   Diana Becker Fall risk Assessment Score: 12  Moderate fall risk Interventions  - Bed and strip alarm   - Yellow sign by the door   - Yellow wrist band \"Fall risk\"  - Room near to the nurse station  - Do not leave patient unattended in the bathroom  - Fall risk education provided              "

## 2023-04-14 NOTE — PROGRESS NOTES
"Rehab Progress Note     Date of Service: 4/14/2023  Chief Complaint: Follow-up knee replacement    Interval Events (Subjective)    Patient seen and examined today in the cafeteria.  Her daughter and  are present.  They feel ready for her discharge home tomorrow.  We discussed her medications and pharmacy.  We discussed opiates risks and benefits.  Patient reports pain is better controlled.  She last moved her bowels 2 days ago.    Patient has no other new questions, concerns, or complaints today.     Objective:  VITAL SIGNS: /45   Pulse 76   Temp 36.3 °C (97.4 °F) (Temporal)   Resp 16   Ht 1.727 m (5' 8\")   Wt 78.5 kg (173 lb)   SpO2 93%   BMI 26.30 kg/m²   Gen: alert, no apparent distress  CV: Regular rate, regular rhythm  Resp: Clear to auscultation bilaterally      No results found for this or any previous visit (from the past 72 hour(s)).        Scheduled Medications   Medication Dose Frequency    oxyCODONE immediate-release  10 mg BID    acetaminophen  500 mg TID    senna-docusate  2 Tablet BID    aspirin EC  81 mg BID    atorvastatin  40 mg Nightly    busPIRone  15 mg TID    methenamine hip  1 g BID    valsartan  160 mg DAILY    venlafaxine XR  225 mg QDAY with Breakfast    omeprazole  40 mg QAM AC       Current Diet Order   Procedures    Diet Order Diet: Regular       Assessment:    This patient is a 83 y.o. female admitted for acute inpatient rehabilitation with   S/P total knee arthroplasty, left.    I led and attended the weekly conference, and agree with the IDT conference documentation and plan of care as noted below.    Date of conference: 4/12/2023    Goals and barriers: See IDT note.    Biggest barriers: mild cognitive impairment (memory), knee pain, impaired range of motion - not progressing as expected     Goals in next week: discharge     CM/social support: daughters supportive     Anticipated DC date: 4/15    Home health: PT/OT/RN    Equip: doesn't need any    Follow up: PCP, " orthopedic surgery     [facility]       Problem List/Medical Decision Making and Plan:    Left knee replacement  3/28 with Dr. Marcus Hall  Weightbearing as tolerated  Continue full rehab program  PT/OT/SLP, 1 hr each discipline, 5 days per week    Outpatient follow-up with surgery next week for suture/staple removal     Pain management  Continue Tylenol and oxycodone    I discussed the risks and benefits of using opioid medications for pain control.  I discussed the risk of addiction, potential for overdose leading respiratory depression, which could be fatal.      I encouraged the patient to take this medication sparingly with the expressed goal of weaning off the medication as soon as is clinically appropriate.      I informed the patient that we are only able to provide a 7 day supply of these medications at discharge and no refills will be provided by the [facility] medical team.  No replacement prescriptions will be provided for lost prescriptions.  Any medication refill would need to be provided by the patient's primary pain management provider at that provider's discretion.  The patient's primary provider may decide that ongoing opioid medications are no longer necessary.      NARxCHECK score was: Narcotic 50  Sedative 30  Date of last prescription: 4/12 #15 tramadol    I answered the patient's questions regarding this treatment, and the patient indicated understanding and willingness to proceed.      Normal pressure hydrocephalus  Gait impairments  Short-term memory impairments  Status post  shunt placement 1/2022 with Dr. Blanchard  Per review of outpatient neurology documentations, last imaging in November 2022 showed decreased in size of ventricles, and patient's memory/gait had slightly improved  Speech therapy assessment/treatment     Hypertension  Continue valsartan     History of Overactive bladder  Discontinued oxybutynin, unsafe medication for 83-year-old  patient    Urinary Retention, resolved  Discontinued oxybutynin which was the likely culprit    Xerostomia, resolved  Discontinued oxybutynin which was the likely culprit    Sore throat, resolved  Likely from xerostomia  COVID-negative on 3/31 as well as 4/3     Frequent UTIs, history of  Continue methenamine    History of depression  Continue venlafaxine and buspirone    Hyperlipidemia  Continue atorvastatin     History of stroke  Continue aspirin and atorvastatin    Macrocytic anemia, continues  Normal B12    Elevated alk phos, continues  Likely secondary to surgery.bony injury   Monitor for abdominal complaints - has none    GERD  Continue omeprazole  Uses Nexium as an outpatient    Epistaxis, resolved    DVT prophylaxis  Continue aspirin for surgery, 81 mg twice daily for 4 more weeks    Ursula Fall M.D.  Physical Medicine and Rehabilitation

## 2023-04-14 NOTE — DISCHARGE PLANNING
DPA     Sent dme order to [facility] for transport chair. [facility] declined due to insurance.     Then sent to [facility]. [facility] declined because they do not carry transport chairs.     Now [facility] and [facility].   [facility] declined because they do not carry transport chairs either.   Waiting for call back from [employer].      also provided family with a care chest application.

## 2023-04-15 VITALS
HEART RATE: 76 BPM | RESPIRATION RATE: 18 BRPM | DIASTOLIC BLOOD PRESSURE: 58 MMHG | OXYGEN SATURATION: 97 % | SYSTOLIC BLOOD PRESSURE: 131 MMHG | TEMPERATURE: 97.7 F | BODY MASS INDEX: 26.22 KG/M2 | HEIGHT: 68 IN | WEIGHT: 173 LBS

## 2023-04-15 PROCEDURE — 99239 HOSP IP/OBS DSCHRG MGMT >30: CPT | Performed by: PHYSICAL MEDICINE & REHABILITATION

## 2023-04-15 PROCEDURE — 700102 HCHG RX REV CODE 250 W/ 637 OVERRIDE(OP): Performed by: PHYSICAL MEDICINE & REHABILITATION

## 2023-04-15 PROCEDURE — A9270 NON-COVERED ITEM OR SERVICE: HCPCS | Performed by: PHYSICAL MEDICINE & REHABILITATION

## 2023-04-15 RX ADMIN — VENLAFAXINE HYDROCHLORIDE 225 MG: 75 CAPSULE, EXTENDED RELEASE ORAL at 08:00

## 2023-04-15 RX ADMIN — METHENAMINE HIPPURATE 1 G: 1 TABLET ORAL at 08:00

## 2023-04-15 RX ADMIN — ACETAMINOPHEN 500 MG: 500 TABLET ORAL at 08:00

## 2023-04-15 RX ADMIN — BUSPIRONE HYDROCHLORIDE 15 MG: 10 TABLET ORAL at 08:00

## 2023-04-15 RX ADMIN — OMEPRAZOLE 40 MG: 20 CAPSULE, DELAYED RELEASE ORAL at 08:00

## 2023-04-15 RX ADMIN — VALSARTAN 160 MG: 80 TABLET, FILM COATED ORAL at 07:59

## 2023-04-15 RX ADMIN — OXYCODONE HYDROCHLORIDE 10 MG: 10 TABLET ORAL at 08:00

## 2023-04-15 RX ADMIN — SENNOSIDES AND DOCUSATE SODIUM 2 TABLET: 50; 8.6 TABLET ORAL at 08:00

## 2023-04-15 RX ADMIN — MAGNESIUM HYDROXIDE 30 ML: 1200 LIQUID ORAL at 07:59

## 2023-04-15 RX ADMIN — ASPIRIN 81 MG: 81 TABLET, COATED ORAL at 08:02

## 2023-04-15 RX ADMIN — OXYCODONE HYDROCHLORIDE 10 MG: 10 TABLET ORAL at 11:29

## 2023-04-15 NOTE — CARE PLAN
The patient is Stable - Low risk of patient condition declining or worsening      Problem: Pain - Standard  Goal: Alleviation of pain or a reduction in pain to the patient’s comfort goal  Outcome: Progressing   Note: Patient able to verbalize pain level and verbalize an acceptable level of pain.

## 2023-04-15 NOTE — PROGRESS NOTES
Patient discharged to home per order.  Discharge instructions reviewed with patient and daughter; they verbalize understanding and signed copies placed in chart.  Patient has all belongings; signed copy of form in chart.  Patient left facility at 1135 via wheelchair accompanied by rehab staff and daughter.  Wheelchair delivered.

## 2023-04-15 NOTE — DISCHARGE SUMMARY
Admission Date: 3/31/2023    Discharge Date: 4/15/2023    Attending Provider: Ursula Fall MD    Admission Diagnosis:   Active Hospital Problems    Diagnosis     *S/P total knee arthroplasty, left     Benign essential hypertension     Gastroesophageal reflux disease without esophagitis     History of CVA (cerebrovascular accident)     Other specified anemias     Normal pressure hydrocephalus (HCC)     History of recurrent urinary tract infection     Endogenous depression (HCC)        Discharge Diagnosis:  Active Hospital Problems    Diagnosis     *S/P total knee arthroplasty, left     Benign essential hypertension     Gastroesophageal reflux disease without esophagitis     History of CVA (cerebrovascular accident)     Other specified anemias     Normal pressure hydrocephalus (HCC)     History of recurrent urinary tract infection     Endogenous depression (HCC)        HPI per H&P:    Patient is a 83 y.o. female  with a past medical history of breast cancer, cervical fracture, hip fracture, hypertension, depression, stroke, chronic urinary tract infections, overactive bladder, normal pressure hydrocephalus s/p  shunt placement, admitted to [facility] on 3/28, for an elective left total knee replacement due to severe osteoarthritis.  Surgery occurred on 3/28 by Dr. Marcus Hall.  She is weightbearing as tolerated.  Postoperatively patient with acute blood loss anemia with hemoglobin drop from 13.0-11.4.     Patient currently reports left knee pain with movement, none at rest. She reports prior to this surgery she was in so much pain that she wasn't moving much at home. She hasn't driven in that last year or so due to a hip fracture after a fall. She has a history of depression after leaving her full time job, but this improved after she started working part time again. She last moved her bowels yesterday and confirms over active bladder. She is also reporting dry mouth, which is a chronic issue for  her.  Patient reports her surgeon was concerned about her coming here due to risk of getting COVID, but patient feels she needs this in order to get more functional prior to returning home.     Patient was evaluated by Rehab Medicine physician and Physical Therapy and determined to be appropriate for acute inpatient rehab and was transferred to [facility] on 3/31/2023  1:00 PM.    Rehab Hospital Course by Problem List:    Left knee replacement  3/28 with Dr. Marcus Hall  Weightbearing as tolerated  Outpatient follow-up with surgery next week for suture/staple removal      Pain management  Continue Tylenol and oxycodone     I discussed the risks and benefits of using opioid medications for pain control.  I discussed the risk of addiction, potential for overdose leading respiratory depression, which could be fatal.       I encouraged the patient to take this medication sparingly with the expressed goal of weaning off the medication as soon as is clinically appropriate.       I informed the patient that we are only able to provide a 7 day supply of these medications at discharge and no refills will be provided by the [facility] medical team.  No replacement prescriptions will be provided for lost prescriptions.  Any medication refill would need to be provided by the patient's primary pain management provider at that provider's discretion.  The patient's primary provider may decide that ongoing opioid medications are no longer necessary.       NARxCHECK score was: Narcotic 50  Sedative 30  Date of last prescription: 4/12 #15 tramadol     I answered the patient's questions regarding this treatment, and the patient indicated understanding and willingness to proceed.      Normal pressure hydrocephalus  Gait impairments  Short-term memory impairments  Status post  shunt placement 1/2022 with Dr. Blanchard  Per review of outpatient neurology documentations, last imaging in November 2022  showed decreased in size of ventricles, and patient's memory/gait had slightly improved  Speech therapy assessment/treatment     Hypertension  Continue valsartan     History of Overactive bladder  Discontinued oxybutynin, unsafe medication for 83-year-old patient     Urinary Retention, resolved  Discontinued oxybutynin which was the likely culprit     Xerostomia, resolved  Discontinued oxybutynin which was the likely culprit     Sore throat, resolved  Likely from xerostomia  COVID-negative on 3/31 as well as 4/3     Frequent UTIs, history of  Continue methenamine     History of depression  Continue venlafaxine and buspirone     Hyperlipidemia  Continue atorvastatin     History of stroke  Continue aspirin and atorvastatin     Macrocytic anemia, continues  Normal B12     Elevated alk phos, continues  Likely secondary to surgery.bony injury   Monitor for abdominal complaints - has none     GERD  Continue omeprazole  Uses Nexium as an outpatient     Epistaxis, resolved     DVT prophylaxis  Continue aspirin for surgery, 81 mg twice daily for 4 more weeks       Functional Status at Discharge  Eating:  Independent  Eating Description:  Supervision for safety  Grooming:  Modified Independent  Grooming Description:  Seated in wheelchair at sink  Bathing:  Standby Assist  Bathing Description:  Tub bench  Upper Body Dressing:  Modified Independent  Upper Body Dressing Description:  Increased time, Initial preparation for task, Supervision for safety, Verbal cueing  Lower Body Dressing:   (slippers mod I in w/c)  Lower Body Dressing Description:  Increased time, Initial preparation for task, Set-up of equipment, Supervision for safety, Verbal cueing (Seated in w/c; grab bar use for standing balance in pt bathroom)  Discharge Location : Home  Patient Discharging with Assist of: Spouse / Significant Other  Level of Supervision Required: 24 Hour Supervision  Recommended Equipment for Discharge: Front-Wheeled Walker;Grab Bars by  Toilet;Grab Bars in Tub / Shower;Tub Transfer Bench;3 in 1 Commode  Recommended Services Upon Discharge: Home Health Occupational Therapy  Long Term Goals Met: 2  Long Term Goals Not Met: 0  Criteria for Termination of Services: Maximum Function Achieved for Inpatient Rehabilitation  Walk:  Standby Assist  Distance Walked:  150 (in addition to 30 ft outdoors/ sloped drive way)  Number of Times Distance Was Traveled:  2  Assistive Device:  Front Wheel Walker  Gait Deviation:  Antalgic, Decreased Base Of Support  Wheelchair:  Supervised  Distance Propelled:  100   Wheelchair Description:  Extra time, Supervision for safety, Verbal cueing  Stairs Contact Guard Assist  Stairs Description Extra time, Hand rails, Requires incidental assist  Discharge Location: Home  Patient Discharging with Assist of: Family  Level of Supervision Required Upon Discharge: Intermittent Supervision  Recommended Equipment for Discharge: Front-Wheeled Walker  Recommeded Services Upon Discharge: Home Health Physical Therapy  Long Term Goals Met: 1/4  Long Term Goals Not Met: 3/4  Reason(s) for Goals Not Met: pt continues to be at risk for falls, recommend SPV for safety  Comprehension:  Supervision  Comprehension Description:  Magnifying glass  Expression:  Modified Independent  Expression Description:  Other (comment) (extra time)  Social Interaction:  Independent  Social Interaction Description:     Problem Solving:  Minimal Assist  Problem Solving Description:  Bed/chair alarm, Increased time, Seat belt, Supervision, Therapy schedule, Verbal cueing  Memory:  Moderate Assist  Memory Description:  Bed/chair alarm, Increased time, Seat belt, Supervision, Therapy schedule, Verbal cueing  Progress since Admit: Patient has made limited progress toward ST goals.   She currently needs min A for safety planning and problem solving and mod A for recall of novel information.  Patient will need assist with medication, financial and health care  management.  She currently needs CGA for safet when on feet and will need supevision for safety. Patients spouse is not a good candidate for supervision of patient due to own limitations. Patient's daughter educated on discharge recommendations.  Patient will continue for one more day of therapy with ST/OT/PT until planned discharge on 4/15/23  Discharge Location : Home  Patient Discharging with Assist of: Family   Level of Supervision Required: 24 Hour Supervision (direct/ indirect.  Patient will need assist for toileting.  Will need supervision of medication managment.  Provided information on pill boxes with timers.  Also recommended that daughter check and ensure that medications are taken on time.)  Recommended Services Upon Discharge: No Follow-Up Speech Therapy Recommended (Patient likely at baseline)  Long Term Goals Met: patient will solve basic problems with 80% acc min A. for safe discharge  Reason(s) for Goals Not Met: Patient is limited by poor carry over of new training and novel information.    IUrsula M.D., personally performed a complete drug regimen review and no potential clinically significant medication issues were identified.     Discharge Medication:     Medication List        START taking these medications        Instructions   senna-docusate 8.6-50 MG Tabs  Commonly known as: PERICOLACE or SENOKOT S  Notes to patient: BOWEL MED   Take 2 Tablets by mouth 2 times a day.  Dose: 2 Tablet            CHANGE how you take these medications        Instructions   aspirin 81 MG EC tablet  What changed: when to take this  Notes to patient: ANTICOAGULANT    Take 1 Tablet by mouth 2 times a day for 31 days.  Dose: 81 mg     oxyCODONE immediate-release 5 MG Tabs  What changed:   how much to take  how to take this  when to take this  reasons to take this  additional instructions  Commonly known as: ROXICODONE  Notes to patient: PAIN PILL   Take 1 -2 tabs for moderate - several pain every 6  hours as needed     venlafaxine 150 MG extended-release capsule  What changed: Another medication with the same name was removed. Continue taking this medication, and follow the directions you see here.  Commonly known as: EFFEXOR-XR  Notes to patient: MOOD    Take 1 Capsule by mouth every day. Taken with 75 mg for 225mg daily  Dose: 150 mg            CONTINUE taking these medications        Instructions   acetaminophen 500 MG Tabs  Commonly known as: TYLENOL   Take 1,000 mg by mouth every 6 hours as needed for Moderate Pain.  Dose: 1,000 mg     atorvastatin 40 MG Tabs  Commonly known as: LIPITOR  Notes to patient: CHOLESETEROL   Doctor's comments: New Start- for stroke prevention  Take 1 Tablet by mouth every evening for 364 days.  Dose: 40 mg     busPIRone 15 MG tablet  Commonly known as: BUSPAR  Notes to patient: MOOD   Take 15 mg by mouth 3 times a day.  Dose: 15 mg     CALCIUM-VITAMIN D3 PO   Take 600 mg by mouth every day.  Dose: 600 mg     CRANBERRY EXTRACT PO   Take 1 Capsule by mouth every day.  Dose: 1 Capsule     D-MANNOSE PO   Take 1 Capsule by mouth every evening.  Dose: 1 Capsule     estradiol 0.1 MG/GM vaginal cream  Commonly known as: ESTRACE   Insert 2 g into the vagina every day.  Dose: 2 g     methenamine hip 1 GM Tabs  Commonly known as: HIPPREX  Notes to patient: PROPHYLAXIS    2 times a day. LONG TERM TREATMENT FOR CHRONIC UTI.  Indications: Urinary Tract Infection     omeprazole 40 MG delayed-release capsule  Commonly known as: PRILOSEC  Notes to patient: STOMACH ACID REGULATION    Take 1 Capsule by mouth every day.  Dose: 40 mg     valsartan 160 MG Tabs  Commonly known as: DIOVAN  Notes to patient: BLOOD PRESSURE MED   Take 1 Tablet by mouth every day.  Dose: 160 mg     Ventolin  (90 Base) MCG/ACT Aers inhalation aerosol  Generic drug: albuterol   2 Puffs every 6 hours as needed.  Dose: 2 Puff     vitamin D3 1000 Unit (25 mcg) Tabs  Commonly known as: cholecalciferol  Notes to patient:  SUPPLEMENTATION    Take 1,000 Units by mouth every day.  Dose: 1,000 Units            STOP taking these medications      mupirocin 2 % Oint  Commonly known as: BACTROBAN     oxybutynin 15 MG CR tablet  Commonly known as: DITROPAN XL     traMADol 50 MG Tabs  Commonly known as: Ultram              Discharge Diet:  Regular    Discharge Activity:  Do not return to driving until cleared by a physician.         Disposition:  Patient to discharge home with family support and community resources.     Equipment:  Follow-up & Discharge Instructions:  Home health: PT/OT/RN     Equip: doesn't need any     Follow up: PCP, orthopedic surgery      [facility]         Condition on Discharge:  Good    More than 35 minutes was spent on discharging this patient, including face-to-face time, prescription management, and the dictation of this note.    Ursula Fall M.D.    Date of Service: 4/15/2023

## 2023-04-16 DIAGNOSIS — Z96.652 S/P TOTAL KNEE ARTHROPLASTY, LEFT: ICD-10-CM

## 2023-04-16 RX ORDER — OXYCODONE HYDROCHLORIDE 5 MG/1
TABLET ORAL
Qty: 56 TABLET | Refills: 0 | Status: SHIPPED | OUTPATIENT
Start: 2023-04-16 | End: 2023-04-23

## 2023-04-16 NOTE — PROGRESS NOTES
Resent oxycodone RX with daily max dose per request of pharmacy. Call daughter to update RX was resent.

## 2023-06-05 ENCOUNTER — APPOINTMENT (OUTPATIENT)
Dept: RADIOLOGY | Facility: MEDICAL CENTER | Age: 83
End: 2023-06-05
Attending: EMERGENCY MEDICINE
Payer: MEDICARE

## 2023-06-05 ENCOUNTER — HOSPITAL ENCOUNTER (EMERGENCY)
Facility: MEDICAL CENTER | Age: 83
End: 2023-06-05
Attending: EMERGENCY MEDICINE
Payer: MEDICARE

## 2023-06-05 VITALS
HEIGHT: 68 IN | SYSTOLIC BLOOD PRESSURE: 112 MMHG | BODY MASS INDEX: 25.46 KG/M2 | WEIGHT: 167.99 LBS | DIASTOLIC BLOOD PRESSURE: 62 MMHG | OXYGEN SATURATION: 95 % | RESPIRATION RATE: 16 BRPM | TEMPERATURE: 98.1 F | HEART RATE: 78 BPM

## 2023-06-05 DIAGNOSIS — M19.011 PRIMARY OSTEOARTHRITIS OF RIGHT SHOULDER: ICD-10-CM

## 2023-06-05 PROCEDURE — 99284 EMERGENCY DEPT VISIT MOD MDM: CPT

## 2023-06-05 PROCEDURE — 700102 HCHG RX REV CODE 250 W/ 637 OVERRIDE(OP): Performed by: EMERGENCY MEDICINE

## 2023-06-05 PROCEDURE — 73030 X-RAY EXAM OF SHOULDER: CPT | Mod: RT

## 2023-06-05 PROCEDURE — A9270 NON-COVERED ITEM OR SERVICE: HCPCS | Performed by: EMERGENCY MEDICINE

## 2023-06-05 RX ORDER — GABAPENTIN 100 MG/1
100 CAPSULE ORAL 3 TIMES DAILY
Qty: 90 CAPSULE | Refills: 0 | Status: SHIPPED | OUTPATIENT
Start: 2023-06-05

## 2023-06-05 RX ORDER — GABAPENTIN 100 MG/1
100 CAPSULE ORAL ONCE
Status: COMPLETED | OUTPATIENT
Start: 2023-06-05 | End: 2023-06-05

## 2023-06-05 RX ORDER — ACETAMINOPHEN 325 MG/1
650 TABLET ORAL ONCE
Status: COMPLETED | OUTPATIENT
Start: 2023-06-05 | End: 2023-06-05

## 2023-06-05 RX ADMIN — GABAPENTIN 100 MG: 100 CAPSULE ORAL at 13:53

## 2023-06-05 RX ADMIN — ACETAMINOPHEN 650 MG: 325 TABLET, FILM COATED ORAL at 13:53

## 2023-06-05 ASSESSMENT — FIBROSIS 4 INDEX: FIB4 SCORE: 0.77

## 2023-06-05 NOTE — ED TRIAGE NOTES
.  Chief Complaint   Patient presents with    Pain     Generalized body pain, difficulty lifting right arm, pt. States she fell approx 2 weeks     Pt. Ambulates with walker, no sings of distress

## 2023-06-05 NOTE — ED PROVIDER NOTES
ED Provider Note    CHIEF COMPLAINT  Chief Complaint   Patient presents with    Pain     Generalized body pain, difficulty lifting right arm, pt. States she fell approx 2 weeks       EXTERNAL RECORDS REVIEWED  Inpatient Notes last admission on March 31, 2023 and at that time she is status post arthroplasty of the left knee    HPI/ROS  LIMITATION TO HISTORY   Select: : None  OUTSIDE HISTORIAN(S):  Family daughter given history about her generalized body pain getting worse and especially difficulty lifting her right arm with shoulder pain    Aaliyah Fulton is a 83 y.o. female who presents with a report that she has had generalized body aches that are gradually getting worse over the last few weeks since she got her knee replacement on the left side and late March.  She states that over the last couple of days she has had increasing difficulty lifting her arm up past her shoulder and has new right shoulder pain and the rest of the pains been going on for weeks to months.  She has not seen Dr. Trivedi to talk about this pain and is on episodic oxybutynin.  Denies any trauma in the last week but did 2 weeks ago fall on the lawn and she is not sure if she injured her arm at that time    PAST MEDICAL HISTORY   has a past medical history of Anesthesia, Arthritis, ASTHMA, Breast cancer (HCC), Cancer (HCC) (2010), Cataract, Dental disorder, Heart burn, Heart murmur, High cholesterol, History of cervical fracture (2022), Hypertension (01/03/2022), Indigestion, Osteoporosis, Pain, Pneumonia (12/2021), PONV (postoperative nausea and vomiting), Psychiatric problem, Stroke (HCC) (2021), and Unspecified urinary incontinence.    SURGICAL HISTORY   has a past surgical history that includes sinuscopy; breast biopsy (05/21/2010); other orthopedic surgery; knee arthroplasty total (09/19/2011); lumbar laminectomy diskectomy (10/17/2012); foraminotomy (10/17/2012); radiation therapy plan simple; recovery (05/10/2016); partial hip  replacement (Left, 11/24/2021); lumbar decompression (10/17/2012); create shunt:ventric-peritoneal (01/10/2022); cholecystectomy; hammertoe correction (Bilateral); shoulder surgery; knee arthroscopy; and total knee arthroplasty (Left, 3/28/2023).    FAMILY HISTORY  Family History   Problem Relation Age of Onset    Lung Disease Mother         copd    Stroke Mother     Cancer Daughter         Breast    Breast Cancer Daughter     Psychiatric Illness Daughter         Anxiety    Psychiatric Illness Daughter         Anxiety and Depression       SOCIAL HISTORY  Social History     Tobacco Use    Smoking status: Never    Smokeless tobacco: Never   Vaping Use    Vaping Use: Never used   Substance and Sexual Activity    Alcohol use: No     Comment: very rare    Drug use: Never    Sexual activity: Not on file     Comment: ; two daughters; retired ( @ [employer] / [employer])       CURRENT MEDICATIONS  Home Medications       Reviewed by Katelynn Hurtado R.N. (Registered Nurse) on 06/05/23 at 1204  Med List Status: Not Addressed     Medication Last Dose Status   acetaminophen (TYLENOL) 500 MG Tab  Active   atorvastatin (LIPITOR) 40 MG Tab  Active   busPIRone (BUSPAR) 15 MG tablet  Active   Calcium Carbonate-Vitamin D (CALCIUM-VITAMIN D3 PO)  Active   CRANBERRY EXTRACT PO  Active   D-MANNOSE PO  Active   estradiol (ESTRACE) 0.1 MG/GM vaginal cream  Active   methenamine hip (HIPPREX) 1 GM Tab  Active   omeprazole (PRILOSEC) 40 MG delayed-release capsule  Active   oxybutynin (DITROPAN XL) 15 MG CR tablet  Active   senna-docusate (PERICOLACE OR SENOKOT S) 8.6-50 MG Tab  Active   valsartan (DIOVAN) 160 MG Tab  Active   venlafaxine (EFFEXOR-XR) 150 MG extended-release capsule  Active   venlafaxine (EFFEXOR-XR) 150 MG extended-release capsule  Active   venlafaxine XR (EFFEXOR XR) 75 MG CAPSULE SR 24 HR  Active   VENTOLIN  (90 Base) MCG/ACT Aero Soln inhalation aerosol  Active   vitamin D3 (CHOLECALCIFEROL)  "1000 Unit (25 mcg) Tab  Active                    ALLERGIES  No Known Allergies    PHYSICAL EXAM  VITAL SIGNS: /54   Pulse 81   Temp 36.8 °C (98.3 °F) (Temporal)   Resp 18   Ht 1.727 m (5' 8\")   Wt 76.2 kg (167 lb 15.9 oz)   SpO2 97%   BMI 25.54 kg/m²    Constitutional: The female in a wheelchair, No acute distress, Non-toxic appearance.   HENT: Normocephalic, Atraumatic, Bilateral external ears normal, Oropharynx is clear mucous membranes are moist. No oral exudates or nasal discharge.   Eyes: Pupils are equal round and reactive, EOMI, Conjunctiva normal, No discharge.   Neck: Normal range of motion, No tenderness, Supple, No stridor. No meningismus.  Lymphatic: No lymphadenopathy noted.   Cardiovascular: Regular rate and rhythm without murmur rub or gallop.  Thorax & Lungs: Clear breath sounds bilaterally without wheezes, rhonchi or rales. There is no chest wall tenderness.   Abdomen: Soft non-tender non-distended. There is no rebound or guarding. No organomegaly is appreciated. Bowel sounds are normal.  Skin: Normal without rash.   Back: No CVA or spinal tenderness.   Extremities: Intact distal pulses, No edema, left anterior shoulder and proximal humerus tenderness without swelling, No cyanosis, No clubbing. Capillary refill is less than 2 seconds.  Musculoskeletal: Reduced range of motion of right shoulder, passively can go up well above the shoulder on AP motion but actively she can only raise to about breast level. No tenderness to palpation or major deformities noted.   Neurologic: Alert & oriented x 3, Normal motor function, Normal sensory function, No focal deficits noted. Reflexes are normal.  Psychiatric: Affect normal, Judgment normal, Mood normal. There is no suicidal ideation or patient reported hallucinations.       DIAGNOSTIC STUDIES / PROCEDURES      RADIOLOGY  I have independently interpreted the diagnostic imaging associated with this visit and am waiting the final reading from the " radiologist.   My preliminary interpretation is as follows: Arthritic changes  Radiologist interpretation:   DX-SHOULDER 2+ RIGHT   Final Result      1.  No radiographic evidence of acute traumatic injury.   2.  Osteopenia.   3.  Degenerative changes of the acromioclavicular and glenohumeral joints.   4.  Ossified joint bodies of the glenohumeral joint.            COURSE & MEDICAL DECISION MAKING    ED Observation Status? No; Patient does not meet criteria for ED Observation.     INITIAL ASSESSMENT, COURSE AND PLAN  Care Narrative: Patient presents with a number of chronic pain complaints and her more acute complaint was right shoulder pain and reduced range of motion actively.    The patient is getting a stiff right shoulder and I did some physical therapy during her visit      DISPOSITION AND DISCUSSIONS    Escalation of care considered, and ultimately not performed:blood analysis    Decided on use of Neurontin 100 mg 3 times a day for neuropathic pain and follow-up with her primary care provider in a couple of weeks    FINAL DIAGNOSIS  1. Primary osteoarthritis of right shoulder           Electronically signed by: Stephane Thomas M.D., 6/5/2023 1:04 PM

## 2023-06-05 NOTE — DISCHARGE INSTRUCTIONS
We are going to start you on low-dose Neurontin for your neuropathic pain which is sharp stabbing and burning    See Dr. Trivedi in the next couple of weeks for recheck

## 2023-06-05 NOTE — ED NOTES
Pt and family member at bedside verbalize understanding of discharge instructions and follow up/prescription . Pt wheeled out to ED lobby with all belongings in hospital wheelchair.

## 2023-07-03 NOTE — THERAPY
Physical Therapy   Daily Treatment     Patient Name: Aaliyah Fulton  Age:  81 y.o., Sex:  female  Medical Record #: [id number]  Today's Date: 12/8/2021     Precautions  Precautions: Fall Risk,Posterior Hip Precautions,Weight Bearing As Tolerated Left Lower Extremity  Comments: Hydrocephalus baseline    Subjective    Pt up in wc, willing to participate     Objective       12/08/21 0901   Gait Functional Level of Assist    Gait Level Of Assist Stand by Assist   Assistive Device Front Wheel Walker   Distance (Feet) 110   # of Times Distance was Traveled 2   Deviation Antalgic;Step To;Decreased Base Of Support;Bradykinetic  (L knee valgus)   Stairs Functional Level of Assist   Level of Assist with Stairs Contact Guard Assist   # of Stairs Climbed 4   Stairs Description Extra time;Hand rails;Verbal cueing   Sitting Lower Body Exercises   Ankle Pumps 2 sets of 15   Hip Flexion 2 sets of 15   Hip Abduction 2 sets of 15   Hip Adduction 2 sets of 15   Long Arc Quad 2 sets of 15   Hamstring Curl 2 sets of 15   PT Total Time Spent   PT Individual Total Time Spent (Mins) 30   PT Charge Group   PT Gait Training 1   PT Therapeutic Exercise 1       Assessment    Pt continues to progress with gait/ stairs and strength, L knee valgus remains limiting factor to increased standing stability, will require FWW for all upright mobility.    Strengths: Able to follow instructions,Adequate strength,Effective communication skills,Good insight into deficits/needs,Independent prior level of function,Making steady progress towards goals,Pleasant and cooperative,Supportive family,Willingly participates in therapeutic activities  Barriers: Decreased endurance,Fatigue,Generalized weakness,Home accessibility,Impaired activity tolerance,Impaired balance,Pain,Limited mobility (L knee valgus, posterior hip precautions)    Plan    Progressive gait tolerance with FWW, bed mobility/ transfers and LE strength training. Review posterior hip precautions and  use of abduction pillow.      Passport items to be completed:  Get in/out of bed safely, in/out of a vehicle, safely use mobility device, walk or wheel around home/community, navigate up and down stairs, show how to get up/down from the ground, ensure home is accessible, demonstrate HEP, complete caregiver training      Physical Therapy Problems (Active)     Problem: PT-Long Term Goals     Dates: Start: 11/27/21       Goal: LTG-By discharge, patient will ambulate with FWW and SPV / modified independent 100 ft x 2     Dates: Start: 11/27/21          Goal: LTG-By discharge, patient will transfer one surface to another with FWW and SPV / modified independent     Dates: Start: 11/27/21          Goal: LTG-By discharge, patient will ambulate up/down 4-6 stairs with hand rails and CGA     Dates: Start: 11/27/21          Goal: LTG-By discharge, patient will transfer in/out of a car with FWW and SBA/ CGA to maintain posterior hip precautions LLE     Dates: Start: 11/27/21                 Erythromycin Counseling:  I discussed with the patient the risks of erythromycin including but not limited to GI upset, allergic reaction, drug rash, diarrhea, increase in liver enzymes, and yeast infections.

## 2023-08-27 ENCOUNTER — HOSPITAL ENCOUNTER (EMERGENCY)
Facility: MEDICAL CENTER | Age: 83
End: 2023-08-28
Attending: EMERGENCY MEDICINE
Payer: MEDICARE

## 2023-08-27 DIAGNOSIS — S60.211A CONTUSION OF RIGHT WRIST, INITIAL ENCOUNTER: ICD-10-CM

## 2023-08-27 PROCEDURE — 99284 EMERGENCY DEPT VISIT MOD MDM: CPT

## 2023-08-27 ASSESSMENT — FIBROSIS 4 INDEX: FIB4 SCORE: 0.77

## 2023-08-28 ENCOUNTER — APPOINTMENT (OUTPATIENT)
Dept: RADIOLOGY | Facility: MEDICAL CENTER | Age: 83
End: 2023-08-28
Attending: EMERGENCY MEDICINE
Payer: MEDICARE

## 2023-08-28 VITALS
HEIGHT: 68 IN | BODY MASS INDEX: 25.29 KG/M2 | RESPIRATION RATE: 18 BRPM | SYSTOLIC BLOOD PRESSURE: 143 MMHG | DIASTOLIC BLOOD PRESSURE: 75 MMHG | WEIGHT: 166.89 LBS | TEMPERATURE: 97.7 F | OXYGEN SATURATION: 94 % | HEART RATE: 69 BPM

## 2023-08-28 PROCEDURE — 29105 APPLICATION LONG ARM SPLINT: CPT

## 2023-08-28 PROCEDURE — 73110 X-RAY EXAM OF WRIST: CPT | Mod: RT

## 2023-08-28 PROCEDURE — 73060 X-RAY EXAM OF HUMERUS: CPT | Mod: RT

## 2023-08-28 PROCEDURE — A9270 NON-COVERED ITEM OR SERVICE: HCPCS | Performed by: EMERGENCY MEDICINE

## 2023-08-28 PROCEDURE — 700102 HCHG RX REV CODE 250 W/ 637 OVERRIDE(OP): Performed by: EMERGENCY MEDICINE

## 2023-08-28 PROCEDURE — 302875 HCHG BANDAGE ACE 4 OR 6""

## 2023-08-28 RX ORDER — NAPROXEN 500 MG/1
500 TABLET ORAL ONCE
Status: COMPLETED | OUTPATIENT
Start: 2023-08-28 | End: 2023-08-28

## 2023-08-28 RX ORDER — NAPROXEN 500 MG/1
500 TABLET ORAL 2 TIMES DAILY WITH MEALS
Qty: 60 TABLET | Refills: 0 | Status: SHIPPED | OUTPATIENT
Start: 2023-08-28 | End: 2023-10-05

## 2023-08-28 RX ADMIN — NAPROXEN 500 MG: 500 TABLET ORAL at 01:10

## 2023-08-28 NOTE — ED TRIAGE NOTES
"Chief Complaint   Patient presents with    Fall     Fall today. Pt states she tripped and fell while pruning roses. Swelling to rt wrist. Denies dizziness, head strike or LOC. Pt landed on rt side. C/o wrist and shoulder pain. Denies blood thinners. Axox4, GCS 15.     /67   Pulse 83   Temp 36.3 °C (97.4 °F) (Temporal)   Resp 14   Ht 1.727 m (5' 8\")   Wt 75.7 kg (166 lb 14.2 oz)   SpO2 94%   BMI 25.38 kg/m²     Pt placed in lobby, educated on triage process, and told to notify staff of any change in status.    "

## 2023-08-28 NOTE — DISCHARGE INSTRUCTIONS
Follow-up with orthopedics in 7 to 10 days.  Return to the emergency room if you are acutely worse.

## 2023-08-28 NOTE — ED PROVIDER NOTES
ED Provider Note    CHIEF COMPLAINT  Chief Complaint   Patient presents with    Fall     Fall today. Pt states she tripped and fell while pruning roses. Swelling to rt wrist. Denies dizziness, head strike or LOC. Pt landed on rt side. C/o wrist and shoulder pain. Denies blood thinners. Axox4, GCS 15.       HPI/ROS    Aaliyah Fulton is a 83 y.o. female who presents with right wrist and upper arm pain on the right.  The patient states that she was pruning some roses when she fell landed on outstretched hand.  She presents with right wrist pain as well as pain to the midshaft of the right humerus.  She did not strike her head.  She is unaware of any other injuries.  She does not take any anticoagulants.    PAST MEDICAL HISTORY   has a past medical history of Anesthesia, Arthritis, ASTHMA, Breast cancer (HCC), Cancer ([facility]) (2010), Cataract, Dental disorder, Heart burn, Heart murmur, High cholesterol, History of cervical fracture (2022), Hypertension (01/03/2022), Indigestion, Osteoporosis, Pain, Pneumonia (12/2021), PONV (postoperative nausea and vomiting), Psychiatric problem, Stroke ([facility]) (2021), and Unspecified urinary incontinence.    SURGICAL HISTORY   has a past surgical history that includes sinuscopy; breast biopsy (05/21/2010); other orthopedic surgery; knee arthroplasty total (09/19/2011); lumbar laminectomy diskectomy (10/17/2012); foraminotomy (10/17/2012); radiation therapy plan simple; recovery (05/10/2016); partial hip replacement (Left, 11/24/2021); lumbar decompression (10/17/2012); create shunt:ventric-peritoneal (01/10/2022); cholecystectomy; hammertoe correction (Bilateral); shoulder surgery; knee arthroscopy; and total knee arthroplasty (Left, 3/28/2023).    FAMILY HISTORY  Family History   Problem Relation Age of Onset    Lung Disease Mother         copd    Stroke Mother     Cancer Daughter         Breast    Breast Cancer Daughter     Psychiatric Illness Daughter         Anxiety    Psychiatric  "Illness Daughter         Anxiety and Depression       SOCIAL HISTORY  Social History     Tobacco Use    Smoking status: Never    Smokeless tobacco: Never   Vaping Use    Vaping Use: Never used   Substance and Sexual Activity    Alcohol use: No     Comment: very rare    Drug use: Never    Sexual activity: Not on file     Comment: ; two daughters; retired ( @ [employer] / [employer])       CURRENT MEDICATIONS  Home Medications       Reviewed by Sharon Mcrae R.N. (Registered Nurse) on 08/27/23 at 2056  Med List Status: Partial     Medication Last Dose Status   acetaminophen (TYLENOL) 500 MG Tab  Active   atorvastatin (LIPITOR) 40 MG Tab  Active   busPIRone (BUSPAR) 15 MG tablet  Active   Calcium Carbonate-Vitamin D (CALCIUM-VITAMIN D3 PO)  Active   CRANBERRY EXTRACT PO  Active   D-MANNOSE PO  Active   estradiol (ESTRACE) 0.1 MG/GM vaginal cream  Active   gabapentin (NEURONTIN) 100 MG Cap  Active   methenamine hip (HIPPREX) 1 GM Tab  Active   omeprazole (PRILOSEC) 40 MG delayed-release capsule  Active   oxybutynin (DITROPAN XL) 15 MG CR tablet  Active   senna-docusate (PERICOLACE OR SENOKOT S) 8.6-50 MG Tab  Active   valsartan (DIOVAN) 160 MG Tab  Active   venlafaxine (EFFEXOR-XR) 150 MG extended-release capsule  Active   venlafaxine (EFFEXOR-XR) 150 MG extended-release capsule  Active   venlafaxine XR (EFFEXOR XR) 75 MG CAPSULE SR 24 HR  Active   VENTOLIN  (90 Base) MCG/ACT Aero Soln inhalation aerosol  Active   vitamin D3 (CHOLECALCIFEROL) 1000 Unit (25 mcg) Tab  Active                    ALLERGIES  No Known Allergies    PHYSICAL EXAM  VITAL SIGNS: BP (!) 165/77   Pulse 74   Temp 36.3 °C (97.4 °F) (Temporal)   Resp 18   Ht 1.727 m (5' 8\")   Wt 75.7 kg (166 lb 14.2 oz)   SpO2 95%   BMI 25.38 kg/m²    General the patient appears uncomfortable but nontoxic    Extremities patient has soft tissue swelling and tenderness noted to the right wrist.  She has a normal right elbow exam.  " She also some tenderness in the right midshaft of the humerus.  She has a normal right shoulder exam.    Skin some ecchymosis throughout the right upper extremity    Neurovascular examination is grossly intact to the right upper extremity      RADIOLOGY  I have independently interpreted the diagnostic imaging associated with this visit and am waiting the final reading from the radiologist.   My preliminary interpretation is as follows: X-rays were reviewed and outstanding obvious acute fracture  Radiologist interpretation:   DX-HUMERUS 2+ RIGHT   Final Result         1.  No acute traumatic bony injury.      DX-WRIST-COMPLETE 3+ RIGHT   Final Result         1.  No acute traumatic bony injury.            COURSE & MEDICAL DECISION MAKING    This an 83-year-old female who presents the emergency room with right wrist pain and proximal humerus pain after a fall.  The x-rays do not show any evidence of an acute fracture.  However there is some arthritic change and due to continued discomfort on repeat examination we will immobilize the patient and have her follow-up with orthopedics for repeat exam in 7 to 10 days.  Patient will receive a prescription for naproxen for pain control.  At the time of discharge the patient continues to be neurologically intact with no other evidence of injury.    FINAL DIAGNOSIS  Right wrist contusion possible occult fracture  2. Right humerus contusion possible occult fracture    Disposition  The patient will be discharged in stable condition       Electronically signed by: Jeancarlos Perdomo M.D., 8/27/2023 11:56 PM

## 2023-08-28 NOTE — ED NOTES
Pt provided discharge instructions, and prescription. Pt verbalized understanding of all instructions. IV removed, cathlon intact, site without s/s of infection. Pt to lobby via wheelchair.

## 2023-10-05 PROBLEM — S52.501A CLOSED FRACTURE OF RIGHT DISTAL RADIUS: Status: ACTIVE | Noted: 2023-10-05

## 2023-11-29 ENCOUNTER — PATIENT MESSAGE (OUTPATIENT)
Dept: HEALTH INFORMATION MANAGEMENT | Facility: OTHER | Age: 83
End: 2023-11-29

## 2024-01-10 ENCOUNTER — APPOINTMENT (OUTPATIENT)
Dept: BEHAVIORAL HEALTH | Facility: CLINIC | Age: 84
End: 2024-01-10
Payer: MEDICARE

## 2024-02-02 ENCOUNTER — HOSPITAL ENCOUNTER (OUTPATIENT)
Dept: RADIOLOGY | Facility: MEDICAL CENTER | Age: 84
End: 2024-02-02
Attending: INTERNAL MEDICINE
Payer: MEDICARE

## 2024-02-02 DIAGNOSIS — Z12.31 VISIT FOR SCREENING MAMMOGRAM: ICD-10-CM

## 2024-02-02 PROCEDURE — 77067 SCR MAMMO BI INCL CAD: CPT

## 2024-02-07 DIAGNOSIS — R47.1 DYSARTHRIA: ICD-10-CM

## 2024-02-08 RX ORDER — ATORVASTATIN CALCIUM 40 MG/1
40 TABLET, FILM COATED ORAL EVERY EVENING
Qty: 90 TABLET | Refills: 0 | Status: SHIPPED | OUTPATIENT
Start: 2024-02-08

## 2024-02-08 NOTE — TELEPHONE ENCOUNTER
atorvastatin (LIPITOR) 40 MG Tab     Received request via: Pharmacy    Was the patient seen in the last year in this department? No    Does the patient have an active prescription (recently filled or refills available) for medication(s) requested?  yes    Pharmacy Name: [id number]     Does the patient have detention Plus and need 100 day supply (blood pressure, diabetes and cholesterol meds only)? Patient does not have SCP

## 2024-02-08 NOTE — TELEPHONE ENCOUNTER
Debbie or Juan Jose    This patient should get this specific medication refilled through her PRIMARY CARE DOCTOR- He Trivedi MD.    I gave her the initial prescription but refills should come from or through her primary care doctor.    Carrington TRUONG

## 2024-03-12 ENCOUNTER — APPOINTMENT (OUTPATIENT)
Dept: RADIOLOGY | Facility: MEDICAL CENTER | Age: 84
End: 2024-03-12
Attending: EMERGENCY MEDICINE
Payer: MEDICARE

## 2024-03-12 ENCOUNTER — HOSPITAL ENCOUNTER (EMERGENCY)
Facility: MEDICAL CENTER | Age: 84
End: 2024-03-12
Attending: EMERGENCY MEDICINE
Payer: MEDICARE

## 2024-03-12 VITALS
BODY MASS INDEX: 25.31 KG/M2 | RESPIRATION RATE: 16 BRPM | SYSTOLIC BLOOD PRESSURE: 150 MMHG | OXYGEN SATURATION: 94 % | HEIGHT: 68 IN | DIASTOLIC BLOOD PRESSURE: 63 MMHG | TEMPERATURE: 98.8 F | WEIGHT: 167 LBS | HEART RATE: 83 BPM

## 2024-03-12 DIAGNOSIS — I62.03 CHRONIC SUBDURAL HEMATOMA (HCC): ICD-10-CM

## 2024-03-12 DIAGNOSIS — N39.0 URINARY TRACT INFECTION WITHOUT HEMATURIA, SITE UNSPECIFIED: ICD-10-CM

## 2024-03-12 DIAGNOSIS — R53.1 GENERALIZED WEAKNESS: ICD-10-CM

## 2024-03-12 LAB
ALBUMIN SERPL BCP-MCNC: 3.7 G/DL (ref 3.2–4.9)
ALBUMIN/GLOB SERPL: 1 G/DL
ALP SERPL-CCNC: 124 U/L (ref 30–99)
ALT SERPL-CCNC: 12 U/L (ref 2–50)
ANION GAP SERPL CALC-SCNC: 9 MMOL/L (ref 7–16)
APPEARANCE UR: CLEAR
AST SERPL-CCNC: 28 U/L (ref 12–45)
BACTERIA #/AREA URNS HPF: ABNORMAL /HPF
BASOPHILS # BLD AUTO: 1 % (ref 0–1.8)
BASOPHILS # BLD: 0.05 K/UL (ref 0–0.12)
BILIRUB SERPL-MCNC: 0.4 MG/DL (ref 0.1–1.5)
BILIRUB UR QL STRIP.AUTO: NEGATIVE
BUN SERPL-MCNC: 14 MG/DL (ref 8–22)
CALCIUM ALBUM COR SERPL-MCNC: 9 MG/DL (ref 8.5–10.5)
CALCIUM SERPL-MCNC: 8.8 MG/DL (ref 8.5–10.5)
CAOX CRY #/AREA URNS HPF: ABNORMAL /HPF
CHLORIDE SERPL-SCNC: 105 MMOL/L (ref 96–112)
CO2 SERPL-SCNC: 26 MMOL/L (ref 20–33)
COLOR UR: YELLOW
CREAT SERPL-MCNC: 0.54 MG/DL (ref 0.5–1.4)
EKG IMPRESSION: NORMAL
EOSINOPHIL # BLD AUTO: 0.15 K/UL (ref 0–0.51)
EOSINOPHIL NFR BLD: 2.9 % (ref 0–6.9)
EPI CELLS #/AREA URNS HPF: ABNORMAL /HPF
ERYTHROCYTE [DISTWIDTH] IN BLOOD BY AUTOMATED COUNT: 44.4 FL (ref 35.9–50)
GFR SERPLBLD CREATININE-BSD FMLA CKD-EPI: 90 ML/MIN/1.73 M 2
GLOBULIN SER CALC-MCNC: 3.6 G/DL (ref 1.9–3.5)
GLUCOSE SERPL-MCNC: 103 MG/DL (ref 65–99)
GLUCOSE UR STRIP.AUTO-MCNC: NEGATIVE MG/DL
HCT VFR BLD AUTO: 41.1 % (ref 37–47)
HGB BLD-MCNC: 14 G/DL (ref 12–16)
HYALINE CASTS #/AREA URNS LPF: ABNORMAL /LPF
IMM GRANULOCYTES # BLD AUTO: 0.02 K/UL (ref 0–0.11)
IMM GRANULOCYTES NFR BLD AUTO: 0.4 % (ref 0–0.9)
KETONES UR STRIP.AUTO-MCNC: NEGATIVE MG/DL
LACTATE SERPL-SCNC: 1.1 MMOL/L (ref 0.5–2)
LEUKOCYTE ESTERASE UR QL STRIP.AUTO: ABNORMAL
LYMPHOCYTES # BLD AUTO: 1.36 K/UL (ref 1–4.8)
LYMPHOCYTES NFR BLD: 26.5 % (ref 22–41)
MCH RBC QN AUTO: 32.6 PG (ref 27–33)
MCHC RBC AUTO-ENTMCNC: 34.1 G/DL (ref 32.2–35.5)
MCV RBC AUTO: 95.6 FL (ref 81.4–97.8)
MICRO URNS: ABNORMAL
MONOCYTES # BLD AUTO: 0.39 K/UL (ref 0–0.85)
MONOCYTES NFR BLD AUTO: 7.6 % (ref 0–13.4)
NEUTROPHILS # BLD AUTO: 3.16 K/UL (ref 1.82–7.42)
NEUTROPHILS NFR BLD: 61.6 % (ref 44–72)
NITRITE UR QL STRIP.AUTO: POSITIVE
NRBC # BLD AUTO: 0 K/UL
NRBC BLD-RTO: 0 /100 WBC (ref 0–0.2)
PH UR STRIP.AUTO: 5.5 [PH] (ref 5–8)
PLATELET # BLD AUTO: 195 K/UL (ref 164–446)
PMV BLD AUTO: 10.9 FL (ref 9–12.9)
POTASSIUM SERPL-SCNC: 4 MMOL/L (ref 3.6–5.5)
PROT SERPL-MCNC: 7.3 G/DL (ref 6–8.2)
PROT UR QL STRIP: NEGATIVE MG/DL
RBC # BLD AUTO: 4.3 M/UL (ref 4.2–5.4)
RBC # URNS HPF: ABNORMAL /HPF
RBC UR QL AUTO: NEGATIVE
SODIUM SERPL-SCNC: 140 MMOL/L (ref 135–145)
SP GR UR STRIP.AUTO: 1.02
UROBILINOGEN UR STRIP.AUTO-MCNC: 0.2 MG/DL
WBC # BLD AUTO: 5.1 K/UL (ref 4.8–10.8)
WBC #/AREA URNS HPF: ABNORMAL /HPF

## 2024-03-12 PROCEDURE — 87077 CULTURE AEROBIC IDENTIFY: CPT

## 2024-03-12 PROCEDURE — 72020 X-RAY EXAM OF SPINE 1 VIEW: CPT

## 2024-03-12 PROCEDURE — 70450 CT HEAD/BRAIN W/O DYE: CPT

## 2024-03-12 PROCEDURE — 36415 COLL VENOUS BLD VENIPUNCTURE: CPT

## 2024-03-12 PROCEDURE — 74018 RADEX ABDOMEN 1 VIEW: CPT

## 2024-03-12 PROCEDURE — 85025 COMPLETE CBC W/AUTO DIFF WBC: CPT

## 2024-03-12 PROCEDURE — 71045 X-RAY EXAM CHEST 1 VIEW: CPT

## 2024-03-12 PROCEDURE — 93005 ELECTROCARDIOGRAM TRACING: CPT | Performed by: EMERGENCY MEDICINE

## 2024-03-12 PROCEDURE — 70250 X-RAY EXAM OF SKULL: CPT

## 2024-03-12 PROCEDURE — 87040 BLOOD CULTURE FOR BACTERIA: CPT

## 2024-03-12 PROCEDURE — 99285 EMERGENCY DEPT VISIT HI MDM: CPT

## 2024-03-12 PROCEDURE — 87086 URINE CULTURE/COLONY COUNT: CPT

## 2024-03-12 PROCEDURE — 700111 HCHG RX REV CODE 636 W/ 250 OVERRIDE (IP): Performed by: EMERGENCY MEDICINE

## 2024-03-12 PROCEDURE — 87186 SC STD MICRODIL/AGAR DIL: CPT

## 2024-03-12 PROCEDURE — 80053 COMPREHEN METABOLIC PANEL: CPT

## 2024-03-12 PROCEDURE — 83605 ASSAY OF LACTIC ACID: CPT

## 2024-03-12 PROCEDURE — 96374 THER/PROPH/DIAG INJ IV PUSH: CPT

## 2024-03-12 PROCEDURE — 81001 URINALYSIS AUTO W/SCOPE: CPT

## 2024-03-12 RX ORDER — CEFAZOLIN 2 G/1
2 INJECTION, POWDER, FOR SOLUTION INTRAMUSCULAR; INTRAVENOUS ONCE
Status: COMPLETED | OUTPATIENT
Start: 2024-03-12 | End: 2024-03-12

## 2024-03-12 RX ORDER — GRANULES FOR ORAL 3 G/1
3 POWDER ORAL
Qty: 2 EACH | Refills: 0 | Status: ACTIVE | OUTPATIENT
Start: 2024-03-12

## 2024-03-12 RX ORDER — CEPHALEXIN 500 MG/1
500 CAPSULE ORAL 4 TIMES DAILY
Qty: 20 CAPSULE | Refills: 0 | Status: SHIPPED | OUTPATIENT
Start: 2024-03-12 | End: 2024-03-12

## 2024-03-12 RX ADMIN — CEFAZOLIN 2 G: 2 INJECTION, POWDER, FOR SOLUTION INTRAMUSCULAR; INTRAVENOUS at 15:58

## 2024-03-12 ASSESSMENT — FIBROSIS 4 INDEX: FIB4 SCORE: 0.77

## 2024-03-12 ASSESSMENT — PAIN DESCRIPTION - PAIN TYPE: TYPE: ACUTE PAIN

## 2024-03-12 NOTE — ED NOTES
All lines and monitors DC'd.  Discharge instructions given, questions answered.  AMbulated out of ER, escorted by RN.  Pt states all belongings in possession.  Instructed not to drive after pain medication and pt verbalizes understanding.  RX x1 given.

## 2024-03-12 NOTE — ED TRIAGE NOTES
"Chief Complaint   Patient presents with    Weakness     PT reports progressive weakness x 2 months.  Her daughter called EMS as she found her to be extra weak and pt reports \"I just don't feel like myself.\"     Pt has h/o CVA in 2021, but no deficits.  Pt does not take a blood thinner.  [facility] EMS report, pt does have h/o cardiac valves issues, but pt uncertain what it is.      Pt is Ox3, TEJADA, equal strength throughout.    ND=138 by EMS.  Per EMS report, pt was positive for orthostatics on scene.  "

## 2024-03-12 NOTE — ED PROVIDER NOTES
"ER Provider Note    Scribed for Italo Serrano D.O. by Steve Segovia. 3/12/2024  12:10 PM    Primary Care Provider: He Trivedi M.D.    CHIEF COMPLAINT  Chief Complaint   Patient presents with    Weakness     PT reports progressive weakness x 2 months.  Her daughter called EMS as she found her to be extra weak and pt reports \"I just don't feel like myself.\"       HPI/ROS  LIMITATION TO HISTORY   None    OUTSIDE HISTORIAN(S):  Patient's daughter was present at bedside and helped provide the history below.    Aaliyah Fulton is a 83 y.o. female who presents to the Emergency Department for weakness onset 2 months. Per daughter, on Saturday, the patient was very \"out of it and said the patient may have slept \"too hard\". During this episode, she had difficulties speaking to the patient and she checked her BP which was unremarkable. She called the patient's cardiologist. The patient said that \"she has not had any energy\" and is light headed with associated lightheadedness, and generalized weakness. She adds that she is able to get out of bed and is able to live by herself. She has been eating and drinking okay with no nausea or diarrhea. She is slightly tired but is able top get out of bed. Medical history includes a  shunt. Patient says she is unable to state what year it currently is. but denies fevers    ROS as per HPI.    PAST MEDICAL HISTORY  Past Medical History:   Diagnosis Date    Anesthesia     nausea    Arthritis     ASTHMA     CHEMICAL INDUCED    Breast cancer (HCC)     stage 0 - Dr. Naqvi    Cancer (HCC) 2010    right breast-lumpectomy. Radiation, no chemo    Cataract     silver iol    Dental disorder     upper implants    Heart burn     taking omeprazole    Heart murmur     High cholesterol     History of cervical fracture 2022    No surgery. Had to wear brace only.    Hypertension 01/03/2022    states well controlled on meds    Indigestion     taking omeprazole    Osteoporosis     Pain     \"everywhere\"    " Pneumonia 12/2021    on antibiotics    PONV (postoperative nausea and vomiting)     Have always had this.    Psychiatric problem     depression on zoloft    Stroke (HCC) 2021    Some memory problems and expressive aphasia.    Unspecified urinary incontinence     wears depends       SURGICAL HISTORY  Past Surgical History:   Procedure Laterality Date    PB TOTAL KNEE ARTHROPLASTY Left 03/28/2023    Procedure: LEFT TOTAL KNEE ARTHROPLASTY;  Surgeon: Marcus Hall M.D.;  Location: SURGERY [facility];  Service: Orthopedics    DC CREATE SHUNT:VENTRIC-PERITONEAL  01/10/2022    Procedure: INSERTION, SHUNT, VENTRICULOPERITONEAL, LAPAROSCOPIC PERITONEAL CATHETER - STEALTH GUIDED;  Surgeon: Gregory Blanchard M.D.;  Location: SURGERY [facility];  Service: Neurosurgery    PB PARTIAL HIP REPLACEMENT Left 11/24/2021    Procedure: HEMIARTHROPLASTY, HIP;  Surgeon: Marlon Culver M.D.;  Location: SURGERY [facility];  Service: Orthopedics    RECOVERY  05/10/2016    Procedure: [id number];  Surgeon: Recoveryonly Surgery;  [place] [facility]  Service:     LUMBAR LAMINECTOMY DISKECTOMY  10/17/2012    Performed by Daksha Melendez M.D. at SURGERY [facility] ORS    FORAMINOTOMY  10/17/2012    Performed by Daksha Melendez M.D. at SURGERY [facility] ORS    LUMBAR DECOMPRESSION  10/17/2012    Performed by Daksha Melendez M.D. at SURGERY [facility] ORS    KNEE ARTHROPLASTY TOTAL  09/19/2011    Performed by KISHAN CARDENAS at SURGERY [facility] ORS    BREAST BIOPSY  05/21/2010    Performed by ROB TOMAS at SURGERY SAME DAY [facility] ORS    CHOLECYSTECTOMY      HAMMERTOE CORRECTION Bilateral     with bunions    KNEE ARTHROSCOPY      LUMPECTOMY      OTHER ORTHOPEDIC SURGERY      foot, shoulder, and knee    DC RADIATION THERAPY PLAN SIMPLE      SHOULDER SURGERY      SINUSCOPY         FAMILY HISTORY  Family History   Problem Relation Age of Onset    Lung Disease Mother          copd    Stroke Mother     Cancer Daughter         Breast    Breast Cancer Daughter     Psychiatric Illness Daughter         Anxiety    Psychiatric Illness Daughter         Anxiety and Depression       SOCIAL HISTORY   reports that she has never smoked. She has never used smokeless tobacco. She reports that she does not drink alcohol and does not use drugs.    CURRENT MEDICATIONS  Discharge Medication List as of 3/12/2024  4:38 PM        CONTINUE these medications which have NOT CHANGED    Details   atorvastatin (LIPITOR) 40 MG Tab TAKE ONE TABLET BY MOUTH EVERY EVENING, Disp-90 Tablet, R-0, Normal      meloxicam (MOBIC) 7.5 MG Tab TAKE 1 TABLET BY MOUTH ONCE DAILY, Disp-30 Tablet, R-0, Normal      !! oxybutynin (DITROPAN-XL) 15 MG CR tablet Take 15 mg by mouth every day., Historical Med      ascorbic acid (ASCORBIC ACID) 500 MG Tab Take 500 mg by mouth every day., Historical Med      amoxicillin (AMOXIL) 500 MG Cap TAKE 1 CAPSULE BY MOUTH EVERY 8 HOURS TAKE UNTIL ALL CAPSULES ARE GONE, Historical Med      PAXLOVID, 300/100, 20 x 150 MG & 10 x 100MG Tablet Therapy Pack FELICE, Historical Med      !! omeprazole (PRILOSEC) 40 MG delayed-release capsule Take 1 Capsule by mouth every day., Historical Med      gabapentin (NEURONTIN) 100 MG Cap Take 1 Capsule by mouth 3 times a day., Disp-90 Capsule, R-0, Normal      !! oxybutynin (DITROPAN XL) 15 MG CR tablet Take 1 Tablet by mouth every day., Historical Med      !! venlafaxine (EFFEXOR-XR) 150 MG extended-release capsule Take 1 Capsule by mouth every day., Historical Med      !! venlafaxine XR (EFFEXOR XR) 75 MG CAPSULE SR 24 HR Take 1 Capsule by mouth every day., Historical Med      senna-docusate (PERICOLACE OR SENOKOT S) 8.6-50 MG Tab Take 2 Tablets by mouth 2 times a day., Disp-120 Tablet, R-0, OTC      methenamine hip (HIPPREX) 1 GM Tab 2 times a day. LONG TERM TREATMENT FOR CHRONIC UTI.  Indications: Urinary Tract Infection, Historical Med      estradiol (ESTRACE) 0.1  "MG/GM vaginal cream Insert 2 g into the vagina every day., Historical Med      Calcium Carbonate-Vitamin D (CALCIUM-VITAMIN D3 PO) Take 600 mg by mouth every day., Historical Med      vitamin D3 (CHOLECALCIFEROL) 1000 Unit (25 mcg) Tab Take 1,000 Units by mouth every day., Historical Med      busPIRone (BUSPAR) 15 MG tablet Take 15 mg by mouth 3 times a day., Historical Med      VENTOLIN  (90 Base) MCG/ACT Aero Soln inhalation aerosol 2 Puffs every 6 hours as needed., FELICE, Historical Med      acetaminophen (TYLENOL) 500 MG Tab Take 1,000 mg by mouth every 6 hours as needed for Moderate Pain., Historical Med      D-MANNOSE PO Take 1 Capsule by mouth every evening., Historical Med      !! omeprazole (PRILOSEC) 40 MG delayed-release capsule Take 1 Capsule by mouth every day., Disp-30 Capsule, R-2, Normal      valsartan (DIOVAN) 160 MG Tab Take 1 Tablet by mouth every day., Disp-30 Tablet, R-2, Normal      !! venlafaxine (EFFEXOR-XR) 150 MG extended-release capsule Take 1 Capsule by mouth every day. Taken with 75 mg for 225mg daily, Disp-30 Capsule, R-2, Normal      CRANBERRY EXTRACT PO Take 1 Capsule by mouth every day., Historical Med       !! - Potential duplicate medications found. Please discuss with provider.          ALLERGIES  Patient has no known allergies.    PHYSICAL EXAM  /71   Pulse 76   Temp 36.5 °C (97.7 °F) (Temporal)   Resp 19   Ht 1.727 m (5' 8\")   Wt 75.8 kg (167 lb)   SpO2 94%   BMI 25.39 kg/m²     General: No acute distress.  HENT: Normocephalic, Mucus membranes are moist.   Chest: Lungs have even and unlabored respirations, Clear to auscultation.   Cardiovascular: Regular rate and regular rhythm, No peripheral cyanosis.  Abdomen: Non distended.  Neuro: Awake, Conversive, Able to relay recent events. Disoriented to year and month  Psychiatric: Calm and cooperative.     EXTERNAL RECORDS REVIEWED  Review of patient's past medical records show that the patient has regular " occupational therapy     INITIAL ASSESSMENT  Patient is having some confusion which appears to be intermittent chronically. There were some concerns of her mental status on Saturday and that she ws more confused and sleepy. She has a  shunt. Concerns for  shunt malfunction, electrolyte imbalance, anemia, and sepsis.     ED Observation Status? No; Patient does not meet criteria for ED Observation.     DIAGNOSTIC STUDIES    Labs:   Results for orders placed or performed during the hospital encounter of 03/12/24   Lactic Acid   Result Value Ref Range    Lactic Acid 1.1 0.5 - 2.0 mmol/L   CBC with Differential   Result Value Ref Range    WBC 5.1 4.8 - 10.8 K/uL    RBC 4.30 4.20 - 5.40 M/uL    Hemoglobin 14.0 12.0 - 16.0 g/dL    Hematocrit 41.1 37.0 - 47.0 %    MCV 95.6 81.4 - 97.8 fL    MCH 32.6 27.0 - 33.0 pg    MCHC 34.1 32.2 - 35.5 g/dL    RDW 44.4 35.9 - 50.0 fL    Platelet Count 195 164 - 446 K/uL    MPV 10.9 9.0 - 12.9 fL    Neutrophils-Polys 61.60 44.00 - 72.00 %    Lymphocytes 26.50 22.00 - 41.00 %    Monocytes 7.60 0.00 - 13.40 %    Eosinophils 2.90 0.00 - 6.90 %    Basophils 1.00 0.00 - 1.80 %    Immature Granulocytes 0.40 0.00 - 0.90 %    Nucleated RBC 0.00 0.00 - 0.20 /100 WBC    Neutrophils (Absolute) 3.16 1.82 - 7.42 K/uL    Lymphs (Absolute) 1.36 1.00 - 4.80 K/uL    Monos (Absolute) 0.39 0.00 - 0.85 K/uL    Eos (Absolute) 0.15 0.00 - 0.51 K/uL    Baso (Absolute) 0.05 0.00 - 0.12 K/uL    Immature Granulocytes (abs) 0.02 0.00 - 0.11 K/uL    NRBC (Absolute) 0.00 K/uL   Complete Metabolic Panel   Result Value Ref Range    Sodium 140 135 - 145 mmol/L    Potassium 4.0 3.6 - 5.5 mmol/L    Chloride 105 96 - 112 mmol/L    Co2 26 20 - 33 mmol/L    Anion Gap 9.0 7.0 - 16.0    Glucose 103 (H) 65 - 99 mg/dL    Bun 14 8 - 22 mg/dL    Creatinine 0.54 0.50 - 1.40 mg/dL    Calcium 8.8 8.5 - 10.5 mg/dL    Correct Calcium 9.0 8.5 - 10.5 mg/dL    AST(SGOT) 28 12 - 45 U/L    ALT(SGPT) 12 2 - 50 U/L    Alkaline Phosphatase  124 (H) 30 - 99 U/L    Total Bilirubin 0.4 0.1 - 1.5 mg/dL    Albumin 3.7 3.2 - 4.9 g/dL    Total Protein 7.3 6.0 - 8.2 g/dL    Globulin 3.6 (H) 1.9 - 3.5 g/dL    A-G Ratio 1.0 g/dL   Urinalysis    Specimen: Urine   Result Value Ref Range    Color Yellow     Character Clear     Specific Gravity 1.020 <1.035    Ph 5.5 5.0 - 8.0    Glucose Negative Negative mg/dL    Ketones Negative Negative mg/dL    Protein Negative Negative mg/dL    Bilirubin Negative Negative    Urobilinogen, Urine 0.2 Negative    Nitrite Positive (A) Negative    Leukocyte Esterase Trace (A) Negative    Occult Blood Negative Negative    Micro Urine Req Microscopic    ESTIMATED GFR   Result Value Ref Range    GFR (CKD-EPI) 90 >60 mL/min/1.73 m 2   URINE MICROSCOPIC (W/UA)   Result Value Ref Range    WBC 2-5 /hpf    RBC 0-2 /hpf    Bacteria Many (A) None /hpf    Epithelial Cells Few /hpf    Ca Oxalate Crystal Few /hpf    Hyaline Cast 0-2 /lpf   EKG   Result Value Ref Range    Report       [facility] Emergency Dept.    Test Date:  2024  Pt Name:    LISA WINTERS                 Department: ER  MRN:        [id number]                      Room:        08  Gender:     Female                       Technician: [id number]  :        1940                   Requested By:ER TRIAGE PROTOCOL  Order #:    [id number]                    Reading MD: ELE VICTOR D.O.    Measurements  Intervals                                Axis  Rate:       70                           P:          0  MO:         0                            QRS:        -15  QRSD:       96                           T:          33  QT:         395  QTc:        427    Interpretive Statements  Sinus rhythm  Borderline left axis deviation  Borderline ST elevation, anterior leads  Compared to ECG 2023 14:12:04  AV block, advanced (high-grade) now present  ST (T wave) deviation now present  Sinus rhythm no longer present  Ventricular premature complex(es) no longer  present  T-wave abnorm ality no longer present  Electronically Signed On 2024 15:47:12 PDT by ELE VICTOR D.O.          EKG:   I have independently interpreted the above EKG.  Results for orders placed or performed during the hospital encounter of 24   EKG   Result Value Ref Range    Report       [facility] Emergency Dept.    Test Date:  2024  Pt Name:    LISA WINTERS                 Department: ER  MRN:        [id number]                      Room:       [facility]  Gender:     Female                       Technician: [id number]  :        1940                   Requested By:ER TRIAGE PROTOCOL  Order #:    [id number]                    Reading MD: ELE VICTOR D.O.    Measurements  Intervals                                Axis  Rate:       70                           P:          0  ND:         0                            QRS:        -15  QRSD:       96                           T:          33  QT:         395  QTc:        427    Interpretive Statements  Sinus rhythm  Borderline left axis deviation  Borderline ST elevation, anterior leads  Compared to ECG 2023 14:12:04  AV block, advanced (high-grade) now present  ST (T wave) deviation now present  Sinus rhythm no longer present  Ventricular premature complex(es) no longer present  T-wave abnorm ality no longer present  Electronically Signed On 2024 15:47:12 PDT by ELE VICTOR D.O.          Radiology:   The attending emergency physician has independently interpreted the diagnostic imaging associated with this visit and am waiting the final reading from the radiologist.   Preliminary interpretation is as follows: Subdural hematoma  Radiologist interpretation:   CT-HEAD W/O   Final Result      1. Subacute small bilateral frontal parietal subdural hematomas which measure 6 mm in greatest diameter and do not cause significant effacement of the underlying cortical sulci and gyri      2. Age-related cerebral  atrophy. White matter lucencies most consistent with small vessel ischemic change versus demyelination or gliosis.      3. Intact right frontal ventriculoperitoneal shunt with its tip coursing into the roof of the third ventricle.      4. No evidence for acute hydrocephalus.      These findings were discussed with ELE VICTOR at 2:00 PM 3/12/2024      DX-SPINE-ANY ONE VIEW   Final Result      1.  Intact ventriculoperitoneal shunt tubing over the neck and upper chest.      DX-SKULL-LIMITED 3-   Final Result      1.  Ventriculoperitoneal shunt intact over the skull.      DX-CHEST-LIMITED (1 VIEW)   Final Result         Ventriculoperitoneal shunt catheter is visible at the right chest. No shunt catheter discontinuity is detected.      [id number] VIEW   Final Result      1.  Marked constipation.      DX-CHEST-PORTABLE (1 VIEW)   Final Result      No acute cardiac or pulmonary abnormalities are identified.            COURSE & MEDICAL DECISION MAKING     COURSE AND PLAN  12:10 PM - Patient seen and examined at bedside. Discussed plan of care, including EKG, imaging, and labs. Patient agrees to the plan of care. Ordered for DX-Chest-portable, CT-head w/o, DX-abdomen, DX-spine, DX-chest limited, and DX-skull lactic acid, CMP, UA, CBC with diff, urine culture, and blood culture to evaluate her symptoms.     2:07 PM - Patient was reevaluated at bedside. Discussed lab and radiology results with the patient. The daughter adds that she was seen at [facility]. Those records were reviewed and that CT showed bilateral subdermal hematoma subacute vs chronic, radius size is 5 mm.     3:46 PM - Patient was reevaluated at bedside. Discussed lab and radiology results with the patient and discussed plan for discharge.      ED Summary: The patient presents with intermittent episodes of not feeling well and tired.  She has some confusion daughter states that this is not unusual for her.  She does get remember the year.    CT showed  bilateral subdural subacute hematoma, her records were reviewed shows that she was at [facility] last month for similar to her CT appears unchanged.  These appear to be chronic, with no change after over a month.    She does have a small urinary tract infection, she is not septic.  She is awake and spry, she is able to care for self she was ambulated in the department without difficulty she was placed on antibiotics here patient.  She has had multiple UTIs in the past with resistance so antibiotics have been ordered.  The patient is stable for discharge home with strict instructions to return if her symptoms change or worsen.      DISPOSITION AND DISCUSSIONS  Discussion of management with other [facility] or appropriate source(s): Pharmacy    The patient will return for new or worsening symptoms and is stable at the time of discharge.    The patient is referred to a primary physician for blood pressure management, diabetic screening, and for all other preventative health concerns.    DISPOSITION:  Patient will be discharged home in stable condition.    FOLLOW UP:  He Trivedi M.D.  [address] [id number]  [phone]    In 1 week      OUTPATIENT MEDICATIONS:  Discharge Medication List as of 3/12/2024  4:38 PM        START taking these medications    Details   fosfomycin (MONUROL) 3 GM Pack Take 3 g by mouth every 3 days., Disp-2 Each, R-0, Normal              FINAL DIAGNOSIS  1. Generalized weakness    2. Urinary tract infection without hematuria, site unspecified    3. Chronic subdural hematoma (HCC)        Steve SEQUEIRA (Dlai), am scribing for, and in the presence of, Italo Serrano D.O..    Electronically signed by: Steve Carrillo), 3/12/2024    Italo SEQUEIRA D.O. personally performed the services described in this documentation, as scribed by Steve Segovia in my presence, and it is both accurate and complete.     The note accurately reflects work and decisions made by me.  Italo  RODNEY Serrano  3/12/2024  6:07 PM

## 2024-03-12 NOTE — LETTER
3/15/2024               Aaliyah Fluton  [address] [place] [address]        Dear Aaliyah (MR#[id number])    This letter is sent in regards to your recent visit to the [facility] Emergency Department on 3/12/2024. During the visit, tests were performed to assist the physician in your medical diagnosis. A review of your tests requires that we notify you of the following:    Your urine culture was POSITIVE for a bacteria called Escherichia coli. The antibiotic prescribed for you (Fosfomycin) should be active to treat this bacteria. It is important that you continue taking your antibiotic until it is finished.     Please feel free to contact me at the number below if you have any questions or concerns. Thank you for your cooperation in the matter.    Sincerely,  ED Culture Follow-Up Staff  Armaan Kincaid, PharmD  [phone]    [facility] Emergency Department  [address] [place] [id number] [phone] (ED Culture Line)

## 2024-03-12 NOTE — DISCHARGE INSTRUCTIONS
You have a urinary tract infection you are being started on antibiotics for this.  Return if your symptoms worsen sometimes urine infections can become more severe.  I anticipate no problems from this at this time.    CT scan shows bilateral subdural hematomas which are bleeding in the brain but they are the same as what they were when CT was done last month this appears to be chronic and not changing is stable and does not require intervention.  A referral has been placed for neurology so these can be watched and monitored.  Return to the emergency room for any change or worsening symptoms.

## 2024-03-14 NOTE — ED NOTES
ED Positive Culture Follow-up/Notification Note:    Date: 3/14/24     Patient seen in the ED on 3/12/2024 for weakness that has been ongoing for ~2 months, and lightheadedness. Patient was given one dose on Ancef while in the ER.  1. Generalized weakness    2. Urinary tract infection without hematuria, site unspecified    3. Chronic subdural hematoma (HCC)       Discharge Medication List as of 3/12/2024  4:38 PM        START taking these medications    Details   fosfomycin (MONUROL) 3 GM Pack Take 3 g by mouth every 3 days., Disp-2 Each, R-0, Normal           Allergies: Patient has no known allergies.     Vitals:    03/12/24 1530 03/12/24 1540 03/12/24 1601 03/12/24 1624   BP: (!) 142/55  (!) 136/90 (!) 150/63   Pulse: 71 71 71 83   Resp:    16   Temp:    37.1 °C (98.8 °F)   TempSrc:       SpO2:  93% 93% 94%   Weight:       Height:         Final cultures:   Results       Procedure Component Value Units Date/Time    Urine Culture (New) [[id number]]  (Abnormal)  (Susceptibility) Collected: 03/12/24 1425    Order Status: Completed Specimen: Urine Updated: 03/14/24 0902     Significant Indicator POS     Source UR     Site -     Culture Result Usual urogenital shannan 10-50,000 cfu/mL      Escherichia coli  >100,000 cfu/mL      Narrative:      Indication for culture:->Emergency Room Patient  Indication for culture:->Emergency Room Patient    Susceptibility       Escherichia coli (1)       Antibiotic Interpretation Microscan   Method Status    Amikacin  [*]  Sensitive <=16 mcg/mL MONA Final    Ampicillin Sensitive <=8 mcg/mL MONA Final    Amoxicillin/CA Sensitive <=8/4 mcg/mL MONA Final    Aztreonam  [*]  Sensitive <=4 mcg/mL MONA Final    Ceftolozane+Tazobactam  [*]  Sensitive <=2 mcg/mL MONA Final    Ceftriaxone Sensitive <=1 mcg/mL MONA Final    Ceftazidime  [*]  Sensitive <=1 mcg/mL MONA Final    Cefazolin Sensitive <=2 mcg/mL MONA Final     Breakpoints when Cefazolin is used for therapy of infections  other than uncomplicated  UTIs due to Enterobacterales are as  follows:  MONA and Interpretation:  <=2 S  4 I  >=8 R         Ciprofloxacin Sensitive <=0.25 mcg/mL MONA Final    Cefepime Sensitive <=2 mcg/mL MONA Final    Cefuroxime Sensitive <=4 mcg/mL MONA Final    Ceftazidime+Avibactam  [*]  Sensitive <=4 mcg/mL MONA Final    Ampicillin/sulbactam Sensitive <=4/2 mcg/mL MONA Final    Ertapenem  [*]  Sensitive <=0.5 mcg/mL MONA Final    Tobramycin Sensitive <=2 mcg/mL MONA Final    Nitrofurantoin Sensitive <=32 mcg/mL MONA Final    Gentamicin Sensitive <=2 mcg/mL MONA Final    Imipenem  [*]  Sensitive <=1 mcg/mL MONA Final    Levofloxacin Sensitive <=0.5 mcg/mL MONA Final    Meropenem  [*]  Sensitive <=1 mcg/mL MONA Final    Meropenem/Vaborbactam  [*]  Sensitive <=2 mcg/mL MONA Final    Minocycline Sensitive <=4 mcg/mL MONA Final    Pip/Tazobactam Sensitive <=8 mcg/mL MONA Final    Trimeth/Sulfa Sensitive <=0.5/9.5 mcg/mL MONA Final    Tetracycline  [*]  Sensitive <=4 mcg/mL MONA Final    Tigecycline Sensitive <=2 mcg/mL MONA Final               [*]  Suppressed Antibiotic                   Blood Culture - Draw one from central line and one from peripheral site [[id number]] Collected: 03/12/24 1311    Order Status: Completed Specimen: Blood from Peripheral Updated: 03/13/24 0759     Significant Indicator NEG     Source BLD     Site PERIPHERAL     Culture Result No Growth  Note: Blood cultures are incubated for 5 days and  are monitored continuously.Positive blood cultures  are called to the RN and reported as soon as  they are identified.      Narrative:      Blood Cultures X2. Draw one blood culture from central line  and one blood culture peripherally. If no line present, draw  blood cultures times two peripherally from different sites.  Right Hand    Blood Culture - Draw one from central line and one from peripheral site [[id number]] Collected: 03/12/24 1510    Order Status: Completed Specimen: Blood from Line Updated: 03/13/24 0759     Significant Indicator  NEG     Source BLD     Site Peripheral     Culture Result No Growth  Note: Blood cultures are incubated for 5 days and  are monitored continuously.Positive blood cultures  are called to the RN and reported as soon as  they are identified.      Narrative:      Blood Cultures X2. Blood Cultures X2. Draw one blood culture  from central line and one blood culture peripherally. If no  line present, draw blood cultures times two peripherally from  different sites.  Left AC    Urinalysis [[id number]]  (Abnormal) Collected: 03/12/24 1425    Order Status: Completed Specimen: Urine Updated: 03/12/24 1527     Color Yellow     Character Clear     Specific Gravity 1.020     Ph 5.5     Glucose Negative mg/dL      Ketones Negative mg/dL      Protein Negative mg/dL      Bilirubin Negative     Urobilinogen, Urine 0.2     Nitrite Positive     Leukocyte Esterase Trace     Occult Blood Negative     Micro Urine Req Microscopic    Narrative:      Indication for culture:->Emergency Room Patient          Plan:   Appropriate antibiotic therapy prescribed. No changes required based upon culture result.  Sent letter to patient to notify of positive culture result and encourage compliance with prescribed antibiotics.     Armaan Kincaid, PharmD

## 2024-03-17 LAB
BACTERIA BLD CULT: NORMAL
BACTERIA BLD CULT: NORMAL
SIGNIFICANT IND 70042: NORMAL
SIGNIFICANT IND 70042: NORMAL
SITE SITE: NORMAL
SITE SITE: NORMAL
SOURCE SOURCE: NORMAL
SOURCE SOURCE: NORMAL

## 2024-03-20 ENCOUNTER — TELEPHONE (OUTPATIENT)
Dept: HEALTH INFORMATION MANAGEMENT | Facility: OTHER | Age: 84
End: 2024-03-20
Payer: MEDICARE

## 2024-04-29 ENCOUNTER — HOSPITAL ENCOUNTER (OUTPATIENT)
Dept: RADIOLOGY | Facility: MEDICAL CENTER | Age: 84
End: 2024-04-29
Attending: PHYSICIAN ASSISTANT

## 2024-04-29 NOTE — PROGRESS NOTES
Subjective:   4/30/2024  8:39 AM  Primary care physician: He Trivedi M.D.  Referring Provider: Dorene Waggoner P.A.-C.     Chief Complaint:   Chief Complaint   Patient presents with    New Patient     L LIVER LESION   CT W/O 6CM  HX BRCA  CT 4/26 [facility]        Diagnosis:   1. Liver mass  Referral to Oncology Psychosocial Screening for Distress    Comp Metabolic Panel    CA 19-9    AFP SERUM TUMOR MARKER    Didier-Gamma-Carboxy Prothrombin    MR-ABDOMEN-WITH & W/O    IR-LIVER BX PROCEDURE      2. Abnormal CT of the abdomen  Referral to Oncology Psychosocial Screening for Distress    Comp Metabolic Panel    CA 19-9    AFP SERUM TUMOR MARKER    Didier-Gamma-Carboxy Prothrombin    MR-ABDOMEN-WITH & W/O    IR-LIVER BX PROCEDURE      3. Gastroesophageal reflux disease without esophagitis  Referral to Oncology Psychosocial Screening for Distress    Comp Metabolic Panel    CA 19-9    AFP SERUM TUMOR MARKER    Didier-Gamma-Carboxy Prothrombin    MR-ABDOMEN-WITH & W/O    IR-LIVER BX PROCEDURE      4. Impaired fasting glucose  Referral to Oncology Psychosocial Screening for Distress    Comp Metabolic Panel    CA 19-9    AFP SERUM TUMOR MARKER    Didier-Gamma-Carboxy Prothrombin    MR-ABDOMEN-WITH & W/O    IR-LIVER BX PROCEDURE      5. Carcinoma in situ of right breast, unspecified type  Referral to Oncology Psychosocial Screening for Distress    Comp Metabolic Panel    CA 19-9    AFP SERUM TUMOR MARKER    Didier-Gamma-Carboxy Prothrombin    MR-ABDOMEN-WITH & W/O    IR-LIVER BX PROCEDURE      6. Cholangiocarcinoma (HCC)  Referral to Oncology Psychosocial Screening for Distress    Comp Metabolic Panel    CA 19-9    AFP SERUM TUMOR MARKER    Didier-Gamma-Carboxy Prothrombin    MR-ABDOMEN-WITH & W/O    IR-LIVER BX PROCEDURE      7. Hepatocellular carcinoma (HCC)  Referral to Oncology Psychosocial Screening for Distress    Comp Metabolic Panel    CA 19-9    AFP SERUM TUMOR MARKER     Didier-Gamma-Carboxy Prothrombin    MR-ABDOMEN-WITH & W/O    IR-LIVER BX PROCEDURE        History of presenting illness:    Aaliyah Fulton is a pleasant 84 y.o. female with history including asthma, arthritis, breast cancer, GERD, HTN, hx cervical fracture, frequent UTIs, depression, stroke, confusion, and chronic pain. Referred for concern hepatomegaly.    CT ABDOMEN W/O on 4/26/24 noted heterogeneously enhancing mass in left liver concerning for malignancy / metastasis. Few enlarged kianna hepatitis, peripancreatic and gastrohepatic ligament lymph nodes concerning for metastases.      She is here today for evaluation of what appears to be a large tumor involving the left lateral segment including segment 4A, 3, and part of 2.  It also appears as though the left portal vein is out with a small [place] appearance near the hilum.  Patient has no tumor markers.  I do not have a CMP.  I will I have his imaging that was sent to us for evaluation dated April 26, 2024.  The reading itself appears that they are suspicious for malignant tumor.  She does have adenopathy.  There is no tumor markers noted to have any tumor tissue.  The patient does have a history of breast cancer but she states it was over 20 years ago and she got radiation for it.  She has no other malignancies.  She has had an open cholecystectomy and does have an umbilical hernia.  She is 84 years old and does have some difficulty ambulating due to instability and she has fallen several times.  She does have a  shunt in place secondary to falls.  Other than that she is very pleasant and appears very healthy.    Past Medical History:   Diagnosis Date    Anesthesia     nausea    Arthritis     ASTHMA     CHEMICAL INDUCED    Breast cancer (HCC)     stage 0 - Dr. Naqvi    Cancer (HCC) 2010    right breast-lumpectomy. Radiation, no chemo    Cataract     silver iol    Dental disorder     upper implants    Heart burn     taking omeprazole    Heart murmur     High  "cholesterol     History of cervical fracture 2022    No surgery. Had to wear brace only.    Hypertension 01/03/2022    states well controlled on meds    Indigestion     taking omeprazole    Osteoporosis     Pain     \"everywhere\"    Pneumonia 12/2021    on antibiotics    PONV (postoperative nausea and vomiting)     Have always had this.    Psychiatric problem     depression on zoloft    Stroke (HCC) 2021    Some memory problems and expressive aphasia.    Unspecified urinary incontinence     wears depends     Past Surgical History:   Procedure Laterality Date    PB TOTAL KNEE ARTHROPLASTY Left 03/28/2023    Procedure: LEFT TOTAL KNEE ARTHROPLASTY;  Surgeon: Marcus Hall M.D.;  Location: SURGERY [facility];  Service: Orthopedics    KS CREATE SHUNT:VENTRIC-PERITONEAL  01/10/2022    Procedure: INSERTION, SHUNT, VENTRICULOPERITONEAL, LAPAROSCOPIC PERITONEAL CATHETER - STEALTH GUIDED;  Surgeon: Gregory Blanchard M.D.;  Location: SURGERY [facility];  Service: Neurosurgery    PB PARTIAL HIP REPLACEMENT Left 11/24/2021    Procedure: HEMIARTHROPLASTY, HIP;  Surgeon: Marlon Culver M.D.;  Location: SURGERY [facility];  Service: Orthopedics    RECOVERY  05/10/2016    Procedure: [id number];  Surgeon: [facility];  [place] [facility] [place]:     LUMBAR LAMINECTOMY DISKECTOMY  10/17/2012    Performed by Daksha Melendez M.D. at SURGERY [facility] ORS    FORAMINOTOMY  10/17/2012    Performed by Daksha Melendez M.D. at SURGERY [facility] ORS    LUMBAR DECOMPRESSION  10/17/2012    Performed by Daksha Melendez M.D. at SURGERY [facility] ORS    KNEE ARTHROPLASTY TOTAL  09/19/2011    Performed by KISHAN CARDENAS at SURGERY [facility] ORS    BREAST BIOPSY  05/21/2010    Performed by ROB TOMAS at SURGERY SAME DAY [facility] ORS    CHOLECYSTECTOMY      HAMMERTOE CORRECTION Bilateral     with bunions    KNEE ARTHROSCOPY      LUMPECTOMY      OTHER ORTHOPEDIC " SURGERY      foot, shoulder, and knee    VT RADIATION THERAPY PLAN SIMPLE      SHOULDER SURGERY      SINUSCOPY       No Known Allergies  Outpatient Encounter Medications as of 4/30/2024   Medication Sig Dispense Refill    fosfomycin (MONUROL) 3 GM Pack Take 3 g by mouth every 3 days. 2 Each 0    atorvastatin (LIPITOR) 40 MG Tab TAKE ONE TABLET BY MOUTH EVERY EVENING 90 Tablet 0    meloxicam (MOBIC) 7.5 MG Tab TAKE 1 TABLET BY MOUTH ONCE DAILY 30 Tablet 0    oxybutynin (DITROPAN-XL) 15 MG CR tablet Take 15 mg by mouth every day.      ascorbic acid (ASCORBIC ACID) 500 MG Tab Take 500 mg by mouth every day.      amoxicillin (AMOXIL) 500 MG Cap TAKE 1 CAPSULE BY MOUTH EVERY 8 HOURS TAKE UNTIL ALL CAPSULES ARE GONE      PAXLOVID, 300/100, 20 x 150 MG & 10 x 100MG Tablet Therapy Pack       omeprazole (PRILOSEC) 40 MG delayed-release capsule Take 1 Capsule by mouth every day.      gabapentin (NEURONTIN) 100 MG Cap Take 1 Capsule by mouth 3 times a day. 90 Capsule 0    oxybutynin (DITROPAN XL) 15 MG CR tablet Take 1 Tablet by mouth every day.      venlafaxine (EFFEXOR-XR) 150 MG extended-release capsule Take 1 Capsule by mouth every day.      venlafaxine XR (EFFEXOR XR) 75 MG CAPSULE SR 24 HR Take 1 Capsule by mouth every day.      senna-docusate (PERICOLACE OR SENOKOT S) 8.6-50 MG Tab Take 2 Tablets by mouth 2 times a day. 120 Tablet 0    methenamine hip (HIPPREX) 1 GM Tab 2 times a day. LONG TERM TREATMENT FOR CHRONIC UTI.  Indications: Urinary Tract Infection      estradiol (ESTRACE) 0.1 MG/GM vaginal cream Insert 2 g into the vagina every day.      Calcium Carbonate-Vitamin D (CALCIUM-VITAMIN D3 PO) Take 600 mg by mouth every day.      vitamin D3 (CHOLECALCIFEROL) 1000 Unit (25 mcg) Tab Take 1,000 Units by mouth every day.      busPIRone (BUSPAR) 15 MG tablet Take 15 mg by mouth 3 times a day.      VENTOLIN  (90 Base) MCG/ACT Aero Soln inhalation aerosol 2 Puffs every 6 hours as needed.      acetaminophen  (TYLENOL) 500 MG Tab Take 1,000 mg by mouth every 6 hours as needed for Moderate Pain.      D-MANNOSE PO Take 1 Capsule by mouth every evening.      omeprazole (PRILOSEC) 40 MG delayed-release capsule Take 1 Capsule by mouth every day. 30 Capsule 2    valsartan (DIOVAN) 160 MG Tab Take 1 Tablet by mouth every day. 30 Tablet 2    venlafaxine (EFFEXOR-XR) 150 MG extended-release capsule Take 1 Capsule by mouth every day. Taken with 75 mg for 225mg daily 30 Capsule 2    CRANBERRY EXTRACT PO Take 1 Capsule by mouth every day.       No facility-administered encounter medications on file as of 4/30/2024.     Social History     Socioeconomic History    Marital status:      Spouse name: Not on file    Number of children: Not on file    Years of education: Not on file    Highest education level: Not on file   Occupational History    Not on file   Tobacco Use    Smoking status: Never    Smokeless tobacco: Never   Vaping Use    Vaping Use: Never used   Substance and Sexual Activity    Alcohol use: No     Comment: very rare    Drug use: Never    Sexual activity: Not on file     Comment: ; two daughters; retired ( @ [employer]   Other Topics Concern    Not on file   Social History Narrative    Not on file     Social Determinants of Health     Financial Resource Strain: Not on file   Food Insecurity: Not on file   Transportation Needs: No Transportation Needs (4/3/2023)    PRAPARE - Transportation     Lack of Transportation (Medical): No     Lack of Transportation (Non-Medical): No   Physical Activity: Not on file   Stress: Not on file   Social Connections: Not on file   Intimate Partner Violence: Not on file   Housing Stability: Not on file      Social History     Tobacco Use   Smoking Status Never   Smokeless Tobacco Never     Social History     Substance and Sexual Activity   Alcohol Use No    Comment: very rare     Social History     Substance and Sexual Activity   Drug Use Never     "  Family History   Problem Relation Age of Onset    Lung Disease Mother         copd    Stroke Mother     Cancer Daughter         Breast    Breast Cancer Daughter     Psychiatric Illness Daughter         Anxiety    Psychiatric Illness Daughter         Anxiety and Depression       Review of Systems   Gastrointestinal:  Positive for abdominal pain.   All other systems reviewed and are negative.       Objective:   /72 (BP Location: Left arm, Patient Position: Sitting, BP Cuff Size: Small adult)   Pulse 94   Temp 36.8 °C (98.3 °F) (Temporal)   Ht 1.727 m (5' 8\")   Wt 72.6 kg (160 lb)   SpO2 94%   BMI 24.33 kg/m²     Physical Exam  Constitutional:       Appearance: Normal appearance.   HENT:      Head: Normocephalic.      Nose: Nose normal.      Mouth/Throat:      Mouth: Mucous membranes are moist.      Pharynx: Oropharynx is clear.   Eyes:      Extraocular Movements: Extraocular movements intact.      Conjunctiva/sclera: Conjunctivae normal.      Pupils: Pupils are equal, round, and reactive to light.   Cardiovascular:      Rate and Rhythm: Normal rate and regular rhythm.      Pulses: Normal pulses.      Heart sounds: Normal heart sounds.   Pulmonary:      Effort: Pulmonary effort is normal.      Breath sounds: Normal breath sounds.   Abdominal:      General: Abdomen is flat. Bowel sounds are normal.      Palpations: Abdomen is soft.      Comments: Mild subjective left upper quadrant and subxiphoid discomfort near location of tumor on imaging  Well-healed subcostal incisions from an open cholecystectomy   Musculoskeletal:         General: Normal range of motion.      Cervical back: Normal range of motion and neck supple.   Skin:     General: Skin is warm.   Neurological:      General: No focal deficit present.      Mental Status: She is alert and oriented to person, place, and time.   Psychiatric:         Mood and Affect: Mood normal.         Behavior: Behavior normal.         Thought Content: Thought " content normal.         Judgment: Judgment normal.         Labs   Latest Reference Range & Units 03/12/24 12:03   WBC 4.8 - 10.8 K/uL 5.1   RBC 4.20 - 5.40 M/uL 4.30   Hemoglobin 12.0 - 16.0 g/dL 14.0   Hematocrit 37.0 - 47.0 % 41.1   MCV 81.4 - 97.8 fL 95.6   MCH 27.0 - 33.0 pg 32.6   MCHC 32.2 - 35.5 g/dL 34.1   RDW 35.9 - 50.0 fL 44.4   Platelet Count 164 - 446 K/uL 195   MPV 9.0 - 12.9 fL 10.9      Latest Reference Range & Units 03/12/24 12:03   Sodium 135 - 145 mmol/L 140   Potassium 3.6 - 5.5 mmol/L 4.0   Chloride 96 - 112 mmol/L 105   Co2 20 - 33 mmol/L 26   Anion Gap 7.0 - 16.0  9.0   Glucose 65 - 99 mg/dL 103 (H)   Bun 8 - 22 mg/dL 14   Creatinine 0.50 - 1.40 mg/dL 0.54   GFR (CKD-EPI) >60 mL/min/1.73 m 2 90   Calcium 8.5 - 10.5 mg/dL 8.8   Correct Calcium 8.5 - 10.5 mg/dL 9.0   AST(SGOT) 12 - 45 U/L 28   ALT(SGPT) 2 - 50 U/L 12   Alkaline Phosphatase 30 - 99 U/L 124 (H)   Total Bilirubin 0.1 - 1.5 mg/dL 0.4   Albumin 3.2 - 4.9 g/dL 3.7   Total Protein 6.0 - 8.2 g/dL 7.3   Globulin 1.9 - 3.5 g/dL 3.6 (H)   A-G Ratio g/dL 1.0   (H): Data is abnormally high    Imaging  CT ABDOMEN W/O (4/26/24)      Pathology  N/A    Procedures  N/A    Diagnosis:     1. Liver mass  Referral to Oncology Psychosocial Screening for Distress    Comp Metabolic Panel    CA 19-9    AFP SERUM TUMOR MARKER    Didier-Gamma-Carboxy Prothrombin    MR-ABDOMEN-WITH & W/O    IR-LIVER BX PROCEDURE      2. Abnormal CT of the abdomen  Referral to Oncology Psychosocial Screening for Distress    Comp Metabolic Panel    CA 19-9    AFP SERUM TUMOR MARKER    Didier-Gamma-Carboxy Prothrombin    MR-ABDOMEN-WITH & W/O    IR-LIVER BX PROCEDURE      3. Gastroesophageal reflux disease without esophagitis  Referral to Oncology Psychosocial Screening for Distress    Comp Metabolic Panel    CA 19-9    AFP SERUM TUMOR MARKER    Didier-Gamma-Carboxy Prothrombin    MR-ABDOMEN-WITH & W/O    IR-LIVER BX PROCEDURE      4. 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 Cholangiocarcinoma (HCC)  Referral to Oncology Psychosocial Screening for Distress    Comp Metabolic Panel    CA 19-9    AFP SERUM TUMOR MARKER    Didier-Gamma-Carboxy Prothrombin    MR-ABDOMEN-WITH & W/O    IR-LIVER BX PROCEDURE      7. Hepatocellular carcinoma (HCC)  Referral to Oncology Psychosocial Screening for Distress    Comp Metabolic Panel    CA 19-9    AFP SERUM TUMOR MARKER    Didier-Gamma-Carboxy Prothrombin    MR-ABDOMEN-WITH & W/O    IR-LIVER BX PROCEDURE          Medical Decision Making:  Today's Assessment / Status / Plan:     In light of the present findings, my concern is that this may be a cholangiocarcinoma.  Based on my review of her imaging it does appear malignant especially with the lymphadenopathy in the kianna but also the fact that it looks like the left portal vein is actually out.  This would indicate invasive tumor.  In any event, the patient was sent for an IR guided biopsy as well as an MRI of the liver.  She is also going for AFP, CBC, DCP, CA 19-9 to look at her tumor markers.  I have ordered these and I will see them after these have been completed.    I, Dr. Moreno have entered, reviewed and confirmed the above diagnoses related to this patient on this date of service, 4/30/2024  8:39 AM.    She agreed and verbalized her agreement and understanding with the current plan. I answered all questions and concerns she has at this time and advised her to call at any time in the interim with questions or concerns in regards to her care.    Thank  you for allowing me to participate in her care, I will continue to follow closely.       Please note that this dictation was created using voice recognition software. I have made every reasonable attempt to correct obvious errors, but I expect that there are errors of grammar and possibly content that I did not discover before finalizing the note.     Thank you for this consultation and allowing me to participate in your patient's care. If I can be of further service please contact my office.      I, Dr Crum, have entered, reviewed and confirmed the above diagnoses related to this patient on this date of service, as per the time and date noted at top of this note.

## 2024-04-30 ENCOUNTER — HOSPITAL ENCOUNTER (OUTPATIENT)
Dept: LAB | Facility: MEDICAL CENTER | Age: 84
End: 2024-04-30
Attending: SURGERY
Payer: MEDICARE

## 2024-04-30 ENCOUNTER — OFFICE VISIT (OUTPATIENT)
Dept: SURGICAL ONCOLOGY | Facility: MEDICAL CENTER | Age: 84
End: 2024-04-30
Payer: MEDICARE

## 2024-04-30 VITALS
OXYGEN SATURATION: 94 % | BODY MASS INDEX: 24.25 KG/M2 | TEMPERATURE: 98.3 F | DIASTOLIC BLOOD PRESSURE: 72 MMHG | WEIGHT: 160 LBS | SYSTOLIC BLOOD PRESSURE: 118 MMHG | HEART RATE: 94 BPM | HEIGHT: 68 IN

## 2024-04-30 DIAGNOSIS — D05.91 CARCINOMA IN SITU OF RIGHT BREAST, UNSPECIFIED TYPE: ICD-10-CM

## 2024-04-30 DIAGNOSIS — C22.1 CHOLANGIOCARCINOMA (HCC): ICD-10-CM

## 2024-04-30 DIAGNOSIS — R73.01 IMPAIRED FASTING GLUCOSE: ICD-10-CM

## 2024-04-30 DIAGNOSIS — R93.5 ABNORMAL CT OF THE ABDOMEN: ICD-10-CM

## 2024-04-30 DIAGNOSIS — R16.0 LIVER MASS: ICD-10-CM

## 2024-04-30 DIAGNOSIS — K21.9 GASTROESOPHAGEAL REFLUX DISEASE WITHOUT ESOPHAGITIS: ICD-10-CM

## 2024-04-30 DIAGNOSIS — C22.0 HEPATOCELLULAR CARCINOMA (HCC): ICD-10-CM

## 2024-04-30 LAB
ALBUMIN SERPL BCP-MCNC: 4 G/DL (ref 3.2–4.9)
ALBUMIN/GLOB SERPL: 1.1 G/DL
ALP SERPL-CCNC: 122 U/L (ref 30–99)
ALT SERPL-CCNC: 9 U/L (ref 2–50)
ANION GAP SERPL CALC-SCNC: 11 MMOL/L (ref 7–16)
AST SERPL-CCNC: 24 U/L (ref 12–45)
BILIRUB SERPL-MCNC: 0.5 MG/DL (ref 0.1–1.5)
BUN SERPL-MCNC: 12 MG/DL (ref 8–22)
CALCIUM ALBUM COR SERPL-MCNC: 9.2 MG/DL (ref 8.5–10.5)
CALCIUM SERPL-MCNC: 9.2 MG/DL (ref 8.5–10.5)
CANCER AG19-9 SERPL-ACNC: 20.4 U/ML (ref 0–35)
CHLORIDE SERPL-SCNC: 106 MMOL/L (ref 96–112)
CO2 SERPL-SCNC: 23 MMOL/L (ref 20–33)
CREAT SERPL-MCNC: 0.54 MG/DL (ref 0.5–1.4)
GFR SERPLBLD CREATININE-BSD FMLA CKD-EPI: 90 ML/MIN/1.73 M 2
GLOBULIN SER CALC-MCNC: 3.5 G/DL (ref 1.9–3.5)
GLUCOSE SERPL-MCNC: 108 MG/DL (ref 65–99)
POTASSIUM SERPL-SCNC: 4 MMOL/L (ref 3.6–5.5)
PROT SERPL-MCNC: 7.5 G/DL (ref 6–8.2)
SODIUM SERPL-SCNC: 140 MMOL/L (ref 135–145)

## 2024-04-30 ASSESSMENT — FIBROSIS 4 INDEX: FIB4 SCORE: 3.56

## 2024-04-30 ASSESSMENT — ENCOUNTER SYMPTOMS: ABDOMINAL PAIN: 1

## 2024-05-01 ENCOUNTER — TELEPHONE (OUTPATIENT)
Dept: SURGICAL ONCOLOGY | Facility: MEDICAL CENTER | Age: 84
End: 2024-05-01
Payer: MEDICARE

## 2024-05-01 ENCOUNTER — PATIENT OUTREACH (OUTPATIENT)
Dept: ONCOLOGY | Facility: MEDICAL CENTER | Age: 84
End: 2024-05-01
Payer: MEDICARE

## 2024-05-01 NOTE — TELEPHONE ENCOUNTER
Aaliyah's daughter called to let us know the biopsy is scheduled for 5/22/24 and wanted to make sure that it was not to far out. She is also asking if Aaliyah can take Tylenol for the pain or what she should do if she is in pain.   Please advise

## 2024-05-01 NOTE — TELEPHONE ENCOUNTER
Called patient's daughter to follow up regarding some of the questions noted in their message previously today.    1) Regarding bx appointment, urged to patient to check for opportunities to find earlier date, such as from cancellations, or changes in schedule.    2) Regarding pain medications. Advised to take no more than 3 gram Tylenol per day (patient previously advised to avoid ibuprofen due to gastric ulcers since some time back). LFTs at this point are normal, but concern possible malignancy. Or consider taking occasional ibuprofen with FOOD, and then tylenol at lower dose.    3) Leading up to surgery - patient advised to not take aspirin, ibuprofen, naproxen, and other medications which may affect clotting function.    Patient encouraged to call again if any further questions or concerns.

## 2024-05-02 LAB
ACARBOXYPROTHROMBIN SERPL-MCNC: 0.4 NG/ML (ref 0–7.4)
AFP-TM SERPL-MCNC: 2 NG/ML (ref 0–9)

## 2024-05-06 ENCOUNTER — APPOINTMENT (OUTPATIENT)
Dept: ADMISSIONS | Facility: MEDICAL CENTER | Age: 84
End: 2024-05-06
Attending: SURGERY
Payer: MEDICARE

## 2024-05-10 ENCOUNTER — PRE-ADMISSION TESTING (OUTPATIENT)
Dept: ADMISSIONS | Facility: MEDICAL CENTER | Age: 84
End: 2024-05-10
Attending: SURGERY
Payer: MEDICARE

## 2024-05-10 DIAGNOSIS — Z01.812 PRE-OPERATIVE LABORATORY EXAMINATION: ICD-10-CM

## 2024-05-10 NOTE — OR NURSING
Medication instructions verbally given, patient and Angi (daughter) verbalizes understanding and has no further questions.  Also, medication list placed on the lab slip when she comes in for lab appointment.

## 2024-05-15 ENCOUNTER — HOSPITAL ENCOUNTER (OUTPATIENT)
Dept: RADIOLOGY | Facility: MEDICAL CENTER | Age: 84
End: 2024-05-15
Attending: SURGERY
Payer: MEDICARE

## 2024-05-15 DIAGNOSIS — R73.01 IMPAIRED FASTING GLUCOSE: ICD-10-CM

## 2024-05-15 DIAGNOSIS — R93.5 ABNORMAL CT OF THE ABDOMEN: ICD-10-CM

## 2024-05-15 DIAGNOSIS — R16.0 LIVER MASS: ICD-10-CM

## 2024-05-15 DIAGNOSIS — C22.1 CHOLANGIOCARCINOMA (HCC): ICD-10-CM

## 2024-05-15 DIAGNOSIS — C22.0 HEPATOCELLULAR CARCINOMA (HCC): ICD-10-CM

## 2024-05-15 DIAGNOSIS — K21.9 GASTROESOPHAGEAL REFLUX DISEASE WITHOUT ESOPHAGITIS: ICD-10-CM

## 2024-05-15 DIAGNOSIS — D05.91 CARCINOMA IN SITU OF RIGHT BREAST, UNSPECIFIED TYPE: ICD-10-CM

## 2024-05-15 RX ADMIN — GADOTERIDOL 15 ML: 279.3 INJECTION, SOLUTION INTRAVENOUS at 14:29

## 2024-05-17 ENCOUNTER — TELEPHONE (OUTPATIENT)
Dept: SURGICAL ONCOLOGY | Facility: MEDICAL CENTER | Age: 84
End: 2024-05-17
Payer: MEDICARE

## 2024-05-17 NOTE — TELEPHONE ENCOUNTER
Message forwarded to Brodie for further instructions   ----- Message from Kimmy Petit, Med Ass't sent at 5/16/2024  6:35 AM PDT -----  Regarding: MRI  Pt daughter called and left message on answer west. She was told to call to get her mom results. Her name is Delaney phone is [phone]

## 2024-05-22 ENCOUNTER — APPOINTMENT (OUTPATIENT)
Dept: ADMISSIONS | Facility: MEDICAL CENTER | Age: 84
End: 2024-05-22
Attending: SURGERY
Payer: MEDICARE

## 2024-05-22 DIAGNOSIS — Z01.812 PRE-OPERATIVE LABORATORY EXAMINATION: ICD-10-CM

## 2024-05-22 LAB
ERYTHROCYTE [DISTWIDTH] IN BLOOD BY AUTOMATED COUNT: 44.9 FL (ref 35.9–50)
HCT VFR BLD AUTO: 43.1 % (ref 37–47)
HGB BLD-MCNC: 13.8 G/DL (ref 12–16)
INR PPP: 1.01 (ref 0.87–1.13)
MCH RBC QN AUTO: 31.6 PG (ref 27–33)
MCHC RBC AUTO-ENTMCNC: 32 G/DL (ref 32.2–35.5)
MCV RBC AUTO: 98.6 FL (ref 81.4–97.8)
PLATELET # BLD AUTO: 198 K/UL (ref 164–446)
PMV BLD AUTO: 11.3 FL (ref 9–12.9)
PROTHROMBIN TIME: 13.4 SEC (ref 12–14.6)
RBC # BLD AUTO: 4.37 M/UL (ref 4.2–5.4)
WBC # BLD AUTO: 6.2 K/UL (ref 4.8–10.8)

## 2024-05-23 ENCOUNTER — APPOINTMENT (OUTPATIENT)
Dept: RADIOLOGY | Facility: MEDICAL CENTER | Age: 84
End: 2024-05-23
Attending: SURGERY
Payer: MEDICARE

## 2024-05-23 ENCOUNTER — HOSPITAL ENCOUNTER (OUTPATIENT)
Facility: MEDICAL CENTER | Age: 84
End: 2024-05-23
Attending: SURGERY | Admitting: SURGERY
Payer: MEDICARE

## 2024-05-23 VITALS
BODY MASS INDEX: 23.79 KG/M2 | WEIGHT: 156.97 LBS | HEART RATE: 66 BPM | RESPIRATION RATE: 16 BRPM | SYSTOLIC BLOOD PRESSURE: 166 MMHG | DIASTOLIC BLOOD PRESSURE: 82 MMHG | TEMPERATURE: 98.1 F | OXYGEN SATURATION: 92 % | HEIGHT: 68 IN

## 2024-05-23 DIAGNOSIS — R16.0 LIVER MASS: ICD-10-CM

## 2024-05-23 DIAGNOSIS — C22.1 CHOLANGIOCARCINOMA (HCC): ICD-10-CM

## 2024-05-23 DIAGNOSIS — K21.9 GASTROESOPHAGEAL REFLUX DISEASE WITHOUT ESOPHAGITIS: ICD-10-CM

## 2024-05-23 DIAGNOSIS — R93.5 ABNORMAL CT OF THE ABDOMEN: ICD-10-CM

## 2024-05-23 DIAGNOSIS — R73.01 IMPAIRED FASTING GLUCOSE: ICD-10-CM

## 2024-05-23 DIAGNOSIS — C22.0 HEPATOCELLULAR CARCINOMA (HCC): ICD-10-CM

## 2024-05-23 DIAGNOSIS — D05.91 CARCINOMA IN SITU OF RIGHT BREAST, UNSPECIFIED TYPE: ICD-10-CM

## 2024-05-23 LAB — PATHOLOGY CONSULT NOTE: NORMAL

## 2024-05-23 RX ORDER — SODIUM CHLORIDE 9 MG/ML
500 INJECTION, SOLUTION INTRAVENOUS
Status: DISCONTINUED | OUTPATIENT
Start: 2024-05-23 | End: 2024-05-23 | Stop reason: HOSPADM

## 2024-05-23 RX ORDER — HYDRALAZINE HYDROCHLORIDE 20 MG/ML
10 INJECTION INTRAMUSCULAR; INTRAVENOUS ONCE
Status: DISCONTINUED | OUTPATIENT
Start: 2024-05-23 | End: 2024-05-23 | Stop reason: HOSPADM

## 2024-05-23 RX ORDER — OXYCODONE HYDROCHLORIDE 5 MG/1
2.5 TABLET ORAL
Status: DISCONTINUED | OUTPATIENT
Start: 2024-05-23 | End: 2024-05-23 | Stop reason: HOSPADM

## 2024-05-23 RX ORDER — MIDAZOLAM HYDROCHLORIDE 1 MG/ML
INJECTION INTRAMUSCULAR; INTRAVENOUS
Status: COMPLETED
Start: 2024-05-23 | End: 2024-05-23

## 2024-05-23 RX ORDER — LIDOCAINE HYDROCHLORIDE 20 MG/ML
INJECTION, SOLUTION INFILTRATION; PERINEURAL
Status: COMPLETED
Start: 2024-05-23 | End: 2024-05-23

## 2024-05-23 RX ORDER — HYDROMORPHONE HYDROCHLORIDE 1 MG/ML
0.25 INJECTION, SOLUTION INTRAMUSCULAR; INTRAVENOUS; SUBCUTANEOUS
Status: DISCONTINUED | OUTPATIENT
Start: 2024-05-23 | End: 2024-05-23 | Stop reason: HOSPADM

## 2024-05-23 RX ORDER — ONDANSETRON 2 MG/ML
4 INJECTION INTRAMUSCULAR; INTRAVENOUS PRN
Status: DISCONTINUED | OUTPATIENT
Start: 2024-05-23 | End: 2024-05-23

## 2024-05-23 RX ORDER — MIDAZOLAM HYDROCHLORIDE 1 MG/ML
.5-2 INJECTION INTRAMUSCULAR; INTRAVENOUS PRN
Status: DISCONTINUED | OUTPATIENT
Start: 2024-05-23 | End: 2024-05-23 | Stop reason: HOSPADM

## 2024-05-23 RX ORDER — ONDANSETRON 2 MG/ML
4 INJECTION INTRAMUSCULAR; INTRAVENOUS EVERY 8 HOURS PRN
Status: DISCONTINUED | OUTPATIENT
Start: 2024-05-23 | End: 2024-05-23 | Stop reason: HOSPADM

## 2024-05-23 RX ORDER — SODIUM CHLORIDE 9 MG/ML
INJECTION, SOLUTION INTRAVENOUS
Status: DISCONTINUED | OUTPATIENT
Start: 2024-05-23 | End: 2024-05-23 | Stop reason: HOSPADM

## 2024-05-23 RX ADMIN — FENTANYL CITRATE 25 MCG: 50 INJECTION, SOLUTION INTRAMUSCULAR; INTRAVENOUS at 14:34

## 2024-05-23 RX ADMIN — LIDOCAINE HYDROCHLORIDE: 20 INJECTION, SOLUTION INFILTRATION; PERINEURAL at 14:39

## 2024-05-23 RX ADMIN — MIDAZOLAM HYDROCHLORIDE 1 MG: 2 INJECTION, SOLUTION INTRAMUSCULAR; INTRAVENOUS at 14:34

## 2024-05-23 RX ADMIN — OXYCODONE 2.5 MG: 5 TABLET ORAL at 15:42

## 2024-05-23 RX ADMIN — MIDAZOLAM HYDROCHLORIDE 1 MG: 1 INJECTION INTRAMUSCULAR; INTRAVENOUS at 14:34

## 2024-05-23 ASSESSMENT — FIBROSIS 4 INDEX: FIB4 SCORE: 3.47

## 2024-05-23 NOTE — OR SURGEON
Immediate Post- Operative Note    PostOp Diagnosis: LIVER MASS, LEFT LOBE      Procedure(s): CT GUIDED LIVER BIOPSY, LEFT LOBE SEGMENT 3    20G CORES X 6 IN FORMALIN      Estimated Blood Loss: <5CC      FINDINGS: NEEDLE CONFIRMED IN LEFT LOBE HYPODENSE MASS AS SEEN ON CT AND MRI        Complications: NONE            5/23/2024     2:49 PM     Charles Sanz M.D.

## 2024-05-23 NOTE — DISCHARGE INSTRUCTIONS
What to Expect Post Sedation    Rest and take it easy for the first 24 hours.  A responsible adult is recommended to remain with you during that time.  It is normal to feel sleepy.  We encourage you to not do anything that requires balance, judgment or coordination.    FOR 24 HOURS DO NOT:  Drive, operate machinery or run household appliances.  Drink beer or alcoholic beverages.  Make important decisions or sign legal documents.    To avoid nausea, slowly advance diet as tolerated, avoiding spicy or greasy foods for the first day.  Add more substantial food to your diet according to your provider's instructions.  Babies can be fed formula or breast milk as soon as they are hungry.  INCREASE FLUIDS AND FIBER TO AVOID CONSTIPATION.    MILD FLU-LIKE SYMPTOMS ARE NORMAL.  YOU MAY EXPERIENCE GENERALIZED MUSCLE ACHES, THROAT IRRITATION, HEADACHE AND/OR SOME NAUSEA.  If any questions arise, call your provider.  If your provider is not available, please feel free to call the Surgical [facility] at [phone].    MEDICATIONS: Resume taking daily medication.  Take prescribed pain medication with food.  If no medication is prescribed, you may take non-aspirin pain medication if needed.  PAIN MEDICATION CAN BE VERY CONSTIPATING.  Take a stool softener or laxative such as senokot, pericolace, or milk of magnesia if needed.    Last pain medication given at 3:42pm, oxycodone 2.5 mg    Diet    Resume your normal diet as tolerated.  A diet low in cholesterol, fat, and sodium is recommended for good health.     Discharge Instructions:  Liver Biopsy, Care After  After a liver biopsy, it is common to have these things in the area where the biopsy was done. You may:  Have pain.  Feel sore.  Have bruising.  You may also feel tired for a few days.  Follow these instructions at home:  Medicines  Take over-the-counter and prescription medicines only as told by your doctor.  If you were prescribed an antibiotic medicine, take it as told by  your doctor. Do not stop taking the antibiotic, even if you start to feel better.  Do not take medicines that may thin your blood. These medicines include aspirin and ibuprofen. Take them only if your doctor tells you to.  If told, take steps to prevent problems with pooping (constipation). You may need to:  Drink enough fluid to keep your pee (urine) pale yellow.  Take medicines. You will be told what medicines to take.  Eat foods that are high in fiber. These include beans, whole grains, and fresh fruits and vegetables.  Limit foods that are high in fat and sugar. These include fried or sweet foods.  Ask your doctor if you should avoid driving or using machines while you are taking your medicine.  Caring for your incision  Follow instructions from your doctor about how to take care of your cut from surgery (incisions). Make sure you:  Wash your hands with soap and water for at least 20 seconds before and after you change your bandage. If you cannot use soap and water, use hand .  Change your bandage.  Leavestitches or skin glue in place for at least two weeks.  Leave tape strips alone unless you are told to take them off. You may trim the edges of the tape strips if they curl up.  Check your incision every day for signs of infection. Check for:  Redness, swelling, or more pain.  Fluid or blood.  Warmth.  Pus or a bad smell.  Do not take baths, swim, or use a hot tub. Ask your doctor about taking showers or sponge baths.  Activity  Rest at home for 1-2 days, or as told by your doctor.  Get up to take short walks every 1 to 2 hours. Ask for help if you feel weak or unsteady.  Do not lift anything that is heavier than 10 lb (4.5 kg), or the limit that you are told.  Do not play contact sports for 2 weeks after the procedure.  Return to your normal activities as told by your doctor. Ask what activities are safe for you.  General instructions  Do not drink alcohol in the first week after the procedure.  Plan  to have a responsible adult care for you for the time you are told after you leave the hospital or clinic. This is important.  It is up to you to get the results of your procedure. Ask how to get your results when they are ready.  Keep all follow-up visits.  Contact a doctor if:  You have more bleeding in your incision.  Your incision swells, or is red and more painful.  You have fluid that comes from your incision.  You develop a rash.  You have fever or chills.  Get help right away if:  You have swelling, bloating, or pain in your belly (abdomen).  You get dizzy or faint.  You vomit or you feel like vomiting.  You have trouble breathing or feel short of breath.  You have chest pain.  You have problems talking or seeing.  You have trouble with your balance or moving your arms or legs.  These symptoms may be an emergency. Get help right away. Call your local emergency services (911 in the [place]  Do not wait to see if the symptoms will go away.  Do not drive yourself to the hospital.  Summary  After the procedure, it is common to have pain, soreness, bruising, and tiredness.  Your doctor will tell you how to take care of yourself at home. Change your bandage, take your medicines, and limit your activities as told by your doctor.  Call your doctor if you have symptoms of infection. Get help right away if your belly swells, your cut bleeds a lot, or you have trouble talking or breathing.  This information is not intended to replace advice given to you by your health care provider. Make sure you discuss any questions you have with your health care provider.  Document Revised: 11/01/2021 Document Reviewed: 11/01/2021  [employer] Patient Education © 2023 [employer]    DRESSING: Keep dressing and incision dry and intact for 24 hours, may remove dressing and shower after 5 PM on 5/24, do not need to replace.  Do not submerge site in water for 7 days.     BOWEL FUNCTION:  Prescription pain medication may cause  constipation. If you are having problems, use what you normally would or call your provider for suggestions. It also helps to stay regular by including fiber in your diet (for example: bran or fruits and vegetables) and drink plenty of liquids (water, juice, etc.).

## 2024-05-23 NOTE — PROGRESS NOTES
Pt presents to CT-4.   Pt was consented by MD at bedside in PPU, confirmed by this RN.   Pt transferred to CT table in supine position.   Pt placed on monitor, prepped and draped in a sterile fashion.   Patient underwent a Liver mass biopsy by Dr. Sanz.   Procedure site was marked by MD and verified using imaging guidance.   Vitals were taken every 5 minutes and remained stable during procedure (see doc flow sheet for results). CO2 waveform capnography was monitored and remained WNL throughout procedure.   Report called to Eva BURT. Pt transported by stretcher with RN to PPU-2.     Specimen: Liver mass biopsy cores x 6 in formalin hand delivered to lab.

## 2024-05-29 ENCOUNTER — TELEPHONE (OUTPATIENT)
Dept: SURGICAL ONCOLOGY | Facility: MEDICAL CENTER | Age: 84
End: 2024-05-29
Payer: MEDICARE

## 2024-05-29 NOTE — TELEPHONE ENCOUNTER
Patient has biopsy done and would like a call with the results/plan    Angi the daughter called her CB# is [phone]

## 2024-05-30 DIAGNOSIS — C22.1 CHOLANGIOCARCINOMA OF LIVER (HCC): ICD-10-CM

## 2024-05-31 NOTE — PROGRESS NOTES
Subjective:   6/4/2024 10:20 AM  Primary care physician: Pcp Pt States None  Referring Provider: Dorene Waggoner P.A.-C     Chief Complaint:   Chief Complaint   Patient presents with    New Patient     L LIVER LESION   CT W/O 6CM  IR BX 5/23  PATH: ADENOCARC        Diagnosis:   1. Liver mass  Didier-Gamma-Carboxy Prothrombin    Referral to Radiation Oncology    Referral to Hematology Oncology    [id number] BASE TO MID-THIGH      2. Hepatocellular carcinoma (HCC)  Didier-Gamma-Carboxy Prothrombin    Referral to Radiation Oncology    Referral to Hematology Oncology    [id number] BASE TO MID-THIGH      3. 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 Cholangiocarcinoma (HCC)  Didier-Gamma-Carboxy Prothrombin    Referral to Radiation Oncology    Referral to Hematology Oncology    [id number] BASE TO MID-THIGH      6. Gastroesophageal reflux disease without esophagitis  Didier-Gamma-Carboxy Prothrombin    Referral to Radiation Oncology    Referral to Hematology Oncology    [id number] BASE TO MID-THIGH      7. Liver disease  CT-ABDOMEN LIVER FOR HEPATIC MASS (CIRRHOSIS)    Didier-Gamma-Carboxy Prothrombin    Referral to Radiation Oncology    Referral to Hematology Oncology    [id number] BASE TO MID-THIGH        History of presenting illness:    Aaliyah Fulton is a pleasant 84 y.o. female with history including asthma, arthritis, breast cancer, GERD, HTN, hx cervical fracture, frequent UTIs, depression, stroke, confusion, and chronic pain. Referred for concern hepatomegaly.     CT ABDOMEN W/O on 4/26/24 noted heterogeneously enhancing mass in left liver  concerning for malignancy / metastasis. Few enlarged kianna hepatitis, peripancreatic and gastrohepatic ligament lymph nodes concerning for metastases.       She is here today for evaluation of what appears to be a large tumor involving the left lateral segment including segment 4A, 3, and part of 2.  It also appears as though the left portal vein is out with a small [place] appearance near the hilum.  Patient has no tumor markers.  I do not have a CMP.  I will I have his imaging that was sent to us for evaluation dated April 26, 2024.  The reading itself appears that they are suspicious for malignant tumor.  She does have adenopathy.  There is no tumor markers noted to have any tumor tissue.    The patient does have a history of breast cancer but she states it was over 20 years ago and she got radiation for it.  She has no other malignancies.  She has had an open cholecystectomy and does have an umbilical hernia.  She is 84 years old and does have some difficulty ambulating due to instability and she has fallen several times.  She does have a  shunt in place secondary to falls.  Other than that she is very pleasant and appears very healthy.    Update 6/4/24  CA 19-9 on 4/30/24 was 20.4, and AFP was 2, both WNL    Patient completed IR liver bx on 5/23/24 by Dr Sanz.    Pathology noted liver mass biopsy: moderately to poorly differentiated adenocarcinoma.     She is her today for evaluation and follow-up status post biopsy of the liver tumor.  Her presumptive diagnosis was cholangiocarcinoma intrahepatic.  The pathology did return moderately to poorly differentiated adenocarcinoma.  There is questionable adenopathy in the kianna.  She has not had a PET scan since we did not have a diagnosis.  Her CA 19-9 was normal at 20 and her AFP was normal as well.  We did not send a DCP.  The patient comes in with her daughter.  I did call the daughter once I got the results last week.  They are here to discuss neck steps.  I  "did review her past CT scan which was back in April 2024.    Past Medical History:   Diagnosis Date    Arthritis     ASTHMA     CHEMICAL INDUCED    Breast cancer ([facility])     stage 0 - Dr. Naqvi    Cancer ([facility]) 2010    right breast-lumpectomy. Radiation, no chemo    Cataract     silver iol    Dental disorder     upper implants    Heart burn     taking omeprazole    Heart murmur     High cholesterol     History of cervical fracture 2022    No surgery. Had to wear brace only.    Hypertension 01/03/2022    states well controlled on meds    Indigestion     taking omeprazole    Osteoporosis     Pain     \"everywhere\"    Pneumonia 12/2021    on antibiotics    PONV (postoperative nausea and vomiting)     Have always had this.    Psychiatric problem     depression on zoloft    Stroke ([facility]) 2021    Some memory problems and expressive aphasia.    Unspecified urinary incontinence     wears depends     Past Surgical History:   Procedure Laterality Date    PB TOTAL KNEE ARTHROPLASTY Left 03/28/2023    Procedure: LEFT TOTAL KNEE ARTHROPLASTY;  Surgeon: Marcus Hall M.D.;  Location: SURGERY [facility];  Service: Orthopedics    GA CREATE SHUNT:VENTRIC-PERITONEAL  01/10/2022    Procedure: INSERTION, SHUNT, VENTRICULOPERITONEAL, LAPAROSCOPIC PERITONEAL CATHETER - STEALTH GUIDED;  Surgeon: Gregory Blanchard M.D.;  Location: SURGERY [facility];  Service: Neurosurgery    PB PARTIAL HIP REPLACEMENT Left 11/24/2021    Procedure: HEMIARTHROPLASTY, HIP;  Surgeon: Marlon Culver M.D.;  Location: SURGERY [facility];  Service: Orthopedics    RECOVERY  05/10/2016    Procedure: [id number].KOCI-ANESTHESIA;  Surgeon: [facility];  Location: SURGERY PRE[place]POST PROC UNIT [facility];  Service:     LUMBAR LAMINECTOMY DISKECTOMY  10/17/2012    Performed by Daksha Melendez M.D. at SURGERY [facility] ORS    FORAMINOTOMY  10/17/2012    Performed by Daksha Melendez M.D. at [facility] ORS    LUMBAR DECOMPRESSION  10/17/2012    " Performed by Daksha Melendez M.D. at SURGERY [facility] ORS    KNEE ARTHROPLASTY TOTAL  09/19/2011    Performed by KISHAN CARDENAS at SURGERY [facility]    BREAST BIOPSY  05/21/2010    Performed by ROB TOMAS at SURGERY SAME DAY [facility] ORS    CHOLECYSTECTOMY      HAMMERTOE CORRECTION Bilateral     with bunions    KNEE ARTHROSCOPY      LUMPECTOMY      OTHER ORTHOPEDIC SURGERY      foot, shoulder, and knee    AL RADIATION THERAPY PLAN SIMPLE      SHOULDER SURGERY      SINUSCOPY       No Known Allergies  Outpatient Encounter Medications as of 6/4/2024   Medication Sig Dispense Refill    fosfomycin (MONUROL) 3 GM Pack Take 3 g by mouth every 3 days. 2 Each 0    atorvastatin (LIPITOR) 40 MG Tab TAKE ONE TABLET BY MOUTH EVERY EVENING 90 Tablet 0    [DISCONTINUED] oxybutynin (DITROPAN-XL) 15 MG CR tablet Take 15 mg by mouth every morning.      omeprazole (PRILOSEC) 40 MG delayed-release capsule Take 1 Capsule by mouth every day.      oxybutynin (DITROPAN XL) 15 MG CR tablet Take 1 Tablet by mouth every day.      [DISCONTINUED] venlafaxine (EFFEXOR-XR) 150 MG extended-release capsule Take 150 mg by mouth every morning.      [DISCONTINUED] venlafaxine XR (EFFEXOR XR) 75 MG CAPSULE SR 24 HR Take 1 Capsule by mouth every morning.      senna-docusate (PERICOLACE OR SENOKOT S) 8.6-50 MG Tab Take 2 Tablets by mouth 2 times a day. 120 Tablet 0    methenamine hip (HIPPREX) 1 GM Tab Take 1 g by mouth 2 times a day. LONG TERM TREATMENT FOR CHRONIC UTI.  Indications: Urinary Tract Infection      estradiol (ESTRACE) 0.1 MG/GM vaginal cream Insert 2 g into the vagina. Three times/week      Calcium Carbonate-Vitamin D (CALCIUM-VITAMIN D3 PO) Take 600 mg by mouth every morning.      vitamin D3 (CHOLECALCIFEROL) 1000 Unit (25 mcg) Tab Take 2,000 Units by mouth every morning.      busPIRone (BUSPAR) 15 MG tablet Take 15 mg by mouth 2 times a day.      VENTOLIN  (90 Base) MCG/ACT Aero Soln inhalation aerosol 2 Puffs  every 6 hours as needed. (Patient not taking: Reported on 5/10/2024)      acetaminophen (TYLENOL) 500 MG Tab Take 500-1,000 mg by mouth every 6 hours as needed for Moderate Pain.      D-MANNOSE PO Take 500 mg by mouth every evening.      omeprazole (PRILOSEC) 40 MG delayed-release capsule Take 1 Capsule by mouth every day. (Patient taking differently: Take 40 mg by mouth every morning.) 30 Capsule 2    valsartan (DIOVAN) 160 MG Tab Take 1 Tablet by mouth every day. 30 Tablet 2    venlafaxine (EFFEXOR-XR) 150 MG extended-release capsule Take 1 Capsule by mouth every day. Taken with 75 mg for 225mg daily 30 Capsule 2     No facility-administered encounter medications on file as of 6/4/2024.     Social History     Socioeconomic History    Marital status:      Spouse name: Not on file    Number of children: Not on file    Years of education: Not on file    Highest education level: Not on file   Occupational History    Not on file   Tobacco Use    Smoking status: Never    Smokeless tobacco: Never   Vaping Use    Vaping status: Never Used   Substance and Sexual Activity    Alcohol use: No    Drug use: Never    Sexual activity: Not on file     Comment: ; two daughters; retired ( @ [employer])   Other Topics Concern    Not on file   Social History Narrative    Not on file     Social Determinants of Health     Financial Resource Strain: Not on file   Food Insecurity: Not on file   Transportation Needs: No Transportation Needs (4/3/2023)    PRAPARE - Transportation     Lack of Transportation (Medical): No     Lack of Transportation (Non-Medical): No   Physical Activity: Not on file   Stress: Not on file   Social Connections: Not on file   Intimate Partner Violence: Not on file   Housing Stability: Not on file      Social History     Tobacco Use   Smoking Status Never   Smokeless Tobacco Never     Social History     Substance and Sexual Activity   Alcohol Use No     Social History  "    Substance and Sexual Activity   Drug Use Never      Family History   Problem Relation Age of Onset    Lung Disease Mother         copd    Stroke Mother     Cancer Daughter         Breast    Breast Cancer Daughter     Psychiatric Illness Daughter         Anxiety    Psychiatric Illness Daughter         Anxiety and Depression     Review of Systems   All other systems reviewed and are negative.       Objective:   /64 (BP Location: Left arm, Patient Position: Sitting)   Pulse 98   Temp 36.3 °C (97.4 °F) (Temporal)   Ht 1.727 m (5' 8\")   Wt 70.8 kg (156 lb)   SpO2 95%   BMI 23.72 kg/m²     Physical Exam  Vitals and nursing note reviewed.   Constitutional:       Appearance: Normal appearance.   HENT:      Head: Normocephalic.      Nose: Nose normal.      Mouth/Throat:      Mouth: Mucous membranes are moist.      Pharynx: Oropharynx is clear.   Eyes:      Extraocular Movements: Extraocular movements intact.      Conjunctiva/sclera: Conjunctivae normal.      Pupils: Pupils are equal, round, and reactive to light.   Cardiovascular:      Rate and Rhythm: Normal rate and regular rhythm.      Pulses: Normal pulses.      Heart sounds: Normal heart sounds.   Pulmonary:      Effort: Pulmonary effort is normal.      Breath sounds: Normal breath sounds.   Abdominal:      General: Abdomen is flat. Bowel sounds are normal.      Palpations: Abdomen is soft.   Musculoskeletal:         General: Swelling and tenderness present.      Cervical back: Normal range of motion and neck supple.      Comments: She does walk with a walker   Skin:     General: Skin is warm.   Neurological:      General: No focal deficit present.      Mental Status: She is alert and oriented to person, place, and time.   Psychiatric:         Mood and Affect: Mood normal.         Behavior: Behavior normal.         Thought Content: Thought content normal.         Judgment: Judgment normal.         Labs   Latest Reference Range & Units 05/22/24 11:30 "   WBC 4.8 - 10.8 K/uL 6.2   RBC 4.20 - 5.40 M/uL 4.37   Hemoglobin 12.0 - 16.0 g/dL 13.8   Hematocrit 37.0 - 47.0 % 43.1   MCV 81.4 - 97.8 fL 98.6 (H)   MCH 27.0 - 33.0 pg 31.6   MCHC 32.2 - 35.5 g/dL 32.0 (L)   RDW 35.9 - 50.0 fL 44.9   Platelet Count 164 - 446 K/uL 198   MPV 9.0 - 12.9 fL 11.3   (H): Data is abnormally high  (L): Data is abnormally low      Latest Reference Range & Units 04/30/24 11:22   Sodium 135 - 145 mmol/L 140   Potassium 3.6 - 5.5 mmol/L 4.0   Chloride 96 - 112 mmol/L 106   Co2 20 - 33 mmol/L 23   Anion Gap 7.0 - 16.0  11.0   Glucose 65 - 99 mg/dL 108 (H)   Bun 8 - 22 mg/dL 12   Creatinine 0.50 - 1.40 mg/dL 0.54   GFR (CKD-EPI) >60 mL/min/1.73 m 2 90   Calcium 8.5 - 10.5 mg/dL 9.2   Correct Calcium 8.5 - 10.5 mg/dL 9.2   AST(SGOT) 12 - 45 U/L 24   ALT(SGPT) 2 - 50 U/L 9   Alkaline Phosphatase 30 - 99 U/L 122 (H)   Total Bilirubin 0.1 - 1.5 mg/dL 0.5   Albumin 3.2 - 4.9 g/dL 4.0   Total Protein 6.0 - 8.2 g/dL 7.5   Globulin 1.9 - 3.5 g/dL 3.5   A-G Ratio g/dL 1.1   (H): Data is abnormally high      Latest Reference Range & Units 04/30/24 11:22   Alpha Fetoprotein 0 - 9 ng/mL 2   Ca 19-9 0.0 - 35.0 U/mL 20.4       Imaging  CT ABDOMEN W/O (4/26/24)    Pathology  PATH (5/23/24)  FINAL DIAGNOSIS:   A. Liver mass biopsy:          Moderately to poorly differentiated adenocarcinoma.      Procedures  PROCEDURE (5/23/24)  CT GUIDED LIVER BIOPSY, LEFT LOBE SEGMENT 3  - Dr Sanz    Diagnosis:     1. Liver mass  Didier-Gamma-Carboxy Prothrombin    Referral to Radiation Oncology    Referral to Hematology Oncology    [id number] BASE TO MID-THIGH      2. Hepatocellular carcinoma (HCC)  Didier-Gamma-Carboxy Prothrombin    Referral to Radiation Oncology    Referral to Hematology Oncology    [id number] BASE TO MID-THIGH      3. Abnormal CT of the abdomen  Didier-Gamma-Carboxy Prothrombin    Referral to Radiation Oncology    Referral to Hematology Oncology    [id number] BASE TO MID-THIGH      4. Carcinoma in  situ of right breast, unspecified type  Didier-Gamma-Carboxy Prothrombin    Referral to Radiation Oncology    Referral to Hematology Oncology    [id number] BASE TO MID-THIGH      5. Cholangiocarcinoma (HCC)  Didier-Gamma-Carboxy Prothrombin    Referral to Radiation Oncology    Referral to Hematology Oncology    [id number] BASE TO MID-THIGH      6. Gastroesophageal reflux disease without esophagitis  Didier-Gamma-Carboxy Prothrombin    Referral to Radiation Oncology    Referral to Hematology Oncology    [id number] BASE TO MID-THIGH      7. Liver disease  CT-ABDOMEN LIVER FOR HEPATIC MASS (CIRRHOSIS)    Didier-Gamma-Carboxy Prothrombin    Referral to Radiation Oncology    Referral to Hematology Oncology    [id number] BASE TO MID-THIGH          Medical Decision Making:  Today's Assessment / Status / Plan:     In light of the present findings, the patient will be referred to Dr. Tabor and Dr. Arias to discuss definitive chemoradiotherapy.  I also ordering a repeat CA 19-9 and a DCP to assess whether this could be hepatoma versus cholangiocarcinoma since they were both normal back in April.  I am also ordering a liver CT scan and a PET scan.  Most likely this is a intrahepatic cholangiocarcinoma.  In any event, the patient will see me after the CT scan and the PET scan to discuss neck steps.  Dr. Tabor will add the patient to tumor board for next week.    I, Dr. Moreno have entered, reviewed and confirmed the above diagnoses related to this patient on this date of service, 6/4/2024 10:20 AM.    She agreed and verbalized her agreement and understanding with the current plan. I answered all questions and concerns she has at this time and advised her to call at any time in the interim with questions or concerns in regards to her care.    Thank you for allowing me to participate in her care, I will continue to follow closely.       Please note that this dictation was created using voice recognition software. I have  made every reasonable attempt to correct obvious errors, but I expect that there are errors of grammar and possibly content that I did not discover before finalizing the note.     Thank you for this consultation and allowing me to participate in your patient's care. If I can be of further service please contact my office.      I, Dr Moreno, have entered, reviewed and confirmed the above diagnoses related to this patient on this date of service, as per the time and date noted at top of this note.

## 2024-06-04 ENCOUNTER — OFFICE VISIT (OUTPATIENT)
Dept: SURGICAL ONCOLOGY | Facility: MEDICAL CENTER | Age: 84
End: 2024-06-04
Payer: MEDICARE

## 2024-06-04 ENCOUNTER — HOSPITAL ENCOUNTER (OUTPATIENT)
Dept: LAB | Facility: MEDICAL CENTER | Age: 84
End: 2024-06-04
Attending: SURGERY
Payer: MEDICARE

## 2024-06-04 VITALS
BODY MASS INDEX: 23.64 KG/M2 | OXYGEN SATURATION: 95 % | HEART RATE: 98 BPM | SYSTOLIC BLOOD PRESSURE: 112 MMHG | HEIGHT: 68 IN | DIASTOLIC BLOOD PRESSURE: 64 MMHG | TEMPERATURE: 97.4 F | WEIGHT: 156 LBS

## 2024-06-04 DIAGNOSIS — C22.0 HEPATOCELLULAR CARCINOMA (HCC): ICD-10-CM

## 2024-06-04 DIAGNOSIS — K21.9 GASTROESOPHAGEAL REFLUX DISEASE WITHOUT ESOPHAGITIS: ICD-10-CM

## 2024-06-04 DIAGNOSIS — K76.9 LIVER DISEASE: ICD-10-CM

## 2024-06-04 DIAGNOSIS — D05.91 CARCINOMA IN SITU OF RIGHT BREAST, UNSPECIFIED TYPE: ICD-10-CM

## 2024-06-04 DIAGNOSIS — C22.1 CHOLANGIOCARCINOMA (HCC): ICD-10-CM

## 2024-06-04 DIAGNOSIS — R16.0 LIVER MASS: ICD-10-CM

## 2024-06-04 DIAGNOSIS — R93.5 ABNORMAL CT OF THE ABDOMEN: ICD-10-CM

## 2024-06-04 LAB — CANCER AG19-9 SERPL-ACNC: 19 U/ML (ref 0–35)

## 2024-06-04 PROCEDURE — 3078F DIAST BP <80 MM HG: CPT | Performed by: SURGERY

## 2024-06-04 PROCEDURE — 83951 ONCOPROTEIN DCP: CPT

## 2024-06-04 PROCEDURE — 36415 COLL VENOUS BLD VENIPUNCTURE: CPT

## 2024-06-04 PROCEDURE — 86301 IMMUNOASSAY TUMOR CA 19-9: CPT

## 2024-06-04 PROCEDURE — 99214 OFFICE O/P EST MOD 30 MIN: CPT | Performed by: SURGERY

## 2024-06-04 PROCEDURE — 3074F SYST BP LT 130 MM HG: CPT | Performed by: SURGERY

## 2024-06-04 ASSESSMENT — FIBROSIS 4 INDEX: FIB4 SCORE: 3.47

## 2024-06-06 LAB — ACARBOXYPROTHROMBIN SERPL-MCNC: 0.3 NG/ML (ref 0–7.4)

## 2024-06-10 ENCOUNTER — HOSPITAL ENCOUNTER (OUTPATIENT)
Dept: RADIOLOGY | Facility: MEDICAL CENTER | Age: 84
End: 2024-06-10
Attending: SURGERY
Payer: MEDICARE

## 2024-06-10 DIAGNOSIS — K76.9 LIVER DISEASE: ICD-10-CM

## 2024-06-10 PROCEDURE — 700117 HCHG RX CONTRAST REV CODE 255: Performed by: SURGERY

## 2024-06-10 PROCEDURE — 74160 CT ABDOMEN W/CONTRAST: CPT

## 2024-06-10 RX ADMIN — IOHEXOL 100 ML: 350 INJECTION, SOLUTION INTRAVENOUS at 14:19

## 2024-06-11 ENCOUNTER — PATIENT OUTREACH (OUTPATIENT)
Dept: ONCOLOGY | Facility: MEDICAL CENTER | Age: 84
End: 2024-06-11
Payer: MEDICARE

## 2024-06-11 DIAGNOSIS — R47.1 DYSARTHRIA: ICD-10-CM

## 2024-06-11 NOTE — PROGRESS NOTES
Oncology nurse navigator called patient to follow up on a referral.  RANDY introduced self my role and services.  Patient denies any needs or barriers at this time.  She states that she lives with her daughter Sandra and the other daughter lives right next door.  Patient atates that she has a great support.

## 2024-06-12 RX ORDER — ATORVASTATIN CALCIUM 40 MG/1
40 TABLET, FILM COATED ORAL EVERY EVENING
Qty: 90 TABLET | Refills: 0 | OUTPATIENT
Start: 2024-06-12

## 2024-06-18 ASSESSMENT — LIFESTYLE VARIABLES
TOBACCO_USE: NO
SMOKING_STATUS: NO

## 2024-06-21 ENCOUNTER — HOSPITAL ENCOUNTER (OUTPATIENT)
Dept: RADIATION ONCOLOGY | Facility: MEDICAL CENTER | Age: 84
End: 2024-06-21
Attending: RADIOLOGY
Payer: MEDICARE

## 2024-06-21 ENCOUNTER — HOSPITAL ENCOUNTER (OUTPATIENT)
Dept: HEMATOLOGY ONCOLOGY | Facility: MEDICAL CENTER | Age: 84
End: 2024-06-21
Attending: STUDENT IN AN ORGANIZED HEALTH CARE EDUCATION/TRAINING PROGRAM
Payer: MEDICARE

## 2024-06-21 VITALS
WEIGHT: 157.41 LBS | TEMPERATURE: 97.4 F | DIASTOLIC BLOOD PRESSURE: 83 MMHG | BODY MASS INDEX: 23.86 KG/M2 | OXYGEN SATURATION: 94 % | SYSTOLIC BLOOD PRESSURE: 140 MMHG | HEIGHT: 68 IN | RESPIRATION RATE: 18 BRPM | HEART RATE: 80 BPM

## 2024-06-21 VITALS
HEIGHT: 68 IN | SYSTOLIC BLOOD PRESSURE: 124 MMHG | OXYGEN SATURATION: 96 % | BODY MASS INDEX: 23.7 KG/M2 | TEMPERATURE: 98.1 F | HEART RATE: 83 BPM | DIASTOLIC BLOOD PRESSURE: 76 MMHG | WEIGHT: 156.4 LBS | RESPIRATION RATE: 16 BRPM

## 2024-06-21 DIAGNOSIS — C22.0 HEPATOCELLULAR CARCINOMA (HCC): ICD-10-CM

## 2024-06-21 DIAGNOSIS — C22.1 CHOLANGIOCARCINOMA (HCC): ICD-10-CM

## 2024-06-21 PROCEDURE — 99214 OFFICE O/P EST MOD 30 MIN: CPT | Performed by: RADIOLOGY

## 2024-06-21 PROCEDURE — 99212 OFFICE O/P EST SF 10 MIN: CPT | Performed by: STUDENT IN AN ORGANIZED HEALTH CARE EDUCATION/TRAINING PROGRAM

## 2024-06-21 PROCEDURE — 99205 OFFICE O/P NEW HI 60 MIN: CPT | Performed by: RADIOLOGY

## 2024-06-21 ASSESSMENT — FIBROSIS 4 INDEX
FIB4 SCORE: 3.47
FIB4 SCORE: 3.47

## 2024-06-21 ASSESSMENT — PAIN SCALES - GENERAL
PAINLEVEL: NO PAIN
PAINLEVEL: NO PAIN

## 2024-06-21 NOTE — CONSULTS
"RADIATION ONCOLOGY CONSULT    DATE OF SERVICE: 6/21/2024    IDENTIFICATION: A 84 y.o. female with   Cholangiocarcinoma (HCC)  Staging form: Intrahepatic Bile Duct, AJCC 8th Edition  - Clinical stage from 6/21/2024: Stage IIIB (cT2, cN1, cM0) - Signed by Marco Tabor M.D. on 6/21/2024  Histologic grade (G): G2  Histologic grading system: 3 grade system    She is here at the kind request of Dr. Jack    HISTORY OF PRESENT ILLNESS:   Patient originally presented with weakness to the emergency department urologist related for kidney stone and had CT abdomen which showed enhancing mass in left liver concerning for malignancy with enlarged periportal nodes underwent CT-guided liver biopsy May 23 which showed mildly to poorly differentiated adenocarcinoma.  MRI abdomen May 15, 2024 showed hypervascular mass in the left lobe segment of the left lobe 4.8 cm with associated kianna hepatis and celiac adenopathy.  Minimal ascites.  Repeat CT liver protocol Lillian 10, 2024 showed stable 4.4 cm left lateral lobe liver mass with left portal vein occlusion stable periportal adenopathy 19 x 12 mm.  Patient has PET/CT scheduled for July 16, 2024.  Overall patient feeling well.    PAST MEDICAL HISTORY:  Past Medical History:   Diagnosis Date    Arthritis     ASTHMA     CHEMICAL INDUCED    Breast cancer (HCC)     stage 0 - Dr. Naqvi    Cancer (HCC) 2010    right breast-lumpectomy. Radiation, no chemo    Cataract     silver iol    Dental disorder     upper implants    Heart burn     taking omeprazole    Heart murmur     High cholesterol     History of cervical fracture 2022    No surgery. Had to wear brace only.    Hypertension 01/03/2022    states well controlled on meds    Indigestion     taking omeprazole    Osteoporosis     Pain     \"everywhere\"    Pneumonia 12/2021    on antibiotics    PONV (postoperative nausea and vomiting)     Have always had this.    Psychiatric problem     depression on zoloft    Stroke (HCC) 2021    Some " memory problems and expressive aphasia.    Unspecified urinary incontinence     wears depends       PAST SURGICAL HISTORY:  Past Surgical History:   Procedure Laterality Date    PB TOTAL KNEE ARTHROPLASTY Left 03/28/2023    Procedure: LEFT TOTAL KNEE ARTHROPLASTY;  Surgeon: Marcus Hall M.D.;  Location: SURGERY [facility];  Service: Orthopedics    CT CREATE SHUNT:VENTRIC-PERITONEAL  01/10/2022    Procedure: INSERTION, SHUNT, VENTRICULOPERITONEAL, LAPAROSCOPIC PERITONEAL CATHETER - STEALTH GUIDED;  Surgeon: Gregory Blanchard M.D.;  Location: SURGERY [facility];  Service: Neurosurgery    PB PARTIAL HIP REPLACEMENT Left 11/24/2021    Procedure: HEMIARTHROPLASTY, HIP;  Surgeon: Marlon Culver M.D.;  Location: SURGERY [facility];  Service: Orthopedics    RECOVERY  05/10/2016    Procedure: [id number];  Surgeon: Recoveryon Surgery;  Location: SURGERY PRE-POST PROC UNIT [facility];  Service:     LUMBAR LAMINECTOMY DISKECTOMY  10/17/2012    Performed by Daksha Melendez M.D. at SURGERY [facility] ORS    FORAMINOTOMY  10/17/2012    Performed by Daksha Melendez M.D. at SURGERY [facility] ORS    LUMBAR DECOMPRESSION  10/17/2012    Performed by Daksha Melendez M.D. at SURGERY [facility] ORS    KNEE ARTHROPLASTY TOTAL  09/19/2011    Performed by KISHAN CARDENAS at SURGERY [facility] ORS    BREAST BIOPSY  05/21/2010    Performed by ROB TOMAS at SURGERY SAME DAY [facility] ORS    CHOLECYSTECTOMY      HAMMERTOE CORRECTION Bilateral     with bunions    KNEE ARTHROSCOPY      LUMPECTOMY      OTHER ORTHOPEDIC SURGERY      foot, shoulder, and knee    CT RADIATION THERAPY PLAN SIMPLE      SHOULDER SURGERY      SINUSCOPY         CURRENT MEDICATIONS:  Current Outpatient Medications   Medication Sig Dispense Refill    omeprazole (PRILOSEC) 40 MG delayed-release capsule Take 1 Capsule by mouth every day.      oxybutynin (DITROPAN XL) 15 MG CR tablet Take 1 Tablet by mouth every day.      methenamine  hip (HIPPREX) 1 GM Tab Take 1 g by mouth 2 times a day. LONG TERM TREATMENT FOR CHRONIC UTI.  Indications: Urinary Tract Infection      estradiol (ESTRACE) 0.1 MG/GM vaginal cream Insert 2 g into the vagina. Three times/week      Calcium Carbonate-Vitamin D (CALCIUM-VITAMIN D3 PO) Take 600 mg by mouth every morning.      vitamin D3 (CHOLECALCIFEROL) 1000 Unit (25 mcg) Tab Take 2,000 Units by mouth every morning.      busPIRone (BUSPAR) 15 MG tablet Take 15 mg by mouth 2 times a day.      VENTOLIN  (90 Base) MCG/ACT Aero Soln inhalation aerosol 2 Puffs every 6 hours as needed.      acetaminophen (TYLENOL) 500 MG Tab Take 500-1,000 mg by mouth every 6 hours as needed for Moderate Pain.      D-MANNOSE PO Take 500 mg by mouth every evening.      venlafaxine (EFFEXOR-XR) 150 MG extended-release capsule Take 1 Capsule by mouth every day. Taken with 75 mg for 225mg daily 30 Capsule 2    fosfomycin (MONUROL) 3 GM Pack Take 3 g by mouth every 3 days. 2 Each 0    atorvastatin (LIPITOR) 40 MG Tab TAKE ONE TABLET BY MOUTH EVERY EVENING 90 Tablet 0    senna-docusate (PERICOLACE OR SENOKOT S) 8.6-50 MG Tab Take 2 Tablets by mouth 2 times a day. 120 Tablet 0    omeprazole (PRILOSEC) 40 MG delayed-release capsule Take 1 Capsule by mouth every day. (Patient taking differently: Take 40 mg by mouth every morning.) 30 Capsule 2    valsartan (DIOVAN) 160 MG Tab Take 1 Tablet by mouth every day. 30 Tablet 2     No current facility-administered medications for this encounter.       ALLERGIES:    Patient has no known allergies.    FAMILY HISTORY:    family history includes Breast Cancer in her daughter; Cancer in her daughter; Lung Disease in her mother; Psychiatric Illness in her daughter and daughter; Stroke in her mother.    SOCIAL HISTORY:     reports that she has never smoked. She has never used smokeless tobacco. She reports that she does not drink alcohol and does not use drugs. She states she lives in [place] with her  "daughter. She is a retired .     REVIEW OF SYSTEMS:  A review of systems for today's date of service was reviewed and uploaded into the electronic medical record.      PHYSICAL EXAM:    ECOG PERFORMANCE STATUS:      6/21/2024    12:46 PM   ECOG Performance Review   ECOG Performance Status Restricted in physically strenuous activity but ambulatory and able to carry out work of a light or sedentary nature, e.g., light house work, office work         6/21/2024    12:47 PM   Karnofsky Score   Karnofsky Score 80     BP (!) 140/83 (BP Location: Left arm, Patient Position: Sitting, BP Cuff Size: Adult)   Pulse 80   Temp 36.3 °C (97.4 °F)   Resp 18   Ht 1.727 m (5' 7.99\")   Wt 71.4 kg (157 lb 6.5 oz)   SpO2 94%   BMI 23.94 kg/m²   Physical Exam  Vitals reviewed.   HENT:      Head: Normocephalic.   Eyes:      Pupils: Pupils are equal, round, and reactive to light.   Cardiovascular:      Rate and Rhythm: Normal rate.   Pulmonary:      Effort: Pulmonary effort is normal.   Abdominal:      General: Abdomen is flat.   Musculoskeletal:         General: Normal range of motion.      Cervical back: Normal range of motion.   Skin:     General: Skin is warm.   Neurological:      General: No focal deficit present.      Mental Status: She is alert.   Psychiatric:         Mood and Affect: Mood normal.          LABORATORY DATA:   Lab Results   Component Value Date/Time    WBC 6.2 05/22/2024 1130    WBC 5.1 03/12/2024 1203    WBC 7.5 04/11/2023 0534    HEMOGLOBIN 13.8 05/22/2024 1130    HEMOGLOBIN 14.0 03/12/2024 1203    HEMOGLOBIN 10.6 (L) 04/11/2023 0534    HEMATOCRIT 43.1 05/22/2024 1130    HEMATOCRIT 41.1 03/12/2024 1203    HEMATOCRIT 33.7 (L) 04/11/2023 0534    MCV 98.6 (H) 05/22/2024 1130    MCV 95.6 03/12/2024 1203    .3 (H) 04/11/2023 0534    PLATELETCT 198 05/22/2024 1130    PLATELETCT 195 03/12/2024 1203    PLATELETCT 388 04/11/2023 0534    NEUTS 3.16 03/12/2024 1203    NEUTS 3.78 04/01/2023 0544    NEUTS " 2.69 03/21/2023 1357      Lab Results   Component Value Date/Time    SODIUM 140 04/30/2024 1122    SODIUM 140 03/12/2024 1203    SODIUM 139 04/03/2023 0529    POTASSIUM 4.0 04/30/2024 1122    POTASSIUM 4.0 03/12/2024 1203    POTASSIUM 4.2 04/03/2023 0529    BUN 12 04/30/2024 1122    BUN 14 03/12/2024 1203    BUN 17 04/03/2023 0529    CREATININE 0.54 04/30/2024 1122    CREATININE 0.70 04/26/2024 1029    CREATININE 0.54 03/12/2024 1203    CALCIUM 9.2 04/30/2024 1122    CALCIUM 8.8 03/12/2024 1203    CALCIUM 8.9 04/03/2023 0529    ALBUMIN 4.0 04/30/2024 1122    ALBUMIN 3.7 03/12/2024 1203    ALBUMIN 3.1 (L) 04/05/2023 0537    ASTSGOT 24 04/30/2024 1122    ASTSGOT 28 03/12/2024 1203    ASTSGOT 25 04/05/2023 0537    ALKPHOSPHAT 122 (H) 04/30/2024 1122    ALKPHOSPHAT 124 (H) 03/12/2024 1203    ALKPHOSPHAT 343 (H) 04/11/2023 0534    IFNOTAFR >60 01/11/2022 0339    IFNOTAFR >60 01/03/2022 1530    IFNOTAFR >60 12/06/2021 0527       RADIOLOGY DATA:  CT-ABDOMEN LIVER FOR HEPATIC MASS (CIRRHOSIS)    Result Date: 6/11/2024  6/10/2024 1:28 PM HISTORY/REASON FOR EXAM:  Liver lesion, > 1cm, US nondiagnostic; Evaluate liver tumor left lateral segment for portal vein involvement; Evaluate extent of left lateral segment adenocarcinoma/cholangiocarcinoma involvement of the kianna. TECHNIQUE/EXAM DESCRIPTION:  CT scan of the abdomen utilizing the multiphase hepatic CT protocol. Following nonionic contrast administration in an IV bolus fashion, thin section helical scanning was obtained of the abdomen in the arterial, portal venous and delayed phases. 100 mL of Omnipaque 350 nonionic contrast was administered. Low dose optimization technique was utilized for this CT exam including automated exposure control and adjustment of the mA and/or kV according to patient size. COMPARISON: MRI abdomen 5/15/2024, CT 4/26/2024 and additional FINDINGS: Liver: There is an approximately 4.4 x 4.3 x 3.2 cm ill-defined mildly enhancing mass within the  left lateral hepatic lobe on image 34 series 2. Better visualized/evaluated on recent MRI. When allowing for differences between CT and MRI, the mass appears  relatively stable. Hepatic arterial vasculature: Normal. Portal vein:. Right and main portal veins are patent. Diameter: 8 mm Splenic vein: Patent. Other organs: Lungs: Visualized lung bases are clear. Spleen: Normal in size, without focal lesion. Adrenal glands: Normal. Pancreas: Fatty replacement of the pancreas. No pancreatic lesion identified.. Gallbladder: Not visualized. Biliary: Dilated, 10 mm.. Kidneys: Normal. No stone or hydronephrosis. Vasculature: Nonaneurysmal. Lymph nodes: Relatively stable periportal lymph nodes. For example, 19 x 12 mm lymph node on image 43 series 4.. Bowel: No evidence of obstruction or acute inflammation. Appendix appears normal.. Peritoneum: No ascites. AP shunt catheter noted. Musculoskeletal: Previous vertebroplasty at L3.     1. Stable 4.4 cm left lateral lobe liver mass with left portal vein occlusion. 2. Stable periportal adenopathy, largest measuring 19 x 12 mm. 3. Common bile duct dilation, 10 mm. Possibly reservoir effect from previous cholecystectomy.    CT-NEEDLE BX-LIVER    Result Date: 5/23/2024 5/23/2024 2:10 PM HISTORY/REASON FOR EXAM:  Left lateral segment tumor rule out cholangiocarcinoma versus hepatocellular carcinoma; Rule out malignant tumor left lateral segment. Avidly enhancing mass left lobe of the liver, primarily segment 3 TECHNIQUE/EXAM DESCRIPTION: CT guided liver biopsy. Low dose optimization technique was utilized for this CT exam including automated exposure control and adjustment of the mA and/or kV according to patient size. ALL ELEMENTS OF MAXIMAL STERILE BARRIER TECHNIQUE WERE FOLLOWED. COMPARISON: CT of the abdomen and pelvis outside films 4/26/2024, MRI of the abdomen/liver 5/15/2024 PROCEDURE:  Informed consent was obtained.  CT localizing images were obtained with the patient in supine  position.  The patient was prepped and draped in a sterile manner. Moderate sedation was provided. Pulse oximetry and continuous cardiac monitoring by the nurse was performed throughout the exam. Intraservice time was 30 minutes. Following local anesthesia with 6 cc 1% lidocaine, a 19-gauge guiding needle was placed and needle position confirmed with CT. Using coaxial technique, 20 gauge core biopsy was performed x 6 and specimen material submitted in formalin. The patient tolerated the procedure well with no evidence of complication. FINDINGS: Needle confirmed in the targeted hypodense lesion occupying the left lobe, primarily segment 3.     1.  CT guided 20 gauge core biopsy of the left lobe of the liver targeting an approximately 47 mm lesion.      IMPRESSION:    A 84 y.o. with    Cholangiocarcinoma (HCC)  Staging form: Intrahepatic Bile Duct, AJCC 8th Edition  - Clinical stage from 6/21/2024: Stage IIIB (cT2, cN1, cM0) - Signed by Marco Tabor M.D. on 6/21/2024  Histologic grade (G): G2  Histologic grading system: 3 grade system        RECOMMENDATIONS:   Patient has excellent performance status despite her age.  She was presented multidisciplinary tumor board and recommendation was for upfront chemoimmunotherapy 6-8 cycles followed by consolidative chemoradiation to primary as well as periportal lymph node.  We discussed risk benefits patient minimal treatment I will do telephone follow-up with her in 3 to 4 months to check in how she is doing with chemotherapy but if she does not tolerate triplet systemic therapy would recommend chemoradiation sooner or possible just SBRT to the primary depending on her performance status.    Thank you for the opportunity to participate in her care.  If any questions or comments, please do not hesitate in calling.    Orders Placed This Encounter    Referral to Oncology Psychosocial Screening for Distress

## 2024-06-22 ASSESSMENT — ENCOUNTER SYMPTOMS
NAUSEA: 0
ABDOMINAL PAIN: 0
BLURRED VISION: 0
SORE THROAT: 0
ORTHOPNEA: 0
SPUTUM PRODUCTION: 0
COUGH: 0
HEARTBURN: 0
NERVOUS/ANXIOUS: 0
WEAKNESS: 1
CONSTIPATION: 0
TINGLING: 0
SHORTNESS OF BREATH: 0
BACK PAIN: 0
DIARRHEA: 0
BRUISES/BLEEDS EASILY: 0
WHEEZING: 0
BLOOD IN STOOL: 0
VOMITING: 0
DIAPHORESIS: 0
WEIGHT LOSS: 0
FEVER: 0
INSOMNIA: 0
DIZZINESS: 0
DOUBLE VISION: 0
HEADACHES: 0
SINUS PAIN: 0
MYALGIAS: 0
CHILLS: 0
PALPITATIONS: 0

## 2024-06-22 NOTE — PROGRESS NOTES
"Consult:  Hematology/Oncology      Referring Physician: James Moreno MD  Primary Care:  Pcp Pt States None    Diagnosis: Intrahepatic cholangiocarcinoma    Chief Complaint:  Evaluation for systemic therapy    History of Presenting Illness:  Aaliyah Fulton is a 84 y.o.  woman who presents to the clinic for evaluation for systemic therapy for a diagnosis of intrahepatic cholangiocarcinoma. She was incidentally found to have a liver mass when being worked up for kidney issues with a CT scan, and that was subsequently biopsied and found to be a moderate-to-poorly differentiated adenocarcinoma. She was evaluated by surgical oncology and felt to not be a surgical candidate at this time, so she was referred for medical and radiation oncology evaluation.     Interval History:  Patient is here for consultation. She feels fine for the most part and has no complaints. Her daughter is with her today. She is still in a bit of shock regarding her diagnosis.     Past Medical History:   Diagnosis Date    Arthritis     ASTHMA     CHEMICAL INDUCED    Breast cancer (Formerly [facility])     stage 0 - Dr. Naqvi    Cancer (Formerly [facility]) 2010    right breast-lumpectomy. Radiation, no chemo    Cataract     silver iol    Dental disorder     upper implants    Heart burn     taking omeprazole    Heart murmur     High cholesterol     History of cervical fracture 2022    No surgery. Had to wear brace only.    Hypertension 01/03/2022    states well controlled on meds    Indigestion     taking omeprazole    Osteoporosis     Pain     \"everywhere\"    Pneumonia 12/2021    on antibiotics    PONV (postoperative nausea and vomiting)     Have always had this.    Psychiatric problem     depression on zoloft    Stroke (Formerly [facility]) 2021    Some memory problems and expressive aphasia.    Unspecified urinary incontinence     wears depends       Past Surgical History:   Procedure Laterality Date    PB TOTAL KNEE ARTHROPLASTY Left 03/28/2023    Procedure: LEFT TOTAL KNEE " ARTHROPLASTY;  Surgeon: Marcus Hall M.D.;  Location: SURGERY [facility];  Service: Orthopedics    CA CREATE SHUNT:VENTRIC-PERITONEAL  01/10/2022    Procedure: INSERTION, SHUNT, VENTRICULOPERITONEAL, LAPAROSCOPIC PERITONEAL CATHETER - STEALTH GUIDED;  Surgeon: Gregory Blanchard M.D.;  Location: SURGERY [facility];  Service: Neurosurgery    PB PARTIAL HIP REPLACEMENT Left 11/24/2021    Procedure: HEMIARTHROPLASTY, HIP;  Surgeon: Marlon Culver M.D.;  Location: SURGERY [facility];  Service: Orthopedics    RECOVERY  05/10/2016    Procedure: [id number]-ANESTHESIA;  Surgeon: Recoveryon Surgery;  Location: SURGERY PRE-POST PROC UNIT [facility];  Service:     LUMBAR LAMINECTOMY DISKECTOMY  10/17/2012    Performed by Daksha Melendez M.D. at SURGERY [facility] ORS    FORAMINOTOMY  10/17/2012    Performed by Daksha Melendez M.D. at SURGERY [facility] ORS    LUMBAR DECOMPRESSION  10/17/2012    Performed by Daksha Melendez M.D. at SURGERY [facility] ORS    KNEE ARTHROPLASTY TOTAL  09/19/2011    Performed by KISHAN CARDENAS at SURGERY [facility] ORS    BREAST BIOPSY  05/21/2010    Performed by ROB TOMAS at SURGERY SAME DAY [facility] ORS    CHOLECYSTECTOMY      HAMMERTOE CORRECTION Bilateral     with bunions    KNEE ARTHROSCOPY      LUMPECTOMY      OTHER ORTHOPEDIC SURGERY      foot, shoulder, and knee    CA RADIATION THERAPY PLAN SIMPLE      SHOULDER SURGERY      SINUSCOPY         Social History     Socioeconomic History    Marital status:      Spouse name: Not on file    Number of children: Not on file    Years of education: Not on file    Highest education level: Not on file   Occupational History    Not on file   Tobacco Use    Smoking status: Never    Smokeless tobacco: Never   Vaping Use    Vaping status: Never Used   Substance and Sexual Activity    Alcohol use: No    Drug use: Never    Sexual activity: Not on file     Comment: ; two daughters; retired ( @  [employer])   Other Topics Concern    Not on file   Social History Narrative    Retired-       Social Determinants of Health     Financial Resource Strain: Not on file   Food Insecurity: Not on file   Transportation Needs: No Transportation Needs (4/3/2023)    PRAPARE - Transportation     Lack of Transportation (Medical): No     Lack of Transportation (Non-Medical): No   Physical Activity: Not on file   Stress: Not on file   Social Connections: Not on file   Intimate Partner Violence: Not on file   Housing Stability: Not on file       Family History   Problem Relation Age of Onset    Lung Disease Mother         copd    Stroke Mother     Cancer Daughter         Breast    Breast Cancer Daughter     Psychiatric Illness Daughter         Anxiety    Psychiatric Illness Daughter         Anxiety and Depression       OB History   No obstetric history on file.       Allergies as of 06/21/2024    (No Known Allergies)         Current Outpatient Medications:     omeprazole (PRILOSEC) 40 MG delayed-release capsule, Take 1 Capsule by mouth every day., Disp: , Rfl:     oxybutynin (DITROPAN XL) 15 MG CR tablet, Take 1 Tablet by mouth every day., Disp: , Rfl:     methenamine hip (HIPPREX) 1 GM Tab, Take 1 g by mouth 2 times a day. LONG TERM TREATMENT FOR CHRONIC UTI.  Indications: Urinary Tract Infection, Disp: , Rfl:     estradiol (ESTRACE) 0.1 MG/GM vaginal cream, Insert 2 g into the vagina. Three times/week, Disp: , Rfl:     Calcium Carbonate-Vitamin D (CALCIUM-VITAMIN D3 PO), Take 600 mg by mouth every morning., Disp: , Rfl:     vitamin D3 (CHOLECALCIFEROL) 1000 Unit (25 mcg) Tab, Take 2,000 Units by mouth every morning., Disp: , Rfl:     busPIRone (BUSPAR) 15 MG tablet, Take 15 mg by mouth 2 times a day., Disp: , Rfl:     acetaminophen (TYLENOL) 500 MG Tab, Take 500-1,000 mg by mouth every 6 hours as needed for Moderate Pain., Disp: , Rfl:     D-MANNOSE PO, Take 500 mg by mouth every evening., Disp: , Rfl:      venlafaxine (EFFEXOR-XR) 150 MG extended-release capsule, Take 1 Capsule by mouth every day. Taken with 75 mg for 225mg daily, Disp: 30 Capsule, Rfl: 2    fosfomycin (MONUROL) 3 GM Pack, Take 3 g by mouth every 3 days., Disp: 2 Each, Rfl: 0    atorvastatin (LIPITOR) 40 MG Tab, TAKE ONE TABLET BY MOUTH EVERY EVENING, Disp: 90 Tablet, Rfl: 0    senna-docusate (PERICOLACE OR SENOKOT S) 8.6-50 MG Tab, Take 2 Tablets by mouth 2 times a day., Disp: 120 Tablet, Rfl: 0    VENTOLIN  (90 Base) MCG/ACT Aero Soln inhalation aerosol, 2 Puffs every 6 hours as needed., Disp: , Rfl:     omeprazole (PRILOSEC) 40 MG delayed-release capsule, Take 1 Capsule by mouth every day. (Patient taking differently: Take 40 mg by mouth every morning.), Disp: 30 Capsule, Rfl: 2    valsartan (DIOVAN) 160 MG Tab, Take 1 Tablet by mouth every day., Disp: 30 Tablet, Rfl: 2    Review of Systems:  Review of Systems   Constitutional:  Negative for chills, diaphoresis, fever, malaise/fatigue and weight loss.   HENT:  Negative for hearing loss, nosebleeds, sinus pain and sore throat.    Eyes:  Negative for blurred vision and double vision.   Respiratory:  Negative for cough, sputum production, shortness of breath and wheezing.    Cardiovascular:  Negative for chest pain, palpitations, orthopnea and leg swelling.   Gastrointestinal:  Negative for abdominal pain, blood in stool, constipation, diarrhea, heartburn, melena, nausea and vomiting.   Genitourinary:  Negative for dysuria, frequency, hematuria and urgency.   Musculoskeletal:  Negative for back pain, joint pain and myalgias.   Skin:  Negative for rash.   Neurological:  Positive for weakness. Negative for dizziness, tingling and headaches.   Endo/Heme/Allergies:  Does not bruise/bleed easily.   Psychiatric/Behavioral:  The patient is not nervous/anxious and does not have insomnia.           Physical Exam:  Vitals:    06/21/24 1100   BP: 124/76   Pulse: 83   Resp: 16   Temp: 36.7 °C (98.1 °F)  "  SpO2: 96%   Weight: 70.9 kg (156 lb 6.4 oz)   Height: 1.727 m (5' 7.99\")       DESC; KARNOFSKY SCALE WITH ECOG EQUIVALENT: 70, Cares for self; unable to carry on normal activity or to do active work (ECOG equivalent 1)    DISTRESS LEVEL: no apparent distress    Physical Exam  Vitals and nursing note reviewed.   Constitutional:       General: She is awake. She is not in acute distress.     Appearance: Normal appearance. She is normal weight. She is not ill-appearing, toxic-appearing or diaphoretic.   HENT:      Head: Normocephalic and atraumatic.      Nose: Nose normal. No congestion.      Mouth/Throat:      Pharynx: Oropharynx is clear. No oropharyngeal exudate or posterior oropharyngeal erythema.   Eyes:      General: No scleral icterus.     Extraocular Movements: Extraocular movements intact.      Conjunctiva/sclera: Conjunctivae normal.      Pupils: Pupils are equal, round, and reactive to light.   Cardiovascular:      Rate and Rhythm: Normal rate and regular rhythm.      Pulses: Normal pulses.      Heart sounds: Normal heart sounds. No murmur heard.     No friction rub. No gallop.   Pulmonary:      Effort: Pulmonary effort is normal.      Breath sounds: Normal breath sounds. No decreased air movement. No wheezing, rhonchi or rales.   Abdominal:      General: Bowel sounds are normal. There is no distension.      Tenderness: There is no abdominal tenderness.   Musculoskeletal:         General: No deformity. Normal range of motion.      Cervical back: Normal range of motion and neck supple. No tenderness.      Right lower leg: No edema.      Left lower leg: No edema.   Lymphadenopathy:      Cervical: No cervical adenopathy.      Upper Body:      Right upper body: No axillary adenopathy.      Left upper body: No axillary adenopathy.      Lower Body: No right inguinal adenopathy. No left inguinal adenopathy.   Skin:     General: Skin is warm and dry.      Coloration: Skin is not jaundiced.      Findings: No " erythema or rash.   Neurological:      General: No focal deficit present.      Mental Status: She is alert and oriented to person, place, and time.      Sensory: Sensation is intact.      Motor: Weakness present.      Gait: Gait abnormal (Ambulating with a walker).   Psychiatric:         Attention and Perception: Attention normal.         Mood and Affect: Mood normal.         Behavior: Behavior normal. Behavior is cooperative.         Thought Content: Thought content normal.         Judgment: Judgment normal.          Depression Screening    Little interest or pleasure in doing things?      Feeling down, depressed , or hopeless?     Trouble falling or staying asleep, or sleeping too much?      Feeling tired or having little energy?      Poor appetite or overeating?      Feeling bad about yourself - or that you are a failure or have let yourself or your family down?     Trouble concentrating on things, such as reading the newspaper or watching television?     Moving or speaking so slowly that other people could have noticed.  Or the opposite - being so fidgety or restless that you have been moving around a lot more than usual?      Thoughts that you would be better off dead, or of hurting yourself?      Patient Health Questionnaire Score:         If depressive symptoms identified deferred to follow up visit unless specifically addressed in assesment and plan.    Interpretation of PHQ-9 Total Score   Score Severity   1-4 No Depression   5-9 Mild Depression   10-14 Moderate Depression   15-19 Moderately Severe Depression   20-27 Severe Depression    Labs:  No visits with results within 7 Day(s) from this visit.   Latest known visit with results is:   Hospital Outpatient Visit on 06/04/2024   Component Date Value Ref Range Status    Didier-gamma-carboxy Prothrombin 06/04/2024 0.3  0.0 - 7.4 ng/mL Final    Comment: INTERPRETIVE INFORMATION: Didier-gamma-carboxy Prothrombin  The [facility] method is used. Results obtained with  different assay  methods or kits cannot be used interchangeably. The  adam-gamma-carboxy prothrombin (DCP) assay is intended as a risk  assessment for the development of hepatocellular carcinoma in  patients with chronic liver diseases. Elevated DCP values have  been shown to be associated with an increased risk for developing  hepatocellular carcinoma. Patients with elevated serum DCP should  be more intensely evaluated for evidence of hepatocellular  carcinoma. Results cannot be interpreted as absolute evidence of  the presence or absence of malignant disease.  Medication containing vitamin K preparations may cause a negative  bias of the DCP values.  Medication containing vitamin K antagonist or antibiotic may cause  a positive bias of the DCP values.  Performed By: [employer]  [address] [place] [id number]  : Jeromy Mcfadden MD, PhD  C                           BARBRA Number: [id number]      Ca 19-9 06/04/2024 19.0  0.0 - 35.0 U/mL Final    Comment: Performed using Roche dariusz e immunoassay analyzer.  CA 19 9 values  determined on patient samples by different testing procedures cannot be  directly compared with one another and could be the cause of erroneous  medical interpretations. If there is a change in the CA 19 9 assay procedure  used while monitoring therapy, then new baselines may need to be established  when comparing previous results.         Imaging:   All listed images below have been independently reviewed by me. I agree with the findings as summarized below:    CT-ABDOMEN LIVER FOR HEPATIC MASS (CIRRHOSIS)    Result Date: 6/11/2024  6/10/2024 1:28 PM HISTORY/REASON FOR EXAM:  Liver lesion, > 1cm, US nondiagnostic; Evaluate liver tumor left lateral segment for portal vein involvement; Evaluate extent of left lateral segment adenocarcinoma/cholangiocarcinoma involvement of the kianna. TECHNIQUE/EXAM DESCRIPTION:  CT scan of the abdomen utilizing the multiphase  hepatic CT protocol. Following nonionic contrast administration in an IV bolus fashion, thin section helical scanning was obtained of the abdomen in the arterial, portal venous and delayed phases. 100 mL of Omnipaque 350 nonionic contrast was administered. Low dose optimization technique was utilized for this CT exam including automated exposure control and adjustment of the mA and/or kV according to patient size. COMPARISON: MRI abdomen 5/15/2024, CT 4/26/2024 and additional FINDINGS: Liver: There is an approximately 4.4 x 4.3 x 3.2 cm ill-defined mildly enhancing mass within the left lateral hepatic lobe on image 34 series 2. Better visualized/evaluated on recent MRI. When allowing for differences between CT and MRI, the mass appears  relatively stable. Hepatic arterial vasculature: Normal. Portal vein:. Right and main portal veins are patent. Diameter: 8 mm Splenic vein: Patent. Other organs: Lungs: Visualized lung bases are clear. Spleen: Normal in size, without focal lesion. Adrenal glands: Normal. Pancreas: Fatty replacement of the pancreas. No pancreatic lesion identified.. Gallbladder: Not visualized. Biliary: Dilated, 10 mm.. Kidneys: Normal. No stone or hydronephrosis. Vasculature: Nonaneurysmal. Lymph nodes: Relatively stable periportal lymph nodes. For example, 19 x 12 mm lymph node on image 43 series 4.. Bowel: No evidence of obstruction or acute inflammation. Appendix appears normal.. Peritoneum: No ascites. AP shunt catheter noted. Musculoskeletal: Previous vertebroplasty at L3.     1. Stable 4.4 cm left lateral lobe liver mass with left portal vein occlusion. 2. Stable periportal adenopathy, largest measuring 19 x 12 mm. 3. Common bile duct dilation, 10 mm. Possibly reservoir effect from previous cholecystectomy.    CT-NEEDLE BX-LIVER    Result Date: 5/23/2024 5/23/2024 2:10 PM HISTORY/REASON FOR EXAM:  Left lateral segment tumor rule out cholangiocarcinoma versus hepatocellular carcinoma; Rule out  malignant tumor left lateral segment. Avidly enhancing mass left lobe of the liver, primarily segment 3 TECHNIQUE/EXAM DESCRIPTION: CT guided liver biopsy. Low dose optimization technique was utilized for this CT exam including automated exposure control and adjustment of the mA and/or kV according to patient size. ALL ELEMENTS OF MAXIMAL STERILE BARRIER TECHNIQUE WERE FOLLOWED. COMPARISON: CT of the abdomen and pelvis outside films 4/26/2024, MRI of the abdomen/liver 5/15/2024 PROCEDURE:  Informed consent was obtained.  CT localizing images were obtained with the patient in supine position.  The patient was prepped and draped in a sterile manner. Moderate sedation was provided. Pulse oximetry and continuous cardiac monitoring by the nurse was performed throughout the exam. Intraservice time was 30 minutes. Following local anesthesia with 6 cc 1% lidocaine, a 19-gauge guiding needle was placed and needle position confirmed with CT. Using coaxial technique, 20 gauge core biopsy was performed x 6 and specimen material submitted in formalin. The patient tolerated the procedure well with no evidence of complication. FINDINGS: Needle confirmed in the targeted hypodense lesion occupying the left lobe, primarily segment 3.     1.  CT guided 20 gauge core biopsy of the left lobe of the liver targeting an approximately 47 mm lesion.       Pathology:    FINAL DIAGNOSIS:     A. Liver mass biopsy:          Moderately to poorly differentiated adenocarcinoma.     Comment: The tumor shows a non-specific immunophenotype (positive for   CK7, negative for CDX2, GATA3, ER), but supports cholangiocarcinoma in   the proper clinical context.                                           Diagnosis performed by:                                       ELANA ADAMS DO     Assessment & Plan:  1. Cholangiocarcinoma (HCC)          This is an 84 year old  woman with intrahepatic cholangiocarcinoma, [id number] stage IIIb disease. She presents  for evaluation.     Current Diagnosis and Staging: Intrahepatic cholangiocarcinoma, [id number] stage IIIb disease.     Treatment Plan: Gemcitabine, cisplatin, durvalumab followed by maintenance durvalumab and capecitabine with concurrent XRT; could consider radiation therapy alone for palliation as well.     Treatment Citation: TOPAZ-1 trial, [facility] Evidence 2022; NCCN    Plan of Care:    Primary Therapy: TBD - consider gem/cis/durva  Supportive Therapy: Antiemetics per protocol  Toxicity: Patient is getting antineoplastic therapy and needs monitoring of blood counts, hepatic function, and renal function due to potential for organ dysfunction.   Labs: CBC with diff, CMP, TSH, Free T4, Ca 19-9. NGS testing requested.   Imaging: PET scan pending  Treatment Planning: Patient is unresectable at current, and therefore options include palliative chemoimmunotherapy and maintenance immunotherapy with capecitabine/RT, or palliative RT alone.   Consultations: Radiation oncology (Dr. Tabor); Surgical oncology (Dr. Moreno)  Code Status: Full  Miscellaneous: NA  Return for Follow Up: After scans for discussion of plan of care    Any questions and concerns raised by the patient were answered to the best of my ability. Thank you for allowing me to participate in the care for this patient. Please feel free to contact me for any questions or concerns.     Total time spent on chart review, clinic encounter, and documentation: 63 minutes.

## 2024-06-25 ENCOUNTER — HOSPITAL ENCOUNTER (OUTPATIENT)
Dept: HEMATOLOGY ONCOLOGY | Facility: MEDICAL CENTER | Age: 84
End: 2024-06-25
Attending: STUDENT IN AN ORGANIZED HEALTH CARE EDUCATION/TRAINING PROGRAM
Payer: MEDICARE

## 2024-06-25 DIAGNOSIS — Z79.899 HIGH RISK MEDICATION USE: ICD-10-CM

## 2024-06-25 DIAGNOSIS — R16.0 LIVER MASS: ICD-10-CM

## 2024-06-25 DIAGNOSIS — C22.1 CHOLANGIOCARCINOMA (HCC): ICD-10-CM

## 2024-06-25 RX ORDER — 0.9 % SODIUM CHLORIDE 0.9 %
3 VIAL (ML) INJECTION PRN
OUTPATIENT
Start: 2024-06-26

## 2024-06-25 RX ORDER — 0.9 % SODIUM CHLORIDE 0.9 %
10 VIAL (ML) INJECTION PRN
OUTPATIENT
Start: 2024-06-26

## 2024-06-25 RX ORDER — SODIUM CHLORIDE 9 MG/ML
INJECTION, SOLUTION INTRAVENOUS CONTINUOUS
OUTPATIENT
Start: 2024-07-04

## 2024-06-25 RX ORDER — ONDANSETRON 2 MG/ML
4 INJECTION INTRAMUSCULAR; INTRAVENOUS PRN
OUTPATIENT
Start: 2024-06-27

## 2024-06-25 RX ORDER — DIPHENHYDRAMINE HYDROCHLORIDE 50 MG/ML
50 INJECTION INTRAMUSCULAR; INTRAVENOUS PRN
OUTPATIENT
Start: 2024-06-27

## 2024-06-25 RX ORDER — 0.9 % SODIUM CHLORIDE 0.9 %
VIAL (ML) INJECTION PRN
OUTPATIENT
Start: 2024-06-26

## 2024-06-25 RX ORDER — 0.9 % SODIUM CHLORIDE 0.9 %
10 VIAL (ML) INJECTION PRN
OUTPATIENT
Start: 2024-06-27

## 2024-06-25 RX ORDER — SODIUM CHLORIDE 9 MG/ML
500 INJECTION, SOLUTION INTRAVENOUS ONCE
OUTPATIENT
Start: 2024-07-04

## 2024-06-25 RX ORDER — ONDANSETRON 8 MG/1
8 TABLET, ORALLY DISINTEGRATING ORAL PRN
OUTPATIENT
Start: 2024-06-27

## 2024-06-25 RX ORDER — 0.9 % SODIUM CHLORIDE 0.9 %
10 VIAL (ML) INJECTION PRN
OUTPATIENT
Start: 2024-07-04

## 2024-06-25 RX ORDER — METHYLPREDNISOLONE SODIUM SUCCINATE 125 MG/2ML
125 INJECTION, POWDER, LYOPHILIZED, FOR SOLUTION INTRAMUSCULAR; INTRAVENOUS PRN
OUTPATIENT
Start: 2024-06-27

## 2024-06-25 RX ORDER — 0.9 % SODIUM CHLORIDE 0.9 %
VIAL (ML) INJECTION PRN
OUTPATIENT
Start: 2024-06-27

## 2024-06-25 RX ORDER — 0.9 % SODIUM CHLORIDE 0.9 %
3 VIAL (ML) INJECTION PRN
OUTPATIENT
Start: 2024-06-27

## 2024-06-25 RX ORDER — PROCHLORPERAZINE MALEATE 10 MG
10 TABLET ORAL EVERY 6 HOURS PRN
Qty: 30 TABLET | Refills: 6 | Status: SHIPPED | OUTPATIENT
Start: 2024-06-25

## 2024-06-25 RX ORDER — PROCHLORPERAZINE MALEATE 10 MG
10 TABLET ORAL EVERY 6 HOURS PRN
OUTPATIENT
Start: 2024-07-04

## 2024-06-25 RX ORDER — METHYLPREDNISOLONE SODIUM SUCCINATE 125 MG/2ML
125 INJECTION, POWDER, LYOPHILIZED, FOR SOLUTION INTRAMUSCULAR; INTRAVENOUS PRN
OUTPATIENT
Start: 2024-07-04

## 2024-06-25 RX ORDER — SODIUM CHLORIDE 9 MG/ML
500 INJECTION, SOLUTION INTRAVENOUS ONCE
OUTPATIENT
Start: 2024-06-27

## 2024-06-25 RX ORDER — 0.9 % SODIUM CHLORIDE 0.9 %
3 VIAL (ML) INJECTION PRN
OUTPATIENT
Start: 2024-07-04

## 2024-06-25 RX ORDER — ONDANSETRON 4 MG/1
4 TABLET, FILM COATED ORAL EVERY 4 HOURS PRN
Qty: 30 TABLET | Refills: 6 | Status: SHIPPED | OUTPATIENT
Start: 2024-06-25

## 2024-06-25 RX ORDER — 0.9 % SODIUM CHLORIDE 0.9 %
VIAL (ML) INJECTION PRN
OUTPATIENT
Start: 2024-07-04

## 2024-06-25 RX ORDER — SODIUM CHLORIDE 9 MG/ML
INJECTION, SOLUTION INTRAVENOUS CONTINUOUS
OUTPATIENT
Start: 2024-06-27

## 2024-06-25 RX ORDER — ONDANSETRON 8 MG/1
8 TABLET, ORALLY DISINTEGRATING ORAL PRN
OUTPATIENT
Start: 2024-07-04

## 2024-06-25 RX ORDER — EPINEPHRINE 1 MG/ML(1)
0.5 AMPUL (ML) INJECTION PRN
OUTPATIENT
Start: 2024-06-27

## 2024-06-25 RX ORDER — EPINEPHRINE 1 MG/ML(1)
0.5 AMPUL (ML) INJECTION PRN
OUTPATIENT
Start: 2024-07-04

## 2024-06-25 RX ORDER — PROCHLORPERAZINE MALEATE 10 MG
10 TABLET ORAL EVERY 6 HOURS PRN
OUTPATIENT
Start: 2024-06-27

## 2024-06-25 RX ORDER — ONDANSETRON 2 MG/ML
4 INJECTION INTRAMUSCULAR; INTRAVENOUS PRN
OUTPATIENT
Start: 2024-07-04

## 2024-06-25 RX ORDER — DIPHENHYDRAMINE HYDROCHLORIDE 50 MG/ML
50 INJECTION INTRAMUSCULAR; INTRAVENOUS PRN
OUTPATIENT
Start: 2024-07-04

## 2024-06-25 NOTE — PROGRESS NOTES
The following appointments, labs, and medications have been provided to the patient and her two daughters:  Line: NA  ECHO: NA  WBC Support (g-csf): NA  Labs: CBC, CMP, TSH, Free T4  Follow up appts: TBD  Chemo/immunotherapy class: Completed 6/25/2024  Chemotherapy Regimen [place]/Cis/Durv per Dr. Arias  Nausea medication: zofran/compazine prescribed  Other treatment specific meds: NA  Oral chemo follow up: NA  Pain medication: Per provider discretion  Nurse jayme: Referred on 6/7/2024   Dietician:  ARI  SW: ARI  FRA: Referred on 6/7/2024    Additional info/teaching for immunotherapy patients:  If hospitalized, inform staff of immunotherapy treatment and have them contact oncologist - immunotherapy alert card provided.    Additional info/teaching for chemotherapy patients:  Neutropenia reminder card provided to patient    Additional teaching:  Link to NCCN patient guidelines provided to pt and daughters.    Patient was provided with drug specific handouts and [facility] side effects sheet. Patient verbalized that questions and concerns regarding treatment have been addressed. Consent to proceed with treatment was reviewed and signed during this visit.    Spent 60 minutes of continuous, non-interrupted, face-to-face patient contact in which greater than 50% of the visit was spent counseling and coordinating of care.

## 2024-07-05 ENCOUNTER — PATIENT MESSAGE (OUTPATIENT)
Dept: HEMATOLOGY ONCOLOGY | Facility: MEDICAL CENTER | Age: 84
End: 2024-07-05
Payer: MEDICARE

## 2024-07-12 ENCOUNTER — APPOINTMENT (OUTPATIENT)
Dept: PHARMACY | Facility: MEDICAL CENTER | Age: 84
End: 2024-07-12
Payer: MEDICARE

## 2024-07-15 ENCOUNTER — APPOINTMENT (OUTPATIENT)
Dept: OCCUPATIONAL THERAPY | Facility: REHABILITATION | Age: 84
End: 2024-07-15
Attending: INTERNAL MEDICINE
Payer: MEDICARE

## 2024-07-16 ENCOUNTER — HOSPITAL ENCOUNTER (OUTPATIENT)
Dept: LAB | Facility: MEDICAL CENTER | Age: 84
End: 2024-07-16
Attending: STUDENT IN AN ORGANIZED HEALTH CARE EDUCATION/TRAINING PROGRAM
Payer: MEDICARE

## 2024-07-16 ENCOUNTER — HOSPITAL ENCOUNTER (OUTPATIENT)
Dept: RADIOLOGY | Facility: MEDICAL CENTER | Age: 84
End: 2024-07-16
Attending: SURGERY
Payer: MEDICARE

## 2024-07-16 DIAGNOSIS — K76.9 LIVER DISEASE: ICD-10-CM

## 2024-07-16 DIAGNOSIS — C22.0 HEPATOCELLULAR CARCINOMA (HCC): ICD-10-CM

## 2024-07-16 DIAGNOSIS — K21.9 GASTROESOPHAGEAL REFLUX DISEASE WITHOUT ESOPHAGITIS: ICD-10-CM

## 2024-07-16 DIAGNOSIS — R16.0 LIVER MASS: ICD-10-CM

## 2024-07-16 DIAGNOSIS — C22.1 CHOLANGIOCARCINOMA (HCC): ICD-10-CM

## 2024-07-16 DIAGNOSIS — D05.91 CARCINOMA IN SITU OF RIGHT BREAST, UNSPECIFIED TYPE: ICD-10-CM

## 2024-07-16 DIAGNOSIS — R93.5 ABNORMAL CT OF THE ABDOMEN: ICD-10-CM

## 2024-07-16 DIAGNOSIS — Z79.899 HIGH RISK MEDICATION USE: ICD-10-CM

## 2024-07-16 LAB
ALBUMIN SERPL BCP-MCNC: 3.5 G/DL (ref 3.2–4.9)
ALBUMIN/GLOB SERPL: 0.9 G/DL
ALP SERPL-CCNC: 147 U/L (ref 30–99)
ALT SERPL-CCNC: 9 U/L (ref 2–50)
ANION GAP SERPL CALC-SCNC: 13 MMOL/L (ref 7–16)
AST SERPL-CCNC: 25 U/L (ref 12–45)
BASOPHILS # BLD AUTO: 0.4 % (ref 0–1.8)
BASOPHILS # BLD: 0.02 K/UL (ref 0–0.12)
BILIRUB SERPL-MCNC: 0.4 MG/DL (ref 0.1–1.5)
BUN SERPL-MCNC: 17 MG/DL (ref 8–22)
CALCIUM ALBUM COR SERPL-MCNC: 9.7 MG/DL (ref 8.5–10.5)
CALCIUM SERPL-MCNC: 9.3 MG/DL (ref 8.5–10.5)
CANCER AG19-9 SERPL-ACNC: 18.1 U/ML (ref 0–35)
CHLORIDE SERPL-SCNC: 102 MMOL/L (ref 96–112)
CO2 SERPL-SCNC: 21 MMOL/L (ref 20–33)
CREAT SERPL-MCNC: 0.76 MG/DL (ref 0.5–1.4)
EOSINOPHIL # BLD AUTO: 0.08 K/UL (ref 0–0.51)
EOSINOPHIL NFR BLD: 1.6 % (ref 0–6.9)
ERYTHROCYTE [DISTWIDTH] IN BLOOD BY AUTOMATED COUNT: 47.8 FL (ref 35.9–50)
GFR SERPLBLD CREATININE-BSD FMLA CKD-EPI: 76 ML/MIN/1.73 M 2
GLOBULIN SER CALC-MCNC: 3.7 G/DL (ref 1.9–3.5)
GLUCOSE BLD-MCNC: 89 MG/DL (ref 65–99)
GLUCOSE SERPL-MCNC: 89 MG/DL (ref 65–99)
HCT VFR BLD AUTO: 46.9 % (ref 37–47)
HGB BLD-MCNC: 14.5 G/DL (ref 12–16)
IMM GRANULOCYTES # BLD AUTO: 0.01 K/UL (ref 0–0.11)
IMM GRANULOCYTES NFR BLD AUTO: 0.2 % (ref 0–0.9)
LYMPHOCYTES # BLD AUTO: 1.52 K/UL (ref 1–4.8)
LYMPHOCYTES NFR BLD: 29.8 % (ref 22–41)
MAGNESIUM SERPL-MCNC: 2.1 MG/DL (ref 1.5–2.5)
MCH RBC QN AUTO: 32 PG (ref 27–33)
MCHC RBC AUTO-ENTMCNC: 30.9 G/DL (ref 32.2–35.5)
MCV RBC AUTO: 103.5 FL (ref 81.4–97.8)
MONOCYTES # BLD AUTO: 0.48 K/UL (ref 0–0.85)
MONOCYTES NFR BLD AUTO: 9.4 % (ref 0–13.4)
NEUTROPHILS # BLD AUTO: 2.99 K/UL (ref 1.82–7.42)
NEUTROPHILS NFR BLD: 58.6 % (ref 44–72)
NRBC # BLD AUTO: 0 K/UL
NRBC BLD-RTO: 0 /100 WBC (ref 0–0.2)
PLATELET # BLD AUTO: 167 K/UL (ref 164–446)
PMV BLD AUTO: 11.2 FL (ref 9–12.9)
POTASSIUM SERPL-SCNC: 4.4 MMOL/L (ref 3.6–5.5)
PROT SERPL-MCNC: 7.2 G/DL (ref 6–8.2)
RBC # BLD AUTO: 4.53 M/UL (ref 4.2–5.4)
SODIUM SERPL-SCNC: 136 MMOL/L (ref 135–145)
T4 FREE SERPL-MCNC: 0.95 NG/DL (ref 0.93–1.7)
TSH SERPL-ACNC: 2.53 UIU/ML (ref 0.35–5.5)
WBC # BLD AUTO: 5.1 K/UL (ref 4.8–10.8)

## 2024-07-16 PROCEDURE — 80053 COMPREHEN METABOLIC PANEL: CPT

## 2024-07-16 PROCEDURE — A9552 F18 FDG: HCPCS

## 2024-07-16 PROCEDURE — 84439 ASSAY OF FREE THYROXINE: CPT

## 2024-07-16 PROCEDURE — 36415 COLL VENOUS BLD VENIPUNCTURE: CPT

## 2024-07-16 PROCEDURE — 85025 COMPLETE CBC W/AUTO DIFF WBC: CPT

## 2024-07-16 PROCEDURE — 83735 ASSAY OF MAGNESIUM: CPT

## 2024-07-16 PROCEDURE — 86301 IMMUNOASSAY TUMOR CA 19-9: CPT

## 2024-07-16 PROCEDURE — 84443 ASSAY THYROID STIM HORMONE: CPT

## 2024-07-18 ENCOUNTER — HOSPITAL ENCOUNTER (OUTPATIENT)
Dept: HEMATOLOGY ONCOLOGY | Facility: MEDICAL CENTER | Age: 84
End: 2024-07-18
Attending: NURSE PRACTITIONER
Payer: MEDICARE

## 2024-07-18 ENCOUNTER — TELEPHONE (OUTPATIENT)
Dept: RADIATION ONCOLOGY | Facility: MEDICAL CENTER | Age: 84
End: 2024-07-18
Payer: MEDICARE

## 2024-07-18 VITALS
HEART RATE: 72 BPM | DIASTOLIC BLOOD PRESSURE: 60 MMHG | WEIGHT: 159 LBS | OXYGEN SATURATION: 93 % | HEIGHT: 68 IN | BODY MASS INDEX: 24.1 KG/M2 | TEMPERATURE: 99.1 F | SYSTOLIC BLOOD PRESSURE: 124 MMHG

## 2024-07-18 DIAGNOSIS — Z79.899 ENCOUNTER FOR LONG-TERM CURRENT USE OF HIGH RISK MEDICATION: ICD-10-CM

## 2024-07-18 DIAGNOSIS — C22.0 HEPATOCELLULAR CARCINOMA (HCC): ICD-10-CM

## 2024-07-18 PROCEDURE — 99212 OFFICE O/P EST SF 10 MIN: CPT | Performed by: NURSE PRACTITIONER

## 2024-07-18 PROCEDURE — 99214 OFFICE O/P EST MOD 30 MIN: CPT | Performed by: NURSE PRACTITIONER

## 2024-07-18 ASSESSMENT — ENCOUNTER SYMPTOMS
MYALGIAS: 0
DIZZINESS: 1
CHILLS: 0
NAUSEA: 0
FEVER: 0
HEADACHES: 0
BLOOD IN STOOL: 0
WEIGHT LOSS: 0
WHEEZING: 0
TINGLING: 0
DIARRHEA: 0
VOMITING: 0
COUGH: 0
CONSTIPATION: 0
SHORTNESS OF BREATH: 0
INSOMNIA: 0
PALPITATIONS: 0

## 2024-07-18 ASSESSMENT — PATIENT HEALTH QUESTIONNAIRE - PHQ9
2. FEELING DOWN, DEPRESSED, IRRITABLE, OR HOPELESS: NOT AT ALL
4. FEELING TIRED OR HAVING LITTLE ENERGY: NOT AT ALL
9. THOUGHTS THAT YOU WOULD BE BETTER OFF DEAD, OR OF HURTING YOURSELF: NOT AT ALL
5. POOR APPETITE OR OVEREATING: NOT AT ALL
6. FEELING BAD ABOUT YOURSELF - OR THAT YOU ARE A FAILURE OR HAVE LET YOURSELF OR YOUR FAMILY DOWN: NOT AL ALL
SUM OF ALL RESPONSES TO PHQ QUESTIONS 1-9: 0
7. TROUBLE CONCENTRATING ON THINGS, SUCH AS READING THE NEWSPAPER OR WATCHING TELEVISION: NOT AT ALL
8. MOVING OR SPEAKING SO SLOWLY THAT OTHER PEOPLE COULD HAVE NOTICED. OR THE OPPOSITE, BEING SO FIGETY OR RESTLESS THAT YOU HAVE BEEN MOVING AROUND A LOT MORE THAN USUAL: NOT AT ALL
3. TROUBLE FALLING OR STAYING ASLEEP OR SLEEPING TOO MUCH: NOT AT ALL
SUM OF ALL RESPONSES TO PHQ9 QUESTIONS 1 AND 2: 0
1. LITTLE INTEREST OR PLEASURE IN DOING THINGS: NOT AT ALL

## 2024-07-18 ASSESSMENT — FIBROSIS 4 INDEX: FIB4 SCORE: 4.29

## 2024-07-19 ENCOUNTER — OUTPATIENT INFUSION SERVICES (OUTPATIENT)
Dept: ONCOLOGY | Facility: MEDICAL CENTER | Age: 84
End: 2024-07-19
Attending: STUDENT IN AN ORGANIZED HEALTH CARE EDUCATION/TRAINING PROGRAM
Payer: MEDICARE

## 2024-07-19 VITALS
WEIGHT: 158.73 LBS | TEMPERATURE: 97.2 F | HEART RATE: 86 BPM | SYSTOLIC BLOOD PRESSURE: 101 MMHG | HEIGHT: 63 IN | DIASTOLIC BLOOD PRESSURE: 54 MMHG | OXYGEN SATURATION: 94 % | RESPIRATION RATE: 18 BRPM | BODY MASS INDEX: 28.12 KG/M2

## 2024-07-19 DIAGNOSIS — C22.1 CHOLANGIOCARCINOMA (HCC): ICD-10-CM

## 2024-07-19 PROCEDURE — 96413 CHEMO IV INFUSION 1 HR: CPT

## 2024-07-19 PROCEDURE — 96361 HYDRATE IV INFUSION ADD-ON: CPT

## 2024-07-19 PROCEDURE — 700105 HCHG RX REV CODE 258: Performed by: STUDENT IN AN ORGANIZED HEALTH CARE EDUCATION/TRAINING PROGRAM

## 2024-07-19 PROCEDURE — 96367 TX/PROPH/DG ADDL SEQ IV INF: CPT

## 2024-07-19 PROCEDURE — 96366 THER/PROPH/DIAG IV INF ADDON: CPT

## 2024-07-19 PROCEDURE — 96375 TX/PRO/DX INJ NEW DRUG ADDON: CPT

## 2024-07-19 PROCEDURE — 700111 HCHG RX REV CODE 636 W/ 250 OVERRIDE (IP): Performed by: STUDENT IN AN ORGANIZED HEALTH CARE EDUCATION/TRAINING PROGRAM

## 2024-07-19 PROCEDURE — 96417 CHEMO IV INFUS EACH ADDL SEQ: CPT

## 2024-07-19 RX ORDER — SODIUM CHLORIDE 9 MG/ML
500 INJECTION, SOLUTION INTRAVENOUS ONCE
Status: COMPLETED | OUTPATIENT
Start: 2024-07-19 | End: 2024-07-19

## 2024-07-19 RX ADMIN — ONDANSETRON 16 MG: 2 INJECTION INTRAMUSCULAR; INTRAVENOUS at 08:35

## 2024-07-19 RX ADMIN — SODIUM CHLORIDE 1500 MG: 9 INJECTION, SOLUTION INTRAVENOUS at 10:06

## 2024-07-19 RX ADMIN — SODIUM CHLORIDE 500 ML: 9 INJECTION, SOLUTION INTRAVENOUS at 08:14

## 2024-07-19 RX ADMIN — DEXAMETHASONE SODIUM PHOSPHATE 12 MG: 4 INJECTION, SOLUTION INTRA-ARTICULAR; INTRALESIONAL; INTRAMUSCULAR; INTRAVENOUS; SOFT TISSUE at 08:15

## 2024-07-19 RX ADMIN — CISPLATIN 46 MG: 1 INJECTION INTRAVENOUS at 12:18

## 2024-07-19 RX ADMIN — SODIUM CHLORIDE: 9 INJECTION, SOLUTION INTRAVENOUS at 13:29

## 2024-07-19 RX ADMIN — GEMCITABINE 1900 MG: 100 INJECTION, SOLUTION INTRAVENOUS at 11:36

## 2024-07-19 RX ADMIN — FOSAPREPITANT DIMEGLUMINE 150 MG: 150 INJECTION, POWDER, LYOPHILIZED, FOR SOLUTION INTRAVENOUS at 08:55

## 2024-07-19 ASSESSMENT — FIBROSIS 4 INDEX: FIB4 SCORE: 4.29

## 2024-07-23 ENCOUNTER — TELEPHONE (OUTPATIENT)
Dept: HEMATOLOGY ONCOLOGY | Facility: MEDICAL CENTER | Age: 84
End: 2024-07-23
Payer: MEDICARE

## 2024-07-24 ENCOUNTER — HOSPITAL ENCOUNTER (OUTPATIENT)
Dept: LAB | Facility: MEDICAL CENTER | Age: 84
End: 2024-07-24
Attending: STUDENT IN AN ORGANIZED HEALTH CARE EDUCATION/TRAINING PROGRAM
Payer: MEDICARE

## 2024-07-24 DIAGNOSIS — C22.1 CHOLANGIOCARCINOMA (HCC): ICD-10-CM

## 2024-07-24 DIAGNOSIS — Z79.899 HIGH RISK MEDICATION USE: ICD-10-CM

## 2024-07-24 LAB
ALBUMIN SERPL BCP-MCNC: 3.7 G/DL (ref 3.2–4.9)
ALBUMIN/GLOB SERPL: 1 G/DL
ALP SERPL-CCNC: 150 U/L (ref 30–99)
ALT SERPL-CCNC: 47 U/L (ref 2–50)
ANION GAP SERPL CALC-SCNC: 8 MMOL/L (ref 7–16)
AST SERPL-CCNC: 40 U/L (ref 12–45)
BASOPHILS # BLD AUTO: 0.2 % (ref 0–1.8)
BASOPHILS # BLD: 0.01 K/UL (ref 0–0.12)
BILIRUB SERPL-MCNC: 0.3 MG/DL (ref 0.1–1.5)
BUN SERPL-MCNC: 15 MG/DL (ref 8–22)
CALCIUM ALBUM COR SERPL-MCNC: 9.8 MG/DL (ref 8.5–10.5)
CALCIUM SERPL-MCNC: 9.6 MG/DL (ref 8.5–10.5)
CANCER AG19-9 SERPL-ACNC: 18.5 U/ML (ref 0–35)
CHLORIDE SERPL-SCNC: 101 MMOL/L (ref 96–112)
CO2 SERPL-SCNC: 26 MMOL/L (ref 20–33)
CREAT SERPL-MCNC: 0.66 MG/DL (ref 0.5–1.4)
EOSINOPHIL # BLD AUTO: 0.07 K/UL (ref 0–0.51)
EOSINOPHIL NFR BLD: 1.7 % (ref 0–6.9)
ERYTHROCYTE [DISTWIDTH] IN BLOOD BY AUTOMATED COUNT: 44.3 FL (ref 35.9–50)
GFR SERPLBLD CREATININE-BSD FMLA CKD-EPI: 85 ML/MIN/1.73 M 2
GLOBULIN SER CALC-MCNC: 3.6 G/DL (ref 1.9–3.5)
GLUCOSE SERPL-MCNC: 95 MG/DL (ref 65–99)
HCT VFR BLD AUTO: 40.8 % (ref 37–47)
HGB BLD-MCNC: 13.3 G/DL (ref 12–16)
IMM GRANULOCYTES # BLD AUTO: 0.02 K/UL (ref 0–0.11)
IMM GRANULOCYTES NFR BLD AUTO: 0.5 % (ref 0–0.9)
LYMPHOCYTES # BLD AUTO: 1.33 K/UL (ref 1–4.8)
LYMPHOCYTES NFR BLD: 31.9 % (ref 22–41)
MAGNESIUM SERPL-MCNC: 1.9 MG/DL (ref 1.5–2.5)
MCH RBC QN AUTO: 31.6 PG (ref 27–33)
MCHC RBC AUTO-ENTMCNC: 32.6 G/DL (ref 32.2–35.5)
MCV RBC AUTO: 96.9 FL (ref 81.4–97.8)
MONOCYTES # BLD AUTO: 0.05 K/UL (ref 0–0.85)
MONOCYTES NFR BLD AUTO: 1.2 % (ref 0–13.4)
NEUTROPHILS # BLD AUTO: 2.69 K/UL (ref 1.82–7.42)
NEUTROPHILS NFR BLD: 64.5 % (ref 44–72)
NRBC # BLD AUTO: 0 K/UL
NRBC BLD-RTO: 0 /100 WBC (ref 0–0.2)
PLATELET # BLD AUTO: 167 K/UL (ref 164–446)
PMV BLD AUTO: 11.1 FL (ref 9–12.9)
POTASSIUM SERPL-SCNC: 5 MMOL/L (ref 3.6–5.5)
PROT SERPL-MCNC: 7.3 G/DL (ref 6–8.2)
RBC # BLD AUTO: 4.21 M/UL (ref 4.2–5.4)
SODIUM SERPL-SCNC: 135 MMOL/L (ref 135–145)
T4 FREE SERPL-MCNC: 0.95 NG/DL (ref 0.93–1.7)
TSH SERPL-ACNC: 3.16 UIU/ML (ref 0.35–5.5)
WBC # BLD AUTO: 4.2 K/UL (ref 4.8–10.8)

## 2024-07-24 PROCEDURE — 84439 ASSAY OF FREE THYROXINE: CPT

## 2024-07-24 PROCEDURE — 85025 COMPLETE CBC W/AUTO DIFF WBC: CPT

## 2024-07-24 PROCEDURE — 83735 ASSAY OF MAGNESIUM: CPT

## 2024-07-24 PROCEDURE — 86301 IMMUNOASSAY TUMOR CA 19-9: CPT

## 2024-07-24 PROCEDURE — 80053 COMPREHEN METABOLIC PANEL: CPT

## 2024-07-24 PROCEDURE — 36415 COLL VENOUS BLD VENIPUNCTURE: CPT

## 2024-07-24 PROCEDURE — 84443 ASSAY THYROID STIM HORMONE: CPT

## 2024-07-26 ENCOUNTER — OUTPATIENT INFUSION SERVICES (OUTPATIENT)
Dept: ONCOLOGY | Facility: MEDICAL CENTER | Age: 84
End: 2024-07-26
Attending: STUDENT IN AN ORGANIZED HEALTH CARE EDUCATION/TRAINING PROGRAM
Payer: MEDICARE

## 2024-07-26 VITALS
WEIGHT: 160.94 LBS | HEIGHT: 63 IN | HEART RATE: 90 BPM | DIASTOLIC BLOOD PRESSURE: 58 MMHG | SYSTOLIC BLOOD PRESSURE: 109 MMHG | BODY MASS INDEX: 28.52 KG/M2 | TEMPERATURE: 98.4 F | RESPIRATION RATE: 18 BRPM | OXYGEN SATURATION: 93 %

## 2024-07-26 DIAGNOSIS — C22.1 CHOLANGIOCARCINOMA (HCC): ICD-10-CM

## 2024-07-26 PROCEDURE — 700105 HCHG RX REV CODE 258: Performed by: STUDENT IN AN ORGANIZED HEALTH CARE EDUCATION/TRAINING PROGRAM

## 2024-07-26 PROCEDURE — 96366 THER/PROPH/DIAG IV INF ADDON: CPT

## 2024-07-26 PROCEDURE — 96367 TX/PROPH/DG ADDL SEQ IV INF: CPT

## 2024-07-26 PROCEDURE — 96413 CHEMO IV INFUSION 1 HR: CPT

## 2024-07-26 PROCEDURE — 700111 HCHG RX REV CODE 636 W/ 250 OVERRIDE (IP): Performed by: STUDENT IN AN ORGANIZED HEALTH CARE EDUCATION/TRAINING PROGRAM

## 2024-07-26 PROCEDURE — 96375 TX/PRO/DX INJ NEW DRUG ADDON: CPT

## 2024-07-26 PROCEDURE — 96417 CHEMO IV INFUS EACH ADDL SEQ: CPT

## 2024-07-26 RX ORDER — SODIUM CHLORIDE 9 MG/ML
500 INJECTION, SOLUTION INTRAVENOUS ONCE
Status: COMPLETED | OUTPATIENT
Start: 2024-07-26 | End: 2024-07-26

## 2024-07-26 RX ORDER — UREA 10 %
500 LOTION (ML) TOPICAL DAILY
COMMUNITY

## 2024-07-26 RX ADMIN — POTASSIUM CHLORIDE: 2 INJECTION, SOLUTION, CONCENTRATE INTRAVENOUS at 14:54

## 2024-07-26 RX ADMIN — SODIUM CHLORIDE 500 ML: 9 INJECTION, SOLUTION INTRAVENOUS at 11:15

## 2024-07-26 RX ADMIN — CISPLATIN 46 MG: 1 INJECTION INTRAVENOUS at 13:42

## 2024-07-26 RX ADMIN — GEMCITABINE 1900 MG: 100 INJECTION, SOLUTION INTRAVENOUS at 13:03

## 2024-07-26 RX ADMIN — ONDANSETRON 16 MG: 2 INJECTION INTRAMUSCULAR; INTRAVENOUS at 12:01

## 2024-07-26 RX ADMIN — DEXAMETHASONE SODIUM PHOSPHATE 12 MG: 4 INJECTION, SOLUTION INTRA-ARTICULAR; INTRALESIONAL; INTRAMUSCULAR; INTRAVENOUS; SOFT TISSUE at 11:43

## 2024-07-26 RX ADMIN — FOSAPREPITANT 150 MG: 150 INJECTION, POWDER, LYOPHILIZED, FOR SOLUTION INTRAVENOUS at 12:16

## 2024-07-26 ASSESSMENT — FIBROSIS 4 INDEX: FIB4 SCORE: 3

## 2024-07-30 ENCOUNTER — TELEPHONE (OUTPATIENT)
Dept: HEMATOLOGY ONCOLOGY | Facility: MEDICAL CENTER | Age: 84
End: 2024-07-30
Payer: MEDICARE

## 2024-07-31 ENCOUNTER — OFFICE VISIT (OUTPATIENT)
Dept: URGENT CARE | Facility: CLINIC | Age: 84
End: 2024-07-31
Payer: MEDICARE

## 2024-07-31 ENCOUNTER — PATIENT MESSAGE (OUTPATIENT)
Dept: HEMATOLOGY ONCOLOGY | Facility: MEDICAL CENTER | Age: 84
End: 2024-07-31

## 2024-07-31 VITALS
HEIGHT: 68 IN | HEART RATE: 89 BPM | RESPIRATION RATE: 18 BRPM | TEMPERATURE: 97 F | OXYGEN SATURATION: 97 % | SYSTOLIC BLOOD PRESSURE: 116 MMHG | DIASTOLIC BLOOD PRESSURE: 68 MMHG | WEIGHT: 159.4 LBS | BODY MASS INDEX: 24.16 KG/M2

## 2024-07-31 DIAGNOSIS — R22.9 SKIN MASS: ICD-10-CM

## 2024-07-31 DIAGNOSIS — L03.116 CELLULITIS OF LEFT LOWER EXTREMITY: ICD-10-CM

## 2024-07-31 RX ORDER — CEPHALEXIN 500 MG/1
500 CAPSULE ORAL 4 TIMES DAILY
Qty: 20 CAPSULE | Refills: 0 | Status: SHIPPED | OUTPATIENT
Start: 2024-07-31 | End: 2024-08-05

## 2024-07-31 ASSESSMENT — FIBROSIS 4 INDEX: FIB4 SCORE: 3

## 2024-08-06 NOTE — PROGRESS NOTES
"Pharmacy Chemotherapy calculation:    DX: Intrahepatic cholangiocarcinoma    Cycle 2    Day 1  Previous treatment = C1D8 on 7/26/24    Regimen: Cisplatin + Gemcitabine + Durvalumab    *Dosing Reference*  Durvalumab 1500 mg IV over 60 minutes on Day 1  Gemcitabine 1000 mg/m2 IV over 30 minutes on Days 1 and 8  Cisplatin 25 mg/m2 IV over 60 minutes on Days 1 and 8  21-day cycle for 2-6 cycles (neoadjuvant) or 8 cycles (unresectable, recurrent, or metastatic) followed by  Durvalumab 1500 mg IV over 60 minutes on Day 1  28-day cycle until disease progression or unacceptable toxicity (unresectable, recurrent, or metastatic)  NCCN Guidelines for Biliary Tract Cancers V.1.2024.  Oh DY, et al. J Clin Oncol. 2022;40(4-suppl):378.    Allergies: Patient has no known allergies.   /72   Temp 36.6 °C (97.9 °F) (Temporal)   Resp 18   Ht 1.61 m (5' 3.39\")   Wt 73.8 kg (162 lb 11.2 oz)   SpO2 92%   BMI 28.47 kg/m²   Body surface area is 1.82 meters squared.    Labs 8/8/24:  ANC~ 1430 Plt = 287k   Hgb = 11.9     SCr = 0.64 mg/dL CrCl ~ 66.7 mL/min   ALT/AST/AP= 20/12/149    T bili= 0.2  TSH=1.76 T-4=0.98  Mag = 1.7  K+ = 4.2      Durvalumab 1500 mg IV fixed dose = 1500 mg   <10% difference, okay to treat with final dose = 1500 mg IV    Gemcitabine 1000 mg/m2 x 1.82 m2 = 1820 mg   <10% difference, okay to treat with final dose = 1900 mg IV    Cisplatin 25 mg/m2 x 1.82 m2 = 45.5 mg   <10% difference, okay to treat with final dose = 46 mg IV    Kayla Crenshaw, PharmD      "

## 2024-08-07 RX ORDER — 0.9 % SODIUM CHLORIDE 0.9 %
10 VIAL (ML) INJECTION PRN
Status: CANCELLED | OUTPATIENT
Start: 2024-08-09

## 2024-08-07 RX ORDER — 0.9 % SODIUM CHLORIDE 0.9 %
10 VIAL (ML) INJECTION PRN
Status: CANCELLED | OUTPATIENT
Start: 2024-08-08

## 2024-08-07 RX ORDER — 0.9 % SODIUM CHLORIDE 0.9 %
VIAL (ML) INJECTION PRN
Status: CANCELLED | OUTPATIENT
Start: 2024-08-09

## 2024-08-07 RX ORDER — DIPHENHYDRAMINE HYDROCHLORIDE 50 MG/ML
50 INJECTION INTRAMUSCULAR; INTRAVENOUS PRN
Status: CANCELLED | OUTPATIENT
Start: 2024-08-09

## 2024-08-07 RX ORDER — 0.9 % SODIUM CHLORIDE 0.9 %
10 VIAL (ML) INJECTION PRN
Status: CANCELLED | OUTPATIENT
Start: 2024-08-16

## 2024-08-07 RX ORDER — 0.9 % SODIUM CHLORIDE 0.9 %
3 VIAL (ML) INJECTION PRN
Status: CANCELLED | OUTPATIENT
Start: 2024-08-16

## 2024-08-07 RX ORDER — ONDANSETRON 8 MG/1
8 TABLET, ORALLY DISINTEGRATING ORAL PRN
Status: CANCELLED | OUTPATIENT
Start: 2024-08-16

## 2024-08-07 RX ORDER — ONDANSETRON 8 MG/1
8 TABLET, ORALLY DISINTEGRATING ORAL PRN
Status: CANCELLED | OUTPATIENT
Start: 2024-08-09

## 2024-08-07 RX ORDER — METHYLPREDNISOLONE SODIUM SUCCINATE 125 MG/2ML
125 INJECTION, POWDER, LYOPHILIZED, FOR SOLUTION INTRAMUSCULAR; INTRAVENOUS PRN
Status: CANCELLED | OUTPATIENT
Start: 2024-08-09

## 2024-08-07 RX ORDER — PROCHLORPERAZINE MALEATE 10 MG
10 TABLET ORAL EVERY 6 HOURS PRN
Status: CANCELLED | OUTPATIENT
Start: 2024-08-16

## 2024-08-07 RX ORDER — 0.9 % SODIUM CHLORIDE 0.9 %
VIAL (ML) INJECTION PRN
Status: CANCELLED | OUTPATIENT
Start: 2024-08-16

## 2024-08-07 RX ORDER — EPINEPHRINE 1 MG/ML(1)
0.5 AMPUL (ML) INJECTION PRN
Status: CANCELLED | OUTPATIENT
Start: 2024-08-09

## 2024-08-07 RX ORDER — PROCHLORPERAZINE MALEATE 10 MG
10 TABLET ORAL EVERY 6 HOURS PRN
Status: CANCELLED | OUTPATIENT
Start: 2024-08-09

## 2024-08-07 RX ORDER — DIPHENHYDRAMINE HYDROCHLORIDE 50 MG/ML
50 INJECTION INTRAMUSCULAR; INTRAVENOUS PRN
Status: CANCELLED | OUTPATIENT
Start: 2024-08-16

## 2024-08-07 RX ORDER — SODIUM CHLORIDE 9 MG/ML
INJECTION, SOLUTION INTRAVENOUS CONTINUOUS
Status: CANCELLED | OUTPATIENT
Start: 2024-08-16

## 2024-08-07 RX ORDER — SODIUM CHLORIDE 9 MG/ML
500 INJECTION, SOLUTION INTRAVENOUS ONCE
Status: CANCELLED | OUTPATIENT
Start: 2024-08-09

## 2024-08-07 RX ORDER — METHYLPREDNISOLONE SODIUM SUCCINATE 125 MG/2ML
125 INJECTION, POWDER, LYOPHILIZED, FOR SOLUTION INTRAMUSCULAR; INTRAVENOUS PRN
Status: CANCELLED | OUTPATIENT
Start: 2024-08-16

## 2024-08-07 RX ORDER — EPINEPHRINE 1 MG/ML(1)
0.5 AMPUL (ML) INJECTION PRN
Status: CANCELLED | OUTPATIENT
Start: 2024-08-16

## 2024-08-07 RX ORDER — SODIUM CHLORIDE 9 MG/ML
INJECTION, SOLUTION INTRAVENOUS CONTINUOUS
Status: CANCELLED | OUTPATIENT
Start: 2024-08-09

## 2024-08-07 RX ORDER — 0.9 % SODIUM CHLORIDE 0.9 %
3 VIAL (ML) INJECTION PRN
Status: CANCELLED | OUTPATIENT
Start: 2024-08-09

## 2024-08-07 RX ORDER — 0.9 % SODIUM CHLORIDE 0.9 %
VIAL (ML) INJECTION PRN
Status: CANCELLED | OUTPATIENT
Start: 2024-08-08

## 2024-08-07 RX ORDER — ONDANSETRON 2 MG/ML
4 INJECTION INTRAMUSCULAR; INTRAVENOUS PRN
Status: CANCELLED | OUTPATIENT
Start: 2024-08-09

## 2024-08-07 RX ORDER — 0.9 % SODIUM CHLORIDE 0.9 %
3 VIAL (ML) INJECTION PRN
Status: CANCELLED | OUTPATIENT
Start: 2024-08-08

## 2024-08-07 RX ORDER — ONDANSETRON 2 MG/ML
4 INJECTION INTRAMUSCULAR; INTRAVENOUS PRN
Status: CANCELLED | OUTPATIENT
Start: 2024-08-16

## 2024-08-07 RX ORDER — SODIUM CHLORIDE 9 MG/ML
500 INJECTION, SOLUTION INTRAVENOUS ONCE
Status: CANCELLED | OUTPATIENT
Start: 2024-08-16

## 2024-08-08 ENCOUNTER — HOSPITAL ENCOUNTER (OUTPATIENT)
Dept: HEMATOLOGY ONCOLOGY | Facility: MEDICAL CENTER | Age: 84
End: 2024-08-08
Attending: STUDENT IN AN ORGANIZED HEALTH CARE EDUCATION/TRAINING PROGRAM
Payer: MEDICARE

## 2024-08-08 ENCOUNTER — HOSPITAL ENCOUNTER (OUTPATIENT)
Dept: LAB | Facility: MEDICAL CENTER | Age: 84
End: 2024-08-08
Attending: STUDENT IN AN ORGANIZED HEALTH CARE EDUCATION/TRAINING PROGRAM
Payer: MEDICARE

## 2024-08-08 VITALS
BODY MASS INDEX: 24.67 KG/M2 | HEART RATE: 77 BPM | TEMPERATURE: 97.7 F | HEIGHT: 68 IN | WEIGHT: 162.8 LBS | OXYGEN SATURATION: 96 % | SYSTOLIC BLOOD PRESSURE: 112 MMHG | DIASTOLIC BLOOD PRESSURE: 60 MMHG

## 2024-08-08 DIAGNOSIS — C22.1 CHOLANGIOCARCINOMA (HCC): ICD-10-CM

## 2024-08-08 DIAGNOSIS — Z79.899 HIGH RISK MEDICATION USE: ICD-10-CM

## 2024-08-08 LAB
ALBUMIN SERPL BCP-MCNC: 3.5 G/DL (ref 3.2–4.9)
ALBUMIN/GLOB SERPL: 0.9 G/DL
ALP SERPL-CCNC: 149 U/L (ref 30–99)
ALT SERPL-CCNC: 12 U/L (ref 2–50)
ANION GAP SERPL CALC-SCNC: 9 MMOL/L (ref 7–16)
AST SERPL-CCNC: 20 U/L (ref 12–45)
BASOPHILS # BLD AUTO: 0 % (ref 0–1.8)
BASOPHILS # BLD: 0 K/UL (ref 0–0.12)
BILIRUB SERPL-MCNC: 0.2 MG/DL (ref 0.1–1.5)
BUN SERPL-MCNC: 16 MG/DL (ref 8–22)
CALCIUM ALBUM COR SERPL-MCNC: 10 MG/DL (ref 8.5–10.5)
CALCIUM SERPL-MCNC: 9.6 MG/DL (ref 8.5–10.5)
CANCER AG19-9 SERPL-ACNC: 16.9 U/ML (ref 0–35)
CHLORIDE SERPL-SCNC: 107 MMOL/L (ref 96–112)
CO2 SERPL-SCNC: 22 MMOL/L (ref 20–33)
CREAT SERPL-MCNC: 0.64 MG/DL (ref 0.5–1.4)
EOSINOPHIL # BLD AUTO: 0.01 K/UL (ref 0–0.51)
EOSINOPHIL NFR BLD: 0.4 % (ref 0–6.9)
ERYTHROCYTE [DISTWIDTH] IN BLOOD BY AUTOMATED COUNT: 43.5 FL (ref 35.9–50)
GFR SERPLBLD CREATININE-BSD FMLA CKD-EPI: 86 ML/MIN/1.73 M 2
GLOBULIN SER CALC-MCNC: 4.1 G/DL (ref 1.9–3.5)
GLUCOSE SERPL-MCNC: 94 MG/DL (ref 65–99)
HCT VFR BLD AUTO: 34.6 % (ref 37–47)
HGB BLD-MCNC: 11.9 G/DL (ref 12–16)
IMM GRANULOCYTES # BLD AUTO: 0.01 K/UL (ref 0–0.11)
IMM GRANULOCYTES NFR BLD AUTO: 0.4 % (ref 0–0.9)
LYMPHOCYTES # BLD AUTO: 0.95 K/UL (ref 1–4.8)
LYMPHOCYTES NFR BLD: 33.7 % (ref 22–41)
MAGNESIUM SERPL-MCNC: 1.7 MG/DL (ref 1.5–2.5)
MCH RBC QN AUTO: 33 PG (ref 27–33)
MCHC RBC AUTO-ENTMCNC: 34.4 G/DL (ref 32.2–35.5)
MCV RBC AUTO: 95.8 FL (ref 81.4–97.8)
MONOCYTES # BLD AUTO: 0.42 K/UL (ref 0–0.85)
MONOCYTES NFR BLD AUTO: 14.9 % (ref 0–13.4)
NEUTROPHILS # BLD AUTO: 1.43 K/UL (ref 1.82–7.42)
NEUTROPHILS NFR BLD: 50.6 % (ref 44–72)
NRBC # BLD AUTO: 0 K/UL
NRBC BLD-RTO: 0 /100 WBC (ref 0–0.2)
PLATELET # BLD AUTO: 287 K/UL (ref 164–446)
PMV BLD AUTO: 10.3 FL (ref 9–12.9)
POTASSIUM SERPL-SCNC: 4.2 MMOL/L (ref 3.6–5.5)
PROT SERPL-MCNC: 7.6 G/DL (ref 6–8.2)
RBC # BLD AUTO: 3.61 M/UL (ref 4.2–5.4)
SODIUM SERPL-SCNC: 138 MMOL/L (ref 135–145)
T4 FREE SERPL-MCNC: 0.98 NG/DL (ref 0.93–1.7)
TSH SERPL-ACNC: 1.76 UIU/ML (ref 0.35–5.5)
WBC # BLD AUTO: 2.8 K/UL (ref 4.8–10.8)

## 2024-08-08 PROCEDURE — 36415 COLL VENOUS BLD VENIPUNCTURE: CPT

## 2024-08-08 PROCEDURE — 99212 OFFICE O/P EST SF 10 MIN: CPT | Performed by: STUDENT IN AN ORGANIZED HEALTH CARE EDUCATION/TRAINING PROGRAM

## 2024-08-08 PROCEDURE — 99214 OFFICE O/P EST MOD 30 MIN: CPT | Performed by: STUDENT IN AN ORGANIZED HEALTH CARE EDUCATION/TRAINING PROGRAM

## 2024-08-08 PROCEDURE — 83735 ASSAY OF MAGNESIUM: CPT

## 2024-08-08 PROCEDURE — 80053 COMPREHEN METABOLIC PANEL: CPT

## 2024-08-08 PROCEDURE — 85025 COMPLETE CBC W/AUTO DIFF WBC: CPT

## 2024-08-08 PROCEDURE — 84443 ASSAY THYROID STIM HORMONE: CPT

## 2024-08-08 PROCEDURE — 86301 IMMUNOASSAY TUMOR CA 19-9: CPT

## 2024-08-08 PROCEDURE — 84439 ASSAY OF FREE THYROXINE: CPT

## 2024-08-08 RX ORDER — DOXYCYCLINE HYCLATE 100 MG
TABLET ORAL
COMMUNITY
Start: 2024-08-02

## 2024-08-08 ASSESSMENT — ENCOUNTER SYMPTOMS
CONSTIPATION: 0
WEIGHT LOSS: 0
DOUBLE VISION: 0
SINUS PAIN: 0
ABDOMINAL PAIN: 0
NERVOUS/ANXIOUS: 0
BRUISES/BLEEDS EASILY: 0
BACK PAIN: 0
ORTHOPNEA: 0
PALPITATIONS: 0
DIZZINESS: 0
HEADACHES: 0
SHORTNESS OF BREATH: 0
INSOMNIA: 0
COUGH: 0
NAUSEA: 0
VOMITING: 0
DIARRHEA: 0
DIAPHORESIS: 0
BLURRED VISION: 0
HEARTBURN: 0
FEVER: 0
CHILLS: 0
SORE THROAT: 0
SPUTUM PRODUCTION: 0
MYALGIAS: 0
TINGLING: 0
BLOOD IN STOOL: 0
WHEEZING: 0
WEAKNESS: 1

## 2024-08-08 ASSESSMENT — FIBROSIS 4 INDEX: FIB4 SCORE: 3

## 2024-08-08 ASSESSMENT — PAIN SCALES - GENERAL: PAINLEVEL: NO PAIN

## 2024-08-08 NOTE — PROGRESS NOTES
"Pharmacy Chemotherapy Calculation:    Dx: Intrahepatic cholangiocarcinoma stage IIIb          Protocol: CISplatin/Gemcitabine + Durvalumab     *Dosing Reference*  Durvalumab 1500 mg IV over 60 min on Day 1   Gemcitabine 1000 mg/m2 IV over 30 min on Day 1 and 8  CISplatin 25 mg/m2 IV over 60 min on Day 1 and 8   Repeat 21 days for 2 to 6 months (neoadjuvant) or 8 cycles (unresectable, recurrent, or metastatic)  ~Followed by~  Durvalumab 1500 mg IV over 60 min   Repeat every 28 days until disease progression or unacceptable toxicity (unresectable, recurrent, or metastatic)    NCCN Guidelines for Biliary Tract Cancers. V.1.2024.  Oh DY, et al.J Clin Oncol.2022;40)4-suppl):378.    Allergies:  Patient has no known allergies.     /72   Temp 36.6 °C (97.9 °F) (Temporal)   Resp 18   Ht 1.61 m (5' 3.39\")   Wt 73.8 kg (162 lb 11.2 oz)   SpO2 92%   BMI 28.47 kg/m²  Body surface area is 1.82 meters squared.    Labs 8/8/24:  ANC~ 1430 Plt = 287k   Hgb = 11.9     SCr = 0.64 mg/dL CrCl ~ 66.7 mL/min (min SCr 0.7 used)  AST/ALT/AP = 20/12/149 TBili = 0.2  Mag = 1.7  K+ = 4.2  TSH = 1.760   Free T4 = 0.98     Drug Order   (Drug name, dose, route, IV Fluid & volume, frequency, number of doses) Cycle 2 Day 1      Previous treatment: C1D8 7/26/24     Medication = Durvalumab  Base Dose = 1500 mg   Fixed dose, no calc needed  Final Dose = 1500 mg  Route = IV  Fluid & Volume =  mL  Admin Duration = Over 60 minutes     Day 1       Fixed dose; ok to treat with final dose.    Medication = Gemcitabine   Base Dose = 1000 mg/m2   Calc Dose: Base Dose x 1.82 m² = 1820 mg  Final Dose = 1900 mg  Route = IV  Fluid & Volume =  mL  Admin Duration = Over 30 minutes     Day 1 and 8      <10% difference, okay to treat with final dose     Medication = CISplatin   Base Dose = 25 mg/m2   Calc Dose:Base Dose x 1.82 m² = 45.5 mg  Final Dose = 46 mg  Route = IV  Fluid & Volume =  mL  Admin Duration = Over 60 minutes    Day 1 and " 8       <10% difference, okay to treat with final dose       By my signature below, I confirm this process was performed independently with the BSA and all final chemotherapy dosing calculations congruent. I have reviewed the above chemotherapy order and that my calculation of the final dose and BSA (when applicable) corroborate those calculations of the  pharmacist. Discrepancies of 10% or greater in the written dose have been addressed and documented within the EPIC Progress notes.    Celeste Toledo, AlisaD

## 2024-08-08 NOTE — PROGRESS NOTES
Follow Up Note:  Hematology/Oncology      Primary Care:  Unknown/Not in system    Diagnosis: Cholangiocarcinoma, intrahepatic    Chief Complaint: On-treatment visit    Current Treatment: Gemcitabine, cisplatin, durvalumab    Prior Treatment: NA    Oncology History of Presenting Illness:  Aaliyah Fulton is a 84 y.o.  woman who presents to the clinic for evaluation for systemic therapy for a diagnosis of intrahepatic cholangiocarcinoma. She was incidentally found to have a liver mass when being worked up for kidney issues with a CT scan, and that was subsequently biopsied and found to be a moderate-to-poorly differentiated adenocarcinoma. She was evaluated by surgical oncology and felt to not be a surgical candidate at this time, so she was referred for medical and radiation oncology evaluation.     Treatment History:   07/19/24: C1D1 gem/cis/durva  08/09/24: C2D1 gem/cis/durva scheduled    Interval History:  Patient is here for follow up visit. She is doing well and tolerated the first cycle of treatment well without any complaints.     Allergies as of 08/08/2024    (No Known Allergies)         Current Outpatient Medications:     doxycycline (VIBRAMYCIN) 100 MG Tab, TAKE ONE TABLET BY MOUTH EVERY TWELVE HOURS, Disp: , Rfl:     cyanocobalamin (VITAMIN B-12) 500 MCG Tab, Take 500 mcg by mouth every day., Disp: , Rfl:     cyclobenzaprine (FLEXERIL) 5 mg tablet, Take 1-2 Tablets by mouth every 8 hours as needed for Moderate Pain (EVERY 8 HOURS PRN PAIN)., Disp: 60 Tablet, Rfl: 0    ondansetron (ZOFRAN) 4 MG Tab tablet, Take 1 Tablet by mouth every four hours as needed for Nausea/Vomiting (for nausea, vomiting)., Disp: 30 Tablet, Rfl: 6    prochlorperazine (COMPAZINE) 10 MG Tab, Take 1 Tablet by mouth every 6 hours as needed (for nausea, vomiting)., Disp: 30 Tablet, Rfl: 6    atorvastatin (LIPITOR) 40 MG Tab, TAKE ONE TABLET BY MOUTH EVERY EVENING, Disp: 90 Tablet, Rfl: 0    omeprazole (PRILOSEC) 40 MG  delayed-release capsule, Take 1 Capsule by mouth every day., Disp: , Rfl:     oxybutynin (DITROPAN XL) 15 MG CR tablet, Take 1 Tablet by mouth every day., Disp: , Rfl:     methenamine hip (HIPPREX) 1 GM Tab, Take 1 g by mouth 2 times a day. LONG TERM TREATMENT FOR CHRONIC UTI.  Indications: Urinary Tract Infection, Disp: , Rfl:     estradiol (ESTRACE) 0.1 MG/GM vaginal cream, Insert 2 g into the vagina. Three times/week, Disp: , Rfl:     Calcium Carbonate-Vitamin D (CALCIUM-VITAMIN D3 PO), Take 600 mg by mouth every morning., Disp: , Rfl:     vitamin D3 (CHOLECALCIFEROL) 1000 Unit (25 mcg) Tab, Take 2,000 Units by mouth every morning., Disp: , Rfl:     busPIRone (BUSPAR) 15 MG tablet, Take 15 mg by mouth 2 times a day., Disp: , Rfl:     VENTOLIN  (90 Base) MCG/ACT Aero Soln inhalation aerosol, 2 Puffs every 6 hours as needed., Disp: , Rfl:     acetaminophen (TYLENOL) 500 MG Tab, Take 500-1,000 mg by mouth every 6 hours as needed for Moderate Pain., Disp: , Rfl:     D-MANNOSE PO, Take 500 mg by mouth every evening., Disp: , Rfl:     venlafaxine (EFFEXOR-XR) 150 MG extended-release capsule, Take 1 Capsule by mouth every day. Taken with 75 mg for 225mg daily, Disp: 30 Capsule, Rfl: 2    methylPREDNISolone (MEDROL DOSEPAK) 4 MG Tablet Therapy Pack, Follow schedule on package instructions. (Patient not taking: Reported on 8/8/2024), Disp: 21 Tablet, Rfl: 0      Review of Systems:  Review of Systems   Constitutional:  Positive for malaise/fatigue. Negative for chills, diaphoresis, fever and weight loss.   HENT:  Negative for hearing loss, nosebleeds, sinus pain and sore throat.    Eyes:  Negative for blurred vision and double vision.   Respiratory:  Negative for cough, sputum production, shortness of breath and wheezing.    Cardiovascular:  Negative for chest pain, palpitations, orthopnea and leg swelling.   Gastrointestinal:  Negative for abdominal pain, blood in stool, constipation, diarrhea, heartburn, melena,  "nausea and vomiting.   Genitourinary:  Negative for dysuria, frequency, hematuria and urgency.   Musculoskeletal:  Negative for back pain, joint pain and myalgias.   Skin:  Negative for rash.   Neurological:  Positive for weakness. Negative for dizziness, tingling and headaches.   Endo/Heme/Allergies:  Does not bruise/bleed easily.   Psychiatric/Behavioral:  The patient is not nervous/anxious and does not have insomnia.          Physical Exam:  Vitals:    08/08/24 0940   BP: 112/60   Pulse: 77   Temp: 36.5 °C (97.7 °F)   TempSrc: Temporal   SpO2: 96%   Weight: 73.8 kg (162 lb 12.8 oz)   Height: 1.727 m (5' 7.99\")       DESC; KARNOFSKY SCALE WITH ECOG EQUIVALENT: 90, Able to carry on normal activity; minor signs or symptoms of disease (ECOG equivalent 0)    DISTRESS LEVEL: no apparent distress    Physical Exam  Vitals and nursing note reviewed.   Constitutional:       General: She is awake. She is not in acute distress.     Appearance: Normal appearance. She is normal weight. She is not ill-appearing, toxic-appearing or diaphoretic.   HENT:      Head: Normocephalic and atraumatic.      Nose: Nose normal. No congestion.      Mouth/Throat:      Pharynx: Oropharynx is clear. No oropharyngeal exudate or posterior oropharyngeal erythema.   Eyes:      General: No scleral icterus.     Extraocular Movements: Extraocular movements intact.      Conjunctiva/sclera: Conjunctivae normal.      Pupils: Pupils are equal, round, and reactive to light.   Cardiovascular:      Rate and Rhythm: Normal rate and regular rhythm.      Pulses: Normal pulses.      Heart sounds: Normal heart sounds. No murmur heard.     No friction rub. No gallop.   Pulmonary:      Effort: Pulmonary effort is normal.      Breath sounds: Normal breath sounds. No decreased air movement. No wheezing, rhonchi or rales.   Abdominal:      General: Bowel sounds are normal. There is no distension.      Tenderness: There is no abdominal tenderness.   Musculoskeletal:    "      General: No deformity. Normal range of motion.      Cervical back: Normal range of motion and neck supple. No tenderness.      Right lower leg: No edema.      Left lower leg: No edema.   Lymphadenopathy:      Cervical: No cervical adenopathy.      Upper Body:      Right upper body: No axillary adenopathy.      Left upper body: No axillary adenopathy.      Lower Body: No right inguinal adenopathy. No left inguinal adenopathy.   Skin:     General: Skin is warm and dry.      Coloration: Skin is not jaundiced.      Findings: No erythema or rash.   Neurological:      General: No focal deficit present.      Mental Status: She is alert and oriented to person, place, and time.      Sensory: Sensation is intact.      Motor: Weakness present.      Gait: Gait abnormal (Ambulating with walker).   Psychiatric:         Attention and Perception: Attention normal.         Mood and Affect: Mood normal.         Behavior: Behavior normal. Behavior is cooperative.         Thought Content: Thought content normal.         Judgment: Judgment normal.           Labs:  No visits with results within 7 Day(s) from this visit.   Latest known visit with results is:   Hospital Outpatient Visit on 07/24/2024   Component Date Value Ref Range Status    Free T-4 07/24/2024 0.95  0.93 - 1.70 ng/dL Final    TSH 07/24/2024 3.160  0.350 - 5.500 uIU/mL Final    Magnesium 07/24/2024 1.9  1.5 - 2.5 mg/dL Final    Sodium 07/24/2024 135  135 - 145 mmol/L Final    Potassium 07/24/2024 5.0  3.6 - 5.5 mmol/L Final    Chloride 07/24/2024 101  96 - 112 mmol/L Final    Co2 07/24/2024 26  20 - 33 mmol/L Final    Anion Gap 07/24/2024 8.0  7.0 - 16.0 Final    Glucose 07/24/2024 95  65 - 99 mg/dL Final    Bun 07/24/2024 15  8 - 22 mg/dL Final    Creatinine 07/24/2024 0.66  0.50 - 1.40 mg/dL Final    Calcium 07/24/2024 9.6  8.5 - 10.5 mg/dL Final    Correct Calcium 07/24/2024 9.8  8.5 - 10.5 mg/dL Final    AST(SGOT) 07/24/2024 40  12 - 45 U/L Final    ALT(SGPT)  07/24/2024 47  2 - 50 U/L Final    Alkaline Phosphatase 07/24/2024 150 (H)  30 - 99 U/L Final    Total Bilirubin 07/24/2024 0.3  0.1 - 1.5 mg/dL Final    Albumin 07/24/2024 3.7  3.2 - 4.9 g/dL Final    Total Protein 07/24/2024 7.3  6.0 - 8.2 g/dL Final    Globulin 07/24/2024 3.6 (H)  1.9 - 3.5 g/dL Final    A-G Ratio 07/24/2024 1.0  g/dL Final    WBC 07/24/2024 4.2 (L)  4.8 - 10.8 K/uL Final    RBC 07/24/2024 4.21  4.20 - 5.40 M/uL Final    Hemoglobin 07/24/2024 13.3  12.0 - 16.0 g/dL Final    Hematocrit 07/24/2024 40.8  37.0 - 47.0 % Final    MCV 07/24/2024 96.9  81.4 - 97.8 fL Final    MCH 07/24/2024 31.6  27.0 - 33.0 pg Final    MCHC 07/24/2024 32.6  32.2 - 35.5 g/dL Final    RDW 07/24/2024 44.3  35.9 - 50.0 fL Final    Platelet Count 07/24/2024 167  164 - 446 K/uL Final    MPV 07/24/2024 11.1  9.0 - 12.9 fL Final    Neutrophils-Polys 07/24/2024 64.50  44.00 - 72.00 % Final    Lymphocytes 07/24/2024 31.90  22.00 - 41.00 % Final    Monocytes 07/24/2024 1.20  0.00 - 13.40 % Final    Eosinophils 07/24/2024 1.70  0.00 - 6.90 % Final    Basophils 07/24/2024 0.20  0.00 - 1.80 % Final    Immature Granulocytes 07/24/2024 0.50  0.00 - 0.90 % Final    Nucleated RBC 07/24/2024 0.00  0.00 - 0.20 /100 WBC Final    Neutrophils (Absolute) 07/24/2024 2.69  1.82 - 7.42 K/uL Final    Includes immature neutrophils, if present.    Lymphs (Absolute) 07/24/2024 1.33  1.00 - 4.80 K/uL Final    Monos (Absolute) 07/24/2024 0.05  0.00 - 0.85 K/uL Final    Eos (Absolute) 07/24/2024 0.07  0.00 - 0.51 K/uL Final    Baso (Absolute) 07/24/2024 0.01  0.00 - 0.12 K/uL Final    Immature Granulocytes (abs) 07/24/2024 0.02  0.00 - 0.11 K/uL Final    NRBC (Absolute) 07/24/2024 0.00  K/uL Final    Ca 19-9 07/24/2024 18.5  0.0 - 35.0 U/mL Final    Comment: Performed using Roche dariusz e immunoassay analyzer.  CA 19 9 values  determined on patient samples by different testing procedures cannot be  directly compared with one another and could be the  cause of erroneous  medical interpretations. If there is a change in the CA 19 9 assay procedure  used while monitoring therapy, then new baselines may need to be established  when comparing previous results.      GFR (CKD-EPI) 07/24/2024 85  >60 mL/min/1.73 m 2 Final    Comment: Estimated Glomerular Filtration Rate is calculated using  race neutral CKD-EPI 2021 equation per NKF-ASN recommendations.         Imaging:     All listed images below have been independently reviewed by me. I agree with the findings as summarized below:    DX-LUMBAR SPINE-4+ VIEWS    Result Date: 7/24/2024  4 views of the lumbar spine AP, lateral, flexion-extension demonstrate prior kyphoplasty at L3.  Osteophyte formation and loss of disc space height L4-L5.  Mild scoliosis.  No spondylosis or spondylolisthesis.  Left total hip arthroplasty is incompletely visualized.    XK-[id number]-[id number] BASE TO MID-THIGH    Result Date: 7/17/2024 7/16/2024 3:44 PM HISTORY/REASON FOR EXAM:  Staging for cholangiocarcinoma. TECHNIQUE/EXAM DESCRIPTION AND NUMBER OF VIEWS: PET body imaging. Initially, 10.67 mCi F-18 FDG was administered intravenously under standardized conditions. Approximately 45 minutes after FDG administration, the patient was placed in the supine position on the PET CT table. Blood glucose level was 89 mg/dL. Low dose spiral CT imaging was performed from the skull base to the mid thighs. PET imaging was then performed from the skull base to the mid thighs. CT images, PET images, and PET/CT fused images were reviewed on a PACS 3D workstation. The limited non-contrast CT data are used primarily for attenuation correction and anatomic correlation.  Evaluation of solid organs and bowel are especially limited utilizing this technique. COMPARISON: CT abdomen 6/10/2024 FINDINGS: Head and neck: Focal increased activity in the LEFT nasal cavity corresponding to site of prior dental work. Chest: Mild increased activity in the distal esophagus, likely  inflammatory.  No focal abnormal activity in the lungs. Abdomen and pelvis: Large area of increased activity in the medial segment LEFT lobe liver corresponding to low-density lesion on CT, max SUV 26.19.  No abnormal activity in the spleen.  Expected urinary activity.  Physiologic bowel uptake.  Focal increased activity in the ascending colon without CT correlate. Musculoskeletal: No abnormal focal FDG activity. Incidental findings on CT: RIGHT-sided  shunt catheter in place.  Lungs show mild peripheral intrahepatic changes.  LEFT hip prosthesis.     1.  Large hyperintense mass in the medial segment LEFT lobe liver consistent with known malignancy. 2.  Focal increased activity in the distal ascending colon without CT correlate which may represent benign or malignant process. 3.  No definite metastatic disease. 4.  Mild uptake in the distal esophagus likely inflammatory. 5.  Focal increased activity in the LEFT nasal cavity most likely related to dental disease with nearby dental hardware present.      Pathology:  FINAL DIAGNOSIS:     A. Liver mass biopsy:          Moderately to poorly differentiated adenocarcinoma.     Comment: The tumor shows a non-specific immunophenotype (positive for   CK7, negative for CDX2, GATA3, ER), but supports cholangiocarcinoma in   the proper clinical context.                                           Diagnosis performed by:                                       ELANA ADAMS DO     Assessment & Plan:  1. Cholangiocarcinoma (HCC)          This is an 84 year old  woman with intrahepatic cholangiocarcinoma, [id number] stage IIIb disease. She presents for evaluation.      Current Diagnosis and Staging: Intrahepatic cholangiocarcinoma, [id number] stage IIIb disease.     Update: The patient is doing well with treatment. Continue with C2 tomorrow.      Treatment Plan: Gemcitabine, cisplatin, durvalumab followed by maintenance durvalumab and capecitabine with concurrent XRT; could  consider radiation therapy alone for palliation as well.      Treatment Citation: TOPAZ-1 trial, NEJM Evidence 2022; NCCN     Plan of Care:     Primary Therapy: C2 gem/cis/durva tomorrow  Supportive Therapy: Antiemetics per protocol  Toxicity: Patient is getting antineoplastic therapy and needs monitoring of blood counts, hepatic function, and renal function due to potential for organ dysfunction.   Labs: CBC with diff, CMP, TSH, Free T4, Ca 19-9. NGS testing requested.   Imaging: Repeat CT scans after C4  Treatment Planning: Patient is unresectable at current, and therefore options include palliative chemoimmunotherapy and maintenance immunotherapy with capecitabine/RT, or palliative RT alone. Patient has opted for palliative chemoimmunotherapy.   Consultations: Radiation oncology (Dr. Tabor); Surgical oncology (Dr. Moreno)  Code Status: Full  Miscellaneous: NA  Return for Follow Up: 3 weeks    Any questions and concerns raised by the patient were answered to the best of my ability. Thank you for allowing me to participate in the care for this patient. Please feel free to contact me for any questions or concerns.       Total time spent on chart review, clinic encounter, and documentation: 26 minutes.

## 2024-08-09 ENCOUNTER — OUTPATIENT INFUSION SERVICES (OUTPATIENT)
Dept: ONCOLOGY | Facility: MEDICAL CENTER | Age: 84
End: 2024-08-09
Attending: STUDENT IN AN ORGANIZED HEALTH CARE EDUCATION/TRAINING PROGRAM
Payer: MEDICARE

## 2024-08-09 ENCOUNTER — DOCUMENTATION (OUTPATIENT)
Dept: ONCOLOGY | Facility: MEDICAL CENTER | Age: 84
End: 2024-08-09

## 2024-08-09 VITALS
BODY MASS INDEX: 28.83 KG/M2 | OXYGEN SATURATION: 92 % | RESPIRATION RATE: 18 BRPM | SYSTOLIC BLOOD PRESSURE: 111 MMHG | DIASTOLIC BLOOD PRESSURE: 72 MMHG | WEIGHT: 162.7 LBS | HEIGHT: 63 IN | TEMPERATURE: 97.9 F

## 2024-08-09 DIAGNOSIS — C22.1 CHOLANGIOCARCINOMA (HCC): ICD-10-CM

## 2024-08-09 PROCEDURE — 700111 HCHG RX REV CODE 636 W/ 250 OVERRIDE (IP): Performed by: NURSE PRACTITIONER

## 2024-08-09 PROCEDURE — 96375 TX/PRO/DX INJ NEW DRUG ADDON: CPT

## 2024-08-09 PROCEDURE — 96366 THER/PROPH/DIAG IV INF ADDON: CPT

## 2024-08-09 PROCEDURE — 96417 CHEMO IV INFUS EACH ADDL SEQ: CPT

## 2024-08-09 PROCEDURE — 700105 HCHG RX REV CODE 258: Performed by: NURSE PRACTITIONER

## 2024-08-09 PROCEDURE — 96367 TX/PROPH/DG ADDL SEQ IV INF: CPT

## 2024-08-09 PROCEDURE — 96413 CHEMO IV INFUSION 1 HR: CPT

## 2024-08-09 RX ORDER — SODIUM CHLORIDE 9 MG/ML
500 INJECTION, SOLUTION INTRAVENOUS ONCE
Status: COMPLETED | OUTPATIENT
Start: 2024-08-09 | End: 2024-08-09

## 2024-08-09 RX ADMIN — GEMCITABINE 1900 MG: 100 INJECTION, SOLUTION INTRAVENOUS at 13:04

## 2024-08-09 RX ADMIN — SODIUM CHLORIDE 1500 MG: 9 INJECTION, SOLUTION INTRAVENOUS at 11:50

## 2024-08-09 RX ADMIN — FOSAPREPITANT 150 MG: 150 INJECTION, POWDER, LYOPHILIZED, FOR SOLUTION INTRAVENOUS at 11:07

## 2024-08-09 RX ADMIN — POTASSIUM CHLORIDE: 2 INJECTION, SOLUTION, CONCENTRATE INTRAVENOUS at 14:52

## 2024-08-09 RX ADMIN — DEXAMETHASONE SODIUM PHOSPHATE 12 MG: 4 INJECTION, SOLUTION INTRA-ARTICULAR; INTRALESIONAL; INTRAMUSCULAR; INTRAVENOUS; SOFT TISSUE at 10:37

## 2024-08-09 RX ADMIN — SODIUM CHLORIDE 500 ML: 9 INJECTION, SOLUTION INTRAVENOUS at 10:25

## 2024-08-09 RX ADMIN — CISPLATIN 46 MG: 1 INJECTION INTRAVENOUS at 13:51

## 2024-08-09 RX ADMIN — ONDANSETRON 16 MG: 2 INJECTION INTRAMUSCULAR; INTRAVENOUS at 10:50

## 2024-08-09 ASSESSMENT — FIBROSIS 4 INDEX: FIB4 SCORE: 1.73

## 2024-08-09 NOTE — PROGRESS NOTES
Chemotherapy Verification - SECONDARY RN       Height = 1.61m  Weight = 73.8kg  BSA = 1.82m2       Medication: durvalumab  Dose: flat dose  Calculated Dose: 1500mg                             (In mg/m2, AUC, mg/kg)     Medication: gemcitabine  Dose: 1000mg/m2  Calculated Dose: 1820mg                             (In mg/m2, AUC, mg/kg)    Medication: cisplatin  Dose: 25mg/m2  Calculated Dose: 45.5mg                             (In mg/m2, AUC, mg/kg)      I confirm that this process was performed independently.

## 2024-08-09 NOTE — PROGRESS NOTES
Nutrition Services: Brief Update      Aaliyah Fulton is a 84 y.o. female with diagnosis of breast cancer is situ    Wt Readings from Last 7 Encounters:   08/09/24 73.8 kg (162 lb 11.2 oz)   08/08/24 73.8 kg (162 lb 12.8 oz)   07/31/24 72.3 kg (159 lb 6.4 oz)   07/26/24 73 kg (160 lb 15 oz)   07/24/24 72.6 kg (160 lb)   07/19/24 72 kg (158 lb 11.7 oz)   07/18/24 72.1 kg (159 lb)     Weight remaining desirably stable. No nutrition needs requested or indicated at this time as a result of weight stability. RD will remain available PRN. Pt previously provided RD information at prior visits for patient to reach out as desired. Please re-consult RD as needed per pt preferences or if nutrition status changes.      Please contact [phone]

## 2024-08-09 NOTE — PROGRESS NOTES
Chemotherapy Verification - PRIMARY RN      Height = 161cm  Weight = 73.8kg  BSA = 1.82       Medication: Durvalumab  Dose: 1500mg  Calculated Dose: 1500mg standard dose                               (In mg/m2, AUC, mg/kg)     Medication: Gemcitabine  Dose: 1000mg/m2  Calculated Dose: 1820mg                             (In mg/m2, AUC, mg/kg)    Medication: Cisplatin  Dose: 25mg/m2  Calculated Dose: 45.5mg                             (In mg/m2, AUC, mg/kg)          I confirm this process was performed independently with the BSA and all final chemotherapy dosing calculations congruent.  Any discrepancies of 10% or greater have been addressed with the chemotherapy pharmacist. The resolution of the discrepancy has been documented in the EPIC progress notes.

## 2024-08-09 NOTE — PROGRESS NOTES
Pt arrived ambulatory for C2 D1 chemo, denies c/o, states she tolerated the first cycle well.  Labs drawn yesterday reviewed, WNL and okay for treatment.  PIV started with good blood return, hydration started and premeds given concurrently.  Pt then given Durvalumab over one hour via 0.2 micron filter, tolerated well.  Pt then given Gemzar, Cisplatin, and post hydration as charted, tolerated all infusions well.  Pt Dc'd home with daughter and will return next week as scheduled.

## 2024-08-13 NOTE — PROGRESS NOTES
"Pharmacy Chemotherapy Calculation:    Dx: Intrahepatic cholangiocarcinoma stage IIIb          Protocol: CISplatin/Gemcitabine + Durvalumab     *Dosing Reference*  Durvalumab 1500 mg IV over 60 min on Day 1   Gemcitabine 1000 mg/m2 IV over 30 min on Day 1 and 8  CISplatin 25 mg/m2 IV over 60 min on Day 1 and 8   Repeat 21 days for 2 to 6 months (neoadjuvant) or 8 cycles (unresectable, recurrent, or metastatic)  ~Followed by~  Durvalumab 1500 mg IV over 60 min   Repeat every 28 days until disease progression or unacceptable toxicity (unresectable, recurrent, or metastatic)    NCCN Guidelines for Biliary Tract Cancers. V.1.2024.  Oh DY, et al.J Clin Oncol.2022;40)4-suppl):378.    Allergies:  Patient has no known allergies.     /60   Pulse 96   Temp 36.4 °C (97.6 °F) (Temporal)   Resp 18   Ht 1.61 m (5' 3.39\")   Wt 72.8 kg (160 lb 7.9 oz)   SpO2 93%   BMI 28.09 kg/m²  Body surface area is 1.8 meters squared.    Labs 8/15/24:  ANC~ 960 (okay to proceed per Dr. Arias)  Plt = 337k   Hgb = 11.8     SCr = 0.63 mg/dL CrCl ~ 66 mL/min (min SCr 0.7 used)  AST/ALT/AP = 24/19/162 TBili = 0.2  Mag = 2.3  K+ = 4.8  TSH = 2.33   Free T4 = 0.97     Drug Order   (Drug name, dose, route, IV Fluid & volume, frequency, number of doses) Cycle 2 Day 8      Previous treatment: C2D1 8/9/24     Medication = Durvalumab  Base Dose = 1500 mg   Fixed dose, no calc needed  Final Dose = 1500 mg  Route = IV  Fluid & Volume =  mL  Admin Duration = Over 60 minutes     Day 1       Fixed dose; ok to treat with final dose.    Medication = Gemcitabine   Base Dose = 1000 mg/m2   Calc Dose: Base Dose x 1.8 m² = 1800 mg  Final Dose = 1900 mg  Route = IV  Fluid & Volume =  mL  Admin Duration = Over 30 minutes     Day 1 and 8      <10% difference, okay to treat with final dose     Medication = CISplatin   Base Dose = 25 mg/m2   Calc Dose:Base Dose x 1.8 m² = 45 mg  Final Dose = 46 mg  Route = IV  Fluid & Volume =  mL  Admin " Duration = Over 60 minutes    Day 1 and 8       <10% difference, okay to treat with final dose       By my signature below, I confirm this process was performed independently with the BSA and all final chemotherapy dosing calculations congruent. I have reviewed the above chemotherapy order and that my calculation of the final dose and BSA (when applicable) corroborate those calculations of the  pharmacist. Discrepancies of 10% or greater in the written dose have been addressed and documented within the EPIC Progress notes.    Irene Taylor, PharmD

## 2024-08-15 ENCOUNTER — HOSPITAL ENCOUNTER (OUTPATIENT)
Dept: LAB | Facility: MEDICAL CENTER | Age: 84
End: 2024-08-15
Attending: STUDENT IN AN ORGANIZED HEALTH CARE EDUCATION/TRAINING PROGRAM
Payer: MEDICARE

## 2024-08-15 DIAGNOSIS — C22.1 CHOLANGIOCARCINOMA (HCC): ICD-10-CM

## 2024-08-15 DIAGNOSIS — Z79.899 HIGH RISK MEDICATION USE: ICD-10-CM

## 2024-08-15 LAB
ALBUMIN SERPL BCP-MCNC: 3.6 G/DL (ref 3.2–4.9)
ALBUMIN/GLOB SERPL: 0.9 G/DL
ALP SERPL-CCNC: 162 U/L (ref 30–99)
ALT SERPL-CCNC: 19 U/L (ref 2–50)
ANION GAP SERPL CALC-SCNC: 10 MMOL/L (ref 7–16)
AST SERPL-CCNC: 24 U/L (ref 12–45)
BASOPHILS # BLD AUTO: 0.5 % (ref 0–1.8)
BASOPHILS # BLD: 0.01 K/UL (ref 0–0.12)
BILIRUB SERPL-MCNC: 0.2 MG/DL (ref 0.1–1.5)
BUN SERPL-MCNC: 17 MG/DL (ref 8–22)
CALCIUM ALBUM COR SERPL-MCNC: 8.7 MG/DL (ref 8.5–10.5)
CALCIUM SERPL-MCNC: 8.4 MG/DL (ref 8.5–10.5)
CANCER AG19-9 SERPL-ACNC: 17.8 U/ML (ref 0–35)
CHLORIDE SERPL-SCNC: 102 MMOL/L (ref 96–112)
CO2 SERPL-SCNC: 25 MMOL/L (ref 20–33)
CREAT SERPL-MCNC: 0.63 MG/DL (ref 0.5–1.4)
EOSINOPHIL # BLD AUTO: 0 K/UL (ref 0–0.51)
EOSINOPHIL NFR BLD: 0 % (ref 0–6.9)
ERYTHROCYTE [DISTWIDTH] IN BLOOD BY AUTOMATED COUNT: 46.1 FL (ref 35.9–50)
GFR SERPLBLD CREATININE-BSD FMLA CKD-EPI: 86 ML/MIN/1.73 M 2
GLOBULIN SER CALC-MCNC: 3.8 G/DL (ref 1.9–3.5)
GLUCOSE SERPL-MCNC: 95 MG/DL (ref 65–99)
HCT VFR BLD AUTO: 35.8 % (ref 37–47)
HGB BLD-MCNC: 11.8 G/DL (ref 12–16)
IMM GRANULOCYTES # BLD AUTO: 0.03 K/UL (ref 0–0.11)
IMM GRANULOCYTES NFR BLD AUTO: 1.4 % (ref 0–0.9)
LYMPHOCYTES # BLD AUTO: 1.05 K/UL (ref 1–4.8)
LYMPHOCYTES NFR BLD: 48.4 % (ref 22–41)
MAGNESIUM SERPL-MCNC: 2.3 MG/DL (ref 1.5–2.5)
MCH RBC QN AUTO: 32.6 PG (ref 27–33)
MCHC RBC AUTO-ENTMCNC: 33 G/DL (ref 32.2–35.5)
MCV RBC AUTO: 98.9 FL (ref 81.4–97.8)
MONOCYTES # BLD AUTO: 0.12 K/UL (ref 0–0.85)
MONOCYTES NFR BLD AUTO: 5.5 % (ref 0–13.4)
NEUTROPHILS # BLD AUTO: 0.96 K/UL (ref 1.82–7.42)
NEUTROPHILS NFR BLD: 44.2 % (ref 44–72)
NRBC # BLD AUTO: 0 K/UL
NRBC BLD-RTO: 0 /100 WBC (ref 0–0.2)
PLATELET # BLD AUTO: 337 K/UL (ref 164–446)
PMV BLD AUTO: 10.2 FL (ref 9–12.9)
POTASSIUM SERPL-SCNC: 4.8 MMOL/L (ref 3.6–5.5)
PROT SERPL-MCNC: 7.4 G/DL (ref 6–8.2)
RBC # BLD AUTO: 3.62 M/UL (ref 4.2–5.4)
SODIUM SERPL-SCNC: 137 MMOL/L (ref 135–145)
T4 FREE SERPL-MCNC: 0.97 NG/DL (ref 0.93–1.7)
TSH SERPL-ACNC: 2.33 UIU/ML (ref 0.35–5.5)
WBC # BLD AUTO: 2.2 K/UL (ref 4.8–10.8)

## 2024-08-15 PROCEDURE — 85025 COMPLETE CBC W/AUTO DIFF WBC: CPT

## 2024-08-15 PROCEDURE — 80053 COMPREHEN METABOLIC PANEL: CPT

## 2024-08-15 PROCEDURE — 36415 COLL VENOUS BLD VENIPUNCTURE: CPT

## 2024-08-15 PROCEDURE — 86301 IMMUNOASSAY TUMOR CA 19-9: CPT

## 2024-08-15 PROCEDURE — 83735 ASSAY OF MAGNESIUM: CPT

## 2024-08-15 PROCEDURE — 84439 ASSAY OF FREE THYROXINE: CPT

## 2024-08-15 PROCEDURE — 84443 ASSAY THYROID STIM HORMONE: CPT

## 2024-08-16 ENCOUNTER — OUTPATIENT INFUSION SERVICES (OUTPATIENT)
Dept: ONCOLOGY | Facility: MEDICAL CENTER | Age: 84
End: 2024-08-16
Attending: STUDENT IN AN ORGANIZED HEALTH CARE EDUCATION/TRAINING PROGRAM
Payer: MEDICARE

## 2024-08-16 VITALS
WEIGHT: 160.5 LBS | RESPIRATION RATE: 18 BRPM | BODY MASS INDEX: 28.44 KG/M2 | SYSTOLIC BLOOD PRESSURE: 122 MMHG | OXYGEN SATURATION: 93 % | HEIGHT: 63 IN | DIASTOLIC BLOOD PRESSURE: 60 MMHG | HEART RATE: 96 BPM | TEMPERATURE: 97.6 F

## 2024-08-16 DIAGNOSIS — C22.1 CHOLANGIOCARCINOMA (HCC): ICD-10-CM

## 2024-08-16 PROCEDURE — 96367 TX/PROPH/DG ADDL SEQ IV INF: CPT

## 2024-08-16 PROCEDURE — 96417 CHEMO IV INFUS EACH ADDL SEQ: CPT

## 2024-08-16 PROCEDURE — 96366 THER/PROPH/DIAG IV INF ADDON: CPT

## 2024-08-16 PROCEDURE — 700111 HCHG RX REV CODE 636 W/ 250 OVERRIDE (IP): Performed by: NURSE PRACTITIONER

## 2024-08-16 PROCEDURE — 700105 HCHG RX REV CODE 258: Performed by: NURSE PRACTITIONER

## 2024-08-16 PROCEDURE — 96413 CHEMO IV INFUSION 1 HR: CPT

## 2024-08-16 PROCEDURE — 96375 TX/PRO/DX INJ NEW DRUG ADDON: CPT

## 2024-08-16 RX ORDER — SODIUM CHLORIDE 9 MG/ML
500 INJECTION, SOLUTION INTRAVENOUS ONCE
Status: COMPLETED | OUTPATIENT
Start: 2024-08-16 | End: 2024-08-16

## 2024-08-16 RX ADMIN — GEMCITABINE 1900 MG: 100 INJECTION, SOLUTION INTRAVENOUS at 10:13

## 2024-08-16 RX ADMIN — ONDANSETRON 16 MG: 2 INJECTION, SOLUTION INTRAMUSCULAR; INTRAVENOUS at 08:06

## 2024-08-16 RX ADMIN — CISPLATIN 46 MG: 1 INJECTION INTRAVENOUS at 08:58

## 2024-08-16 RX ADMIN — SODIUM CHLORIDE 500 ML: 9 INJECTION, SOLUTION INTRAVENOUS at 07:15

## 2024-08-16 RX ADMIN — FOSAPREPITANT 150 MG: 150 INJECTION, POWDER, LYOPHILIZED, FOR SOLUTION INTRAVENOUS at 08:21

## 2024-08-16 RX ADMIN — DEXAMETHASONE SODIUM PHOSPHATE 12 MG: 4 INJECTION, SOLUTION INTRA-ARTICULAR; INTRALESIONAL; INTRAMUSCULAR; INTRAVENOUS; SOFT TISSUE at 07:55

## 2024-08-16 RX ADMIN — POTASSIUM CHLORIDE: 2 INJECTION, SOLUTION, CONCENTRATE INTRAVENOUS at 10:13

## 2024-08-16 ASSESSMENT — FIBROSIS 4 INDEX: FIB4 SCORE: 1.41

## 2024-08-16 NOTE — PROGRESS NOTES
Chemotherapy Verification - SECONDARY RN       Height = 161 cm  Weight = 72.8 kg  BSA = 1.8 m^2       Medication: Gemzar  Dose: 1,000 mg/m^2  Calculated Dose: 1,800 mg                             (In mg/m2, AUC, mg/kg)     Medication: Cisplatin  Dose: 25 mg/m^2  Calculated Dose: 45 mg                             (In mg/m2, AUC, mg/kg)    I confirm that this process was performed independently.

## 2024-08-16 NOTE — PROGRESS NOTES
Chemotherapy Verification - PRIMARY RN      Height = 1.61 m  Weight = 72.8 kkg  BSA = 1.85 m2       Medication: gemcitabine  Dose: 1000mg/m2  Calculated Dose: 1850 mg                             (In mg/m2, AUC, mg/kg)     Medication: Cisplatin  Dose: 25mg/m2  Calculated Dose: 46.25 mg                             (In mg/m2, AUC, mg/kg)    I confirm this process was performed independently with the BSA and all final chemotherapy dosing calculations congruent.  Any discrepancies of 10% or greater have been addressed with the chemotherapy pharmacist. The resolution of the discrepancy has been documented in the EPIC progress notes.

## 2024-08-16 NOTE — PROGRESS NOTES
"Pharmacy Chemotherapy calculation:    DX: Intrahepatic cholangiocarcinoma    Cycle 2    Day 8  Previous treatment = C2D1 on 8/9/24    Regimen: Cisplatin + Gemcitabine + Durvalumab    *Dosing Reference*  Durvalumab 1500 mg IV over 60 minutes on Day 1  Gemcitabine 1000 mg/m2 IV over 30 minutes on Days 1 and 8  Cisplatin 25 mg/m2 IV over 60 minutes on Days 1 and 8  21-day cycle for 2-6 cycles (neoadjuvant) or 8 cycles (unresectable, recurrent, or metastatic) followed by  Durvalumab 1500 mg IV over 60 minutes on Day 1  28-day cycle until disease progression or unacceptable toxicity (unresectable, recurrent, or metastatic)  NCCN Guidelines for Biliary Tract Cancers V.1.2024.  Oh DY, et al. J Clin Oncol. 2022;40(4-suppl):378.    Allergies: Patient has no known allergies.   /60   Pulse 96   Temp 36.4 °C (97.6 °F) (Temporal)   Resp 18   Ht 1.61 m (5' 3.39\")   Wt 72.8 kg (160 lb 7.9 oz)   SpO2 93%   BMI 28.09 kg/m²   Body surface area is 1.8 meters squared.    Labs 8/15/24  ANC~ 960 Plt = 337k   Hgb = 11.8     SCr = 0.63 mg/dL CrCl ~ 65.8 mL/min (minimum SCr of 0.7)   LFT's = 24/19/162 TBili = 0.2   K = 4.8  Mg = 2.3  Dr. Arias aware, okay to proceed with treatment today    Gemcitabine 1000 mg/m2 x 1.8 m2 = 1800 mg   <10% difference, okay to treat with final dose = 1900 mg IV    Cisplatin 25 mg/m2 x 1.8 m2 = 45 mg   <10% difference, okay to treat with final dose = 46 mg IV    Abdias Jennings, PharmD      "

## 2024-08-16 NOTE — PROGRESS NOTES
Ms Fulton is here today for chemotherapy.  She has no new concerns to report.     IV placed to her right arm. Labs previously drawn. ANC below parameters TORB Dr. Hugo frazier to proceed with treatment.     Pre medications given and were tolerated well.     Chemotherapy given and was tolerated well.     Pre and post hydration given.     IV removed intact, gauze and coban applied to the site.     Pt discharged in stable condition.

## 2024-08-25 NOTE — PROGRESS NOTES
"Pharmacy Chemotherapy Calculation:    Dx: Intrahepatic cholangiocarcinoma stage IIIb          Protocol: CISplatin/Gemcitabine + Durvalumab     *Dosing Reference*  Durvalumab 1500 mg IV over 60 min on Day 1   Gemcitabine 1000 mg/m2 IV over 30 min on Day 1 and 8  CISplatin 25 mg/m2 IV over 60 min on Day 1 and 8   Repeat 21 days for 2 to 6 months (neoadjuvant) or 8 cycles (unresectable, recurrent, or metastatic)  ~Followed by~  Durvalumab 1500 mg IV over 60 min   Repeat every 28 days until disease progression or unacceptable toxicity (unresectable, recurrent, or metastatic)    NCCN Guidelines for Biliary Tract Cancers. V.1.2024.  Oh DY, et al.J Clin Oncol.2022;40)4-suppl):378.    Allergies:  Patient has no known allergies.     BP (!) 141/58   Pulse 85   Temp 36.4 °C (97.6 °F) (Temporal)   Resp 18   Ht 1.61 m (5' 3.39\")   Wt 72.4 kg (159 lb 9.8 oz)   SpO2 93%   BMI 27.93 kg/m²  Body surface area is 1.8 meters squared.    Labs 8/29/24:  ANC~ 1640 Plt = 246k   Hgb = 11.4     SCr = 0.93 mg/dL CrCl ~ 49.2 mL/min   AST/ALT/AP = 23/10/147 TBili = 0.2  TSH = 1.89 Free T4 = 0.93  *Okay to proceed with treatment today, per Clare STERLING, will increase hydration     Drug Order   (Drug name, dose, route, IV Fluid & volume, frequency, number of doses) Cycle 3 Day 1      Previous treatment: C2D8 8/16/24     Medication = Durvalumab  Base Dose = 1500 mg   Fixed dose, no calc needed  Final Dose = 1500 mg  Route = IV  Fluid & Volume =  mL  Admin Duration = Over 60 minutes     Day 1       Fixed dose; ok to treat with final dose.    Medication = Gemcitabine   Base Dose = 1000 mg/m2   Calc Dose: Base Dose x 1.8 m² = 1800 mg  Final Dose = 1900 mg  Route = IV  Fluid & Volume =  mL  Admin Duration = Over 30 minutes     Day 1 and 8      <10% difference, okay to treat with final dose     Medication = CISplatin   Base Dose = 25 mg/m2   Calc Dose:Base Dose x 1.8 m² = 45 mg  Final Dose = 46 mg  Route = IV  Fluid & Volume = "  mL  Admin Duration = Over 60 minutes    Day 1 and 8       <10% difference, okay to treat with final dose       By my signature below, I confirm this process was performed independently with the BSA and all final chemotherapy dosing calculations congruent. I have reviewed the above chemotherapy order and that my calculation of the final dose and BSA (when applicable) corroborate those calculations of the  pharmacist. Discrepancies of 10% or greater in the written dose have been addressed and documented within the EPIC Progress notes.    Kayla Crenshaw, PharmD

## 2024-08-26 DIAGNOSIS — Z79.899 HIGH RISK MEDICATION USE: ICD-10-CM

## 2024-08-26 RX ORDER — EPINEPHRINE 1 MG/ML(1)
0.5 AMPUL (ML) INJECTION PRN
OUTPATIENT
Start: 2024-09-06

## 2024-08-26 RX ORDER — DIPHENHYDRAMINE HYDROCHLORIDE 50 MG/ML
50 INJECTION INTRAMUSCULAR; INTRAVENOUS PRN
OUTPATIENT
Start: 2024-09-06

## 2024-08-26 RX ORDER — ONDANSETRON 8 MG/1
8 TABLET, ORALLY DISINTEGRATING ORAL PRN
Status: CANCELLED | OUTPATIENT
Start: 2024-08-30

## 2024-08-26 RX ORDER — ONDANSETRON 8 MG/1
8 TABLET, ORALLY DISINTEGRATING ORAL PRN
OUTPATIENT
Start: 2024-09-06

## 2024-08-26 RX ORDER — METHYLPREDNISOLONE SODIUM SUCCINATE 125 MG/2ML
125 INJECTION, POWDER, LYOPHILIZED, FOR SOLUTION INTRAMUSCULAR; INTRAVENOUS PRN
OUTPATIENT
Start: 2024-09-06

## 2024-08-26 RX ORDER — SODIUM CHLORIDE 9 MG/ML
INJECTION, SOLUTION INTRAVENOUS CONTINUOUS
Status: CANCELLED | OUTPATIENT
Start: 2024-08-30

## 2024-08-26 RX ORDER — DIPHENHYDRAMINE HYDROCHLORIDE 50 MG/ML
50 INJECTION INTRAMUSCULAR; INTRAVENOUS PRN
Status: CANCELLED | OUTPATIENT
Start: 2024-08-30

## 2024-08-26 RX ORDER — SODIUM CHLORIDE 9 MG/ML
INJECTION, SOLUTION INTRAVENOUS CONTINUOUS
OUTPATIENT
Start: 2024-09-06

## 2024-08-26 RX ORDER — 0.9 % SODIUM CHLORIDE 0.9 %
10 VIAL (ML) INJECTION PRN
Status: CANCELLED | OUTPATIENT
Start: 2024-08-29

## 2024-08-26 RX ORDER — ONDANSETRON 2 MG/ML
4 INJECTION INTRAMUSCULAR; INTRAVENOUS PRN
Status: CANCELLED | OUTPATIENT
Start: 2024-08-30

## 2024-08-26 RX ORDER — 0.9 % SODIUM CHLORIDE 0.9 %
VIAL (ML) INJECTION PRN
Status: CANCELLED | OUTPATIENT
Start: 2024-08-29

## 2024-08-26 RX ORDER — PROCHLORPERAZINE MALEATE 10 MG
10 TABLET ORAL EVERY 6 HOURS PRN
OUTPATIENT
Start: 2024-09-06

## 2024-08-26 RX ORDER — 0.9 % SODIUM CHLORIDE 0.9 %
3 VIAL (ML) INJECTION PRN
OUTPATIENT
Start: 2024-09-06

## 2024-08-26 RX ORDER — SODIUM CHLORIDE 9 MG/ML
500 INJECTION, SOLUTION INTRAVENOUS ONCE
OUTPATIENT
Start: 2024-09-06

## 2024-08-26 RX ORDER — PROCHLORPERAZINE MALEATE 10 MG
10 TABLET ORAL EVERY 6 HOURS PRN
Status: CANCELLED | OUTPATIENT
Start: 2024-08-30

## 2024-08-26 RX ORDER — 0.9 % SODIUM CHLORIDE 0.9 %
VIAL (ML) INJECTION PRN
Status: CANCELLED | OUTPATIENT
Start: 2024-08-30

## 2024-08-26 RX ORDER — 0.9 % SODIUM CHLORIDE 0.9 %
10 VIAL (ML) INJECTION PRN
OUTPATIENT
Start: 2024-09-06

## 2024-08-26 RX ORDER — ONDANSETRON 2 MG/ML
4 INJECTION INTRAMUSCULAR; INTRAVENOUS PRN
OUTPATIENT
Start: 2024-09-06

## 2024-08-26 RX ORDER — 0.9 % SODIUM CHLORIDE 0.9 %
3 VIAL (ML) INJECTION PRN
Status: CANCELLED | OUTPATIENT
Start: 2024-08-30

## 2024-08-26 RX ORDER — METHYLPREDNISOLONE SODIUM SUCCINATE 125 MG/2ML
125 INJECTION, POWDER, LYOPHILIZED, FOR SOLUTION INTRAMUSCULAR; INTRAVENOUS PRN
Status: CANCELLED | OUTPATIENT
Start: 2024-08-30

## 2024-08-26 RX ORDER — 0.9 % SODIUM CHLORIDE 0.9 %
10 VIAL (ML) INJECTION PRN
Status: CANCELLED | OUTPATIENT
Start: 2024-08-30

## 2024-08-26 RX ORDER — 0.9 % SODIUM CHLORIDE 0.9 %
VIAL (ML) INJECTION PRN
OUTPATIENT
Start: 2024-09-06

## 2024-08-26 RX ORDER — SODIUM CHLORIDE 9 MG/ML
500 INJECTION, SOLUTION INTRAVENOUS ONCE
Status: CANCELLED | OUTPATIENT
Start: 2024-08-30

## 2024-08-26 RX ORDER — 0.9 % SODIUM CHLORIDE 0.9 %
3 VIAL (ML) INJECTION PRN
Status: CANCELLED | OUTPATIENT
Start: 2024-08-29

## 2024-08-26 RX ORDER — EPINEPHRINE 1 MG/ML(1)
0.5 AMPUL (ML) INJECTION PRN
Status: CANCELLED | OUTPATIENT
Start: 2024-08-30

## 2024-08-29 ENCOUNTER — HOSPITAL ENCOUNTER (OUTPATIENT)
Dept: LAB | Facility: MEDICAL CENTER | Age: 84
End: 2024-08-29
Attending: STUDENT IN AN ORGANIZED HEALTH CARE EDUCATION/TRAINING PROGRAM
Payer: MEDICARE

## 2024-08-29 DIAGNOSIS — Z79.899 HIGH RISK MEDICATION USE: ICD-10-CM

## 2024-08-29 DIAGNOSIS — C22.1 CHOLANGIOCARCINOMA (HCC): ICD-10-CM

## 2024-08-29 LAB
ALBUMIN SERPL BCP-MCNC: 3.7 G/DL (ref 3.2–4.9)
ALBUMIN/GLOB SERPL: 1 G/DL
ALP SERPL-CCNC: 147 U/L (ref 30–99)
ALT SERPL-CCNC: 10 U/L (ref 2–50)
ANION GAP SERPL CALC-SCNC: 11 MMOL/L (ref 7–16)
AST SERPL-CCNC: 23 U/L (ref 12–45)
BASOPHILS # BLD AUTO: 0 % (ref 0–1.8)
BASOPHILS # BLD: 0 K/UL (ref 0–0.12)
BILIRUB SERPL-MCNC: 0.2 MG/DL (ref 0.1–1.5)
BUN SERPL-MCNC: 14 MG/DL (ref 8–22)
CALCIUM ALBUM COR SERPL-MCNC: 9.9 MG/DL (ref 8.5–10.5)
CALCIUM SERPL-MCNC: 9.7 MG/DL (ref 8.5–10.5)
CANCER AG19-9 SERPL-ACNC: 18.9 U/ML (ref 0–35)
CHLORIDE SERPL-SCNC: 104 MMOL/L (ref 96–112)
CO2 SERPL-SCNC: 25 MMOL/L (ref 20–33)
CREAT SERPL-MCNC: 0.93 MG/DL (ref 0.5–1.4)
EOSINOPHIL # BLD AUTO: 0.01 K/UL (ref 0–0.51)
EOSINOPHIL NFR BLD: 0.3 % (ref 0–6.9)
ERYTHROCYTE [DISTWIDTH] IN BLOOD BY AUTOMATED COUNT: 47.9 FL (ref 35.9–50)
GFR SERPLBLD CREATININE-BSD FMLA CKD-EPI: 60 ML/MIN/1.73 M 2
GLOBULIN SER CALC-MCNC: 3.7 G/DL (ref 1.9–3.5)
GLUCOSE SERPL-MCNC: 151 MG/DL (ref 65–99)
HCT VFR BLD AUTO: 34.1 % (ref 37–47)
HGB BLD-MCNC: 11.4 G/DL (ref 12–16)
IMM GRANULOCYTES # BLD AUTO: 0.01 K/UL (ref 0–0.11)
IMM GRANULOCYTES NFR BLD AUTO: 0.3 % (ref 0–0.9)
LYMPHOCYTES # BLD AUTO: 1.06 K/UL (ref 1–4.8)
LYMPHOCYTES NFR BLD: 33.8 % (ref 22–41)
MAGNESIUM SERPL-MCNC: 1.8 MG/DL (ref 1.5–2.5)
MCH RBC QN AUTO: 32.9 PG (ref 27–33)
MCHC RBC AUTO-ENTMCNC: 33.4 G/DL (ref 32.2–35.5)
MCV RBC AUTO: 98.6 FL (ref 81.4–97.8)
MONOCYTES # BLD AUTO: 0.42 K/UL (ref 0–0.85)
MONOCYTES NFR BLD AUTO: 13.4 % (ref 0–13.4)
NEUTROPHILS # BLD AUTO: 1.64 K/UL (ref 1.82–7.42)
NEUTROPHILS NFR BLD: 52.2 % (ref 44–72)
NRBC # BLD AUTO: 0 K/UL
NRBC BLD-RTO: 0 /100 WBC (ref 0–0.2)
PLATELET # BLD AUTO: 246 K/UL (ref 164–446)
PMV BLD AUTO: 10.8 FL (ref 9–12.9)
POTASSIUM SERPL-SCNC: 4.4 MMOL/L (ref 3.6–5.5)
PROT SERPL-MCNC: 7.4 G/DL (ref 6–8.2)
RBC # BLD AUTO: 3.46 M/UL (ref 4.2–5.4)
SODIUM SERPL-SCNC: 140 MMOL/L (ref 135–145)
T4 FREE SERPL-MCNC: 0.93 NG/DL (ref 0.93–1.7)
TSH SERPL-ACNC: 1.89 UIU/ML (ref 0.35–5.5)
WBC # BLD AUTO: 3.1 K/UL (ref 4.8–10.8)

## 2024-08-29 PROCEDURE — 80053 COMPREHEN METABOLIC PANEL: CPT

## 2024-08-29 PROCEDURE — 84439 ASSAY OF FREE THYROXINE: CPT

## 2024-08-29 PROCEDURE — 36415 COLL VENOUS BLD VENIPUNCTURE: CPT

## 2024-08-29 PROCEDURE — 83735 ASSAY OF MAGNESIUM: CPT

## 2024-08-29 PROCEDURE — 86301 IMMUNOASSAY TUMOR CA 19-9: CPT

## 2024-08-29 PROCEDURE — 84443 ASSAY THYROID STIM HORMONE: CPT

## 2024-08-29 PROCEDURE — 85025 COMPLETE CBC W/AUTO DIFF WBC: CPT

## 2024-08-30 ENCOUNTER — HOSPITAL ENCOUNTER (OUTPATIENT)
Dept: HEMATOLOGY ONCOLOGY | Facility: MEDICAL CENTER | Age: 84
DRG: 189 | End: 2024-08-30
Attending: NURSE PRACTITIONER
Payer: MEDICARE

## 2024-08-30 ENCOUNTER — OUTPATIENT INFUSION SERVICES (OUTPATIENT)
Dept: ONCOLOGY | Facility: MEDICAL CENTER | Age: 84
End: 2024-08-30
Attending: STUDENT IN AN ORGANIZED HEALTH CARE EDUCATION/TRAINING PROGRAM
Payer: MEDICARE

## 2024-08-30 VITALS
TEMPERATURE: 97.6 F | SYSTOLIC BLOOD PRESSURE: 141 MMHG | BODY MASS INDEX: 28.28 KG/M2 | HEART RATE: 85 BPM | DIASTOLIC BLOOD PRESSURE: 58 MMHG | OXYGEN SATURATION: 93 % | HEIGHT: 63 IN | WEIGHT: 159.61 LBS | RESPIRATION RATE: 18 BRPM

## 2024-08-30 VITALS
RESPIRATION RATE: 18 BRPM | HEIGHT: 63 IN | WEIGHT: 159 LBS | OXYGEN SATURATION: 94 % | SYSTOLIC BLOOD PRESSURE: 110 MMHG | HEART RATE: 86 BPM | TEMPERATURE: 98.7 F | DIASTOLIC BLOOD PRESSURE: 58 MMHG | BODY MASS INDEX: 28.17 KG/M2

## 2024-08-30 DIAGNOSIS — Z79.899 ENCOUNTER FOR LONG-TERM CURRENT USE OF HIGH RISK MEDICATION: ICD-10-CM

## 2024-08-30 DIAGNOSIS — C22.0 HEPATOCELLULAR CARCINOMA (HCC): ICD-10-CM

## 2024-08-30 DIAGNOSIS — C22.1 CHOLANGIOCARCINOMA (HCC): ICD-10-CM

## 2024-08-30 DIAGNOSIS — Z79.899 HIGH RISK MEDICATION USE: ICD-10-CM

## 2024-08-30 PROCEDURE — 96413 CHEMO IV INFUSION 1 HR: CPT

## 2024-08-30 PROCEDURE — 96417 CHEMO IV INFUS EACH ADDL SEQ: CPT

## 2024-08-30 PROCEDURE — 700105 HCHG RX REV CODE 258: Performed by: NURSE PRACTITIONER

## 2024-08-30 PROCEDURE — 99214 OFFICE O/P EST MOD 30 MIN: CPT | Performed by: NURSE PRACTITIONER

## 2024-08-30 PROCEDURE — 96375 TX/PRO/DX INJ NEW DRUG ADDON: CPT

## 2024-08-30 PROCEDURE — 96367 TX/PROPH/DG ADDL SEQ IV INF: CPT

## 2024-08-30 PROCEDURE — 96366 THER/PROPH/DIAG IV INF ADDON: CPT

## 2024-08-30 PROCEDURE — 96361 HYDRATE IV INFUSION ADD-ON: CPT

## 2024-08-30 PROCEDURE — 700111 HCHG RX REV CODE 636 W/ 250 OVERRIDE (IP): Mod: JZ,JG | Performed by: NURSE PRACTITIONER

## 2024-08-30 PROCEDURE — 99212 OFFICE O/P EST SF 10 MIN: CPT | Performed by: NURSE PRACTITIONER

## 2024-08-30 RX ORDER — SODIUM CHLORIDE 9 MG/ML
1000 INJECTION, SOLUTION INTRAVENOUS ONCE
Status: COMPLETED | OUTPATIENT
Start: 2024-08-30 | End: 2024-08-30

## 2024-08-30 RX ADMIN — DEXAMETHASONE SODIUM PHOSPHATE 12 MG: 4 INJECTION, SOLUTION INTRA-ARTICULAR; INTRALESIONAL; INTRAMUSCULAR; INTRAVENOUS; SOFT TISSUE at 11:23

## 2024-08-30 RX ADMIN — SODIUM CHLORIDE 1000 ML: 9 INJECTION, SOLUTION INTRAVENOUS at 10:20

## 2024-08-30 RX ADMIN — CISPLATIN 46 MG: 1 INJECTION INTRAVENOUS at 14:30

## 2024-08-30 RX ADMIN — SODIUM CHLORIDE 1500 MG: 9 INJECTION, SOLUTION INTRAVENOUS at 12:30

## 2024-08-30 RX ADMIN — POTASSIUM CHLORIDE: 2 INJECTION, SOLUTION, CONCENTRATE INTRAVENOUS at 15:35

## 2024-08-30 RX ADMIN — ONDANSETRON 16 MG: 2 INJECTION INTRAMUSCULAR; INTRAVENOUS at 11:33

## 2024-08-30 RX ADMIN — GEMCITABINE 1900 MG: 100 INJECTION, SOLUTION INTRAVENOUS at 13:50

## 2024-08-30 RX ADMIN — FOSAPREPITANT 150 MG: 150 INJECTION, POWDER, LYOPHILIZED, FOR SOLUTION INTRAVENOUS at 11:50

## 2024-08-30 ASSESSMENT — ENCOUNTER SYMPTOMS
INSOMNIA: 0
HEADACHES: 0
WEIGHT LOSS: 0
CHILLS: 0
DIZZINESS: 0
SHORTNESS OF BREATH: 1
WHEEZING: 0
MYALGIAS: 0
VOMITING: 1
DIARRHEA: 0
FEVER: 0
TINGLING: 0
COUGH: 0
PALPITATIONS: 0
NAUSEA: 1
CONSTIPATION: 0

## 2024-08-30 ASSESSMENT — FIBROSIS 4 INDEX
FIB4 SCORE: 2.54
FIB4 SCORE: 2.54

## 2024-08-30 NOTE — PROGRESS NOTES
Chemotherapy Verification - PRIMARY RN      Height = 1.61 m  Weight = 72.4 kg  BSA = 1.8 m2       Medication: durvalumab  Dose: 1,500 mg set dose  Calculated Dose: 1,500 mg                             (In mg/m2, AUC, mg/kg)     Medication: gemcitibine  Dose: 1,000 mg/m2  Calculated Dose: 1,800 mg                             (In mg/m2, AUC, mg/kg)    Medication: CISplatin  Dose: 25 mg/m2  Calculated Dose: 45 mg                             (In mg/m2, AUC, mg/kg)    I confirm this process was performed independently with the BSA and all final chemotherapy dosing calculations congruent.  Any discrepancies of 10% or greater have been addressed with the chemotherapy pharmacist. The resolution of the discrepancy has been documented in the EPIC progress notes.

## 2024-08-30 NOTE — PROGRESS NOTES
Chemotherapy Verification - SECONDARY RN       Height = 161cm  Weight = 72.4kg  BSA = 1.8       Medication: Durvalumab  Dose: 1500mg  Calculated Dose: 1500mg standard dose                               (In mg/m2, AUC, mg/kg)     Medication: Gemcitabine  Dose: 1000mg/m2  Calculated Dose: 1800mg                             (In mg/m2, AUC, mg/kg)    Medication: Cisplatin  Dose: 25mg/m2  Calculated Dose: 45mg                             (In mg/m2, AUC, mg/kg)        I confirm that this process was performed independently.

## 2024-08-30 NOTE — PROGRESS NOTES
"Pharmacy Chemotherapy calculation:    DX: Intrahepatic cholangiocarcinoma    Cycle 3    Day 1  Previous treatment = C2D8 on 8/16/24    Regimen: Cisplatin + Gemcitabine + Durvalumab    *Dosing Reference*  Durvalumab 1500 mg IV over 60 minutes on Day 1  Gemcitabine 1000 mg/m2 IV over 30 minutes on Days 1 and 8  Cisplatin 25 mg/m2 IV over 60 minutes on Days 1 and 8  21-day cycle for 2-6 cycles (neoadjuvant) or 8 cycles (unresectable, recurrent, or metastatic) followed by  Durvalumab 1500 mg IV over 60 minutes on Day 1  28-day cycle until disease progression or unacceptable toxicity (unresectable, recurrent, or metastatic)  NCCN Guidelines for Biliary Tract Cancers V.1.2024.  Oh DY, et al. J Clin Oncol. 2022;40(4-suppl):378.    Allergies: Patient has no known allergies.   BP (!) 141/58   Pulse 85   Temp 36.4 °C (97.6 °F) (Temporal)   Resp 18   Ht 1.61 m (5' 3.39\")   Wt 72.4 kg (159 lb 9.8 oz)   SpO2 93%   BMI 27.93 kg/m²   Body surface area is 1.8 meters squared.    Labs 8/29/24  ANC~ 1640 Plt = 246k   Hgb = 11.4     SCr = 0.93 mg/dL CrCl ~ 49.6 mL/min   LFT's = 23/10/147 TBili = 0.2   K = 4.4  Mg = 1.8  TSH = 1.89 Free T4 = 0.93  L.Alfonso STERLING aware, okay to proceed with treatment today. Increase prehydration from 500ml to 1000ml.    Durvalumab 1500 mg fixed dose = 1500 mg   <10% difference, okay to treat with final dose = 1500 mg IV    Gemcitabine 1000 mg/m2 x 1.8 m2 = 1800 mg   <10% difference, okay to treat with final dose = 1900 mg IV    Cisplatin 25 mg/m2 x 1.8 m2 = 45 mg   <10% difference, okay to treat with final dose = 46 mg IV    Abdias Jennings, PharmD      "

## 2024-08-30 NOTE — PROGRESS NOTES
"Subjective     Aaliyah Fulton is a 84 y.o. female who presents with Hepatic Cancer (Hugo/Prechemo)            HPI        C3  Daughter      OK to getr B12 injection        Review of Systems   Constitutional:  Negative for chills, fever, malaise/fatigue (usual chores - not a lot of them) and weight loss (stable).   Respiratory:  Positive for shortness of breath (with exertion). Negative for cough and wheezing.    Cardiovascular:  Negative for chest pain, palpitations and leg swelling.   Gastrointestinal:  Positive for nausea (last night, for the first time (did not take zofran)) and vomiting (threw up this morning, then felt better). Negative for constipation (Last BM 2 days ago, MOM PRN) and diarrhea.   Genitourinary:  Negative for dysuria.   Musculoskeletal:  Negative for joint pain and myalgias.        PT for R sciatic is pending   Skin:         Left shin needs Mohs Derm to give 1 more round of ab x prior   Neurological:  Negative for dizziness, tingling and headaches.   Psychiatric/Behavioral:  The patient does not have insomnia.               Objective     /58 (BP Location: Left leg, Patient Position: Sitting, BP Cuff Size: Adult)   Pulse 86   Temp 37.1 °C (98.7 °F) (Temporal)   Resp 18   Ht 1.61 m (5' 3.39\")   Wt 72.1 kg (159 lb)   SpO2 94%   BMI 27.82 kg/m²      Physical Exam             murmur           Assessment & Plan     No diagnosis found.          3 weeks   CT after C4 - clarify with dr buchanan if this is enough or special focus imaging      C6 will be travelling likely delay for the druaing of 3 weeks        " and wheezing.    Cardiovascular:  Negative for chest pain, palpitations and leg swelling.   Gastrointestinal:  Positive for nausea (last night, for the first time (did not take zofran)) and vomiting (threw up this morning, then felt better). Negative for constipation (Last BM 2 days ago, MOM PRN) and diarrhea.   Genitourinary:  Negative for dysuria.   Musculoskeletal:  Negative for joint pain and myalgias.        PT for R sciatic is pending   Skin:         Left shin needs Mohs Derm to give 1 more round of ab x prior   Neurological:  Negative for dizziness, tingling and headaches.   Psychiatric/Behavioral:  The patient does not have insomnia.      No Known Allergies      No current facility-administered medications on file prior to encounter.     Current Outpatient Medications on File Prior to Encounter   Medication Sig Dispense Refill    cyanocobalamin (VITAMIN B-12) 500 MCG Tab Take 500 mcg by mouth every day.      ondansetron (ZOFRAN) 4 MG Tab tablet Take 1 Tablet by mouth every four hours as needed for Nausea/Vomiting (for nausea, vomiting). (Patient not taking: Reported on 9/1/2024) 30 Tablet 6    prochlorperazine (COMPAZINE) 10 MG Tab Take 1 Tablet by mouth every 6 hours as needed (for nausea, vomiting). (Patient not taking: Reported on 9/1/2024) 30 Tablet 6    atorvastatin (LIPITOR) 40 MG Tab TAKE ONE TABLET BY MOUTH EVERY EVENING 90 Tablet 0    omeprazole (PRILOSEC) 40 MG delayed-release capsule Take 1 Capsule by mouth every day.      oxybutynin (DITROPAN XL) 15 MG CR tablet Take 1 Tablet by mouth every day.      methenamine hip (HIPPREX) 1 GM Tab Take 1 g by mouth 2 times a day. LONG TERM TREATMENT FOR CHRONIC UTI.  Indications: Urinary Tract Infection      estradiol (ESTRACE) 0.1 MG/GM vaginal cream Insert 2 g into the vagina 3 times a week.      Calcium Carbonate-Vitamin D (CALCIUM-VITAMIN D3 PO) Take 600 mg by mouth every morning.      vitamin D3 (CHOLECALCIFEROL) 1000 Unit (25 mcg) Tab Take 2,000 Units  "by mouth every morning.      busPIRone (BUSPAR) 15 MG tablet Take 15 mg by mouth 2 times a day.      acetaminophen (TYLENOL) 500 MG Tab Take 500-1,000 mg by mouth every 6 hours as needed for Moderate Pain.      D-MANNOSE PO Take 500 mg by mouth every evening.      venlafaxine (EFFEXOR-XR) 150 MG extended-release capsule Take 1 Capsule by mouth every day. Taken with 75 mg for 225mg daily 30 Capsule 2    methylPREDNISolone (MEDROL DOSEPAK) 4 MG Tablet Therapy Pack Follow schedule on package instructions. (Patient not taking: Reported on 9/1/2024) 21 Tablet 0              Objective     /58 (BP Location: Left leg, Patient Position: Sitting, BP Cuff Size: Adult)   Pulse 86   Temp 37.1 °C (98.7 °F) (Temporal)   Resp 18   Ht 1.61 m (5' 3.39\")   Wt 72.1 kg (159 lb)   SpO2 94%   BMI 27.82 kg/m²      Physical Exam  Vitals reviewed.   Constitutional:       General: She is not in acute distress.     Appearance: She is well-developed. She is not diaphoretic.   HENT:      Head: Normocephalic and atraumatic.      Mouth/Throat:      Pharynx: No oropharyngeal exudate.   Eyes:      General: No scleral icterus.        Right eye: No discharge.         Left eye: No discharge.      Conjunctiva/sclera: Conjunctivae normal.      Pupils: Pupils are equal, round, and reactive to light.   Cardiovascular:      Rate and Rhythm: Normal rate and regular rhythm.      Heart sounds: Murmur heard.      No friction rub. No gallop.   Pulmonary:      Effort: Pulmonary effort is normal. No respiratory distress.      Breath sounds: Normal breath sounds. No wheezing.   Abdominal:      General: Bowel sounds are normal. There is no distension.      Palpations: Abdomen is soft.      Tenderness: There is no abdominal tenderness.   Musculoskeletal:         General: Normal range of motion.      Cervical back: Normal range of motion and neck supple.   Skin:     General: Skin is warm and dry.      Coloration: Skin is not pale.      Findings: No " erythema or rash.   Neurological:      Mental Status: She is alert and oriented to person, place, and time.   Psychiatric:         Mood and Affect: Mood normal.         Behavior: Behavior normal.       No visits with results within 1 Day(s) from this visit.   Latest known visit with results is:   Hospital Outpatient Visit on 08/29/2024   Component Date Value Ref Range Status    Free T-4 08/29/2024 0.93  0.93 - 1.70 ng/dL Final    TSH 08/29/2024 1.890  0.350 - 5.500 uIU/mL Final    Magnesium 08/29/2024 1.8  1.5 - 2.5 mg/dL Final    Sodium 08/29/2024 140  135 - 145 mmol/L Final    Potassium 08/29/2024 4.4  3.6 - 5.5 mmol/L Final    Chloride 08/29/2024 104  96 - 112 mmol/L Final    Co2 08/29/2024 25  20 - 33 mmol/L Final    Anion Gap 08/29/2024 11.0  7.0 - 16.0 Final    Glucose 08/29/2024 151 (H)  65 - 99 mg/dL Final    Bun 08/29/2024 14  8 - 22 mg/dL Final    Creatinine 08/29/2024 0.93  0.50 - 1.40 mg/dL Final    Calcium 08/29/2024 9.7  8.5 - 10.5 mg/dL Final    Correct Calcium 08/29/2024 9.9  8.5 - 10.5 mg/dL Final    AST(SGOT) 08/29/2024 23  12 - 45 U/L Final    ALT(SGPT) 08/29/2024 10  2 - 50 U/L Final    Alkaline Phosphatase 08/29/2024 147 (H)  30 - 99 U/L Final    Total Bilirubin 08/29/2024 0.2  0.1 - 1.5 mg/dL Final    Albumin 08/29/2024 3.7  3.2 - 4.9 g/dL Final    Total Protein 08/29/2024 7.4  6.0 - 8.2 g/dL Final    Globulin 08/29/2024 3.7 (H)  1.9 - 3.5 g/dL Final    A-G Ratio 08/29/2024 1.0  g/dL Final    WBC 08/29/2024 3.1 (L)  4.8 - 10.8 K/uL Final    RBC 08/29/2024 3.46 (L)  4.20 - 5.40 M/uL Final    Hemoglobin 08/29/2024 11.4 (L)  12.0 - 16.0 g/dL Final    Hematocrit 08/29/2024 34.1 (L)  37.0 - 47.0 % Final    MCV 08/29/2024 98.6 (H)  81.4 - 97.8 fL Final    MCH 08/29/2024 32.9  27.0 - 33.0 pg Final    MCHC 08/29/2024 33.4  32.2 - 35.5 g/dL Final    RDW 08/29/2024 47.9  35.9 - 50.0 fL Final    Platelet Count 08/29/2024 246  164 - 446 K/uL Final    MPV 08/29/2024 10.8  9.0 - 12.9 fL Final     Neutrophils-Polys 08/29/2024 52.20  44.00 - 72.00 % Final    Lymphocytes 08/29/2024 33.80  22.00 - 41.00 % Final    Monocytes 08/29/2024 13.40  0.00 - 13.40 % Final    Eosinophils 08/29/2024 0.30  0.00 - 6.90 % Final    Basophils 08/29/2024 0.00  0.00 - 1.80 % Final    Immature Granulocytes 08/29/2024 0.30  0.00 - 0.90 % Final    Nucleated RBC 08/29/2024 0.00  0.00 - 0.20 /100 WBC Final    Neutrophils (Absolute) 08/29/2024 1.64 (L)  1.82 - 7.42 K/uL Final    Includes immature neutrophils, if present.    Lymphs (Absolute) 08/29/2024 1.06  1.00 - 4.80 K/uL Final    Monos (Absolute) 08/29/2024 0.42  0.00 - 0.85 K/uL Final    Eos (Absolute) 08/29/2024 0.01  0.00 - 0.51 K/uL Final    Baso (Absolute) 08/29/2024 0.00  0.00 - 0.12 K/uL Final    Immature Granulocytes (abs) 08/29/2024 0.01  0.00 - 0.11 K/uL Final    NRBC (Absolute) 08/29/2024 0.00  K/uL Final    Ca 19-9 08/29/2024 18.9  0.0 - 35.0 U/mL Final    Comment: Performed using Roche dariusz e immunoassay analyzer.  CA 19 9 values  determined on patient samples by different testing procedures cannot be  directly compared with one another and could be the cause of erroneous  medical interpretations. If there is a change in the CA 19 9 assay procedure  used while monitoring therapy, then new baselines may need to be established  when comparing previous results.      GFR (CKD-EPI) 08/29/2024 60  >60 mL/min/1.73 m 2 Final    Comment: Estimated Glomerular Filtration Rate is calculated using  race neutral CKD-EPI 2021 equation per NKF-ASN recommendations.              Assessment & Plan     1. Hepatocellular carcinoma (HCC)  CT-CHEST,ABDOMEN,PELVIS WITH      2. Cholangiocarcinoma (HCC)  CT-CHEST,ABDOMEN,PELVIS WITH      3. Encounter for long-term current use of high risk medication             1.  HCC: Diagnosed 5/23/24; to initiate gemcitabine, cisplatin, durvalumab today, 7/18/24.     Patient continues tolerating treatment fairly well. CBC, CMP, TSH, free T4, Ca 19-9 have  been evaluated and found to be within acceptable limits.  Patient will proceed with cycle 3 and return in 3 weeks for evaluation prior to cycle 4, sooner as needed.     - CT after C4, will clarify with Dr. Arias if CAP is sufficient  - C6 will be travelling with [facility], likely defer x 3 weeks        The patient verbalized agreement and understanding of current plan. All questions and concerns were addressed at time of visit.    Please note that this dictation was created using voice recognition software. I have made every reasonable attempt to correct obvious errors, but I expect that there are errors of grammar and possibly content that I did not discover before finalizing the note.

## 2024-08-31 NOTE — PROGRESS NOTES
Aaliyah presents to infusion for cycle 3/ day 1 of imfinzi, genzar, and platinol. She reports feeling well today with no major complaints.        PIV started with positive blood return and flushed.      Previous labs reviewed by RN, creatinine clearance was 49, HALLIE Brown was contacted and gave the okay to proceed but to give the patient a 1000 ml pre-hydration bolus.     Pre-hydration given and tolerated well. Pre-treatment meds given as ordered.      durvalumab given over 60 minutes.  gemcitabine given over 30 minutes  CISplatin given over 60 minutes.   Post hydration given over 2 hours.  Patient tolerated all well with no adverse effects.      PIV flushed post infusion with positive blood return and removed with tip intact, site covered with gauze and coban. Patient left in stable condition, knows when to return for next appointment.

## 2024-09-01 ENCOUNTER — APPOINTMENT (OUTPATIENT)
Dept: RADIOLOGY | Facility: MEDICAL CENTER | Age: 84
DRG: 189 | End: 2024-09-01
Payer: MEDICARE

## 2024-09-01 ENCOUNTER — HOSPITAL ENCOUNTER (INPATIENT)
Facility: MEDICAL CENTER | Age: 84
LOS: 5 days | DRG: 189 | End: 2024-09-06
Attending: EMERGENCY MEDICINE | Admitting: INTERNAL MEDICINE
Payer: MEDICARE

## 2024-09-01 ENCOUNTER — APPOINTMENT (OUTPATIENT)
Dept: RADIOLOGY | Facility: MEDICAL CENTER | Age: 84
DRG: 189 | End: 2024-09-01
Attending: EMERGENCY MEDICINE
Payer: MEDICARE

## 2024-09-01 DIAGNOSIS — C24.0 BILE DUCT CANCER (HCC): ICD-10-CM

## 2024-09-01 DIAGNOSIS — A41.9 SEPSIS, DUE TO UNSPECIFIED ORGANISM, UNSPECIFIED WHETHER ACUTE ORGAN DYSFUNCTION PRESENT (HCC): ICD-10-CM

## 2024-09-01 DIAGNOSIS — J96.01 ACUTE RESPIRATORY FAILURE WITH HYPOXIA (HCC): ICD-10-CM

## 2024-09-01 DIAGNOSIS — J18.9 PNEUMONIA OF BOTH LUNGS DUE TO INFECTIOUS ORGANISM, UNSPECIFIED PART OF LUNG: ICD-10-CM

## 2024-09-01 PROBLEM — D70.9 NEUTROPENIC FEVER (HCC): Status: ACTIVE | Noted: 2024-09-01

## 2024-09-01 PROBLEM — Z71.89 ADVANCED CARE PLANNING/COUNSELING DISCUSSION: Status: ACTIVE | Noted: 2021-03-12

## 2024-09-01 PROBLEM — I38: Status: ACTIVE | Noted: 2024-09-01

## 2024-09-01 PROBLEM — R52 ACUTE PAIN: Status: ACTIVE | Noted: 2024-09-01

## 2024-09-01 PROBLEM — R50.81 NEUTROPENIC FEVER (HCC): Status: ACTIVE | Noted: 2024-09-01

## 2024-09-01 LAB
ALBUMIN SERPL BCP-MCNC: 3.6 G/DL (ref 3.2–4.9)
ALBUMIN/GLOB SERPL: 1 G/DL
ALP SERPL-CCNC: 161 U/L (ref 30–99)
ALT SERPL-CCNC: 45 U/L (ref 2–50)
ANION GAP SERPL CALC-SCNC: 11 MMOL/L (ref 7–16)
APPEARANCE UR: CLEAR
APTT PPP: <20 SEC (ref 24.7–36)
AST SERPL-CCNC: 66 U/L (ref 12–45)
BASOPHILS # BLD AUTO: 0.3 % (ref 0–1.8)
BASOPHILS # BLD: 0.01 K/UL (ref 0–0.12)
BILIRUB SERPL-MCNC: 0.4 MG/DL (ref 0.1–1.5)
BILIRUB UR QL STRIP.AUTO: NEGATIVE
BUN SERPL-MCNC: 21 MG/DL (ref 8–22)
CALCIUM ALBUM COR SERPL-MCNC: 8.6 MG/DL (ref 8.5–10.5)
CALCIUM SERPL-MCNC: 8.3 MG/DL (ref 8.5–10.5)
CHLORIDE SERPL-SCNC: 106 MMOL/L (ref 96–112)
CO2 SERPL-SCNC: 20 MMOL/L (ref 20–33)
COLOR UR: YELLOW
CREAT SERPL-MCNC: 0.71 MG/DL (ref 0.5–1.4)
EKG IMPRESSION: NORMAL
EOSINOPHIL # BLD AUTO: 0.02 K/UL (ref 0–0.51)
EOSINOPHIL NFR BLD: 0.5 % (ref 0–6.9)
ERYTHROCYTE [DISTWIDTH] IN BLOOD BY AUTOMATED COUNT: 51.7 FL (ref 35.9–50)
FLUAV RNA SPEC QL NAA+PROBE: NEGATIVE
FLUBV RNA SPEC QL NAA+PROBE: NEGATIVE
GFR SERPLBLD CREATININE-BSD FMLA CKD-EPI: 83 ML/MIN/1.73 M 2
GLOBULIN SER CALC-MCNC: 3.7 G/DL (ref 1.9–3.5)
GLUCOSE SERPL-MCNC: 92 MG/DL (ref 65–99)
GLUCOSE UR STRIP.AUTO-MCNC: NEGATIVE MG/DL
HCT VFR BLD AUTO: 32.9 % (ref 37–47)
HGB BLD-MCNC: 11.1 G/DL (ref 12–16)
IMM GRANULOCYTES # BLD AUTO: 0.03 K/UL (ref 0–0.11)
IMM GRANULOCYTES NFR BLD AUTO: 0.8 % (ref 0–0.9)
INR PPP: 0.98 (ref 0.87–1.13)
KETONES UR STRIP.AUTO-MCNC: NEGATIVE MG/DL
LACTATE SERPL-SCNC: 0.8 MMOL/L (ref 0.5–2)
LACTATE SERPL-SCNC: 0.9 MMOL/L (ref 0.5–2)
LACTATE SERPL-SCNC: 1.4 MMOL/L (ref 0.5–2)
LACTATE SERPL-SCNC: 1.8 MMOL/L (ref 0.5–2)
LEUKOCYTE ESTERASE UR QL STRIP.AUTO: NEGATIVE
LYMPHOCYTES # BLD AUTO: 0.54 K/UL (ref 1–4.8)
LYMPHOCYTES NFR BLD: 14.5 % (ref 22–41)
MCH RBC QN AUTO: 33.2 PG (ref 27–33)
MCHC RBC AUTO-ENTMCNC: 33.7 G/DL (ref 32.2–35.5)
MCV RBC AUTO: 98.5 FL (ref 81.4–97.8)
MICRO URNS: NORMAL
MONOCYTES # BLD AUTO: 0.13 K/UL (ref 0–0.85)
MONOCYTES NFR BLD AUTO: 3.5 % (ref 0–13.4)
NEUTROPHILS # BLD AUTO: 2.99 K/UL (ref 1.82–7.42)
NEUTROPHILS NFR BLD: 80.4 % (ref 44–72)
NITRITE UR QL STRIP.AUTO: NEGATIVE
NRBC # BLD AUTO: 0 K/UL
NRBC BLD-RTO: 0 /100 WBC (ref 0–0.2)
NT-PROBNP SERPL IA-MCNC: 3252 PG/ML (ref 0–125)
PH UR STRIP.AUTO: 5.5 [PH] (ref 5–8)
PLATELET # BLD AUTO: 268 K/UL (ref 164–446)
PMV BLD AUTO: 10 FL (ref 9–12.9)
POTASSIUM SERPL-SCNC: 4.3 MMOL/L (ref 3.6–5.5)
PROT SERPL-MCNC: 7.3 G/DL (ref 6–8.2)
PROT UR QL STRIP: NEGATIVE MG/DL
PROTHROMBIN TIME: 13.1 SEC (ref 12–14.6)
RBC # BLD AUTO: 3.34 M/UL (ref 4.2–5.4)
RBC UR QL AUTO: NEGATIVE
RSV RNA SPEC QL NAA+PROBE: NEGATIVE
SARS-COV-2 RNA RESP QL NAA+PROBE: NOTDETECTED
SCCMEC + MECA PNL NOSE NAA+PROBE: NEGATIVE
SODIUM SERPL-SCNC: 137 MMOL/L (ref 135–145)
SP GR UR STRIP.AUTO: 1.02
UROBILINOGEN UR STRIP.AUTO-MCNC: 0.2 MG/DL
WBC # BLD AUTO: 3.7 K/UL (ref 4.8–10.8)

## 2024-09-01 PROCEDURE — 36415 COLL VENOUS BLD VENIPUNCTURE: CPT

## 2024-09-01 PROCEDURE — 96375 TX/PRO/DX INJ NEW DRUG ADDON: CPT

## 2024-09-01 PROCEDURE — 770020 HCHG ROOM/CARE - TELE (206)

## 2024-09-01 PROCEDURE — 96367 TX/PROPH/DG ADDL SEQ IV INF: CPT

## 2024-09-01 PROCEDURE — A9270 NON-COVERED ITEM OR SERVICE: HCPCS | Performed by: INTERNAL MEDICINE

## 2024-09-01 PROCEDURE — 94669 MECHANICAL CHEST WALL OSCILL: CPT

## 2024-09-01 PROCEDURE — 87150 DNA/RNA AMPLIFIED PROBE: CPT

## 2024-09-01 PROCEDURE — 96365 THER/PROPH/DIAG IV INF INIT: CPT

## 2024-09-01 PROCEDURE — 85730 THROMBOPLASTIN TIME PARTIAL: CPT

## 2024-09-01 PROCEDURE — 87040 BLOOD CULTURE FOR BACTERIA: CPT | Mod: 91

## 2024-09-01 PROCEDURE — 93005 ELECTROCARDIOGRAM TRACING: CPT | Performed by: EMERGENCY MEDICINE

## 2024-09-01 PROCEDURE — 93005 ELECTROCARDIOGRAM TRACING: CPT

## 2024-09-01 PROCEDURE — 99285 EMERGENCY DEPT VISIT HI MDM: CPT

## 2024-09-01 PROCEDURE — 0241U HCHG SARS-COV-2 COVID-19 NFCT DS RESP RNA 4 TRGT ED POC: CPT

## 2024-09-01 PROCEDURE — 80053 COMPREHEN METABOLIC PANEL: CPT

## 2024-09-01 PROCEDURE — 83605 ASSAY OF LACTIC ACID: CPT

## 2024-09-01 PROCEDURE — 700105 HCHG RX REV CODE 258: Performed by: INTERNAL MEDICINE

## 2024-09-01 PROCEDURE — 85610 PROTHROMBIN TIME: CPT

## 2024-09-01 PROCEDURE — 96366 THER/PROPH/DIAG IV INF ADDON: CPT

## 2024-09-01 PROCEDURE — 83880 ASSAY OF NATRIURETIC PEPTIDE: CPT

## 2024-09-01 PROCEDURE — 700105 HCHG RX REV CODE 258: Performed by: EMERGENCY MEDICINE

## 2024-09-01 PROCEDURE — 700111 HCHG RX REV CODE 636 W/ 250 OVERRIDE (IP): Performed by: INTERNAL MEDICINE

## 2024-09-01 PROCEDURE — 81003 URINALYSIS AUTO W/O SCOPE: CPT

## 2024-09-01 PROCEDURE — 99223 1ST HOSP IP/OBS HIGH 75: CPT | Mod: AI | Performed by: INTERNAL MEDICINE

## 2024-09-01 PROCEDURE — 700111 HCHG RX REV CODE 636 W/ 250 OVERRIDE (IP): Mod: JZ | Performed by: EMERGENCY MEDICINE

## 2024-09-01 PROCEDURE — 700102 HCHG RX REV CODE 250 W/ 637 OVERRIDE(OP): Performed by: INTERNAL MEDICINE

## 2024-09-01 PROCEDURE — 87086 URINE CULTURE/COLONY COUNT: CPT

## 2024-09-01 PROCEDURE — 71045 X-RAY EXAM CHEST 1 VIEW: CPT

## 2024-09-01 PROCEDURE — 85025 COMPLETE CBC W/AUTO DIFF WBC: CPT

## 2024-09-01 PROCEDURE — 87641 MR-STAPH DNA AMP PROBE: CPT

## 2024-09-01 RX ORDER — METHENAMINE HIPPURATE 1000 MG/1
1 TABLET ORAL 2 TIMES DAILY
Status: DISCONTINUED | OUTPATIENT
Start: 2024-09-01 | End: 2024-09-06 | Stop reason: HOSPADM

## 2024-09-01 RX ORDER — SODIUM CHLORIDE, SODIUM LACTATE, POTASSIUM CHLORIDE, CALCIUM CHLORIDE 600; 310; 30; 20 MG/100ML; MG/100ML; MG/100ML; MG/100ML
1000 INJECTION, SOLUTION INTRAVENOUS ONCE
Status: COMPLETED | OUTPATIENT
Start: 2024-09-01 | End: 2024-09-01

## 2024-09-01 RX ORDER — AZITHROMYCIN 500 MG/5ML
500 INJECTION, POWDER, LYOPHILIZED, FOR SOLUTION INTRAVENOUS EVERY 24 HOURS
Status: COMPLETED | OUTPATIENT
Start: 2024-09-01 | End: 2024-09-01

## 2024-09-01 RX ORDER — CEPHALEXIN 500 MG/1
500 CAPSULE ORAL
Status: ON HOLD | COMMUNITY
Start: 2024-08-27 | End: 2024-09-06

## 2024-09-01 RX ORDER — BUSPIRONE HYDROCHLORIDE 10 MG/1
15 TABLET ORAL 2 TIMES DAILY
Status: DISCONTINUED | OUTPATIENT
Start: 2024-09-01 | End: 2024-09-06 | Stop reason: HOSPADM

## 2024-09-01 RX ORDER — ACETAMINOPHEN 325 MG/1
650 TABLET ORAL EVERY 6 HOURS PRN
Status: DISCONTINUED | OUTPATIENT
Start: 2024-09-01 | End: 2024-09-06 | Stop reason: HOSPADM

## 2024-09-01 RX ORDER — CEFTRIAXONE 1 G/1
1000 INJECTION, POWDER, FOR SOLUTION INTRAMUSCULAR; INTRAVENOUS ONCE
Status: COMPLETED | OUTPATIENT
Start: 2024-09-01 | End: 2024-09-01

## 2024-09-01 RX ORDER — OXYCODONE HYDROCHLORIDE 5 MG/1
5 TABLET ORAL
Status: DISCONTINUED | OUTPATIENT
Start: 2024-09-01 | End: 2024-09-06 | Stop reason: HOSPADM

## 2024-09-01 RX ORDER — LABETALOL HYDROCHLORIDE 5 MG/ML
10 INJECTION, SOLUTION INTRAVENOUS EVERY 4 HOURS PRN
Status: DISCONTINUED | OUTPATIENT
Start: 2024-09-01 | End: 2024-09-06 | Stop reason: HOSPADM

## 2024-09-01 RX ORDER — ATORVASTATIN CALCIUM 40 MG/1
40 TABLET, FILM COATED ORAL EVERY EVENING
Status: DISCONTINUED | OUTPATIENT
Start: 2024-09-01 | End: 2024-09-06 | Stop reason: HOSPADM

## 2024-09-01 RX ORDER — ONDANSETRON 2 MG/ML
4 INJECTION INTRAMUSCULAR; INTRAVENOUS EVERY 4 HOURS PRN
Status: DISCONTINUED | OUTPATIENT
Start: 2024-09-01 | End: 2024-09-06 | Stop reason: HOSPADM

## 2024-09-01 RX ORDER — IPRATROPIUM BROMIDE AND ALBUTEROL SULFATE 2.5; .5 MG/3ML; MG/3ML
3 SOLUTION RESPIRATORY (INHALATION)
Status: DISCONTINUED | OUTPATIENT
Start: 2024-09-01 | End: 2024-09-06 | Stop reason: HOSPADM

## 2024-09-01 RX ORDER — VENLAFAXINE HYDROCHLORIDE 75 MG/1
75 CAPSULE, EXTENDED RELEASE ORAL DAILY
COMMUNITY

## 2024-09-01 RX ORDER — AMOXICILLIN 250 MG
2 CAPSULE ORAL EVERY EVENING
Status: DISCONTINUED | OUTPATIENT
Start: 2024-09-01 | End: 2024-09-06 | Stop reason: HOSPADM

## 2024-09-01 RX ORDER — POLYETHYLENE GLYCOL 3350 17 G/17G
1 POWDER, FOR SOLUTION ORAL
Status: DISCONTINUED | OUTPATIENT
Start: 2024-09-01 | End: 2024-09-06 | Stop reason: HOSPADM

## 2024-09-01 RX ORDER — MORPHINE SULFATE 4 MG/ML
3 INJECTION INTRAVENOUS ONCE
Status: COMPLETED | OUTPATIENT
Start: 2024-09-01 | End: 2024-09-01

## 2024-09-01 RX ORDER — MORPHINE SULFATE 4 MG/ML
2 INJECTION INTRAVENOUS
Status: DISCONTINUED | OUTPATIENT
Start: 2024-09-01 | End: 2024-09-06 | Stop reason: HOSPADM

## 2024-09-01 RX ORDER — ONDANSETRON 4 MG/1
4 TABLET, ORALLY DISINTEGRATING ORAL EVERY 4 HOURS PRN
Status: DISCONTINUED | OUTPATIENT
Start: 2024-09-01 | End: 2024-09-06 | Stop reason: HOSPADM

## 2024-09-01 RX ORDER — ESTRADIOL 0.1 MG/G
2 CREAM VAGINAL
Status: DISCONTINUED | OUTPATIENT
Start: 2024-09-01 | End: 2024-09-01

## 2024-09-01 RX ORDER — UREA 10 %
500 LOTION (ML) TOPICAL DAILY
Status: DISCONTINUED | OUTPATIENT
Start: 2024-09-01 | End: 2024-09-06 | Stop reason: HOSPADM

## 2024-09-01 RX ORDER — VENLAFAXINE HYDROCHLORIDE 75 MG/1
225 CAPSULE, EXTENDED RELEASE ORAL DAILY
Status: DISCONTINUED | OUTPATIENT
Start: 2024-09-01 | End: 2024-09-06 | Stop reason: HOSPADM

## 2024-09-01 RX ORDER — OXYCODONE HYDROCHLORIDE 5 MG/1
2.5 TABLET ORAL
Status: DISCONTINUED | OUTPATIENT
Start: 2024-09-01 | End: 2024-09-06 | Stop reason: HOSPADM

## 2024-09-01 RX ADMIN — CEFTRIAXONE SODIUM 1000 MG: 1 INJECTION, POWDER, FOR SOLUTION INTRAMUSCULAR; INTRAVENOUS at 07:30

## 2024-09-01 RX ADMIN — MORPHINE SULFATE 3 MG: 4 INJECTION, SOLUTION INTRAMUSCULAR; INTRAVENOUS at 08:39

## 2024-09-01 RX ADMIN — AZITHROMYCIN FOR INJECTION INJECTION, POWDER, LYOPHILIZED, FOR SOLUTION 500 MG: 500 INJECTION INTRAVENOUS at 07:33

## 2024-09-01 RX ADMIN — BUSPIRONE HYDROCHLORIDE 15 MG: 10 TABLET ORAL at 20:05

## 2024-09-01 RX ADMIN — CYANOCOBALAMIN TAB 500 MCG 500 MCG: 500 TAB at 10:03

## 2024-09-01 RX ADMIN — BUSPIRONE HYDROCHLORIDE 15 MG: 10 TABLET ORAL at 13:00

## 2024-09-01 RX ADMIN — METHENAMINE HIPPURATE 1 G: 1 TABLET ORAL at 17:39

## 2024-09-01 RX ADMIN — OMEPRAZOLE 40 MG: 20 CAPSULE, DELAYED RELEASE ORAL at 10:03

## 2024-09-01 RX ADMIN — ACETAMINOPHEN 650 MG: 325 TABLET ORAL at 10:24

## 2024-09-01 RX ADMIN — RIVAROXABAN 10 MG: 10 TABLET, FILM COATED ORAL at 17:39

## 2024-09-01 RX ADMIN — VANCOMYCIN HYDROCHLORIDE 1750 MG: 5 INJECTION, POWDER, LYOPHILIZED, FOR SOLUTION INTRAVENOUS at 10:08

## 2024-09-01 RX ADMIN — SODIUM CHLORIDE, POTASSIUM CHLORIDE, SODIUM LACTATE AND CALCIUM CHLORIDE 1000 ML: 600; 310; 30; 20 INJECTION, SOLUTION INTRAVENOUS at 06:44

## 2024-09-01 RX ADMIN — CEFEPIME 2 G: 2 INJECTION, POWDER, FOR SOLUTION INTRAVENOUS at 18:18

## 2024-09-01 RX ADMIN — VENLAFAXINE HYDROCHLORIDE 225 MG: 75 CAPSULE, EXTENDED RELEASE ORAL at 12:59

## 2024-09-01 RX ADMIN — ATORVASTATIN CALCIUM 40 MG: 40 TABLET, FILM COATED ORAL at 17:39

## 2024-09-01 RX ADMIN — CEFEPIME 2 G: 2 INJECTION, POWDER, FOR SOLUTION INTRAVENOUS at 06:43

## 2024-09-01 RX ADMIN — ACETAMINOPHEN 650 MG: 325 TABLET ORAL at 20:05

## 2024-09-01 ASSESSMENT — COGNITIVE AND FUNCTIONAL STATUS - GENERAL
DRESSING REGULAR UPPER BODY CLOTHING: A LITTLE
STANDING UP FROM CHAIR USING ARMS: A LITTLE
DRESSING REGULAR LOWER BODY CLOTHING: A LITTLE
TURNING FROM BACK TO SIDE WHILE IN FLAT BAD: A LITTLE
CLIMB 3 TO 5 STEPS WITH RAILING: A LOT
PERSONAL GROOMING: A LITTLE
TOILETING: A LITTLE
SUGGESTED CMS G CODE MODIFIER MOBILITY: CK
WALKING IN HOSPITAL ROOM: A LITTLE
HELP NEEDED FOR BATHING: A LITTLE
DAILY ACTIVITIY SCORE: 18
EATING MEALS: A LITTLE
MOVING FROM LYING ON BACK TO SITTING ON SIDE OF FLAT BED: A LITTLE
MOBILITY SCORE: 17
MOVING TO AND FROM BED TO CHAIR: A LITTLE
SUGGESTED CMS G CODE MODIFIER DAILY ACTIVITY: CK

## 2024-09-01 ASSESSMENT — FIBROSIS 4 INDEX
FIB4 SCORE: 2.54
FIB4 SCORE: 3.16

## 2024-09-01 ASSESSMENT — LIFESTYLE VARIABLES
TOTAL SCORE: 0
EVER HAD A DRINK FIRST THING IN THE MORNING TO STEADY YOUR NERVES TO GET RID OF A HANGOVER: NO
TOTAL SCORE: 0
HAVE YOU EVER FELT YOU SHOULD CUT DOWN ON YOUR DRINKING: NO
EVER FELT BAD OR GUILTY ABOUT YOUR DRINKING: NO
DOES PATIENT WANT TO STOP DRINKING: NO
TOTAL SCORE: 0
HAVE PEOPLE ANNOYED YOU BY CRITICIZING YOUR DRINKING: NO
AVERAGE NUMBER OF DAYS PER WEEK YOU HAVE A DRINK CONTAINING ALCOHOL: 0
CONSUMPTION TOTAL: NEGATIVE
HOW MANY TIMES IN THE PAST YEAR HAVE YOU HAD 5 OR MORE DRINKS IN A DAY: 0
ON A TYPICAL DAY WHEN YOU DRINK ALCOHOL HOW MANY DRINKS DO YOU HAVE: 0
ALCOHOL_USE: NO

## 2024-09-01 ASSESSMENT — ENCOUNTER SYMPTOMS
COUGH: 1
VOMITING: 0
FEVER: 1
SPUTUM PRODUCTION: 0
CHILLS: 1
NAUSEA: 0
DIARRHEA: 0
BACK PAIN: 1
SHORTNESS OF BREATH: 1
ABDOMINAL PAIN: 0
DIZZINESS: 0
DIAPHORESIS: 0
MYALGIAS: 1
HEADACHES: 1

## 2024-09-01 ASSESSMENT — SOCIAL DETERMINANTS OF HEALTH (SDOH)
WITHIN THE PAST 12 MONTHS, YOU WORRIED THAT YOUR FOOD WOULD RUN OUT BEFORE YOU GOT THE MONEY TO BUY MORE: NEVER TRUE
WITHIN THE PAST 12 MONTHS, THE FOOD YOU BOUGHT JUST DIDN'T LAST AND YOU DIDN'T HAVE MONEY TO GET MORE: NEVER TRUE
IN THE PAST 12 MONTHS, HAS THE ELECTRIC, GAS, OIL, OR WATER COMPANY THREATENED TO SHUT OFF SERVICE IN YOUR HOME?: NO
WITHIN THE LAST YEAR, HAVE YOU BEEN KICKED, HIT, SLAPPED, OR OTHERWISE PHYSICALLY HURT BY YOUR PARTNER OR EX-PARTNER?: NO
WITHIN THE LAST YEAR, HAVE YOU BEEN AFRAID OF YOUR PARTNER OR EX-PARTNER?: NO
WITHIN THE LAST YEAR, HAVE YOU BEEN HUMILIATED OR EMOTIONALLY ABUSED IN OTHER WAYS BY YOUR PARTNER OR EX-PARTNER?: NO
WITHIN THE LAST YEAR, HAVE TO BEEN RAPED OR FORCED TO HAVE ANY KIND OF SEXUAL ACTIVITY BY YOUR PARTNER OR EX-PARTNER?: NO

## 2024-09-01 ASSESSMENT — PAIN DESCRIPTION - PAIN TYPE
TYPE: CHRONIC PAIN
TYPE: ACUTE PAIN

## 2024-09-01 NOTE — ASSESSMENT & PLAN NOTE
On cefepime, adding Vanco due to shunt device.    9/2/2024  Infectious diseases consulted.  Vancomycin discontinued.  Continuing cefepime.  CT scan ordered per recommendations.  Per my review showing small right greater than left bilateral pleural effusions.

## 2024-09-01 NOTE — PROGRESS NOTES
Pharmacy Vancomycin Kinetics Note for 9/1/2024     84 y.o. female on Vancomycin day # 1     Vancomycin Indication (AUC Dosing): Sepsis    Provider specified end date: 09/06/24    Active Antibiotics (From admission, onward)      Ordered     Ordering Provider       Sun Sep 1, 2024  9:00 AM    09/01/24 0900  methenamine hip (Hipprex) tablet 1 g  2 TIMES DAILY         Dilcia Birch M.D.       [place] Sep 1, 2024  8:56 AM    09/01/24 0856  vancomycin (Vancocin) 1,750 mg in  mL IVPB  (vancomycin (VANCOCIN) IV (LD + Maintenance))  ONCE         Dilcia Birch M.D.       [place] Sep 1, 2024  8:41 AM    09/01/24 0841  cefepime (Maxipime) 2 g in  mL IVPB  EVERY 12 HOURS        Note to Pharmacy: Renal dose adjustment per protocol    Dilcia Birch M.D.    09/01/24 0841  MD Alert...Vancomycin per Pharmacy  (MD Alert...Vancomycin per Pharmacy)  PHARMACY TO DOSE        Question:  Indication(s) for vancomycin?  Answer:  Neutropenic fever    Dilcia Birch M.D.            Dosing Weight: 72.4 kg (159 lb 9.8 oz)      Admission History: Admitted on 9/1/2024 for Neutropenic fever (HCC) [D70.9, R50.81]  Pertinent history: Patient arrives with c/f neutropenic fever. Patient is currently undergoing chemotherapy and had a fever at home. Patient's last chemo treatment (cisplatin/gemcitabine/durvalumab) was Friday 8/30 for bile duct cancer. ANC on 8/29 was 1.64, ANC WNL today.    Allergies:     Patient has no known allergies.     Pertinent cultures to date:     Results       Procedure Component Value Units Date/Time    Blood Culture x 2 - Draw one set from central line if present [[id number]] Collected: 09/01/24 0610    Order Status: Completed Specimen: Blood from Line Updated: 09/01/24 1008     Significant Indicator NEG     Source BLD     Site Peripheral     Culture Result No Growth  Note: Blood cultures are incubated for 5 days and  are monitored continuously.Positive blood cultures  are called to the RN and  reported as soon as  they are identified.      Blood Culture x 2 - Draw one set from central line if present [[id number]] Collected: 09/01/24 0609    Order Status: Completed Specimen: Blood from Peripheral Updated: 09/01/24 1008     Significant Indicator NEG     Source BLD     Site PERIPHERAL     Culture Result No Growth  Note: Blood cultures are incubated for 5 days and  are monitored continuously.Positive blood cultures  are called to the RN and reported as soon as  they are identified.      MRSA By PCR (Amp) [[id number]] Collected: 09/01/24 0858    Order Status: Sent Specimen: Respirate from Nares Updated: 09/01/24 0911    CoV-2, FLU A/B, and RSV by PCR (2-4 Hours CEPHEID) : Collect NP swab in VTM [[id number]]     Order Status: Canceled Specimen: Respirate     Urinalysis [[id number]] Collected: 09/01/24 0700    Order Status: Completed Specimen: Urine, Clean Catch Updated: 09/01/24 0714     Color Yellow     Character Clear     Specific Gravity 1.018     Ph 5.5     Glucose Negative mg/dL      Ketones Negative mg/dL      Protein Negative mg/dL      Bilirubin Negative     Urobilinogen, Urine 0.2     Nitrite Negative     Leukocyte Esterase Negative     Occult Blood Negative     Micro Urine Req see below     Comment: Microscopic examination not performed when specimen is clear  and chemically negative for protein, blood, leukocyte esterase  and nitrite.         Urine Culture [[id number]] Collected: 09/01/24 0700    Order Status: Sent Specimen: Urine, Clean Catch Updated: 09/01/24 0708            Labs:     Estimated Creatinine Clearance: 54.2 mL/min (by C-G formula based on SCr of 0.71 mg/dL).  Recent Labs     08/29/24  1413 09/01/24  0610   WBC 3.1* 3.7*   NEUTSPOLYS 52.20 80.40*     Recent Labs     08/29/24  1413 09/01/24  0610   BUN 14 21   CREATININE 0.93 0.71   ALBUMIN 3.7 3.6       Intake/Output Summary (Last 24 hours) at 9/1/2024 1029  Last data filed at 9/1/2024 0833  Gross per 24 hour   Intake 1350 ml   Output --  "  Net 1350 ml      /72   Pulse (!) 116   Temp (!) 38.3 °C (100.9 °F) (Oral)   Resp 19   Ht 1.6 m (5' 3\")   Wt 72.4 kg (159 lb 9.8 oz)   SpO2 93%  Temp (24hrs), Av.2 °C (100.7 °F), Min:38 °C (100.4 °F), Max:38.3 °C (100.9 °F)      List concerns for Vancomycin clearance:     Age;Hypermetabolic State (SIRS)    Pharmacokinetics:    AUC kinetics:   Ke (hr ^-1): 0.0494 hr^-1  Half life: 14.03 hr  Clearance: 2.325  Estimated TDD: 1162.5  Estimated Dose: 775  Estimated interval: 16      A/P:     -  Vancomycin dose: 25 mg/kg LD + 1250 mg Q24H    -  Next vancomycin level(s):    - at steady state > not ordered    -  Predicted vancomycin AUC from initial AUC test calculator: 538 mg·hr/L    -  Comments: Patient's age and baseline renal function poses risk for accumulation, serum creatinine is stable for the patient. Empiric febrile neutropenia antibiotics ordered. Blood cultures in process, viral panel negative. MRSA nares in process. Pharmacy will continue to monitor and dose adjust if needed.     Armaan Kincaid, PharmD    "

## 2024-09-01 NOTE — ED NOTES
Med rec complete per pt and family at bedside with pt's permission   Pt denies taking ABX in the last 30 days

## 2024-09-01 NOTE — ASSESSMENT & PLAN NOTE
Patient does not normally have pain related to her malignancy and is not on chronic pain medic patient however complains of significant pain all over throughout her bones and her whole body including headache.  Will add low-dose opioids for pain.  She has a history of GI bleeds so NSAIDs are relatively contraindicated

## 2024-09-01 NOTE — ED NOTES
Pt to [facility] from EKG room in w/c on 2L NC. Pt changed into gown and connected to monitors. Neutropenic precautions in place. Family at bedside. Chart up for ERP.

## 2024-09-01 NOTE — ED PROVIDER NOTES
"ER Provider Note    Scribed for Dr. Layton Li M.D. by Malorie Alcantar. 9/1/2024  6:22 AM    Primary Care Provider: Ashvin Arias D.O.    CHIEF COMPLAINT  Chief Complaint   Patient presents with    Palpitations     \"Heart racing\" since 2300    Difficulty Breathing     Since 2300    Fever     Since 2300; temp of 101*F at home       EXTERNAL RECORDS REVIEWED  Outpatient Notes Patient had cholangiocarcinoma outpatient infusion on the 30th.    HPI/ROS    OUTSIDE HISTORIAN(S):  Family Daughter is present at bedside and provides information of the patient's history of present illness and medical history.    Aaliyah Fulton is a 84 y.o. female who presents to the ED via EMS for difficulty breathing onset last night. Patient's daughter reports that the patient woke up, unable to breath and she called [facility]. She adds that the patient always has a dry cough, but notes that the patient has a fever and has been feeling raspy when she breaths. The patient denies any history of smoking or COPD, but daughter reports that the patient normally needs inhalers when she is sick. Patient has associated fever and fast heart rate. The patient had chemotherapy on Friday. The patient has no known allergies.     PAST MEDICAL HISTORY  Past Medical History:   Diagnosis Date    Arthritis     ASTHMA     CHEMICAL INDUCED    Breast cancer (HCC) 2005    stage 0 - Dr. 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 Had to wear brace only.    Hypertension 01/03/2022    states well controlled on meds    Indigestion     taking omeprazole    Osteoporosis     Pain     \"everywhere\"    Pneumonia 12/2021    on antibiotics    PONV (postoperative nausea and vomiting)     Have always had this.    Psychiatric problem     depression " on zoloft    Stroke (HCC) 2021    Some memory problems and expressive aphasia.    Unspecified urinary incontinence     wears depends       SURGICAL HISTORY  Past Surgical History:   Procedure Laterality Date    PB TOTAL KNEE ARTHROPLASTY Left 03/28/2023    Procedure: LEFT TOTAL KNEE ARTHROPLASTY;  Surgeon: Marcus Hall M.D.;  Location: SURGERY [facility];  Service: Orthopedics    MS CREATE SHUNT:VENTRIC-PERITONEAL  01/10/2022    Procedure: INSERTION, SHUNT, VENTRICULOPERITONEAL, LAPAROSCOPIC PERITONEAL CATHETER - STEALTH GUIDED;  Surgeon: Gregory Blanchard M.D.;  Location: SURGERY [facility];  Service: Neurosurgery    PB PARTIAL HIP REPLACEMENT Left 11/24/2021    Procedure: HEMIARTHROPLASTY, HIP;  Surgeon: Marlon Culver M.D.;  Location: SURGERY [facility];  Service: Orthopedics    RECOVERY  05/10/2016    Procedure: [id number];  Surgeon: Recoveryon Surgery;  [place] [facility] [place]:     LUMBAR LAMINECTOMY DISKECTOMY  10/17/2012    Performed by Daksha Melendez M.D. at SURGERY [facility] ORS    FORAMINOTOMY  10/17/2012    Performed by Daksha Melendez M.D. at SURGERY [facility] ORS    LUMBAR DECOMPRESSION  10/17/2012    Performed by Daksha Melendez M.D. at SURGERY [facility] ORS    KNEE ARTHROPLASTY TOTAL  09/19/2011    Performed by KISHAN CARDENAS at SURGERY [facility] ORS    BREAST BIOPSY  05/21/2010    Performed by ROB TOMAS at SURGERY SAME DAY [facility] ORS    CHOLECYSTECTOMY      HAMMERTOE CORRECTION Bilateral     with bunions    KNEE ARTHROSCOPY      LUMPECTOMY      OTHER ORTHOPEDIC SURGERY      foot, shoulder, and knee    MS RADIATION THERAPY PLAN SIMPLE      SHOULDER SURGERY      SINUSCOPY         FAMILY HISTORY  Family History   Problem Relation Age of Onset    Lung Disease Mother         copd    Stroke Mother     Cancer Daughter         Breast    Breast Cancer Daughter     Psychiatric Illness Daughter         Anxiety     Psychiatric Illness Daughter         Anxiety and Depression       SOCIAL HISTORY   reports that she has never smoked. She has never used smokeless tobacco. She reports that she does not drink alcohol and does not use drugs.    CURRENT MEDICATIONS  Previous Medications    ACETAMINOPHEN (TYLENOL) 500 MG TAB    Take 500-1,000 mg by mouth every 6 hours as needed for Moderate Pain.    ATORVASTATIN (LIPITOR) 40 MG TAB    TAKE ONE TABLET BY MOUTH EVERY EVENING    BUSPIRONE (BUSPAR) 15 MG TABLET    Take 15 mg by mouth 2 times a day.    CALCIUM CARBONATE-VITAMIN D (CALCIUM-VITAMIN D3 PO)    Take 600 mg by mouth every morning.    CYANOCOBALAMIN (VITAMIN B-12) 500 MCG TAB    Take 500 mcg by mouth every day.    CYCLOBENZAPRINE (FLEXERIL) 5 MG TABLET    Take 1-2 Tablets by mouth every 8 hours as needed for Moderate Pain (EVERY 8 HOURS PRN PAIN).    D-MANNOSE PO    Take 500 mg by mouth every evening.    DOXYCYCLINE (VIBRAMYCIN) 100 MG TAB    TAKE ONE TABLET BY MOUTH EVERY TWELVE HOURS    ESTRADIOL (ESTRACE) 0.1 MG/GM VAGINAL CREAM    Insert 2 g into the vagina. Three times/week    METHENAMINE HIP (HIPPREX) 1 GM TAB    Take 1 g by mouth 2 times a day. LONG TERM TREATMENT FOR CHRONIC UTI.  Indications: Urinary Tract Infection    METHYLPREDNISOLONE (MEDROL DOSEPAK) 4 MG TABLET THERAPY PACK    Follow schedule on package instructions.    OMEPRAZOLE (PRILOSEC) 40 MG DELAYED-RELEASE CAPSULE    Take 1 Capsule by mouth every day.    ONDANSETRON (ZOFRAN) 4 MG TAB TABLET    Take 1 Tablet by mouth every four hours as needed for Nausea/Vomiting (for nausea, vomiting).    OXYBUTYNIN (DITROPAN XL) 15 MG CR TABLET    Take 1 Tablet by mouth every day.    PROCHLORPERAZINE (COMPAZINE) 10 MG TAB    Take 1 Tablet by mouth every 6 hours as needed (for nausea, vomiting).    VENLAFAXINE (EFFEXOR-XR) 150 MG EXTENDED-RELEASE CAPSULE    Take 1 Capsule by mouth every day. Taken with 75 mg for 225mg daily    VENTOLIN  (90 BASE) MCG/ACT AERO SOLN  "INHALATION AEROSOL    2 Puffs every 6 hours as needed.    VITAMIN D3 (CHOLECALCIFEROL) 1000 UNIT (25 MCG) TAB    Take 2,000 Units by mouth every morning.       ALLERGIES  Patient has no known allergies.    PHYSICAL EXAM  BP (!) 159/69   Pulse (!) 101   Temp 38 °C (100.4 °F) (Temporal)   Resp (!) 24   Ht 1.6 m (5' 3\")   Wt 72.4 kg (159 lb 9.8 oz)   SpO2 96%   BMI 28.27 kg/m²   Constitutional: Alert in no apparent distress.  HENT: No signs of trauma, Bilateral external ears normal, Nose normal.   Eyes: Pupils are equal and reactive, Conjunctiva normal, Non-icteric.   Neck: Normal range of motion, No tenderness, Supple,   Cardiovascular: Regular rate and rhythm, no murmurs.   Thorax & Lungs: Diminished lung sounds bilaterally and mild respiratory distress, No wheezing, No chest tenderness.   Abdomen: Bowel sounds normal, Soft, No tenderness.  Skin: Warm, Dry, No erythema, No rash.   Back: No bony tenderness, No CVA tenderness.   Extremities: No edema, No tenderness, No cyanosis  Psychiatric: Affect normal    DIAGNOSTIC STUDIES & PROCEDURES    Labs:   Labs Reviewed   CBC WITH DIFFERENTIAL - Abnormal; Notable for the following components:       Result Value    WBC 3.7 (*)     RBC 3.34 (*)     Hemoglobin 11.1 (*)     Hematocrit 32.9 (*)     MCV 98.5 (*)     MCH 33.2 (*)     RDW 51.7 (*)     Neutrophils-Polys 80.40 (*)     Lymphocytes 14.50 (*)     Lymphs (Absolute) 0.54 (*)     All other components within normal limits   COMP METABOLIC PANEL - Abnormal; Notable for the following components:    Calcium 8.3 (*)     AST(SGOT) 66 (*)     Alkaline Phosphatase 161 (*)     Globulin 3.7 (*)     All other components within normal limits   APTT - Abnormal; Notable for the following components:    APTT <20.0 (*)     All other components within normal limits   PROBRAIN NATRIURETIC PEPTIDE, NT - Abnormal; Notable for the following components:    NT-proBNP 3252 (*)     All other components within normal limits   LACTIC ACID "   PROTHROMBIN TIME   URINALYSIS   ESTIMATED GFR   LACTIC ACID   URINE CULTURE(NEW)   BLOOD CULTURE   BLOOD CULTURE   LACTIC ACID   COV-2, FLU A/B, AND RSV BY PCR ([employer])   POCT COV-2, FLU A/B, RSV BY PCR   POC COV-2, FLU A/B, RSV BY PCR      All labs reviewed by me.    EKG Interpretation:  Interpreted by me  12 Lead EKG interpreted by me to show:  Sinus rhythm at 93, ST elevation with T wave inversion.     Radiology:   The attending Emergency Physician has independently interpreted the diagnostic imaging associated with this visit and is awaiting the final reading from the radiologist, which will be displayed below.    Preliminary interpretation is a follows: Infiltrates noted  Radiologist interpretation:      DX-CHEST-PORTABLE (1 VIEW)   Final Result      1.  Interstitial infiltrates and/or edema.   2.  Cardiomegaly.           COURSE & MEDICAL DECISION MAKING    ED Observation Status? No; Patient does not meet criteria for ED Observation.     INITIAL ASSESSMENT AND PLAN  Care Narrative:       6:22 AM - Patient seen and evaluated at bedside. Discussed plan of care, including ordering labs, imaging, and EKG to further evaluate. Patient agrees to plan of care. Patient will be treated with Maxipime 2 g in  mL IVPB, Duo neb nebulizer solution for her symptoms. Ordered DX-chest, lactic acid 2 hours after STAT 1x, proBrain Natriuretic peptide, SARS-CoV-2, Flu A/B, and RSV, Lactic acid, CBC with diff, CMP, Prothrombin Time, APTT, UA, Urine culture, blood culture, EKG to evaluate.    7:16 AM - Patient was reevaluated at bedside. Discussed lab and radiology results with the patient and informed her that she will need to be admitted. Patient verbalizes understanding and agreement to this plan of care.        8:11 AM - I discussed the patient's case and the above findings with Dr. Birch (Hospitalist) who agrees to evaluate the patient for admission.      HYDRATION: Based on the patient's presentation of Sepsis the  patient was given IV fluids. IV Hydration was used because oral hydration was not adequate alone. Upon recheck following hydration, the patient was improved.        CRITICAL CARE  The very real possibility of a deterioration of this patient's condition required the highest level of my preparedness for sudden, emergent intervention.  I provided critical care services, which included medication orders, frequent reevaluations of the patient's condition and response to treatment, ordering and reviewing test results, and discussing the case with various consultants.  The critical care time associated with the care of the patient was 33 minutes. Review chart for interventions. This time is exclusive of any other billable procedures.      ADDITIONAL PROBLEM LIST AND DISPOSITION  Patient with fever as well as tachycardia.  Concern for sepsis.  No leukocytosis.  Mild anemia.  proBNP elevated.  Small mount of fluids given given concern for congestive heart failure.  IV antibiotics given for potential pneumonia.  No COVID, flu or RSV.  Admitted to the hospital in guarded condition.               DISPOSITION AND DISCUSSIONS  I have discussed management of the patient with the following physicians and BIRGIT's: Dr. Birch (Hospitalist)    Discussion of management with other [facility] or appropriate source(s): None     Barriers to care at this time, including but not limited to:  None .     Decision tools and prescription drugs considered including, but not limited to: Antibiotics   .  For potential sepsis.    DISPOSITION:  Patient will be hospitalized by Dr. Birch in critical condition.     FINAL IMPRESSION   1. Sepsis, due to unspecified organism, unspecified whether acute organ dysfunction present (HCC)    2. Pneumonia of both lungs due to infectious organism, unspecified part of lung    3. Acute respiratory failure with hypoxia (HCC)    4.      CCT: 33 min.      IMalorie (Scribe), am scribing for, and in the presence  ofLayton M.D..    Electronically signed by: Malorie Alcantar (Scribe), 9/1/2024    I, Layton Li M.D. personally performed the services described in this documentation, as scribed by Malorie Alcantar in my presence, and it is both accurate and complete.    The note accurately reflects work and decisions made by me.  Layton Li M.D.  9/1/2024  1:13 PM

## 2024-09-01 NOTE — H&P
"Hospital Medicine History & Physical Note    Date of Service  9/1/2024    Primary Care Physician  Ashvin Arias D.O.    Consultants  NONE    Specialist Names: n/a    Code Status  DNAR/DNI  (Discussed in ER both daughters present)      Chief Complaint  Chief Complaint   Patient presents with    Palpitations     \"Heart racing\" since 2300    Difficulty Breathing     Since 2300    Fever     Since 2300; temp of 101*F at home       History of Presenting Illness  Aaliyah Fulton is a 84 y.o. female who is on chemotherapy for cholangiocarcinoma with her last treatment being on Friday 8/30 who presented 9/1/2024 with sudden onset fever, shortness of breath, nonproductive cough and generalized bodyaches.  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  She is not having tenderness over any of these areas.  She denies nausea, vomiting, diarrhea, dysuria.  She is chronically incontinent and does have frequent UTIs, daughter states that when she gets UTIs she becomes confused.    I discussed the plan of care with patient and family.    Review of Systems  Review of Systems   Constitutional:  Positive for chills, fever and malaise/fatigue. Negative for diaphoresis.   Respiratory:  Positive for cough and shortness of breath. Negative for sputum production.    Cardiovascular:  Negative for chest pain.   Gastrointestinal:  Negative for abdominal pain, diarrhea, nausea and vomiting.   Genitourinary:  Negative for dysuria.        Chronic incontinence   Musculoskeletal:  Positive for back pain and myalgias.   Neurological:  Positive for headaches. Negative for dizziness.       Past Medical History   has a past medical history of Arthritis, ASTHMA, Breast cancer (HCC) (2005), Cancer (HCC) (06/2024), Cataract, Dental disorder, Heart burn, Heart murmur, High cholesterol, History of cervical fracture (2022), " Hypertension (01/03/2022), Indigestion, Osteoporosis, Pain, Pneumonia (12/2021), PONV (postoperative nausea and vomiting), Psychiatric problem, Stroke (HCC) (2021), and Unspecified urinary incontinence.    Surgical History   has a past surgical history that includes sinuscopy; breast biopsy (05/21/2010); other orthopedic surgery; knee arthroplasty total (09/19/2011); lumbar laminectomy diskectomy (10/17/2012); foraminotomy (10/17/2012); pr radiation therapy plan simple; recovery (05/10/2016); pr partial hip replacement (Left, 11/24/2021); lumbar decompression (10/17/2012); pr create shunt:ventric-peritoneal (01/10/2022); cholecystectomy; hammertoe correction (Bilateral); shoulder surgery; knee arthroscopy; pr total knee arthroplasty (Left, 03/28/2023); and lumpectomy.  Tonsillectomy    Family History  family history includes Breast Cancer in her daughter; Cancer in her daughter; Lung Disease in her mother; Psychiatric Illness in her daughter and daughter; Stroke in her mother. (TIAs)    Family history reviewed with patient. There is no family history that is pertinent to the chief complaint.     Social History   reports that she has never smoked. She has never used smokeless tobacco. She reports that she does not drink alcohol and does not use drugs.  She lives with 1 daughter the other daughter lives next-door, she has no other children.  She has been  for approximately 1 year, her mobility is generally with a 4 wheeled walker.    Allergies  No Known Allergies    Medications  Prior to Admission Medications   Prescriptions Last Dose Informant Patient Reported? Taking?   Calcium Carbonate-Vitamin D (CALCIUM-VITAMIN D3 PO) 8/31/2024 at [facility] Family Member, Patient Yes Yes   Sig: Take 600 mg by mouth every morning.   D-MANNOSE PO 8/31/2024 at [facility] Patient, Family Member Yes Yes   Sig: Take 500 mg by mouth every evening.   acetaminophen (TYLENOL) 500 MG Tab unk at [facility] Patient, Family Member Yes No   Sig: Take  500-1,000 mg by mouth every 6 hours as needed for Moderate Pain.   atorvastatin (LIPITOR) 40 MG Tab 8/31/2024 at  Family Member, Patient No Yes   Sig: TAKE ONE TABLET BY MOUTH EVERY EVENING   busPIRone (BUSPAR) 15 MG tablet 8/31/2024 at  Family Member, Patient Yes Yes   Sig: Take 15 mg by mouth 2 times a day.   cyanocobalamin (VITAMIN B-12) 500 MCG Tab 8/31/2024 at [facility]  Yes Yes   Sig: Take 500 mcg by mouth every day.   estradiol (ESTRACE) 0.1 MG/GM vaginal cream 1 wk ago Family Member, Patient Yes No   Sig: Insert 2 g into the vagina 3 times a week.   methenamine hip (HIPPREX) 1 GM Tab 8/31/2024 at  Family Member, Patient Yes Yes   Sig: Take 1 g by mouth 2 times a day. LONG TERM TREATMENT FOR CHRONIC UTI.  Indications: Urinary Tract Infection   methylPREDNISolone (MEDROL DOSEPAK) 4 MG Tablet Therapy Pack Not Taking  No No   Sig: Follow schedule on package instructions.   Patient not taking: Reported on 9/1/2024   omeprazole (PRILOSEC) 40 MG delayed-release capsule 8/31/2024 at [facility] Family Member, Patient Yes Yes   Sig: Take 1 Capsule by mouth every day.   ondansetron (ZOFRAN) 4 MG Tab tablet Not Taking  No No   Sig: Take 1 Tablet by mouth every four hours as needed for Nausea/Vomiting (for nausea, vomiting).   Patient not taking: Reported on 9/1/2024   oxybutynin (DITROPAN XL) 15 MG CR tablet 8/31/2024 at [facility] Family Member, Patient Yes Yes   Sig: Take 1 Tablet by mouth every day.   prochlorperazine (COMPAZINE) 10 MG Tab Not Taking  No No   Sig: Take 1 Tablet by mouth every 6 hours as needed (for nausea, vomiting).   Patient not taking: Reported on 9/1/2024   venlafaxine (EFFEXOR-XR) 150 MG extended-release capsule 8/31/2024 at [facility] Patient, Family Member No Yes   Sig: Take 1 Capsule by mouth every day. Taken with 75 mg for 225mg daily   venlafaxine XR (EFFEXOR XR) 75 MG CAPSULE SR 24 HR 8/31/2024 at [facility]  Yes Yes   Sig: Take 75 mg by mouth every day. For a daily total of 225 mg   vitamin D3 (CHOLECALCIFEROL) 1000  Unit (25 mcg) Tab 8/31/2024 Family Member, Patient Yes Yes   Sig: Take 2,000 Units by mouth every morning.      Facility-Administered Medications: None       Physical Exam  Temp:  [38 °C (100.4 °F)] 38 °C (100.4 °F)  Pulse:  [] 115  Resp:  [16-24] 20  BP: (153-183)/(69-91) 153/88  SpO2:  [88 %-98 %] 96 %  Blood Pressure : (!) 153/88   Temperature: 38 °C (100.4 °F)   Pulse: (!) 115   Respiration: 20   Pulse Oximetry: 96 %       Physical Exam  Vitals and nursing note reviewed.   Constitutional:       General: She is in acute distress (Mild due to diffuse pain).      Appearance: She is obese. She is not ill-appearing, toxic-appearing or diaphoretic.   HENT:      Head: Normocephalic and atraumatic.      Nose: Nose normal.      Mouth/Throat:      Mouth: Mucous membranes are moist.   Eyes:      Extraocular Movements: Extraocular movements intact.      Pupils: Pupils are equal, round, and reactive to light.   Cardiovascular:      Rate and Rhythm: Normal rate and regular rhythm.      Heart sounds: Murmur (5 out of 6 with thrill palpable throughout the precordium) heard.   Pulmonary:      Effort: No respiratory distress.      Breath sounds: No stridor. No rhonchi or rales.      Comments: Mild tachypnea  Dry hacking cough  Abdominal:      General: There is no distension.      Tenderness: There is no abdominal tenderness.   Musculoskeletal:      Right lower leg: No edema.      Left lower leg: No edema.   Skin:     General: Skin is warm and dry.      Findings: No erythema.   Neurological:      General: No focal deficit present.      Mental Status: She is alert and oriented to person, place, and time.      Cranial Nerves: No cranial nerve deficit.      Motor: No weakness.   Psychiatric:         Mood and Affect: Mood normal.         Behavior: Behavior normal.         Laboratory:  Recent Labs     08/29/24  1413 09/01/24  0610   WBC 3.1* 3.7*   RBC 3.46* 3.34*   HEMOGLOBIN 11.4* 11.1*   HEMATOCRIT 34.1* 32.9*   MCV 98.6*  "98.5*   MCH 32.9 33.2*   MCHC 33.4 33.7   RDW 47.9 51.7*   PLATELETCT 246 268   MPV 10.8 10.0     Recent Labs     08/29/24  1413 09/01/24  0610   SODIUM 140 137   POTASSIUM 4.4 4.3   CHLORIDE 104 106   CO2 25 20   GLUCOSE 151* 92   BUN 14 21   CREATININE 0.93 0.71   CALCIUM 9.7 8.3*     Recent Labs     08/29/24  1413 09/01/24  0610   ALTSGPT 10 45   ASTSGOT 23 66*   ALKPHOSPHAT 147* 161*   TBILIRUBIN 0.2 0.4   GLUCOSE 151* 92     Recent Labs     09/01/24  0610   APTT <20.0*   INR 0.98     Recent Labs     09/01/24  0610   NTPROBNP 3252*         No results for input(s): \"TROPONINT\" in the last 72 hours.    Imaging:  DX-CHEST-PORTABLE (1 VIEW)   Final Result      1.  Interstitial infiltrates and/or edema.   2.  Cardiomegaly.          X-Ray:  I have personally reviewed the images and compared with prior images.  EKG:  I have personally reviewed the images and compared with prior images.    Assessment/Plan:  Justification for Admission Status  I anticipate this patient will require at least two midnights for appropriate medical management, necessitating inpatient admission because Neutropenic fever and respiratory failure    Patient will need a Telemetry bed on MEDICAL service .  The need is secondary to Sepsis.    * Neutropenic fever (HCC)- (present on admission)  Assessment & Plan  On cefepime, adding Vanco due to shunt device    Acute pain- (present on admission)  Assessment & Plan  Patient does not normally have pain related to her malignancy and is not on chronic pain medic patient however complains of significant pain all over throughout her bones and her whole body including headache.  Will add low-dose opioids for pain.  She has a history of GI bleeds so NSAIDs are relatively contraindicated    Pneumonia- (present on admission)  Assessment & Plan  Multifocal on imaging  Viral panel negative  On antibiotics for neutropenic fever    Acute respiratory failure with hypoxemia (HCC)- (present on admission)  Assessment " & Plan  Room air saturation 88%, in the mid 90s on 2 L  Continue oxygen as needed, add respiratory therapy protocol    Valvular stenosis- (present on admission)  Assessment & Plan  8/31/23 Echo w/ normal EF but Moderate Mitral stenosis w/ regurg and  Moderate Aortic Stenosis with regurg  She has a very loud 5/6 murmur with significant thrill-her daughter states she recently had an echo in July with her cardiologist Dr. Oneal.  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  Appropriate orders were placed.        Normal pressure hydrocephalus (HCC)- (present on admission)  Assessment & Plan  Patient has had a shunt for approximately 2 years-due to indwelling devices will also add vancomycin to her regimen-she does complain of headache        VTE prophylaxis: Xarelto 10 mg daily as prophylaxis

## 2024-09-01 NOTE — ASSESSMENT & PLAN NOTE
Room air saturation 88%, in the mid 90s on 2 L  Continue oxygen as needed, add respiratory therapy protocol    9/2/2024  CT scan per my review showing small right greater than left bilateral pleural effusions.  Continues to require 2 LPM oxygen via nasal cannula.  Baseline is room air.  Start gentle diuresis with furosemide 20 mg IV twice daily.  Continuous cardiac monitoring during first diuresis to monitor for arrhythmias.  PEP therapy and IS

## 2024-09-01 NOTE — ED TRIAGE NOTES
"Chief Complaint   Patient presents with    Palpitations     \"Heart racing\" since 2300    Difficulty Breathing     Since 2300    Fever     Since 2300; temp of 101*F at home     Pt to triage via wheelchair with daughters x2 for above complaint. Daughters called 911 and EMS saw pt at home and recommended pt come to hospital as she is currently undergoing chemotherapy and she had a fever at home. Pt declined ambulance and was brought in by daughters. Pt's last chemo treatment was Friday 8/30 for bile duct cancer. Pt also had low SpO2 at home per EMS, family is unsure how low though. 88% on room air on arrival to ED, pt placed on 2L O2 via NC and is tolerating well.     Pt is alert/oriented and follows commands. Pt speaking in full sentences and responds appropriately to questions. No acute distress noted in triage and respirations are even and unlabored.     Pt has sepsis score of 4 and is neutropenic. Charge RN notified of pt and room requested.  "

## 2024-09-01 NOTE — ASSESSMENT & PLAN NOTE
Multifocal on imaging  Viral panel negative  On antibiotics for neutropenic fever    9/2/2024  Continue cefepime

## 2024-09-01 NOTE — ASSESSMENT & PLAN NOTE
In the past patient has been a full code, both daughters are present I asked the patient what she would want in the event of cardiac arrest and she said she did not wish to be resuscitated or receive CPR, I asked what she would want done in the event of respiratory difficulty she stated that she would not want to be on mechanical assisted ventilation.  Appropriate orders were placed.

## 2024-09-01 NOTE — ED NOTES
Urine cath done with mini cath using aseptic technique. Procedure explained to pt prior to start and verbalized understanding.  Urine collected and sent to lab.

## 2024-09-01 NOTE — PROGRESS NOTES
4 Eyes Skin Assessment Completed by JAVED Charles and camille Christie.    Head discoloration on face  Ears Redness and Blanching  Nose WDL  Mouth WDL  Neck WDL  Breast/Chest WDL  Shoulder Blades WDL  Spine WDL  (R) Arm/Elbow/Hand Redness, Blanching, and Bruising  (L) Arm/Elbow/Hand Redness, Blanching, and Bruising  Abdomen WDL  Groin WDL  Scrotum/Coccyx/Buttocks Redness and Blanching  (R) Leg Scab  (L) Leg Scab  (R) Heel/Foot/Toe Redness, Blanching, and Boggy  (L) Heel/Foot/Toe Redness, Blanching, and Boggy          Devices In Places Tele Box, Pulse Ox, and Nasal Cannula      Interventions In Place Gray Ear Foams and Heels Loaded W/Pillows    Possible Skin Injury No    Pictures Uploaded Into Epic Yes  Wound Consult Placed N/A  RN Wound Prevention Protocol Ordered No

## 2024-09-01 NOTE — ASSESSMENT & PLAN NOTE
8/31/23 Echo w/ normal EF but Moderate Mitral stenosis w/ regurg and  Moderate Aortic Stenosis with regurg  She has a very loud 5/6 murmur with significant thrill-her daughter states she recently had an echo in July with her cardiologist Dr. Oneal.  Will try to get results of this echo when the office opens tomorrow

## 2024-09-01 NOTE — ED NOTES
Bedside report received from off going RN/tech: Kait assumed care of patient.  POC discussed with patient. Call light within reach, all needs addressed at this time.       Fall risk interventions in place: Patient's personal possessions are with in their safe reach and Keep floor surfaces clean and dry (all applicable per [place] Fall risk assessment)   Continuous monitoring: Cardiac Leads, Pulse Ox, or Blood Pressure  IVF/IV medications: Infusion per MAR (List Med(s)) LR  Oxygen: How many liters 4L  Bedside sitter: Not Applicable   Isolation: Not Applicable

## 2024-09-01 NOTE — ASSESSMENT & PLAN NOTE
Patient has had a shunt for approximately 2 years-due to indwelling devices will also add vancomycin to her regimen-she does complain of headache

## 2024-09-02 ENCOUNTER — APPOINTMENT (OUTPATIENT)
Dept: CARDIOLOGY | Facility: MEDICAL CENTER | Age: 84
DRG: 189 | End: 2024-09-02
Attending: STUDENT IN AN ORGANIZED HEALTH CARE EDUCATION/TRAINING PROGRAM
Payer: MEDICARE

## 2024-09-02 ENCOUNTER — APPOINTMENT (OUTPATIENT)
Dept: RADIOLOGY | Facility: MEDICAL CENTER | Age: 84
DRG: 189 | End: 2024-09-02
Attending: STUDENT IN AN ORGANIZED HEALTH CARE EDUCATION/TRAINING PROGRAM
Payer: MEDICARE

## 2024-09-02 PROBLEM — J90 BILATERAL PLEURAL EFFUSION: Status: ACTIVE | Noted: 2024-09-02

## 2024-09-02 PROBLEM — I27.20 PULMONARY HYPERTENSION (HCC): Status: ACTIVE | Noted: 2024-09-02

## 2024-09-02 PROBLEM — I50.31 ACUTE HEART FAILURE WITH PRESERVED EJECTION FRACTION (HFPEF) (HCC): Status: ACTIVE | Noted: 2024-09-02

## 2024-09-02 LAB
B PARAP IS1001 DNA NPH QL NAA+NON-PROBE: NOT DETECTED
B PERT.PT PRMT NPH QL NAA+NON-PROBE: NOT DETECTED
BASOPHILS # BLD AUTO: 0 % (ref 0–1.8)
BASOPHILS # BLD: 0 K/UL (ref 0–0.12)
C PNEUM DNA NPH QL NAA+NON-PROBE: NOT DETECTED
EOSINOPHIL # BLD AUTO: 0.03 K/UL (ref 0–0.51)
EOSINOPHIL NFR BLD: 1.6 % (ref 0–6.9)
ERYTHROCYTE [DISTWIDTH] IN BLOOD BY AUTOMATED COUNT: 54.4 FL (ref 35.9–50)
FLUAV RNA NPH QL NAA+NON-PROBE: NOT DETECTED
FLUBV RNA NPH QL NAA+NON-PROBE: NOT DETECTED
HADV DNA NPH QL NAA+NON-PROBE: NOT DETECTED
HCOV 229E RNA NPH QL NAA+NON-PROBE: NOT DETECTED
HCOV HKU1 RNA NPH QL NAA+NON-PROBE: NOT DETECTED
HCOV NL63 RNA NPH QL NAA+NON-PROBE: NOT DETECTED
HCOV OC43 RNA NPH QL NAA+NON-PROBE: NOT DETECTED
HCT VFR BLD AUTO: 28.6 % (ref 37–47)
HGB BLD-MCNC: 9.3 G/DL (ref 12–16)
HMPV RNA NPH QL NAA+NON-PROBE: NOT DETECTED
HPIV1 RNA NPH QL NAA+NON-PROBE: NOT DETECTED
HPIV2 RNA NPH QL NAA+NON-PROBE: NOT DETECTED
HPIV3 RNA NPH QL NAA+NON-PROBE: NOT DETECTED
HPIV4 RNA NPH QL NAA+NON-PROBE: NOT DETECTED
IMM GRANULOCYTES # BLD AUTO: 0 K/UL (ref 0–0.11)
IMM GRANULOCYTES NFR BLD AUTO: 0 % (ref 0–0.9)
LV EJECT FRACT  99904: 55
LV EJECT FRACT MOD 2C 99903: 60.75
LV EJECT FRACT MOD 4C 99902: 52.67
LV EJECT FRACT MOD BP 99901: 57.09
LYMPHOCYTES # BLD AUTO: 0.55 K/UL (ref 1–4.8)
LYMPHOCYTES NFR BLD: 29.3 % (ref 22–41)
M PNEUMO DNA NPH QL NAA+NON-PROBE: NOT DETECTED
MCH RBC QN AUTO: 32.5 PG (ref 27–33)
MCHC RBC AUTO-ENTMCNC: 32.5 G/DL (ref 32.2–35.5)
MCV RBC AUTO: 100 FL (ref 81.4–97.8)
MONOCYTES # BLD AUTO: 0.04 K/UL (ref 0–0.85)
MONOCYTES NFR BLD AUTO: 2.1 % (ref 0–13.4)
NEUTROPHILS # BLD AUTO: 1.26 K/UL (ref 1.82–7.42)
NEUTROPHILS NFR BLD: 67 % (ref 44–72)
NRBC # BLD AUTO: 0 K/UL
NRBC BLD-RTO: 0 /100 WBC (ref 0–0.2)
PLATELET # BLD AUTO: 224 K/UL (ref 164–446)
PMV BLD AUTO: 10.2 FL (ref 9–12.9)
RBC # BLD AUTO: 2.86 M/UL (ref 4.2–5.4)
RSV RNA NPH QL NAA+NON-PROBE: NOT DETECTED
RV+EV RNA NPH QL NAA+NON-PROBE: NOT DETECTED
SARS-COV-2 RNA NPH QL NAA+NON-PROBE: NOTDETECTED
WBC # BLD AUTO: 1.9 K/UL (ref 4.8–10.8)

## 2024-09-02 PROCEDURE — 36415 COLL VENOUS BLD VENIPUNCTURE: CPT

## 2024-09-02 PROCEDURE — 97535 SELF CARE MNGMENT TRAINING: CPT

## 2024-09-02 PROCEDURE — 97166 OT EVAL MOD COMPLEX 45 MIN: CPT

## 2024-09-02 PROCEDURE — 700111 HCHG RX REV CODE 636 W/ 250 OVERRIDE (IP): Mod: JZ | Performed by: INTERNAL MEDICINE

## 2024-09-02 PROCEDURE — 700111 HCHG RX REV CODE 636 W/ 250 OVERRIDE (IP): Mod: JZ | Performed by: STUDENT IN AN ORGANIZED HEALTH CARE EDUCATION/TRAINING PROGRAM

## 2024-09-02 PROCEDURE — 99233 SBSQ HOSP IP/OBS HIGH 50: CPT | Performed by: STUDENT IN AN ORGANIZED HEALTH CARE EDUCATION/TRAINING PROGRAM

## 2024-09-02 PROCEDURE — A9270 NON-COVERED ITEM OR SERVICE: HCPCS | Performed by: INTERNAL MEDICINE

## 2024-09-02 PROCEDURE — 85025 COMPLETE CBC W/AUTO DIFF WBC: CPT

## 2024-09-02 PROCEDURE — 700105 HCHG RX REV CODE 258: Performed by: INTERNAL MEDICINE

## 2024-09-02 PROCEDURE — 99223 1ST HOSP IP/OBS HIGH 75: CPT | Performed by: INTERNAL MEDICINE

## 2024-09-02 PROCEDURE — 97165 OT EVAL LOW COMPLEX 30 MIN: CPT

## 2024-09-02 PROCEDURE — 87040 BLOOD CULTURE FOR BACTERIA: CPT

## 2024-09-02 PROCEDURE — 93306 TTE W/DOPPLER COMPLETE: CPT | Mod: 26 | Performed by: INTERNAL MEDICINE

## 2024-09-02 PROCEDURE — 93306 TTE W/DOPPLER COMPLETE: CPT

## 2024-09-02 PROCEDURE — 770004 HCHG ROOM/CARE - ONCOLOGY PRIVATE *

## 2024-09-02 PROCEDURE — 71250 CT THORAX DX C-: CPT

## 2024-09-02 PROCEDURE — 700102 HCHG RX REV CODE 250 W/ 637 OVERRIDE(OP): Performed by: INTERNAL MEDICINE

## 2024-09-02 PROCEDURE — 97162 PT EVAL MOD COMPLEX 30 MIN: CPT

## 2024-09-02 PROCEDURE — 94669 MECHANICAL CHEST WALL OSCILL: CPT

## 2024-09-02 PROCEDURE — 0202U NFCT DS 22 TRGT SARS-COV-2: CPT

## 2024-09-02 RX ORDER — FUROSEMIDE 10 MG/ML
20 INJECTION INTRAMUSCULAR; INTRAVENOUS
Status: DISCONTINUED | OUTPATIENT
Start: 2024-09-02 | End: 2024-09-05

## 2024-09-02 RX ADMIN — METHENAMINE HIPPURATE 1 G: 1 TABLET ORAL at 05:51

## 2024-09-02 RX ADMIN — BUSPIRONE HYDROCHLORIDE 15 MG: 10 TABLET ORAL at 08:58

## 2024-09-02 RX ADMIN — CEFEPIME 2 G: 2 INJECTION, POWDER, FOR SOLUTION INTRAVENOUS at 17:15

## 2024-09-02 RX ADMIN — CEFEPIME 2 G: 2 INJECTION, POWDER, FOR SOLUTION INTRAVENOUS at 05:55

## 2024-09-02 RX ADMIN — VANCOMYCIN HYDROCHLORIDE 1250 MG: 5 INJECTION, POWDER, LYOPHILIZED, FOR SOLUTION INTRAVENOUS at 09:00

## 2024-09-02 RX ADMIN — RIVAROXABAN 10 MG: 10 TABLET, FILM COATED ORAL at 16:15

## 2024-09-02 RX ADMIN — SENNOSIDES AND DOCUSATE SODIUM 2 TABLET: 50; 8.6 TABLET ORAL at 16:15

## 2024-09-02 RX ADMIN — CYANOCOBALAMIN TAB 500 MCG 500 MCG: 500 TAB at 05:50

## 2024-09-02 RX ADMIN — METHENAMINE HIPPURATE 1 G: 1 TABLET ORAL at 16:15

## 2024-09-02 RX ADMIN — OMEPRAZOLE 40 MG: 20 CAPSULE, DELAYED RELEASE ORAL at 05:50

## 2024-09-02 RX ADMIN — FUROSEMIDE 20 MG: 10 INJECTION, SOLUTION INTRAVENOUS at 22:43

## 2024-09-02 RX ADMIN — VENLAFAXINE HYDROCHLORIDE 225 MG: 75 CAPSULE, EXTENDED RELEASE ORAL at 05:50

## 2024-09-02 RX ADMIN — ACETAMINOPHEN 650 MG: 325 TABLET ORAL at 05:50

## 2024-09-02 RX ADMIN — ATORVASTATIN CALCIUM 40 MG: 40 TABLET, FILM COATED ORAL at 16:15

## 2024-09-02 RX ADMIN — BUSPIRONE HYDROCHLORIDE 15 MG: 10 TABLET ORAL at 22:44

## 2024-09-02 ASSESSMENT — COGNITIVE AND FUNCTIONAL STATUS - GENERAL
MOVING FROM LYING ON BACK TO SITTING ON SIDE OF FLAT BED: A LITTLE
MOBILITY SCORE: 18
TOILETING: A LITTLE
DAILY ACTIVITIY SCORE: 20
SUGGESTED CMS G CODE MODIFIER DAILY ACTIVITY: CJ
SUGGESTED CMS G CODE MODIFIER MOBILITY: CK
MOVING TO AND FROM BED TO CHAIR: A LITTLE
DRESSING REGULAR LOWER BODY CLOTHING: A LOT
WALKING IN HOSPITAL ROOM: A LITTLE
HELP NEEDED FOR BATHING: A LITTLE
CLIMB 3 TO 5 STEPS WITH RAILING: A LITTLE
STANDING UP FROM CHAIR USING ARMS: A LITTLE
TURNING FROM BACK TO SIDE WHILE IN FLAT BAD: A LITTLE

## 2024-09-02 ASSESSMENT — GAIT ASSESSMENTS
ASSISTIVE DEVICE: FRONT WHEEL WALKER
GAIT LEVEL OF ASSIST: SUPERVISED
DISTANCE (FEET): 10
DEVIATION: NO DEVIATION

## 2024-09-02 ASSESSMENT — ENCOUNTER SYMPTOMS
SHORTNESS OF BREATH: 0
VOMITING: 0
NAUSEA: 0
ABDOMINAL PAIN: 0
HEADACHES: 0
FEVER: 0
PALPITATIONS: 0
COUGH: 0
SORE THROAT: 1
MYALGIAS: 0
CHILLS: 0
DIZZINESS: 0

## 2024-09-02 ASSESSMENT — PAIN DESCRIPTION - PAIN TYPE: TYPE: ACUTE PAIN

## 2024-09-02 ASSESSMENT — FIBROSIS 4 INDEX: FIB4 SCORE: 3.78

## 2024-09-02 ASSESSMENT — ACTIVITIES OF DAILY LIVING (ADL): TOILETING: INDEPENDENT

## 2024-09-02 NOTE — PROGRESS NOTES
Received bedside report from RN, pt care assumed, VSS, pt assessment complete. Pt AAOx4, with 3/10 of pain at this time, treated with tylenol. No signs of acute distress noted at this time. Plan of care discussed with pt and verbalizes no questions. Pt denies any additional needs at this time. Bed locked/in lowest position, bed alarm on, pt educated on fall risk and verbalized understanding, call light within reach, hourly rounding initiated.

## 2024-09-02 NOTE — PROGRESS NOTES
Monitor summary:        Rhythm: SR   Rate: 69-88  Ectopy: (R)PAC, (R) PVC  Measurements: .19/.06/.33        12hr chart check

## 2024-09-02 NOTE — PROGRESS NOTES
Pt is oriented x 4 without complaints.  Call light within reach.  Protective precautions in place.

## 2024-09-02 NOTE — THERAPY
Speech Language Therapy Contact Note    Patient Name: Aaliyah Fulton  Age:  84 y.o., Sex:  female  Medical Record #: [id number]  Today's Date: 9/2/2024    Discussed missed therapy with RN.        09/02/24 1337   Treatment Variance   Reason For Missed Therapy Medical - Other (Please Comment)   Interdisciplinary Plan of Care Collaboration   IDT Collaboration with  Nursing   Collaboration Comments Orders received for swallow evaluation. Per RN, patient with frequent reports of choking with PO at baseline, though doing well with meals from RN perspective. OT starting evaluation at first attempt. SLP returned later and patient with transport heading to imaging. Service to follow up tomorrow as appropriate.

## 2024-09-02 NOTE — CARE PLAN
The patient is Stable - Low risk of patient condition declining or worsening    Shift Goals  Clinical Goals: monitor O2, trend vitals/labs, IV ABX  Patient Goals: sleep, pain management  Family Goals: updates    Progress made toward(s) clinical / shift goals:    Problem: Knowledge Deficit - Standard  Goal: Patient and family/care givers will demonstrate understanding of plan of care, disease process/condition, diagnostic tests and medications  Outcome: Progressing     Problem: Hemodynamics  Goal: Patient's hemodynamics, fluid balance and neurologic status will be stable or improve  Outcome: Progressing     Problem: Physical Regulation  Goal: Signs and symptoms of infection will decrease  Outcome: Progressing     Problem: Pain - Standard  Goal: Alleviation of pain or a reduction in pain to the patient’s comfort goal  Outcome: Progressing     Problem: Fall Risk  Goal: Patient will remain free from falls  Outcome: Progressing       Patient is not progressing towards the following goals:      Problem: Respiratory  Goal: Patient will achieve/maintain optimum respiratory ventilation and gas exchange  Outcome: Not Progressing   Pt verbalizes understanding of care plan. Pt has scheduled IV cefepime and vancomycin for ABX, requires 2L O2. Pt has been afebrile so far during shift, generalized pain being treated with tylenol.

## 2024-09-02 NOTE — CONSULTS
"[facility] INFECTIOUS DISEASES INPATIENT CONSULT NOTE     Date of Service: 9/2/2024    Consult Requested By: Yogesh Guzman M.D.    Reason for Consultation: Fever    Chief Complaint: Fever    History of Present Illness:     Aaliyah Fulton is a 84 y.o. female admitted 9/1/2024.  Extensive review of documentation 1 multiple specialties performed in generating this HPI.  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  Patient has a 6 cm mass in the left lobe of liver, multiple lymph nodes.  Patient's last chemotherapy was on 8/30.  On 8/31, patient began to experience acute onset of difficulty breathing, fever, palpitations, generalized bodyaches, nonproductive cough.  Patient has hardware in multiple places, and a  shunt in place, dental hardware in place.  She had a Tmax of 100.9, was started on vancomycin and cefepime, white count dropped from 3.7-1.9 with ANC of 1260.  UA negative and no urinary symptoms.  Chest x-ray with multifocal interstitial infiltrates.  1 out of 2 blood cultures positive for GPC.    Patient today with rapid improvement, only on 2 L oxygen, feeling significantly better.  No sick contacts.    Review of Systems:  All other systems reviewed and are negative expect as noted in HPI    Past Medical History:   Diagnosis Date    Arthritis     ASTHMA     CHEMICAL INDUCED    Breast cancer (HCC) 2005    stage 0 - Dr. 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 Had to wear brace only.    Hypertension 01/03/2022    states well controlled on meds    Indigestion     taking omeprazole    Osteoporosis     Pain     \"everywhere\"    Pneumonia 12/2021    on " antibiotics    PONV (postoperative nausea and vomiting)     Have always had this.    Psychiatric problem     depression on zoloft    Stroke (HCC) 2021    Some memory problems and expressive aphasia.    Unspecified urinary incontinence     wears depends       Past Surgical History:   Procedure Laterality Date    PB TOTAL KNEE ARTHROPLASTY Left 03/28/2023    Procedure: LEFT TOTAL KNEE ARTHROPLASTY;  Surgeon: Marcus Hall M.D.;  Location: SURGERY [facility];  Service: Orthopedics    MO CREATE SHUNT:VENTRIC-PERITONEAL  01/10/2022    Procedure: INSERTION, SHUNT, VENTRICULOPERITONEAL, LAPAROSCOPIC PERITONEAL CATHETER - STEALTH GUIDED;  Surgeon: Gregory Blanchard M.D.;  Location: SURGERY [facility];  Service: Neurosurgery    PB PARTIAL HIP REPLACEMENT Left 11/24/2021    Procedure: HEMIARTHROPLASTY, HIP;  Surgeon: Marlon Culver M.D.;  Location: SURGERY [facility];  Service: Orthopedics    RECOVERY  05/10/2016    Procedure: [id number];  Surgeon: [facility];  Location: SURGERY PRE-POST PROC UNIT [facility];  Service:     LUMBAR LAMINECTOMY DISKECTOMY  10/17/2012    Performed by Daksha Melendez M.D. at SURGERY [facility] ORS    FORAMINOTOMY  10/17/2012    Performed by Daksha Melendez M.D. at SURGERY [facility] ORS    LUMBAR DECOMPRESSION  10/17/2012    Performed by Daksha Melendez M.D. at SURGERY [facility] ORS    KNEE ARTHROPLASTY TOTAL  09/19/2011    Performed by KISHAN CARDENAS at SURGERY [facility] ORS    BREAST BIOPSY  05/21/2010    Performed by ROB TOMAS at SURGERY SAME DAY [facility] ORS    CHOLECYSTECTOMY      HAMMERTOE CORRECTION Bilateral     with bunions    KNEE ARTHROSCOPY      LUMPECTOMY      OTHER ORTHOPEDIC SURGERY      foot, shoulder, and knee    MO RADIATION THERAPY PLAN SIMPLE      SHOULDER SURGERY      SINUSCOPY         Family History   Problem Relation Age of Onset    Lung Disease Mother         copd    Stroke Mother     Cancer Daughter         Breast     Breast Cancer Daughter     Psychiatric Illness Daughter         Anxiety    Psychiatric Illness Daughter         Anxiety and Depression       Social History     Socioeconomic History    Marital status:      Spouse name: Not on file    Number of children: Not on file    Years of education: Not on file    Highest education level: Not on file   Occupational History    Not on file   Tobacco Use    Smoking status: Never    Smokeless tobacco: Never   Vaping Use    Vaping status: Never Used   Substance and Sexual Activity    Alcohol use: No    Drug use: Never    Sexual activity: Not on file     Comment: ; two daughters; retired ( @ [employer])   Other Topics Concern    Not on file   Social History Narrative    Retired-       Social Determinants of Health     Financial Resource Strain: Not on file   Food Insecurity: No Food Insecurity (9/1/2024)    Hunger Vital Sign     Worried About Running Out of Food in the Last Year: Never true     Ran Out of Food in the Last Year: Never true   Transportation Needs: No Transportation Needs (9/1/2024)    PRAPARE - Transportation     Lack of Transportation (Medical): No     Lack of Transportation (Non-Medical): No   Physical Activity: Not on file   Stress: Not on file   Social Connections: Not on file   Intimate Partner Violence: Not At Risk (9/1/2024)    Humiliation, Afraid, Rape, and Kick questionnaire     Fear of Current or Ex-Partner: No     Emotionally Abused: No     Physically Abused: No     Sexually Abused: No   Housing Stability: Low Risk  (9/1/2024)    Housing Stability Vital Sign     Unable to Pay for Housing in the Last Year: No     Number of Times Moved in the Last Year: 0     Homeless in the Last Year: No       No Known Allergies    Medications:    Current Facility-Administered Medications:     ipratropium-albuterol (DUONEB) nebulizer solution, 3 mL, Nebulization, Q4H PRN (RT), Layton Li M.D.    cefepime (Maxipime) 2 g  in  mL IVPB, 2 g, Intravenous, Q12HRS, Dilcia Birch M.D., Stopped at 09/02/24 0625    MD Alert...Vancomycin per Pharmacy, , Other, PHARMACY TO DOSE, Dilcia Birch M.D.    Respiratory Therapy Consult, , Nebulization, Continuous RT, Dilcia Birch M.D.    rivaroxaban (Xarelto) tablet 10 mg, 10 mg, Oral, DAILY AT 1800, Dilcia Birch M.D., 10 mg at 09/01/24 1739    acetaminophen (Tylenol) tablet 650 mg, 650 mg, Oral, Q6HRS PRN, Dilcia Birch M.D., 650 mg at 09/02/24 0550    labetalol (Normodyne/Trandate) injection 10 mg, 10 mg, Intravenous, Q4HRS PRN, Dilcia Birch M.D.    ondansetron (Zofran) syringe/vial injection 4 mg, 4 mg, Intravenous, Q4HRS PRN, Dilcia Birch M.D.    ondansetron (Zofran ODT) dispertab 4 mg, 4 mg, Oral, Q4HRS PRN, Dilcia Birch M.D.    senna-docusate (Pericolace Or Senokot S) 8.6-50 MG per tablet 2 Tablet, 2 Tablet, Oral, Q EVENING **AND** polyethylene glycol/lytes (Miralax) Packet 1 Packet, 1 Packet, Oral, QDAY PRN, Diclia Birch M.D.    Notify provider if pain remains uncontrolled, , , CONTINUOUS **AND** Use the Numeric Rating Scale (NRS), Bautista-Baker Faces (WBF), or FLACC on regular floors and Critical-Care Pain Observation Tool (CPOT) on ICUs/Trauma to assess pain, , , CONTINUOUS **AND** [COMPLETED] Pulse Ox, , , CONTINUOUS **AND** Pharmacy Consult Request ...Pain Management Review 1 Each, 1 Each, Other, PHARMACY TO DOSE **AND** If patient difficult to arouse and/or has respiratory depression (respiratory rate of 10 or less), stop any opiates that are currently infusing and call a Rapid Response., , , CONTINUOUS, Dilcia Birch M.D.    oxyCODONE immediate-release (Roxicodone) tablet 2.5 mg, 2.5 mg, Oral, Q3HRS PRN **OR** oxyCODONE immediate-release (Roxicodone) tablet 5 mg, 5 mg, Oral, Q3HRS PRN **OR** morphine 4 MG/ML injection 2 mg, 2 mg, Intravenous, Q3HRS PRN, Dilcia Birch M.D.    atorvastatin  "(Lipitor) tablet 40 mg, 40 mg, Oral, Q EVENING, Dilcia Birch M.D., 40 mg at 24 1739    busPIRone (Buspar) tablet 15 mg, 15 mg, Oral, BID, Dilcia Birch M.D., 15 mg at 24 0858    cyanocobalamin (Vitamin B-12) tablet 500 mcg, 500 mcg, Oral, DAILY, Dilcia Birch M.D., 500 mcg at 24 0550    methenamine hip (Hipprex) tablet 1 g, 1 g, Oral, BID, Dilcia Birch M.D., 1 g at 24 0551    omeprazole (PriLOSEC) capsule 40 mg, 40 mg, Oral, DAILY, Dilcia Birch M.D., 40 mg at 24 0550    venlafaxine XR (Effexor XR) capsule 225 mg, 225 mg, Oral, DAILY, Dilcia Birch M.D., 225 mg at 24 0550    vancomycin (Vancocin) 1,250 mg in  mL IVPB, 1,250 mg, Intravenous, Q24HR, Dilcia Birhc M.D., Last Rate: 125 mL/hr at 24 0900, 1,250 mg at 24 0900    Physical Exam:   Vital Signs: /68   Pulse 78   Temp 36.6 °C (97.9 °F) (Temporal)   Resp 18   Ht 1.6 m (5' 3\")   Wt 79.5 kg (175 lb 4.3 oz)   SpO2 98%   BMI 31.05 kg/m²   Temp  Av.9 °C (98.5 °F)  Min: 36.1 °C (97 °F)  Max: 38.3 °C (100.9 °F)  Vital signs reviewed  Constitutional: Patient is oriented to person, place, and time. Appears well-developed and well-nourished. No distress  Head: Atraumatic  Eyes: Conjunctivae normal  Mouth/Throat: Lips without lesions  Cardiovascular: Normal rate  Pulmonary/Chest: No respiratory distress  Abdominal: Non distended  Musculoskeletal: No joint tenderness, swelling, erythema, or restriction of motion noted.  Neurological: Alert and oriented to person, place, and time. No gross cranial nerve deficit. No focal neural deficit noted  Skin: Skin is warm and dry. No rashes or embolic phenomena noted on exposed skin  Psychiatric: Normal mood and affect. Behavior is normal.     LABS:  Recent Labs     24  0610 24  0029   WBC 3.7* 1.9*      Recent Labs     24  0610 24  0029   HEMOGLOBIN 11.1* 9.3*   HEMATOCRIT 32.9* " "28.6*   MCV 98.5* 100.0*   MCH 33.2* 32.5   PLATELETCT 268 224       Recent Labs     09/01/24  0610   SODIUM 137   POTASSIUM 4.3   CHLORIDE 106   CO2 20   CREATININE 0.71        Recent Labs     09/01/24  0610   ALBUMIN 3.6        MICRO:  No results found for: \"BLOODCULTU\", \"BLDCULT\", \"BCHOLD\"     Latest pertinent labs were reviewed    IMAGING STUDIES:  As above    Hospital Course/Assessment:   Aaliyah Fulton is a 84 y.o. 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  Patient has a 6 cm mass in the left lobe of liver, multiple lymph nodes.  Patient's last chemotherapy was on 8/30.  On 8/31, patient began to experience acute onset of difficulty breathing, fever, palpitations, generalized bodyaches, nonproductive cough. UA negative and no urinary symptoms.  Chest x-ray with multifocal interstitial infiltrates. 1 out of 2 blood cultures positive for coagulase-negative Staphylococcus, possible contaminant.  She had significant rapid improvement in 1 day.    Pertinent Diagnoses:  Fever  Acute hypoxemic respiratory failure  Intrahepatic cholangiocarcinoma on chemotherapy  Immunosuppressed status  Positive blood culture    Plan:   -Okay to continue cefepime for now  -1 out of 2 blood cultures positive for coagulase-negative Staphylococcus, likely contaminant, discontinue vancomycin  -Patient with rapid improvement in just 1 day, however given immunosuppressed status and symptomatology and radiology pointing to a pulmonary source, recommend CT chest without contrast.  Also on the list is a chemotherapy induced pneumonitis  -RVP, urine Legionella and Strep antigens    Disposition: Unable to determine at this time  Need for PICC line: Unable to determine at this time    Plan was discussed with the primary team, Dr. Guzman.  ID will follow.  This illness poses a threat to life.    Bacilio " MIRYAM Jones.    Please note that this dictation was created using voice recognition software. I have worked with technical experts from [facility] to optimize the interface.  I have made every reasonable attempt to correct obvious errors, but there may be errors of grammar and possibly content that I did not discover before finalizing the note.

## 2024-09-02 NOTE — THERAPY
"Occupational Therapy   Initial Evaluation     Patient Name: Aaliyah Fulton  Age:  84 y.o., Sex:  female  Medical Record #: [id number]  Today's Date: 9/2/2024     Precautions  Precautions: Fall Risk    Assessment    Patient is a very pleasant 84 y.o. female with a PMHx significant for arthritis, asthma, breast cancer, cataract, heart murmur, HTN, osteoporosis, UTIs, CVA, normal pressure hydrocephalus with a  shunt and cholangiocarcinoma with current chemotherapy. Presented to hospital with c/o fever, SOB, nonproductive cough and generalized body aches. Found to have neutropenic fever and acute respiratory failure with hypoxemia. Pt lives with her daughter and son-in-law in a 1SH with 4SE. Reports she was independent with ADLs and some IADLs prior to admission.     Pt seen for OT eval. Family present and supportive. Required no more than SPV for all functional mobility, SBA for standing g/h, min A for toileting ADLs and max A for LB dressing. Reports LUE pain making it difficult to complete bimanual ADLs at this time; attributes pain to IV placement. RN notified. Pt demo'd x1 overt LOB during standing g/h tasks but able to self correct. Educated on home safety, role of HH OT, pathology of bedrest and use of pulse oximeter as outlined in \"Education Group.\" Currently limited by impaired AROM, strength, balance and pain which are currently affecting patients ability to complete ADL/IADLs at baseline. Will continue to follow.    Plan    Occupational Therapy Initial Treatment Plan   Treatment Interventions: Self Care / Activities of Daily Living, Adaptive Equipment, Neuro Re-Education / Balance, Therapeutic Exercises, Therapeutic Activity, Family / Caregiver Training  Treatment Frequency: 3 Times per Week  Duration: Until Therapy Goals Met    DC Equipment Recommendations: None  Discharge Recommendations: Recommend home health for continued occupational therapy services      Objective     09/02/24 1352   Prior Living " Situation   Housing / Facility 1 Story House   Steps Into Home 4   Rail Right Rail (Steps into Home)   Bathroom Set up Walk In Shower;Shower Chair   Equipment Owned Front-Wheel Walker;4-Wheel Walker;Single Point Cane;Tub / Shower Seat  (Thinks she has a reacher but unsure of where it would be.)   Lives with - Patient's Self Care Capacity Adult Children  (daughter and ROS)   Comments Pt lives with daughter who works full time and ROS who is retired and able/available to assist PRN. Pt feels she has a good support system.   Prior Level of ADL Function   Self Feeding Independent   Grooming / Hygiene Independent   Bathing Independent   Dressing Independent   Toileting Independent   Prior Level of IADL Function   Medication Management Requires Assist   Laundry Independent   Kitchen Mobility Requires Assist   Finances Requires Assist   Home Management Requires Assist   Shopping Requires Assist   Prior Level Of Mobility Independent With Device in Community;Independent With Device in Home  (FWW in home; 4WW in community.)   Driving / Transportation Relatives / Others Provide Transportation   Occupation (Pre-Hospital Vocational) Retired Due To Age   History of Falls   History of Falls Yes   Date of Last Fall   (Reports 3 falls within the last year; dates not given)   Precautions   Precautions Fall Risk   Vitals   Pulse 78   Pulse Oximetry 93 %   O2 (LPM) 2   O2 Delivery Device Silicone Nasal Cannula   Vitals Comments Doffed O2 for mobility to bathroom; desaturation to 84% SpO2 following return to bed. O2 titrated to 3L and able to recover quickly with pursed lip breathing. No c/o dizziness or light headedness.   Pain 0 - 10 Group   Location Arm   Location Orientation Left   Therapist Pain Assessment During Activity;Post Activity Pain Same as Prior to Activity;Nurse Notified  (Reports L arm pain; not quantified. Thinks it is due to IV placement.)   Cognition    Cognition / Consciousness WDL   Level of Consciousness Alert    Comments Very pleasant and participatory. Good safety awareness and insight into deficits.   Passive ROM Upper Body   Passive ROM Upper Body WDL   Active ROM Upper Body   Active ROM Upper Body  X   Dominant Hand Right   Comments LUE grossly impaired by pain; Attributes pain to IV. RUE WDL.   Strength Upper Body   Upper Body Strength  X   Gross Strength Generalized Weakness, Equal Bilaterally.    Coordination Upper Body   Coordination WDL   Comments RA B hands; reports that it does not inhibit ability to complete ADLs at baseline.   Balance Assessment   Sitting Balance (Static) Fair +   Sitting Balance (Dynamic) Fair   Standing Balance (Static) Fair   Standing Balance (Dynamic) Fair -   Weight Shift Sitting Good   Weight Shift Standing Fair   Comments FWW in standing   Bed Mobility    Supine to Sit Supervised   Sit to Supine Supervised   Scooting Supervised   Rolling Supervised   Comments HOB flat with no use of rail   ADL Assessment   Eating Supervision   Grooming Standby Assist;Standing  (brushed teeth and wash hands at sink. 1 overt LOB attempted to throw trash away in bathroom; able to self correct,)   Lower Body Dressing Maximal Assist  (don socks)   Toileting Minimal Assist  (standard toilet)   Functional Mobility   Sit to Stand Supervised   Bed, Chair, Wheelchair Transfer Supervised   Toilet Transfers Supervised   Transfer Method Stand Step   Mobility with FWW; Bed > toilet > sink > bed > sink > bed   Edema / Skin Assessment   Edema / Skin  WDL   Activity Tolerance   Comments No c/o fatigue   Patient / Family Goals   Patient / Family Goal #1 to go home   Short Term Goals   Short Term Goal # 1 Pt will be able to complete toileting ADLs with SPV   Short Term Goal # 2 Pt will complete LB dressing with SPV and AE PRN   Education Group   Education Provided Home Safety;Role of Occupational Therapist;Pathology of bedrest   Role of Occupational Therapist Patient Response Patient;Family;Acceptance;Explanation;Verbal  Demonstration   Home Safety Patient Response Patient;Family;Acceptance;Explanation;Verbal Demonstration   Pathology of Bedrest Patient Response Patient;Family;Acceptance;Explanation;Verbal Demonstration  (Educated on importance of frequent OOB activity and up to chair for all meals.)   Additional Comments Educated on benefits of pulse oximeter to monitor SpO2 saturation and HR. Educated on importance of sitting upright for meals to avoid aspiration PNA. Educated on role of HH OT.   Occupational Therapy Initial Treatment Plan    Treatment Interventions Self Care / Activities of Daily Living;Adaptive Equipment;Neuro Re-Education / Balance;Therapeutic Exercises;Therapeutic Activity;Family / Caregiver Training   Treatment Frequency 3 Times per Week   Duration Until Therapy Goals Met   Problem List   Problem List Decreased Active Daily Living Skills;Decreased Homemaking Skills;Decreased Upper Extremity Strength Right;Decreased Upper Extremity Strength Left;Decreased Upper Extremity AROM Left;Impaired Postural Control / Balance   Anticipated Discharge Equipment and Recommendations   DC Equipment Recommendations None   Discharge Recommendations Recommend home health for continued occupational therapy services   Interdisciplinary Plan of Care Collaboration   IDT Collaboration with  Nursing;Family / Caregiver   Patient Position at End of Therapy In Bed;Bed Alarm On;Call Light within Reach;Tray Table within Reach;Family / Friend in Room   Collaboration Comments OT report and recs; RN updated   Session Information   Date / Session Number  9/2, #1 (1/3, 9/8)

## 2024-09-02 NOTE — THERAPY
Physical Therapy   Initial Evaluation     Patient Name: Aaliyah Fulton  Age:  84 y.o., Sex:  female  Medical Record #: [id number]  Today's Date: 9/2/2024     Precautions  Precautions: (P) Fall Risk    Assessment    84 y.o. female was admitted for neutropenic fever in the setting of choleangiocarcinoma with current chem treatments.  PMHx includes normal pressure hydrocephalus with a  shunt, frequent UTIs, asthma, heart murmur, HTN, and CVA.  On eval, she was able to mobilize with SPV 10' x2, no LOB, good BLE strength.  She has no acute PT needs.  Anticipate no post acute PT needs.    Plan    Physical Therapy Initial Treatment Plan   Duration: (P) Discharge Needs Only    DC Equipment Recommendations: (P) None (Pt has a FWW and rollator)  Discharge Recommendations: (P) Anticipate that the patient will have no further physical therapy needs after discharge from the hospital       Subjective    Pt reports her balance got significantly worse about a year ago, but cannot think of a precipitating event.     Objective       09/02/24 0942   Initial Contact Note    Initial Contact Note Order Received and Verified, Physical Therapy Evaluation in Progress with Full Report to Follow.   Precautions   Precautions Fall Risk   Vitals   Pulse 81   Pulse Oximetry 95 %   O2 (LPM) 2   O2 Delivery Device Silicone Nasal Cannula   Vitals Comments /p ambulation   Pain 0 - 10 Group   Therapist Pain Assessment Post Activity Pain Same as Prior to Activity  (no complaints of pain)   Prior Living Situation   Housing / Facility 1 Story House   Steps Into Home 4   Rail Right Rail (Steps into Home)   Bathroom Set up Walk In Shower   Equipment Owned Front-Wheel Walker;4-Wheel Walker;Single Point Cane;Tub / Shower Seat   Lives with - Patient's Self Care Capacity Adult Children  (daughter works, son-in-law is home)   Prior Level of Functional Mobility   Bed Mobility Independent   Transfer Status Independent   Ambulation Independent   Ambulation  Distance community   Assistive Devices Used Front-Wheel Walker  (FWW in home, rollator in community)   Stairs Independent   History of Falls   History of Falls Yes   Date of Last Fall   (Pt reports 3 falls x1 year- losses of balance)   Cognition    Cognition / Consciousness WDL   Level of Consciousness Alert   Active ROM Lower Body    Active ROM Lower Body  WDL   Strength Lower Body   Lower Body Strength  WDL   Sensation Lower Body   Lower Extremity Sensation   WDL   Balance Assessment   Sitting Balance (Static) Fair +   Sitting Balance (Dynamic) Fair   Standing Balance (Static) Fair   Standing Balance (Dynamic) Fair   Weight Shift Sitting Good   Weight Shift Standing Fair   Bed Mobility    Supine to Sit Supervised   Sit to Supine Supervised   Scooting Supervised   Rolling Supervised   Comments HOB elevated, rail   Gait Analysis   Gait Level Of Assist Supervised   Assistive Device Front Wheel Walker   Distance (Feet) 10   # of Times Distance was Traveled 2   Deviation No deviation   Weight Bearing Status FWB BLEs   Functional Mobility   Sit to Stand Supervised   Bed, Chair, Wheelchair Transfer Supervised   Toilet Transfers Supervised   Transfer Method Stand Step   Mobility with FWW   Edema / Skin Assessment   Edema / Skin  Not Assessed   Education Group   Education Provided Role of Physical Therapist;Gait Training   Role of Physical Therapist Patient Response Patient;Acceptance;Explanation;Action Demonstration   Gait Training Patient Response Patient;Acceptance;Explanation;Action Demonstration  (awareness of O2 line and obstacles)   Physical Therapy Initial Treatment Plan    Duration Discharge Needs Only   Anticipated Discharge Equipment and Recommendations   DC Equipment Recommendations None  (Pt has a FWW and rollator)   Discharge Recommendations Anticipate that the patient will have no further physical therapy needs after discharge from the hospital   Interdisciplinary Plan of Care Collaboration   IDT  Collaboration with  Nursing   Patient Position at End of Therapy In Bed;Call Light within Reach;Tray Table within Reach   Collaboration Comments RN updated   Session Information   Date / Session Number  9/2- dc needs only

## 2024-09-02 NOTE — CARE PLAN
Problem: Knowledge Deficit - Standard  Goal: Patient and family/care givers will demonstrate understanding of plan of care, disease process/condition, diagnostic tests and medications  Outcome: Progressing     Problem: Fall Risk  Goal: Patient will remain free from falls  Outcome: Progressing   The patient is Watcher - Medium risk of patient condition declining or worsening    Shift Goals  Clinical Goals: abx, o2, pending echo  Patient Goals: rest  Family Goals: updates    Progress made toward(s) clinical / shift goals:  pt and family updated on POC  for today.  Bed alarm activated for safety.    Patient is not progressing towards the following goals:

## 2024-09-03 ENCOUNTER — APPOINTMENT (OUTPATIENT)
Dept: RADIOLOGY | Facility: MEDICAL CENTER | Age: 84
DRG: 189 | End: 2024-09-03
Attending: STUDENT IN AN ORGANIZED HEALTH CARE EDUCATION/TRAINING PROGRAM
Payer: MEDICARE

## 2024-09-03 LAB
ALBUMIN SERPL BCP-MCNC: 3.3 G/DL (ref 3.2–4.9)
ALBUMIN/GLOB SERPL: 0.9 G/DL
ALP SERPL-CCNC: 175 U/L (ref 30–99)
ALT SERPL-CCNC: 61 U/L (ref 2–50)
ANION GAP SERPL CALC-SCNC: 13 MMOL/L (ref 7–16)
AST SERPL-CCNC: 66 U/L (ref 12–45)
BACTERIA UR CULT: NORMAL
BASOPHILS # BLD AUTO: 0.6 % (ref 0–1.8)
BASOPHILS # BLD: 0.01 K/UL (ref 0–0.12)
BILIRUB SERPL-MCNC: 0.5 MG/DL (ref 0.1–1.5)
BUN SERPL-MCNC: 13 MG/DL (ref 8–22)
CALCIUM ALBUM COR SERPL-MCNC: 9.2 MG/DL (ref 8.5–10.5)
CALCIUM SERPL-MCNC: 8.6 MG/DL (ref 8.5–10.5)
CHLORIDE SERPL-SCNC: 101 MMOL/L (ref 96–112)
CO2 SERPL-SCNC: 22 MMOL/L (ref 20–33)
CREAT SERPL-MCNC: 0.56 MG/DL (ref 0.5–1.4)
EOSINOPHIL # BLD AUTO: 0.04 K/UL (ref 0–0.51)
EOSINOPHIL NFR BLD: 2.3 % (ref 0–6.9)
ERYTHROCYTE [DISTWIDTH] IN BLOOD BY AUTOMATED COUNT: 50.7 FL (ref 35.9–50)
GFR SERPLBLD CREATININE-BSD FMLA CKD-EPI: 89 ML/MIN/1.73 M 2
GLOBULIN SER CALC-MCNC: 3.7 G/DL (ref 1.9–3.5)
GLUCOSE SERPL-MCNC: 98 MG/DL (ref 65–99)
HCT VFR BLD AUTO: 30.4 % (ref 37–47)
HGB BLD-MCNC: 10.5 G/DL (ref 12–16)
IMM GRANULOCYTES # BLD AUTO: 0.01 K/UL (ref 0–0.11)
IMM GRANULOCYTES NFR BLD AUTO: 0.6 % (ref 0–0.9)
LYMPHOCYTES # BLD AUTO: 0.56 K/UL (ref 1–4.8)
LYMPHOCYTES NFR BLD: 31.6 % (ref 22–41)
MAGNESIUM SERPL-MCNC: 1.4 MG/DL (ref 1.5–2.5)
MCH RBC QN AUTO: 33.3 PG (ref 27–33)
MCHC RBC AUTO-ENTMCNC: 34.5 G/DL (ref 32.2–35.5)
MCV RBC AUTO: 96.5 FL (ref 81.4–97.8)
MONOCYTES # BLD AUTO: 0.04 K/UL (ref 0–0.85)
MONOCYTES NFR BLD AUTO: 2.3 % (ref 0–13.4)
NEUTROPHILS # BLD AUTO: 1.11 K/UL (ref 1.82–7.42)
NEUTROPHILS NFR BLD: 62.6 % (ref 44–72)
NRBC # BLD AUTO: 0 K/UL
NRBC BLD-RTO: 0 /100 WBC (ref 0–0.2)
PLATELET # BLD AUTO: 244 K/UL (ref 164–446)
PMV BLD AUTO: 9.9 FL (ref 9–12.9)
POTASSIUM SERPL-SCNC: 3.3 MMOL/L (ref 3.6–5.5)
PROT SERPL-MCNC: 7 G/DL (ref 6–8.2)
RBC # BLD AUTO: 3.15 M/UL (ref 4.2–5.4)
SIGNIFICANT IND 70042: NORMAL
SITE SITE: NORMAL
SODIUM SERPL-SCNC: 136 MMOL/L (ref 135–145)
SOURCE SOURCE: NORMAL
WBC # BLD AUTO: 1.8 K/UL (ref 4.8–10.8)

## 2024-09-03 PROCEDURE — 700105 HCHG RX REV CODE 258: Performed by: INTERNAL MEDICINE

## 2024-09-03 PROCEDURE — 700102 HCHG RX REV CODE 250 W/ 637 OVERRIDE(OP): Performed by: INTERNAL MEDICINE

## 2024-09-03 PROCEDURE — 99233 SBSQ HOSP IP/OBS HIGH 50: CPT | Performed by: STUDENT IN AN ORGANIZED HEALTH CARE EDUCATION/TRAINING PROGRAM

## 2024-09-03 PROCEDURE — 99233 SBSQ HOSP IP/OBS HIGH 50: CPT | Performed by: INTERNAL MEDICINE

## 2024-09-03 PROCEDURE — A9270 NON-COVERED ITEM OR SERVICE: HCPCS | Performed by: INTERNAL MEDICINE

## 2024-09-03 PROCEDURE — 700111 HCHG RX REV CODE 636 W/ 250 OVERRIDE (IP): Performed by: STUDENT IN AN ORGANIZED HEALTH CARE EDUCATION/TRAINING PROGRAM

## 2024-09-03 PROCEDURE — 83735 ASSAY OF MAGNESIUM: CPT

## 2024-09-03 PROCEDURE — 700111 HCHG RX REV CODE 636 W/ 250 OVERRIDE (IP): Mod: JZ | Performed by: STUDENT IN AN ORGANIZED HEALTH CARE EDUCATION/TRAINING PROGRAM

## 2024-09-03 PROCEDURE — 92610 EVALUATE SWALLOWING FUNCTION: CPT

## 2024-09-03 PROCEDURE — 85025 COMPLETE CBC W/AUTO DIFF WBC: CPT

## 2024-09-03 PROCEDURE — 700111 HCHG RX REV CODE 636 W/ 250 OVERRIDE (IP): Mod: JZ | Performed by: INTERNAL MEDICINE

## 2024-09-03 PROCEDURE — 770004 HCHG ROOM/CARE - ONCOLOGY PRIVATE *

## 2024-09-03 PROCEDURE — 94669 MECHANICAL CHEST WALL OSCILL: CPT

## 2024-09-03 PROCEDURE — 36415 COLL VENOUS BLD VENIPUNCTURE: CPT

## 2024-09-03 PROCEDURE — 87899 AGENT NOS ASSAY W/OPTIC: CPT

## 2024-09-03 PROCEDURE — 80053 COMPREHEN METABOLIC PANEL: CPT

## 2024-09-03 PROCEDURE — 87449 NOS EACH ORGANISM AG IA: CPT

## 2024-09-03 RX ORDER — MAGNESIUM SULFATE HEPTAHYDRATE 40 MG/ML
4 INJECTION, SOLUTION INTRAVENOUS ONCE
Status: COMPLETED | OUTPATIENT
Start: 2024-09-03 | End: 2024-09-03

## 2024-09-03 RX ADMIN — RIVAROXABAN 10 MG: 10 TABLET, FILM COATED ORAL at 18:34

## 2024-09-03 RX ADMIN — SENNOSIDES AND DOCUSATE SODIUM 2 TABLET: 50; 8.6 TABLET ORAL at 18:34

## 2024-09-03 RX ADMIN — BUSPIRONE HYDROCHLORIDE 15 MG: 10 TABLET ORAL at 21:21

## 2024-09-03 RX ADMIN — CEFEPIME 2 G: 2 INJECTION, POWDER, FOR SOLUTION INTRAVENOUS at 06:59

## 2024-09-03 RX ADMIN — VENLAFAXINE HYDROCHLORIDE 225 MG: 75 CAPSULE, EXTENDED RELEASE ORAL at 06:53

## 2024-09-03 RX ADMIN — ATORVASTATIN CALCIUM 40 MG: 40 TABLET, FILM COATED ORAL at 18:34

## 2024-09-03 RX ADMIN — MAGNESIUM SULFATE HEPTAHYDRATE 4 G: 4 INJECTION, SOLUTION INTRAVENOUS at 11:25

## 2024-09-03 RX ADMIN — METHENAMINE HIPPURATE 1 G: 1 TABLET ORAL at 21:21

## 2024-09-03 RX ADMIN — BUSPIRONE HYDROCHLORIDE 15 MG: 10 TABLET ORAL at 08:15

## 2024-09-03 RX ADMIN — ONDANSETRON 4 MG: 2 INJECTION INTRAMUSCULAR; INTRAVENOUS at 02:17

## 2024-09-03 RX ADMIN — CYANOCOBALAMIN TAB 500 MCG 500 MCG: 500 TAB at 06:53

## 2024-09-03 RX ADMIN — AMPICILLIN AND SULBACTAM 3 G: 1; 2 INJECTION, POWDER, FOR SOLUTION INTRAMUSCULAR; INTRAVENOUS at 21:26

## 2024-09-03 RX ADMIN — METHENAMINE HIPPURATE 1 G: 1 TABLET ORAL at 08:15

## 2024-09-03 RX ADMIN — OMEPRAZOLE 40 MG: 20 CAPSULE, DELAYED RELEASE ORAL at 06:53

## 2024-09-03 RX ADMIN — FUROSEMIDE 20 MG: 10 INJECTION, SOLUTION INTRAVENOUS at 06:53

## 2024-09-03 RX ADMIN — AMPICILLIN AND SULBACTAM 3 G: 1; 2 INJECTION, POWDER, FOR SOLUTION INTRAMUSCULAR; INTRAVENOUS at 16:17

## 2024-09-03 ASSESSMENT — COPD QUESTIONNAIRES
HAVE YOU SMOKED AT LEAST 100 CIGARETTES IN YOUR ENTIRE LIFE: NO/DON'T KNOW
DO YOU EVER COUGH UP ANY MUCUS OR PHLEGM?: NO/ONLY WITH OCCASIONAL COLDS OR INFECTIONS
COPD SCREENING SCORE: 2
DURING THE PAST 4 WEEKS HOW MUCH DID YOU FEEL SHORT OF BREATH: NONE/LITTLE OF THE TIME

## 2024-09-03 ASSESSMENT — ENCOUNTER SYMPTOMS
FEVER: 0
CHILLS: 0

## 2024-09-03 NOTE — DISCHARGE PLANNING
Care Transition Team Assessment    Patient is a 84 year-old female admitted for Neutropenic Fever. Please see patient's H&P for prior medical history. LSW Student met with patient at bedside to complete assessment. Patient is A&Ox4 and able to verify the information on the face sheet. Patient lives with Daughter in a single story house with three steps to enter, Abby Tejeda (p) [phone]; NOK and emergency contact. No Advance Directive on file. Prior to admission patient is independent with ADL's and IADL’s.  Patient does not use any DME at baseline, patient reports having FWW at home. Patient reported that family is good support for her. Patient receives monthly SSI deposits. The patient's PCP is Dr. Arias. Patient's preferred pharmacy is [facility] on [address]. Patient denies a history of SNF/IPR nor HHC use in the past. Patient denies any SA or MH concerns. Patient's confirmed medical coverage via Medicare and AARP. Patient has means of transportation when medically cleared and daughter will provide transport once stable for discharge. Patient's primary oncologist is Dr. Arias with [facility].     Information Source  Orientation Level: Oriented X4  Information Given By: Patient  Informant's Name: jennifer  Who is responsible for making decisions for patient? : Patient    Readmission Evaluation  Is this a readmission?: No    Elopement Risk  Legal Hold: No  Ambulatory or Self Mobile in Wheelchair: Yes  Disoriented: No  Psychiatric Symptoms: None  History of Wandering: No  Elopement this Admit: No  Vocalizing Wanting to Leave: No  Displays Behaviors, Body Language Wanting to Leave: No-Not at Risk for Elopement  Elopement Risk: Not at Risk for Elopement    Interdisciplinary Discharge Planning  Lives with - Patient's Self Care Capacity: Adult Children (daughter and ROS)  Patient or legal guardian wants to designate a caregiver: No  Support Systems: Family Member(s), Children  Housing / Facility: 1 Story  House    Discharge Preparedness  What is your plan after discharge?: Home with help  What are your discharge supports?: Child  Prior Functional Level: Ambulatory, Independent with Activities of Daily Living  Difficulity with ADLs: None  Difficulity with IADLs: Driving    Functional Assesment  Prior Functional Level: Ambulatory, Independent with Activities of Daily Living    Finances  Financial Barriers to Discharge: No  Prescription Coverage: Yes    Vision / Hearing Impairment  Vision Impairment : Yes  Right Eye Vision: Impaired, Wears Glasses  Left Eye Vision: Impaired, Wears Glasses  Hearing Impairment : No    Advance Directive  Advance Directive?: None    Domestic Abuse  Have you ever been the victim of abuse or violence?: No  Possible Abuse/Neglect Reported to:: Not Applicable    Psychological Assessment  History of Substance Abuse: None  History of Psychiatric Problems: No  Non-compliant with Treatment: No  Newly Diagnosed Illness: No    Discharge Risks or Barriers  Discharge risks or barriers?: Complex medical needs  Patient risk factors: Complex medical needs    Anticipated Discharge Information  Discharge Disposition: Discharged to home/self care (01)  Discharge Address: [facility]

## 2024-09-03 NOTE — PROGRESS NOTES
Patient with large urine output after lasix administration. Unable to measure output due to incontinence. /69. ANDREA Gordon notified. No new orders at this time.

## 2024-09-03 NOTE — PROGRESS NOTES
4 Eyes Skin Assessment Completed by Chantel HASSAN RN and Izzy RN.    Head Scar, lump to R side of head   Ears WDL  Nose WDL  Mouth WDL  Neck WDL  Breast/Chest WDL  Shoulder Blades WDL  Spine WDL  (R) Arm/Elbow/Hand Bruising  (L) Arm/Elbow/Hand Bruising  Abdomen WDL  Groin WDL  Scrotum/Coccyx/Buttocks WDL  (R) Leg Scar  (L) Leg Scar, Scab, and Abrasion  (R) Heel/Foot/Toe WDL  (L) Heel/Foot/Toe WDL          Devices In Places Pulse Ox and Nasal Cannula, purewick      Interventions In Place Gray Ear Foams, NC W/Ear Foams, and Pillows    Possible Skin Injury No    Pictures Uploaded Into Epic N/A  Wound Consult Placed N/A  RN Wound Prevention Protocol Ordered No

## 2024-09-03 NOTE — THERAPY
"Speech Language Pathology   Clinical Swallow Evaluation     Patient Name: Aaliyah Fulton  AGE:  84 y.o., SEX:  female  Medical Record #: [id number]  Date of Service: 9/3/2024      History of Present Illness  84F admitted on 24 with palpitations, difficulty breathing, and fever. Patient found to have multifocal PNA, neutropenic fever, and acute respiratory failure. Patient is currently undergoing chemotherapy for cholangiocarcinoma. PMH notable for  shunt r/t NPH, stroke, GERD, memory deficit, frequent UTI, liver mass, breast cancer.     [facility] for moderate cognitive impairment.     Imagin/1/24 CXR:   1.  Interstitial infiltrates and/or edema.  2.  Cardiomegaly.        General Information:  Vitals  O2 Delivery Device: Room air w/o2 available  Level of Consciousness: Alert, Awake  Orientation: Oriented x 4  Follows Directives: Yes      Prior Living Situation & Level of Function:  Housing / Facility: [address] [place] [address] House  Lives with - Patient's Self Care Capacity: Adult Children  Communication: WFL  Swallowing: Patient reports occasionally \"choking\" with PO stating, \"my daughter tells me I do, but I don't know.\"       Oral Mechanism Evaluation:  Dentition: Some missing dentition   Facial Symmetry: Equal  Facial Sensation: Equal     Labial Observations: WFL   Lingual Observations: Midline            Laryngeal Function:  Secretion Management: Adequate  Voice Quality: WFL  Cough: Perceptually WNL         Subjective  RN cleared patient for clinical swallow evaluation. Patient received awake, fully cooperated with SLP tasks.      Assessment  Current Method of Nutrition: Oral diet (RG7/TN0)  Positioning: Vazquez's (60-90 degrees)  Bolus Administration: Patient    O2 Delivery Device: Room air w/o2 available  Factor(s) Affecting Performance: None  Tracheostomy : No            Swallowing Trials:  Swallowing Trials  Thin Liquid (TN0): WFL  Liquidised (LQ3): WFL  Regular (RG7): " "WFL      Comments: Patient adequately self-feeding without difficulty. Adequate oral bolus acceptance/containment. Adequate bite with functional mastication of dry solids. No cough/throat clear appreciated with PO intake. Vocal quality remained clear. Single swallow completed per bolus. No signs of esophageal dysfunction. No overt s/sx of aspiration appreciated.  Provided education regarding general aspiration precautions as well as signs of aspiration.        Clinical Impressions  Patient presents with a functional swallow, diet modification is not indicated. Service will follow likely x1 to ensure diet tolerance and monitor for changes.        Recommendations  Diet Consistency: Regular solids, thin liquids  Instrumentation: None indicated at this time  Medication: As tolerated  Supervision: Distant supervision - check on patient 2-3 times per meal  Positioning: Fully upright and midline during oral intake, Meals sitting upright in a chair, as tolerated  Risk Management : Small bites/sips, Alternate bites and sips, Slow rate of intake, Physical mobility, as tolerated  Oral Care: BID         SLP Treatment Plan  Treatment Plan: Dysphagia Treatment, Patient/Family/Caregiver Training  SLP Frequency: 2x Per Week  Estimated Duration: Until Therapy Goals Met      Anticipated Discharge Needs  Discharge Recommendations: Anticipate that the patient will have no further speech therapy needs after discharge from the hospital   Therapy Recommendations Upon DC: Not Indicated        Patient / Family Goals  Patient / Family Goal #1: \"I am fine\"  Short Term Goals  Short Term Goal # 1: Pt will consume rg/tn diet with no overt s/sx of aspiration or decline in pulmonary status      KARISHMA Joseph   "

## 2024-09-03 NOTE — CARE PLAN
The patient is Stable - Low risk of patient condition declining or worsening    Shift Goals  Clinical Goals: I/O, diuresis, wean oxygen, remain afebrile  Patient Goals: updates from doctor, rest  Family Goals: No family present    Progress made toward(s) clinical / shift goals:      Problem: Knowledge Deficit - Standard  Goal: Patient and family/care givers will demonstrate understanding of plan of care, disease process/condition, diagnostic tests and medications  Outcome: Progressing     Problem: Respiratory  Goal: Patient will achieve/maintain optimum respiratory ventilation and gas exchange  Outcome: Progressing   Attempted to wean pt to room air after being on 2L of oxygen. Pt 86% on room air. 1L oxygen used throughout the day. Pt independently using incentive spirometer with strong effort.    Patient is not progressing towards the following goals:

## 2024-09-03 NOTE — ASSESSMENT & PLAN NOTE
9/2/2024  Echo:  Normal left and right ventricular systolic function, visually estimated   LVEF of 55-60%.  Mildly dilated left atrium.  Severe mitral annular calcification with sclerotic leaflets.  Mild   mitral stenosis with mean gradient of 5 mmHg, 69 bpm.  Moderate mitral   regurgitation.  Calcified trileaflet aortic valve leaflets with moderate aortic valve   stenosis and mild regurgitation ( msec).  Mean gradient of 30   mmHg and DIANA 1.0 to 1.1 cm2 using continuity equation.    Mild tricuspid regurgitation.  Right ventricular systolic pressure is estimated to be  38 mmHg.  Compared to the images of the prior study of 11/25/2021, there has been   interval progression of mitral regurgitation and aortic stenosis.    NT pro BNP elevated 3252.  Start furosemide 20 was IV twice daily  Continuous cardiac monitoring during forced diuresis to monitor for arrhythmias.

## 2024-09-03 NOTE — PROGRESS NOTES
Hospital Medicine Daily Progress Note    Date of Service  9/2/2024    Chief Complaint  Aaliyah Fulton is a 84 y.o. female admitted 9/1/2024 with fever and palpitations with shortness of breath.    Hospital Course  Aaliyah Fulton is a 84 y.o. female who is on chemotherapy for cholangiocarcinoma with her last treatment being on Friday 8/30 who presented 9/1/2024 with sudden onset fever, shortness of breath, nonproductive cough and generalized bodyaches.  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  She is not having tenderness over any of these areas.  She denies nausea, vomiting, diarrhea, dysuria.  She is chronically incontinent and does have frequent UTIs, daughter states that when she gets UTIs she becomes confused.     Interval Problem Update  9/2/2024  Patient was seen and examined on the telemetry floor.  She continues on continuous cardiac monitoring.  Infectious diseases consulted due to complexity of patient's neutropenic fever shunt and orthopedic hardware.  Blood cultures 1 out of 2 positive.  Believed to be contaminant.  Vancomycin discontinued.  Continuing on cefepime.  Tachycardia and fevers improving.  Continues on 2 LPM oxygen by nasal cannula.  Discussed with infectious diseases, Dr. Jones, recommending CT scan of the chest.  CT scan per my review showing small bilateral pleural effusions right greater than left.  Starting furosemide 20 mg IV twice daily.  NT proBNP notably elevated at 3252.  Echocardiogram showing ejection fraction of 55-60.  RVSP of 38 mmHg    I have discussed this patient's plan of care and discharge plan at IDT rounds today with Case Management, Nursing, Nursing leadership, and other members of the IDT team.    Consultants/Specialty  infectious disease    Code Status  DNAR/DNI    Disposition  The patient is not medically cleared for discharge  to home or a post-acute facility.  Anticipate discharge to: home with close outpatient follow-up    I have placed the appropriate orders for post-discharge needs.    Review of Systems  Review of Systems   Constitutional:  Negative for chills and fever.   HENT:  Positive for sore throat.    Respiratory:  Negative for cough and shortness of breath.    Cardiovascular:  Negative for chest pain and palpitations.   Gastrointestinal:  Negative for abdominal pain, nausea and vomiting.   Musculoskeletal:  Negative for joint pain and myalgias.   Neurological:  Negative for dizziness and headaches.        Physical Exam  Temp:  [36.1 °C (97 °F)-37.1 °C (98.7 °F)] 37.1 °C (98.7 °F)  Pulse:  [71-88] 87  Resp:  [18-20] 18  BP: (118-171)/(66-86) 171/86  SpO2:  [93 %-98 %] 95 %    Physical Exam  Vitals and nursing note reviewed. Exam conducted with a chaperone present.   Constitutional:       Appearance: Normal appearance. She is normal weight. She is not ill-appearing or diaphoretic.      Interventions: Nasal cannula in place.   HENT:      Head: Normocephalic and atraumatic.      Mouth/Throat:      Mouth: Mucous membranes are moist.      Pharynx: Oropharynx is clear. No oropharyngeal exudate.   Eyes:      General:         Right eye: No discharge.         Left eye: No discharge.      Conjunctiva/sclera: Conjunctivae normal.      Pupils: Pupils are equal, round, and reactive to light.   Cardiovascular:      Rate and Rhythm: Normal rate and regular rhythm.      Pulses: Normal pulses.      Heart sounds: Normal heart sounds. No murmur heard.  Pulmonary:      Effort: Pulmonary effort is normal. No respiratory distress.      Breath sounds: Normal breath sounds.   Abdominal:      General: Abdomen is flat. Bowel sounds are normal. There is no distension.      Palpations: Abdomen is soft.      Tenderness: There is no abdominal tenderness.   Musculoskeletal:      Cervical back: Neck supple. No tenderness.      Right lower leg: No edema.       Left lower leg: No edema.   Neurological:      Mental Status: She is alert and oriented to person, place, and time.      Motor: No weakness.   Psychiatric:         Thought Content: Thought content normal.         Judgment: Judgment normal.         Fluids    Intake/Output Summary (Last 24 hours) at 9/2/2024 2202  Last data filed at 9/2/2024 0400  Gross per 24 hour   Intake 120 ml   Output 700 ml   Net -580 ml        Laboratory  Recent Labs     09/01/24  0610 09/02/24  0029   WBC 3.7* 1.9*   RBC 3.34* 2.86*   HEMOGLOBIN 11.1* 9.3*   HEMATOCRIT 32.9* 28.6*   MCV 98.5* 100.0*   MCH 33.2* 32.5   MCHC 33.7 32.5   RDW 51.7* 54.4*   PLATELETCT 268 224   MPV 10.0 10.2     Recent Labs     09/01/24  0610   SODIUM 137   POTASSIUM 4.3   CHLORIDE 106   CO2 20   GLUCOSE 92   BUN 21   CREATININE 0.71   CALCIUM 8.3*     Recent Labs     09/01/24  0610   APTT <20.0*   INR 0.98               Imaging  CT-CHEST (THORAX) W/O   Final Result      1.  Small bilateral pleural effusion      2.  Bilateral linear density consistent with atelectasis and/or parenchymal scar      3.  Borderline enlargement lymph nodes in the mediastinum      Fleischner Society pulmonary nodule recommendations:   Not Applicable         EC-ECHOCARDIOGRAM COMPLETE W/O CONT   Final Result      DX-CHEST-PORTABLE (1 VIEW)   Final Result      1.  Interstitial infiltrates and/or edema.   2.  Cardiomegaly.           Assessment/Plan  * Neutropenic fever (HCC)- (present on admission)  Assessment & Plan  On cefepime, adding Vanco due to shunt device.    9/2/2024  Infectious diseases consulted.  Vancomycin discontinued.  Continuing cefepime.  CT scan ordered per recommendations.  Per my review showing small right greater than left bilateral pleural effusions.    Acute heart failure with preserved ejection fraction (HFpEF) (HCC)- (present on admission)  Assessment & Plan  9/2/2024  Echo:  Normal left and right ventricular systolic function, visually estimated   LVEF of  55-60%.  Mildly dilated left atrium.  Severe mitral annular calcification with sclerotic leaflets.  Mild   mitral stenosis with mean gradient of 5 mmHg, 69 bpm.  Moderate mitral   regurgitation.  Calcified trileaflet aortic valve leaflets with moderate aortic valve   stenosis and mild regurgitation ( msec).  Mean gradient of 30   mmHg and DIANA 1.0 to 1.1 cm2 using continuity equation.    Mild tricuspid regurgitation.  Right ventricular systolic pressure is estimated to be  38 mmHg.  Compared to the images of the prior study of 11/25/2021, there has been   interval progression of mitral regurgitation and aortic stenosis.    NT pro BNP elevated 3252.  Start furosemide 20 was IV twice daily  Continuous cardiac monitoring during forced diuresis to monitor for arrhythmias.    Acute respiratory failure with hypoxemia (HCC)- (present on admission)  Assessment & Plan  Room air saturation 88%, in the mid 90s on 2 L  Continue oxygen as needed, add respiratory therapy protocol    9/2/2024  CT scan per my review showing small right greater than left bilateral pleural effusions.  Continues to require 2 LPM oxygen via nasal cannula.  Baseline is room air.  Start gentle diuresis with furosemide 20 mg IV twice daily.  Continuous cardiac monitoring during first diuresis to monitor for arrhythmias.  PEP therapy and IS    Pulmonary hypertension (HCC)- (present on admission)  Assessment & Plan  9/2/2024  Etiology unclear.  RVSP of 38 mmHg.  Stat furosemide 20 mg IV BID    Bilateral pleural effusion- (present on admission)  Assessment & Plan  9/2/2024  As per CT scan.  Start IV diuresis    Acute pain- (present on admission)  Assessment & Plan  Patient does not normally have pain related to her malignancy and is not on chronic pain medic patient however complains of significant pain all over throughout her bones and her whole body including headache.  Will add low-dose opioids for pain.  She has a history of GI bleeds so NSAIDs are  relatively contraindicated    Pneumonia- (present on admission)  Assessment & Plan  Multifocal on imaging  Viral panel negative  On antibiotics for neutropenic fever    9/2/2024  Continue cefepime    Valvular stenosis- (present on admission)  Assessment & Plan  8/31/23 Echo w/ normal EF but Moderate Mitral stenosis w/ regurg and  Moderate Aortic Stenosis with regurg  She has a very loud 5/6 murmur with significant thrill-her daughter states she recently had an echo in July with her cardiologist Dr. Oneal.  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  Appropriate orders were placed.        Normal pressure hydrocephalus (HCC)- (present on admission)  Assessment & Plan  Patient has had a shunt for approximately 2 years-due to indwelling devices will also add vancomycin to her regimen-she does complain of headache         VTE prophylaxis:    Xarelto 10mg daily as prophylaxis      I have performed a physical exam and reviewed and updated ROS and Plan today (9/2/2024). In review of yesterday's note (9/1/2024), there are no changes except as documented above.

## 2024-09-03 NOTE — CARE PLAN
The patient is Watcher - Medium risk of patient condition declining or worsening    Shift Goals  Clinical Goals: Patient will remain afebrile and continue to tolerate IV abx  Patient Goals: Patient will rest comfortbaly  Family Goals: none present    Progress made toward(s) clinical / shift goals:  Patient afebrile throughout the shift. No new s/sx of infection.     Problem: Pain - Standard  Goal: Alleviation of pain or a reduction in pain to the patient’s comfort goal  Outcome: Progressing  Patient denies pain throughout the shift.      Problem: Fall Risk  Goal: Patient will remain free from falls  Outcome: Progressing   Patient educated on fall prevention. Patient verbalizes understanding but needs reinforcement. Bed locked and in lowest position. Call light and belongings within reach. Bed alarm on.     Patient is not progressing towards the following goals: N/a

## 2024-09-03 NOTE — HOSPITAL COURSE
Aaliyah Fulton is a 84 y.o. female who is on chemotherapy for cholangiocarcinoma with her last treatment being on Friday 8/30 who presented 9/1/2024 with sudden onset fever, shortness of breath, nonproductive cough and generalized bodyaches.  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  She is not having tenderness over any of these areas.  She denies nausea, vomiting, diarrhea, dysuria.  She is chronically incontinent and does have frequent UTIs, daughter states that when she gets UTIs she becomes confused.

## 2024-09-03 NOTE — PROGRESS NOTES
Infectious Disease Progress Note    Author: Bacilio Jones M.D. Date & Time of service: 9/3/2024  8:07 AM    Chief Complaint:  Follow-up for fever    Interval History:  9/3 afebrile since admission, white count is 1.8, ANC 1100, cultures no growth till date, 2 L nasal cannula oxygen continues to feel much better, almost back to baseline and would like to go home today.    Labs Reviewed and Medications Reviewed.    Review of Systems:  Review of Systems   Constitutional:  Negative for chills and fever.   Skin:  Negative for itching and rash.       Hemodynamics:  Temp (24hrs), Av.7 °C (98.1 °F), Min:36.2 °C (97.1 °F), Max:37.1 °C (98.7 °F)  Temperature: 36.2 °C (97.1 °F)  Pulse  Av.8  Min: 71  Max: 119   Blood Pressure : 99/51       Physical Exam:  Physical Exam  Vitals and nursing note reviewed.   Constitutional:       General: She is not in acute distress.     Appearance: She is not ill-appearing.   Eyes:      Conjunctiva/sclera: Conjunctivae normal.   Cardiovascular:      Rate and Rhythm: Normal rate.   Pulmonary:      Effort: No respiratory distress.      Breath sounds: No stridor.      Comments: Not using accessory muscles of respiration but does seem to have trouble completing long full sentences in 1 breath  Abdominal:      General: There is no distension.   Musculoskeletal:         General: No swelling or tenderness.   Skin:     Findings: No erythema or rash.   Neurological:      General: No focal deficit present.      Mental Status: She is alert.   Psychiatric:         Mood and Affect: Mood normal.         Behavior: Behavior normal.         Meds:    Current Facility-Administered Medications:     furosemide    ipratropium-albuterol    cefepime    Respiratory Therapy Consult    rivaroxaban    acetaminophen    labetalol    ondansetron    ondansetron    senna-docusate **AND** polyethylene glycol/lytes    Notify provider if pain remains uncontrolled **AND** Use the Numeric Rating Scale (NRS), Xenia  Faces (WBF), or FLACC on regular floors and Critical-Care Pain Observation Tool (CPOT) on ICUs/Trauma to assess pain **AND** [COMPLETED] Pulse Ox **AND** Pharmacy Consult Request **AND** If patient difficult to arouse and/or has respiratory depression (respiratory rate of 10 or less), stop any opiates that are currently infusing and call a Rapid Response.    oxyCODONE immediate-release **OR** oxyCODONE immediate-release **OR** morphine injection    atorvastatin    busPIRone    cyanocobalamin    methenamine hip    omeprazole    venlafaxine XR    Labs:  Recent Labs     09/01/24  0610 09/02/24  0029 09/03/24  0114   WBC 3.7* 1.9* 1.8*   RBC 3.34* 2.86* 3.15*   HEMOGLOBIN 11.1* 9.3* 10.5*   HEMATOCRIT 32.9* 28.6* 30.4*   MCV 98.5* 100.0* 96.5   MCH 33.2* 32.5 33.3*   RDW 51.7* 54.4* 50.7*   PLATELETCT 268 224 244   MPV 10.0 10.2 9.9   NEUTSPOLYS 80.40* 67.00 62.60   LYMPHOCYTES 14.50* 29.30 31.60   MONOCYTES 3.50 2.10 2.30   EOSINOPHILS 0.50 1.60 2.30   BASOPHILS 0.30 0.00 0.60     Recent Labs     09/01/24  0610 09/03/24  0114   SODIUM 137 136   POTASSIUM 4.3 3.3*   CHLORIDE 106 101   CO2 20 22   GLUCOSE 92 98   BUN 21 13     Recent Labs     09/01/24  0610 09/03/24  0114   ALBUMIN 3.6 3.3   TBILIRUBIN 0.4 0.5   ALKPHOSPHAT 161* 175*   TOTPROTEIN 7.3 7.0   ALTSGPT 45 61*   ASTSGOT 66* 66*   CREATININE 0.71 0.56       Imaging:  CT-CHEST (THORAX) W/O    Result Date: 9/2/2024 9/2/2024 3:50 PM HISTORY/REASON FOR EXAM:  Fever, bacteremia, concern for pneumonia. TECHNIQUE/EXAM DESCRIPTION: CT scan of the chest without contrast. Noncontrast helical scanning of the chest was obtained from the lung apices through the adrenal glands. Low dose optimization technique was utilized for this CT exam including automated exposure control and adjustment of the mA and/or kV according to patient size. COMPARISON: 1 view chest 8/31/2024 FINDINGS: Lungs: There is linear density consistent with atelectasis or parenchymal scarring. There are  small bilateral pleural effusion.. Mediastinum/Alissa: There are borderline enlarged lymph nodes throughout the mediastinum. Pleura: No pleural effusion. Cardiac: Heart normal in size without pericardial effusion. There is no coronary artery calcification. Vascular: There is aortic and branch vessel atherosclerotic plaque including the coronary arteries.. Soft tissues: Unremarkable. Bones: No acute or destructive process.     1.  Small bilateral pleural effusion 2.  Bilateral linear density consistent with atelectasis and/or parenchymal scar 3.  Borderline enlargement lymph nodes in the mediastinum Fleischner Society pulmonary nodule recommendations: Not Applicable     EC-ECHOCARDIOGRAM COMPLETE W/O CONT    Result Date: 2024  Transthoracic Echo Report Echocardiography Laboratory CONCLUSIONS Normal left and right ventricular systolic function, visually estimated LVEF of 55-60%. Mildly dilated left atrium. Severe mitral annular calcification with sclerotic leaflets.  Mild mitral stenosis with mean gradient of 5 mmHg, 69 bpm.  Moderate mitral regurgitation. Calcified trileaflet aortic valve leaflets with moderate aortic valve stenosis and mild regurgitation ( msec).  Mean gradient of 30 mmHg and DIANA 1.0 to 1.1 cm2 using continuity equation.  Mild tricuspid regurgitation. Right ventricular systolic pressure is estimated to be  38 mmHg. Compared to the images of the prior study of 2021, there has been interval progression of mitral regurgitation and aortic stenosis. ASHWINILISA TAM Exam Date:         2024                    09:42 Exam Location:     Inpatient Priority:          Routine Ordering Physician:        SAROJ LUNA Referring Physician:       [id number] Garcia Sonographer:               Valdez Coleman                            RUST, RVT Age:    84     Gender:    F MRN:    [id number] :    [id number] BSA:    1.83   Ht (in):    63     Wt (lb):    175 Exam Type:     Complete Indications:      Endocarditis ICD Codes:       421 CPT Codes:       23898 BP:   125    /   68     HR:   68 Technical Quality:       Technically difficult study -                          adequate information is obtained MEASUREMENTS  (Male / Female) Normal Values 2D ECHO LV Diastolic Diameter PLAX        4.4 cm                4.2 - 5.9 / 3.9 - 5.3 cm LV Systolic Diameter PLAX         3 cm                  2.1 - 4.0 cm IVS Diastolic Thickness           0.92 cm               LVPW Diastolic Thickness          0.97 cm               LVOT Diameter                     2 cm                  Estimated LV Ejection Fraction    55 %                  LV Ejection Fraction MOD BP       57.1 %                >= 55  % LV Ejection Fraction MOD 4C       52.7 %                LV Ejection Fraction MOD 2C       60.8 %                IVC Diameter                      0.94 cm               DOPPLER AV Peak Velocity                  3.5 m/s               AV Peak Gradient                  49.4 mmHg             AV Mean Gradient                  28.7 mmHg             AI Pressure Half Time             363 ms                LVOT Peak Velocity                1.2 m/s               AV Area Cont Eq vti               1.2 cm2               MV Velocity Time Integral         50.4 cm               Mitral E Point Velocity           1.9 m/s               Mitral E to A Ratio               1.7                   MV Pressure Half Time             85.3 ms               MV Area PHT                       2.6 cm2               MV Deceleration Time              294 ms                MR ERO PISA                       0.17 cm2              MR Regurgitant Volume PISA        31.8 cm3              TR Peak Velocity                  283 cm/s              PV Peak Velocity                  1.1 m/s               PV Peak Gradient                  5.2 mmHg              RVOT Peak Velocity                0.94 m/s              * Indicates values subject to auto-interpretation LV EF:  55    %  FINDINGS Left Ventricle Normal left ventricular chamber size. Normal left ventricular wall thickness. Normal left ventricular systolic function. The left ventricular ejection fraction is visually estimated to be 55-60%. No regional wall motion abnormalities. Indeterminate diastolic function. Right Ventricle Normal right ventricular size and systolic function. Right Atrium Enlarged right atrium. Normal inferior vena cava size and inspiratory collapse. Left Atrium Mildly dilated left atrium. Left atrial volume index is 41   mL/sq m. Mitral Valve Severe mitral annular calcification with sclerotic leaflets. Mild mitral stenosis. Mean transvalvular gradient is  5.0 mmHg at a heart rate of  69 BPM. Moderate mitral regurgitation. ERO by PISA method is 0.2  sq cm. Aortic Valve Calcified trileaflet aortic valve leaflets with moderate aortic valve stenosis and mild regurgitation ( msec). Transvalvular gradients are - Peak:  55 mmHg, Mean:  30 mmHg. Aortic valve area calculated from the continuity equation is  1.0-1.1 cm2. . Tricuspid Valve Structurally normal tricuspid valve. No tricuspid stenosis. Mild tricuspid regurgitation. Right ventricular systolic pressure is estimated to be  38 mmHg. Right atrial pressure is estimated to be 3 mmHg. Pulmonic Valve The pulmonic valve is not well visualized. No pulmonic stenosis. Mild pulmonic insufficiency. Pericardium Normal pericardium without effusion. Aorta The ascending aorta diameter is 3.2   cm. Laly Nascimento MD (Electronically Signed) Final Date:     02 September 2024                 13:35    DX-CHEST-PORTABLE (1 VIEW)    Result Date: 9/1/2024 9/1/2024 6:33 AM HISTORY/REASON FOR EXAM:  Neutropenic Fever TECHNIQUE/EXAM DESCRIPTION AND NUMBER OF VIEWS: Single portable view of the chest. COMPARISON: 3/12/2024 FINDINGS: Hardware is stably positioned in its visualized extent. HEART: Enlarged. There is atherosclerotic calcification in the aortic arch. LUNGS: Diffuse interstitial  opacities. PLEURA: No effusion or pneumothorax.     1.  Interstitial infiltrates and/or edema. 2.  Cardiomegaly.      Micro:  Results       Procedure Component Value Units Date/Time    Blood Culture [[id number]] Collected: 09/02/24 1918    Order Status: Completed Specimen: Blood from Peripheral Updated: 09/02/24 2208     Significant Indicator NEG     Source BLD     Site PERIPHERAL     Culture Result No Growth  Note: Blood cultures are incubated for 5 days and  are monitored continuously.Positive blood cultures  are called to the RN and reported as soon as  they are identified.      Respiratory Panel by PCR (Inpatient ONLY) [[id number]] Collected: 09/02/24 1420    Order Status: Completed Specimen: Respirate from Nasopharyngeal Updated: 09/02/24 1515     Adenovirus, PCR Not Detected     SARS-CoV-2 (COVID-19) RNA by CORI NotDetected     Comment: [facility] providers: PLEASE REFER TO DE-ESCALATION AND RETESTING PROTOCOL  on [email or web address]    **The [employer] Respiratory Panel 2.1 (RP2.1) RT-PCR test is FDA authorized.          Coronavirus 229E, PCR Not Detected     Coronavirus HKU1, PCR Not Detected     Coronavirus NL63, PCR Not Detected     Coronavirus OC43, PCR Not Detected     Human Metapneumovirus, PCR Not Detected     Rhino/Enterovirus, PCR Not Detected     Influenza A, PCR Not Detected     Influenza B, PCR Not Detected     Parainfluenza 1, PCR Not Detected     Parainfluenza 2, PCR Not Detected     Parainfluenza 3, PCR Not Detected     Parainfluenza 4, PCR Not Detected     RSV (Respiratory Syncytial Virus), PCR Not Detected     Bordetella parapertussis (VT8977), PCR Not Detected     Bordetella pertussis (ptxP), PCR Not Detected     Mycoplasma pneumoniae, PCR Not Detected     Chlamydia pneumoniae, PCR Not Detected    Urine Culture [[id number]] Collected: 09/01/24 0700    Order Status: Completed Specimen: Urine, Clean Catch Updated: 09/02/24 1504     Significant Indicator NEG     Source UR     Site URINE, CLEAN CATCH      Culture Result No growth at 24 hours.    Blood Culture x 2 - Draw one set from central line if present [[id number]]  (Abnormal) Collected: 09/01/24 0610    Order Status: Completed Specimen: Blood from Line Updated: 09/02/24 0932     Significant Indicator POS     Source BLD     Site Peripheral     Culture Result Growth detected by Bactec instrument. 09/02/2024  05:58  Gram Stain: Gram positive cocci: Possible Staphylococcus sp.  Negative for Staphylococcus aureus and MRSA by PCR. Correlate  ongoing need for antibiotics with clinical condition.  Further report to follow.      MRSA By PCR (Amp) [[id number]] Collected: 09/01/24 0858    Order Status: Completed Specimen: Respirate from Nares Updated: 09/01/24 1232     MRSA by PCR Negative    Blood Culture x 2 - Draw one set from central line if present [[id number]] Collected: 09/01/24 0609    Order Status: Completed Specimen: Blood from Peripheral Updated: 09/01/24 1008     Significant Indicator NEG     Source BLD     Site PERIPHERAL     Culture Result No Growth  Note: Blood cultures are incubated for 5 days and  are monitored continuously.Positive blood cultures  are called to the RN and reported as soon as  they are identified.      CoV-2, FLU A/B, and RSV by PCR (2-4 Hours CEPHEID) : Collect NP swab in VTM [[id number]]     Order Status: Canceled Specimen: Respirate     Urinalysis [[id number]] Collected: 09/01/24 0700    Order Status: Completed Specimen: Urine, Clean Catch Updated: 09/01/24 0714     Color Yellow     Character Clear     Specific Gravity 1.018     Ph 5.5     Glucose Negative mg/dL      Ketones Negative mg/dL      Protein Negative mg/dL      Bilirubin Negative     Urobilinogen, Urine 0.2     Nitrite Negative     Leukocyte Esterase Negative     Occult Blood Negative     Micro Urine Req see below     Comment: Microscopic examination not performed when specimen is clear  and chemically negative for protein, blood, leukocyte esterase  and nitrite.                  Hospital Course/Assessment:   Aaliyah Fulton is a 84 y.o. 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  Patient has a 6 cm mass in the left lobe of liver, multiple lymph nodes.  Patient's last chemotherapy was on 8/30.  On 8/31, patient began to experience acute onset of difficulty breathing, fever, palpitations, generalized bodyaches, nonproductive cough. UA negative and no urinary symptoms. 1 out of 2 blood cultures positive for coagulase-negative Staphylococcus, likely contaminant.  She had significant rapid improvement in 1 day.     Pertinent Diagnoses:  Fever, resolved  Acute hypoxemic respiratory failure, improved  Intrahepatic cholangiocarcinoma on chemotherapy  Immunosuppressed status  Positive blood culture, likely contaminant     Plan:   -1 out of 2 blood cultures positive for coagulase-negative Staphylococcus, likely contaminant, discontinued vancomycin 9/2  -Patient with rapid improvement in just 1 day, suspect viral syndrome or potentially a chemotherapy induced pneumonitis  -CT of the chest with with no definitive evidence of pneumonia   -RVP negative, urine Legionella and Strep antigens pending  -Switch cefepime to IV Unasyn.  If blood cultures remain negative and improvement continues, anticipate a 5-day course of antibiotics and then monitor closely off of antibiotics for any worsening     Disposition: Anticipate no ID specific disposition needs  Need for PICC line: No     Plan was discussed with the primary team, Dr. Hayes.  ID will sign off.  This illness poses a threat to life.  Please call us back if any change in clinical status

## 2024-09-04 ENCOUNTER — APPOINTMENT (OUTPATIENT)
Dept: RADIOLOGY | Facility: MEDICAL CENTER | Age: 84
DRG: 189 | End: 2024-09-04
Attending: STUDENT IN AN ORGANIZED HEALTH CARE EDUCATION/TRAINING PROGRAM
Payer: MEDICARE

## 2024-09-04 LAB
BACTERIA BLD CULT: ABNORMAL
BACTERIA BLD CULT: ABNORMAL
BASOPHILS # BLD AUTO: 0 % (ref 0–1.8)
BASOPHILS # BLD: 0 K/UL (ref 0–0.12)
EOSINOPHIL # BLD AUTO: 0.01 K/UL (ref 0–0.51)
EOSINOPHIL NFR BLD: 0.7 % (ref 0–6.9)
ERYTHROCYTE [DISTWIDTH] IN BLOOD BY AUTOMATED COUNT: 49.5 FL (ref 35.9–50)
HCT VFR BLD AUTO: 27.6 % (ref 37–47)
HGB BLD-MCNC: 9.5 G/DL (ref 12–16)
IMM GRANULOCYTES # BLD AUTO: 0.01 K/UL (ref 0–0.11)
IMM GRANULOCYTES NFR BLD AUTO: 0.7 % (ref 0–0.9)
LYMPHOCYTES # BLD AUTO: 0.54 K/UL (ref 1–4.8)
LYMPHOCYTES NFR BLD: 39.7 % (ref 22–41)
MCH RBC QN AUTO: 32.9 PG (ref 27–33)
MCHC RBC AUTO-ENTMCNC: 34.4 G/DL (ref 32.2–35.5)
MCV RBC AUTO: 95.5 FL (ref 81.4–97.8)
MONOCYTES # BLD AUTO: 0.04 K/UL (ref 0–0.85)
MONOCYTES NFR BLD AUTO: 2.9 % (ref 0–13.4)
NEUTROPHILS # BLD AUTO: 0.76 K/UL (ref 1.82–7.42)
NEUTROPHILS NFR BLD: 56 % (ref 44–72)
NRBC # BLD AUTO: 0 K/UL
NRBC BLD-RTO: 0 /100 WBC (ref 0–0.2)
PLATELET # BLD AUTO: 226 K/UL (ref 164–446)
PMV BLD AUTO: 10.4 FL (ref 9–12.9)
RBC # BLD AUTO: 2.89 M/UL (ref 4.2–5.4)
S PNEUM AG UR QL: NEGATIVE
SIGNIFICANT IND 70042: ABNORMAL
SITE SITE: ABNORMAL
SOURCE SOURCE: ABNORMAL
WBC # BLD AUTO: 1.4 K/UL (ref 4.8–10.8)

## 2024-09-04 PROCEDURE — 700111 HCHG RX REV CODE 636 W/ 250 OVERRIDE (IP): Mod: JZ | Performed by: INTERNAL MEDICINE

## 2024-09-04 PROCEDURE — 700111 HCHG RX REV CODE 636 W/ 250 OVERRIDE (IP): Mod: JZ | Performed by: STUDENT IN AN ORGANIZED HEALTH CARE EDUCATION/TRAINING PROGRAM

## 2024-09-04 PROCEDURE — 36415 COLL VENOUS BLD VENIPUNCTURE: CPT

## 2024-09-04 PROCEDURE — 85025 COMPLETE CBC W/AUTO DIFF WBC: CPT

## 2024-09-04 PROCEDURE — 97535 SELF CARE MNGMENT TRAINING: CPT

## 2024-09-04 PROCEDURE — 770004 HCHG ROOM/CARE - ONCOLOGY PRIVATE *

## 2024-09-04 PROCEDURE — 700102 HCHG RX REV CODE 250 W/ 637 OVERRIDE(OP): Mod: JZ | Performed by: STUDENT IN AN ORGANIZED HEALTH CARE EDUCATION/TRAINING PROGRAM

## 2024-09-04 PROCEDURE — 700105 HCHG RX REV CODE 258: Performed by: INTERNAL MEDICINE

## 2024-09-04 PROCEDURE — 700102 HCHG RX REV CODE 250 W/ 637 OVERRIDE(OP): Performed by: INTERNAL MEDICINE

## 2024-09-04 PROCEDURE — 94760 N-INVAS EAR/PLS OXIMETRY 1: CPT

## 2024-09-04 PROCEDURE — 99233 SBSQ HOSP IP/OBS HIGH 50: CPT | Performed by: STUDENT IN AN ORGANIZED HEALTH CARE EDUCATION/TRAINING PROGRAM

## 2024-09-04 PROCEDURE — A9270 NON-COVERED ITEM OR SERVICE: HCPCS | Mod: JZ | Performed by: STUDENT IN AN ORGANIZED HEALTH CARE EDUCATION/TRAINING PROGRAM

## 2024-09-04 PROCEDURE — A9270 NON-COVERED ITEM OR SERVICE: HCPCS | Performed by: INTERNAL MEDICINE

## 2024-09-04 PROCEDURE — 94669 MECHANICAL CHEST WALL OSCILL: CPT

## 2024-09-04 RX ORDER — POTASSIUM CHLORIDE 1500 MG/1
40 TABLET, EXTENDED RELEASE ORAL ONCE
Status: COMPLETED | OUTPATIENT
Start: 2024-09-04 | End: 2024-09-04

## 2024-09-04 RX ADMIN — CYANOCOBALAMIN TAB 500 MCG 500 MCG: 500 TAB at 05:56

## 2024-09-04 RX ADMIN — RIVAROXABAN 10 MG: 10 TABLET, FILM COATED ORAL at 17:10

## 2024-09-04 RX ADMIN — BUSPIRONE HYDROCHLORIDE 15 MG: 10 TABLET ORAL at 21:24

## 2024-09-04 RX ADMIN — ATORVASTATIN CALCIUM 40 MG: 40 TABLET, FILM COATED ORAL at 17:10

## 2024-09-04 RX ADMIN — VENLAFAXINE HYDROCHLORIDE 225 MG: 75 CAPSULE, EXTENDED RELEASE ORAL at 05:56

## 2024-09-04 RX ADMIN — AMPICILLIN AND SULBACTAM 3 G: 1; 2 INJECTION, POWDER, FOR SOLUTION INTRAMUSCULAR; INTRAVENOUS at 17:41

## 2024-09-04 RX ADMIN — AMPICILLIN AND SULBACTAM 3 G: 1; 2 INJECTION, POWDER, FOR SOLUTION INTRAMUSCULAR; INTRAVENOUS at 21:24

## 2024-09-04 RX ADMIN — BUSPIRONE HYDROCHLORIDE 15 MG: 10 TABLET ORAL at 08:35

## 2024-09-04 RX ADMIN — AMPICILLIN AND SULBACTAM 3 G: 1; 2 INJECTION, POWDER, FOR SOLUTION INTRAMUSCULAR; INTRAVENOUS at 04:07

## 2024-09-04 RX ADMIN — AMPICILLIN AND SULBACTAM 3 G: 1; 2 INJECTION, POWDER, FOR SOLUTION INTRAMUSCULAR; INTRAVENOUS at 11:13

## 2024-09-04 RX ADMIN — ONDANSETRON 4 MG: 2 INJECTION INTRAMUSCULAR; INTRAVENOUS at 06:30

## 2024-09-04 RX ADMIN — METHENAMINE HIPPURATE 1 G: 1 TABLET ORAL at 17:10

## 2024-09-04 RX ADMIN — METHENAMINE HIPPURATE 1 G: 1 TABLET ORAL at 05:57

## 2024-09-04 RX ADMIN — FUROSEMIDE 20 MG: 10 INJECTION, SOLUTION INTRAVENOUS at 05:57

## 2024-09-04 RX ADMIN — FUROSEMIDE 20 MG: 10 INJECTION, SOLUTION INTRAVENOUS at 17:41

## 2024-09-04 RX ADMIN — POTASSIUM CHLORIDE 40 MEQ: 1500 TABLET, EXTENDED RELEASE ORAL at 08:35

## 2024-09-04 RX ADMIN — SENNOSIDES AND DOCUSATE SODIUM 2 TABLET: 50; 8.6 TABLET ORAL at 17:10

## 2024-09-04 RX ADMIN — OMEPRAZOLE 40 MG: 20 CAPSULE, DELAYED RELEASE ORAL at 05:56

## 2024-09-04 ASSESSMENT — COGNITIVE AND FUNCTIONAL STATUS - GENERAL
TOILETING: A LITTLE
DAILY ACTIVITIY SCORE: 19
DRESSING REGULAR UPPER BODY CLOTHING: A LITTLE
HELP NEEDED FOR BATHING: A LITTLE
DRESSING REGULAR LOWER BODY CLOTHING: A LITTLE
PERSONAL GROOMING: A LITTLE
SUGGESTED CMS G CODE MODIFIER DAILY ACTIVITY: CK

## 2024-09-04 ASSESSMENT — ENCOUNTER SYMPTOMS
PALPITATIONS: 0
VOMITING: 0
HEADACHES: 0
FEVER: 0
CHILLS: 0
DIZZINESS: 0
NAUSEA: 0
MYALGIAS: 0
COUGH: 0
SORE THROAT: 1
ABDOMINAL PAIN: 0
SHORTNESS OF BREATH: 0

## 2024-09-04 ASSESSMENT — PAIN DESCRIPTION - PAIN TYPE
TYPE: ACUTE PAIN
TYPE: ACUTE PAIN

## 2024-09-04 NOTE — PROGRESS NOTES
Bedside report received, Assume care.     A&O x 4, Able to make needs known.  Pain Assessment: 0/10.   Bed in low position and locked, pt resting comfortably now, call light with in reach, all needs met at this time. Interventions will be executed per plan of care.

## 2024-09-04 NOTE — CARE PLAN
Problem: Hyperinflation  Goal: Prevent or improve atelectasis  Description: Target End Date:  3 to 4 days    1. Instruct incentive spirometry usage  2.  Perform hyperinflation therapy as indicated  Flowsheets  Taken 9/4/2024 1608 by Yanci Harden RRT  Hyperinflation Protocol Goals/Outcome: Improvement in chest x-ray/atelectasis  Taken 9/3/2024 1909 by Rosibel Gray RRT  Hyperinflation Protocol Indications: Pneumonia  Note:     Respiratory Update    Treatment modality: PEP  Frequency:BID    Pt tolerating current treatments well with no adverse reactions.

## 2024-09-04 NOTE — THERAPY
Occupational Therapy  Daily Treatment     Patient Name: Aaliyah Fulton  Age:  84 y.o., Sex:  female  Medical Record #: [id number]  Today's Date: 9/4/2024     Precautions  Precautions: Fall Risk    Assessment    Pt seen for OT treatment. Pt required min A to don underwear, CGA for toilet transfer, and SBA for standing handwashing. Pt encouraged to continue to get up with nursing to the chair for meals, the bathroom, and hallway as medically appropriate. Pt current functional performance limited by generalized weakness, and impaired activity tolerance. Pt will continue to benefit from skilled OT while admitted to acute care.    Plan    Treatment Plan Status: Continue Current Treatment Plan  Type of Treatment: Self Care / Activities of Daily Living, Adaptive Equipment, Neuro Re-Education / Balance, Therapeutic Exercises, Therapeutic Activity, Family / Caregiver Training  Treatment Frequency: 3 Times per Week  Treatment Duration: Until Therapy Goals Met    DC Equipment Recommendations: Unable to determine at this time  Discharge Recommendations: Recommend home health for continued occupational therapy services     Objective     09/04/24 1206   Precautions   Precautions Fall Risk   Pain   Pain Scales 0 to 10 Scale    Pain 0 - 10 Group   Therapist Pain Assessment Post Activity Pain Same as Prior to Activity;Nurse Notified  (not rated, agreeable to session)   Non Verbal Descriptors   Non Verbal Scale  Calm   Cognition    Cognition / Consciousness WDL   Level of Consciousness Alert   Comments Very pleasant and cooperatie   Other Treatments   Other Treatments Provided Discussed the pathology of bedrest and encouraged pt to continue to make the full trip to/from the bathroom, get OOB for all meals, and walk the hallway with nursing staff.   Balance   Sitting Balance (Static) Fair +   Sitting Balance (Dynamic) Fair   Standing Balance (Static) Fair   Standing Balance (Dynamic) Fair   Weight Shift Sitting Fair   Weight Shift  Standing Fair   Skilled Intervention Verbal Cuing;Tactile Cuing;Sequencing;Facilitation   Comments w/ FWW   Bed Mobility    Supine to Sit Supervised   Sit to Supine Supervised   Scooting Supervised   Rolling Supervised   Skilled Intervention Verbal Cuing;Tactile Cuing;Facilitation   Comments w/ HOB slightly elevated   Activities of Daily Living   Grooming Standby Assist;Standing  (washed hands at sink)   Lower Body Dressing Minimal Assist  (don underwear with pad)   Toileting   (declined the need)   Skilled Intervention Verbal Cuing;Tactile Cuing;Sequencing;Facilitation   Functional Mobility   Sit to Stand Supervised   Bed, Chair, Wheelchair Transfer Supervised   Toilet Transfers Contact Guard Assist   Transfer Method Stand Step   Mobility EOB>bathroom>bed   Skilled Intervention Verbal Cuing;Tactile Cuing;Sequencing;Facilitation   Comments w/ FWW   Visual Perception   Visual Perception  Not Tested   Activity Tolerance   Sitting in Chair NT, declined   Sitting Edge of Bed >5 min   Standing >5 min   Comments limited by weakness/fatigue   Patient / Family Goals   Patient / Family Goal #1 to go home   Goal #1 Outcome Progressing as expected   Short Term Goals   Short Term Goal # 1 Pt will be able to complete toileting ADLs with SPV   Goal Outcome # 1 Progressing as expected   Short Term Goal # 2 Pt will complete LB dressing with SPV and AE PRN   Goal Outcome # 2 Progressing as expected   Education Group   Education Provided Role of Occupational Therapist;Activities of Daily Living;Pathology of bedrest   Role of Occupational Therapist Patient Response Patient;Acceptance;Explanation;Verbal Demonstration   ADL Patient Response Patient;Acceptance;Demonstration;Explanation;Verbal Demonstration;Action Demonstration   Pathology of Bedrest Patient Response Patient;Acceptance;Explanation;Verbal Demonstration

## 2024-09-04 NOTE — CARE PLAN
The patient is Stable - Low risk of patient condition declining or worsening    Shift Goals  Clinical Goals: Rset , treatment plan  Patient Goals: Rset , treatment plan  Family Goals: none    Progress made toward(s) clinical / shift goals:    Problem: Pain - Standard  Goal: Alleviation of pain or a reduction in pain to the patient’s comfort goal  Outcome: Progressing  Note: Patient Free from pain,  Patient moved without hesitation        Problem: Fall Risk  Goal: Patient will remain free from falls  Outcome: Progressing  Note: Patient free from fall, belonging within reach, bed I low position and educated patient to call before standing .       Patient is not progressing towards the following goals:

## 2024-09-04 NOTE — PROGRESS NOTES
Hospital Medicine Daily Progress Note    Date of Service  9/3/2024    Chief Complaint  Aaliyah Fulton is a 84 y.o. female admitted 9/1/2024 with fever and palpitations with shortness of breath.    Hospital Course  Aaliyah Fulton is a 84 y.o. female who is on chemotherapy for cholangiocarcinoma with her last treatment being on Friday 8/30 who presented 9/1/2024 with sudden onset fever, shortness of breath, nonproductive cough and generalized bodyaches.  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  She is not having tenderness over any of these areas.  She denies nausea, vomiting, diarrhea, dysuria.  She is chronically incontinent and does have frequent UTIs, daughter states that when she gets UTIs she becomes confused.     Interval Problem Update  9/2/2024  Patient was seen and examined on the telemetry floor.  She continues on continuous cardiac monitoring.  Infectious diseases consulted due to complexity of patient's neutropenic fever shunt and orthopedic hardware.  Blood cultures 1 out of 2 positive.  Believed to be contaminant.  Vancomycin discontinued.  Continuing on cefepime.  Tachycardia and fevers improving.  Continues on 2 LPM oxygen by nasal cannula.  Discussed with infectious diseases, Dr. Jones, recommending CT scan of the chest.  CT scan per my review showing small bilateral pleural effusions right greater than left.  Starting furosemide 20 mg IV twice daily.  NT proBNP notably elevated at 3252.  Echocardiogram showing ejection fraction of 55-60.  RVSP of 38 mmHg    9/3  Patient new to me today.  Afebrile normal pulse respiratory rate systolic blood pressure 90s to 120s pulse ox 86 to 98% on 1 L nasal cannula.  WBCs 1.8 stable anemia 10.5 platelets 244 CMP potassium 3.3 AST 66 ALT 61 globulin 3.7 magnesium 1.4.  IV magnesium replacement ordered.  Antibiotics narrowed  down to Unasyn, no clear source identified for infection. ID following, patti Jones.    I have discussed this patient's plan of care and discharge plan at IDT rounds today with Case Management, Nursing, Nursing leadership, and other members of the IDT team.    Consultants/Specialty  infectious disease    Code Status  DNAR/DNI    Disposition  The patient is not medically cleared for discharge to home or a post-acute facility.      I have placed the appropriate orders for post-discharge needs.    Review of Systems  Review of Systems   Constitutional:  Negative for chills and fever.   HENT:  Positive for sore throat.    Respiratory:  Negative for cough and shortness of breath.    Cardiovascular:  Negative for chest pain and palpitations.   Gastrointestinal:  Negative for abdominal pain, nausea and vomiting.   Musculoskeletal:  Negative for joint pain and myalgias.   Neurological:  Negative for dizziness and headaches.        Physical Exam  Temp:  [36.2 °C (97.1 °F)-37.3 °C (99.1 °F)] 36.8 °C (98.3 °F)  Pulse:  [63-91] 89  Resp:  [16-20] 18  BP: ()/(51-73) 123/59  SpO2:  [86 %-98 %] 95 %    Physical Exam  Vitals and nursing note reviewed. Exam conducted with a chaperone present.   Constitutional:       Appearance: Normal appearance. She is normal weight. She is not ill-appearing or diaphoretic.      Interventions: Nasal cannula in place.   HENT:      Head: Normocephalic and atraumatic.      Mouth/Throat:      Mouth: Mucous membranes are moist.      Pharynx: Oropharynx is clear. No oropharyngeal exudate.   Eyes:      General:         Right eye: No discharge.         Left eye: No discharge.      Conjunctiva/sclera: Conjunctivae normal.      Pupils: Pupils are equal, round, and reactive to light.   Cardiovascular:      Rate and Rhythm: Normal rate and regular rhythm.      Pulses: Normal pulses.      Heart sounds: Normal heart sounds. No murmur heard.  Pulmonary:      Effort: Pulmonary effort is  normal. No respiratory distress.      Breath sounds: Normal breath sounds.   Abdominal:      General: Abdomen is flat. Bowel sounds are normal. There is no distension.      Palpations: Abdomen is soft.      Tenderness: There is no abdominal tenderness.   Musculoskeletal:      Cervical back: Neck supple. No tenderness.      Right lower leg: No edema.      Left lower leg: No edema.   Neurological:      Mental Status: She is alert and oriented to person, place, and time.      Motor: No weakness.   Psychiatric:         Thought Content: Thought content normal.         Judgment: Judgment normal.         Fluids    Intake/Output Summary (Last 24 hours) at 9/4/2024 0634  Last data filed at 9/3/2024 0930  Gross per 24 hour   Intake 240 ml   Output 100 ml   Net 140 ml        Laboratory  Recent Labs     09/02/24  0029 09/03/24  0114 09/04/24  0327   WBC 1.9* 1.8* 1.4*   RBC 2.86* 3.15* 2.89*   HEMOGLOBIN 9.3* 10.5* 9.5*   HEMATOCRIT 28.6* 30.4* 27.6*   .0* 96.5 95.5   MCH 32.5 33.3* 32.9   MCHC 32.5 34.5 34.4   RDW 54.4* 50.7* 49.5   PLATELETCT 224 244 226   MPV 10.2 9.9 10.4     Recent Labs     09/03/24  0114   SODIUM 136   POTASSIUM 3.3*   CHLORIDE 101   CO2 22   GLUCOSE 98   BUN 13   CREATININE 0.56   CALCIUM 8.6                     Imaging  IR-US GUIDED PIV   Final Result    Ultrasound-guided PERIPHERAL IV INSERTION performed by    qualified nursing staff as above.      CT-CHEST (THORAX) W/O   Final Result      1.  Small bilateral pleural effusion      2.  Bilateral linear density consistent with atelectasis and/or parenchymal scar      3.  Borderline enlargement lymph nodes in the mediastinum      Fleischner Society pulmonary nodule recommendations:   Not Applicable         EC-ECHOCARDIOGRAM COMPLETE W/O CONT   Final Result      DX-CHEST-PORTABLE (1 VIEW)   Final Result      1.  Interstitial infiltrates and/or edema.   2.  Cardiomegaly.           Assessment/Plan  * Neutropenic fever (HCC)- (present on  admission)  Assessment & Plan  On cefepime, adding Vanco due to shunt device.    9/2/2024  Infectious diseases consulted.  Vancomycin discontinued.  Continuing cefepime.  CT scan ordered per recommendations.  Per my review showing small right greater than left bilateral pleural effusions.    Pulmonary hypertension (HCC)- (present on admission)  Assessment & Plan  9/2/2024  Etiology unclear.  RVSP of 38 mmHg.  Stat furosemide 20 mg IV BID    Acute heart failure with preserved ejection fraction (HFpEF) (HCC)- (present on admission)  Assessment & Plan  9/2/2024  Echo:  Normal left and right ventricular systolic function, visually estimated   LVEF of 55-60%.  Mildly dilated left atrium.  Severe mitral annular calcification with sclerotic leaflets.  Mild   mitral stenosis with mean gradient of 5 mmHg, 69 bpm.  Moderate mitral   regurgitation.  Calcified trileaflet aortic valve leaflets with moderate aortic valve   stenosis and mild regurgitation ( msec).  Mean gradient of 30   mmHg and DIANA 1.0 to 1.1 cm2 using continuity equation.    Mild tricuspid regurgitation.  Right ventricular systolic pressure is estimated to be  38 mmHg.  Compared to the images of the prior study of 11/25/2021, there has been   interval progression of mitral regurgitation and aortic stenosis.    NT pro BNP elevated 3252.  Start furosemide 20 was IV twice daily  Continuous cardiac monitoring during forced diuresis to monitor for arrhythmias.    Bilateral pleural effusion- (present on admission)  Assessment & Plan  9/2/2024  As per CT scan.  Start IV diuresis    Acute pain- (present on admission)  Assessment & Plan  Patient does not normally have pain related to her malignancy and is not on chronic pain medic patient however complains of significant pain all over throughout her bones and her whole body including headache.  Will add low-dose opioids for pain.  She has a history of GI bleeds so NSAIDs are relatively  contraindicated    Pneumonia- (present on admission)  Assessment & Plan  Multifocal on imaging  Viral panel negative  On antibiotics for neutropenic fever    9/2/2024  Continue cefepime    Acute respiratory failure with hypoxemia (HCC)- (present on admission)  Assessment & Plan  Room air saturation 88%, in the mid 90s on 2 L  Continue oxygen as needed, add respiratory therapy protocol    9/2/2024  CT scan per my review showing small right greater than left bilateral pleural effusions.  Continues to require 2 LPM oxygen via nasal cannula.  Baseline is room air.  Start gentle diuresis with furosemide 20 mg IV twice daily.  Continuous cardiac monitoring during first diuresis to monitor for arrhythmias.  PEP therapy and IS    Valvular stenosis- (present on admission)  Assessment & Plan  8/31/23 Echo w/ normal EF but Moderate Mitral stenosis w/ regurg and  Moderate Aortic Stenosis with regurg  She has a very loud 5/6 murmur with significant thrill-her daughter states she recently had an echo in July with her cardiologist Dr. Oneal.  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  Appropriate orders were placed.        Normal pressure hydrocephalus (HCC)- (present on admission)  Assessment & Plan  Patient has had a shunt for approximately 2 years-due to indwelling devices will also add vancomycin to her regimen-she does complain of headache         VTE prophylaxis:    SCDs/TEDs   Xarelto 10mg daily as prophylaxis      I have performed a physical exam and reviewed and updated ROS and Plan today (9/3/2024). In review of yesterday's note (9/2/2024), there are no changes except as documented above.  Total time spent 54 minutes. I spent greater than 50% of the time for patient care, counseling, and coordination on this date, including unit/floor time, and face-to-face time with the patient as per interval events, my own review of patient's imaging and lab analysis and developing my assessment and plan above.

## 2024-09-04 NOTE — CARE PLAN
The patient is Stable - Low risk of patient condition declining or worsening    Shift Goals  Clinical Goals: Monitor labs/vs; frequent rounding for safety;  Patient Goals: Rest  Family Goals: Not present    Progress made toward(s) clinical / shift goals:    Problem: Knowledge Deficit - Standard  Goal: Patient and family/care givers will demonstrate understanding of plan of care, disease process/condition, diagnostic tests and medications  Outcome: Progressing     Problem: Hemodynamics  Goal: Patient's hemodynamics, fluid balance and neurologic status will be stable or improve  Outcome: Progressing     Problem: Pain - Standard  Goal: Alleviation of pain or a reduction in pain to the patient’s comfort goal  Outcome: Progressing     Problem: Fall Risk  Goal: Patient will remain free from falls  Outcome: Progressing       Patient is not progressing towards the following goals:       room air

## 2024-09-04 NOTE — DISCHARGE PLANNING
Case Management Discharge Planning    Admission Date: 9/1/2024  GMLOS: 5.1  ALOS: 3    6-Clicks ADL Score: 20  6-Clicks Mobility Score: 18      Anticipated Discharge Dispo: Discharge Disposition: Discharged to home/self care (01)  Discharge Address: [address]    DME Needed: No    Action(s) Taken: Discussed in IDT rounds, pt is pending final ID recommendations and count recovery. Hospitalist to contact [facility] for oncology clearance.     Escalations Completed: None    Medically Clear: No    Next Steps: Pending ID and Oncology clearance.     Barriers to Discharge: Medical clearance    Is the patient up for discharge tomorrow: No

## 2024-09-05 ENCOUNTER — HOME HEALTH ADMISSION (OUTPATIENT)
Dept: HOME HEALTH SERVICES | Facility: HOME HEALTHCARE | Age: 84
End: 2024-09-05
Payer: MEDICARE

## 2024-09-05 LAB
ALBUMIN SERPL BCP-MCNC: 3.2 G/DL (ref 3.2–4.9)
ALBUMIN/GLOB SERPL: 0.9 G/DL
ALP SERPL-CCNC: 163 U/L (ref 30–99)
ALT SERPL-CCNC: 30 U/L (ref 2–50)
ANION GAP SERPL CALC-SCNC: 12 MMOL/L (ref 7–16)
AST SERPL-CCNC: 30 U/L (ref 12–45)
BASOPHILS # BLD AUTO: 0 % (ref 0–1.8)
BASOPHILS # BLD: 0 K/UL (ref 0–0.12)
BILIRUB SERPL-MCNC: 0.3 MG/DL (ref 0.1–1.5)
BUN SERPL-MCNC: 8 MG/DL (ref 8–22)
CALCIUM ALBUM COR SERPL-MCNC: 9.2 MG/DL (ref 8.5–10.5)
CALCIUM SERPL-MCNC: 8.6 MG/DL (ref 8.5–10.5)
CHLORIDE SERPL-SCNC: 100 MMOL/L (ref 96–112)
CO2 SERPL-SCNC: 25 MMOL/L (ref 20–33)
CREAT SERPL-MCNC: 0.49 MG/DL (ref 0.5–1.4)
EOSINOPHIL # BLD AUTO: 0 K/UL (ref 0–0.51)
EOSINOPHIL NFR BLD: 0 % (ref 0–6.9)
ERYTHROCYTE [DISTWIDTH] IN BLOOD BY AUTOMATED COUNT: 50.6 FL (ref 35.9–50)
GFR SERPLBLD CREATININE-BSD FMLA CKD-EPI: 91 ML/MIN/1.73 M 2
GLOBULIN SER CALC-MCNC: 3.6 G/DL (ref 1.9–3.5)
GLUCOSE SERPL-MCNC: 96 MG/DL (ref 65–99)
HCT VFR BLD AUTO: 30.6 % (ref 37–47)
HGB BLD-MCNC: 10.2 G/DL (ref 12–16)
L PNEUMO AG UR QL IA: NEGATIVE
LYMPHOCYTES # BLD AUTO: 0.81 K/UL (ref 1–4.8)
LYMPHOCYTES NFR BLD: 50.9 % (ref 22–41)
MAGNESIUM SERPL-MCNC: 1.6 MG/DL (ref 1.5–2.5)
MANUAL DIFF BLD: NORMAL
MCH RBC QN AUTO: 32.4 PG (ref 27–33)
MCHC RBC AUTO-ENTMCNC: 33.3 G/DL (ref 32.2–35.5)
MCV RBC AUTO: 97.1 FL (ref 81.4–97.8)
MONOCYTES # BLD AUTO: 0.1 K/UL (ref 0–0.85)
MONOCYTES NFR BLD AUTO: 6.1 % (ref 0–13.4)
MORPHOLOGY BLD-IMP: NORMAL
NEUTROPHILS # BLD AUTO: 0.69 K/UL (ref 1.82–7.42)
NEUTROPHILS NFR BLD: 43 % (ref 44–72)
NRBC # BLD AUTO: 0 K/UL
NRBC BLD-RTO: 0 /100 WBC (ref 0–0.2)
OVALOCYTES BLD QL SMEAR: NORMAL
PLATELET # BLD AUTO: 209 K/UL (ref 164–446)
PLATELET BLD QL SMEAR: NORMAL
PMV BLD AUTO: 10 FL (ref 9–12.9)
POIKILOCYTOSIS BLD QL SMEAR: NORMAL
POTASSIUM SERPL-SCNC: 4.1 MMOL/L (ref 3.6–5.5)
PROT SERPL-MCNC: 6.8 G/DL (ref 6–8.2)
RBC # BLD AUTO: 3.15 M/UL (ref 4.2–5.4)
RBC BLD AUTO: PRESENT
SODIUM SERPL-SCNC: 137 MMOL/L (ref 135–145)
WBC # BLD AUTO: 1.6 K/UL (ref 4.8–10.8)

## 2024-09-05 PROCEDURE — 700102 HCHG RX REV CODE 250 W/ 637 OVERRIDE(OP): Performed by: STUDENT IN AN ORGANIZED HEALTH CARE EDUCATION/TRAINING PROGRAM

## 2024-09-05 PROCEDURE — A9270 NON-COVERED ITEM OR SERVICE: HCPCS | Performed by: STUDENT IN AN ORGANIZED HEALTH CARE EDUCATION/TRAINING PROGRAM

## 2024-09-05 PROCEDURE — 83735 ASSAY OF MAGNESIUM: CPT

## 2024-09-05 PROCEDURE — 700102 HCHG RX REV CODE 250 W/ 637 OVERRIDE(OP): Performed by: INTERNAL MEDICINE

## 2024-09-05 PROCEDURE — 700111 HCHG RX REV CODE 636 W/ 250 OVERRIDE (IP): Performed by: INTERNAL MEDICINE

## 2024-09-05 PROCEDURE — 700111 HCHG RX REV CODE 636 W/ 250 OVERRIDE (IP): Mod: JZ | Performed by: STUDENT IN AN ORGANIZED HEALTH CARE EDUCATION/TRAINING PROGRAM

## 2024-09-05 PROCEDURE — A9270 NON-COVERED ITEM OR SERVICE: HCPCS | Performed by: INTERNAL MEDICINE

## 2024-09-05 PROCEDURE — 700105 HCHG RX REV CODE 258: Performed by: INTERNAL MEDICINE

## 2024-09-05 PROCEDURE — 700102 HCHG RX REV CODE 250 W/ 637 OVERRIDE(OP)

## 2024-09-05 PROCEDURE — 80053 COMPREHEN METABOLIC PANEL: CPT

## 2024-09-05 PROCEDURE — A9270 NON-COVERED ITEM OR SERVICE: HCPCS

## 2024-09-05 PROCEDURE — 700111 HCHG RX REV CODE 636 W/ 250 OVERRIDE (IP): Mod: JZ | Performed by: INTERNAL MEDICINE

## 2024-09-05 PROCEDURE — 99232 SBSQ HOSP IP/OBS MODERATE 35: CPT | Performed by: STUDENT IN AN ORGANIZED HEALTH CARE EDUCATION/TRAINING PROGRAM

## 2024-09-05 PROCEDURE — 85007 BL SMEAR W/DIFF WBC COUNT: CPT

## 2024-09-05 PROCEDURE — 85027 COMPLETE CBC AUTOMATED: CPT

## 2024-09-05 PROCEDURE — 770004 HCHG ROOM/CARE - ONCOLOGY PRIVATE *

## 2024-09-05 RX ORDER — DIPHENHYDRAMINE HCL 25 MG
25 TABLET ORAL EVERY 8 HOURS PRN
Status: DISCONTINUED | OUTPATIENT
Start: 2024-09-05 | End: 2024-09-06 | Stop reason: HOSPADM

## 2024-09-05 RX ORDER — FUROSEMIDE 40 MG
40 TABLET ORAL ONCE
Status: COMPLETED | OUTPATIENT
Start: 2024-09-05 | End: 2024-09-05

## 2024-09-05 RX ORDER — PROMETHAZINE HYDROCHLORIDE 25 MG/1
25 SUPPOSITORY RECTAL EVERY 6 HOURS PRN
Status: DISCONTINUED | OUTPATIENT
Start: 2024-09-05 | End: 2024-09-06 | Stop reason: HOSPADM

## 2024-09-05 RX ORDER — FUROSEMIDE 10 MG/ML
20 INJECTION INTRAMUSCULAR; INTRAVENOUS
Status: COMPLETED | OUTPATIENT
Start: 2024-09-05 | End: 2024-09-05

## 2024-09-05 RX ADMIN — METHENAMINE HIPPURATE 1 G: 1 TABLET ORAL at 17:17

## 2024-09-05 RX ADMIN — ONDANSETRON 4 MG: 4 TABLET, ORALLY DISINTEGRATING ORAL at 21:41

## 2024-09-05 RX ADMIN — ATORVASTATIN CALCIUM 40 MG: 40 TABLET, FILM COATED ORAL at 17:17

## 2024-09-05 RX ADMIN — AMOXICILLIN AND CLAVULANATE POTASSIUM 1 TABLET: 875; 125 TABLET, FILM COATED ORAL at 20:03

## 2024-09-05 RX ADMIN — Medication 5 MG: at 20:03

## 2024-09-05 RX ADMIN — DIPHENHYDRAMINE HYDROCHLORIDE 25 MG: 25 TABLET ORAL at 02:16

## 2024-09-05 RX ADMIN — AMPICILLIN AND SULBACTAM 3 G: 1; 2 INJECTION, POWDER, FOR SOLUTION INTRAMUSCULAR; INTRAVENOUS at 05:08

## 2024-09-05 RX ADMIN — BUSPIRONE HYDROCHLORIDE 15 MG: 10 TABLET ORAL at 09:07

## 2024-09-05 RX ADMIN — PROMETHAZINE HYDROCHLORIDE 25 MG: 25 SUPPOSITORY RECTAL at 23:31

## 2024-09-05 RX ADMIN — RIVAROXABAN 10 MG: 10 TABLET, FILM COATED ORAL at 17:17

## 2024-09-05 RX ADMIN — AMPICILLIN AND SULBACTAM 3 G: 1; 2 INJECTION, POWDER, FOR SOLUTION INTRAMUSCULAR; INTRAVENOUS at 09:06

## 2024-09-05 RX ADMIN — OMEPRAZOLE 40 MG: 20 CAPSULE, DELAYED RELEASE ORAL at 05:06

## 2024-09-05 RX ADMIN — Medication 5 MG: at 01:23

## 2024-09-05 RX ADMIN — CYANOCOBALAMIN TAB 500 MCG 500 MCG: 500 TAB at 05:07

## 2024-09-05 RX ADMIN — BUSPIRONE HYDROCHLORIDE 15 MG: 10 TABLET ORAL at 20:03

## 2024-09-05 RX ADMIN — FUROSEMIDE 20 MG: 10 INJECTION, SOLUTION INTRAVENOUS at 17:17

## 2024-09-05 RX ADMIN — AMPICILLIN AND SULBACTAM 3 G: 1; 2 INJECTION, POWDER, FOR SOLUTION INTRAMUSCULAR; INTRAVENOUS at 17:17

## 2024-09-05 RX ADMIN — FUROSEMIDE 20 MG: 10 INJECTION, SOLUTION INTRAVENOUS at 05:07

## 2024-09-05 RX ADMIN — METHENAMINE HIPPURATE 1 G: 1 TABLET ORAL at 05:06

## 2024-09-05 RX ADMIN — VENLAFAXINE HYDROCHLORIDE 225 MG: 75 CAPSULE, EXTENDED RELEASE ORAL at 05:06

## 2024-09-05 RX ADMIN — FUROSEMIDE 40 MG: 40 TABLET ORAL at 20:03

## 2024-09-05 ASSESSMENT — ENCOUNTER SYMPTOMS
SORE THROAT: 1
FEVER: 0
NAUSEA: 0
DIZZINESS: 0
VOMITING: 0
CHILLS: 0
PALPITATIONS: 0
SHORTNESS OF BREATH: 0
MYALGIAS: 0
COUGH: 0
HEADACHES: 0
ABDOMINAL PAIN: 0

## 2024-09-05 ASSESSMENT — PAIN DESCRIPTION - PAIN TYPE: TYPE: ACUTE PAIN

## 2024-09-05 NOTE — DISCHARGE PLANNING
Case Management Discharge Planning    Admission Date: 9/1/2024  GMLOS: 5.1  ALOS: 4    6-Clicks ADL Score: 19  6-Clicks Mobility Score: 18      Anticipated Discharge Dispo: Discharge Disposition: D/T to home under [facility] care in anticipation of covered skilled care (06)  Discharge Address: [address]    DME Needed: No    Action(s) Taken: Discussed during IDT rounds, Patient completes IV ABX today. Discussed OT recommending HH, obtained HH choice and faxed to [facility]. Patients first choice is [facility].       Escalations Completed: None    Medically Clear: No    Next Steps: Pending Medical Clearance     Barriers to Discharge: Medical clearance    Is the patient up for discharge tomorrow: No

## 2024-09-05 NOTE — CARE PLAN
Problem: Knowledge Deficit - Standard  Goal: Patient and family/care givers will demonstrate understanding of plan of care, disease process/condition, diagnostic tests and medications  Outcome: Progressing     Problem: Fall Risk  Goal: Patient will remain free from falls  Outcome: Progressing     The patient is Watcher - Medium risk of patient condition declining or worsening    Shift Goals  Clinical Goals: maintain patent IV, continue IV ABX  Patient Goals: keep IV, rest  Family Goals: not at bedside    Progress made toward(s) clinical / shift goals:  Pt educated on POC    Patient is not progressing towards the following goals:

## 2024-09-05 NOTE — PROGRESS NOTES
Hospital Medicine Daily Progress Note    Date of Service  9/4/2024    Chief Complaint  Aaliyah Fulton is a 84 y.o. female admitted 9/1/2024 with fever and palpitations with shortness of breath.    Hospital Course  Aaliyah Fulton is a 84 y.o. female who is on chemotherapy for cholangiocarcinoma with her last treatment being on Friday 8/30 who presented 9/1/2024 with sudden onset fever, shortness of breath, nonproductive cough and generalized bodyaches.  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  She is not having tenderness over any of these areas.  She denies nausea, vomiting, diarrhea, dysuria.  She is chronically incontinent and does have frequent UTIs, daughter states that when she gets UTIs she becomes confused.     Interval Problem Update  9/2/2024  Patient was seen and examined on the telemetry floor.  She continues on continuous cardiac monitoring.  Infectious diseases consulted due to complexity of patient's neutropenic fever shunt and orthopedic hardware.  Blood cultures 1 out of 2 positive.  Believed to be contaminant.  Vancomycin discontinued.  Continuing on cefepime.  Tachycardia and fevers improving.  Continues on 2 LPM oxygen by nasal cannula.  Discussed with infectious diseases, Dr. Jones, recommending CT scan of the chest.  CT scan per my review showing small bilateral pleural effusions right greater than left.  Starting furosemide 20 mg IV twice daily.  NT proBNP notably elevated at 3252.  Echocardiogram showing ejection fraction of 55-60.  RVSP of 38 mmHg    9/3  Patient new to me today.  Afebrile normal pulse respiratory rate systolic blood pressure 90s to 120s pulse ox 86 to 98% on 1 L nasal cannula.  WBCs 1.8 stable anemia 10.5 platelets 244 CMP potassium 3.3 AST 66 ALT 61 globulin 3.7 magnesium 1.4.  IV magnesium replacement ordered.  Antibiotics narrowed  down to Unasyn, no clear source identified for infection. ID following, discussed delmi Jones.    9/4  Afebrile with normal vitals on room air.  WBC 0.4 hemoglobin 9.5 normal platelet count ANC dropped 0.76 now neutropenic.  She feels well, no new complaints or symptoms.  She has had 2 episodes where she had had sudden symptoms concerning for infection with rapid improvement no clear source identified.  It sounds like this go around she may have been suffering from an acute viral process however no way to be sure.  Now with becoming neutropenic and given prior to episodes may be worth discussing with oncology if ANC continues to drop to see if there is a threshold they would like prior to discharge.    I have discussed this patient's plan of care and discharge plan at IDT rounds today with Case Management, Nursing, Nursing leadership, and other members of the IDT team.    Consultants/Specialty  infectious disease    Code Status  DNAR/DNI    Disposition  The patient is not medically cleared for discharge to home or a post-acute facility.      I have placed the appropriate orders for post-discharge needs.    Review of Systems  Review of Systems   Constitutional:  Negative for chills and fever.   HENT:  Positive for sore throat.    Respiratory:  Negative for cough and shortness of breath.    Cardiovascular:  Negative for chest pain and palpitations.   Gastrointestinal:  Negative for abdominal pain, nausea and vomiting.   Musculoskeletal:  Negative for joint pain and myalgias.   Neurological:  Negative for dizziness and headaches.        Physical Exam  Temp:  [36.7 °C (98.1 °F)-37.3 °C (99.2 °F)] 36.7 °C (98.1 °F)  Pulse:  [75-96] 78  Resp:  [15-18] 15  BP: (103-131)/(58-71) 129/65  SpO2:  [92 %-95 %] 92 %    Physical Exam  Vitals and nursing note reviewed. Exam conducted with a chaperone present.   Constitutional:       Appearance: Normal appearance. She is normal weight. She is not ill-appearing or  diaphoretic.      Interventions: Nasal cannula in place.   HENT:      Head: Normocephalic and atraumatic.      Mouth/Throat:      Mouth: Mucous membranes are moist.      Pharynx: Oropharynx is clear. No oropharyngeal exudate.   Eyes:      General:         Right eye: No discharge.         Left eye: No discharge.      Conjunctiva/sclera: Conjunctivae normal.      Pupils: Pupils are equal, round, and reactive to light.   Cardiovascular:      Rate and Rhythm: Normal rate and regular rhythm.      Pulses: Normal pulses.      Heart sounds: Normal heart sounds. No murmur heard.  Pulmonary:      Effort: Pulmonary effort is normal. No respiratory distress.      Breath sounds: Normal breath sounds.   Abdominal:      General: Abdomen is flat. Bowel sounds are normal. There is no distension.      Palpations: Abdomen is soft.      Tenderness: There is no abdominal tenderness.   Musculoskeletal:      Cervical back: Neck supple. No tenderness.      Right lower leg: No edema.      Left lower leg: No edema.   Skin:     General: Skin is warm and dry.      Coloration: Skin is not jaundiced.   Neurological:      Mental Status: She is alert and oriented to person, place, and time.      Motor: No weakness.   Psychiatric:         Thought Content: Thought content normal.         Judgment: Judgment normal.         Fluids  No intake or output data in the 24 hours ending 09/05/24 0724       Laboratory  Recent Labs     09/03/24  0114 09/04/24  0327 09/05/24  0439   WBC 1.8* 1.4* 1.6*   RBC 3.15* 2.89* 3.15*   HEMOGLOBIN 10.5* 9.5* 10.2*   HEMATOCRIT 30.4* 27.6* 30.6*   MCV 96.5 95.5 97.1   MCH 33.3* 32.9 32.4   MCHC 34.5 34.4 33.3   RDW 50.7* 49.5 50.6*   PLATELETCT 244 226 209   MPV 9.9 10.4 10.0     Recent Labs     09/03/24  0114 09/05/24  0439   SODIUM 136 137   POTASSIUM 3.3* 4.1   CHLORIDE 101 100   CO2 22 25   GLUCOSE 98 96   BUN 13 8   CREATININE 0.56 0.49*   CALCIUM 8.6 8.6                     Imaging  IR-US GUIDED PIV   Final Result     Ultrasound-guided PERIPHERAL IV INSERTION performed by    qualified nursing staff as above.      IR-US GUIDED PIV   Final Result    Ultrasound-guided PERIPHERAL IV INSERTION performed by    qualified nursing staff as above.      CT-CHEST (THORAX) W/O   Final Result      1.  Small bilateral pleural effusion      2.  Bilateral linear density consistent with atelectasis and/or parenchymal scar      3.  Borderline enlargement lymph nodes in the mediastinum      Fleischner Society pulmonary nodule recommendations:   Not Applicable         EC-ECHOCARDIOGRAM COMPLETE W/O CONT   Final Result      DX-CHEST-PORTABLE (1 VIEW)   Final Result      1.  Interstitial infiltrates and/or edema.   2.  Cardiomegaly.           Assessment/Plan  * Neutropenic fever (HCC)- (present on admission)  Assessment & Plan  On cefepime, adding Vanco due to shunt device.    9/2/2024  Infectious diseases consulted.  Vancomycin discontinued.  Continuing cefepime.  CT scan ordered per recommendations.  Per my review showing small right greater than left bilateral pleural effusions.    Pulmonary hypertension (HCC)- (present on admission)  Assessment & Plan  9/2/2024  Etiology unclear.  RVSP of 38 mmHg.  Stat furosemide 20 mg IV BID    Acute heart failure with preserved ejection fraction (HFpEF) (HCC)- (present on admission)  Assessment & Plan  9/2/2024  Echo:  Normal left and right ventricular systolic function, visually estimated   LVEF of 55-60%.  Mildly dilated left atrium.  Severe mitral annular calcification with sclerotic leaflets.  Mild   mitral stenosis with mean gradient of 5 mmHg, 69 bpm.  Moderate mitral   regurgitation.  Calcified trileaflet aortic valve leaflets with moderate aortic valve   stenosis and mild regurgitation ( msec).  Mean gradient of 30   mmHg and DIANA 1.0 to 1.1 cm2 using continuity equation.    Mild tricuspid regurgitation.  Right ventricular systolic pressure is estimated to be  38 mmHg.  Compared to the images of  the prior study of 11/25/2021, there has been   interval progression of mitral regurgitation and aortic stenosis.    NT pro BNP elevated 3252.  Start furosemide 20 was IV twice daily  Continuous cardiac monitoring during forced diuresis to monitor for arrhythmias.    Bilateral pleural effusion- (present on admission)  Assessment & Plan  9/2/2024  As per CT scan.  Start IV diuresis    Acute pain- (present on admission)  Assessment & Plan  Patient does not normally have pain related to her malignancy and is not on chronic pain medic patient however complains of significant pain all over throughout her bones and her whole body including headache.  Will add low-dose opioids for pain.  She has a history of GI bleeds so NSAIDs are relatively contraindicated    Pneumonia- (present on admission)  Assessment & Plan  Multifocal on imaging  Viral panel negative  On antibiotics for neutropenic fever    9/2/2024  Continue cefepime    Acute respiratory failure with hypoxemia (HCC)- (present on admission)  Assessment & Plan  Room air saturation 88%, in the mid 90s on 2 L  Continue oxygen as needed, add respiratory therapy protocol    9/2/2024  CT scan per my review showing small right greater than left bilateral pleural effusions.  Continues to require 2 LPM oxygen via nasal cannula.  Baseline is room air.  Start gentle diuresis with furosemide 20 mg IV twice daily.  Continuous cardiac monitoring during first diuresis to monitor for arrhythmias.  PEP therapy and IS    Valvular stenosis- (present on admission)  Assessment & Plan  8/31/23 Echo w/ normal EF but Moderate Mitral stenosis w/ regurg and  Moderate Aortic Stenosis with regurg  She has a very loud 5/6 murmur with significant thrill-her daughter states she recently had an echo in July with her cardiologist Dr. Oneal.  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  Appropriate orders were placed.        Normal pressure hydrocephalus (HCC)- (present on admission)  Assessment & Plan  Patient has had a shunt for approximately 2 years-due to indwelling devices will also add vancomycin to her regimen-she does complain of headache         VTE prophylaxis:   SCDs/TEDs   Xarelto 10mg daily as prophylaxis      I have performed a physical exam and reviewed and updated ROS and Plan today (9/4/2024). In review of yesterday's note (9/3/2024), there are no changes except as documented above.  Total time spent 54 minutes. I spent greater than 50% of the time for patient care, counseling, and coordination on this date, including unit/floor time, and face-to-face time with the patient as per interval events, my own review of patient's imaging and lab analysis and developing my assessment and plan above.

## 2024-09-05 NOTE — CARE PLAN
The patient is Stable - Low risk of patient condition declining or worsening    Shift Goals  Clinical Goals: maintain patent IV, continue IV ABX  Patient Goals: keep IV, rest  Family Goals: not at bedside    Progress made toward(s) clinical / shift goals:      Pt lost multiple IV's within the last 24 hours.   Currently pt has a 24 gauge on her right hand.   Problem: Knowledge Deficit - Standard  Goal: Patient and family/care givers will demonstrate understanding of plan of care, disease process/condition, diagnostic tests and medications  Outcome: Progressing     Problem: Respiratory  Goal: Patient will achieve/maintain optimum respiratory ventilation and gas exchange  Outcome: Progressing  Flowsheets  Taken 9/5/2024 0320 by Gloira Duvall R.N.  Deep Breathe and Cough: Performs Correctly  Taken 9/5/2024 0007 by Arabella Salazar, C.N.A.  O2 Delivery Device: None - Room Air       Patient is not progressing towards the following goals:      Problem: Hemodynamics  Goal: Patient's hemodynamics, fluid balance and neurologic status will be stable or improve  Outcome: Not Met  Note: Labs are still pending.

## 2024-09-05 NOTE — DISCHARGE PLANNING
9143  Received Choice form at 9252  Agency/Facility Name: [facility]  Referral sent per Choice form @ 0790

## 2024-09-05 NOTE — DISCHARGE PLANNING
Thank you for sending [facility] this referral.  Please clarify if patient will be discharging with IV ABX. Please place the [facility] orders as well as information regarding the time of the last dose given, and the discharge date.  This referral will be placed on hold until receipt of the above mentioned information.

## 2024-09-05 NOTE — DOCUMENTATION QUERY
"                                                                         [facility]                                                                       Query Response Note      PATIENT:               LISA WINTERS  ACCT #:                  [id number]  MRN:                     [id number]  :                      1940  ADMIT DATE:       2024 5:28 AM  DISCH DATE:          RESPONDING  PROVIDER #:        [id number]           QUERY TEXT:    The diagnosis of sepsis has been documented in the Medical Record without an associated organ dysfunction.    Please clarify the status of sepsis by providing SIRS criteria and sepsis-related organ dysfunction.    Sepsis - real or suspected infection plus 2 or more SIRS criteria + organ dysfunction related to sepsis    Temp <96.8 or >101  HR >90  RR >20  WBC <4,000 or > 12,000 or >10% bandemia    The patient's Clinical Indicators include:  83yo with dx of acute diastolic heart failure, pneumonia, acute respiratory failure with hypoxia     ED note \"Sepsis\"   H&P \"...need is secondary to sepsis\" \"neutropenic fever\"    PN \"...rapid improvement in just 1 day, suspect viral syndrome or potentially a chemotherapy induced pneumonitis\"     BC  positive     ED VS , RR 24    Risk factors: advanced age, neutropenic fever, chemotherapy last dose , cancer  Treatments: labwork, IVF, antibiotics, monitoring, imaging    If you agree with the above dx, please document in the medical record.      Contact me with questions.     Thank you,  TL Zhang, CDI  [email or web address]  Options provided:   -- Sepsis does not exist - amended documentation in the medical record and updated problem list   -- Sepsis  POA, (provide SIRS criteria plus sepsis-related organ dysfunction)   -- Other explanation, Please specify      Query created by: Ammy Frank on 2024 7:38 AM    RESPONSE TEXT:    Sepsis does not exist - amended documentation in the medical record " and updated problem list          Electronically signed by:  SHAHANA SOTELO MD 9/5/2024 7:52 AM

## 2024-09-06 ENCOUNTER — APPOINTMENT (OUTPATIENT)
Dept: ONCOLOGY | Facility: MEDICAL CENTER | Age: 84
End: 2024-09-06
Attending: STUDENT IN AN ORGANIZED HEALTH CARE EDUCATION/TRAINING PROGRAM
Payer: MEDICARE

## 2024-09-06 ENCOUNTER — PATIENT OUTREACH (OUTPATIENT)
Dept: SCHEDULING | Facility: IMAGING CENTER | Age: 84
End: 2024-09-06
Payer: MEDICARE

## 2024-09-06 VITALS
HEART RATE: 92 BPM | TEMPERATURE: 98.2 F | SYSTOLIC BLOOD PRESSURE: 103 MMHG | HEIGHT: 63 IN | DIASTOLIC BLOOD PRESSURE: 68 MMHG | RESPIRATION RATE: 20 BRPM | OXYGEN SATURATION: 95 % | WEIGHT: 175.27 LBS | BODY MASS INDEX: 31.05 KG/M2

## 2024-09-06 LAB
BACTERIA BLD CULT: NORMAL
ERYTHROCYTE [DISTWIDTH] IN BLOOD BY AUTOMATED COUNT: 47.3 FL (ref 35.9–50)
HCT VFR BLD AUTO: 29.8 % (ref 37–47)
HGB BLD-MCNC: 10.3 G/DL (ref 12–16)
MCH RBC QN AUTO: 32.4 PG (ref 27–33)
MCHC RBC AUTO-ENTMCNC: 34.6 G/DL (ref 32.2–35.5)
MCV RBC AUTO: 93.7 FL (ref 81.4–97.8)
PLATELET # BLD AUTO: 193 K/UL (ref 164–446)
PMV BLD AUTO: 10 FL (ref 9–12.9)
RBC # BLD AUTO: 3.18 M/UL (ref 4.2–5.4)
SIGNIFICANT IND 70042: NORMAL
SITE SITE: NORMAL
SOURCE SOURCE: NORMAL
WBC # BLD AUTO: 2.6 K/UL (ref 4.8–10.8)

## 2024-09-06 PROCEDURE — 99239 HOSP IP/OBS DSCHRG MGMT >30: CPT | Performed by: STUDENT IN AN ORGANIZED HEALTH CARE EDUCATION/TRAINING PROGRAM

## 2024-09-06 PROCEDURE — 97535 SELF CARE MNGMENT TRAINING: CPT

## 2024-09-06 PROCEDURE — A9270 NON-COVERED ITEM OR SERVICE: HCPCS | Performed by: INTERNAL MEDICINE

## 2024-09-06 PROCEDURE — 85027 COMPLETE CBC AUTOMATED: CPT

## 2024-09-06 PROCEDURE — 700102 HCHG RX REV CODE 250 W/ 637 OVERRIDE(OP): Performed by: INTERNAL MEDICINE

## 2024-09-06 RX ADMIN — CYANOCOBALAMIN TAB 500 MCG 500 MCG: 500 TAB at 08:14

## 2024-09-06 RX ADMIN — OMEPRAZOLE 40 MG: 20 CAPSULE, DELAYED RELEASE ORAL at 08:14

## 2024-09-06 RX ADMIN — BUSPIRONE HYDROCHLORIDE 15 MG: 10 TABLET ORAL at 08:14

## 2024-09-06 RX ADMIN — METHENAMINE HIPPURATE 1 G: 1 TABLET ORAL at 08:14

## 2024-09-06 RX ADMIN — VENLAFAXINE HYDROCHLORIDE 225 MG: 75 CAPSULE, EXTENDED RELEASE ORAL at 08:14

## 2024-09-06 ASSESSMENT — COGNITIVE AND FUNCTIONAL STATUS - GENERAL
TOILETING: A LITTLE
STANDING UP FROM CHAIR USING ARMS: A LITTLE
DAILY ACTIVITIY SCORE: 24
DAILY ACTIVITIY SCORE: 22
SUGGESTED CMS G CODE MODIFIER DAILY ACTIVITY: CH
MOBILITY SCORE: 20
SUGGESTED CMS G CODE MODIFIER DAILY ACTIVITY: CJ
HELP NEEDED FOR BATHING: A LITTLE
WALKING IN HOSPITAL ROOM: A LITTLE
CLIMB 3 TO 5 STEPS WITH RAILING: A LITTLE
MOVING TO AND FROM BED TO CHAIR: A LITTLE
SUGGESTED CMS G CODE MODIFIER MOBILITY: CJ

## 2024-09-06 NOTE — CARE PLAN
Problem: Knowledge Deficit - Standard  Goal: Patient and family/care givers will demonstrate understanding of plan of care, disease process/condition, diagnostic tests and medications  Outcome: Progressing     Problem: Fluid Volume  Goal: Fluid volume balance will be maintained  Outcome: Progressing     Problem: Urinary - Renal Perfusion  Goal: Ability to achieve and maintain adequate renal perfusion and functioning will improve  Outcome: Progressing     Problem: Care Map:  Admission Optimal Outcome for the Heart Failure Patient  Goal: Admission:  Optimal Care of the heart failure patient  Outcome: Progressing     Problem: Care Map:  Day 1 Optimal Outcome for the Heart Failure Patient  Goal: Day 1:  Optimal Care of the heart failure patient  Outcome: Progressing     Problem: Care Map:  Day 2 Optimal Outcome for the Heart Failure Patient  Goal: Day 2:  Optimal Care of the heart failure patient  Outcome: Progressing     Problem: Care Map:  Day 3 Optimal Outcome for the Heart Failure Patient  Goal: Day 3:  Optimal Care of the heart failure patient  Outcome: Progressing     Problem: Care Map:  Day Before Discharge Optimal Outcome for the Heart Failure Patient  Goal: Day Before Discharge:  Optimal Care of the heart failure patient  Outcome: Progressing     Problem: Care Map:  Day of Discharge Optimal Outcome for the Heart Failure Patient  Goal: Day of Discharge:  Optimal Care of the heart failure patient  Outcome: Progressing   The patient is Stable - Low risk of patient condition declining or worsening    Shift Goals  Clinical Goals: hemodynamic stability, pt safety  Patient Goals: rest  Family Goals: not at bedside    Progress made toward(s) clinical / shift goals:  patient remained hemodynamically stable. Patient remained safe from injury. Patient discharging home today.    Patient is not progressing towards the following goals:

## 2024-09-06 NOTE — PROGRESS NOTES
Patient reporting nausea and vomiting. This RN administered  4mg Zofran at 2141. Patient reported nausea/vomiting relief after Zofran administered. Patient has had emesis episode x2 times since Zofran administered, 300mL of thin, green, yellow emesis. HALLIE Bradford notified, patient does not have IV access at this time. New orders received.

## 2024-09-06 NOTE — DISCHARGE SUMMARY
"Discharge Summary    CHIEF COMPLAINT ON ADMISSION  Chief Complaint   Patient presents with    Palpitations     \"Heart racing\" since 2300    Difficulty Breathing     Since 2300    Fever     Since 2300; temp of 101*F at home       Reason for Admission  Palpitations     Admission Date  9/1/2024    CODE STATUS  DNAR/DNI    HPI & HOSPITAL COURSE  Aaliyah Fulton is a 84 y.o. female who is on chemotherapy for cholangiocarcinoma with her last treatment being on Friday 8/30 who presented 9/1/2024 with sudden onset fever, shortness of breath, nonproductive cough and generalized bodyaches.  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  She is chronically incontinent and does have frequent UTIs, daughter states that when she gets UTIs she becomes confused.   Patient was found to have neutropenic fever. She was treated with IV antibiotics, and completed course during hospital stay for empiric treatment of fever. She remains afebrile. Unclear source of infection, suspected pulmonary given hypoxia and pulmonary edema on chest x ray. Infectious disease suspect viral syndrome vs chemotherapy induced pneumonitis. She was given IV lasix with improvement of pulmonary edema. Patient does not have heart failure, echo shows moderate mitral regurgitation and moderate aortic stenosis. Pulmonary edema not of cardiogenic source. She is feeling well, completed antibiotics with no fever recurrence. She is to follow up with her PCP and oncologist.    Therefore, she is discharged in fair and stable condition to home with close outpatient follow-up.    The patient met 2-midnight criteria for an inpatient stay at the time of discharge.    Discharge Date  9/6/2024    FOLLOW UP ITEMS POST DISCHARGE  Take medications as prescribed.  Follow up with PCP and oncology.    DISCHARGE DIAGNOSES  Principal Problem:    Neutropenic " fever (HCC) (POA: Yes)  Active Problems:    Normal pressure hydrocephalus (HCC) (POA: Yes)      Overview: Last Assessment & Plan:       Formatting of this note might be different from the original.      Dx by neuro, was seen for freq falls, lethargy and h/a      1/10/22 a ventriculoperitoneal shunt was placed with post op f/u revealing       fluid was not collecting on brain.      Now feels well, cont to use walker, feels unsteady without a walker, fell       and broke her hip when using a cane      Shunt noticeable on R side of skull, parietal area      Followed by neuro    Advanced care planning/counseling discussion (POA: Yes)    Bile duct cancer (HCC) (POA: Yes)    Valvular stenosis (POA: Yes)    Acute respiratory failure with hypoxemia (HCC) (POA: Yes)    Pneumonia (POA: Yes)    Acute pain (POA: Yes)    Bilateral pleural effusion (POA: Yes)    Acute heart failure with preserved ejection fraction (HFpEF) (HCC) (POA: Yes)    Pulmonary hypertension (HCC) (POA: Yes)  Resolved Problems:    * No resolved hospital problems. *      FOLLOW UP  Future Appointments   Date Time Provider Department Center   9/17/2024  1:00 PM Tyshawn Laws M.D. [facility] [place]   9/20/2024  9:40 AM Ashvin Arias D.O. [facility] None   9/20/2024 10:15 AM [facility] [address]   9/27/2024  7:00 AM [facility] [address]   10/9/2024 10:20 AM [address]   10/11/2024  9:40 AM Ashvin Arias D.O. [facility] None   10/11/2024 10:15 AM [facility] [address]   10/18/2024 10:15 AM [facility] [address]     [facility]  [address]  [place] [address]  # [phone]  Follow up  Please arrive at 8:00am for ongoing heart failure treatment. This is a walk-in clinic and you may go anytime between 8:00am and 4:00pm. Patients are seen on a first come, first served basis, wait times may vary. This clinic offers a sliding-fee scale.      MEDICATIONS ON  DISCHARGE     Medication List        CONTINUE taking these medications        Instructions   acetaminophen 500 MG Tabs  Commonly known as: Tylenol   Take 500-1,000 mg by mouth every 6 hours as needed for Moderate Pain.  Dose: 500-1,000 mg     atorvastatin 40 MG Tabs  Commonly known as: Lipitor   TAKE ONE TABLET BY MOUTH EVERY EVENING  Dose: 40 mg     busPIRone 15 MG tablet  Commonly known as: Buspar   Take 15 mg by mouth 2 times a day.  Dose: 15 mg     CALCIUM-VITAMIN D3 PO   Take 600 mg by mouth every morning.  Dose: 600 mg     cyanocobalamin 500 MCG Tabs  Commonly known as: Vitamin B-12   Take 500 mcg by mouth every day.  Dose: 500 mcg     D-MANNOSE PO   Take 500 mg by mouth every evening.  Dose: 500 mg     estradiol 0.1 MG/GM vaginal cream  Commonly known as: Estrace   Insert 2 g into the vagina 3 times a week.  Dose: 2 g     methenamine hip 1 GM Tabs  Commonly known as: Hipprex   Take 1 g by mouth 2 times a day. LONG TERM TREATMENT FOR CHRONIC UTI.  Indications: Urinary Tract Infection  Dose: 1 g     omeprazole 40 MG delayed-release capsule  Commonly known as: PriLOSEC   Take 1 Capsule by mouth every day.  Dose: 1 Capsule     oxybutynin 15 MG CR tablet  Commonly known as: Ditropan-XL   Take 1 Tablet by mouth every day.  Dose: 1 Tablet     * venlafaxine XR 75 MG Cp24  Commonly known as: Effexor XR   Take 75 mg by mouth every day. For a daily total of 225 mg  Dose: 75 mg     * venlafaxine 150 MG extended-release capsule  Commonly known as: Effexor-XR   Take 1 Capsule by mouth every day. Taken with 75 mg for 225mg daily  Dose: 150 mg     vitamin D3 1000 Unit (25 mcg) Tabs  Commonly known as: Cholecalciferol   Take 2,000 Units by mouth every morning.  Dose: 2,000 Units           * This list has 2 medication(s) that are the same as other medications prescribed for you. Read the directions carefully, and ask your doctor or other care provider to review them with you.                STOP taking these medications       cephALEXin 500 MG Caps  Commonly known as: Keflex     methylPREDNISolone 4 MG Tbpk  Commonly known as: Medrol Dosepak     ondansetron 4 MG Tabs tablet  Commonly known as: Zofran     prochlorperazine 10 MG Tabs  Commonly known as: Compazine              Allergies  No Known Allergies    DIET  Orders Placed This Encounter   Procedures    Diet Order Diet: Regular     Standing Status:   Standing     Number of Occurrences:   1     Order Specific Question:   Diet:     Answer:   Regular [1]    Discontinue Diet Tray     Standing Status:   Standing     Number of Occurrences:   1       ACTIVITY  As tolerated.  Weight bearing as tolerated    CONSULTATIONS  Infectious disease    PROCEDURES  none    LABORATORY  Lab Results   Component Value Date    SODIUM 137 09/05/2024    POTASSIUM 4.1 09/05/2024    CHLORIDE 100 09/05/2024    CO2 25 09/05/2024    GLUCOSE 96 09/05/2024    BUN 8 09/05/2024    CREATININE 0.49 (L) 09/05/2024        Lab Results   Component Value Date    WBC 2.6 (L) 09/06/2024    HEMOGLOBIN 10.3 (L) 09/06/2024    HEMATOCRIT 29.8 (L) 09/06/2024    PLATELETCT 193 09/06/2024        Total time of the discharge process exceeds 33 minutes.

## 2024-09-06 NOTE — PROGRESS NOTES
DC instructions reviewed w/ pt and daughter , verbalized understanding. PIV dc'd, armband removed.

## 2024-09-06 NOTE — CARE PLAN
The patient is Stable - Low risk of patient condition declining or worsening    Shift Goals  Clinical Goals: monitor labs, monitor nausea/emesis output, comfort, rest  Patient Goals: rest  Family Goals: not at bedside    Progress made toward(s) clinical / shift goals:      Problem: Knowledge Deficit - Standard  Goal: Patient and family/care givers will demonstrate understanding of plan of care, disease process/condition, diagnostic tests and medications  Outcome: Progressing     Problem: Fall Risk  Goal: Patient will remain free from falls  Outcome: Progressing

## 2024-09-06 NOTE — DISCHARGE PLANNING
ATTN: Case Management  RE: Referral for Home Health    As of 9/6/24, we have accepted the Home Health referral for the patient listed above.    A [facility] clinician will be out to see the patient within 48 hours. If you have any questions or concerns regarding the patient’s transition to Home Health, please do not hesitate to contact us at [phone].      We look forward to collaborating with you,  [facility] Team

## 2024-09-06 NOTE — DISCHARGE PLANNING
Case Management Discharge Planning    Admission Date: 9/1/2024  GMLOS: 3.6  ALOS: 5    6-Clicks ADL Score: 22  6-Clicks Mobility Score: 20      Anticipated Discharge Dispo: Discharge Disposition: D/T to home under [facility] care in anticipation of covered skilled care (06)  Discharge Address: [address]    DME Needed: No    Action(s) Taken: Discussed in IDT rounds, pt accepted with [facility]. Pt needs PCP appt, contacted [facility] scheduling appt added to AVS. IMM given and faxed to DPA.     Escalations Completed: None    Medically Clear: Yes

## 2024-09-06 NOTE — PROGRESS NOTES
Discharge Lounge order placed and patient educated. Care plan and patient education completed. All belongings returned to patient. No new medications for discharge. Patient transported via wheelchair to discharge lounge. Family instructed to pickup patient at [facility].

## 2024-09-06 NOTE — CARE PLAN
Problem: Knowledge Deficit - Standard  Goal: Patient and family/care givers will demonstrate understanding of plan of care, disease process/condition, diagnostic tests and medications  9/6/2024 1224 by Jacek Holder R.N.  Outcome: Met  9/6/2024 0920 by Jacek Holder R.N.  Outcome: Progressing     Problem: Hemodynamics  Goal: Patient's hemodynamics, fluid balance and neurologic status will be stable or improve  Outcome: Met     Problem: Fluid Volume  Goal: Fluid volume balance will be maintained  9/6/2024 1224 by Jacek Holder R.N.  Outcome: Met  9/6/2024 0920 by Jacek Holder R.N.  Outcome: Progressing     Problem: Urinary - Renal Perfusion  Goal: Ability to achieve and maintain adequate renal perfusion and functioning will improve  9/6/2024 1224 by Jacek Holder R.N.  Outcome: Met  9/6/2024 0920 by Jacek Holder R.N.  Outcome: Progressing     Problem: Respiratory  Goal: Patient will achieve/maintain optimum respiratory ventilation and gas exchange  Outcome: Met     Problem: Mechanical Ventilation  Goal: Safe management of artificial airway and ventilation  Outcome: Met  Goal: Successful weaning off mechanical ventilator, spontaneously maintains adequate gas exchange  Outcome: Met  Goal: Patient will be able to express needs and understand communication  Outcome: Met     Problem: Physical Regulation  Goal: Diagnostic test results will improve  Outcome: Met  Goal: Signs and symptoms of infection will decrease  Outcome: Met     Problem: Pain - Standard  Goal: Alleviation of pain or a reduction in pain to the patient’s comfort goal  Outcome: Met     Problem: Fall Risk  Goal: Patient will remain free from falls  Outcome: Met     Problem: Care Map:  Admission Optimal Outcome for the Heart Failure Patient  Goal: Admission:  Optimal Care of the heart failure patient  9/6/2024 1224 by Jacek Holder R.N.  Outcome: Met  9/6/2024 0920 by Jacek Holder R.N.  Outcome: Progressing     Problem: Care Map:  Day 1 Optimal Outcome for the  Heart Failure Patient  Goal: Day 1:  Optimal Care of the heart failure patient  9/6/2024 1224 by Jacek Holder R.N.  Outcome: Met  9/6/2024 0920 by Jacek Holder R.N.  Outcome: Progressing     Problem: Care Map:  Day 2 Optimal Outcome for the Heart Failure Patient  Goal: Day 2:  Optimal Care of the heart failure patient  9/6/2024 1224 by Jacek Holder R.N.  Outcome: Met  9/6/2024 0920 by Jacek Holder R.N.  Outcome: Progressing     Problem: Care Map:  Day 3 Optimal Outcome for the Heart Failure Patient  Goal: Day 3:  Optimal Care of the heart failure patient  9/6/2024 1224 by Jacek Holder R.N.  Outcome: Met  9/6/2024 0920 by Jacek Holder R.N.  Outcome: Progressing     Problem: Care Map:  Day Before Discharge Optimal Outcome for the Heart Failure Patient  Goal: Day Before Discharge:  Optimal Care of the heart failure patient  9/6/2024 1224 by Jacek Holder R.N.  Outcome: Met  9/6/2024 0920 by Jacek Holder R.N.  Outcome: Progressing     Problem: Care Map:  Day of Discharge Optimal Outcome for the Heart Failure Patient  Goal: Day of Discharge:  Optimal Care of the heart failure patient  9/6/2024 1224 by Jacek Holder R.N.  Outcome: Met  9/6/2024 0920 by Jacek Holder R.N.  Outcome: Progressing     Problem: Hyperinflation  Goal: Prevent or improve atelectasis  Outcome: Met

## 2024-09-06 NOTE — THERAPY
Occupational Therapy  Discharge      Patient Name: Aaliyah Fulton  Age:  84 y.o., Sex:  female  Medical Record #: [id number]  Today's Date: 9/6/2024     Precautions  Precautions: (P) Fall Risk    Assessment    Pt seen for follow up OT tx session, pt able to complete all functional mobility and ADLs with supervision and intermittent use of FWW. Anticipate pt is at or near her functional baseline, likely no further acute OT needs will complete order at this time. Patient will not be actively followed for occupational therapy services at this time, however may be seen if requested by physician for 1 more visit within 30 days to address any discharge or equipment needs.     Plan    Reason for Discharge From Therapy: (P) Discharge Secondary to Goals Met    DC Equipment Recommendations: (P) None  Discharge Recommendations: (P) Recommend home health for continued occupational therapy services       Objective       09/06/24 0904   Precautions   Precautions Fall Risk   Vitals   O2 Delivery Device None - Room Air   Pain 0 - 10 Group   Therapist Pain Assessment Post Activity Pain Same as Prior to Activity;Nurse Notified   Cognition    Cognition / Consciousness WDL   Level of Consciousness Alert   Active ROM Upper Body   Active ROM Upper Body  WDL   Dominant Hand Right   Strength Upper Body   Upper Body Strength  WDL   Balance   Sitting Balance (Static) Fair +   Sitting Balance (Dynamic) Fair   Standing Balance (Static) Fair   Standing Balance (Dynamic) Fair   Weight Shift Sitting Fair   Weight Shift Standing Fair   Skilled Intervention Verbal Cuing;Facilitation   Comments w/ FWW, intermittent no AD needed   Bed Mobility    Supine to Sit Supervised   Scooting Supervised   Rolling Supervised   Skilled Intervention Verbal Cuing;Facilitation   Activities of Daily Living   Grooming Supervision;Standing   Upper Body Dressing Supervision   Lower Body Dressing Supervision   Toileting Supervision   Skilled Intervention Verbal  Cuing;Facilitation   How much help from another person does the patient currently need...   Putting on and taking off regular lower body clothing? 4   Bathing (including washing, rinsing, and drying)? 4   Toileting, which includes using a toilet, bedpan, or urinal? 4   Putting on and taking off regular upper body clothing? 4   Taking care of personal grooming such as brushing teeth? 4   Eating meals? 4   6 Clicks Daily Activity Score 24   Functional Mobility   Sit to Stand Supervised   Bed, Chair, Wheelchair Transfer Supervised   Toilet Transfers Supervised   Transfer Method Stand Step   Mobility bed mobility, bathroom mobility, up to chair   Skilled Intervention Verbal Cuing   Comments w/ FWW   Activity Tolerance   Sitting in Chair left seated in chair for breakfast   Sitting Edge of Bed 8 min   Standing 10 min   Patient / Family Goals   Patient / Family Goal #1 to go home   Goal #1 Outcome Goal met   Short Term Goals   Short Term Goal # 1 Pt will be able to complete toileting ADLs with SPV   Goal Outcome # 1 Goal met   Short Term Goal # 2 Pt will complete LB dressing with SPV and AE PRN   Goal Outcome # 2 Goal met   Education Group   Education Provided Role of Occupational Therapist   Role of Occupational Therapist Patient Response Patient;Acceptance;Explanation;Verbal Demonstration   Occupational Therapy Treatment Plan    O.T. Treatment Plan Modify Current Treatment Plan   Duration Discharge Needs Only   Reason For Discharge Discharge Secondary to Goals Met   Anticipated Discharge Equipment and Recommendations   DC Equipment Recommendations None   Discharge Recommendations Recommend home health for continued occupational therapy services   Interdisciplinary Plan of Care Collaboration   IDT Collaboration with  Nursing   Patient Position at End of Therapy Seated;Chair Alarm On;Call Light within Reach;Tray Table within Reach;Phone within Reach   Collaboration Comments RN updated

## 2024-09-06 NOTE — PROGRESS NOTES
"Bedside report received.  Assessment complete.  A&O x 4. Patient calls appropriately.  Patient ambulates with front wheel walker and standby assist. Bed alarm off.   Patient has 0/10 pain. Patient medicated per MAR.  Denies N&V. Tolerating regular diet.  + void, + flatus, + BM.  Patient denies SOB.  SCD's off, patient ambulates.  Patient is pleasant and cooperative with the care plan. Plan to DC home today.  Review plan with of care with patient. Call light and personal belongings within reach. Hourly rounding in place. All needs met at this time.    /68   Pulse 92   Temp 36.8 °C (98.2 °F) (Oral)   Resp 20   Ht 1.6 m (5' 3\")   Wt 79.5 kg (175 lb 4.3 oz)   SpO2 95%   BMI 31.05 kg/m²     "

## 2024-09-06 NOTE — PROGRESS NOTES
Hospital Medicine Daily Progress Note    Date of Service  9/4/2024    Chief Complaint  Aaliyah Fulton is a 84 y.o. female admitted 9/1/2024 with fever and palpitations with shortness of breath.    Hospital Course  Aaliyah Fulton is a 84 y.o. female who is on chemotherapy for cholangiocarcinoma with her last treatment being on Friday 8/30 who presented 9/1/2024 with sudden onset fever, shortness of breath, nonproductive cough and generalized bodyaches.  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  She is not having tenderness over any of these areas.  She denies nausea, vomiting, diarrhea, dysuria.  She is chronically incontinent and does have frequent UTIs, daughter states that when she gets UTIs she becomes confused.     Interval Problem Update  No acute events overnight.  Remains afebrile, patient feels well, ambulating with nursing.  WBC remains low 1.6, ANC 0.65. continue to monitor.  Continue unasyn for neutropenic fever, completes today.  On IV lasix for pulmonary edema, can stop today as well.  Home health ordered for patient.  If patient remains afebrile, anticipate she can discharge to home tomorrow.      I have discussed this patient's plan of care and discharge plan at IDT rounds today with Case Management, Nursing, Nursing leadership, and other members of the IDT team.    Consultants/Specialty  infectious disease    Code Status  DNAR/DNI    Disposition  Medically Cleared  I have placed the appropriate orders for post-discharge needs.    Review of Systems  Review of Systems   Constitutional:  Negative for chills and fever.   HENT:  Positive for sore throat.    Respiratory:  Negative for cough and shortness of breath.    Cardiovascular:  Negative for chest pain and palpitations.   Gastrointestinal:  Negative for abdominal pain, nausea and vomiting.   Musculoskeletal:   Negative for joint pain and myalgias.   Neurological:  Negative for dizziness and headaches.        Physical Exam  Temp:  [36.7 °C (98.1 °F)-37.2 °C (98.9 °F)] 36.7 °C (98.1 °F)  Pulse:  [78-96] 84  Resp:  [15-20] 20  BP: (105-131)/(61-72) 129/71  SpO2:  [90 %-96 %] 92 %    Physical Exam  Vitals and nursing note reviewed. Exam conducted with a chaperone present.   Constitutional:       Appearance: Normal appearance. She is normal weight. She is not ill-appearing or diaphoretic.      Interventions: Nasal cannula in place.   HENT:      Head: Normocephalic and atraumatic.      Mouth/Throat:      Mouth: Mucous membranes are moist.      Pharynx: Oropharynx is clear. No oropharyngeal exudate.   Eyes:      General:         Right eye: No discharge.         Left eye: No discharge.      Conjunctiva/sclera: Conjunctivae normal.      Pupils: Pupils are equal, round, and reactive to light.   Cardiovascular:      Rate and Rhythm: Normal rate and regular rhythm.      Pulses: Normal pulses.      Heart sounds: Normal heart sounds. No murmur heard.  Pulmonary:      Effort: Pulmonary effort is normal. No respiratory distress.      Breath sounds: Normal breath sounds.   Abdominal:      General: Abdomen is flat. Bowel sounds are normal. There is no distension.      Palpations: Abdomen is soft.      Tenderness: There is no abdominal tenderness.   Musculoskeletal:      Cervical back: Neck supple. No tenderness.      Right lower leg: No edema.      Left lower leg: No edema.   Skin:     General: Skin is warm and dry.      Coloration: Skin is not jaundiced.   Neurological:      Mental Status: She is alert and oriented to person, place, and time.      Motor: No weakness.   Psychiatric:         Thought Content: Thought content normal.         Judgment: Judgment normal.         Fluids  No intake or output data in the 24 hours ending 09/05/24 1814       Laboratory  Recent Labs     09/03/24  0114 09/04/24  0327 09/05/24  0439   WBC 1.8* 1.4* 1.6*    RBC 3.15* 2.89* 3.15*   HEMOGLOBIN 10.5* 9.5* 10.2*   HEMATOCRIT 30.4* 27.6* 30.6*   MCV 96.5 95.5 97.1   MCH 33.3* 32.9 32.4   MCHC 34.5 34.4 33.3   RDW 50.7* 49.5 50.6*   PLATELETCT 244 226 209   MPV 9.9 10.4 10.0     Recent Labs     09/03/24  0114 09/05/24  0439   SODIUM 136 137   POTASSIUM 3.3* 4.1   CHLORIDE 101 100   CO2 22 25   GLUCOSE 98 96   BUN 13 8   CREATININE 0.56 0.49*   CALCIUM 8.6 8.6                     Imaging  IR-US GUIDED PIV   Final Result    Ultrasound-guided PERIPHERAL IV INSERTION performed by    qualified nursing staff as above.      IR-US GUIDED PIV   Final Result    Ultrasound-guided PERIPHERAL IV INSERTION performed by    qualified nursing staff as above.      CT-CHEST (THORAX) W/O   Final Result      1.  Small bilateral pleural effusion      2.  Bilateral linear density consistent with atelectasis and/or parenchymal scar      3.  Borderline enlargement lymph nodes in the mediastinum      Fleischner Society pulmonary nodule recommendations:   Not Applicable         EC-ECHOCARDIOGRAM COMPLETE W/O CONT   Final Result      DX-CHEST-PORTABLE (1 VIEW)   Final Result      1.  Interstitial infiltrates and/or edema.   2.  Cardiomegaly.           Assessment/Plan  * Neutropenic fever (HCC)- (present on admission)  Assessment & Plan  On cefepime, adding Vanco due to shunt device.    9/2/2024  Infectious diseases consulted.  Vancomycin discontinued.  Continuing cefepime.  CT scan ordered per recommendations.  Per my review showing small right greater than left bilateral pleural effusions.    Pulmonary hypertension (HCC)- (present on admission)  Assessment & Plan  9/2/2024  Etiology unclear.  RVSP of 38 mmHg.  Stat furosemide 20 mg IV BID    Acute heart failure with preserved ejection fraction (HFpEF) (HCC)- (present on admission)  Assessment & Plan  9/2/2024  Echo:  Normal left and right ventricular systolic function, visually estimated   LVEF of 55-60%.  Mildly dilated left atrium.  Severe mitral  annular calcification with sclerotic leaflets.  Mild   mitral stenosis with mean gradient of 5 mmHg, 69 bpm.  Moderate mitral   regurgitation.  Calcified trileaflet aortic valve leaflets with moderate aortic valve   stenosis and mild regurgitation ( msec).  Mean gradient of 30   mmHg and DIANA 1.0 to 1.1 cm2 using continuity equation.    Mild tricuspid regurgitation.  Right ventricular systolic pressure is estimated to be  38 mmHg.  Compared to the images of the prior study of 11/25/2021, there has been   interval progression of mitral regurgitation and aortic stenosis.    NT pro BNP elevated 3252.  Start furosemide 20 was IV twice daily  Continuous cardiac monitoring during forced diuresis to monitor for arrhythmias.    Bilateral pleural effusion- (present on admission)  Assessment & Plan  9/2/2024  As per CT scan.  Start IV diuresis    Acute pain- (present on admission)  Assessment & Plan  Patient does not normally have pain related to her malignancy and is not on chronic pain medic patient however complains of significant pain all over throughout her bones and her whole body including headache.  Will add low-dose opioids for pain.  She has a history of GI bleeds so NSAIDs are relatively contraindicated    Pneumonia- (present on admission)  Assessment & Plan  Multifocal on imaging  Viral panel negative  On antibiotics for neutropenic fever    9/2/2024  Continue cefepime    Acute respiratory failure with hypoxemia (HCC)- (present on admission)  Assessment & Plan  Room air saturation 88%, in the mid 90s on 2 L  Continue oxygen as needed, add respiratory therapy protocol    9/2/2024  CT scan per my review showing small right greater than left bilateral pleural effusions.  Continues to require 2 LPM oxygen via nasal cannula.  Baseline is room air.  Start gentle diuresis with furosemide 20 mg IV twice daily.  Continuous cardiac monitoring during first diuresis to monitor for arrhythmias.  PEP therapy and  IS    Valvular stenosis- (present on admission)  Assessment & Plan  8/31/23 Echo w/ normal EF but Moderate Mitral stenosis w/ regurg and  Moderate Aortic Stenosis with regurg  She has a very loud 5/6 murmur with significant thrill-her daughter states she recently had an echo in July with her cardiologist Dr. Oneal.  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  Appropriate orders were placed.        Normal pressure hydrocephalus (HCC)- (present on admission)  Assessment & Plan  Patient has had a shunt for approximately 2 years-due to indwelling devices will also add vancomycin to her regimen-she does complain of headache         VTE prophylaxis: VTE Selection    I have performed a physical exam and reviewed and updated ROS and Plan today (9/4/2024). In review of yesterday's note (9/3/2024), there are no changes except as documented above.

## 2024-09-07 LAB
BACTERIA BLD CULT: NORMAL
SIGNIFICANT IND 70042: NORMAL
SITE SITE: NORMAL
SOURCE SOURCE: NORMAL

## 2024-09-09 ENCOUNTER — APPOINTMENT (OUTPATIENT)
Dept: CARDIOLOGY | Facility: MEDICAL CENTER | Age: 84
End: 2024-09-09
Attending: INTERNAL MEDICINE
Payer: MEDICARE

## 2024-09-09 ENCOUNTER — TELEPHONE (OUTPATIENT)
Dept: CARDIOLOGY | Facility: MEDICAL CENTER | Age: 84
End: 2024-09-09
Payer: MEDICARE

## 2024-09-09 NOTE — TELEPHONE ENCOUNTER
Our Structural Heart Program has implemented a quality initiative aimed at identifying patients with valvular heart disease and confirming a clinical plan for their care upon diagnosis.     Your patient has been flagged through our echocardiogram surveillance with the following echocardiogram results:    Moderate Aortic Stenosis and Moderate Mitral Regurgitation    Cardiologist: none/unknown    Current Plan of Care for Structural Heart Disease: none/unknown    Next Echo date: none/unknown    Next Follow up appointment: none/unknown    Please place Referral to [facility], sub-specialty Structural Heart Program, if warranted for consult and treatment.

## 2024-09-13 ENCOUNTER — HOME CARE VISIT (OUTPATIENT)
Dept: HOME HEALTH SERVICES | Facility: HOME HEALTHCARE | Age: 84
End: 2024-09-13
Payer: MEDICARE

## 2024-09-13 ENCOUNTER — DOCUMENTATION (OUTPATIENT)
Dept: MEDICAL GROUP | Facility: PHYSICIAN GROUP | Age: 84
End: 2024-09-13
Payer: MEDICARE

## 2024-09-13 VITALS
BODY MASS INDEX: 24.66 KG/M2 | WEIGHT: 157.13 LBS | SYSTOLIC BLOOD PRESSURE: 112 MMHG | OXYGEN SATURATION: 95 % | DIASTOLIC BLOOD PRESSURE: 60 MMHG | HEART RATE: 82 BPM | TEMPERATURE: 97.4 F | HEIGHT: 67 IN | RESPIRATION RATE: 16 BRPM

## 2024-09-13 PROCEDURE — 665001 SOC-HOME HEALTH

## 2024-09-13 PROCEDURE — G0299 HHS/HOSPICE OF RN EA 15 MIN: HCPCS

## 2024-09-13 PROCEDURE — 665998 HH PPS REVENUE CREDIT

## 2024-09-13 PROCEDURE — 665005 NO-PAY RAP - HOME HEALTH

## 2024-09-13 PROCEDURE — 665999 HH PPS REVENUE DEBIT

## 2024-09-13 ASSESSMENT — ENCOUNTER SYMPTOMS
DYSPNEA ACTIVITY LEVEL: AFTER AMBULATING MORE THAN 20 FT
PERSON REPORTING PAIN: PATIENT
PAIN LOCATION - EXACERBATING FACTORS: MOVEMENT
FATIGUES EASILY: 1
PAIN LOCATION - PAIN FREQUENCY: CONSTANT
BOWEL PATTERN NORMAL: 1
PAIN LOCATION - PAIN QUALITY: TIGHT
LOWEST PAIN SEVERITY IN PAST 24 HOURS: 2/10
HIGHEST PAIN SEVERITY IN PAST 24 HOURS: 7/10
PAIN LOCATION: LLE
PAIN: 1
PAIN SEVERITY GOAL: 2/10
SHORTNESS OF BREATH: 1
SUBJECTIVE PAIN PROGRESSION: UNCHANGED
PAIN LOCATION - PAIN SEVERITY: 3/10
LAST BOWEL MOVEMENT: 67096
PAIN LOCATION - RELIEVING FACTORS: REST, TYLENOL
DYSPNEA ON EXERTION: 1
FATIGUE: 1

## 2024-09-13 ASSESSMENT — FIBROSIS 4 INDEX: FIB4 SCORE: 2.44

## 2024-09-13 ASSESSMENT — ACTIVITIES OF DAILY LIVING (ADL): OASIS_M1830: 03

## 2024-09-13 NOTE — PROGRESS NOTES
Medication chart review for [facility] services    Received referral from [facility].   Medications reviewed  compared with discharge summary if available.  Discharge summary date, if applicable:   9/6    Current medication list per [facility]     Medication list one, patient is currently taking    Current Outpatient Medications:     ondansetron, 4 mg, Oral, Q4HRS PRN    prochlorperazine, 10 mg, Oral, Q6HRS PRN    venlafaxine XR, 75 mg, Oral, DAILY    cyanocobalamin, 5,000 mcg, Oral, DAILY    atorvastatin, 40 mg, Oral, Q EVENING    omeprazole, 1 Capsule, Oral, DAILY    oxybutynin, 1 Tablet, Oral, DAILY    methenamine hip, 1 g, Oral, BID    estradiol, 2 g, Vaginal, 3x/week    Calcium Carbonate-Vitamin D (CALCIUM-VITAMIN D3 PO), 600 mg, Oral, QAM    vitamin D3, 2,000 Units, Oral, QAM    busPIRone, 15 mg, Oral, BID    acetaminophen, 1,000 mg, Oral, Q6HRS PRN    D-MANNOSE PO, 500 mg, Oral, Q EVENING    venlafaxine, 150 mg, Oral, DAILY      Medication list two, drugs that the patient has been prescribed or recommended to take by their healthcare provider on discharge summary       MEDICATIONS ON DISCHARGE      Medication List          CONTINUE taking these medications         Instructions   acetaminophen 500 MG Tabs  Commonly known as: Tylenol    Take 500-1,000 mg by mouth every 6 hours as needed for Moderate Pain.  Dose: 500-1,000 mg      atorvastatin 40 MG Tabs  Commonly known as: Lipitor    TAKE ONE TABLET BY MOUTH EVERY EVENING  Dose: 40 mg      busPIRone 15 MG tablet  Commonly known as: Buspar    Take 15 mg by mouth 2 times a day.  Dose: 15 mg      CALCIUM-VITAMIN D3 PO    Take 600 mg by mouth every morning.  Dose: 600 mg      cyanocobalamin 500 MCG Tabs  Commonly known as: Vitamin B-12    Take 500 mcg by mouth every day.  Dose: 500 mcg      D-MANNOSE PO    Take 500 mg by mouth every evening.  Dose: 500 mg      estradiol 0.1 MG/GM vaginal cream  Commonly known as: Estrace    Insert 2 g into the vagina 3  times a week.  Dose: 2 g      methenamine hip 1 GM Tabs  Commonly known as: Hipprex    Take 1 g by mouth 2 times a day. LONG TERM TREATMENT FOR CHRONIC UTI.  Indications: Urinary Tract Infection  Dose: 1 g      omeprazole 40 MG delayed-release capsule  Commonly known as: PriLOSEC    Take 1 Capsule by mouth every day.  Dose: 1 Capsule      oxybutynin 15 MG CR tablet  Commonly known as: Ditropan-XL    Take 1 Tablet by mouth every day.  Dose: 1 Tablet      * venlafaxine XR 75 MG Cp24  Commonly known as: Effexor XR    Take 75 mg by mouth every day. For a daily total of 225 mg  Dose: 75 mg      * venlafaxine 150 MG extended-release capsule  Commonly known as: Effexor-XR    Take 1 Capsule by mouth every day. Taken with 75 mg for 225mg daily  Dose: 150 mg      vitamin D3 1000 Unit (25 mcg) Tabs  Commonly known as: Cholecalciferol    Take 2,000 Units by mouth every morning.  Dose: 2,000 Units              * This list has 2 medication(s) that are the same as other medications prescribed for you. Read the directions carefully, and ask your doctor or other care provider to review them with you.                    STOP taking these medications       cephALEXin 500 MG Caps  Commonly known as: Keflex      methylPREDNISolone 4 MG Tbpk  Commonly known as: Medrol Dosepak      ondansetron 4 MG Tabs tablet  Commonly known as: Zofran      prochlorperazine 10 MG Tabs  Commonly known as: Compazine           No Known Allergies    Labs     Lab Results   Component Value Date/Time    SODIUM 137 09/05/2024 04:39 AM    POTASSIUM 4.1 09/05/2024 04:39 AM    CHLORIDE 100 09/05/2024 04:39 AM    CO2 25 09/05/2024 04:39 AM    GLUCOSE 96 09/05/2024 04:39 AM    BUN 8 09/05/2024 04:39 AM    CREATININE 0.49 (L) 09/05/2024 04:39 AM     Lab Results   Component Value Date/Time    ALKPHOSPHAT 163 (H) 09/05/2024 04:39 AM    ASTSGOT 30 09/05/2024 04:39 AM    ALTSGPT 30 09/05/2024 04:39 AM    TBILIRUBIN 0.3 09/05/2024 04:39 AM    INR 0.98 09/01/2024 06:10 AM     ALBUMIN 3.2 09/05/2024 04:39 AM        Assessment for clinically significant drug interactions, drug omissions/additions, duplicative therapies.            CC   Ashvin Arias D.O.  [address] [facility] [address]  [facility] [address]  Fax: [phone]    [facility]  Phone [phone] fax [phone]    This note was created using voice recognition software (Dragon). The accuracy of the dictation is limited by the abilities of the software. I have reviewed the note prior to signing, however some errors in grammar and context are still possible. If you have any questions related to this note please do not hesitate to contact our office.

## 2024-09-14 PROCEDURE — 665999 HH PPS REVENUE DEBIT

## 2024-09-14 PROCEDURE — 665998 HH PPS REVENUE CREDIT

## 2024-09-15 ENCOUNTER — HOME CARE VISIT (OUTPATIENT)
Dept: HOME HEALTH SERVICES | Facility: HOME HEALTHCARE | Age: 84
End: 2024-09-15
Payer: MEDICARE

## 2024-09-15 ENCOUNTER — APPOINTMENT (OUTPATIENT)
Dept: RADIOLOGY | Facility: MEDICAL CENTER | Age: 84
DRG: 086 | End: 2024-09-15
Attending: PHYSICIAN ASSISTANT
Payer: MEDICARE

## 2024-09-15 ENCOUNTER — APPOINTMENT (OUTPATIENT)
Dept: RADIOLOGY | Facility: MEDICAL CENTER | Age: 84
DRG: 086 | End: 2024-09-15
Attending: EMERGENCY MEDICINE
Payer: MEDICARE

## 2024-09-15 ENCOUNTER — HOSPITAL ENCOUNTER (INPATIENT)
Facility: MEDICAL CENTER | Age: 84
LOS: 2 days | DRG: 086 | End: 2024-09-17
Attending: EMERGENCY MEDICINE | Admitting: STUDENT IN AN ORGANIZED HEALTH CARE EDUCATION/TRAINING PROGRAM
Payer: MEDICARE

## 2024-09-15 DIAGNOSIS — T14.90XA TRAUMA: ICD-10-CM

## 2024-09-15 DIAGNOSIS — S06.5XAA SUBDURAL HEMATOMA (HCC): ICD-10-CM

## 2024-09-15 DIAGNOSIS — T14.8XXA SUTURED SKIN WOUND: ICD-10-CM

## 2024-09-15 PROBLEM — N32.81 OVERACTIVE BLADDER: Status: ACTIVE | Noted: 2024-09-15

## 2024-09-15 PROBLEM — R33.9 URINARY RETENTION: Status: ACTIVE | Noted: 2024-09-15

## 2024-09-15 PROBLEM — F41.1 GENERALIZED ANXIETY DISORDER: Status: ACTIVE | Noted: 2024-09-15

## 2024-09-15 PROBLEM — S02.2XXA NASAL BONE FRACTURE: Status: ACTIVE | Noted: 2024-09-15

## 2024-09-15 PROBLEM — Z53.09 CONTRAINDICATION TO DEEP VEIN THROMBOSIS (DVT) PROPHYLAXIS: Status: ACTIVE | Noted: 2024-09-15

## 2024-09-15 LAB
ALBUMIN SERPL BCP-MCNC: 3.5 G/DL (ref 3.2–4.9)
ALBUMIN/GLOB SERPL: 0.9 G/DL
ALP SERPL-CCNC: 132 U/L (ref 30–99)
ALT SERPL-CCNC: 9 U/L (ref 2–50)
ANION GAP SERPL CALC-SCNC: 10 MMOL/L (ref 7–16)
APTT PPP: 27.3 SEC (ref 24.7–36)
AST SERPL-CCNC: 23 U/L (ref 12–45)
BASOPHILS # BLD AUTO: 0 % (ref 0–1.8)
BASOPHILS # BLD: 0 K/UL (ref 0–0.12)
BILIRUB SERPL-MCNC: 0.3 MG/DL (ref 0.1–1.5)
BUN SERPL-MCNC: 13 MG/DL (ref 8–22)
CALCIUM ALBUM COR SERPL-MCNC: 9 MG/DL (ref 8.5–10.5)
CALCIUM SERPL-MCNC: 8.6 MG/DL (ref 8.5–10.5)
CFT BLD TEG: 4.7 MIN (ref 4.6–9.1)
CFT P HPASE BLD TEG: 4.7 MIN (ref 4.3–8.3)
CHLORIDE SERPL-SCNC: 105 MMOL/L (ref 96–112)
CLOT ANGLE BLD TEG: 79.8 DEGREES (ref 63–78)
CO2 SERPL-SCNC: 23 MMOL/L (ref 20–33)
CREAT SERPL-MCNC: 0.66 MG/DL (ref 0.5–1.4)
CT.EXTRINSIC BLD ROTEM: 0.8 MIN (ref 0.8–2.1)
EKG IMPRESSION: NORMAL
EOSINOPHIL # BLD AUTO: 0 K/UL (ref 0–0.51)
EOSINOPHIL NFR BLD: 0 % (ref 0–6.9)
ERYTHROCYTE [DISTWIDTH] IN BLOOD BY AUTOMATED COUNT: 61.4 FL (ref 35.9–50)
GFR SERPLBLD CREATININE-BSD FMLA CKD-EPI: 85 ML/MIN/1.73 M 2
GLOBULIN SER CALC-MCNC: 3.8 G/DL (ref 1.9–3.5)
GLUCOSE SERPL-MCNC: 149 MG/DL (ref 65–99)
HCT VFR BLD AUTO: 30.9 % (ref 37–47)
HGB BLD-MCNC: 10 G/DL (ref 12–16)
HOLDING TUBE BB 8507: NORMAL
IMM GRANULOCYTES # BLD AUTO: 0.01 K/UL (ref 0–0.11)
IMM GRANULOCYTES NFR BLD AUTO: 0.3 % (ref 0–0.9)
INR PPP: 1.14 (ref 0.87–1.13)
LYMPHOCYTES # BLD AUTO: 0.89 K/UL (ref 1–4.8)
LYMPHOCYTES NFR BLD: 29 % (ref 22–41)
MCF BLD TEG: 68.5 MM (ref 52–69)
MCF.PLATELET INHIB BLD ROTEM: 37.4 MM (ref 15–32)
MCH RBC QN AUTO: 32.6 PG (ref 27–33)
MCHC RBC AUTO-ENTMCNC: 32.4 G/DL (ref 32.2–35.5)
MCV RBC AUTO: 100.7 FL (ref 81.4–97.8)
MONOCYTES # BLD AUTO: 0.5 K/UL (ref 0–0.85)
MONOCYTES NFR BLD AUTO: 16.3 % (ref 0–13.4)
NEUTROPHILS # BLD AUTO: 1.67 K/UL (ref 1.82–7.42)
NEUTROPHILS NFR BLD: 54.4 % (ref 44–72)
NRBC # BLD AUTO: 0 K/UL
NRBC BLD-RTO: 0 /100 WBC (ref 0–0.2)
PA AA BLD-ACNC: 0 % (ref 0–11)
PA ADP BLD-ACNC: 0.2 % (ref 0–17)
PLATELET # BLD AUTO: 251 K/UL (ref 164–446)
PMV BLD AUTO: 9.8 FL (ref 9–12.9)
POTASSIUM SERPL-SCNC: 3.8 MMOL/L (ref 3.6–5.5)
PROT SERPL-MCNC: 7.3 G/DL (ref 6–8.2)
PROTHROMBIN TIME: 14.8 SEC (ref 12–14.6)
RBC # BLD AUTO: 3.07 M/UL (ref 4.2–5.4)
SODIUM SERPL-SCNC: 138 MMOL/L (ref 135–145)
TEG ALGORITHM TGALG: ABNORMAL
WBC # BLD AUTO: 3.1 K/UL (ref 4.8–10.8)

## 2024-09-15 PROCEDURE — 85610 PROTHROMBIN TIME: CPT

## 2024-09-15 PROCEDURE — 85730 THROMBOPLASTIN TIME PARTIAL: CPT

## 2024-09-15 PROCEDURE — 80053 COMPREHEN METABOLIC PANEL: CPT

## 2024-09-15 PROCEDURE — 665998 HH PPS REVENUE CREDIT

## 2024-09-15 PROCEDURE — 85576 BLOOD PLATELET AGGREGATION: CPT | Mod: 91

## 2024-09-15 PROCEDURE — 93010 ELECTROCARDIOGRAM REPORT: CPT | Performed by: INTERNAL MEDICINE

## 2024-09-15 PROCEDURE — 99291 CRITICAL CARE FIRST HOUR: CPT

## 2024-09-15 PROCEDURE — 36415 COLL VENOUS BLD VENIPUNCTURE: CPT

## 2024-09-15 PROCEDURE — 70486 CT MAXILLOFACIAL W/O DYE: CPT

## 2024-09-15 PROCEDURE — 700111 HCHG RX REV CODE 636 W/ 250 OVERRIDE (IP): Mod: JZ | Performed by: EMERGENCY MEDICINE

## 2024-09-15 PROCEDURE — 85025 COMPLETE CBC W/AUTO DIFF WBC: CPT

## 2024-09-15 PROCEDURE — A9270 NON-COVERED ITEM OR SERVICE: HCPCS | Performed by: PHYSICIAN ASSISTANT

## 2024-09-15 PROCEDURE — 700102 HCHG RX REV CODE 250 W/ 637 OVERRIDE(OP): Performed by: STUDENT IN AN ORGANIZED HEALTH CARE EDUCATION/TRAINING PROGRAM

## 2024-09-15 PROCEDURE — 93005 ELECTROCARDIOGRAM TRACING: CPT | Performed by: EMERGENCY MEDICINE

## 2024-09-15 PROCEDURE — 70450 CT HEAD/BRAIN W/O DYE: CPT

## 2024-09-15 PROCEDURE — 73130 X-RAY EXAM OF HAND: CPT | Mod: RT

## 2024-09-15 PROCEDURE — 770022 HCHG ROOM/CARE - ICU (200)

## 2024-09-15 PROCEDURE — 71045 X-RAY EXAM CHEST 1 VIEW: CPT

## 2024-09-15 PROCEDURE — A9270 NON-COVERED ITEM OR SERVICE: HCPCS | Performed by: STUDENT IN AN ORGANIZED HEALTH CARE EDUCATION/TRAINING PROGRAM

## 2024-09-15 PROCEDURE — 85384 FIBRINOGEN ACTIVITY: CPT | Mod: 91

## 2024-09-15 PROCEDURE — 85347 COAGULATION TIME ACTIVATED: CPT

## 2024-09-15 PROCEDURE — 665999 HH PPS REVENUE DEBIT

## 2024-09-15 PROCEDURE — 96374 THER/PROPH/DIAG INJ IV PUSH: CPT

## 2024-09-15 PROCEDURE — 99222 1ST HOSP IP/OBS MODERATE 55: CPT | Mod: AI | Performed by: STUDENT IN AN ORGANIZED HEALTH CARE EDUCATION/TRAINING PROGRAM

## 2024-09-15 PROCEDURE — 700102 HCHG RX REV CODE 250 W/ 637 OVERRIDE(OP): Performed by: PHYSICIAN ASSISTANT

## 2024-09-15 RX ORDER — VALSARTAN 160 MG/1
160 TABLET ORAL DAILY
COMMUNITY

## 2024-09-15 RX ORDER — LABETALOL HYDROCHLORIDE 5 MG/ML
10 INJECTION, SOLUTION INTRAVENOUS EVERY 4 HOURS PRN
Status: DISCONTINUED | OUTPATIENT
Start: 2024-09-15 | End: 2024-09-17 | Stop reason: HOSPADM

## 2024-09-15 RX ORDER — VENLAFAXINE HYDROCHLORIDE 75 MG/1
150 CAPSULE, EXTENDED RELEASE ORAL DAILY
Status: DISCONTINUED | OUTPATIENT
Start: 2024-09-15 | End: 2024-09-17 | Stop reason: HOSPADM

## 2024-09-15 RX ORDER — METHENAMINE HIPPURATE 1000 MG/1
1 TABLET ORAL 2 TIMES DAILY
Status: DISCONTINUED | OUTPATIENT
Start: 2024-09-15 | End: 2024-09-16

## 2024-09-15 RX ORDER — OXYCODONE HYDROCHLORIDE 5 MG/1
5 TABLET ORAL EVERY 4 HOURS PRN
Status: DISCONTINUED | OUTPATIENT
Start: 2024-09-15 | End: 2024-09-17 | Stop reason: HOSPADM

## 2024-09-15 RX ORDER — ESTRADIOL 0.1 MG/G
2 CREAM VAGINAL
Status: DISCONTINUED | OUTPATIENT
Start: 2024-09-16 | End: 2024-09-15

## 2024-09-15 RX ORDER — ONDANSETRON 4 MG/1
4 TABLET, ORALLY DISINTEGRATING ORAL EVERY 4 HOURS PRN
Status: DISCONTINUED | OUTPATIENT
Start: 2024-09-15 | End: 2024-09-17 | Stop reason: HOSPADM

## 2024-09-15 RX ORDER — ATORVASTATIN CALCIUM 40 MG/1
40 TABLET, FILM COATED ORAL EVERY EVENING
Status: DISCONTINUED | OUTPATIENT
Start: 2024-09-15 | End: 2024-09-17 | Stop reason: HOSPADM

## 2024-09-15 RX ORDER — DOCUSATE SODIUM 100 MG/1
100 CAPSULE, LIQUID FILLED ORAL 2 TIMES DAILY
Status: DISCONTINUED | OUTPATIENT
Start: 2024-09-15 | End: 2024-09-17 | Stop reason: HOSPADM

## 2024-09-15 RX ORDER — SODIUM PHOSPHATE,MONO-DIBASIC 19G-7G/118
1 ENEMA (ML) RECTAL
Status: DISCONTINUED | OUTPATIENT
Start: 2024-09-15 | End: 2024-09-17 | Stop reason: HOSPADM

## 2024-09-15 RX ORDER — LEVETIRACETAM 500 MG/5ML
500 INJECTION, SOLUTION, CONCENTRATE INTRAVENOUS ONCE
Status: COMPLETED | OUTPATIENT
Start: 2024-09-15 | End: 2024-09-15

## 2024-09-15 RX ORDER — ACETAMINOPHEN 500 MG
1000 TABLET ORAL EVERY 6 HOURS PRN
Status: DISCONTINUED | OUTPATIENT
Start: 2024-09-20 | End: 2024-09-17 | Stop reason: HOSPADM

## 2024-09-15 RX ORDER — BUSPIRONE HYDROCHLORIDE 10 MG/1
15 TABLET ORAL 3 TIMES DAILY
Status: DISCONTINUED | OUTPATIENT
Start: 2024-09-15 | End: 2024-09-17 | Stop reason: HOSPADM

## 2024-09-15 RX ORDER — LEVETIRACETAM 500 MG/1
500 TABLET ORAL 2 TIMES DAILY
Status: DISCONTINUED | OUTPATIENT
Start: 2024-09-15 | End: 2024-09-17 | Stop reason: HOSPADM

## 2024-09-15 RX ORDER — POLYETHYLENE GLYCOL 3350 17 G/17G
1 POWDER, FOR SOLUTION ORAL 2 TIMES DAILY
Status: DISCONTINUED | OUTPATIENT
Start: 2024-09-15 | End: 2024-09-17 | Stop reason: HOSPADM

## 2024-09-15 RX ORDER — AMOXICILLIN 250 MG
1 CAPSULE ORAL
Status: DISCONTINUED | OUTPATIENT
Start: 2024-09-15 | End: 2024-09-17 | Stop reason: HOSPADM

## 2024-09-15 RX ORDER — VALSARTAN 80 MG/1
160 TABLET ORAL DAILY
Status: DISCONTINUED | OUTPATIENT
Start: 2024-09-15 | End: 2024-09-17 | Stop reason: HOSPADM

## 2024-09-15 RX ORDER — LIDOCAINE 4 G/G
1 PATCH TOPICAL EVERY 12 HOURS PRN
COMMUNITY

## 2024-09-15 RX ORDER — BISACODYL 10 MG
10 SUPPOSITORY, RECTAL RECTAL
Status: DISCONTINUED | OUTPATIENT
Start: 2024-09-15 | End: 2024-09-17 | Stop reason: HOSPADM

## 2024-09-15 RX ORDER — AMOXICILLIN 250 MG
1 CAPSULE ORAL NIGHTLY
Status: DISCONTINUED | OUTPATIENT
Start: 2024-09-15 | End: 2024-09-17 | Stop reason: HOSPADM

## 2024-09-15 RX ORDER — OXYCODONE HYDROCHLORIDE 5 MG/1
2.5 TABLET ORAL EVERY 4 HOURS PRN
Status: DISCONTINUED | OUTPATIENT
Start: 2024-09-15 | End: 2024-09-17 | Stop reason: HOSPADM

## 2024-09-15 RX ORDER — LEVETIRACETAM 500 MG/5ML
500 INJECTION, SOLUTION, CONCENTRATE INTRAVENOUS 2 TIMES DAILY
Status: DISCONTINUED | OUTPATIENT
Start: 2024-09-15 | End: 2024-09-17 | Stop reason: HOSPADM

## 2024-09-15 RX ORDER — ACETAMINOPHEN 500 MG
1000 TABLET ORAL EVERY 6 HOURS
Status: DISCONTINUED | OUTPATIENT
Start: 2024-09-15 | End: 2024-09-17 | Stop reason: HOSPADM

## 2024-09-15 RX ORDER — ONDANSETRON 2 MG/ML
4 INJECTION INTRAMUSCULAR; INTRAVENOUS EVERY 4 HOURS PRN
Status: DISCONTINUED | OUTPATIENT
Start: 2024-09-15 | End: 2024-09-17 | Stop reason: HOSPADM

## 2024-09-15 RX ADMIN — SENNOSIDES AND DOCUSATE SODIUM 1 TABLET: 50; 8.6 TABLET ORAL at 21:02

## 2024-09-15 RX ADMIN — LEVETIRACETAM 500 MG: 100 INJECTION, SOLUTION INTRAVENOUS at 12:40

## 2024-09-15 RX ADMIN — ATORVASTATIN CALCIUM 40 MG: 40 TABLET, FILM COATED ORAL at 17:55

## 2024-09-15 RX ADMIN — VALSARTAN 160 MG: 80 TABLET, FILM COATED ORAL at 17:57

## 2024-09-15 RX ADMIN — ACETAMINOPHEN 1000 MG: 500 TABLET ORAL at 17:56

## 2024-09-15 RX ADMIN — BUSPIRONE HYDROCHLORIDE 15 MG: 10 TABLET ORAL at 16:17

## 2024-09-15 RX ADMIN — VENLAFAXINE HYDROCHLORIDE 150 MG: 75 CAPSULE, EXTENDED RELEASE ORAL at 17:58

## 2024-09-15 RX ADMIN — OXYBUTYNIN CHLORIDE 15 MG: 10 TABLET, EXTENDED RELEASE ORAL at 17:57

## 2024-09-15 RX ADMIN — DOCUSATE SODIUM 100 MG: 100 CAPSULE, LIQUID FILLED ORAL at 17:55

## 2024-09-15 RX ADMIN — LEVETIRACETAM 500 MG: 500 TABLET, FILM COATED ORAL at 17:55

## 2024-09-15 RX ADMIN — ACETAMINOPHEN 1000 MG: 500 TABLET ORAL at 12:40

## 2024-09-15 RX ADMIN — OXYCODONE HYDROCHLORIDE 2.5 MG: 5 TABLET ORAL at 16:13

## 2024-09-15 RX ADMIN — METHENAMINE HIPPURATE 1 G: 1 TABLET ORAL at 17:56

## 2024-09-15 RX ADMIN — BUSPIRONE HYDROCHLORIDE 15 MG: 10 TABLET ORAL at 21:01

## 2024-09-15 ASSESSMENT — COPD QUESTIONNAIRES
DO YOU EVER COUGH UP ANY MUCUS OR PHLEGM?: NO/ONLY WITH OCCASIONAL COLDS OR INFECTIONS
COPD SCREENING SCORE: 2
HAVE YOU SMOKED AT LEAST 100 CIGARETTES IN YOUR ENTIRE LIFE: NO/DON'T KNOW
DURING THE PAST 4 WEEKS HOW MUCH DID YOU FEEL SHORT OF BREATH: NONE/LITTLE OF THE TIME

## 2024-09-15 ASSESSMENT — PAIN DESCRIPTION - PAIN TYPE
TYPE: ACUTE PAIN

## 2024-09-15 ASSESSMENT — FIBROSIS 4 INDEX
FIB4 SCORE: 2.44
FIB4 SCORE: 2.63

## 2024-09-15 NOTE — ASSESSMENT & PLAN NOTE
5 mm parafalcine subdural hematoma, extending from anterior to posterior. 5 mm right holohemispheric subdural hematoma, with acute on subacute/chronic components.  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   Interval head CT stable.  Non-operative management.  Post traumatic pharmacologic seizure prophylaxis for 1 week.  Speech Language Pathology cognitive evaluation.  Francisco Borden MD. Neurosurgeon. [facility].

## 2024-09-15 NOTE — ED NOTES
Bedside report to Valery RN.  POC discussed with patient. Call light within reach, all needs addressed at this time.         Fall risk interventions in place: In place (all applicable per [place] Fall risk assessment)   Continuous monitoring: Cardiac Leads, Pulse Ox, or Blood Pressure  IVF/IV medications: Not Applicable   Oxygen: Room Air  Bedside sitter: Not Applicable   Isolation: Not Applicable

## 2024-09-15 NOTE — ASSESSMENT & PLAN NOTE
shunt placed 1/10/2022.   Followed [facility].

## 2024-09-15 NOTE — ED NOTES
Medication history reviewed with PT and her daughter, Angi at bedside    Med rec is complete per PT and daughter reporting    Allergies reviewed.     Patient denies any outpatient antibiotics in the last 30 days.     Patient is not taking anticoagulants.    Preferred pharmacy for this visit - [facility] on [address] ([phone])

## 2024-09-15 NOTE — PROGRESS NOTES
4 Eyes Skin Assessment Completed by JAVED Degroot and JAVED Christie.    Head WDL  Bruising to left eye   Ears WDL  Nose WDL  Mouth WDL  Neck WDL  Breast/Chest WDL  Shoulder Blades WDL  Spine WDL  (R) Arm/Elbow/Hand Bruising - forearm and upper arm   (L) Arm/Elbow/Hand bruising to left upper arm  Abdomen WDL  Groin Redness and Rash - moisture rash to right groin   Scrotum/Coccyx/Buttocks Redness and moisture to gluteal cleft   (R) Leg WDL  (L) Leg Redness, Scab, and Incision recent surgery to left leg with sutures present   (R) Heel/Foot/Toe Redness and Blanching + missing toenail   (L) Heel/Foot/Toe WDL          Devices In Places ECG, Tele Box, Blood Pressure Cuff, Pulse Ox, [place], and SCD's      Interventions In Place Sacral Mepilex, Q2 Turns, Low Air Loss Mattress, Dri-Parag Pads, and Pressure Redistribution Mattress    Possible Skin Injury No    Pictures Uploaded Into Epic N/A  Wound Consult Placed N/A  RN Wound Prevention Protocol Ordered No

## 2024-09-15 NOTE — ED TRIAGE NOTES
Chief Complaint   Patient presents with    T-5000 GLF     Pt BIB family/self to triage via W/C. Per pt she fell at home last night hitting left side of her face and injuring right ring finger. Pt cannot recall the incident, pt currently A&Ox4.

## 2024-09-15 NOTE — PROGRESS NOTES
1255:  Patient arrived to [facility] via [facility] accompanied by Charge RN and ACT.  Patient is alert, oriented and pleasant.  Patient stood and transferred from [facility] to bed. Skin check and CHG bath completed.     Patient's daughter removed ring from L. Ring finger and plans to take it home with her.     1505:  Dr. Borden, Neurosurgeon, at bedside to assess patient.  Adjustments made to  shunt.    Plan for repeat Head CT @1700.  Q2 hour neuro checks.     1530:  Dr. Burton at bedside to meet with patient and her daughter.  Code status changed.

## 2024-09-15 NOTE — TREATMENT PLAN
Discussed code status with the patient and her Daughter Angi. Per the patient if her heart were to stop she would not want any heroic measures such as CPR or intubation to prolong her life. Daughter was in agreement to make her DNR/DNI

## 2024-09-15 NOTE — ED NOTES
Bedside report to TICU RN  Transported to 906- via gurney on cardiac monitor. Belongings sent with pt

## 2024-09-15 NOTE — ASSESSMENT & PLAN NOTE
Chronic condition treated with Buspar and Effexor XR.  Resumed maintenance medication on admission.

## 2024-09-15 NOTE — H&P
CHIEF COMPLAINT: Ground level fall.     HISTORY OF PRESENT ILLNESS: The patient is a 84 year-old White elderly woman who was injured in a fall. The patient suffered a mechanical ground-level fall. She had a brief loss of consciousness. The patient denies any chronic anticoagulation or antiplatelet medications. She complains of brief loss of consciousness and amnestic to events. She was found shortly after by her son in law. She denies any HA, dizziness, or head pain. She c/o some pain to her right wrist.    TRIAGE CATEGORY: The patient was triaged as a T-5000 Activation. An expeditious primary and secondary survey with required adjuncts was conducted. See Trauma Narrator for full details.    PAST MEDICAL HISTORY:  has a past medical history of Arthritis, ASTHMA, Breast cancer ([facility]) (2005), Cancer ([facility]) (06/2024), Cataract, Dental disorder, Heart burn, Heart murmur, High cholesterol, History of cervical fracture (2022), Hypertension (01/03/2022), Indigestion, Osteoporosis, Pain, Pneumonia (12/2021), PONV (postoperative nausea and vomiting), Psychiatric problem, Stroke ([facility]) (2021), and Unspecified urinary incontinence.    PAST SURGICAL HISTORY:  has a past surgical history that includes sinuscopy; breast biopsy (05/21/2010); other orthopedic surgery; knee arthroplasty total (09/19/2011); lumbar laminectomy diskectomy (10/17/2012); foraminotomy (10/17/2012); pr radiation therapy plan simple; recovery (05/10/2016); pr partial hip replacement (Left, 11/24/2021); lumbar decompression (10/17/2012); pr create shunt:ventric-peritoneal (01/10/2022); cholecystectomy; hammertoe correction (Bilateral); shoulder surgery; knee arthroscopy; pr total knee arthroplasty (Left, 03/28/2023); and lumpectomy.    ALLERGIES: No Known Allergies    CURRENT MEDICATIONS:   Home Medications    **Home medications have not yet been reviewed for this encounter**       Audit from Redirected Encounters    **Home medications have not yet been  "reviewed for this encounter**       FAMILY HISTORY: family history includes Breast Cancer in her daughter; Cancer in her daughter; Lung Disease in her mother; Psychiatric Illness in her daughter and daughter; Stroke in her mother.    SOCIAL HISTORY:  reports that she has never smoked. She has never used smokeless tobacco. She reports that she does not drink alcohol and does not use drugs.    REVIEW OF SYSTEMS: Comprehensive review of systems is negative with the exception of the aforementioned HPI, PMH, and PSH bullets in accordance with CMS guidelines.    PHYSICAL EXAMINATION:      Vital Signs: /62   Pulse 83   Temp 36.5 °C (97.7 °F) (Temporal)   Resp 16   Ht 1.702 m (5' 7\")   Wt 71.2 kg (157 lb)   SpO2 94%   Physical Exam  Vitals reviewed.   Constitutional:       General: She is not in acute distress.     Appearance: Normal appearance.   HENT:      Head: Normocephalic and atraumatic.   Eyes:      General: No scleral icterus.     Extraocular Movements: Extraocular movements intact.      Pupils: Pupils are equal, round, and reactive to light.   Cardiovascular:      Rate and Rhythm: Normal rate and regular rhythm.      Pulses: Normal pulses.   Pulmonary:      Effort: Pulmonary effort is normal.      Breath sounds: Normal breath sounds.   Abdominal:      General: Abdomen is flat.      Palpations: Abdomen is soft.   Musculoskeletal:         General: No tenderness. Normal range of motion.      Cervical back: Normal range of motion and neck supple. No tenderness.      Comments: +tenderness to the right 4th finger   Skin:     General: Skin is warm and dry.      Capillary Refill: Capillary refill takes less than 2 seconds.   Neurological:      General: No focal deficit present.      Mental Status: She is alert and oriented to person, place, and time.   Psychiatric:         Mood and Affect: Mood normal.         Behavior: Behavior normal.       LABORATORY VALUES:   Recent Labs     09/15/24  1121   WBC 3.1*   RBC " 3.07*   HEMOGLOBIN 10.0*   HEMATOCRIT 30.9*   .7*   MCH 32.6   MCHC 32.4   RDW 61.4*   PLATELETCT 251   MPV 9.8     Recent Labs     09/15/24  1121   SODIUM 138   POTASSIUM 3.8   CHLORIDE 105   CO2 23   GLUCOSE 149*   BUN 13   CREATININE 0.66   CALCIUM 8.6     Recent Labs     09/15/24  1121 09/15/24  1156   ASTSGOT 23  --    ALTSGPT 9  --    TBILIRUBIN 0.3  --    ALKPHOSPHAT 132*  --    GLOBULIN 3.8*  --    INR  --  1.14*     Recent Labs     09/15/24  1156   APTT 27.3   INR 1.14*        IMAGING:   DX-CHEST-PORTABLE (1 VIEW)   Final Result         Mild diffuse interstitial prominence could relate to mild fluid overload or chronic changes      DX-HAND 3+ RIGHT   Final Result         Ill-defined lucency in the distal radius, concerning for nondisplaced fracture. There is overlying soft tissue swelling.      CT-MAXILLOFACIAL W/O PLUS RECONS   Final Result            1. Minimal step-off in the bilateral nasal bones, left more than right, could relate to age indeterminant injury.      CT-HEAD W/O   Final Result         1. A 5 mm parafalcine subdural hematoma, extending from anterior to posterior.   2. A 5 mm right holohemispheric subdural hematoma, with acute on subacute/chronic components.   3. 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   4. No midline shift. No hydrocephalus.   5. No large vascular territory infarct.   6. Well-positioned right frontoparietal  shunt catheter.         Based solely on CT findings, the brain injury guideline category is mBIG 2.      Nondisplaced skull fx   SDH 4.1-7.9mm   IPH 4.1-7.9mm   SAH 1 hemisphere, >3 sulci, 1-3mm      The original BIG retrospective analysis found radiographic progression in 0% of BIG 1 patients and 2.6% BIG 2.            Findings were communicated to AB TRAVIS via [employer] system on 9/15/2024 11:06 AM.          ASSESSMENT AND PLAN:   Aaliyah Fulton is a 84 y.o. women presenting after a mechanical GLF while using her walker.  Imaging consistent with a BIG 2 ICH.    Subdural hematoma (HCC)  5 mm parafalcine subdural hematoma, extending from anterior to posterior. 5 mm right holohemispheric subdural hematoma, with acute on subacute/chronic components.  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   Non-operative management.  Post traumatic pharmacologic seizure prophylaxis for 1 week.  Speech Language Pathology cognitive evaluation.  Neurosurgery consultation given prior history of NPH with  shunt    Nasal bone fracture  Minimal step-off in the bilateral nasal bones, left more than right, could relate to age indeterminant injury.   Non-tender    Contraindication to deep vein thrombosis (DVT) prophylaxis  VTE prophylaxis initially contraindicated secondary to elevated bleeding risk.  9/16 Trauma surveillance venous duplex ultrasonography ordered.    Trauma  GLF. Found down by family.   T-5000 Activation.    Overactive bladder  Chronic condition treated with Oxybutynin.  Medication reconciliation pending.    Cholangiocarcinoma (HCC)  Followed by oncology.   Current infusion therapy.    DISPOSITION: Trauma ICU.  Interval Trauma tertiary survey.    CRITICAL CARE TIME: My full attention was spent providing medically necessary critical care to the patient, exclusive of time spent on any procedures, for 30 minutes, with details documented in my note.    The patient has acute impairment of one or more vital organ systems and a high probability of imminent or life-threatening deterioration in condition. Provided high complexity decision making to assess, manipulate, and support vital system functions to treat vital organ system failure and/or to prevent further life-threatening deterioration of the patient's condition. Requires continued ICU and hospital admission.    Critical care interventions include: integration of multiple data points and associated complex medical decision making and management of traumatic brain injury .        ____________________________________     Trey Burton M.D.    DD: 9/15/2024  12:25 PM

## 2024-09-15 NOTE — CASE COMMUNICATION
Angi, patients daughter called answering service and wanted advice regarding patients headache, Angi states patient had a fall yesterday, patient landed on her face, patient woke up this morning with headache, bruises to her face, pain to fingers,described headache as a constant headache, pain level 3/10, heavy/achy pain, denies blurry vision, denies headache, denies dizziness, Alert and oriented,instructed Angi to have patient evaluate d at nearest ER, verbalized understanding   Falls Template         Date & Time of fall: 9/14/24, late afternoon/evening           Cause of fall:  Mechanical           Location:  Living Room           Was the fall witnessed?                  If yes, by who? No            Actions taken by patient:  called for help            Any new injury?                   If yes, what kind? bruise to face, headache, pain to fingers            Any rec ent medication changes? no            Did patient have appropriate DME available? yes            Actions Taken: Notified care team, Notified MD, Sent patient to ER, Informed RN supervisor to schedule follow-up visit and Reran medication interactions and sent to pharmacy

## 2024-09-15 NOTE — ASSESSMENT & PLAN NOTE
Chronic condition treated with estradiol cream, Hipprex, and D-mannose.  Holding maintenance medication during acute traumatic illness.

## 2024-09-15 NOTE — ED NOTES
Bedside report from Belia BURT. Pt aaox4. Call light within reach. Instructed to call staff for assistance.  Belongings present upon ED arrival: earrings (given to daughter), has purse and cellphone.

## 2024-09-15 NOTE — ED NOTES
Updated with the plan of care by erp.  Neuro intact. Aaox4. Gcs 15. Reports head ache as 2/10.  Denies n/v. Denies need for pain medication.

## 2024-09-15 NOTE — ED PROVIDER NOTES
ED Provider Note    CHIEF COMPLAINT  Chief Complaint   Patient presents with    T-5000 GLF     Pt BIB family/self to triage via W/C. Per pt she fell at home last night hitting left side of her face and injuring right ring finger. Pt cannot recall the incident, pt currently A&Ox4.       EXTERNAL RECORDS REVIEWED  Reviewed previous ER visits laboratory studies medication list    HPI/ROS  LIMITATION TO HISTORY   Adult daughter provided additional history  OUTSIDE HISTORIAN(S):  Adult daughter provided additional history    Aaliyah Fulton is a 84 y.o. female who presents for evaluation of acute head and facial injury.  The patient apparently had a mechanical slip and fall last night and struck her head and left cheekbone.  She is not on any anticoagulants.  She denies any pain or injury to the neck or back.  She did land on her right hand and has pain in her right hand.  No reported pain or trauma to the chest abdomen pelvis or lower extremities.  She is accompanied by family.  Injury occurred last night.  No numbness weakness or tingling no reported seizures.  No visual changes    PAST MEDICAL HISTORY   has a past medical history of Arthritis, ASTHMA, Breast cancer ([facility]) (2005), Cancer ([facility]) (06/2024), Cataract, Dental disorder, Heart burn, Heart murmur, High cholesterol, History of cervical fracture (2022), Hypertension (01/03/2022), Indigestion, Osteoporosis, Pain, Pneumonia (12/2021), PONV (postoperative nausea and vomiting), Psychiatric problem, Stroke ([facility]) (2021), and Unspecified urinary incontinence.    SURGICAL HISTORY   has a past surgical history that includes sinuscopy; breast biopsy (05/21/2010); other orthopedic surgery; knee arthroplasty total (09/19/2011); lumbar laminectomy diskectomy (10/17/2012); foraminotomy (10/17/2012); radiation therapy plan simple; recovery (05/10/2016); partial hip replacement (Left, 11/24/2021); lumbar decompression (10/17/2012); create shunt:ventric-peritoneal (01/10/2022);  cholecystectomy; hammertoe correction (Bilateral); shoulder surgery; knee arthroscopy; total knee arthroplasty (Left, 03/28/2023); and lumpectomy.    FAMILY HISTORY  Family History   Problem Relation Age of Onset    Lung Disease Mother         copd    Stroke Mother     Cancer Daughter         Breast    Breast Cancer Daughter     Psychiatric Illness Daughter         Anxiety    Psychiatric Illness Daughter         Anxiety and Depression       SOCIAL HISTORY  Social History     Tobacco Use    Smoking status: Never    Smokeless tobacco: Never   Vaping Use    Vaping status: Never Used   Substance and Sexual Activity    Alcohol use: No    Drug use: Never    Sexual activity: Not on file     Comment: ; two daughters; retired ( @ [employer])       CURRENT MEDICATIONS  Home Medications    **Home medications have not yet been reviewed for this encounter**       Audit from Redirected Encounters    **Home medications have not yet been reviewed for this encounter**       Current Facility-Administered Medications:     levETIRAcetam (Keppra) injection 500 mg, 500 mg, Intravenous, Once, Yamil Herron M.D.    Current Outpatient Medications:     ondansetron (ZOFRAN ODT) 4 MG TABLET DISPERSIBLE, Take 4 mg by mouth every four hours as needed for Nausea/Vomiting. Indications: Nausea and Vomiting, Disp: , Rfl:     prochlorperazine (COMPAZINE) 10 MG Tab, Take 10 mg by mouth every 6 hours as needed for Nausea/Vomiting. Indications: Feeling Anxious, Nausea and Vomiting, Disp: , Rfl:     venlafaxine XR (EFFEXOR XR) 75 MG CAPSULE SR 24 HR, Take 75 mg by mouth every day. For a daily total of 225 mg  Indications: Generalized Anxiety Disorder, Disp: , Rfl:     cyanocobalamin (VITAMIN B-12) 500 MCG Tab, Take 5,000 mcg by mouth every day. Indications: supplement, Disp: , Rfl:     atorvastatin (LIPITOR) 40 MG Tab, TAKE ONE TABLET BY MOUTH EVERY EVENING, Disp: 90 Tablet, Rfl: 0    omeprazole (PRILOSEC) 40 MG  "delayed-release capsule, Take 1 Capsule by mouth every day. Indications: Heartburn, Disp: , Rfl:     oxybutynin (DITROPAN XL) 15 MG CR tablet, Take 1 Tablet by mouth every day. Indications: Urinary Incontinence, Disp: , Rfl:     methenamine hip (HIPPREX) 1 GM Tab, Take 1 g by mouth 2 times a day. LONG TERM TREATMENT FOR CHRONIC UTI.  Indications: Urinary Tract Infection, Disp: , Rfl:     estradiol (ESTRACE) 0.1 MG/GM vaginal cream, Insert 2 g into the vagina 3 times a week. Indications: Simple Infection of the Urinary Tract, Disp: , Rfl:     Calcium Carbonate-Vitamin D (CALCIUM-VITAMIN D3 PO), Take 600 mg by mouth every morning. Indications: supplement, Disp: , Rfl:     vitamin D3 (CHOLECALCIFEROL) 1000 Unit (25 mcg) Tab, Take 2,000 Units by mouth every morning. Indications: supplement, Disp: , Rfl:     busPIRone (BUSPAR) 15 MG tablet, Take 15 mg by mouth 2 times a day. Indications: Anxiety Disorder, Disp: , Rfl:     acetaminophen (TYLENOL) 500 MG Tab, Take 1,000 mg by mouth every 6 hours as needed for Moderate Pain. Indications: Pain, Disp: , Rfl:     D-MANNOSE PO, Take 500 mg by mouth every evening. Indications: healthy urinary tract , Disp: , Rfl:     venlafaxine (EFFEXOR-XR) 150 MG extended-release capsule, Take 1 Capsule by mouth every day. Taken with 75 mg for 225mg daily, Disp: 30 Capsule, Rfl: 2      ALLERGIES  No Known Allergies    PHYSICAL EXAM  VITAL SIGNS: /62   Pulse 83   Temp 36.5 °C (97.7 °F) (Temporal)   Resp 16   Ht 1.702 m (5' 7\")   Wt 71.2 kg (157 lb)   SpO2 94%   BMI 24.59 kg/m²    Pulse ox interpretation: I interpret this pulse ox as normal.  Constitutional: Alert and oriented x 3, no acute distress  HEENT: Atraumatic normocephalic, pupils 3 mm are equal round reactive to light extraocular movements are intact. The nares is clear, external ears are normal, mouth shows moist mucous membranes normal dentition for age  Neck: Supple, no JVD no tracheal deviation no midline bony " tenderness  Cardiovascular: Regular rate and rhythm no murmur rub or gallop 2+ pulses peripherally x4  Thorax & Lungs: No respiratory distress, no wheezes rales or rhonchi, No chest tenderness.   GI: Soft nontender nondistended positive bowel sounds, no peritoneal signs no rebound or guarding  Skin: Warm dry no acute rash or lesion  Musculoskeletal: Moving all extremities with full range and 5 of 5 strength no acute  deformity bony tenderness noted to the right ring finger and ecchymosis and bruising on the PIP joint without evidence of deformity.  Neurologic: Cranial nerves III through XII are grossly intact no sensory deficit no cerebellar dysfunction NIH stroke scale score 0 no pronator drift of the arms or legs speech patterns normal GCS 15  Psychiatric: Appropriate affect for situation at this time          EKG/LABS  Results for orders placed or performed during the hospital encounter of 09/15/24   CBC WITH DIFFERENTIAL   Result Value Ref Range    WBC 3.1 (L) 4.8 - 10.8 K/uL    RBC 3.07 (L) 4.20 - 5.40 M/uL    Hemoglobin 10.0 (L) 12.0 - 16.0 g/dL    Hematocrit 30.9 (L) 37.0 - 47.0 %    .7 (H) 81.4 - 97.8 fL    MCH 32.6 27.0 - 33.0 pg    MCHC 32.4 32.2 - 35.5 g/dL    RDW 61.4 (H) 35.9 - 50.0 fL    Platelet Count 251 164 - 446 K/uL    MPV 9.8 9.0 - 12.9 fL    Neutrophils-Polys 54.40 44.00 - 72.00 %    Lymphocytes 29.00 22.00 - 41.00 %    Monocytes 16.30 (H) 0.00 - 13.40 %    Eosinophils 0.00 0.00 - 6.90 %    Basophils 0.00 0.00 - 1.80 %    Immature Granulocytes 0.30 0.00 - 0.90 %    Nucleated RBC 0.00 0.00 - 0.20 /100 WBC    Neutrophils (Absolute) 1.67 (L) 1.82 - 7.42 K/uL    Lymphs (Absolute) 0.89 (L) 1.00 - 4.80 K/uL    Monos (Absolute) 0.50 0.00 - 0.85 K/uL    Eos (Absolute) 0.00 0.00 - 0.51 K/uL    Baso (Absolute) 0.00 0.00 - 0.12 K/uL    Immature Granulocytes (abs) 0.01 0.00 - 0.11 K/uL    NRBC (Absolute) 0.00 K/uL   Comp Metabolic Panel   Result Value Ref Range    Sodium 138 135 - 145 mmol/L     Potassium 3.8 3.6 - 5.5 mmol/L    Chloride 105 96 - 112 mmol/L    Co2 23 20 - 33 mmol/L    Anion Gap 10.0 7.0 - 16.0    Glucose 149 (H) 65 - 99 mg/dL    Bun 13 8 - 22 mg/dL    Creatinine 0.66 0.50 - 1.40 mg/dL    Calcium 8.6 8.5 - 10.5 mg/dL    Correct Calcium 9.0 8.5 - 10.5 mg/dL    AST(SGOT) 23 12 - 45 U/L    ALT(SGPT) 9 2 - 50 U/L    Alkaline Phosphatase 132 (H) 30 - 99 U/L    Total Bilirubin 0.3 0.1 - 1.5 mg/dL    Albumin 3.5 3.2 - 4.9 g/dL    Total Protein 7.3 6.0 - 8.2 g/dL    Globulin 3.8 (H) 1.9 - 3.5 g/dL    A-G Ratio 0.9 g/dL   HOLD BLOOD BANK SPECIMEN (NOT TESTED)   Result Value Ref Range    Holding Tube - Bb DONE    ESTIMATED GFR   Result Value Ref Range    GFR (CKD-EPI) 85 >60 mL/min/1.73 m 2      I have independently interpreted this EKG    RADIOLOGY/PROCEDURES   I have independently interpreted the diagnostic imaging associated with this visit and am waiting the final reading from the radiologist.   My preliminary interpretation is as follows: Acute on chronic subdural hematoma without midline shift    Radiologist interpretation:  DX-CHEST-PORTABLE (1 VIEW)   Final Result         Mild diffuse interstitial prominence could relate to mild fluid overload or chronic changes      DX-HAND 3+ RIGHT   Final Result         Ill-defined lucency in the distal radius, concerning for nondisplaced fracture. There is overlying soft tissue swelling.      CT-MAXILLOFACIAL W/O PLUS RECONS   Final Result            1. Minimal step-off in the bilateral nasal bones, left more than right, could relate to age indeterminant injury.      CT-HEAD W/O   Final Result         1. A 5 mm parafalcine subdural hematoma, extending from anterior to posterior.   2. A 5 mm right holohemispheric subdural hematoma, with acute on subacute/chronic components.   3. 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   4. No midline shift. No hydrocephalus.   5. No large vascular territory infarct.   6. Well-positioned right frontoparietal   shunt catheter.         Based solely on CT findings, the brain injury guideline category is mBIG 2.      Nondisplaced skull fx   SDH 4.1-7.9mm   IPH 4.1-7.9mm   SAH 1 hemisphere, >3 sulci, 1-3mm      The original BIG retrospective analysis found radiographic progression in 0% of BIG 1 patients and 2.6% BIG 2.            Findings were communicated to AB TRAVIS via [employer] messaging system on 9/15/2024 11:06 AM.          COURSE & MEDICAL DECISION MAKING    ASSESSMENT, COURSE AND PLAN  Care Narrative:     This is a very pleasant 84-year-old female brought in by family after ground-level fall last night.  She was triaged per code TBI protocol.  I immediately assessed the patient.  She had evidence of facial as well as head injury but no injury to the upper or lower extremities chest abdomen or pelvis.  She did have some injury to the right hand.  CT scan was emergently performed and fortunately demonstrates an acute on chronic subdural hematoma.  This is categorized as a big to by radiology based on the size and distribution.  She is not on any antiplatelet agents or prescription blood thinners.  She has a nonfocal neurological exam.  Per the protocol there is no indication for emergent neurosurgical consult.  Per the protocol she was admitted to the intensive care unit for serial neurological exams and repeat CT scan.  Patient will be admitted by the trauma surgeon to the ICU in guarded condition.  She has moderate to significant risk of deterioration given her age.  Per protocol IV Keppra was administered.  Patient will be admitted to the ICU          ADDITIONAL PROBLEMS MANAGED      DISPOSITION AND DISCUSSIONS  I have discussed management of the patient with the following physicians and BIRGIT's: Discussed plan of care with trauma surgeon    Discussion of management with other QHP or appropriate source(s): Discussed plan of care with the ER pharmacist    Escalation of care considered, and ultimately not  performed: None    Barriers to care at this time, including but not limited to: None.     Decision tools and prescription drugs considered including, but not limited to: GCS was utilized.    FINAL DIAGNOSIS  Acute subdural hematoma  Contusion to the right hand  Facial contusion         CRITICAL CARE  The very real possibilty of a deterioration of this patient's condition required the highest level of my preparedness for sudden, emergent intervention.  I provided critical care services, which included medication orders, frequent reevaluations of the patient's condition and response to treatment, ordering and reviewing test results, and discussing the case with various consultants.  The critical care time associated with the care of the patient was 35 minutes. Review chart for interventions. This time is exclusive of any other billable procedures.     Electronically signed by: Yamil Herron M.D., 9/15/2024 10:53 AM

## 2024-09-15 NOTE — ASSESSMENT & PLAN NOTE
Minimal step-off in the bilateral nasal bones, left more than right, could relate to age indeterminant injury.

## 2024-09-16 ENCOUNTER — APPOINTMENT (OUTPATIENT)
Dept: RADIOLOGY | Facility: MEDICAL CENTER | Age: 84
DRG: 086 | End: 2024-09-16
Attending: STUDENT IN AN ORGANIZED HEALTH CARE EDUCATION/TRAINING PROGRAM
Payer: MEDICARE

## 2024-09-16 PROBLEM — S69.91XA RIGHT WRIST INJURY, INITIAL ENCOUNTER: Status: ACTIVE | Noted: 2024-09-16

## 2024-09-16 PROBLEM — E78.5 HYPERLIPIDEMIA: Status: ACTIVE | Noted: 2024-09-16

## 2024-09-16 PROBLEM — T14.8XXA SUTURED SKIN WOUND: Status: ACTIVE | Noted: 2024-09-16

## 2024-09-16 PROBLEM — D64.9 ANEMIA: Status: ACTIVE | Noted: 2024-09-16

## 2024-09-16 LAB
ALBUMIN SERPL BCP-MCNC: 3.3 G/DL (ref 3.2–4.9)
ALBUMIN/GLOB SERPL: 0.9 G/DL
ALP SERPL-CCNC: 136 U/L (ref 30–99)
ALT SERPL-CCNC: 10 U/L (ref 2–50)
ANION GAP SERPL CALC-SCNC: 12 MMOL/L (ref 7–16)
AST SERPL-CCNC: 33 U/L (ref 12–45)
BASOPHILS # BLD AUTO: 0 % (ref 0–1.8)
BASOPHILS # BLD: 0 K/UL (ref 0–0.12)
BILIRUB SERPL-MCNC: 0.3 MG/DL (ref 0.1–1.5)
BUN SERPL-MCNC: 11 MG/DL (ref 8–22)
CALCIUM ALBUM COR SERPL-MCNC: 8.9 MG/DL (ref 8.5–10.5)
CALCIUM SERPL-MCNC: 8.3 MG/DL (ref 8.5–10.5)
CHLORIDE SERPL-SCNC: 103 MMOL/L (ref 96–112)
CO2 SERPL-SCNC: 22 MMOL/L (ref 20–33)
CREAT SERPL-MCNC: 0.64 MG/DL (ref 0.5–1.4)
EOSINOPHIL # BLD AUTO: 0.01 K/UL (ref 0–0.51)
EOSINOPHIL NFR BLD: 0.3 % (ref 0–6.9)
ERYTHROCYTE [DISTWIDTH] IN BLOOD BY AUTOMATED COUNT: 60.8 FL (ref 35.9–50)
GFR SERPLBLD CREATININE-BSD FMLA CKD-EPI: 86 ML/MIN/1.73 M 2
GLOBULIN SER CALC-MCNC: 3.5 G/DL (ref 1.9–3.5)
GLUCOSE SERPL-MCNC: 101 MG/DL (ref 65–99)
HCT VFR BLD AUTO: 27.6 % (ref 37–47)
HGB BLD-MCNC: 9.2 G/DL (ref 12–16)
IMM GRANULOCYTES # BLD AUTO: 0.01 K/UL (ref 0–0.11)
IMM GRANULOCYTES NFR BLD AUTO: 0.3 % (ref 0–0.9)
IRON SATN MFR SERPL: 19 % (ref 15–55)
IRON SERPL-MCNC: 35 UG/DL (ref 40–170)
LYMPHOCYTES # BLD AUTO: 1.09 K/UL (ref 1–4.8)
LYMPHOCYTES NFR BLD: 29.7 % (ref 22–41)
MAGNESIUM SERPL-MCNC: 1.8 MG/DL (ref 1.5–2.5)
MCH RBC QN AUTO: 33.2 PG (ref 27–33)
MCHC RBC AUTO-ENTMCNC: 33.3 G/DL (ref 32.2–35.5)
MCV RBC AUTO: 99.6 FL (ref 81.4–97.8)
MONOCYTES # BLD AUTO: 0.6 K/UL (ref 0–0.85)
MONOCYTES NFR BLD AUTO: 16.3 % (ref 0–13.4)
NEUTROPHILS # BLD AUTO: 1.96 K/UL (ref 1.82–7.42)
NEUTROPHILS NFR BLD: 53.4 % (ref 44–72)
NRBC # BLD AUTO: 0 K/UL
NRBC BLD-RTO: 0 /100 WBC (ref 0–0.2)
PHOSPHATE SERPL-MCNC: 3.3 MG/DL (ref 2.5–4.5)
PLATELET # BLD AUTO: 257 K/UL (ref 164–446)
PMV BLD AUTO: 10.2 FL (ref 9–12.9)
POTASSIUM SERPL-SCNC: 3.9 MMOL/L (ref 3.6–5.5)
PROT SERPL-MCNC: 6.8 G/DL (ref 6–8.2)
RBC # BLD AUTO: 2.77 M/UL (ref 4.2–5.4)
SODIUM SERPL-SCNC: 137 MMOL/L (ref 135–145)
TIBC SERPL-MCNC: 183 UG/DL (ref 250–450)
UIBC SERPL-MCNC: 148 UG/DL (ref 110–370)
WBC # BLD AUTO: 3.7 K/UL (ref 4.8–10.8)

## 2024-09-16 PROCEDURE — 97163 PT EVAL HIGH COMPLEX 45 MIN: CPT

## 2024-09-16 PROCEDURE — 93970 EXTREMITY STUDY: CPT | Mod: 26,GZ | Performed by: INTERNAL MEDICINE

## 2024-09-16 PROCEDURE — 97167 OT EVAL HIGH COMPLEX 60 MIN: CPT

## 2024-09-16 PROCEDURE — 700102 HCHG RX REV CODE 250 W/ 637 OVERRIDE(OP): Performed by: SURGERY

## 2024-09-16 PROCEDURE — A9270 NON-COVERED ITEM OR SERVICE: HCPCS | Performed by: SURGERY

## 2024-09-16 PROCEDURE — 93970 EXTREMITY STUDY: CPT

## 2024-09-16 PROCEDURE — 85025 COMPLETE CBC W/AUTO DIFF WBC: CPT

## 2024-09-16 PROCEDURE — 83550 IRON BINDING TEST: CPT

## 2024-09-16 PROCEDURE — 83540 ASSAY OF IRON: CPT

## 2024-09-16 PROCEDURE — 36415 COLL VENOUS BLD VENIPUNCTURE: CPT

## 2024-09-16 PROCEDURE — 700111 HCHG RX REV CODE 636 W/ 250 OVERRIDE (IP): Mod: JZ | Performed by: STUDENT IN AN ORGANIZED HEALTH CARE EDUCATION/TRAINING PROGRAM

## 2024-09-16 PROCEDURE — 80053 COMPREHEN METABOLIC PANEL: CPT

## 2024-09-16 PROCEDURE — 83735 ASSAY OF MAGNESIUM: CPT

## 2024-09-16 PROCEDURE — 700111 HCHG RX REV CODE 636 W/ 250 OVERRIDE (IP): Mod: JZ | Performed by: PHYSICIAN ASSISTANT

## 2024-09-16 PROCEDURE — 700102 HCHG RX REV CODE 250 W/ 637 OVERRIDE(OP): Performed by: STUDENT IN AN ORGANIZED HEALTH CARE EDUCATION/TRAINING PROGRAM

## 2024-09-16 PROCEDURE — 97535 SELF CARE MNGMENT TRAINING: CPT

## 2024-09-16 PROCEDURE — 99232 SBSQ HOSP IP/OBS MODERATE 35: CPT

## 2024-09-16 PROCEDURE — 665999 HH PPS REVENUE DEBIT

## 2024-09-16 PROCEDURE — 84100 ASSAY OF PHOSPHORUS: CPT

## 2024-09-16 PROCEDURE — A9270 NON-COVERED ITEM OR SERVICE: HCPCS | Performed by: PHYSICIAN ASSISTANT

## 2024-09-16 PROCEDURE — 770001 HCHG ROOM/CARE - MED/SURG/GYN PRIV*

## 2024-09-16 PROCEDURE — A9270 NON-COVERED ITEM OR SERVICE: HCPCS | Performed by: STUDENT IN AN ORGANIZED HEALTH CARE EDUCATION/TRAINING PROGRAM

## 2024-09-16 PROCEDURE — 665998 HH PPS REVENUE CREDIT

## 2024-09-16 PROCEDURE — 700102 HCHG RX REV CODE 250 W/ 637 OVERRIDE(OP): Performed by: PHYSICIAN ASSISTANT

## 2024-09-16 RX ORDER — SILICONES/ADHESIVE TAPE
COMBINATION PACKAGE (EA) TOPICAL DAILY
Status: DISCONTINUED | OUTPATIENT
Start: 2024-09-16 | End: 2024-09-17 | Stop reason: HOSPADM

## 2024-09-16 RX ADMIN — BUSPIRONE HYDROCHLORIDE 15 MG: 10 TABLET ORAL at 10:40

## 2024-09-16 RX ADMIN — SENNOSIDES AND DOCUSATE SODIUM 1 TABLET: 50; 8.6 TABLET ORAL at 20:54

## 2024-09-16 RX ADMIN — DEXTROMETHORPHAN HYDROBROMIDE, GUAIFENESIN, PHENYLEPHRINE HYDROCHLORIDE: 20; 200; 10 SOLUTION ORAL at 15:52

## 2024-09-16 RX ADMIN — METHENAMINE HIPPURATE 1 G: 1 TABLET ORAL at 05:50

## 2024-09-16 RX ADMIN — LEVETIRACETAM 500 MG: 500 TABLET, FILM COATED ORAL at 18:28

## 2024-09-16 RX ADMIN — VENLAFAXINE HYDROCHLORIDE 150 MG: 75 CAPSULE, EXTENDED RELEASE ORAL at 18:28

## 2024-09-16 RX ADMIN — ATORVASTATIN CALCIUM 40 MG: 40 TABLET, FILM COATED ORAL at 18:28

## 2024-09-16 RX ADMIN — MAGNESIUM HYDROXIDE 30 ML: 1200 LIQUID ORAL at 18:27

## 2024-09-16 RX ADMIN — ACETAMINOPHEN 1000 MG: 500 TABLET ORAL at 05:50

## 2024-09-16 RX ADMIN — DOCUSATE SODIUM 100 MG: 100 CAPSULE, LIQUID FILLED ORAL at 18:28

## 2024-09-16 RX ADMIN — BUSPIRONE HYDROCHLORIDE 15 MG: 10 TABLET ORAL at 15:51

## 2024-09-16 RX ADMIN — ACETAMINOPHEN 1000 MG: 500 TABLET ORAL at 01:33

## 2024-09-16 RX ADMIN — ONDANSETRON 4 MG: 2 INJECTION INTRAMUSCULAR; INTRAVENOUS at 05:31

## 2024-09-16 RX ADMIN — OMEPRAZOLE 40 MG: 20 CAPSULE, DELAYED RELEASE ORAL at 05:50

## 2024-09-16 RX ADMIN — VALSARTAN 160 MG: 80 TABLET, FILM COATED ORAL at 18:27

## 2024-09-16 RX ADMIN — BUSPIRONE HYDROCHLORIDE 15 MG: 10 TABLET ORAL at 20:54

## 2024-09-16 RX ADMIN — ACETAMINOPHEN 1000 MG: 500 TABLET ORAL at 18:27

## 2024-09-16 RX ADMIN — LEVETIRACETAM 500 MG: 100 INJECTION, SOLUTION INTRAVENOUS at 05:50

## 2024-09-16 RX ADMIN — POLYETHYLENE GLYCOL 3350 1 PACKET: 17 POWDER, FOR SOLUTION ORAL at 18:27

## 2024-09-16 RX ADMIN — ACETAMINOPHEN 1000 MG: 500 TABLET ORAL at 11:59

## 2024-09-16 RX ADMIN — OXYBUTYNIN CHLORIDE 15 MG: 10 TABLET, EXTENDED RELEASE ORAL at 18:27

## 2024-09-16 ASSESSMENT — COGNITIVE AND FUNCTIONAL STATUS - GENERAL
DRESSING REGULAR LOWER BODY CLOTHING: A LITTLE
TURNING FROM BACK TO SIDE WHILE IN FLAT BAD: A LITTLE
TOILETING: A LITTLE
STANDING UP FROM CHAIR USING ARMS: A LOT
MOVING FROM LYING ON BACK TO SITTING ON SIDE OF FLAT BED: A LITTLE
TURNING FROM BACK TO SIDE WHILE IN FLAT BAD: A LITTLE
HELP NEEDED FOR BATHING: A LOT
SUGGESTED CMS G CODE MODIFIER DAILY ACTIVITY: CK
DRESSING REGULAR UPPER BODY CLOTHING: A LITTLE
DAILY ACTIVITIY SCORE: 19
SUGGESTED CMS G CODE MODIFIER MOBILITY: CL
CLIMB 3 TO 5 STEPS WITH RAILING: A LOT
MOBILITY SCORE: 16
PERSONAL GROOMING: A LITTLE
WALKING IN HOSPITAL ROOM: A LOT
MOVING FROM LYING ON BACK TO SITTING ON SIDE OF FLAT BED: A LITTLE
HELP NEEDED FOR BATHING: A LITTLE
STANDING UP FROM CHAIR USING ARMS: A LITTLE
TOILETING: A LITTLE
MOVING TO AND FROM BED TO CHAIR: A LITTLE
WALKING IN HOSPITAL ROOM: A LOT
DRESSING REGULAR UPPER BODY CLOTHING: A LITTLE
EATING MEALS: A LITTLE
SUGGESTED CMS G CODE MODIFIER DAILY ACTIVITY: CK
MOBILITY SCORE: 13
MOVING TO AND FROM BED TO CHAIR: A LOT
DRESSING REGULAR LOWER BODY CLOTHING: A LOT
CLIMB 3 TO 5 STEPS WITH RAILING: TOTAL
DAILY ACTIVITIY SCORE: 16
SUGGESTED CMS G CODE MODIFIER MOBILITY: CK
PERSONAL GROOMING: A LITTLE

## 2024-09-16 ASSESSMENT — ENCOUNTER SYMPTOMS
CONSTITUTIONAL NEGATIVE: 1
NECK PAIN: 0
SHORTNESS OF BREATH: 0
SENSORY CHANGE: 0
BLURRED VISION: 0
TINGLING: 0
FALLS: 1
HEADACHES: 0
DOUBLE VISION: 0
COUGH: 0
PALPITATIONS: 0
FOCAL WEAKNESS: 0
BACK PAIN: 0
EYE PAIN: 0
DIZZINESS: 0
ABDOMINAL PAIN: 0
WEAKNESS: 0

## 2024-09-16 ASSESSMENT — PATIENT HEALTH QUESTIONNAIRE - PHQ9
9. THOUGHTS THAT YOU WOULD BE BETTER OFF DEAD, OR OF HURTING YOURSELF: NEARLY EVERY DAY
4. FEELING TIRED OR HAVING LITTLE ENERGY: NEARLY EVERY DAY
SUM OF ALL RESPONSES TO PHQ9 QUESTIONS 1 AND 2: 6
2. FEELING DOWN, DEPRESSED, IRRITABLE, OR HOPELESS: NEARLY EVERY DAY
7. TROUBLE CONCENTRATING ON THINGS, SUCH AS READING THE NEWSPAPER OR WATCHING TELEVISION: NEARLY EVERY DAY
3. TROUBLE FALLING OR STAYING ASLEEP OR SLEEPING TOO MUCH: NEARLY EVERY DAY
6. FEELING BAD ABOUT YOURSELF - OR THAT YOU ARE A FAILURE OR HAVE LET YOURSELF OR YOUR FAMILY DOWN: NEARLY EVERY DAY
8. MOVING OR SPEAKING SO SLOWLY THAT OTHER PEOPLE COULD HAVE NOTICED. OR THE OPPOSITE, BEING SO FIGETY OR RESTLESS THAT YOU HAVE BEEN MOVING AROUND A LOT MORE THAN USUAL: NEARLY EVERY DAY
SUM OF ALL RESPONSES TO PHQ QUESTIONS 1-9: 27
1. LITTLE INTEREST OR PLEASURE IN DOING THINGS: NEARLY EVERY DAY
5. POOR APPETITE OR OVEREATING: NEARLY EVERY DAY

## 2024-09-16 ASSESSMENT — LIFESTYLE VARIABLES
TOTAL SCORE: 0
HAVE PEOPLE ANNOYED YOU BY CRITICIZING YOUR DRINKING: NO
ALCOHOL_USE: NO
AVERAGE NUMBER OF DAYS PER WEEK YOU HAVE A DRINK CONTAINING ALCOHOL: 0
DOES PATIENT WANT TO STOP DRINKING: NO
TOTAL SCORE: 0
HOW MANY TIMES IN THE PAST YEAR HAVE YOU HAD 5 OR MORE DRINKS IN A DAY: 0
HAVE YOU EVER FELT YOU SHOULD CUT DOWN ON YOUR DRINKING: NO
EVER FELT BAD OR GUILTY ABOUT YOUR DRINKING: NO
CONSUMPTION TOTAL: NEGATIVE
ON A TYPICAL DAY WHEN YOU DRINK ALCOHOL HOW MANY DRINKS DO YOU HAVE: 0
TOTAL SCORE: 0
EVER HAD A DRINK FIRST THING IN THE MORNING TO STEADY YOUR NERVES TO GET RID OF A HANGOVER: NO

## 2024-09-16 ASSESSMENT — SOCIAL DETERMINANTS OF HEALTH (SDOH)

## 2024-09-16 ASSESSMENT — PAIN DESCRIPTION - PAIN TYPE
TYPE: ACUTE PAIN

## 2024-09-16 ASSESSMENT — GAIT ASSESSMENTS
ASSISTIVE DEVICE: FRONT WHEEL WALKER
DEVIATION: DECREASED BASE OF SUPPORT;BRADYKINETIC
DISTANCE (FEET): 25
GAIT LEVEL OF ASSIST: MINIMAL ASSIST

## 2024-09-16 ASSESSMENT — FIBROSIS 4 INDEX: FIB4 SCORE: 3.49

## 2024-09-16 ASSESSMENT — ACTIVITIES OF DAILY LIVING (ADL): TOILETING: INDEPENDENT

## 2024-09-16 NOTE — CARE PLAN
The patient is Watcher - Medium risk of patient condition declining or worsening    Progress made toward(s) clinical / shift goals:  q2 hour neuro checks.  Follow up head CT completed at 1715.      Patient is not progressing towards the following goals:    PRN oxy and scheduled tylenol for headaches.  Problem: Pain - Standard  Goal: Alleviation of pain or a reduction in pain to the patient’s comfort goal  Outcome: Progressing

## 2024-09-16 NOTE — CARE PLAN
The patient is Stable - Low risk of patient condition declining or worsening    Shift Goals  Clinical Goals: Q2 neuro checks, mobility  Patient Goals: Rest  Family Goals: Updates      Problem: Knowledge Deficit - Standard  Goal: Patient and family/care givers will demonstrate understanding of plan of care, disease process/condition, diagnostic tests and medications  Outcome: Progressing     Problem: Pain - Standard  Goal: Alleviation of pain or a reduction in pain to the patient’s comfort goal  Outcome: Progressing     Problem: Psychosocial  Goal: Patient's level of anxiety will decrease  Outcome: Progressing  Goal: Patient's ability to verbalize feelings about condition will improve  Outcome: Progressing  Goal: Patient's ability to re-evaluate and adapt role responsibilities will improve  Outcome: Progressing  Goal: Patient and family will demonstrate ability to cope with life altering diagnosis and/or procedure  Outcome: Progressing  Goal: Spiritual and cultural needs incorporated into hospitalization  Outcome: Progressing     Problem: Hemodynamics  Goal: Patient's hemodynamics, fluid balance and neurologic status will be stable or improve  Outcome: Progressing     Problem: Respiratory  Goal: Patient will achieve/maintain optimum respiratory ventilation and gas exchange  Outcome: Progressing     Problem: Urinary - Renal Perfusion  Goal: Ability to achieve and maintain adequate renal perfusion and functioning will improve  Outcome: Progressing     Problem: Venous Thromboembolism (VTE) Prevention  Goal: The patient will remain free from venous thromboembolism (VTE)  Outcome: Progressing     Problem: Nutrition  Goal: Patient's nutritional and fluid intake will be adequate or improve  Outcome: Progressing  Goal: Enteral nutrition will be maintained or improve  Outcome: Progressing  Goal: Enteral nutrition will be maintained or improve  Outcome: Progressing     Problem: Urinary Elimination  Goal: Establish and maintain  regular urinary output  Outcome: Progressing     Problem: Bowel Elimination  Goal: Establish and maintain regular bowel function  Outcome: Progressing

## 2024-09-16 NOTE — DISCHARGE PLANNING
Medical Social Work    LMSW notified that pt was on service with Home Health (HH) prior to this hospitalization and pt would like to ensure that she remains on service with them. LMSW explained that we can ask that resumption orders be sent to HH at time of discharge. LMSW met w/ pt and pt's daughter at bedside to inquire about previous HH. Pt states that she was on service with [facility] and signed choice for [facility]. Completed choice form faxed to [facility] and will be placed in Media tab. PT/OT/SLP ordered and recommendations pending at this time. Bedside RN does report that PT will be recommending HH. Case Management to follow.

## 2024-09-16 NOTE — ASSESSMENT & PLAN NOTE
Patient reports dermatologic procedure to left anterior shin, preexisting sutured wound prior to arrival

## 2024-09-16 NOTE — PROGRESS NOTES
Neurosurgery    Patient seen and examined.  Alert and with it after fall with falcine and small right sdh.  Neuro intact.     Examined shunt and it is pumping normally  Initial setting at 1 and I increased to 2.5 due to recent chi and sdh    Full consult to follow.

## 2024-09-16 NOTE — ASSESSMENT & PLAN NOTE
Ill-defined lucency in the distal radius, concerning for nondisplaced fracture.  No pain, deformity, skin changes, or decrease in ROM on tertiary exam, recommend outpatient f/up if patient becomes symptomatic

## 2024-09-16 NOTE — CARE PLAN
Problem: Knowledge Deficit - Standard  Goal: Patient and family/care givers will demonstrate understanding of plan of care, disease process/condition, diagnostic tests and medications  Outcome: Progressing     Problem: Pain - Standard  Goal: Alleviation of pain or a reduction in pain to the patient’s comfort goal  Outcome: Progressing   The patient is Watcher - Medium risk of patient condition declining or worsening    Shift Goals  Clinical Goals: Q2 neuro checks, mobility  Patient Goals: Rest  Family Goals: Updates    Progress made toward(s) clinical / shift goals:  met    Patient is not progressing towards the following goals:

## 2024-09-16 NOTE — PROGRESS NOTES
Pt daughter inquiring about their existing home health referral and how it plays into a future referral. I asked Heather with SW to talk to the family however she just got a bed out of the ICU. Will pass on in report.     Family also concerned about the patients heart. They were told by Dr Borden that her there was concern however I was not made aware of any cardiac issues or any referral. Will also pass this on in report.

## 2024-09-16 NOTE — PROGRESS NOTES
Neurosurgery Progress Note    Subjective:  No acute events overnight  Thin right ASDH without mass effect after fall; + VPS for NPH  repeat head CT stable    Exam:  Ecchymosis left periorbital region  Follows commands x 4 extremities  Bilateral  bicep IP 5/5  No pronator drift  Sensation intact touch x 4 extremities    BP  Min: 89/48  Max: 163/82  Pulse  Av.8  Min: 67  Max: 94  Resp  Av.5  Min: 14  Max: 33  Temp  Av.7 °C (98 °F)  Min: 36.5 °C (97.7 °F)  Max: 36.8 °C (98.2 °F)  SpO2  Av.9 %  Min: 91 %  Max: 100 %    No data recorded    Recent Labs     09/15/24  11224  0447   WBC 3.1* 3.7*   RBC 3.07* 2.77*   HEMOGLOBIN 10.0* 9.2*   HEMATOCRIT 30.9* 27.6*   .7* 99.6*   MCH 32.6 33.2*   MCHC 32.4 33.3   RDW 61.4* 60.8*   PLATELETCT 251 257   MPV 9.8 10.2     Recent Labs     09/15/24  1121 24  0447   SODIUM 138 137   POTASSIUM 3.8 3.9   CHLORIDE 105 103   CO2 23 22   GLUCOSE 149* 101*   BUN 13 11   CREATININE 0.66 0.64   CALCIUM 8.6 8.3*     Recent Labs     09/15/24  1156   APTT 27.3   INR 1.14*     Recent Labs     09/15/24  1121   REACTMIN 4.7   CLOTKINET 0.8   CLOTANGL 79.8*   MAXCLOTS 68.5   PRCINADP 0.2   PRCINAA 0.0       Intake/Output                         09/15/24 0700 - 24 - 24 0659      Total  Total                 Intake    P.O.  100  400 500  400  -- 400    P.O. 100 400 500 400 -- 400    Total Intake 100 400 500 400 -- 400       Output    Urine  300  400 700  200  -- 200    Number of Times Voided 1 x 1 x 2 x 1 x -- 1 x    Urine Void (mL) 300 400 700 200 -- 200    Emesis  --  1 1  --  -- --    Emesis -- 1 1 -- -- --    Emesis - Number of Times -- 1 x 1 x -- -- --    Stool  --  -- --  --  -- --    Number of Times Stooled -- 0 x 0 x -- -- --    Total Output 300 401 701 200 -- 200       Net I/O     -200 -1 -201 200 -- 200              Intake/Output Summary (Last 24 hours) at 2024 1422  Last  data filed at 9/16/2024 1200  Gross per 24 hour   Intake 900 ml   Output 601 ml   Net 299 ml             bacitracin-polymyxin b   DAILY    Respiratory Therapy Consult   Continuous RT    Pharmacy Consult Request  1 Each PHARMACY TO DOSE    ondansetron  4 mg Q4HRS PRN    ondansetron  4 mg Q4HRS PRN    docusate sodium  100 mg BID    senna-docusate  1 Tablet Nightly    senna-docusate  1 Tablet Q24HRS PRN    polyethylene glycol/lytes  1 Packet BID    magnesium hydroxide  30 mL DAILY AT 1800    bisacodyl  10 mg Q24HRS PRN    sodium phosphate  1 Each Once PRN    acetaminophen  1,000 mg Q6HRS    Followed by    [START ON 9/20/2024] acetaminophen  1,000 mg Q6HRS PRN    oxyCODONE immediate-release  2.5 mg Q4HRS PRN    Or    oxyCODONE immediate-release  5 mg Q4HRS PRN    labetalol  10 mg Q4HRS PRN    atorvastatin  40 mg Q EVENING    busPIRone  15 mg TID    omeprazole  40 mg DAILY    oxybutynin SR  15 mg DAILY AT 1800    valsartan  160 mg DAILY AT 1800    venlafaxine XR  150 mg DAILY AT 1800    levETIRAcetam  500 mg BID    Or    levETIRAcetam (Keppra) IV  500 mg BID       Assessment and Plan:  Hospital day #2 right ASDH  POD #NA  Prophylactic anticoagulation: no         Start date/time: tbd     Brain Compression: No  Stable exam  Repeat head CT stable  Ok to transfer to floor from [facility] standpoint  Keep valve at PL 2.0  Follow up in my clinic 4 weeks with head CT

## 2024-09-16 NOTE — DISCHARGE PLANNING
IP Consult to Case Management for pt with recent admission within the last 30 days. Pt was recently admitted to [facility] on 9/1/24 for sudden onset fever, sob, nonproductive cough and generalized body aches. Pt is on chemotherapy for cholangiocarcinoma. Pt was discharged home on 9/6/2024. Pt now admitted back to [facility] on 9/15 for injury after GLF and imaging consistent with a BIG 2 ICH.      The following assessment was completed with pt on 9/3/24:       Care Transition Team Assessment     Patient is a 84 year-old female admitted for Neutropenic Fever. Please see patient's H&P for prior medical history. LSW Student met with patient at bedside to complete assessment. Patient is A&Ox4 and able to verify the information on the face sheet. Patient lives with Daughter in a single story house with three steps to enter, Abby Tejeda p) [phone]; NOK and emergency contact. No Advance Directive on file. Prior to admission patient is independent with ADL's and IADL’s.  Patient does not use any DME at baseline, patient reports having FWW at home. Patient reported that family is good support for her. Patient receives monthly SSI deposits. The patient's PCP is Dr. Arias. Patient's preferred pharmacy [facility] on [address]. Patient denies a history of SNF/IPR nor HHC use in the past. Patient denies any SA or MH concerns. Patient's confirmed medical coverage via Medicare and AARP. Patient has means of transportation when medically cleared and daughter will provide transport once stable for discharge. Patient's primary oncologist is Dr. Arias with [facility].      Information Source  Orientation Level: Oriented X4  Information Given By: Patient  Informant's Name: jennifer  Who is responsible for making decisions for patient? : Patient     Readmission Evaluation  Is this a readmission?: No     Elopement Risk  Legal Hold: No  Ambulatory or Self Mobile in Wheelchair: Yes  Disoriented: No  Psychiatric Symptoms:  None  History of Wandering: No  Elopement this Admit: No  Vocalizing Wanting to Leave: No  Displays Behaviors, Body Language Wanting to Leave: No-Not at Risk for Elopement  Elopement Risk: Not at Risk for Elopement     Interdisciplinary Discharge Planning  Lives with - Patient's Self Care Capacity: Adult Children (daughter and ROS)  Patient or legal guardian wants to designate a caregiver: No  Support Systems: Family Member(s), Children  Housing / Facility: [address] [facility]     Discharge Preparedness  What is your plan after discharge?: Home with help  What are your discharge supports?: Child  Prior Functional Level: Ambulatory, Independent with Activities of Daily Living  Difficulity with ADLs: None  Difficulity with IADLs: Driving     Functional Assesment  Prior Functional Level: Ambulatory, Independent with Activities of Daily Living     Finances  Financial Barriers to Discharge: No  Prescription Coverage: Yes     Vision / Hearing Impairment  Vision Impairment : Yes  Right Eye Vision: Impaired, Wears Glasses  Left Eye Vision: Impaired, Wears Glasses  Hearing Impairment : No     Advance Directive  Advance Directive?: None     Domestic Abuse  Have you ever been the victim of abuse or violence?: No  Possible Abuse/Neglect Reported to:: Not Applicable     Psychological Assessment  History of Substance Abuse: None  History of Psychiatric Problems: No  Non-compliant with Treatment: No  Newly Diagnosed Illness: No     Discharge Risks or Barriers  Discharge risks or barriers?: Complex medical needs  Patient risk factors: Complex medical needs     Anticipated Discharge Information  Discharge Disposition: Discharged to home/self care (01)  Discharge Address: [address]

## 2024-09-16 NOTE — THERAPY
"Physical Therapy   Initial Evaluation     Patient Name: Aaliyah Fulton  Age:  84 y.o., Sex:  female  Medical Record #: [id number]  Today's Date: 9/16/2024     Precautions  Precautions: Fall Risk  Comments: SDH, getting chemo    Assessment    84 y.o. female admitted 9/15/2024 with GLF resulting in subdural hematoma.  She has a history of cholangiocarcinoma and has been undergoing chemotherapy. She was seen for PT eval and presented with impaired balance, strength and activity tolerance.  She was able to ambulate in the room w/FWW but had 2 overt LOB and needed assist for balance.  She is aware of her deficits and she has excellent support at home. Recommend she return home with increased physical assist from family and HHPT.  Will continue to follow while in house.     Plan    Physical Therapy Initial Treatment Plan   Treatment Plan : Bed Mobility, Gait Training, Neuro Re-Education / Balance, Self Care / Home Evaluation, Stair Training, Therapeutic Activities, Therapeutic Exercise, Family / Caregiver Training, Equipment  Treatment Frequency: 4 Times per Week  Duration: Until Therapy Goals Met    DC Equipment Recommendations: None  Discharge Recommendations: Recommend home health for continued physical therapy services (with increased family support)       Subjective    \"I'm not a balanced as I usually am\"     Objective       09/16/24 1055   Precautions   Precautions Fall Risk   Comments SDH, getting chemo   Pain 0 - 10 Group   Location Head   Location Orientation Mid;Upper   Pain Rating Scale (NPRS) 2   Therapist Pain Assessment During Activity   Prior Living Situation   Prior Services Intermittent Physical Support for ADL Per Family   Housing / Facility 1 Story House   Steps Into Home 3   Steps In Home 0   Rail Both Rail (Steps into Home)   Equipment Owned Front-Wheel Walker;4-Wheel Walker   Lives with - Patient's Self Care Capacity Adult Children   [place] daughter and ROS.  Her " other daughter lives next door and is also retired. She can have assist as needed   Prior Level of Functional Mobility   Bed Mobility Independent   Transfer Status Independent   Ambulation Independent   Ambulation Distance short community distances   Assistive Devices Used Front-Wheel Walker   Stairs Required Assist   Comments Patient uses her 4WW in the community and FWW in the home. She does report sometimes not using any AD in the house   History of Falls   History of Falls Yes   Date of Last Fall   (reason for admit)   Cognition    Cognition / Consciousness WDL   Level of Consciousness Alert   Comments pleasant and cooeprative but slef reports some recent memory loss since starting chemo   Passive ROM Upper Body   Passive ROM Upper Body WDL   Active ROM Upper Body   Active ROM Upper Body  WDL   Strength Upper Body   Upper Body Strength  WDL   Sensation Upper Body   Upper Extremity Sensation  WDL   Strength Lower Body   Lower Body Strength  X   Gross Strength Generalized Weakness, Equal Bilaterally   Sensation Lower Body   Lower Extremity Sensation   WDL   Coordination Upper Body   Coordination WDL   Coordination Lower Body    Coordination Lower Body  WDL   Vision   Vision Comments wears glasses for reading   Other Treatments   Other Treatments Provided extensive education abotu the importance of self-pacing,  cancer related fatigue and chemo side effects. Educated about home safety and the need for increased family support   Balance Assessment   Sitting Balance (Static) Fair   Sitting Balance (Dynamic) Fair   Standing Balance (Static) Fair -   Standing Balance (Dynamic) Fair -   Weight Shift Sitting Fair   Weight Shift Standing Fair   Comments w/FWW   Bed Mobility    Comments found and left in the chair   Gait Analysis   Gait Level Of Assist Minimal Assist   Assistive Device Front Wheel Walker   Distance (Feet) 25   # of Times Distance was Traveled 2   Deviation Decreased Base Of Support;Bradykinetic   Comments  genu varus with B foot pronation. Two LOB during gait w/FWW   Functional Mobility   Sit to Stand Contact Guard Assist   Bed, Chair, Wheelchair Transfer Contact Guard Assist   Mobility chair>gait to the sink>gait>chair   ICU Target Mobility Level   ICU Mobility - Targeted Level Level 4   6 Clicks Assessment - How much HELP from from another person do you currently need... (If the patient hasn't done an activity recently, how much help from another person do you think he/she would need if he/she tried?)   Turning from your back to your side while in a flat bed without using bedrails? 3   Moving from lying on your back to sitting on the side of a flat bed without using bedrails? 3   Moving to and from a bed to a chair (including a wheelchair)? 3   Standing up from a chair using your arms (e.g., wheelchair, or bedside chair)? 3   Walking in hospital room? 2   Climbing 3-5 steps with a railing? 2   6 clicks Mobility Score 16   Activity Tolerance   Sitting in Chair post session   Standing 7 min   Comments poor endurance   Edema / Skin Assessment   Comments L eye orbital ecchymosis   Patient / Family Goals    Patient / Family Goal #1 to improve her balance   Short Term Goals    Short Term Goal # 1 in 6 visits patient will demo all functional transfers with Sup and LRAD for safe DC   Short Term Goal # 2 in 6 visits patient will ambualte 200' with Sup adn LRAD for safe DC   Short Term Goal # 3 in 6 visits patient will navigate 3 stairs with B rails and Lei for safe access to the home   Education Group   Education Provided Role of Physical Therapist;Gait Training;Use of Assistive Device   Role of Physical Therapist Patient Response Patient;Acceptance;Explanation;Demonstration;Verbal Demonstration;Action Demonstration   Gait Training Patient Response Patient;Acceptance;Explanation;Demonstration;Verbal Demonstration;Action Demonstration   Use of Assistive Device Patient Response  Patient;Acceptance;Explanation;Demonstration;Verbal Demonstration;Action Demonstration   Physical Therapy Initial Treatment Plan    Treatment Plan  Bed Mobility;Gait Training;Neuro Re-Education / Balance;Self Care / Home Evaluation;Stair Training;Therapeutic Activities;Therapeutic Exercise;Family / Caregiver Training;Equipment   Treatment Frequency 4 Times per Week   Duration Until Therapy Goals Met   Problem List    Problems Pain;Impaired Transfers;Impaired Ambulation;Functional Strength Deficit;Impaired Balance;Decreased Activity Tolerance   Anticipated Discharge Equipment and Recommendations   DC Equipment Recommendations None   Discharge Recommendations Recommend home health for continued physical therapy services  (with increased family support)     Tracee Coronel, PT, DPT, GCS

## 2024-09-16 NOTE — PROGRESS NOTES
Trauma / Surgical Daily Progress Note    Date of Service  9/16/2024    Chief Complaint  84 y.o. female admitted 9/15/2024 with GLF resulting in subdural hematoma.      Interval Events  -admit tot TICU yesterday  -tertiary negative   -repeat head CT stable   -GCS 15  -neurosurgery cleared for morales transfer    Review of Systems  Review of Systems   Constitutional: Negative.    HENT:  Negative for ear discharge, ear pain, hearing loss, nosebleeds and tinnitus.    Eyes:  Negative for blurred vision, double vision and pain.   Respiratory:  Negative for cough and shortness of breath.    Cardiovascular:  Negative for chest pain and palpitations.   Gastrointestinal:  Negative for abdominal pain.   Genitourinary: Negative.    Musculoskeletal:  Positive for falls (GLF mechanical fall @ home). Negative for back pain, joint pain and neck pain.   Skin:  Negative for rash.   Neurological:  Negative for dizziness, tingling, sensory change, focal weakness, weakness and headaches.        Vital Signs  Temp:  [36.4 °C (97.6 °F)-36.8 °C (98.2 °F)] 36.7 °C (98 °F)  Pulse:  [67-95] 83  Resp:  [14-33] 15  BP: ()/(48-88) 113/57  SpO2:  [87 %-100 %] 98 %    Physical Exam  Physical Exam  Vitals reviewed.   Constitutional:       General: She is not in acute distress.  HENT:      Head:      Comments:  shunt noted     Nose: Nose normal.      Comments: no overt nasal tenderness/deformity      Mouth/Throat:      Mouth: Mucous membranes are moist.      Pharynx: Oropharynx is clear.      Comments: No evidence of malocclusion   Eyes:      Extraocular Movements: Extraocular movements intact.      Pupils: Pupils are equal, round, and reactive to light.      Comments: Left inferior periorbital ecchymosis, minimally tender   Cardiovascular:      Rate and Rhythm: Normal rate and regular rhythm.      Heart sounds: Murmur heard.   Pulmonary:      Effort: Pulmonary effort is normal. No respiratory distress.      Breath sounds: Normal breath  sounds.   Chest:      Chest wall: No tenderness.   Abdominal:      General: Bowel sounds are normal. There is no distension.      Palpations: Abdomen is soft.      Tenderness: There is no abdominal tenderness. There is no guarding.   Musculoskeletal:         General: No swelling, tenderness or deformity. Normal range of motion.      Cervical back: Normal range of motion and neck supple. No tenderness.      Right lower leg: No edema.      Left lower leg: No edema.   Skin:     General: Skin is warm and dry.      Comments: Sutured incision to anterior mid left shin   Neurological:      General: No focal deficit present.      Mental Status: She is alert and oriented to person, place, and time.      GCS: GCS eye subscore is 4. GCS verbal subscore is 5. GCS motor subscore is 6.      Sensory: No sensory deficit.      Motor: No weakness.      Comments: 5/5 b/l upper extremities with normal grasp b/l  5/5 b/l lower extremities with nml plantar flexion b/l   Psychiatric:         Mood and Affect: Mood normal.         Behavior: Behavior is cooperative.         Laboratory  Recent Results (from the past 24 hour(s))   PLATELET MAPPING WITH BASIC TEG    Collection Time: 09/15/24 11:21 AM   Result Value Ref Range    Reaction Time Initial-R 4.7 4.6 - 9.1 min    React Time Initial Hep 4.7 4.3 - 8.3 min    Clot Kinetics-K 0.8 0.8 - 2.1 min    Clot Angle-Angle 79.8 (H) 63.0 - 78.0 degrees    Maximum Clot Strength-MA 68.5 52.0 - 69.0 mm    TEG Functional Fibrinogen(MA) 37.4 (H) 15.0 - 32.0 mm    % Inhibition ADP 0.2 0.0 - 17.0 %    % Inhibition AA 0.0 0.0 - 11.0 %    TEG Algorithm Link Algorithm    CBC WITH DIFFERENTIAL    Collection Time: 09/15/24 11:21 AM   Result Value Ref Range    WBC 3.1 (L) 4.8 - 10.8 K/uL    RBC 3.07 (L) 4.20 - 5.40 M/uL    Hemoglobin 10.0 (L) 12.0 - 16.0 g/dL    Hematocrit 30.9 (L) 37.0 - 47.0 %    .7 (H) 81.4 - 97.8 fL    MCH 32.6 27.0 - 33.0 pg    MCHC 32.4 32.2 - 35.5 g/dL    RDW 61.4 (H) 35.9 - 50.0 fL     Platelet Count 251 164 - 446 K/uL    MPV 9.8 9.0 - 12.9 fL    Neutrophils-Polys 54.40 44.00 - 72.00 %    Lymphocytes 29.00 22.00 - 41.00 %    Monocytes 16.30 (H) 0.00 - 13.40 %    Eosinophils 0.00 0.00 - 6.90 %    Basophils 0.00 0.00 - 1.80 %    Immature Granulocytes 0.30 0.00 - 0.90 %    Nucleated RBC 0.00 0.00 - 0.20 /100 WBC    Neutrophils (Absolute) 1.67 (L) 1.82 - 7.42 K/uL    Lymphs (Absolute) 0.89 (L) 1.00 - 4.80 K/uL    Monos (Absolute) 0.50 0.00 - 0.85 K/uL    Eos (Absolute) 0.00 0.00 - 0.51 K/uL    Baso (Absolute) 0.00 0.00 - 0.12 K/uL    Immature Granulocytes (abs) 0.01 0.00 - 0.11 K/uL    NRBC (Absolute) 0.00 K/uL   Comp Metabolic Panel    Collection Time: 09/15/24 11:21 AM   Result Value Ref Range    Sodium 138 135 - 145 mmol/L    Potassium 3.8 3.6 - 5.5 mmol/L    Chloride 105 96 - 112 mmol/L    Co2 23 20 - 33 mmol/L    Anion Gap 10.0 7.0 - 16.0    Glucose 149 (H) 65 - 99 mg/dL    Bun 13 8 - 22 mg/dL    Creatinine 0.66 0.50 - 1.40 mg/dL    Calcium 8.6 8.5 - 10.5 mg/dL    Correct Calcium 9.0 8.5 - 10.5 mg/dL    AST(SGOT) 23 12 - 45 U/L    ALT(SGPT) 9 2 - 50 U/L    Alkaline Phosphatase 132 (H) 30 - 99 U/L    Total Bilirubin 0.3 0.1 - 1.5 mg/dL    Albumin 3.5 3.2 - 4.9 g/dL    Total Protein 7.3 6.0 - 8.2 g/dL    Globulin 3.8 (H) 1.9 - 3.5 g/dL    A-G Ratio 0.9 g/dL   HOLD BLOOD BANK SPECIMEN (NOT TESTED)    Collection Time: 09/15/24 11:21 AM   Result Value Ref Range    Holding Tube - Bb DONE    ESTIMATED GFR    Collection Time: 09/15/24 11:21 AM   Result Value Ref Range    GFR (CKD-EPI) 85 >60 mL/min/1.73 m 2   Prothrombin Time    Collection Time: 09/15/24 11:56 AM   Result Value Ref Range    PT 14.8 (H) 12.0 - 14.6 sec    INR 1.14 (H) 0.87 - 1.13   APTT    Collection Time: 09/15/24 11:56 AM   Result Value Ref Range    APTT 27.3 24.7 - 36.0 sec   EKG    Collection Time: 09/15/24  2:22 PM   Result Value Ref Range    Report       [facility]    Test Date:  2024-09-15  Pt Name:    LISA WINTERS                  Department:   MRN:        [id number]                      [facility]  Gender:     Female                       Technician: MARIO  :        [id number]                   Requested By:AB TRAVIS  Order #:    [id number]                    Reading MD: Herrera Gray MD    Measurements  Intervals                                Axis  Rate:       77                           P:          56  FL:         193                          QRS:        -15  QRSD:       90                           T:          45  QT:         375  QTc:        425    Interpretive Statements  Sinus rhythm  Compared to ECG 2024 05:16:01  There has been no significant changes  Electronically Signed On 09- 18:06:26 PDT by Herrera Gray MD     CBC with Differential: Tomorrow AM    Collection Time: 24  4:47 AM   Result Value Ref Range    WBC 3.7 (L) 4.8 - 10.8 K/uL    RBC 2.77 (L) 4.20 - 5.40 M/uL    Hemoglobin 9.2 (L) 12.0 - 16.0 g/dL    Hematocrit 27.6 (L) 37.0 - 47.0 %    MCV 99.6 (H) 81.4 - 97.8 fL    MCH 33.2 (H) 27.0 - 33.0 pg    MCHC 33.3 32.2 - 35.5 g/dL    RDW 60.8 (H) 35.9 - 50.0 fL    Platelet Count 257 164 - 446 K/uL    MPV 10.2 9.0 - 12.9 fL    Neutrophils-Polys 53.40 44.00 - 72.00 %    Lymphocytes 29.70 22.00 - 41.00 %    Monocytes 16.30 (H) 0.00 - 13.40 %    Eosinophils 0.30 0.00 - 6.90 %    Basophils 0.00 0.00 - 1.80 %    Immature Granulocytes 0.30 0.00 - 0.90 %    Nucleated RBC 0.00 0.00 - 0.20 /100 WBC    Neutrophils (Absolute) 1.96 1.82 - 7.42 K/uL    Lymphs (Absolute) 1.09 1.00 - 4.80 K/uL    Monos (Absolute) 0.60 0.00 - 0.85 K/uL    Eos (Absolute) 0.01 0.00 - 0.51 K/uL    Baso (Absolute) 0.00 0.00 - 0.12 K/uL    Immature Granulocytes (abs) 0.01 0.00 - 0.11 K/uL    NRBC (Absolute) 0.00 K/uL   Comp Metabolic Panel (CMP): Tomorrow AM    Collection Time: 24  4:47 AM   Result Value Ref Range    Sodium 137 135 - 145 mmol/L    Potassium 3.9 3.6 - 5.5 mmol/L    Chloride 103 96 - 112 mmol/L     Co2 22 20 - 33 mmol/L    Anion Gap 12.0 7.0 - 16.0    Glucose 101 (H) 65 - 99 mg/dL    Bun 11 8 - 22 mg/dL    Creatinine 0.64 0.50 - 1.40 mg/dL    Calcium 8.3 (L) 8.5 - 10.5 mg/dL    Correct Calcium 8.9 8.5 - 10.5 mg/dL    AST(SGOT) 33 12 - 45 U/L    ALT(SGPT) 10 2 - 50 U/L    Alkaline Phosphatase 136 (H) 30 - 99 U/L    Total Bilirubin 0.3 0.1 - 1.5 mg/dL    Albumin 3.3 3.2 - 4.9 g/dL    Total Protein 6.8 6.0 - 8.2 g/dL    Globulin 3.5 1.9 - 3.5 g/dL    A-G Ratio 0.9 g/dL   Magnesium: Every Monday and Thursday AM    Collection Time: 09/16/24  4:47 AM   Result Value Ref Range    Magnesium 1.8 1.5 - 2.5 mg/dL   Phosphorus: Every Monday and Thursday AM    Collection Time: 09/16/24  4:47 AM   Result Value Ref Range    Phosphorus 3.3 2.5 - 4.5 mg/dL   ESTIMATED GFR    Collection Time: 09/16/24  4:47 AM   Result Value Ref Range    GFR (CKD-EPI) 86 >60 mL/min/1.73 m 2   IRON/TOTAL IRON BIND    Collection Time: 09/16/24  4:47 AM   Result Value Ref Range    Iron 35 (L) 40 - 170 ug/dL    Total Iron Binding 183 (L) 250 - 450 ug/dL    Unsat Iron Binding 148 110 - 370 ug/dL    % Saturation 19 15 - 55 %       Fluids    Intake/Output Summary (Last 24 hours) at 9/16/2024 1009  Last data filed at 9/16/2024 0800  Gross per 24 hour   Intake 700 ml   Output 701 ml   Net -1 ml       Core Measures & Quality Metrics  Labs reviewed, Medications reviewed and Radiology images reviewed  Woodson catheter: No Woodson      DVT Prophylaxis: Contraindicated - High bleeding risk  DVT prophylaxis - mechanical: SCDs  Ulcer prophylaxis: Yes        RAP Score Total: 7    CAGE Results: not completed Blood Alcohol>0.08: not completed       Assessment/Plan  * Trauma- (present on admission)  Assessment & Plan  GLF. Found down by family.   T-5000 Activation.  Trey Burton MD. Trauma Surgery.    Subdural hematoma (HCC)- (present on admission)  Assessment & Plan  5 mm parafalcine subdural hematoma, extending from anterior to posterior. 5 mm right  holohemispheric subdural hematoma, with acute on subacute/chronic components.  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   Interval head CT stable.  Non-operative management.  Post traumatic pharmacologic seizure prophylaxis for 1 week.  Speech Language Pathology cognitive evaluation.  Francisco Borden MD. Neurosurgeon. [facility].    Anemia- (present on admission)  Assessment & Plan  Hgb of 10.0 on admission, check  iron studies     Right wrist injury, initial encounter  Assessment & Plan  Ill-defined lucency in the distal radius, concerning for nondisplaced fracture.  No pain, deformity, skin changes, or decrease in ROM on tertiary exam, recommend outpatient f/up if patient becomes symptomatic     Contraindication to deep vein thrombosis (DVT) prophylaxis- (present on admission)  Assessment & Plan  VTE prophylaxis initially contraindicated secondary to elevated bleeding risk.  9/16 Trauma surveillance venous duplex ultrasonography ordered.    Sutured skin wound- (present on admission)  Assessment & Plan  Patient reports dermatologic procedure to left anterior shin, preexisting sutured wound prior to arrival     Hyperlipidemia  Assessment & Plan  Chronic condition treated with Lipitor.  Resumed maintenance medication on admission.      Generalized anxiety disorder- (present on admission)  Assessment & Plan  Chronic condition treated with Buspar and Effexor XR.  Resumed maintenance medication on admission.    Overactive bladder- (present on admission)  Assessment & Plan  Chronic condition treated with Oxybutynin.  Resumed maintenance medication on admission.    Nasal bone fracture- (present on admission)  Assessment & Plan  Minimal step-off in the bilateral nasal bones, left more than right, could relate to age indeterminant injury.     Cholangiocarcinoma (HCC)- (present on admission)  Assessment & Plan  Followed by oncology.     History of recurrent urinary tract infection- (present on  admission)  Assessment & Plan  Chronic condition treated with estradiol cream, Hipprex, and D-mannose.  Holding maintenance medication during acute traumatic illness.     Gastroesophageal reflux disease without esophagitis- (present on admission)  Assessment & Plan  Chronic condition treated with omeprazole.  Resumed maintenance medication on admission.    Benign essential hypertension- (present on admission)  Assessment & Plan  Chronic condition treated with valsartan.  Resumed maintenance medication on admission.    Normal pressure hydrocephalus (HCC)- (present on admission)  Assessment & Plan   shunt placed 1/10/2022.   Followed [facility].       Mental status adequate for full examination?: Yes    Spine cleared (radiologically and/or clinically): Yes    All current laboratory studies/radiology exams reviewed: Yes    Medications reconciliation has been reviewed: Yes    Completed Consultations:  none     Pending Consultations:  Neurosurgery     Newly identified diagnoses, injuries and/or co-morbidities:  None noted at time exam     PDI 3      Discussed patient condition with Patient.

## 2024-09-16 NOTE — THERAPY
Occupational Therapy   Initial Evaluation     Patient Name: Aaliyah Fulton  Age:  84 y.o., Sex:  female  Medical Record #: [id number]  Today's Date: 9/16/2024     Precautions  Precautions: Fall Risk  Comments: SDH, getting chemo    Assessment  Patient is 84 y.o. female admitted after GLF resulting in subdural hematoma.  She has a history of cholangiocarcinoma and has been undergoing chemotherapy.  Additional factors influencing patient status / progress: weakness, fatigue, impaired balance. Began patient education on energy conservation strategies, appropriate AE/DME to utilize during ADL routine and compensatory strategies during ADLs to maximize independence and safety.        Plan    Occupational Therapy Initial Treatment Plan   Treatment Interventions: Self Care / Activities of Daily Living, Adaptive Equipment, Therapeutic Exercises, Neuro Re-Education / Balance, Therapeutic Activity, Family / Caregiver Training  Treatment Frequency: 4 Times per Week  Duration: Until Therapy Goals Met    DC Equipment Recommendations: Reacher, Sock Aide  Discharge Recommendations: Recommend home health for continued occupational therapy services (with increased familial support for ADL mgmt)     Objective       09/16/24 1125   Prior Living Situation   Prior Services Intermittent Physical Support for ADL Per Family   Housing / Facility 1 Story House   Steps Into Home 3   Bathroom Set up Walk In Shower;Shower Chair   Equipment Owned 4-Wheel Walker;Front-Wheel Walker;Tub / Shower Seat   Lives with - Patient's Self Care Capacity Adult Children   Comments [place] with [place] daughter and ROS.  Her other daughter lives next door and is also retired. She can have assist as needed from her ROS   Prior Level of ADL Function   Self Feeding Independent   Grooming / Hygiene Independent   Bathing Independent   Dressing Independent   Toileting Independent   Prior Level of IADL Function   Prior Level Of Mobility Independent With  Device in Community;Independent With Device in Home   Comments family manages iADLs   History of Falls   History of Falls Yes   Precautions   Precautions Fall Risk   Comments SDH, getting chemo   Vitals   Vitals Comments VSS   Pain 0 - 10 Group   Therapist Pain Assessment Nurse Notified;0   Cognition    Cognition / Consciousness WDL   Level of Consciousness Alert   Comments pleasant and cooperative. reports some cognitive changes since starting chemo such as impaired short term memory.   Active ROM Upper Body   Active ROM Upper Body  WDL   Comments arthritic changes in B hands make it difficult to achieve full  but baseline   Strength Upper Body   Upper Body Strength  WDL   Balance Assessment   Sitting Balance (Static) Fair   Sitting Balance (Dynamic) Fair   Standing Balance (Static) Fair -   Standing Balance (Dynamic) Poor +   Weight Shift Sitting Fair   Weight Shift Standing Fair   Comments w/ FWW, one LOB while at the sink grooming   Bed Mobility    Comments in chair pre/post   ADL Assessment   Grooming Minimal Assist;Standing  (Prince for standing balance, able to complete oral care/wash face w/ set up)   Lower Body Dressing Moderate Assist  (don underwear)   Toileting   (dclined need)   Comments pt reporting a lot of fatigue since beginning chemo. began education on compensatory strategies, energy conservation, appropriate AE /DME. provided handout on energy conservation and CRF/brain fog   How much help from another person does the patient currently need...   Putting on and taking off regular lower body clothing? 2   Bathing (including washing, rinsing, and drying)? 2   Toileting, which includes using a toilet, bedpan, or urinal? 3   Putting on and taking off regular upper body clothing? 3   Taking care of personal grooming such as brushing teeth? 3   Eating meals? 3   6 Clicks Daily Activity Score 16   Functional Mobility   Sit to Stand Contact Guard Assist   Bed, Chair, Wheelchair Transfer Contact Guard  Assist   Mobility Chair>Sink>Chair   Comments w/ FWW; reported too fatigued to go farther   Short Term Goals   Short Term Goal # 1 pt will demo LB dressing w/ supv and AE prn   Short Term Goal # 2 pt will demo toileting w/ supv   Short Term Goal # 3 pt will demo toilet txf w/ supv   Short Term Goal # 4 pt will describe 3 energy conservation strategies w/o cues     Patient seen for team evaluation with Physical Therapist for the following reason(s):  Due to the medical complexity, the skill of both practitioners is needed to monitor vitals, patient status, and adjust the intervention to fit the patient's needs and goals.

## 2024-09-17 ENCOUNTER — HOME CARE VISIT (OUTPATIENT)
Dept: HOME HEALTH SERVICES | Facility: HOME HEALTHCARE | Age: 84
End: 2024-09-17
Payer: MEDICARE

## 2024-09-17 ENCOUNTER — PHARMACY VISIT (OUTPATIENT)
Dept: PHARMACY | Facility: MEDICAL CENTER | Age: 84
End: 2024-09-17
Payer: COMMERCIAL

## 2024-09-17 ENCOUNTER — APPOINTMENT (OUTPATIENT)
Dept: RADIOLOGY | Facility: MEDICAL CENTER | Age: 84
DRG: 086 | End: 2024-09-17
Payer: MEDICARE

## 2024-09-17 ENCOUNTER — HOSPITAL ENCOUNTER (INPATIENT)
Facility: MEDICAL CENTER | Age: 84
LOS: 6 days | End: 2024-09-24
Attending: STUDENT IN AN ORGANIZED HEALTH CARE EDUCATION/TRAINING PROGRAM | Admitting: HOSPITALIST
Payer: MEDICARE

## 2024-09-17 ENCOUNTER — APPOINTMENT (OUTPATIENT)
Dept: RADIOLOGY | Facility: MEDICAL CENTER | Age: 84
End: 2024-09-17
Attending: STUDENT IN AN ORGANIZED HEALTH CARE EDUCATION/TRAINING PROGRAM
Payer: MEDICARE

## 2024-09-17 ENCOUNTER — APPOINTMENT (OUTPATIENT)
Dept: INTERNAL MEDICINE | Facility: OTHER | Age: 84
End: 2024-09-17
Payer: MEDICARE

## 2024-09-17 VITALS
TEMPERATURE: 97.8 F | RESPIRATION RATE: 18 BRPM | OXYGEN SATURATION: 95 % | HEART RATE: 97 BPM | SYSTOLIC BLOOD PRESSURE: 121 MMHG | WEIGHT: 155.42 LBS | BODY MASS INDEX: 24.39 KG/M2 | DIASTOLIC BLOOD PRESSURE: 69 MMHG | HEIGHT: 67 IN

## 2024-09-17 DIAGNOSIS — N32.81 OVERACTIVE BLADDER: ICD-10-CM

## 2024-09-17 DIAGNOSIS — E44.0 MODERATE PROTEIN-CALORIE MALNUTRITION (HCC): ICD-10-CM

## 2024-09-17 DIAGNOSIS — R62.7 FAILURE TO THRIVE IN ADULT: ICD-10-CM

## 2024-09-17 DIAGNOSIS — R53.1 GENERALIZED WEAKNESS: ICD-10-CM

## 2024-09-17 DIAGNOSIS — C22.1 CHOLANGIOCARCINOMA (HCC): ICD-10-CM

## 2024-09-17 DIAGNOSIS — S06.5XAA SDH (SUBDURAL HEMATOMA) (HCC): ICD-10-CM

## 2024-09-17 LAB
ALBUMIN SERPL BCP-MCNC: 3.3 G/DL (ref 3.2–4.9)
ALBUMIN SERPL BCP-MCNC: 3.4 G/DL (ref 3.2–4.9)
ALBUMIN/GLOB SERPL: 0.8 G/DL
ALBUMIN/GLOB SERPL: 1.1 G/DL
ALP SERPL-CCNC: 139 U/L (ref 30–99)
ALP SERPL-CCNC: 163 U/L (ref 30–99)
ALT SERPL-CCNC: 15 U/L (ref 2–50)
ALT SERPL-CCNC: 17 U/L (ref 2–50)
ANION GAP SERPL CALC-SCNC: 10 MMOL/L (ref 7–16)
ANION GAP SERPL CALC-SCNC: 10 MMOL/L (ref 7–16)
AST SERPL-CCNC: 27 U/L (ref 12–45)
AST SERPL-CCNC: 28 U/L (ref 12–45)
BASOPHILS # BLD AUTO: 0 % (ref 0–1.8)
BASOPHILS # BLD AUTO: 0 % (ref 0–1.8)
BASOPHILS # BLD: 0 K/UL (ref 0–0.12)
BASOPHILS # BLD: 0 K/UL (ref 0–0.12)
BILIRUB SERPL-MCNC: 0.2 MG/DL (ref 0.1–1.5)
BILIRUB SERPL-MCNC: 0.4 MG/DL (ref 0.1–1.5)
BUN SERPL-MCNC: 11 MG/DL (ref 8–22)
BUN SERPL-MCNC: 11 MG/DL (ref 8–22)
CALCIUM ALBUM COR SERPL-MCNC: 9.2 MG/DL (ref 8.5–10.5)
CALCIUM ALBUM COR SERPL-MCNC: 9.6 MG/DL (ref 8.5–10.5)
CALCIUM SERPL-MCNC: 8.6 MG/DL (ref 8.5–10.5)
CALCIUM SERPL-MCNC: 9.1 MG/DL (ref 8.5–10.5)
CHLORIDE SERPL-SCNC: 105 MMOL/L (ref 96–112)
CHLORIDE SERPL-SCNC: 99 MMOL/L (ref 96–112)
CO2 SERPL-SCNC: 23 MMOL/L (ref 20–33)
CO2 SERPL-SCNC: 23 MMOL/L (ref 20–33)
CREAT SERPL-MCNC: 0.53 MG/DL (ref 0.5–1.4)
CREAT SERPL-MCNC: 0.57 MG/DL (ref 0.5–1.4)
EOSINOPHIL # BLD AUTO: 0.01 K/UL (ref 0–0.51)
EOSINOPHIL # BLD AUTO: 0.02 K/UL (ref 0–0.51)
EOSINOPHIL NFR BLD: 0.3 % (ref 0–6.9)
EOSINOPHIL NFR BLD: 0.5 % (ref 0–6.9)
ERYTHROCYTE [DISTWIDTH] IN BLOOD BY AUTOMATED COUNT: 61.7 FL (ref 35.9–50)
ERYTHROCYTE [DISTWIDTH] IN BLOOD BY AUTOMATED COUNT: 62.1 FL (ref 35.9–50)
FLUAV RNA SPEC QL NAA+PROBE: NEGATIVE
FLUBV RNA SPEC QL NAA+PROBE: NEGATIVE
GFR SERPLBLD CREATININE-BSD FMLA CKD-EPI: 88 ML/MIN/1.73 M 2
GFR SERPLBLD CREATININE-BSD FMLA CKD-EPI: 90 ML/MIN/1.73 M 2
GLOBULIN SER CALC-MCNC: 2.9 G/DL (ref 1.9–3.5)
GLOBULIN SER CALC-MCNC: 4.1 G/DL (ref 1.9–3.5)
GLUCOSE SERPL-MCNC: 119 MG/DL (ref 65–99)
GLUCOSE SERPL-MCNC: 96 MG/DL (ref 65–99)
HCT VFR BLD AUTO: 28 % (ref 37–47)
HCT VFR BLD AUTO: 31.2 % (ref 37–47)
HGB BLD-MCNC: 10.1 G/DL (ref 12–16)
HGB BLD-MCNC: 9.2 G/DL (ref 12–16)
IMM GRANULOCYTES # BLD AUTO: 0.01 K/UL (ref 0–0.11)
IMM GRANULOCYTES # BLD AUTO: 0.02 K/UL (ref 0–0.11)
IMM GRANULOCYTES NFR BLD AUTO: 0.3 % (ref 0–0.9)
IMM GRANULOCYTES NFR BLD AUTO: 0.5 % (ref 0–0.9)
LYMPHOCYTES # BLD AUTO: 1.18 K/UL (ref 1–4.8)
LYMPHOCYTES # BLD AUTO: 1.4 K/UL (ref 1–4.8)
LYMPHOCYTES NFR BLD: 33.3 % (ref 22–41)
LYMPHOCYTES NFR BLD: 35.4 % (ref 22–41)
MCH RBC QN AUTO: 32.7 PG (ref 27–33)
MCH RBC QN AUTO: 33.1 PG (ref 27–33)
MCHC RBC AUTO-ENTMCNC: 32.4 G/DL (ref 32.2–35.5)
MCHC RBC AUTO-ENTMCNC: 32.9 G/DL (ref 32.2–35.5)
MCV RBC AUTO: 100.7 FL (ref 81.4–97.8)
MCV RBC AUTO: 101 FL (ref 81.4–97.8)
MONOCYTES # BLD AUTO: 0.72 K/UL (ref 0–0.85)
MONOCYTES # BLD AUTO: 0.8 K/UL (ref 0–0.85)
MONOCYTES NFR BLD AUTO: 20.3 % (ref 0–13.4)
MONOCYTES NFR BLD AUTO: 20.3 % (ref 0–13.4)
NEUTROPHILS # BLD AUTO: 1.62 K/UL (ref 1.82–7.42)
NEUTROPHILS # BLD AUTO: 1.71 K/UL (ref 1.82–7.42)
NEUTROPHILS NFR BLD: 43.3 % (ref 44–72)
NEUTROPHILS NFR BLD: 45.8 % (ref 44–72)
NRBC # BLD AUTO: 0 K/UL
NRBC # BLD AUTO: 0 K/UL
NRBC BLD-RTO: 0 /100 WBC (ref 0–0.2)
NRBC BLD-RTO: 0 /100 WBC (ref 0–0.2)
PLATELET # BLD AUTO: 262 K/UL (ref 164–446)
PLATELET # BLD AUTO: 307 K/UL (ref 164–446)
PMV BLD AUTO: 10.2 FL (ref 9–12.9)
PMV BLD AUTO: 9.8 FL (ref 9–12.9)
POTASSIUM SERPL-SCNC: 4.2 MMOL/L (ref 3.6–5.5)
POTASSIUM SERPL-SCNC: 4.9 MMOL/L (ref 3.6–5.5)
PROT SERPL-MCNC: 6.2 G/DL (ref 6–8.2)
PROT SERPL-MCNC: 7.5 G/DL (ref 6–8.2)
RBC # BLD AUTO: 2.78 M/UL (ref 4.2–5.4)
RBC # BLD AUTO: 3.09 M/UL (ref 4.2–5.4)
RSV RNA SPEC QL NAA+PROBE: NEGATIVE
SARS-COV-2 RNA RESP QL NAA+PROBE: NOTDETECTED
SODIUM SERPL-SCNC: 132 MMOL/L (ref 135–145)
SODIUM SERPL-SCNC: 138 MMOL/L (ref 135–145)
WBC # BLD AUTO: 3.5 K/UL (ref 4.8–10.8)
WBC # BLD AUTO: 4 K/UL (ref 4.8–10.8)

## 2024-09-17 PROCEDURE — 665999 HH PPS REVENUE DEBIT

## 2024-09-17 PROCEDURE — 85025 COMPLETE CBC W/AUTO DIFF WBC: CPT

## 2024-09-17 PROCEDURE — 71045 X-RAY EXAM CHEST 1 VIEW: CPT

## 2024-09-17 PROCEDURE — 36415 COLL VENOUS BLD VENIPUNCTURE: CPT

## 2024-09-17 PROCEDURE — A9270 NON-COVERED ITEM OR SERVICE: HCPCS | Performed by: STUDENT IN AN ORGANIZED HEALTH CARE EDUCATION/TRAINING PROGRAM

## 2024-09-17 PROCEDURE — 665998 HH PPS REVENUE CREDIT

## 2024-09-17 PROCEDURE — 99285 EMERGENCY DEPT VISIT HI MDM: CPT

## 2024-09-17 PROCEDURE — 80053 COMPREHEN METABOLIC PANEL: CPT

## 2024-09-17 PROCEDURE — 700102 HCHG RX REV CODE 250 W/ 637 OVERRIDE(OP): Performed by: STUDENT IN AN ORGANIZED HEALTH CARE EDUCATION/TRAINING PROGRAM

## 2024-09-17 PROCEDURE — 700102 HCHG RX REV CODE 250 W/ 637 OVERRIDE(OP): Performed by: PHYSICIAN ASSISTANT

## 2024-09-17 PROCEDURE — 700111 HCHG RX REV CODE 636 W/ 250 OVERRIDE (IP): Mod: JZ | Performed by: STUDENT IN AN ORGANIZED HEALTH CARE EDUCATION/TRAINING PROGRAM

## 2024-09-17 PROCEDURE — 70450 CT HEAD/BRAIN W/O DYE: CPT

## 2024-09-17 PROCEDURE — 0241U HCHG SARS-COV-2 COVID-19 NFCT DS RESP RNA 4 TRGT ED POC: CPT

## 2024-09-17 PROCEDURE — 80053 COMPREHEN METABOLIC PANEL: CPT | Mod: 91

## 2024-09-17 PROCEDURE — 99221 1ST HOSP IP/OBS SF/LOW 40: CPT

## 2024-09-17 PROCEDURE — RXMED WILLOW AMBULATORY MEDICATION CHARGE: Performed by: NURSE PRACTITIONER

## 2024-09-17 PROCEDURE — 99238 HOSP IP/OBS DSCHRG MGMT 30/<: CPT | Performed by: NURSE PRACTITIONER

## 2024-09-17 PROCEDURE — A9270 NON-COVERED ITEM OR SERVICE: HCPCS | Performed by: PHYSICIAN ASSISTANT

## 2024-09-17 PROCEDURE — 85025 COMPLETE CBC W/AUTO DIFF WBC: CPT | Mod: 91

## 2024-09-17 RX ORDER — ACETAMINOPHEN 325 MG/1
650 TABLET ORAL ONCE
Status: COMPLETED | OUTPATIENT
Start: 2024-09-17 | End: 2024-09-17

## 2024-09-17 RX ORDER — LEVETIRACETAM 500 MG/1
500 TABLET ORAL 2 TIMES DAILY
Qty: 10 TABLET | Refills: 0 | Status: ON HOLD | OUTPATIENT
Start: 2024-09-17 | End: 2024-09-24

## 2024-09-17 RX ADMIN — POLYETHYLENE GLYCOL 3350 1 PACKET: 17 POWDER, FOR SOLUTION ORAL at 05:45

## 2024-09-17 RX ADMIN — DEXTROMETHORPHAN HYDROBROMIDE, GUAIFENESIN, PHENYLEPHRINE HYDROCHLORIDE: 20; 200; 10 SOLUTION ORAL at 05:45

## 2024-09-17 RX ADMIN — ONDANSETRON 4 MG: 2 INJECTION INTRAMUSCULAR; INTRAVENOUS at 07:13

## 2024-09-17 RX ADMIN — OMEPRAZOLE 40 MG: 20 CAPSULE, DELAYED RELEASE ORAL at 05:45

## 2024-09-17 RX ADMIN — ACETAMINOPHEN 1000 MG: 500 TABLET ORAL at 05:45

## 2024-09-17 RX ADMIN — LEVETIRACETAM 500 MG: 500 TABLET, FILM COATED ORAL at 05:45

## 2024-09-17 RX ADMIN — BUSPIRONE HYDROCHLORIDE 15 MG: 10 TABLET ORAL at 08:09

## 2024-09-17 RX ADMIN — DOCUSATE SODIUM 100 MG: 100 CAPSULE, LIQUID FILLED ORAL at 05:45

## 2024-09-17 RX ADMIN — OXYCODONE HYDROCHLORIDE 2.5 MG: 5 TABLET ORAL at 03:21

## 2024-09-17 RX ADMIN — ACETAMINOPHEN 650 MG: 325 TABLET ORAL at 22:56

## 2024-09-17 ASSESSMENT — FIBROSIS 4 INDEX: FIB4 SCORE: 2.15

## 2024-09-17 ASSESSMENT — PAIN DESCRIPTION - PAIN TYPE
TYPE: ACUTE PAIN

## 2024-09-17 NOTE — DISCHARGE PLANNING
ATTN: Case Management  RE: Referral for Home Health    As of 9/17, we have accepted the Home Health referral for the patient listed above.    A [facility] clinician will be out to see the patient within 48 hours. If you have any questions or concerns regarding the patient’s transition to Home Health, please do not hesitate to contact us at [phone].      We look forward to collaborating with you,  [facility] Health Team

## 2024-09-17 NOTE — CARE PLAN
The patient is Stable - Low risk of patient condition declining or worsening    Shift Goals  Clinical Goals: Stable neuro exams, safety, maintain skin integrity  Patient Goals: Sleep well  Family Goals: SHALINI    Progress made toward(s) clinical / shift goals:  Patient is alert and oriented and she is able to communicate her needs. Education provided on safety and using her call light for assistance. Patient demonstrates understanding of education provided. Patient able to mobilize and turn in bed. Patient has bruising to skin but no new breakdown. Bed low, locked and call light in reach.    Problem: Knowledge Deficit - Standard  Goal: Patient and family/care givers will demonstrate understanding of plan of care, disease process/condition, diagnostic tests and medications  Outcome: Progressing   Education on safety and POC, monitoring her neuro status and pain. Patient shows understanding of education provided, all questions answered  Problem: Pain - Standard  Goal: Alleviation of pain or a reduction in pain to the patient’s comfort goal  Outcome: Progressing   Pain assessment in place, patient has no complaints of pain, pain reassessment in place  Problem: Psychosocial  Goal: Patient's ability to verbalize feelings about condition will improve  Outcome: Progressing   Patient is able to verbalize her feelings, emotional support provided  Problem: Neuro Status  Goal: Neuro status will remain stable or improve  Outcome: Progressing   Q4 hour neuro checks in place to monitor neuro status, patient remains intact    Patient is not progressing towards the following goals: N/A

## 2024-09-17 NOTE — DISCHARGE INSTRUCTIONS
- Call or seek medical attention for questions or concerns  - Follow up with Dr. Blanchard  in 1-2 weeks time. Call their private office to schedule follow up.   - Follow up with primary care provider within one weeks time. Review your home and any new medication with your primary care doctor.    - Resume regular diet  - May take over the counter acetaminophen. Avoid NSAIDS, Aspirin, Ibuprofen if you have been advise to do so by your Neurosurgeon or a minimum of 2 weeks.  - May use OTC 4% lidocaine patches.  - Continue daily over the counter stool softener while on narcotics  - No operation of machinery or motorized vehicles while under the influence of narcotics  - No alcohol, marijuana or illicit drug use while under the influence of narcotics  - In the event of a narcotic overdose naloxone (Narcan) is available without a prescription from any [facility] or [facility]  - Hospital staff are unable to refill your pain medication. You will need to contact your PCP or surgeon's office for refills  - No swimming, hot tubs, baths or wound submersion until cleared by outpatient provider. May shower  - No contact sports, strenuous activities, or heavy lifting until cleared by outpatient provider  - If respiratory decompensation, persistent or worsening pain, fever or signs or symptoms of infection occur seek medical attention

## 2024-09-17 NOTE — DISCHARGE PLANNING
Case Management Discharge Planning    Admission Date: 9/15/2024  GMLOS: 3.3  ALOS: 2    6-Clicks ADL Score: 16  6-Clicks Mobility Score: 16  PT and/or OT Eval ordered: Yes  Post-acute Referrals Ordered: Yes  Post-acute Choice Obtained: Yes  Has referral(s) been sent to post-acute provider:  Yes      Anticipated Discharge Dispo: Discharge Disposition: D/T to home under [facility] care in anticipation of covered skilled care (06)    DME Needed: No    Action(s) Taken: Updated Provider/Nurse on Discharge Plan   referral sent by [facility] to [facility] choice.     [facility] accepted Pt. HALLIE Guevara informed.     IMM delivered. Pt's daughter will provide transport home.    Escalations Completed: Provider    Medically Clear: No    Next Steps: RN CM to continue to assist in Pt's discharge needs.     Barriers to Discharge: Medical clearance    Is the patient up for discharge tomorrow: No

## 2024-09-17 NOTE — THERAPY
Occupational Therapy Contact Note    Patient Name: Aaliyah Fulton  Age:  84 y.o., Sex:  female  Medical Record #: [id number]  Today's Date: 9/17/2024    OT tx attempted x2. At initial attempt (2800), pt requested that therapist come back later to allow her more time to rest. During second attempt (7362), RN reported that pt was expected to DC within the next 10 minutes.

## 2024-09-17 NOTE — PROGRESS NOTES
Patient transferred to neuro  Patient seen by therapies.   Home health has accepted patient  Discharge home

## 2024-09-17 NOTE — PROGRESS NOTES
Approximately 0220, patient's bed alarm was sounding, this RN and RN sitting by patient's room went into room and patient had fallen out of bed, tangled in blankets. Patient stated she was dreaming and tried to stand and fell. Patient stated she hit her left hear on the side table. Staff were able to get patient back into bed without incident. Patient's vitals taken, vital signs stable. Neuro exam completed, patient alert and oriented. Trauma APRN notified and daughter Angi. New orders received for head CT.    0300: CT came back stable, no changes to bleed. APRN notified.

## 2024-09-17 NOTE — DISCHARGE SUMMARY
Trauma Discharge Summary    DATE OF ADMISSION: 9/15/2024    DATE OF DISCHARGE: 9/17/2024    LENGTH OF STAY: 2 days    ATTENDING PHYSICIAN: Trey Burton M.D.    CONSULTING PHYSICIAN:   1. Francisco Borden MD Neurosurgery  2.     DISCHARGE DIAGNOSIS:  Principal Problem:    Trauma  Active Problems:    Subdural hematoma (HCC)    Contraindication to deep vein thrombosis (DVT) prophylaxis    Right wrist injury, initial encounter    Anemia    Normal pressure hydrocephalus (HCC)      Overview: Last Assessment & Plan:       Formatting of this note might be different from the original.      Dx by neuro, was seen for freq falls, lethargy and h/a      1/10/22 a ventriculoperitoneal shunt was placed with post op f/u revealing       fluid was not collecting on brain.      Now feels well, cont to use walker, feels unsteady without a walker, fell       and broke her hip when using a cane      Shunt noticeable on R side of skull, parietal area      Followed by neuro    Benign essential hypertension    Gastroesophageal reflux disease without esophagitis      Overview: Last Assessment & Plan:       Formatting of this note might be different from the original.      Chronic condition managed with current medical regimen      Stable upon review at this time      Continue with current medication(s)      Followed by PCP q 6 months    History of recurrent urinary tract infection    Cholangiocarcinoma (HCC)    Nasal bone fracture    Overactive bladder    Generalized anxiety disorder    Hyperlipidemia    Sutured skin wound  Resolved Problems:    * No resolved hospital problems. *      PROCEDURES:  1. None  2.     HISTORY OF PRESENT ILLNESS: The patient is a 84 y.o. female who was reportedly injured in a ground-level fall. She was transferred to [facility] in [place], [place].    HOSPITAL COURSE: The patient was triaged as a consult level activation. The patient was transported to trauma ICU where she underwent frequent  neurochecks and was evaluated by neurosurgery.  Patient was transferred down to the neurology morales where she was evaluated by therapies and will be discharged home with family support.  Patient did have home health arranged for her prior to discharge.  For specific details regarding patient's hospital stay please see her detailed hospital problem list below    HOSPITAL PROBLEM LIST:  * Trauma- (present on admission)  Assessment & Plan  GLF. Found down by family.   T-5000 Activation.  Trey Burton MD. Trauma Surgery.    Subdural hematoma (HCC)- (present on admission)  Assessment & Plan  5 mm parafalcine subdural hematoma, extending from anterior to posterior. 5 mm right holohemispheric subdural hematoma, with acute on subacute/chronic components.  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   Interval head CT stable.  Non-operative management.  Post traumatic pharmacologic seizure prophylaxis for 1 week.  Speech Language Pathology cognitive evaluation.  Francisco Borden MD. Neurosurgeon. [facility].    Anemia- (present on admission)  Assessment & Plan  Hgb of 10.0 on admission, check  iron studies     Right wrist injury, initial encounter- (present on admission)  Assessment & Plan  Ill-defined lucency in the distal radius, concerning for nondisplaced fracture.  No pain, deformity, skin changes, or decrease in ROM on tertiary exam, recommend outpatient f/up if patient becomes symptomatic     Contraindication to deep vein thrombosis (DVT) prophylaxis- (present on admission)  Assessment & Plan  VTE prophylaxis initially contraindicated secondary to elevated bleeding risk.    Sutured skin wound- (present on admission)  Assessment & Plan  Patient reports dermatologic procedure to left anterior shin, preexisting sutured wound prior to arrival     Hyperlipidemia  Assessment & Plan  Chronic condition treated with Lipitor.  Resumed maintenance medication on admission.      Generalized anxiety  disorder- (present on admission)  Assessment & Plan  Chronic condition treated with Buspar and Effexor XR.  Resumed maintenance medication on admission.    Overactive bladder- (present on admission)  Assessment & Plan  Chronic condition treated with Oxybutynin.  Resumed maintenance medication on admission.    Nasal bone fracture- (present on admission)  Assessment & Plan  Minimal step-off in the bilateral nasal bones, left more than right, could relate to age indeterminant injury.     Cholangiocarcinoma (HCC)- (present on admission)  Assessment & Plan  Followed by oncology.     History of recurrent urinary tract infection- (present on admission)  Assessment & Plan  Chronic condition treated with estradiol cream, Hipprex, and D-mannose.  Holding maintenance medication during acute traumatic illness.     Gastroesophageal reflux disease without esophagitis- (present on admission)  Assessment & Plan  Chronic condition treated with omeprazole.  Resumed maintenance medication on admission.    Benign essential hypertension- (present on admission)  Assessment & Plan  Chronic condition treated with valsartan.  Resumed maintenance medication on admission.    Normal pressure hydrocephalus (HCC)- (present on admission)  Assessment & Plan   shunt placed 1/10/2022.   Followed [facility].           DISPOSITION: Discharged home on 9/17/2024. The patient and family were counseled and questions were answered. Specifically, signs and symptoms of infection, respiratory decompensation and persistent or worsening pain were discussed and the patient agrees to seek medical attention if any of these develop.    DISCHARGE MEDICATIONS:  The patients controlled substance history was reviewed and a controlled substance use informed consent (if applicable) was provided by [facility] and the patient has been prescribed.     Medication List        START taking these medications        Instructions   levETIRAcetam 500  MG Tabs  Commonly known as: Keppra   Take 1 Tablet by mouth 2 times a day for 5 days.  Dose: 500 mg            CONTINUE taking these medications        Instructions   acetaminophen 500 MG Tabs  Commonly known as: Tylenol   Take 1,000 mg by mouth every 6 hours as needed for Moderate Pain. Indications: Pain  Dose: 1,000 mg     atorvastatin 40 MG Tabs  Commonly known as: Lipitor   TAKE ONE TABLET BY MOUTH EVERY EVENING  Dose: 40 mg     busPIRone 15 MG tablet  Commonly known as: Buspar   Take 15 mg by mouth 3 times a day. Indications: Anxiety Disorder  Dose: 15 mg     CALCIUM-VITAMIN D3 PO   Take 600 mg by mouth every morning. Indications: supplement  Dose: 600 mg     cyanocobalamin 500 MCG Tabs  Commonly known as: Vitamin B-12   Take 5,000 mcg by mouth every day. Indications: supplement  Dose: 5,000 mcg     D-MANNOSE PO   Take 500 mg by mouth every evening. Indications: healthy urinary tract  Dose: 500 mg     estradiol 0.1 MG/GM vaginal cream  Commonly known as: Estrace   Insert 2 g into the vagina 3 times a week. Indications: Simple Infection of the Urinary Tract  Dose: 2 g     lidocaine 4 % Ptch  Commonly known as: Asperflex   Place 1 Patch on the skin every 12 hours as needed (lower back pain).  Dose: 1 Patch     methenamine hip 1 GM Tabs  Commonly known as: Hipprex   Take 1 g by mouth 2 times a day. LONG TERM TREATMENT FOR CHRONIC UTI.  Indications: Urinary Tract Infection  Dose: 1 g     omeprazole 40 MG delayed-release capsule  Commonly known as: PriLOSEC   Take 1 Capsule by mouth every day. Indications: Heartburn  Dose: 1 Capsule     oxybutynin 15 MG CR tablet  Commonly known as: Ditropan-XL   Take 1 Tablet by mouth every day. Indications: Urinary Incontinence  Dose: 1 Tablet     valsartan 160 MG Tabs  Commonly known as: Diovan   Take 160 mg by mouth every day.  Dose: 160 mg     * venlafaxine XR 75 MG Cp24  Commonly known as: Effexor XR   Take 75 mg by mouth every day. For a daily total of 225 mg  Indications:  Generalized Anxiety Disorder  Dose: 75 mg     * venlafaxine 150 MG extended-release capsule  Commonly known as: Effexor-XR   Take 1 Capsule by mouth every day. Taken with 75 mg for 225mg daily  Dose: 150 mg     vitamin D3 1000 Unit (25 mcg) Tabs  Commonly known as: Cholecalciferol   Take 2,000 Units by mouth every morning. Indications: supplement  Dose: 2,000 Units           * This list has 2 medication(s) that are the same as other medications prescribed for you. Read the directions carefully, and ask your doctor or other care provider to review them with you.                  ACTIVITY:      WOUND CARE:      DIET:  Orders Placed This Encounter   Procedures    Diet Order Diet: Regular     Standing Status:   Standing     Number of Occurrences:   1     Order Specific Question:   Diet:     Answer:   Regular [1]       FOLLOW UP:  Gregory Blanchard M.D.  [address]  [facility] [address]  [phone]    Schedule an appointment as soon as possible for a visit      Ashvin Arias D.O.  [address] [facility] [address]  [facility] [id number] [phone]    Schedule an appointment as soon as possible for a visit        TIME SPENT ON DISCHARGE: 28 minutes      ____________________________________________  ALIYA Thompson    DD: 9/17/2024 10:45 AM

## 2024-09-18 PROBLEM — R09.02 HYPOXIA: Status: ACTIVE | Noted: 2024-09-18

## 2024-09-18 PROBLEM — K59.00 CONSTIPATION: Status: ACTIVE | Noted: 2024-09-18

## 2024-09-18 PROBLEM — R62.7 FAILURE TO THRIVE IN ADULT: Status: ACTIVE | Noted: 2024-09-18

## 2024-09-18 LAB
APPEARANCE UR: CLEAR
BILIRUB UR QL STRIP.AUTO: NEGATIVE
COLOR UR: YELLOW
GLUCOSE UR STRIP.AUTO-MCNC: NEGATIVE MG/DL
KETONES UR STRIP.AUTO-MCNC: NEGATIVE MG/DL
LEUKOCYTE ESTERASE UR QL STRIP.AUTO: NEGATIVE
MICRO URNS: NORMAL
NITRITE UR QL STRIP.AUTO: NEGATIVE
PH UR STRIP.AUTO: 7 [PH] (ref 5–8)
PROT UR QL STRIP: NEGATIVE MG/DL
RBC UR QL AUTO: NEGATIVE
SP GR UR STRIP.AUTO: 1.01
UROBILINOGEN UR STRIP.AUTO-MCNC: 0.2 MG/DL

## 2024-09-18 PROCEDURE — 665999 HH PPS REVENUE DEBIT

## 2024-09-18 PROCEDURE — A9270 NON-COVERED ITEM OR SERVICE: HCPCS

## 2024-09-18 PROCEDURE — 81003 URINALYSIS AUTO W/O SCOPE: CPT

## 2024-09-18 PROCEDURE — 96374 THER/PROPH/DIAG INJ IV PUSH: CPT

## 2024-09-18 PROCEDURE — 665998 HH PPS REVENUE CREDIT

## 2024-09-18 PROCEDURE — 700102 HCHG RX REV CODE 250 W/ 637 OVERRIDE(OP)

## 2024-09-18 PROCEDURE — 700102 HCHG RX REV CODE 250 W/ 637 OVERRIDE(OP): Performed by: HOSPITALIST

## 2024-09-18 PROCEDURE — 99223 1ST HOSP IP/OBS HIGH 75: CPT | Mod: AI | Performed by: HOSPITALIST

## 2024-09-18 PROCEDURE — 770004 HCHG ROOM/CARE - ONCOLOGY PRIVATE *

## 2024-09-18 PROCEDURE — 700111 HCHG RX REV CODE 636 W/ 250 OVERRIDE (IP): Mod: JZ | Performed by: HOSPITALIST

## 2024-09-18 PROCEDURE — A9270 NON-COVERED ITEM OR SERVICE: HCPCS | Performed by: HOSPITALIST

## 2024-09-18 RX ORDER — ATORVASTATIN CALCIUM 40 MG/1
40 TABLET, FILM COATED ORAL EVERY EVENING
Status: DISCONTINUED | OUTPATIENT
Start: 2024-09-18 | End: 2024-09-24 | Stop reason: HOSPADM

## 2024-09-18 RX ORDER — UREA 10 %
500 LOTION (ML) TOPICAL DAILY
Status: DISCONTINUED | OUTPATIENT
Start: 2024-09-18 | End: 2024-09-24 | Stop reason: HOSPADM

## 2024-09-18 RX ORDER — BUSPIRONE HYDROCHLORIDE 10 MG/1
15 TABLET ORAL 3 TIMES DAILY
Status: DISCONTINUED | OUTPATIENT
Start: 2024-09-18 | End: 2024-09-24 | Stop reason: HOSPADM

## 2024-09-18 RX ORDER — VENLAFAXINE HYDROCHLORIDE 75 MG/1
150 CAPSULE, EXTENDED RELEASE ORAL DAILY
Status: DISCONTINUED | OUTPATIENT
Start: 2024-09-18 | End: 2024-09-24 | Stop reason: HOSPADM

## 2024-09-18 RX ORDER — VALSARTAN 80 MG/1
160 TABLET ORAL DAILY
Status: DISCONTINUED | OUTPATIENT
Start: 2024-09-18 | End: 2024-09-24 | Stop reason: HOSPADM

## 2024-09-18 RX ORDER — FUROSEMIDE 10 MG/ML
20 INJECTION INTRAMUSCULAR; INTRAVENOUS ONCE
Status: COMPLETED | OUTPATIENT
Start: 2024-09-18 | End: 2024-09-18

## 2024-09-18 RX ORDER — ENOXAPARIN SODIUM 100 MG/ML
40 INJECTION SUBCUTANEOUS DAILY
Status: DISCONTINUED | OUTPATIENT
Start: 2024-09-18 | End: 2024-09-20

## 2024-09-18 RX ORDER — LEVETIRACETAM 500 MG/1
250 TABLET ORAL 2 TIMES DAILY
Status: DISCONTINUED | OUTPATIENT
Start: 2024-09-18 | End: 2024-09-19

## 2024-09-18 RX ORDER — LEVETIRACETAM 500 MG/1
500 TABLET ORAL 2 TIMES DAILY
Status: DISCONTINUED | OUTPATIENT
Start: 2024-09-18 | End: 2024-09-18

## 2024-09-18 RX ORDER — ONDANSETRON 4 MG/1
4 TABLET, ORALLY DISINTEGRATING ORAL EVERY 4 HOURS PRN
Status: DISCONTINUED | OUTPATIENT
Start: 2024-09-18 | End: 2024-09-24 | Stop reason: HOSPADM

## 2024-09-18 RX ORDER — POLYETHYLENE GLYCOL 3350 17 G/17G
1 POWDER, FOR SOLUTION ORAL
Status: DISCONTINUED | OUTPATIENT
Start: 2024-09-18 | End: 2024-09-18

## 2024-09-18 RX ORDER — AMOXICILLIN 250 MG
2 CAPSULE ORAL EVERY EVENING
Status: DISCONTINUED | OUTPATIENT
Start: 2024-09-18 | End: 2024-09-18

## 2024-09-18 RX ORDER — AMOXICILLIN 250 MG
2 CAPSULE ORAL EVERY EVENING
Status: DISCONTINUED | OUTPATIENT
Start: 2024-09-18 | End: 2024-09-20

## 2024-09-18 RX ORDER — MULTIVIT-MIN/IRON/FOLIC ACID/K 18-600-40
600 CAPSULE ORAL EVERY MORNING
Status: DISCONTINUED | OUTPATIENT
Start: 2024-09-18 | End: 2024-09-18

## 2024-09-18 RX ORDER — TRAZODONE HYDROCHLORIDE 100 MG/1
50 TABLET ORAL
Status: DISCONTINUED | OUTPATIENT
Start: 2024-09-18 | End: 2024-09-19

## 2024-09-18 RX ORDER — ACETAMINOPHEN 500 MG
1000 TABLET ORAL EVERY 6 HOURS PRN
Status: DISCONTINUED | OUTPATIENT
Start: 2024-09-18 | End: 2024-09-24 | Stop reason: HOSPADM

## 2024-09-18 RX ORDER — POLYETHYLENE GLYCOL 3350 17 G/17G
1 POWDER, FOR SOLUTION ORAL 2 TIMES DAILY
Status: DISCONTINUED | OUTPATIENT
Start: 2024-09-18 | End: 2024-09-20

## 2024-09-18 RX ORDER — ONDANSETRON 2 MG/ML
4 INJECTION INTRAMUSCULAR; INTRAVENOUS EVERY 4 HOURS PRN
Status: DISCONTINUED | OUTPATIENT
Start: 2024-09-18 | End: 2024-09-24 | Stop reason: HOSPADM

## 2024-09-18 RX ADMIN — VIBEGRON 75 MG: 75 TABLET, FILM COATED ORAL at 08:14

## 2024-09-18 RX ADMIN — Medication 5 MG: at 23:01

## 2024-09-18 RX ADMIN — LEVETIRACETAM 500 MG: 500 TABLET, FILM COATED ORAL at 08:14

## 2024-09-18 RX ADMIN — OMEPRAZOLE 40 MG: 20 CAPSULE, DELAYED RELEASE ORAL at 08:14

## 2024-09-18 RX ADMIN — VENLAFAXINE HYDROCHLORIDE 150 MG: 75 CAPSULE, EXTENDED RELEASE ORAL at 08:14

## 2024-09-18 RX ADMIN — POLYETHYLENE GLYCOL 3350 1 PACKET: 17 POWDER, FOR SOLUTION ORAL at 17:59

## 2024-09-18 RX ADMIN — BUSPIRONE HYDROCHLORIDE 15 MG: 5 TABLET ORAL at 17:57

## 2024-09-18 RX ADMIN — FUROSEMIDE 20 MG: 10 INJECTION, SOLUTION INTRAVENOUS at 11:45

## 2024-09-18 RX ADMIN — POLYETHYLENE GLYCOL 3350 1 PACKET: 17 POWDER, FOR SOLUTION ORAL at 08:14

## 2024-09-18 RX ADMIN — CYANOCOBALAMIN TAB 500 MCG 500 MCG: 500 TAB at 11:45

## 2024-09-18 RX ADMIN — ATORVASTATIN CALCIUM 40 MG: 40 TABLET, FILM COATED ORAL at 17:56

## 2024-09-18 RX ADMIN — BUSPIRONE HYDROCHLORIDE 15 MG: 5 TABLET ORAL at 11:45

## 2024-09-18 RX ADMIN — LEVETIRACETAM 250 MG: 500 TABLET, FILM COATED ORAL at 17:58

## 2024-09-18 RX ADMIN — VALSARTAN 160 MG: 80 TABLET, FILM COATED ORAL at 08:14

## 2024-09-18 RX ADMIN — ACETAMINOPHEN 1000 MG: 500 TABLET ORAL at 21:14

## 2024-09-18 ASSESSMENT — ENCOUNTER SYMPTOMS
WEAKNESS: 1
VOMITING: 0
DIARRHEA: 0
WHEEZING: 0
ABDOMINAL PAIN: 0
FALLS: 1
SHORTNESS OF BREATH: 0
CHILLS: 0
CONSTIPATION: 1
LOSS OF CONSCIOUSNESS: 0
FEVER: 0
NAUSEA: 0

## 2024-09-18 ASSESSMENT — LIFESTYLE VARIABLES
TOTAL SCORE: 0
CONSUMPTION TOTAL: INCOMPLETE
EVER HAD A DRINK FIRST THING IN THE MORNING TO STEADY YOUR NERVES TO GET RID OF A HANGOVER: NO
TOTAL SCORE: 0
HAVE YOU EVER FELT YOU SHOULD CUT DOWN ON YOUR DRINKING: NO
EVER FELT BAD OR GUILTY ABOUT YOUR DRINKING: NO
HAVE PEOPLE ANNOYED YOU BY CRITICIZING YOUR DRINKING: NO
TOTAL SCORE: 0
ALCOHOL_USE: NO

## 2024-09-18 ASSESSMENT — COGNITIVE AND FUNCTIONAL STATUS - GENERAL
TOILETING: A LOT
DAILY ACTIVITIY SCORE: 15
MOVING TO AND FROM BED TO CHAIR: A LOT
TOILETING: A LITTLE
WALKING IN HOSPITAL ROOM: A LOT
SUGGESTED CMS G CODE MODIFIER MOBILITY: CL
SUGGESTED CMS G CODE MODIFIER DAILY ACTIVITY: CI
CLIMB 3 TO 5 STEPS WITH RAILING: A LOT
DRESSING REGULAR UPPER BODY CLOTHING: A LITTLE
HELP NEEDED FOR BATHING: A LOT
SUGGESTED CMS G CODE MODIFIER DAILY ACTIVITY: CK
DAILY ACTIVITIY SCORE: 23
MOVING FROM LYING ON BACK TO SITTING ON SIDE OF FLAT BED: A LITTLE
STANDING UP FROM CHAIR USING ARMS: A LOT
TURNING FROM BACK TO SIDE WHILE IN FLAT BAD: A LITTLE
WALKING IN HOSPITAL ROOM: A LOT
CLIMB 3 TO 5 STEPS WITH RAILING: A LOT
TURNING FROM BACK TO SIDE WHILE IN FLAT BAD: A LOT
SUGGESTED CMS G CODE MODIFIER MOBILITY: CL
EATING MEALS: A LITTLE
MOVING FROM LYING ON BACK TO SITTING ON SIDE OF FLAT BED: A LOT
MOVING TO AND FROM BED TO CHAIR: A LOT
MOBILITY SCORE: 12
DRESSING REGULAR LOWER BODY CLOTHING: A LOT
MOBILITY SCORE: 14
STANDING UP FROM CHAIR USING ARMS: A LOT
PERSONAL GROOMING: A LITTLE

## 2024-09-18 ASSESSMENT — PAIN DESCRIPTION - PAIN TYPE
TYPE: ACUTE PAIN

## 2024-09-18 NOTE — ASSESSMENT & PLAN NOTE
Reviewed echocardiogram from 9/2/2024 with moderate aortic stenosis and moderate mitral regurgitation

## 2024-09-18 NOTE — ED NOTES
Pt continues to sleep in NAD, VSS on 2L NC  Breathing even and unlabored  Daughter remains at BS  Pure-wick still in place and functioning  Call light in reach  Fall risk precautions in place, rails up, bed in locked position

## 2024-09-18 NOTE — ASSESSMENT & PLAN NOTE
Patient recently discharged after fall complicated by subdural hematoma 5 mm, wanted repeat 4 weeks follow-up head CT as outpatient clinic, discharged home however failure to thrive with debility and unable to care for herself  PT OT  Palliative care consulted for goals of care discussion

## 2024-09-18 NOTE — ED NOTES
Bedside report received from off going RN/tech: Marge assumed care of patient.  POC discussed with patient. Call light within reach, all needs addressed at this time.       Fall risk interventions in place: Move the patient closer to the nurse's station, Patient's personal possessions are with in their safe reach, Place socks on patient, Place fall risk sign on patient's door, Keep floor surfaces clean and dry, and Bed Alarm in use (all applicable per [employer] Fall risk assessment)   Continuous monitoring: Pulse Ox or Blood Pressure  IVF/IV medications: Not Applicable   Oxygen: How many liters 2L  Bedside sitter: Not Applicable   Isolation: Not Applicable

## 2024-09-18 NOTE — ED TRIAGE NOTES
Chief Complaint   Patient presents with    Weakness     Patient DC home today at 1430 after spending two nights in the hospital for a Subdural hematoma. Family reports patient was transferred into car by staff but was unable to stand or assist with the transfer. Daughter states family is unable to care for patient as she is unable to assist to transfer herself.

## 2024-09-18 NOTE — ASSESSMENT & PLAN NOTE
Recent hospitalization discharged on 9/17/2024  Seizure prophylaxis Keppra has been stopped because of encephalopathy  Neurosurgery recommended repeat CT in 4 weeks  Repeat CT head during this hospitalization is stable

## 2024-09-18 NOTE — ED NOTES
Pt repositioned and provided blankets. Lac repair to LLE re-dressed w/ nonstick dressing and wrapped as pt tries to remove bandage that was covering it. House bed ordered for comfort. Comfortable chair provided to daughter who remains at BS

## 2024-09-18 NOTE — ED NOTES
Pt moved over to hospital bed for more comfort. VSS, remains on baseline 2L NC, A&O x2. Pt and daughter Deny further needs at this time. Call light in reach, daughter remains at BS  Fall risk precautions in place, rails up, bed alarm on, bed in locked position  Pure-wick remains in place and functioning   Pt is absent of any moisture or skin issues from what is visualized upon assessment when transferring to house bed

## 2024-09-18 NOTE — CONSULTS
Hospital Medicine Consultation    Date of Service  9/17/2024    Referring Physician  Ko Holman M.D.    Consulting Physician  Dangelo Rosa D.O.    Reason for Consultation  Debility    History of Presenting Illness  Patient is a 84-year-old female with recent discharge 9/17 due to subdural hematoma who presents due to failure to thrive and debility.  Patient was discharged home earlier in the day, however patient was unable to get out of the car or take care of herself which is unusual for baseline.  Patient has a history of hepatocellular carcinoma and cholangiocarcinoma [facility].  Patient is accompanied by one of her daughters and states that the patient seems more somnolent, however is oriented and is maintaining her mental status.  Recently, patient was started on Keppra 500 mg twice daily as seizure prophylaxis due to subdural hematoma as well as a history of normal pressure hydrocephalus status post  shunt.  Also, patient does report constipation for at least several days but unknown duration.    Review of Systems  Review of Systems   Constitutional:  Negative for chills and fever.   Respiratory:  Negative for shortness of breath and wheezing.    Gastrointestinal:  Positive for constipation. Negative for abdominal pain and diarrhea.   Neurological:  Positive for weakness. Negative for loss of consciousness.       Past Medical History   has a past medical history of Arthritis, ASTHMA, Breast cancer ([facility]) (2005), Cancer (HCC) (06/2024), Cataract, Dental disorder, Heart burn, Heart murmur, High cholesterol, History of cervical fracture (2022), Hypertension (01/03/2022), Indigestion, Osteoporosis, Pain, Pneumonia (12/2021), PONV (postoperative nausea and vomiting), Psychiatric problem, Stroke ([facility]) (2021), and Unspecified urinary incontinence.    Surgical History   has a past surgical history that includes sinuscopy; breast  biopsy (05/21/2010); other orthopedic surgery; knee arthroplasty total (09/19/2011); lumbar laminectomy diskectomy (10/17/2012); foraminotomy (10/17/2012); pr radiation therapy plan simple; recovery (05/10/2016); pr partial hip replacement (Left, 11/24/2021); lumbar decompression (10/17/2012); pr create shunt:ventric-peritoneal (01/10/2022); cholecystectomy; hammertoe correction (Bilateral); shoulder surgery; knee arthroscopy; pr total knee arthroplasty (Left, 03/28/2023); and lumpectomy.    Family History  family history includes Breast Cancer in her daughter; Cancer in her daughter; Lung Disease in her mother; Psychiatric Illness in her daughter and daughter; Stroke in her mother.    Social History   reports that she has never smoked. She has never used smokeless tobacco. She reports that she does not drink alcohol and does not use drugs.    Medications  Prior to Admission Medications   Prescriptions Last Dose Informant Patient Reported? Taking?   Calcium Carbonate-Vitamin D (CALCIUM-VITAMIN D3 PO)  Family Member, Patient Yes No   Sig: Take 600 mg by mouth every morning. Indications: supplement   D-MANNOSE PO  Patient, Family Member Yes No   Sig: Take 500 mg by mouth every evening. Indications: healthy urinary tract    acetaminophen (TYLENOL) 500 MG Tab  Patient, Family Member Yes No   Sig: Take 1,000 mg by mouth every 6 hours as needed for Moderate Pain. Indications: Pain   atorvastatin (LIPITOR) 40 MG Tab  Family Member, Patient No No   Sig: TAKE ONE TABLET BY MOUTH EVERY EVENING   busPIRone (BUSPAR) 15 MG tablet  Family Member, Patient Yes No   Sig: Take 15 mg by mouth 3 times a day. Indications: Anxiety Disorder   cyanocobalamin (VITAMIN B-12) 500 MCG Tab  Family Member, Patient Yes No   Sig: Take 5,000 mcg by mouth every day. Indications: supplement   estradiol (ESTRACE) 0.1 MG/GM vaginal cream  Family Member, Patient Yes No   Sig: Insert 2 g into the vagina 3 times a week. Indications: Simple Infection of  the Urinary Tract   levETIRAcetam (KEPPRA) 500 MG Tab   No No   Sig: Take 1 Tablet by mouth 2 times a day for 5 days.   lidocaine (ASPERFLEX) 4 % Patch  Family Member, Patient Yes No   Sig: Place 1 Patch on the skin every 12 hours as needed (lower back pain).   methenamine hip (HIPPREX) 1 GM Tab  Family Member, Patient Yes No   Sig: Take 1 g by mouth 2 times a day. LONG TERM TREATMENT FOR CHRONIC UTI.  Indications: Urinary Tract Infection   omeprazole (PRILOSEC) 40 MG delayed-release capsule  Family Member, Patient Yes No   Sig: Take 1 Capsule by mouth every day. Indications: Heartburn   oxybutynin (DITROPAN XL) 15 MG CR tablet  Family Member, Patient Yes No   Sig: Take 1 Tablet by mouth every day. Indications: Urinary Incontinence   valsartan (DIOVAN) 160 MG Tab  Family Member, Patient Yes No   Sig: Take 160 mg by mouth every day.   venlafaxine (EFFEXOR-XR) 150 MG extended-release capsule  Patient, Family Member No No   Sig: Take 1 Capsule by mouth every day. Taken with 75 mg for 225mg daily   venlafaxine XR (EFFEXOR XR) 75 MG CAPSULE SR 24 HR  Family Member, Patient Yes No   Sig: Take 75 mg by mouth every day. For a daily total of 225 mg  Indications: Generalized Anxiety Disorder   vitamin D3 (CHOLECALCIFEROL) 1000 Unit (25 mcg) Tab  Family Member, Patient Yes No   Sig: Take 2,000 Units by mouth every morning. Indications: supplement      Facility-Administered Medications: None       Allergies  No Known Allergies    Physical Exam  Temp:  [36.6 °C (97.8 °F)-36.9 °C (98.4 °F)] 36.6 °C (97.9 °F)  Pulse:  [69-98] 75  Resp:  [15-18] 17  BP: (109-152)/(51-70) 109/57  SpO2:  [90 %-98 %] 96 %    Physical Exam  Vitals and nursing note reviewed.   Constitutional:       General: She is not in acute distress.  HENT:      Head: Normocephalic and atraumatic.      Nose: Nose normal.      Mouth/Throat:      Mouth: Mucous membranes are moist.   Eyes:      General: No scleral icterus.     Extraocular Movements: Extraocular  movements intact.   Cardiovascular:      Rate and Rhythm: Normal rate and regular rhythm.      Heart sounds: No murmur heard.     No friction rub. No gallop.   Pulmonary:      Breath sounds: No wheezing, rhonchi or rales.   Abdominal:      General: Abdomen is flat. Bowel sounds are normal. There is no distension.      Palpations: Abdomen is soft.      Tenderness: There is no abdominal tenderness.   Musculoskeletal:         General: No swelling or tenderness.   Skin:     General: Skin is warm.      Capillary Refill: Capillary refill takes less than 2 seconds.   Neurological:      General: No focal deficit present.      Mental Status: She is alert.   Psychiatric:         Mood and Affect: Mood normal.         Fluids      Laboratory  Recent Labs     09/16/24 0447 09/17/24  0116 09/17/24 2121   WBC 3.7* 3.5* 4.0*   RBC 2.77* 2.78* 3.09*   HEMOGLOBIN 9.2* 9.2* 10.1*   HEMATOCRIT 27.6* 28.0* 31.2*   MCV 99.6* 100.7* 101.0*   MCH 33.2* 33.1* 32.7   MCHC 33.3 32.9 32.4   RDW 60.8* 62.1* 61.7*   PLATELETCT 257 262 307   MPV 10.2 10.2 9.8     Recent Labs     09/16/24 0447 09/17/24  0116 09/17/24  2121   SODIUM 137 138 132*   POTASSIUM 3.9 4.2 4.9   CHLORIDE 103 105 99   CO2 22 23 23   GLUCOSE 101* 119* 96   BUN 11 11 11   CREATININE 0.64 0.53 0.57   CALCIUM 8.3* 8.6 9.1     Recent Labs     09/15/24  1156   APTT 27.3   INR 1.14*                 Imaging  DX-CHEST-PORTABLE (1 VIEW)   Final Result      Interstitial opacities again noted which could represent chronic interstitial changes versus mild vascular congestion.          Assessment/Plan  * Failure to thrive in adult  Assessment & Plan  Patient recently discharged after fall complicated by subdural hematoma 5 mm, wanted repeat 4 weeks follow-up head CT as outpatient clinic, discharged home however failure to thrive with debility and unable to care for herself  - PT/OT in the emergency department  -Patient stated she does not feel weak, however unable to stand on her own  or sit up from a seated position    Constipation  Assessment & Plan  Scheduled MiraLAX    Overactive bladder- (present on admission)  Assessment & Plan  Due to concerns for somnolence, transition oxybutynin to Gemtesa to avoid anticholinergic effects    Subdural hematoma (HCC)- (present on admission)  Assessment & Plan  History from last admission discharge today, follow-up with neurosurgery in 4 weeks as outpatient  - Repeat head CT if development of focal neurologic deficits    Normal pressure hydrocephalus (HCC)- (present on admission)  Assessment & Plan  History of status post  shunt      Total time spent: 36 minutes

## 2024-09-18 NOTE — ED NOTES
BS/ shift change report from Diane BURT. Pt was a recent admit and d/c earlier today following SDH after TBI. Pt brought back in for ongoing weakness and reported family unable to provide full care measures- basic work up ordered and ultimately here for placement.  Pt mentation varies, A&O x2-4 which is baseline. Pt does not ambulate well, PT/OT ordered. VSS on 2L NC which is baseline  Pt on a pure wick  Fall risk precautions in place, rails up, bed in low and locked position. Daughter at BS and will be staying overnight  Bed alarm placed   Call light in reach

## 2024-09-18 NOTE — PROGRESS NOTES
4 Eyes Skin Assessment Completed by JAVED Feliz and Demetri RN.    Head WDL  Ears WDL  Nose WDL  Mouth WDL  Neck WDL  Breast/Chest WDL  Shoulder Blades WDL  Spine WDL  (R) Arm/Elbow/Hand WDL  (L) Arm/Elbow/Hand WDL  Abdomen WDL  Groin WDL  Scrotum/Coccyx/Buttocks Redness and Blanching  (R) Leg WDL  (L) Leg Incision  (R) Heel/Foot/Toe WDL  (L) Heel/Foot/Toe WDL          Devices In Places SCD's and Nasal Cannula      Interventions In Place Gray Ear Foams, NC W/Ear Foams, and Sacral Mepilex    Possible Skin Injury No    Pictures Uploaded Into Epic N/A  Wound Consult Placed N/A  RN Wound Prevention Protocol Ordered Yes4 Eyes

## 2024-09-18 NOTE — ASSESSMENT & PLAN NOTE
Patient follows with Dr. Arias and Dr. Tabor  She is on palliative chemotherapy, on hold.  Will need to follow-up with her oncology team  CT liver did not show a lesion

## 2024-09-18 NOTE — H&P
Hospital Medicine History & Physical Note    Date of Service  9/18/2024    Primary Care Physician  Ashvin Arias D.O.    Consultants      Code Status  DNAR/DNI    Chief Complaint  Chief Complaint   Patient presents with    Weakness     Patient DC home today at 1430 after spending two nights in the hospital for a Subdural hematoma. Family reports patient was transferred into car by staff but was unable to stand or assist with the transfer. Daughter states family is unable to care for patient as she is unable to assist to transfer herself.        History of Presenting Illness  Aaliyah Fulton is a 84 y.o. female who presented 9/17/2024 with generalized weakness.  She has a history of cholangiocarcinoma followed by Dr. Arias and was recently hospitalized for subdural hematoma and evaluated by neurosurgery and trauma service and discharged home on 9/17/2024.  She presented back to the emergency department later that day due to inability to transfer at home and was admitted under observation in the emergency department.  While she was being monitored the patient was noted to have hypoxia and hospitalist service was reconsulted for admission.  On my evaluation the patient is on 2 L nasal cannula I attempted to wean her off oxygen but her oxygen saturations dropped to 87 with a good pulse waveform.  She denies any chest pain.  She denies any cough.  She has been afebrile.  I reviewed her chest x-ray with increased interstitial edema      I discussed the plan of care with patient, bedside RN, and ED provider .    Review of Systems  Review of Systems   Constitutional:  Positive for malaise/fatigue.   Respiratory:  Negative for shortness of breath.    Cardiovascular:  Negative for chest pain.   Gastrointestinal:  Negative for abdominal pain, nausea and vomiting.   Genitourinary:  Negative for hematuria.   Musculoskeletal:  Positive for falls and joint pain.       Past Medical History   has a past medical history of  Arthritis, ASTHMA, Breast cancer ([facility]) (2005), Cancer ([facility]) (06/2024), Cataract, Dental disorder, Heart burn, Heart murmur, High cholesterol, History of cervical fracture (2022), Hypertension (01/03/2022), Indigestion, Osteoporosis, Pain, Pneumonia (12/2021), PONV (postoperative nausea and vomiting), Psychiatric problem, Stroke ([facility]) (2021), and Unspecified urinary incontinence.    Surgical History   has a past surgical history that includes sinuscopy; breast biopsy (05/21/2010); other orthopedic surgery; knee arthroplasty total (09/19/2011); lumbar laminectomy diskectomy (10/17/2012); foraminotomy (10/17/2012); pr radiation therapy plan simple; recovery (05/10/2016); pr partial hip replacement (Left, 11/24/2021); lumbar decompression (10/17/2012); pr create shunt:ventric-peritoneal (01/10/2022); cholecystectomy; hammertoe correction (Bilateral); shoulder surgery; knee arthroscopy; pr total knee arthroplasty (Left, 03/28/2023); and lumpectomy.     Family History  family history includes Breast Cancer in her daughter; Cancer in her daughter; Lung Disease in her mother; Psychiatric Illness in her daughter and daughter; Stroke in her mother.       Social History   reports that she has never smoked. She has never used smokeless tobacco. She reports that she does not drink alcohol and does not use drugs.    Allergies  No Known Allergies    Medications  Prior to Admission Medications   Prescriptions Last Dose Informant Patient Reported? Taking?   Calcium Carbonate-Vitamin D (CALCIUM-VITAMIN D3 PO) 9/15/2024 at 0800 Family Member, Patient Yes No   Sig: Take 600 mg by mouth every morning. Indications: supplement   D-MANNOSE PO 9/15/2024 at 0800 Patient, Family Member Yes No   Sig: Take 500 mg by mouth every evening. Indications: healthy urinary tract    acetaminophen (TYLENOL) 500 MG Tab 9/14/2024 at PRN Patient, Family Member Yes No   Sig: Take 1,000 mg by mouth every 6 hours as needed for Moderate Pain. Indications: Pain    atorvastatin (LIPITOR) 40 MG Tab 9/13/2024 at PM Family Member, Patient No No   Sig: TAKE ONE TABLET BY MOUTH EVERY EVENING   busPIRone (BUSPAR) 15 MG tablet 9/15/2024 at 0800 Family Member, Patient Yes No   Sig: Take 15 mg by mouth 3 times a day. Indications: Anxiety Disorder   cyanocobalamin (VITAMIN B-12) 500 MCG Tab 9/15/2024 at 0800 Family Member, Patient Yes No   Sig: Take 5,000 mcg by mouth every day. Indications: supplement   estradiol (ESTRACE) 0.1 MG/GM vaginal cream 9/12/2024 at PM Family Member, Patient Yes No   Sig: Insert 2 g into the vagina 3 times a week. Indications: Simple Infection of the Urinary Tract   levETIRAcetam (KEPPRA) 500 MG Tab NEW RX at NOT STARTED Patient, Family Member No No   Sig: Take 1 Tablet by mouth 2 times a day for 5 days.   lidocaine (ASPERFLEX) 4 % Patch 3 WEEKS AGO at PRN Family Member, Patient Yes No   Sig: Place 1 Patch on the skin every 12 hours as needed (lower back pain).   methenamine hip (HIPPREX) 1 GM Tab 9/15/2024 at 0800 Family Member, Patient Yes No   Sig: Take 1 g by mouth 2 times a day. LONG TERM TREATMENT FOR CHRONIC UTI.  Indications: Urinary Tract Infection   omeprazole (PRILOSEC) 40 MG delayed-release capsule 9/15/2024 at 0800 Family Member, Patient Yes No   Sig: Take 1 Capsule by mouth every day. Indications: Heartburn   oxybutynin (DITROPAN XL) 15 MG CR tablet 9/13/2024 at PM Family Member, Patient Yes No   Sig: Take 1 Tablet by mouth every day. Indications: Urinary Incontinence   valsartan (DIOVAN) 160 MG Tab 9/13/2024 at PM Family Member, Patient Yes No   Sig: Take 160 mg by mouth every day.   venlafaxine (EFFEXOR-XR) 150 MG extended-release capsule 9/13/2024 at PM Patient, Family Member No No   Sig: Take 1 Capsule by mouth every day. Taken with 75 mg for 225mg daily   venlafaxine XR (EFFEXOR XR) 75 MG CAPSULE SR 24 HR 9/13/2024 at PM Family Member, Patient Yes No   Sig: Take 75 mg by mouth every day. For a daily total of 225 mg  Indications:  Generalized Anxiety Disorder   vitamin D3 (CHOLECALCIFEROL) 1000 Unit (25 mcg) Tab 9/15/2024 at 0800 Family Member, Patient Yes No   Sig: Take 2,000 Units by mouth every morning. Indications: supplement      Facility-Administered Medications: None       Physical Exam  Temp:  [36.6 °C (97.9 °F)-37.2 °C (99 °F)] 37.2 °C (99 °F)  Pulse:  [62-87] 65  Resp:  [15-17] 17  BP: (109-152)/(56-76) 141/65  SpO2:  [86 %-100 %] 97 %  Blood Pressure : (!) 141/65   Temperature: 37.2 °C (99 °F)   Pulse: 65   Respiration: 17   Pulse Oximetry: 97 %       Physical Exam  HENT:      Head: Normocephalic.   Eyes:      General: No scleral icterus.  Cardiovascular:      Heart sounds: Murmur heard.      No friction rub. No gallop.   Pulmonary:      Breath sounds: Rales present.   Abdominal:      Palpations: Abdomen is soft.      Tenderness: There is no abdominal tenderness. There is no guarding.   Musculoskeletal:         General: Swelling present.   Neurological:      General: No focal deficit present.      Mental Status: She is alert.      Motor: No weakness.      Comments: Oriented to self and place   Psychiatric:         Mood and Affect: Mood normal.         Laboratory:  Recent Labs     09/16/24 0447 09/17/24 0116 09/17/24 2121   WBC 3.7* 3.5* 4.0*   RBC 2.77* 2.78* 3.09*   HEMOGLOBIN 9.2* 9.2* 10.1*   HEMATOCRIT 27.6* 28.0* 31.2*   MCV 99.6* 100.7* 101.0*   MCH 33.2* 33.1* 32.7   MCHC 33.3 32.9 32.4   RDW 60.8* 62.1* 61.7*   PLATELETCT 257 262 307   MPV 10.2 10.2 9.8     Recent Labs     09/16/24 0447 09/17/24 0116 09/17/24 2121   SODIUM 137 138 132*   POTASSIUM 3.9 4.2 4.9   CHLORIDE 103 105 99   CO2 22 23 23   GLUCOSE 101* 119* 96   BUN 11 11 11   CREATININE 0.64 0.53 0.57   CALCIUM 8.3* 8.6 9.1     Recent Labs     09/16/24 0447 09/17/24  0116 09/17/24  2121   ALTSGPT 10 17 15   ASTSGOT 33 27 28   ALKPHOSPHAT 136* 139* 163*   TBILIRUBIN 0.3 0.2 0.4   GLUCOSE 101* 119* 96     Recent Labs     09/15/24  1156   APTT 27.3   INR 1.14*  "    No results for input(s): \"NTPROBNP\" in the last 72 hours.      No results for input(s): \"TROPONINT\" in the last 72 hours.    Imaging:  DX-CHEST-PORTABLE (1 VIEW)   Final Result      Interstitial opacities again noted which could represent chronic interstitial changes versus mild vascular congestion.               Assessment/Plan:  Justification for Admission Status  I anticipate this patient will require at least two midnights for appropriate medical management, necessitating inpatient admission because hypoxia recent [facility]    Patient will need a Med/Surg bed on ONCOLOGY service .  The need is secondary to hypoxia.    * Failure to thrive in adult  Assessment & Plan  Patient recently discharged after fall complicated by subdural hematoma 5 mm, wanted repeat 4 weeks follow-up head CT as outpatient clinic, discharged home however failure to thrive with debility and unable to care for herself  PT OT  Will likely need SNF referral    Hypoxia- (present on admission)  Assessment & Plan  Chest x-ray with pulmonary edema I ordered IV Lasix  If not improved with diuresis will consider further workups with CTA of chest      Constipation  Assessment & Plan  Scheduled MiraLAX    Overactive bladder- (present on admission)  Assessment & Plan  Due to concerns for somnolence, transition oxybutynin to Gemtesa to avoid anticholinergic effects    Subdural hematoma (HCC)- (present on admission)  Assessment & Plan  Recent hospitalization discharged on 9/17/2024  Continue close clinical monitoring  On Keppra for 5 days for seizure prophylaxis  Neurosurgery recommended repeat CT in 4 weeks  Reviewed repeat head CT 9/17/2024 stable  Continue close clinical monitoring repeat CT if change in mental status    Valvular heart disease- (present on admission)  Assessment & Plan  Reviewed echocardiogram from 9/2/2024 with moderate aortic stenosis and moderate mitral regurgitation  Mild to volume status with gentle diuresis      Cholangiocarcinoma " (HCC)- (present on admission)  Assessment & Plan  Patient follows with Dr. Arias and Dr. Tabor  I will update Dr. Arias about her hospitalization as she is scheduled to have chemotherapy later this week    Normal pressure hydrocephalus (HCC)- (present on admission)  Assessment & Plan  History of status post  shunt        VTE prophylaxis: SCDs/TEDslovenox

## 2024-09-18 NOTE — ED NOTES
Med rec completed historically  Patients family at bedside reports no medications administered between discharge 9/17/24 and coming back to [facility] on 9/17/24    Pharmacy patient utilizes: [facility]

## 2024-09-18 NOTE — ED PROVIDER NOTES
ED Provider Note    CHIEF COMPLAINT  Chief Complaint   Patient presents with    Weakness     Patient DC home today at 1430 after spending two nights in the hospital for a Subdural hematoma. Family reports patient was transferred into car by staff but was unable to stand or assist with the transfer. Daughter states family is unable to care for patient as she is unable to assist to transfer herself.        EXTERNAL RECORDS REVIEWED  Inpatient Notes.  Patient was admitted to the hospital for 5 mm parafalcine subdural hematoma    HPI/ROS  LIMITATION TO HISTORY   None  OUTSIDE HISTORIAN(S):  Daughter at bedside    Aaliyah Fulton is a 84 y.o. female who presents with generalized weakness and sleepiness.  Daughter says that she was just discharged from the hospital earlier today.  She was admitted for a subdural hematoma.  She was managed nonoperatively and interval head CT was stable.  She was started on Keppra for one week for seizure prophylaxis.  Patient's daughter says that before she was hospitalized she was alert and quite conversant and was able to care for herself however since discharging she has been unable to perform ADLs and is even unable to transfer by herself.  She says that she has been very sleepy but does wake up and answer questions appropriately.  She has not had any repeat fall or injury since leaving the hospital.  She denies any dysuria, hematuria or urgency she says that she has an intermittent cough but no pain or shortness of breath.      Of note she is also on chemotherapy for cholangiocarcinoma.  She was admitted in the beginning of September for neutropenic fever.    PAST MEDICAL HISTORY   has a past medical history of Arthritis, ASTHMA, Breast cancer (HCC) (2005), Cancer (HCC) (06/2024), Cataract, Dental disorder, Heart burn, Heart murmur, High cholesterol, History of cervical fracture (2022), Hypertension (01/03/2022), Indigestion, Osteoporosis, Pain, Pneumonia (12/2021), PONV  (postoperative nausea and vomiting), Psychiatric problem, Stroke (HCC) (2021), and Unspecified urinary incontinence.    SURGICAL HISTORY   has a past surgical history that includes sinuscopy; breast biopsy (05/21/2010); other orthopedic surgery; knee arthroplasty total (09/19/2011); lumbar laminectomy diskectomy (10/17/2012); foraminotomy (10/17/2012); radiation therapy plan simple; recovery (05/10/2016); partial hip replacement (Left, 11/24/2021); lumbar decompression (10/17/2012); create shunt:ventric-peritoneal (01/10/2022); cholecystectomy; hammertoe correction (Bilateral); shoulder surgery; knee arthroscopy; total knee arthroplasty (Left, 03/28/2023); and lumpectomy.    FAMILY HISTORY  Family History   Problem Relation Age of Onset    Lung Disease Mother         copd    Stroke Mother     Cancer Daughter         Breast    Breast Cancer Daughter     Psychiatric Illness Daughter         Anxiety    Psychiatric Illness Daughter         Anxiety and Depression       SOCIAL HISTORY  Social History     Tobacco Use    Smoking status: Never    Smokeless tobacco: Never   Vaping Use    Vaping status: Never Used   Substance and Sexual Activity    Alcohol use: No    Drug use: Never    Sexual activity: Not on file     Comment: ; two daughters; retired ( @ [employer])       CURRENT MEDICATIONS  Home Medications       Reviewed by Paola Sutherland R.N. (Registered Nurse) on 09/17/24 at 0161  Med List Status: Partial     Medication Last Dose Status   acetaminophen (TYLENOL) 500 MG Tab  Active   atorvastatin (LIPITOR) 40 MG Tab  Active   busPIRone (BUSPAR) 15 MG tablet  Active   Calcium Carbonate-Vitamin D (CALCIUM-VITAMIN D3 PO)  Active   cyanocobalamin (VITAMIN B-12) 500 MCG Tab  Active   D-MANNOSE PO  Active   estradiol (ESTRACE) 0.1 MG/GM vaginal cream  Active   levETIRAcetam (KEPPRA) 500 MG Tab  Active   lidocaine (ASPERFLEX) 4 % Patch  Active   methenamine hip (HIPPREX) 1 GM Tab  Active  "  omeprazole (PRILOSEC) 40 MG delayed-release capsule  Active   oxybutynin (DITROPAN XL) 15 MG CR tablet  Active   valsartan (DIOVAN) 160 MG Tab  Active   venlafaxine (EFFEXOR-XR) 150 MG extended-release capsule  Active   venlafaxine XR (EFFEXOR XR) 75 MG CAPSULE SR 24 HR  Active   vitamin D3 (CHOLECALCIFEROL) 1000 Unit (25 mcg) Tab  Active                  Audit from Redirected Encounters    **Home medications have not yet been reviewed for this encounter**         ALLERGIES  No Known Allergies    PHYSICAL EXAM  VITAL SIGNS: BP (!) 152/67   Pulse 81   Temp 36.6 °C (97.9 °F) (Temporal)   Resp 17   Ht 1.727 m (5' 8\")   Wt 70.5 kg (155 lb 6.8 oz)   SpO2 96%   BMI 23.63 kg/m²    Constitutional: Awake and alert. Non toxic  HENT: Normal inspection.   Moist mucous membranes  Eyes: Normal inspection  Neck: Grossly normal range of motion.  Cardiovascular: Normal heart rate, Normal rhythm.  Symmetric peripheral pulses.   Thorax & Lungs: No respiratory distress, No wheezing, No rales, No rhonchi  Abdomen: Soft, non-distended, nontender to palpation in all 4 quadrants, no mass  Skin: No obvious rash.  Extremities: Warm, well perfused. No clubbing, cyanosis, edema   Neurologic: She has her eyes closed but is easily arousable and answers all questions appropriately.  She is generally weak to all extremities without focal deficit  Psychiatric: Normal for situation      EKG/LABS  Results for orders placed or performed during the hospital encounter of 09/17/24   CBC WITH DIFFERENTIAL   Result Value Ref Range    WBC 4.0 (L) 4.8 - 10.8 K/uL    RBC 3.09 (L) 4.20 - 5.40 M/uL    Hemoglobin 10.1 (L) 12.0 - 16.0 g/dL    Hematocrit 31.2 (L) 37.0 - 47.0 %    .0 (H) 81.4 - 97.8 fL    MCH 32.7 27.0 - 33.0 pg    MCHC 32.4 32.2 - 35.5 g/dL    RDW 61.7 (H) 35.9 - 50.0 fL    Platelet Count 307 164 - 446 K/uL    MPV 9.8 9.0 - 12.9 fL    Neutrophils-Polys 43.30 (L) 44.00 - 72.00 %    Lymphocytes 35.40 22.00 - 41.00 %    Monocytes " 20.30 (H) 0.00 - 13.40 %    Eosinophils 0.50 0.00 - 6.90 %    Basophils 0.00 0.00 - 1.80 %    Immature Granulocytes 0.50 0.00 - 0.90 %    Nucleated RBC 0.00 0.00 - 0.20 /100 WBC    Neutrophils (Absolute) 1.71 (L) 1.82 - 7.42 K/uL    Lymphs (Absolute) 1.40 1.00 - 4.80 K/uL    Monos (Absolute) 0.80 0.00 - 0.85 K/uL    Eos (Absolute) 0.02 0.00 - 0.51 K/uL    Baso (Absolute) 0.00 0.00 - 0.12 K/uL    Immature Granulocytes (abs) 0.02 0.00 - 0.11 K/uL    NRBC (Absolute) 0.00 K/uL   COMP METABOLIC PANEL   Result Value Ref Range    Sodium 132 (L) 135 - 145 mmol/L    Potassium 4.9 3.6 - 5.5 mmol/L    Chloride 99 96 - 112 mmol/L    Co2 23 20 - 33 mmol/L    Anion Gap 10.0 7.0 - 16.0    Glucose 96 65 - 99 mg/dL    Bun 11 8 - 22 mg/dL    Creatinine 0.57 0.50 - 1.40 mg/dL    Calcium 9.1 8.5 - 10.5 mg/dL    Correct Calcium 9.6 8.5 - 10.5 mg/dL    AST(SGOT) 28 12 - 45 U/L    ALT(SGPT) 15 2 - 50 U/L    Alkaline Phosphatase 163 (H) 30 - 99 U/L    Total Bilirubin 0.4 0.1 - 1.5 mg/dL    Albumin 3.4 3.2 - 4.9 g/dL    Total Protein 7.5 6.0 - 8.2 g/dL    Globulin 4.1 (H) 1.9 - 3.5 g/dL    A-G Ratio 0.8 g/dL   ESTIMATED GFR   Result Value Ref Range    GFR (CKD-EPI) 88 >60 mL/min/1.73 m 2   POC CoV-2, FLU A/B, RSV by PCR   Result Value Ref Range    POC Influenza A RNA, PCR Negative Negative    POC Influenza B RNA, PCR Negative Negative    POC RSV, by PCR Negative Negative    POC SARS-CoV-2, PCR NotDetected NotDetected       RADIOLOGY/PROCEDURES   I have independently interpreted the diagnostic imaging associated with this visit and am waiting the final reading from the radiologist.   My preliminary interpretation is as follows: She has stable interstitial opacities    Radiologist interpretation:  DX-CHEST-PORTABLE (1 VIEW)   Final Result      Interstitial opacities again noted which could represent chronic interstitial changes versus mild vascular congestion.          COURSE & MEDICAL DECISION MAKING    ASSESSMENT, COURSE AND PLAN  Care  Narrative: This is an 84-year-old female was recently admitted to the hospital with a subdural hemorrhage.  She was discharged from the hospital earlier today but unfortunately she has not returned to her baseline since her hospitalization and her family are unable to care for her.  She has no focal neurologic deficit and she has had no repeat trauma.  I did review the CT that was obtained earlier this morning that showed stable intracranial bleed and I do not see an indication to repeat this.  Her labs are all reassuring she has no significant electrolyte derangements she is chronically anemic and leukopenic however no acute changes.  Her viral swab is negative.  She has chronic interstitial opacities on x-ray without focal finding to suggest a pneumonia currently pending urine.  She has no other signs or symptoms consistent with infection or sepsis.  I agree the patient is not safe to discharge home at this time but she is not meeting inpatient criteria.  I did discuss with the hospitalist, Dr. Rosa who will consult on the patient while she stays in the ER for likely placement.  I have discussed with Angi, case management who is also assisting with working on a safe discharge plan and possible placement.    ED OBS: Yes; I am placing the patient in to an observation status due to a diagnostic uncertainty as well as therapeutic intensity. Patient placed in observation status at 6.00 PM, 9/17/2024.     Observation plan is as follows: PT/OT placement         DISPOSITION AND DISCUSSIONS  I have discussed management of the patient with the following physicians and BIRGIT's:  Dr. Rosa    Discussion of management with other [facility] or appropriate source(s): Case Management Angi regarding placement and safe discharge plan        FINAL DIAGNOSIS  1. Generalized weakness Acute        Electronically signed by: Tahira Shepherd M.D., 9/17/2024 7:07 PM

## 2024-09-18 NOTE — DISCHARGE PLANNING
"DILCIAM spoke with daughter of pt at bedside regarding pt's plans/wishes. According to daughter prior to fall pt could ambulate with walker, fix meals for self, dress and toilet without assistance. Pt lives with her daughter and  with other daughter living next door. Family does grocery shopping and takes pt to all appointments, pt is currently receiving chemo. Daughter states that pt's granddaughter is having a baby in November and pt is very \"motivated\" for this upcoming event (one of her reasons for doing chemo per daughter). Family wants pt to return home but unable to care for her at her present state of health. Upon DC from hospital (yesterday) pt had to be lifted into car by staff. Home health had been initiated prior to her discharge yesterday, but had not been seen by patient as of yet. Per daughter pt has been extremely drowsy since starting her new medication \"keppra\" and is wondering if this is the cause. Dr Shepherd has ordered a PT/OT evaluation for am. [facility] was suggested as a possible disposition if pt fits criteria.  "

## 2024-09-18 NOTE — PROGRESS NOTES
"ED Observation Progress Note    Date of Service: 09/18/24    Interval History and Interventions  Patient signed out to me this morning by Dr. Velasquez after he received signout from Dr. Cohen which was the initial ERP evaluated the patient last night after she returned to the hospital after recent trauma at discharge.  Patient with decreased mental status and decreased ability to function at home.  Also found to be hypoxic on this admission.  Chart was reviewed to include inpatient echocardiogram as was completed this week.  Chest x-ray also showed some vascular congestion.  Questionable volume overload state causing hypoxia.  Additionally patient was started on Keppra so this may to be causing some sedation.  Patient while DNR/DNI continues to receive medical care to include that for oncologic radiation for known biliary cancer.  At this point I do believe that the patient would be best served under the hospitalist services admit patient.  I discussed the case with and current representation to the hospital and hospitalist will evaluate the patient at this time.    Physical Exam  BP (!) 140/76   Pulse 75   Temp 37.2 °C (99 °F) (Temporal)   Resp 17   Ht 1.727 m (5' 8\")   Wt 70.5 kg (155 lb 6.8 oz)   SpO2 98%   BMI 23.63 kg/m² .    Constitutional: Awake and alert. Nontoxic  HENT:  Grossly normal  Eyes: Grossly normal  Neck: Normal range of motion  Cardiovascular: Normal heart rate   Thorax & Lungs: No respiratory distress  Abdomen: Nontender  Skin:  No pathologic rash.   Extremities: Well perfused  Psychiatric: Affect normal    Labs  Results for orders placed or performed during the hospital encounter of 09/17/24   CBC WITH DIFFERENTIAL   Result Value Ref Range    WBC 4.0 (L) 4.8 - 10.8 K/uL    RBC 3.09 (L) 4.20 - 5.40 M/uL    Hemoglobin 10.1 (L) 12.0 - 16.0 g/dL    Hematocrit 31.2 (L) 37.0 - 47.0 %    .0 (H) 81.4 - 97.8 fL    MCH 32.7 27.0 - 33.0 pg    MCHC 32.4 32.2 - 35.5 g/dL    RDW 61.7 (H) 35.9 - " 50.0 fL    Platelet Count 307 164 - 446 K/uL    MPV 9.8 9.0 - 12.9 fL    Neutrophils-Polys 43.30 (L) 44.00 - 72.00 %    Lymphocytes 35.40 22.00 - 41.00 %    Monocytes 20.30 (H) 0.00 - 13.40 %    Eosinophils 0.50 0.00 - 6.90 %    Basophils 0.00 0.00 - 1.80 %    Immature Granulocytes 0.50 0.00 - 0.90 %    Nucleated RBC 0.00 0.00 - 0.20 /100 WBC    Neutrophils (Absolute) 1.71 (L) 1.82 - 7.42 K/uL    Lymphs (Absolute) 1.40 1.00 - 4.80 K/uL    Monos (Absolute) 0.80 0.00 - 0.85 K/uL    Eos (Absolute) 0.02 0.00 - 0.51 K/uL    Baso (Absolute) 0.00 0.00 - 0.12 K/uL    Immature Granulocytes (abs) 0.02 0.00 - 0.11 K/uL    NRBC (Absolute) 0.00 K/uL   COMP METABOLIC PANEL   Result Value Ref Range    Sodium 132 (L) 135 - 145 mmol/L    Potassium 4.9 3.6 - 5.5 mmol/L    Chloride 99 96 - 112 mmol/L    Co2 23 20 - 33 mmol/L    Anion Gap 10.0 7.0 - 16.0    Glucose 96 65 - 99 mg/dL    Bun 11 8 - 22 mg/dL    Creatinine 0.57 0.50 - 1.40 mg/dL    Calcium 9.1 8.5 - 10.5 mg/dL    Correct Calcium 9.6 8.5 - 10.5 mg/dL    AST(SGOT) 28 12 - 45 U/L    ALT(SGPT) 15 2 - 50 U/L    Alkaline Phosphatase 163 (H) 30 - 99 U/L    Total Bilirubin 0.4 0.1 - 1.5 mg/dL    Albumin 3.4 3.2 - 4.9 g/dL    Total Protein 7.5 6.0 - 8.2 g/dL    Globulin 4.1 (H) 1.9 - 3.5 g/dL    A-G Ratio 0.8 g/dL   URINALYSIS (UA)    Specimen: Urine   Result Value Ref Range    Color Yellow     Character Clear     Specific Gravity 1.009 <1.035    Ph 7.0 5.0 - 8.0    Glucose Negative Negative mg/dL    Ketones Negative Negative mg/dL    Protein Negative Negative mg/dL    Bilirubin Negative Negative    Urobilinogen, Urine 0.2 Negative    Nitrite Negative Negative    Leukocyte Esterase Negative Negative    Occult Blood Negative Negative    Micro Urine Req see below    ESTIMATED GFR   Result Value Ref Range    GFR (CKD-EPI) 88 >60 mL/min/1.73 m 2   POC CoV-2, FLU A/B, RSV by PCR   Result Value Ref Range    POC Influenza A RNA, PCR Negative Negative    POC Influenza B RNA, PCR Negative  Negative    POC RSV, by PCR Negative Negative    POC SARS-CoV-2, PCR NotDetected NotDetected       Radiology  DX-CHEST-PORTABLE (1 VIEW)   Final Result      Interstitial opacities again noted which could represent chronic interstitial changes versus mild vascular congestion.          Problem List  1.  Hypoxia  2.  Ambulatory dysfunction  3.  Cognitive delay    Electronically signed by: Yamil Laboy M.D., 9/18/2024 9:03 AM

## 2024-09-18 NOTE — CONSULTS
MRN: [id number]  Date of palliative consult: 9/18/24  Reason for consult: apc      HPI:   Aaliyah Fulton is a 84 y.o. female with medical history significant for recent diagnosis of cholangiocarcinoma currently under treatment with Dr. Arias.  Patient unfortunately had a ground-level fall and suffered an intracranial hemorrhage for which she was admitted to the hospital and just discharged yesterday.  Upon discharge the patient was unable to extricate herself from the car even with assistance which prompted the family to bring her back to the hospital.  In the emergency department the patient was found to be hypoxic and hospital medicine was asked to consult for admission.  Given multiple comorbidities as well as advanced age and what seems like decline over the last few weeks palliative care was consulted for goals of care discussion.      I met with the patient as well as daughter in the emergency department.  Both the patient as well as daughter are very pleasant and conversant although the patient does intermittently fall asleep throughout our interview.    I spoke mostly with the daughter about the patient's recent history.  Patient was doing relatively well receiving treatment for cholangiocarcinoma through oncology.  She had no appreciable side effects from medications aside from some mild fatigue.  Up until this recent fall and admission the patient was able to complete all of her activities of daily living and walk with a walker unassisted.    At this time the patient's most important thing in her life is her family.  The patient is expecting a great granddaughter to be born in November and her goal in life is to be present for the baby's birth.    I had a long conversation with the patient and family about realistic expectations and voiced my concern that with the recent occurrences that this could potentially be a slide and degradation of her level of health ultimate leading to her death sooner than  November.  The patient and family seem to appreciate this.  I spoke with them further about the potential for hospice care and what that could potentially look like.  After discussion I do not believe the patient is fully ready for hospice. She would like to continue to improve, get stronger and be present for her great granddaughter's birth.    With this goal in mind I discussed with them the potential to stop cancer treatment as further interventions such as radiation chemotherapy or immunotherapy may potentially further weaken her already compromised state.  Patient's daughter voiced concern over Keppra causing significant somnolence as was appreciated during my interview.  I spoke with them about the potential of seizure with cessation of Keppra which they understand.  They did request to speak with neuro or neurosurgery regarding this risk.      Additional Pertinent Medical History:    ROS:    ROS    PE:   Recent vital signs  BMI: Body mass index is 23.63 kg/m².    Temp (24hrs), Av.9 °C (98.5 °F), Min:36.6 °C (97.9 °F), Max:37.2 °C (99 °F)  Temperature: 37.2 °C (99 °F)  Pulse  Av.8  Min: 62  Max: 98   Blood Pressure : (!) 147/68       Physical Exam  Patient seen in hospital bed intermittently sleeping although easily arousable to voice oriented x 3 nonfocal neuroexam no respiratory distress wearing nasal cannula speaking in full sentences appears well-perfused    ASSESSMENT/PLAN WITH SHARED DECISION MAKING:   PHYSICAL ASPECTS OF CARE  Palliative Performance Scale: 55%    SOCIAL ASPECTS OF CARE  Prior to recent admission patient was living alone although very close to daughters.  Patient was able to perform all of her activities of daily living.    SPIRITUAL ASPECTS OF CARE   Not addressed during this visit    GOALS OF CARE/SERIOUS ILLNESS CONVERSATION  Introduced myself to the patient and daughter. Discussed role of palliative care and reason for consult.    Code Status: DNR and DNI      20 minutes  spent discussing advance care planning, this time excludes any other billed services.    Interval diagnostic studies and medical documentation entries pertinent to this case were reviewed independently by me. This patient has at least one acute or chronic illness or injury that poses a threat to life or bodily function. This patient suffers from a high risk of morbidity from additional invasive diagnostic testing or intensive treatment. Discussion of recommendations and coordination of care undertaken with primary provider/treatment team.      Cholangiocarcinoma: Patient would like to stop palliative treatments at this time.  Patient would prefer comfort driven care regarding cholangiocarcinoma and management of symptomatology in an attempt to avoid further debility/iatrogenic side effects.     Somnolence/deconditioning: Patient would like to limit sedating medications particularly would like to trial stopping Keppra as she feels that this is creating somnolence and likely leading to further deconditioning.  The patient and daughter would appreciate input from neurology/neurosurgery regarding risk of seizure with cessation of Keppra.  Patient would like to work with physical therapy in an attempt to get stronger with an ultimate goal of being present for her great granddaughters birthday in November.    The patient ideally would like to when appropriate be discharged home with home health as well as potentially physical therapy and home palliative care if this is feasible.  If this is not feasible patient is amenable to skilled nursing facility versus rehab if this is an option.    Palliative medicine will continue to follow along.

## 2024-09-19 ENCOUNTER — APPOINTMENT (OUTPATIENT)
Dept: RADIOLOGY | Facility: MEDICAL CENTER | Age: 84
End: 2024-09-19
Attending: INTERNAL MEDICINE
Payer: MEDICARE

## 2024-09-19 PROBLEM — G93.40 ENCEPHALOPATHY: Status: ACTIVE | Noted: 2024-09-19

## 2024-09-19 LAB
ANION GAP SERPL CALC-SCNC: 13 MMOL/L (ref 7–16)
BASOPHILS # BLD AUTO: 0.2 % (ref 0–1.8)
BASOPHILS # BLD: 0.01 K/UL (ref 0–0.12)
BUN SERPL-MCNC: 13 MG/DL (ref 8–22)
CALCIUM SERPL-MCNC: 9.5 MG/DL (ref 8.5–10.5)
CHLORIDE SERPL-SCNC: 100 MMOL/L (ref 96–112)
CO2 SERPL-SCNC: 21 MMOL/L (ref 20–33)
CREAT SERPL-MCNC: 0.81 MG/DL (ref 0.5–1.4)
EOSINOPHIL # BLD AUTO: 0.02 K/UL (ref 0–0.51)
EOSINOPHIL NFR BLD: 0.4 % (ref 0–6.9)
ERYTHROCYTE [DISTWIDTH] IN BLOOD BY AUTOMATED COUNT: 59.7 FL (ref 35.9–50)
GFR SERPLBLD CREATININE-BSD FMLA CKD-EPI: 70 ML/MIN/1.73 M 2
GLUCOSE SERPL-MCNC: 126 MG/DL (ref 65–99)
HCT VFR BLD AUTO: 35.4 % (ref 37–47)
HGB BLD-MCNC: 11.7 G/DL (ref 12–16)
IMM GRANULOCYTES # BLD AUTO: 0.03 K/UL (ref 0–0.11)
IMM GRANULOCYTES NFR BLD AUTO: 0.6 % (ref 0–0.9)
LYMPHOCYTES # BLD AUTO: 1.38 K/UL (ref 1–4.8)
LYMPHOCYTES NFR BLD: 28.1 % (ref 22–41)
MAGNESIUM SERPL-MCNC: 2 MG/DL (ref 1.5–2.5)
MCH RBC QN AUTO: 32.9 PG (ref 27–33)
MCHC RBC AUTO-ENTMCNC: 33.1 G/DL (ref 32.2–35.5)
MCV RBC AUTO: 99.4 FL (ref 81.4–97.8)
MONOCYTES # BLD AUTO: 1.1 K/UL (ref 0–0.85)
MONOCYTES NFR BLD AUTO: 22.4 % (ref 0–13.4)
NEUTROPHILS # BLD AUTO: 2.37 K/UL (ref 1.82–7.42)
NEUTROPHILS NFR BLD: 48.3 % (ref 44–72)
NRBC # BLD AUTO: 0 K/UL
NRBC BLD-RTO: 0 /100 WBC (ref 0–0.2)
NT-PROBNP SERPL IA-MCNC: 729 PG/ML (ref 0–125)
PLATELET # BLD AUTO: 349 K/UL (ref 164–446)
PMV BLD AUTO: 10 FL (ref 9–12.9)
POTASSIUM SERPL-SCNC: 4.3 MMOL/L (ref 3.6–5.5)
RBC # BLD AUTO: 3.56 M/UL (ref 4.2–5.4)
SODIUM SERPL-SCNC: 134 MMOL/L (ref 135–145)
VIT B12 SERPL-MCNC: >4000 PG/ML (ref 211–911)
WBC # BLD AUTO: 4.9 K/UL (ref 4.8–10.8)

## 2024-09-19 PROCEDURE — 83735 ASSAY OF MAGNESIUM: CPT

## 2024-09-19 PROCEDURE — 83880 ASSAY OF NATRIURETIC PEPTIDE: CPT

## 2024-09-19 PROCEDURE — 85025 COMPLETE CBC W/AUTO DIFF WBC: CPT

## 2024-09-19 PROCEDURE — 82607 VITAMIN B-12: CPT

## 2024-09-19 PROCEDURE — 99233 SBSQ HOSP IP/OBS HIGH 50: CPT | Performed by: INTERNAL MEDICINE

## 2024-09-19 PROCEDURE — 70450 CT HEAD/BRAIN W/O DYE: CPT

## 2024-09-19 PROCEDURE — 80048 BASIC METABOLIC PNL TOTAL CA: CPT

## 2024-09-19 PROCEDURE — 700102 HCHG RX REV CODE 250 W/ 637 OVERRIDE(OP)

## 2024-09-19 PROCEDURE — 665999 HH PPS REVENUE DEBIT

## 2024-09-19 PROCEDURE — A9270 NON-COVERED ITEM OR SERVICE: HCPCS

## 2024-09-19 PROCEDURE — 97163 PT EVAL HIGH COMPLEX 45 MIN: CPT

## 2024-09-19 PROCEDURE — 665998 HH PPS REVENUE CREDIT

## 2024-09-19 PROCEDURE — 770004 HCHG ROOM/CARE - ONCOLOGY PRIVATE *

## 2024-09-19 PROCEDURE — 95816 EEG AWAKE AND DROWSY: CPT | Performed by: PSYCHIATRY & NEUROLOGY

## 2024-09-19 PROCEDURE — 700111 HCHG RX REV CODE 636 W/ 250 OVERRIDE (IP): Mod: JZ

## 2024-09-19 PROCEDURE — 36415 COLL VENOUS BLD VENIPUNCTURE: CPT

## 2024-09-19 PROCEDURE — A9270 NON-COVERED ITEM OR SERVICE: HCPCS | Performed by: HOSPITALIST

## 2024-09-19 PROCEDURE — 95816 EEG AWAKE AND DROWSY: CPT | Mod: 26 | Performed by: PSYCHIATRY & NEUROLOGY

## 2024-09-19 PROCEDURE — 700102 HCHG RX REV CODE 250 W/ 637 OVERRIDE(OP): Performed by: HOSPITALIST

## 2024-09-19 PROCEDURE — 4A10X4Z MONITORING OF CENTRAL NERVOUS ELECTRICAL ACTIVITY, EXTERNAL APPROACH: ICD-10-PCS | Performed by: PSYCHIATRY & NEUROLOGY

## 2024-09-19 PROCEDURE — 97162 PT EVAL MOD COMPLEX 30 MIN: CPT

## 2024-09-19 PROCEDURE — 97166 OT EVAL MOD COMPLEX 45 MIN: CPT

## 2024-09-19 RX ORDER — PROCHLORPERAZINE MALEATE 10 MG
10 TABLET ORAL EVERY 6 HOURS PRN
OUTPATIENT
Start: 2024-09-20

## 2024-09-19 RX ORDER — 0.9 % SODIUM CHLORIDE 0.9 %
10 VIAL (ML) INJECTION PRN
OUTPATIENT
Start: 2024-09-20

## 2024-09-19 RX ORDER — SODIUM CHLORIDE 9 MG/ML
500 INJECTION, SOLUTION INTRAVENOUS ONCE
OUTPATIENT
Start: 2024-09-20

## 2024-09-19 RX ORDER — EPINEPHRINE 1 MG/ML(1)
0.5 AMPUL (ML) INJECTION PRN
OUTPATIENT
Start: 2024-09-20

## 2024-09-19 RX ORDER — SODIUM CHLORIDE 9 MG/ML
500 INJECTION, SOLUTION INTRAVENOUS ONCE
OUTPATIENT
Start: 2024-09-27

## 2024-09-19 RX ORDER — 0.9 % SODIUM CHLORIDE 0.9 %
10 VIAL (ML) INJECTION PRN
OUTPATIENT
Start: 2024-09-19

## 2024-09-19 RX ORDER — SODIUM CHLORIDE 9 MG/ML
INJECTION, SOLUTION INTRAVENOUS CONTINUOUS
OUTPATIENT
Start: 2024-09-27

## 2024-09-19 RX ORDER — 0.9 % SODIUM CHLORIDE 0.9 %
VIAL (ML) INJECTION PRN
OUTPATIENT
Start: 2024-09-19

## 2024-09-19 RX ORDER — 0.9 % SODIUM CHLORIDE 0.9 %
10 VIAL (ML) INJECTION PRN
OUTPATIENT
Start: 2024-09-27

## 2024-09-19 RX ORDER — 0.9 % SODIUM CHLORIDE 0.9 %
3 VIAL (ML) INJECTION PRN
OUTPATIENT
Start: 2024-09-20

## 2024-09-19 RX ORDER — SODIUM CHLORIDE 9 MG/ML
INJECTION, SOLUTION INTRAVENOUS CONTINUOUS
OUTPATIENT
Start: 2024-09-20

## 2024-09-19 RX ORDER — ONDANSETRON 8 MG/1
8 TABLET, ORALLY DISINTEGRATING ORAL PRN
OUTPATIENT
Start: 2024-09-20

## 2024-09-19 RX ORDER — EPINEPHRINE 1 MG/ML(1)
0.5 AMPUL (ML) INJECTION PRN
OUTPATIENT
Start: 2024-09-27

## 2024-09-19 RX ORDER — 0.9 % SODIUM CHLORIDE 0.9 %
3 VIAL (ML) INJECTION PRN
OUTPATIENT
Start: 2024-09-19

## 2024-09-19 RX ORDER — 0.9 % SODIUM CHLORIDE 0.9 %
VIAL (ML) INJECTION PRN
OUTPATIENT
Start: 2024-09-27

## 2024-09-19 RX ORDER — DIPHENHYDRAMINE HYDROCHLORIDE 50 MG/ML
50 INJECTION INTRAMUSCULAR; INTRAVENOUS PRN
OUTPATIENT
Start: 2024-09-27

## 2024-09-19 RX ORDER — ONDANSETRON 8 MG/1
8 TABLET, ORALLY DISINTEGRATING ORAL PRN
OUTPATIENT
Start: 2024-09-27

## 2024-09-19 RX ORDER — ONDANSETRON 2 MG/ML
4 INJECTION INTRAMUSCULAR; INTRAVENOUS PRN
OUTPATIENT
Start: 2024-09-27

## 2024-09-19 RX ORDER — 0.9 % SODIUM CHLORIDE 0.9 %
3 VIAL (ML) INJECTION PRN
OUTPATIENT
Start: 2024-09-27

## 2024-09-19 RX ORDER — DIPHENHYDRAMINE HYDROCHLORIDE 50 MG/ML
50 INJECTION INTRAMUSCULAR; INTRAVENOUS PRN
OUTPATIENT
Start: 2024-09-20

## 2024-09-19 RX ORDER — METHYLPREDNISOLONE SODIUM SUCCINATE 125 MG/2ML
125 INJECTION, POWDER, LYOPHILIZED, FOR SOLUTION INTRAMUSCULAR; INTRAVENOUS PRN
OUTPATIENT
Start: 2024-09-20

## 2024-09-19 RX ORDER — PROCHLORPERAZINE MALEATE 10 MG
10 TABLET ORAL EVERY 6 HOURS PRN
OUTPATIENT
Start: 2024-09-27

## 2024-09-19 RX ORDER — ONDANSETRON 2 MG/ML
4 INJECTION INTRAMUSCULAR; INTRAVENOUS PRN
OUTPATIENT
Start: 2024-09-20

## 2024-09-19 RX ORDER — 0.9 % SODIUM CHLORIDE 0.9 %
VIAL (ML) INJECTION PRN
OUTPATIENT
Start: 2024-09-20

## 2024-09-19 RX ORDER — METHYLPREDNISOLONE SODIUM SUCCINATE 125 MG/2ML
125 INJECTION, POWDER, LYOPHILIZED, FOR SOLUTION INTRAMUSCULAR; INTRAVENOUS PRN
OUTPATIENT
Start: 2024-09-27

## 2024-09-19 RX ADMIN — ACETAMINOPHEN 1000 MG: 500 TABLET ORAL at 11:17

## 2024-09-19 RX ADMIN — TRAZODONE HYDROCHLORIDE 50 MG: 100 TABLET ORAL at 01:46

## 2024-09-19 RX ADMIN — LEVETIRACETAM 250 MG: 500 TABLET, FILM COATED ORAL at 05:06

## 2024-09-19 RX ADMIN — OMEPRAZOLE 40 MG: 20 CAPSULE, DELAYED RELEASE ORAL at 05:06

## 2024-09-19 RX ADMIN — CYANOCOBALAMIN TAB 500 MCG 500 MCG: 500 TAB at 05:07

## 2024-09-19 RX ADMIN — BUSPIRONE HYDROCHLORIDE 15 MG: 5 TABLET ORAL at 01:46

## 2024-09-19 RX ADMIN — BUSPIRONE HYDROCHLORIDE 15 MG: 5 TABLET ORAL at 17:50

## 2024-09-19 RX ADMIN — POLYETHYLENE GLYCOL 3350 1 PACKET: 17 POWDER, FOR SOLUTION ORAL at 05:06

## 2024-09-19 RX ADMIN — ENOXAPARIN SODIUM 40 MG: 100 INJECTION SUBCUTANEOUS at 17:50

## 2024-09-19 RX ADMIN — ATORVASTATIN CALCIUM 40 MG: 40 TABLET, FILM COATED ORAL at 17:50

## 2024-09-19 RX ADMIN — VALSARTAN 160 MG: 80 TABLET, FILM COATED ORAL at 05:07

## 2024-09-19 ASSESSMENT — COGNITIVE AND FUNCTIONAL STATUS - GENERAL
DAILY ACTIVITIY SCORE: 14
EATING MEALS: A LITTLE
SUGGESTED CMS G CODE MODIFIER DAILY ACTIVITY: CK
PERSONAL GROOMING: A LITTLE
TOILETING: A LOT
STANDING UP FROM CHAIR USING ARMS: A LOT
HELP NEEDED FOR BATHING: A LOT
WALKING IN HOSPITAL ROOM: A LOT
MOVING TO AND FROM BED TO CHAIR: A LOT
MOVING FROM LYING ON BACK TO SITTING ON SIDE OF FLAT BED: A LOT
CLIMB 3 TO 5 STEPS WITH RAILING: TOTAL
TURNING FROM BACK TO SIDE WHILE IN FLAT BAD: A LOT
MOBILITY SCORE: 11
DRESSING REGULAR LOWER BODY CLOTHING: A LOT
SUGGESTED CMS G CODE MODIFIER MOBILITY: CL
DRESSING REGULAR UPPER BODY CLOTHING: A LOT

## 2024-09-19 ASSESSMENT — GAIT ASSESSMENTS
ASSISTIVE DEVICE: FRONT WHEEL WALKER
DEVIATION: BRADYKINETIC;DECREASED BASE OF SUPPORT;SHUFFLED GAIT;DECREASED HEEL STRIKE;DECREASED TOE OFF;OTHER (COMMENT)
GAIT LEVEL OF ASSIST: MODERATE ASSIST
DISTANCE (FEET): 5

## 2024-09-19 ASSESSMENT — PAIN DESCRIPTION - PAIN TYPE
TYPE: ACUTE PAIN

## 2024-09-19 ASSESSMENT — ACTIVITIES OF DAILY LIVING (ADL): TOILETING: INDEPENDENT

## 2024-09-19 NOTE — PROGRESS NOTES
Hospital Medicine Daily Progress Note    Date of Service  9/19/2024    Chief Complaint  Aaliyah Fulton is a 84 y.o. female admitted 9/17/2024 with weakness    Hospital Course  85 yo woman with cholangiocarcinoma, NPH s/p  shunt, HTN, HLD who was recently discharged from the hospital for ground-level fall and SDH.  On day of discharge she was too weak to get out of the car and returned to the ER.  She was noted to have some hypoxia and placed on 2 L O2.  Chest x-ray showed interstitial changes and she was given a dose of Lasix.  CT head was stable.  Dr. Arias was contacted who recommended palliative care or hospice.  Palliative care was consulted and patient is not ready for hospice but would like comfort driven care.  There is concerns that Keppra is causing increased somnolence so the dose was decreased.    Interval Problem Update  Patient is somnolent.  Does respond to questions when I wake her, though she does not open eyes.  She does move her right arm and leg on command, having difficulty with her left arm and leg.  Daughter says this is different from yesterday, when she was sitting up and eating multiple meals.  Stat CT head showed unchanged hematoma.  Check EEG for possible focal seizure activity.  Could be ischemic stroke, MRI brain ordered.    I have discussed this patient's plan of care and discharge plan at IDT rounds today with Case Management, Nursing, Nursing leadership, and other members of the IDT team.    Consultants/Specialty  neurosurgery and palliative care    Code Status  DNAR/DNI    Disposition  The patient is not medically cleared for discharge to home or a post-acute facility.  Anticipate discharge to: home with organized home healthcare and close outpatient follow-up    I have placed the appropriate orders for post-discharge needs.    Review of Systems  Review of Systems   Unable to perform ROS: Medical condition (Encephalopathic)        Physical Exam  Temp:  [36.1 °C (96.9 °F)-37.2 °C  (99 °F)] 37.1 °C (98.8 °F)  Pulse:  [83-90] 83  Resp:  [16-18] 16  BP: (101-135)/(53-93) 125/59  SpO2:  [94 %-99 %] 94 %    Physical Exam  Vitals and nursing note reviewed.   Constitutional:       Appearance: She is ill-appearing.      Comments: Somnolent, cachectic   HENT:      Head: Normocephalic.      Mouth/Throat:      Mouth: Mucous membranes are dry.   Eyes:      General:         Right eye: No discharge.         Left eye: No discharge.      Pupils: Pupils are equal, round, and reactive to light.   Cardiovascular:      Rate and Rhythm: Normal rate and regular rhythm.      Heart sounds: Murmur heard.   Pulmonary:      Effort: No respiratory distress.      Breath sounds: No wheezing or rales.      Comments: Poor inspiratory effort, diminished bilaterally  Abdominal:      Palpations: Abdomen is soft.      Tenderness: There is no abdominal tenderness.   Musculoskeletal:         General: No swelling.      Cervical back: Neck supple.      Comments: Diffuse muscle atrophy   Skin:     General: Skin is warm and dry.      Coloration: Skin is pale.   Neurological:      Comments: She responds to her name and follows a few simple commands on her right arm and leg.  Having difficulty following commands on her left arm and leg, daughter says she did move her fingers and toes at one point.         Fluids    Intake/Output Summary (Last 24 hours) at 9/19/2024 1459  Last data filed at 9/18/2024 2030  Gross per 24 hour   Intake --   Output 700 ml   Net -700 ml        Laboratory  Recent Labs     09/17/24 0116 09/17/24 2121 09/19/24  0431   WBC 3.5* 4.0* 4.9   RBC 2.78* 3.09* 3.56*   HEMOGLOBIN 9.2* 10.1* 11.7*   HEMATOCRIT 28.0* 31.2* 35.4*   .7* 101.0* 99.4*   MCH 33.1* 32.7 32.9   MCHC 32.9 32.4 33.1   RDW 62.1* 61.7* 59.7*   PLATELETCT 262 307 349   MPV 10.2 9.8 10.0     Recent Labs     09/17/24  0116 09/17/24 2121 09/19/24  0431   SODIUM 138 132* 134*   POTASSIUM 4.2 4.9 4.3   CHLORIDE 105 99 100   CO2 23 23 21    GLUCOSE 119* 96 126*   BUN 11 11 13   CREATININE 0.53 0.57 0.81   CALCIUM 8.6 9.1 9.5                   Imaging  CT-HEAD W/O   Final Result      1.  No demonstrable interval change in the RIGHT parafalcine and holohemispheric subdural hematomas   2.  5 mm LEFT frontal subdural hematoma unchanged   3.  Senescent changes               DX-CHEST-PORTABLE (1 VIEW)   Final Result      Interstitial opacities again noted which could represent chronic interstitial changes versus mild vascular congestion.      MR-BRAIN-W/O    (Results Pending)        Assessment/Plan  * Failure to thrive in adult  Assessment & Plan  Patient recently discharged after fall complicated by subdural hematoma 5 mm, wanted repeat 4 weeks follow-up head CT as outpatient clinic, discharged home however failure to thrive with debility and unable to care for herself  PT OT  Palliative care consulted for goals of care discussion    Encephalopathy  Assessment & Plan  Acute change from yesterday with possible left-sided hemiparesis, neglect?  CT head showed unchanged hematoma  Check MRI brain for stroke  EEG to assess for focal seizure activity.  Continue Keppra for now but if no seizure activity, I will stop prophylactic Keppra as it may be contributing to her mental status change  NPH, neurosurgery has adjusted her shunt    Hypoxia- (present on admission)  Assessment & Plan  Chest x-ray with pulmonary edema and given a dose of Lasix  Has been weaned to room air      Constipation  Assessment & Plan  Bowel medication    Overactive bladder- (present on admission)  Assessment & Plan  Due to concerns for somnolence, transition oxybutynin to Gemtesa to avoid anticholinergic effects    Subdural hematoma (HCC)- (present on admission)  Assessment & Plan  Recent hospitalization discharged on 9/17/2024  Continue close clinical monitoring  On Keppra for 5 days for seizure prophylaxis  Neurosurgery recommended repeat CT in 4 weeks  Repeat CT head today is  stable    Valvular heart disease- (present on admission)  Assessment & Plan  Reviewed echocardiogram from 9/2/2024 with moderate aortic stenosis and moderate mitral regurgitation      Cholangiocarcinoma (HCC)- (present on admission)  Assessment & Plan  Patient follows with Dr. Arias and Dr. Tabor  She is on palliative chemotherapy, on hold    Normal pressure hydrocephalus (HCC)- (present on admission)  Assessment & Plan  History of status post  shunt         VTE prophylaxis:    enoxaparin ppx      I have performed a physical exam and reviewed and updated ROS and Plan today (9/19/2024). In review of yesterday's note (9/18/2024), there are no changes except as documented above.

## 2024-09-19 NOTE — CARE PLAN
The patient is Stable - Low risk of patient condition declining or worsening    Shift Goals  Clinical Goals: comfort, rest, safety  Patient Goals: comfort  Family Goals: na    Problem: Fall Risk  Goal: Patient will remain free from falls  Outcome: Progressing    Educated patient on fall precautions while in the hospital. Patient verbalized understanding of education and interventions used.   Interventions:  - bed alarm in use  - bed in lowest and locked position   - non skid socks on   - call light, water and urinal within reach  - use of correct DME and put away after    Problem: Pain - Standard  Goal: Alleviation of pain or a reduction in pain to the patient’s comfort goal  Outcome: Progressing    Working with patient to manage pain following surgery. Patient verbalized understanding of education and interventions.   Interventions:  - medication   - ambulation   - rest   - repositioning   - distraction   - ice packs

## 2024-09-19 NOTE — DOCUMENTATION QUERY
"                                                                         [facility]                                                                       Query Response Note      PATIENT:               LISA WINTERS  ACCT #:                  [id number]  MRN:                     [id number]  :                      1940  ADMIT DATE:       2024 6:31 PM  DISCH DATE:          RESPONDING  PROVIDER #:        [id number]           QUERY TEXT:    Pulmonary Edema is documented in the Medical Record.  Can the type and acuity of pulmonary edema be further specified?    The patient's Clinical Indicators include:  83yo with dx of dx of hypoxemia, nontraumatic SDH, HTN, rheumatic mitral and aortic valve disorders     H&P \"Chest x-ray with pulmonary edema I ordered IV Lasix\"     CXR Impression Interstitial opacities again noted which could represent chronic interstitial changes versus mild vascular congestion    Risk factors: advanced age, moderate aortic stenosis and moderate mitral regurgitation, hypoxia  Treatments: supplemental oxygen, IV Lasix, imaging, labwork    If you agree with the above dx, please document i the medical record.     Contact me with questions.     Thank you,  TL Zhang, CDI  [email or web address]  Options provided:   -- Acute pulmonary edema   -- Chronic pulmonary edema   -- Other explanation, (please specify other explanation)   -- Unable to determine      Query created by: Ammy Frank on 2024 6:37 AM    RESPONSE TEXT:    Acute pulmonary edema          Electronically signed by:  EDGAR RODRIGUEZ MD 2024 8:01 AM              "

## 2024-09-19 NOTE — PROCEDURES
[facility]    Inpatient Standard Video Electroencephalogram Report      Patient Name: Aaliyah Fulton  MRN: [id number]  #: [id number]  Date of Service: 09/19/24  Total Recording Time: 28 minutes.  Referring Provider: Monet Hernandez M.D.    INDICATION:  Aaliyah Fulton 84 y.o. female. This standard, inpatient, EEG was requested to evaluate for possible seizure(s) and altered mental status.    CURRENT ANTI-SEIZURE AND OTHER PERTINENT MEDICATIONS:     Current Facility-Administered Medications:     acetaminophen    atorvastatin    busPIRone    omeprazole    valsartan    venlafaxine XR    vibegron    enoxaparin (LOVENOX) injection    ondansetron    ondansetron    [Held by provider] senna-docusate **AND** polyethylene glycol/lytes    cyanocobalamin    levETIRAcetam    TECHNIQUE: Standard inpatient video EEG was set up by a Neurodiagnostic technologist who performed education to the patient and staff. A minimum of 23 electrodes and 23 channel recording was setup and performed by Neurodiagnostic technologist, in accordance with the international 10-20 system. Impedence, electrode integrity, and technical impressions were documented. The study was reviewed in bipolar and referential montages. The recording examined the patient in the encephalopathic state(s).     DESCRIPTION OF THE RECORD:  EEG background: The background was continuous, symmetrical, and was absent of a posterior dominant rhythm, with a mixture of polymorphic delta with some intermixed faster frequencies at times sharply contoured . Reactivity was minimally present. Spontaneous variability was  was seen. State changes were were not seen.    No clear sleep architecture    ACTIVATION PROCEDURES:   Intermittent Photic stimulation was performed in a stepwise fashion from 1 to 30 Hz and did not elicited any abnormalities on EEG.  and NA    ICTAL AND INTERICTAL FINDINGS:   No definite focal or generalized epileptiform activity noted.     No persistent regional  slowing was seen during this routine study.      No electroclinical or electrographic seizures were reported or recorded during the study.     EKG: Sampling of the EKG recording showed an abnormal rhythm    EVENTS:  No clinical events recorded or reported    INTERPRETATION:  Abnormal - Video EEG recording:  - Diffuse slowing of the background is a non-specific indicator of diffuse cerebral dysfunction.  - Epileptiform discharges: No definitive epileptiform discharges or other epileptiform phenomena seen.   - No seizures. Clinical correlation is recommended.    As always, an absence of seizures/epileptiform discharges does not exclude the diagnosis of seizures/epilepsy. If clinical suspicion persists, a prolonged EEG monitoring might be considered.     Bakari Apple MD  Neurology Attending, Epilepsy Program  [facility]

## 2024-09-19 NOTE — ASSESSMENT & PLAN NOTE
CT head showed unchanged hematoma  Has persistent left-sided weakness which is new. Check MRI brain for stroke, tumors, this is pending   EEG negative for seizure activity.  Keppra was stopped and her mentation continues to improve, it was likely contributing to her somnolence  NPH, neurosurgery has adjusted her shunt to increase flow  PT OT, recommending SNF

## 2024-09-19 NOTE — CONSULTS
DATE OF SERVICE:  09/19/2024     CHIEF COMPLAINT:  1.  Fall with closed head injury.  2.  Status post placement of ventriculoperitoneal shunt with subdural   hematoma.     HISTORY OF PRESENT ILLNESS:  The patient is an 84-year-old woman who was at   home in her usual state of health.  She got up, became a little bit dizzy and   then fell hitting her head.  She was brought in for evaluation.  CT   examination showed a falcine subdural hematoma and some hematoma spread over   the right convexity.  She has a lot of atrophy.  No mass effect, no brain   compression.  Shunt was in place and we were called for that as well.  Dr. Blanchard had done this some time ago for normal pressure hydrocephalus.     PAST MEDICAL HISTORY:  Remarkable for arthritis, asthma, breast cancer, GERD,   heart murmur, hypercholesterolemia, hypertension, osteoporosis,     PAST SURGICAL HISTORY:  Remarkable for sinus surgery, breast biopsy and   treatment, lumbar laminectomy and diskectomy, radiation therapy, hip   replacement, further lumbar surgery, ventriculoperitoneal shunt placement.     FAMILY HISTORY:  Remarkable for breast cancer in her daughter.     SOCIAL HISTORY:  Never smokes.  Does not drink alcohol.     CURRENT MEDICATIONS:  Her prescriptions at home include calcium, D-Mannose,   acetaminophen, Lipitor and Bluspar.  She also takes esterase, Keppra, ____,   Hipex, Prilosec, Ditropan XL.     PHYSICAL EXAMINATION:  GENERAL:  Well-developed, well nourished woman, she is very pleasant,   oriented.  HEENT:  She has some bruising to the right side of her head.  CARDIOVASCULAR:  Heart is regular rate and rhythm.  PULMONARY:  Rales present at the bases.  NEUROLOGIC:  She is alert and oriented x4.  Cranial nerves are intact.  Speech   is fluent.     Shunt evaluation.  Her shunt does pump normally.  I tested the pressure and it   was at 1.  I did change it to 2.5 as she had a subdural hematoma and I did   not want this to expand.      IMPRESSION AND PLAN: An 84-year-old woman who fell and hit her head with a   traumatic subdural hematoma, which will resolve in time.  Sometimes these   things will enlarge and cause herniation as the CSF was drained from the brain   by a shunt.  We have essentially set this off by turning it to 2.5.  I will   let Dr. Blanchard know about her being here and he will follow her during her   admission.        ______________________________  MD ALEX DOVER/KEN/GASTON    DD:  09/19/2024 09:17  DT:  09/19/2024 09:55    Job#:  [id number]    CC:Ashvin Arias DO

## 2024-09-19 NOTE — PROGRESS NOTES
I met with the patient as well as daughter and cousin at bedside this morning.  Patient is persistently somnolent intermittently responding to internal stimuli however does respond appropriately when asked direct questions.  During my evaluation nursing came in to transition the patient back to bed from a chair and informed me that the patient was going for a CT scan of her head.  There had been a change in the patient's status from yesterday when I saw her at this time the patient is unable to move her left arm or left leg.    I spoke with the patient's daughter about ongoing plan as well as concern for potential ischemic stroke versus worsening of intracranial hemorrhage versus seizure.  Defer to  for acute workup and management.  Daughter did voice her concern over continued use of Keppra as well as trazodone last night for sleep.  I have reassured the daughter that I will speak with the primary team regarding medication as well as potential cessation of Keppra and avoidance of further sedating medications.    Palliative medicine will continue to follow.

## 2024-09-19 NOTE — DISCHARGE PLANNING
Care Transition Team Assessment    Patient is a 84 year old female admitted for failure to thrive. Please see patient's H&P for prior medical history. Patient is not A/Ox4 and aunable to [facility] assessment. Patient's daughter Angi was at bedside and completed assessment. Patient lives with her daughter Abby in a single story house with 4 steps to enter, Abby Tejeda (p)[phone]; emergency contact and NOK. Per daughter, prior to admission patient is independent with ADL's and IADL's. Patient uses a FWW. Patient has strong support from her daughters. Patient receives monthly SSI deposits. Patient PCP is Dr. Arias. Patient's preferred pharmacy is [facility] on [address]. Patient has been to [facility] in the past and was previously discharged with [facility]. Patient does not have any SA or MH concerns. Patient's daughter confirmed medical coverage via Medicare and [employer]. Patient has means to transportation and daughter will provide transport once medically stable for discharge. Patient's primary oncologist is Dr. Arias at [facility]. Per Angi, they are open to SNF, HH and possibly hospice if that is what's best for the patient.      Information Source  Orientation Level: Unable to assess  Informant's Name: Angi, daughter  Who is responsible for making decisions for patient? : Adult child  Name(s) of Primary Decision Maker: Angi Johnson [phone]    Readmission Evaluation  Is this a readmission?: Yes - unplanned readmission    Elopement Risk  Legal Hold: No  Ambulatory or Self Mobile in Wheelchair: No-Not an Elopement Risk  Disoriented: No  Psychiatric Symptoms: None  History of Wandering: No  Elopement this Admit: No  Vocalizing Wanting to Leave: No  Displays Behaviors, Body Language Wanting to Leave: No-Not at Risk for Elopement  Elopement Risk: Not at Risk for Elopement  Picture of Patient on Inside Chart Front Cover: Yes  Purple Armband on Patient: No (See Comments)    Interdisciplinary Discharge Planning  Lives with -  Patient's Self Care Capacity: Adult Children  Patient or legal guardian wants to designate a caregiver: No  Housing / Facility: [address]  Prior Services: Intermittent Physical Support for ADL Per Family    Discharge Preparedness  What is your plan after discharge?: Home with help  What are your discharge supports?: Child  Prior Functional Level: Ambulatory, Independent with Activities of Daily Living, Independent with Medication Management, Uses Walker  Difficulity with ADLs: None  Difficulity with IADLs: None    Functional Assesment  Prior Functional Level: Ambulatory, Independent with Activities of Daily Living, Independent with Medication Management, Uses Walker    Finances  Financial Barriers to Discharge: No  Prescription Coverage: Yes    Vision / Hearing Impairment  Vision Impairment : No  Right Eye Vision: Wears Glasses  Left Eye Vision: Wears Glasses  Hearing Impairment : No         Advance Directive  Advance Directive?: None  Advance Directive offered?: AD Booklet refused    Domestic Abuse  Have you ever been the victim of abuse or violence?: No  Possible Abuse/Neglect Reported to:: Not Applicable    Psychological Assessment  History of Substance Abuse: None  History of Psychiatric Problems: No  Non-compliant with Treatment: No  Newly Diagnosed Illness: No    Discharge Risks or Barriers  Discharge risks or barriers?: Complex medical needs  Patient risk factors: Complex medical needs    Anticipated Discharge Information  Discharge Disposition: D/T to home under A care in anticipation of covered skilled care (06)

## 2024-09-19 NOTE — THERAPY
"Occupational Therapy   Initial Evaluation     Patient Name: Aaliyah Fulton  Age:  84 y.o., Sex:  female  Medical Record #: [id number]  Today's Date: 9/19/2024     Precautions  Precautions: Fall Risk    Assessment    Patient is 84 y.o. female was d/c home after an admission for SDH and was unable to transfer out of the car. Her family brought her back to the hospital. Pt has a history of cholangiocarcinoma, valvular heart disease, and osteoarthritis. Pt seen for OT evaluation. Pt required mod-max A for ADL transfer, max A for toilet hygiene, and min A for seated face washing. Pt endorsed photosensitivity and required max verbal and tactile cues to look B directions. Additionally, pt noted to have likely L inattention and weakness. Prior OT eval on last admission did not reflect this. RN and MD notified. Pt current functional performance limited by impaired activity tolerance, impaired balance, weakness, L inattention/weakness, impaired cognition, and suspected impaired vision. Pt will benefit from skilled OT while admitted to acute care.     Overlap with PT due to pt safety, assist level required, and for monitoring of vitals.    Plan    Occupational Therapy Initial Treatment Plan   Treatment Interventions: Self Care / Activities of Daily Living, Adaptive Equipment, Neuro Re-Education / Balance, Therapeutic Exercises, Therapeutic Activity  Treatment Frequency: 4 Times per Week  Duration: Until Therapy Goals Met    DC Equipment Recommendations: Unable to determine at this time  Discharge Recommendations: Recommend post-acute placement for additional occupational therapy services prior to discharge home     Subjective    \"Oh, thank you so much for your help\"     Objective     09/19/24 0908   Prior Living Situation   Prior Services Intermittent Physical Support for ADL Per Family   Housing / Facility 1 Story House   Steps Into Home 3   Bathroom Set up Walk In Shower;Shower Chair   Equipment Owned Front-Wheel " Walker;4-Wheel Walker;Tub / Shower Seat   Lives with - Patient's Self Care Capacity Adult Children   Comments [place] her daughter and ROS.  Her other daughter lives next door and is also retired. She can have assist as needed from her ROS  (Pt had recent admission. History taken from chart review.)   Prior Level of ADL Function   Self Feeding Independent   Grooming / Hygiene Independent   Bathing Independent   Dressing Independent   Toileting Independent   Comments prior to last admission   Prior Level of IADL Function   Comments Family usually manages IADLs   Precautions   Precautions Fall Risk   Vitals   Pulse 90   Blood Pressure  123/58   Pulse Oximetry 97 %   O2 Delivery Device None - Room Air   Vitals Comments Up in chair post 114/67, 100 on RA, 95   Pain   Pain Scales 0 to 10 Scale    Pain 0 - 10 Group   Therapist Pain Assessment Post Activity Pain Same as Prior to Activity;Nurse Notified  (Pt reported headache, RN aware)   Non Verbal Descriptors   Non Verbal Scale  Calm   Cognition    Cognition / Consciousness X   Orientation Level Not Oriented to Reason;Not Oriented to Year  (Orieted to self, location, and month)   Level of Consciousness Alert   Ability To Follow Commands 1 Step  (decreased with fatigue)   Safety Awareness Impaired   New Learning Impaired   Attention Impaired   Initiation Impaired   Comments Pleasant and cooperative, grateful for help   Passive ROM Upper Body   Passive ROM Upper Body WDL   Active ROM Upper Body   Active ROM Upper Body  X   Comments Pt able to move B UE, noted to move L UE significantly less than R UE   Strength Upper Body   Upper Body Strength  X   Comments R UE WDL, L UE  2-, biceps 2+/5, triceps 2+/5, shoulder flexion 2-/5 (RN and MD aware)  (some difficulty following commands for L UE formal testing)   Sensation Upper Body   Upper Extremity Sensation  Not Tested   Comments due to command following   Upper Body Muscle Tone   Upper Body Muscle  Tone  X   Lt Upper Extremity Muscle Tone Hypotonic   Balance Assessment   Sitting Balance (Static) Fair -   Sitting Balance (Dynamic) Poor +   Standing Balance (Static) Poor -   Standing Balance (Dynamic) Trace +   Weight Shift Sitting Poor   Weight Shift Standing Poor   Comments w/ FWW and x2 assist, difficulty using L hand on walker   Bed Mobility    Comments on BSC with PT prior   ADL Assessment   Grooming Minimal Assist;Seated  (washed face, used L and R hand)   Toileting Maximal Assist  (urine on BSC)   Functional Mobility   Sit to Stand Moderate Assist   Bed, Chair, Wheelchair Transfer Maximal Assist   Transfer Method Stand Step   Mobility BSC>chair   Comments w/ FWW, x2 assist for safety   Visual Perception   Comments Pt noted to keep eyes closed most of the time. When cued to open them, pt would open one at a time, usually the R eye. Pt denied diplopia. Pt stated she has photosensitivity and a headache. Pt able to look both directions given max cues.   Activity Tolerance   Sitting in Chair on BSC prior, up to chair post   Sitting Edge of Bed NT   Standing <1-2 min   Comments limited by weakness   Patient / Family Goals   Patient / Family Goal #1 to get stronger   Short Term Goals   Short Term Goal # 1 Pt will perform ADL transfer w/ min A   Short Term Goal # 2 Pt will perform g/h w/ supv   Short Term Goal # 3 Pt will perform LB dressing w/ min A   Education Group   Education Provided Activities of Daily Living;Role of Occupational Therapist   Role of Occupational Therapist Patient Response Patient;Acceptance;Explanation;Verbal Demonstration   ADL Patient Response Patient;Acceptance;Explanation;Demonstration;Verbal Demonstration;Action Demonstration   Occupational Therapy Initial Treatment Plan    Treatment Interventions Self Care / Activities of Daily Living;Adaptive Equipment;Neuro Re-Education / Balance;Therapeutic Exercises;Therapeutic Activity   Treatment Frequency 4 Times per Week   Duration Until  Therapy Goals Met

## 2024-09-19 NOTE — THERAPY
Physical Therapy   Initial Evaluation     Patient Name: Aaliyah Fulton  Age:  84 y.o., Sex:  female  Medical Record #: [id number]  Today's Date: 9/19/2024     Precautions  Precautions: Fall Risk    Assessment  Patient is 84 y.o. female re-admitted on 9/17/2024 too weak to get out of the car and returned to the ER.     Currently been managed for failure to thrive, Encephalopathy, subdural hematoma     MRI Brain-Pending    CT head 9/19: Unchanged R parafalcine SDH, L frontal SDH     PMH: cholangiocarcinoma, NPH s/p  shunt, HTN, HLD, valvular heart disease , overactive bladder   Recent DC on 9/17, same day re-admit     Patient seen for PT evaluation and treatment. Patient in bed, agreeable for the session. Able to demonstrate functional mobility tasks as detailed below. Currently appears to be below baseline level of functional mobility. Will continue to benefit from PT services to help improve overall functional mobility. Recommend post-acute placement at this time.     Plan    Physical Therapy Initial Treatment Plan   Treatment Plan : Bed Mobility, Equipment, Family / Caregiver Training, Gait Training, Neuro Re-Education / Balance, Stair Training, Therapeutic Activities, Therapeutic Exercise  Treatment Frequency: 4 Times per Week  Duration: Until Therapy Goals Met    DC Equipment Recommendations: Unable to determine at this time  Discharge Recommendations: Recommend post-acute placement for additional physical therapy services prior to discharge home    Objective     09/19/24 0907   Time In/Time Out   Therapy Start Time 0832   Therapy End Time 0907   Total Therapy Time 35   Initial Contact Note    Initial Contact Note Order Received and Verified, Physical Therapy Evaluation in Progress with Full Report to Follow.   Precautions   Precautions Fall Risk   Vitals   Pulse 90   Patient BP Position Sitting  (on BSC)   Blood Pressure  123/58   Pulse Oximetry 98 %   O2 Delivery Device None - Room Air   Vitals Comments Post  activity, seated in recliner: 114/67, 106, 99   Pain   Pain Scales 0 to 10 Scale    Intervention Declines   Prior Living Situation   Prior Services Intermittent Physical Support for ADL Per Family   Housing / Facility 1 Story House   Steps Into Home 3   Steps In Home 0   Rail Both Rail (Steps into Home)   Equipment Owned Front-Wheel Walker;4-Wheel Walker   Lives with - Patient's Self Care Capacity Adult Children   Comments [place] with her daughter and son in law.  Her other daughter lives next door and is also retired. Obtained the above and below information from previous PT evaluation done on 9/16/2024. Patient unable to provide all the information at this time, unreliable historian.   Prior Level of Functional Mobility   Bed Mobility Independent   Transfer Status Independent   Ambulation Independent   Ambulation Distance Limited community   Assistive Devices Used Front-Wheel Walker   Stairs Independent   Comments Patient uses her 4WW in the community and FWW in the home. She does report sometimes not using any AD in the house.   History of Falls   History of Falls Yes   Date of Last Fall   (H/O recent fall)   Cognition    Cognition / Consciousness X   Speech/ Communication Delayed Responses   Orientation Level Not Oriented to Time;Not Oriented to Place;Not Oriented to Reason   Level of Consciousness Alert   Ability To Follow Commands 1 Step   Safety Awareness Impaired   New Learning Impaired   Attention Impaired   Sequencing Impaired   Initiation Impaired   Comments Limited verbal response; tends to keep her eyes closed most of the session.   Passive ROM Lower Body   Passive ROM Lower Body WDL   Active ROM Lower Body    Active ROM Lower Body  X   Comments Grossly RLE WFL; L hip flexion-impaired, L knee flexion/extension-partial ROM, L ankle DF/PF-impaired; Spontaneously moving RLE; required increased time & effort to move LLE.   Strength Lower Body   Lower Body Strength  X   Comments Grossly  RLE 3+/5, L hip flexion 2-/5, L knee flexion/extension 3-/5, L ankle DF/PF 1/5   Sensation Lower Body   Lower Extremity Sensation   X   Comments Reports decreased sensation on LLE as compared to RLE; able to feel light touch on BLE   Lower Body Muscle Tone   Lower Body Muscle Tone  X   Comments No increase or decrease in tone in LE at this time   Coordination Lower Body    Coordination Lower Body  X   Comments LLE-unable to assess due to inadequate strength   Vision   Vision Comments Keeps her eyes closed most of the time; Upon cues able to track partially beyond midline towards L and R, but not towards extreme peripheral fields.   Other Treatments   Other Treatments Provided Assisted to BSC to void per patient's request. Assisted with catia-care in standing.   Balance Assessment   Sitting Balance (Static) Poor +   Sitting Balance (Dynamic) Poor +   Standing Balance (Static) Poor -   Standing Balance (Dynamic) Trace +   Weight Shift Sitting Poor   Weight Shift Standing Absent   Comments Seated EOB; Standing with FWW x2 person assist   Bed Mobility    Supine to Sit Moderate Assist   Scooting Maximal Assist   Rolling Moderate Assist to Rt.   Comments HOB raised, towards R, use of bed rail   Gait Analysis   Gait Level Of Assist Moderate Assist  (x2 person assist for safety)   Assistive Device Front Wheel Walker   Distance (Feet) 5   Deviation Bradykinetic;Decreased Base Of Support;Shuffled Gait;Decreased Heel Strike;Decreased Toe Off;Other (Comment)   Comments Cues for directions, sequencing; LLE-increased time to advance LE, absent heel strike, decreased hip/knee flexion   Functional Mobility   Sit to Stand Moderate Assist  (W FWW; Max A W/O AD)   Bed, Chair, Wheelchair Transfer Moderate Assist  (x2 person assist for safety)   Toilet Transfers Maximal Assist   Transfer Method Stand Step   Mobility Bed-EOB-sit-stand with FWW-EOB-sit-stand-stand pivot towards R-BSC-sit-stand-BSC-sit-stand with FWW-steps to recliner    Comments Sit-stand: Increased difficulty to bring LUE onto FWW, increased difficulty to maintain L hand  on FWW; Cues for hand placement, LE placement, sequencing   6 Clicks Assessment - How much HELP from from another person do you currently need... (If the patient hasn't done an activity recently, how much help from another person do you think he/she would need if he/she tried?)   Turning from your back to your side while in a flat bed without using bedrails? 2   Moving from lying on your back to sitting on the side of a flat bed without using bedrails? 2   Moving to and from a bed to a chair (including a wheelchair)? 2   Standing up from a chair using your arms (e.g., wheelchair, or bedside chair)? 2   Walking in hospital room? 2   Climbing 3-5 steps with a railing? 1   6 clicks Mobility Score 11   Activity Tolerance   Sitting in Chair Post session   Patient / Family Goals    Patient / Family Goal #1 None stated   Short Term Goals    Short Term Goal # 1 Patient will perform supine-sit, sit-supine with HOB flat without rails with supervision in 6 visits to safely get in & out of bed   Short Term Goal # 2 Patient will perform sit-stand with LRAD with supervision in 6 visits to progress with functional mobility   Short Term Goal # 3 Patient will perform chair transfers with LRAD with supervision in 6 visits to safely get OOB to chair   Short Term Goal # 4 Patient will ambulate 100 feet with LRAD with supervision in 6 visits to safely ambulate household distance   Short Term Goal # 5 Patient will negotiate 3 steps with B/L rails with supervision in 6 visits to safely get in & out home   Education Group   Education Provided Role of Physical Therapist   Role of Physical Therapist Patient Response Patient;Acceptance;Explanation;Verbal Demonstration   Physical Therapy Initial Treatment Plan    Treatment Plan  Bed Mobility;Equipment;Family / Caregiver Training;Gait Training;Neuro Re-Education / Balance;Stair  Training;Therapeutic Activities;Therapeutic Exercise   Treatment Frequency 4 Times per Week   Duration Until Therapy Goals Met   Problem List    Problems Impaired Bed Mobility;Impaired Transfers;Impaired Ambulation;Functional ROM Deficit;Functional Strength Deficit;Impaired Balance;Impaired Coordination;Impaired Vision;Decreased Activity Tolerance;Safety Awareness Deficits / Cognition;Motor Planning / Sequencing   Anticipated Discharge Equipment and Recommendations   DC Equipment Recommendations Unable to determine at this time   Discharge Recommendations Recommend post-acute placement for additional physical therapy services prior to discharge home   Interdisciplinary Plan of Care Collaboration   IDT Collaboration with  Certified Nursing Assistant;Nursing;Occupational Therapist   Patient Position at End of Therapy Seated;Chair Alarm On  (OT at bedside)   Session Information   Date / Session Number  9/19-1(1/4, 9/25)     Overalap with OT mid-session due to level of assist required and for monitoring of vitals.

## 2024-09-20 ENCOUNTER — APPOINTMENT (OUTPATIENT)
Dept: HEMATOLOGY ONCOLOGY | Facility: MEDICAL CENTER | Age: 84
DRG: 086 | End: 2024-09-20
Attending: EMERGENCY MEDICINE
Payer: MEDICARE

## 2024-09-20 ENCOUNTER — APPOINTMENT (OUTPATIENT)
Dept: ONCOLOGY | Facility: MEDICAL CENTER | Age: 84
End: 2024-09-20
Attending: STUDENT IN AN ORGANIZED HEALTH CARE EDUCATION/TRAINING PROGRAM
Payer: MEDICARE

## 2024-09-20 PROBLEM — E44.0 MODERATE PROTEIN-CALORIE MALNUTRITION (HCC): Status: ACTIVE | Noted: 2024-09-20

## 2024-09-20 LAB
ALBUMIN SERPL BCP-MCNC: 3.3 G/DL (ref 3.2–4.9)
ALBUMIN/GLOB SERPL: 0.8 G/DL
ALP SERPL-CCNC: 153 U/L (ref 30–99)
ALT SERPL-CCNC: 12 U/L (ref 2–50)
ANION GAP SERPL CALC-SCNC: 12 MMOL/L (ref 7–16)
AST SERPL-CCNC: 20 U/L (ref 12–45)
BASOPHILS # BLD AUTO: 0 % (ref 0–1.8)
BASOPHILS # BLD: 0 K/UL (ref 0–0.12)
BILIRUB SERPL-MCNC: 0.5 MG/DL (ref 0.1–1.5)
BUN SERPL-MCNC: 12 MG/DL (ref 8–22)
CALCIUM ALBUM COR SERPL-MCNC: 9.6 MG/DL (ref 8.5–10.5)
CALCIUM SERPL-MCNC: 9 MG/DL (ref 8.5–10.5)
CHLORIDE SERPL-SCNC: 101 MMOL/L (ref 96–112)
CO2 SERPL-SCNC: 22 MMOL/L (ref 20–33)
CREAT SERPL-MCNC: 0.62 MG/DL (ref 0.5–1.4)
EOSINOPHIL # BLD AUTO: 0.02 K/UL (ref 0–0.51)
EOSINOPHIL NFR BLD: 0.5 % (ref 0–6.9)
ERYTHROCYTE [DISTWIDTH] IN BLOOD BY AUTOMATED COUNT: 60.3 FL (ref 35.9–50)
GFR SERPLBLD CREATININE-BSD FMLA CKD-EPI: 86 ML/MIN/1.73 M 2
GLOBULIN SER CALC-MCNC: 4 G/DL (ref 1.9–3.5)
GLUCOSE SERPL-MCNC: 114 MG/DL (ref 65–99)
HCT VFR BLD AUTO: 32.1 % (ref 37–47)
HGB BLD-MCNC: 10.6 G/DL (ref 12–16)
IMM GRANULOCYTES # BLD AUTO: 0.02 K/UL (ref 0–0.11)
IMM GRANULOCYTES NFR BLD AUTO: 0.5 % (ref 0–0.9)
LYMPHOCYTES # BLD AUTO: 1.31 K/UL (ref 1–4.8)
LYMPHOCYTES NFR BLD: 30.9 % (ref 22–41)
MAGNESIUM SERPL-MCNC: 1.9 MG/DL (ref 1.5–2.5)
MCH RBC QN AUTO: 32.8 PG (ref 27–33)
MCHC RBC AUTO-ENTMCNC: 33 G/DL (ref 32.2–35.5)
MCV RBC AUTO: 99.4 FL (ref 81.4–97.8)
MONOCYTES # BLD AUTO: 0.92 K/UL (ref 0–0.85)
MONOCYTES NFR BLD AUTO: 21.7 % (ref 0–13.4)
NEUTROPHILS # BLD AUTO: 1.97 K/UL (ref 1.82–7.42)
NEUTROPHILS NFR BLD: 46.4 % (ref 44–72)
NRBC # BLD AUTO: 0 K/UL
NRBC BLD-RTO: 0 /100 WBC (ref 0–0.2)
PHOSPHATE SERPL-MCNC: 3.8 MG/DL (ref 2.5–4.5)
PLATELET # BLD AUTO: 334 K/UL (ref 164–446)
PMV BLD AUTO: 9.8 FL (ref 9–12.9)
POTASSIUM SERPL-SCNC: 4.1 MMOL/L (ref 3.6–5.5)
PROT SERPL-MCNC: 7.3 G/DL (ref 6–8.2)
RBC # BLD AUTO: 3.23 M/UL (ref 4.2–5.4)
SODIUM SERPL-SCNC: 135 MMOL/L (ref 135–145)
WBC # BLD AUTO: 4.2 K/UL (ref 4.8–10.8)

## 2024-09-20 PROCEDURE — 700102 HCHG RX REV CODE 250 W/ 637 OVERRIDE(OP)

## 2024-09-20 PROCEDURE — A9270 NON-COVERED ITEM OR SERVICE: HCPCS | Performed by: HOSPITALIST

## 2024-09-20 PROCEDURE — 700102 HCHG RX REV CODE 250 W/ 637 OVERRIDE(OP): Performed by: HOSPITALIST

## 2024-09-20 PROCEDURE — 85025 COMPLETE CBC W/AUTO DIFF WBC: CPT

## 2024-09-20 PROCEDURE — 770004 HCHG ROOM/CARE - ONCOLOGY PRIVATE *

## 2024-09-20 PROCEDURE — A9270 NON-COVERED ITEM OR SERVICE: HCPCS

## 2024-09-20 PROCEDURE — 84100 ASSAY OF PHOSPHORUS: CPT

## 2024-09-20 PROCEDURE — 99232 SBSQ HOSP IP/OBS MODERATE 35: CPT | Performed by: INTERNAL MEDICINE

## 2024-09-20 PROCEDURE — 665998 HH PPS REVENUE CREDIT

## 2024-09-20 PROCEDURE — 36415 COLL VENOUS BLD VENIPUNCTURE: CPT

## 2024-09-20 PROCEDURE — 665999 HH PPS REVENUE DEBIT

## 2024-09-20 PROCEDURE — 83735 ASSAY OF MAGNESIUM: CPT

## 2024-09-20 PROCEDURE — 80053 COMPREHEN METABOLIC PANEL: CPT

## 2024-09-20 RX ADMIN — CYANOCOBALAMIN TAB 500 MCG 500 MCG: 500 TAB at 04:46

## 2024-09-20 RX ADMIN — BUSPIRONE HYDROCHLORIDE 15 MG: 5 TABLET ORAL at 18:01

## 2024-09-20 RX ADMIN — VIBEGRON 75 MG: 75 TABLET, FILM COATED ORAL at 04:46

## 2024-09-20 RX ADMIN — VALSARTAN 160 MG: 80 TABLET, FILM COATED ORAL at 04:46

## 2024-09-20 RX ADMIN — VENLAFAXINE HYDROCHLORIDE 150 MG: 75 CAPSULE, EXTENDED RELEASE ORAL at 04:46

## 2024-09-20 RX ADMIN — OMEPRAZOLE 40 MG: 20 CAPSULE, DELAYED RELEASE ORAL at 04:46

## 2024-09-20 RX ADMIN — BUSPIRONE HYDROCHLORIDE 15 MG: 5 TABLET ORAL at 01:53

## 2024-09-20 RX ADMIN — ATORVASTATIN CALCIUM 40 MG: 40 TABLET, FILM COATED ORAL at 18:01

## 2024-09-20 RX ADMIN — POLYETHYLENE GLYCOL 3350 1 PACKET: 17 POWDER, FOR SOLUTION ORAL at 04:46

## 2024-09-20 RX ADMIN — BUSPIRONE HYDROCHLORIDE 15 MG: 5 TABLET ORAL at 10:52

## 2024-09-20 ASSESSMENT — ENCOUNTER SYMPTOMS
WEAKNESS: 1
MEMORY LOSS: 1
SHORTNESS OF BREATH: 0
NAUSEA: 0
ABDOMINAL PAIN: 0

## 2024-09-20 ASSESSMENT — PAIN DESCRIPTION - PAIN TYPE
TYPE: ACUTE PAIN

## 2024-09-20 NOTE — CARE PLAN
The patient is Stable - Low risk of patient condition declining or worsening    Shift Goals  Clinical Goals: MRI, Comfort  Patient Goals: SHALINI  Family Goals: na    Progress made toward(s) clinical / shift goals:  Yes    Problem: Skin Integrity  Goal: Skin integrity is maintained or improved  Outcome: Progressing  Pillows in place for support and offloading. Patient shows no signs of new or worsening skin integrity.      Problem: Pain - Standard  Goal: Alleviation of pain or a reduction in pain to the patient’s comfort goal  Outcome: Progressing  Patient assessed using FLACC pain scale. Encouraged patient to verbalize pain. Non pharmacological measures in place such as reposition and pillows for comfort.

## 2024-09-20 NOTE — DOCUMENTATION QUERY
"                                                                         [facility]                                                                       Query Response Note      PATIENT:               LISA WINTERS  ACCT #:                  [id number]  MRN:                     [id number]  :                      1940  ADMIT DATE:       2024 6:31 PM  DISCH DATE:          RESPONDING  PROVIDER #:        [id number]           QUERY TEXT:    Encephalopathy is documented in the Medical Record. Please specify type.    The patient's Clinical Indicators include:  85yo with dx of dx of hypoxemia, nontraumatic SDH, HTN, rheumatic mitral and aortic valve disorders     PN \"...keppra so this may be causing some sedation\"   Procedure EEG Technique \"...recording examined the patient in the encephalopathic state(s)\"    Risk factors: advanced age, moderate aortic stenosis and moderate mitral regurgitation, hypoxia  Treatments: supplemental oxygen, IV Lasix, imaging, labwork    If you agree with the above dx, please document i the medical record.     Contact me with questions.     Thank you,  TL Zhang, CDI  [email or web address]  Options provided:   -- Due to medications or drugs   -- Toxic encephalopathy   -- Other explanation, (please specify other explanation)   -- Unable to determine      Query created by: Ammy Frank on 2024 4:00 AM    RESPONSE TEXT:    Due to medications or drugs          Electronically signed by:  SERGIO CRESPO MD 2024 2:51 PM              "

## 2024-09-20 NOTE — CARE PLAN
The patient is Watcher - Medium risk of patient condition declining or worsening    Shift Goals  Clinical Goals: MRI, comfort  Patient Goals: SHALINI  Family Goals: n/a    Progress made toward(s) clinical / shift goals:  All questions answered at this time. Patient is stating a comfortable pain level. Patient remained free of falls throughout shift, fall precautions in place. Bed in lowest position, belongings and call light in reach.

## 2024-09-20 NOTE — DISCHARGE PLANNING
Case Management Discharge Planning    Admission Date: 9/17/2024  GMLOS: 3.6  ALOS: 2    6-Clicks ADL Score: 14  6-Clicks Mobility Score: 11  PT and/or OT Eval ordered: Yes  Post-acute Referrals Ordered: Yes  Post-acute Choice Obtained: Yes  Has referral(s) been sent to post-acute provider:  Yes      Anticipated Discharge Dispo: Discharge Disposition: D/T to home under HHA care in anticipation of covered skilled care (06)    DME Needed: No    Action(s) Taken: Patient was discussed in IDT rounds. Patient is pending a brain MRI. PT/OT are recommending post acute. [facility] has accepted patient. Patient's family is aware patient cannot continue chemotherapy at a SNF.     PASRR [id number]    Escalations Completed: None    Medically Clear: No    Next Steps: pending medical clearance    Barriers to Discharge: Medical clearance    Is the patient up for discharge tomorrow: No

## 2024-09-20 NOTE — DISCHARGE PLANNING
1229  DPA sent referral to [facility] SNF's, per JAVED Simpson.     4266  Agency/Facility Name: [facility]  Spoke To: Tamara  Outcome: Tamara will review referral and answer. May have bed availability tomorrow. Advised JAVED Simpson.     1259  DPA left voicemail for Tamara ([facility]) to follow up on reviewing referral.

## 2024-09-20 NOTE — DIETARY
"Nutrition Services: Initial Assessment     Day 2 of admit. Aaliyah Fulton is a 84 y.o. female with admitting DX of Hypoxia [R09.02]     Consult received for failure to thrive.     Nutrition Assessment:      Height: 172.7 cm (5' 8\")  Weight: 70.5 kg (155 lb 6.8 oz)  Weight taken via other healthcare provider  Body mass index is 23.63 kg/m². BMI classification: Normal.  Wt Readings from Last 20 Encounters:   09/17/24 70.5 kg (155 lb 6.8 oz)   09/16/24 70.5 kg (155 lb 6.8 oz)   09/13/24 71.3 kg (157 lb 2 oz)   09/02/24 79.5 kg (175 lb 4.3 oz)   08/30/24 72.1 kg (159 lb)   08/30/24 72.4 kg (159 lb 9.8 oz)   08/16/24 72.8 kg (160 lb 7.9 oz)   08/09/24 73.8 kg (162 lb 11.2 oz)   08/08/24 73.8 kg (162 lb 12.8 oz)   07/31/24 72.3 kg (159 lb 6.4 oz)   07/26/24 73 kg (160 lb 15 oz)   07/24/24 72.6 kg (160 lb)   07/19/24 72 kg (158 lb 11.7 oz)   07/18/24 72.1 kg (159 lb)   06/21/24 71.4 kg (157 lb 6.5 oz)   06/21/24 70.9 kg (156 lb 6.4 oz)   06/04/24 70.8 kg (156 lb)   05/23/24 71.2 kg (156 lb 15.5 oz)   04/30/24 72.6 kg (160 lb)   03/12/24 75.8 kg (167 lb)     Objective:  Pertinent medical hx:   Past Medical History:   Diagnosis Date    Arthritis     ASTHMA     CHEMICAL INDUCED    Breast cancer (HCC) 2005    stage 0 - Dr. 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 Had to wear brace only.    Hypertension 01/03/2022    states well controlled on meds    Indigestion     taking omeprazole    Osteoporosis     Pain     \"everywhere\"    Pneumonia 12/2021    on antibiotics    PONV (postoperative nausea and vomiting)     Have always had this.    Psychiatric problem     depression on zoloft    Stroke ([facility]) 2021    Some memory problems and expressive aphasia.    Unspecified urinary incontinence     wears " depends     Pertinent labs:   Lab Results   Component Value Date/Time    SODIUM 135 09/20/2024 01:22 AM    POTASSIUM 4.1 09/20/2024 01:22 AM    CO2 22 09/20/2024 01:22 AM    CHLORIDE 101 09/20/2024 01:22 AM    BUN 12 09/20/2024 01:22 AM    CREATININE 0.62 09/20/2024 01:22 AM    CALCIUM 9.0 09/20/2024 01:22 AM    PHOSPHORUS 3.8 09/20/2024 01:22 AM    MAGNESIUM 1.9 09/20/2024 01:22 AM    GLUCOSE 114 (H) 09/20/2024 01:22 AM      Pertinent meds:   Scheduled Medications   Medication Dose Frequency    atorvastatin  40 mg Q EVENING    busPIRone  15 mg TID    omeprazole  40 mg DAILY    valsartan  160 mg DAILY    venlafaxine XR  150 mg DAILY    vibegron  75 mg DAILY    enoxaparin (LOVENOX) injection  40 mg DAILY AT 1800    [Held by provider] senna-docusate  2 Tablet Q EVENING    And    polyethylene glycol/lytes  1 Packet BID    cyanocobalamin  500 mcg DAILY     Skin/wounds: no pressure injuries   Food Allergies: NKFA  Last BM: 09/20/24 per flowsheets     Current diet order:   Cardiac diet    Subjective:   Patient reported UBW: unknown   Dietary recall/energy intake: pt with poor recall. States she does not know how she was eating at home. Reports her appetite is good and eating popcorn at time of visit. Pt's son in law at bedside though he is not sure how he was eating PTA. Pt does not have any food preferences to report.     Nutrition Focused Physical Exam (NFPE)   Weight loss: per chart wts, pt with 5.3 kg, 7% non-severe wt loss x 6 months.   Muscle mass: Mild-moderate loss   Subcutaneous fat: Mild-moderate loss   Fluid Accumulation: pulmonary edema  Reduced  Strength: N/A in acute care setting.     Nutrition Diagnosis:       Moderate malnutrition in context of chronic illness related to multiple medical problems as evidenced by moderate fat/muscle wasting on NFPE and suspected intakes of <75% estimated energy requirement. Notified MD.    Nutrition Interventions:      Recommend Mighty Shake (provides 330 calories, 9 g  protein per 6 fl oz) TID.  Liberalize diet as able  Patient aware of active plan of care as appropriate.     Nutrition Monitoring and Evaluation:     Monitor nutrition POC  Monitor vital signs pertinent to nutrition       RD following and will provide updated recommendations as indicated.     Padma Lind R.D.

## 2024-09-20 NOTE — PROGRESS NOTES
Hospital Medicine Daily Progress Note    Date of Service  9/20/2024    Chief Complaint  Aaliyah Fulton is a 84 y.o. female admitted 9/17/2024 with weakness    Hospital Course  83 yo woman with cholangiocarcinoma, NPH s/p  shunt, HTN, HLD who was recently discharged from the hospital for ground-level fall and SDH.  On day of discharge she was too weak to get out of the car and returned to the ER.  She was noted to have some hypoxia and placed on 2 L O2.  Chest x-ray showed interstitial changes and she was given a dose of Lasix.  CT head was stable.  Dr. Arias was contacted who recommended palliative care or hospice.  Palliative care was consulted and patient is not ready for hospice but would like comfort driven care.  There is concerns that Keppra is causing increased somnolence so it was stopped.    Interval Problem Update  EEG was negative for seizure activity.  I had held Keppra and her mentation is improved.  She is awake, conversing and eating.  Her daughter says her memory is still a bit off.  She still has some left-sided weakness compared to right  MRI brain is pending  I discussed home health versus SNF with patient's daughter.  PT and OT both recommending SNF.  I placed a referral.    I have discussed this patient's plan of care and discharge plan at IDT rounds today with Case Management, Nursing, Nursing leadership, and other members of the IDT team.    Consultants/Specialty  neurosurgery and palliative care    Code Status  DNAR/DNI    Disposition  The patient is not medically cleared for discharge to home or a post-acute facility.  Anticipate discharge to: skilled nursing facility    I have placed the appropriate orders for post-discharge needs.    Review of Systems  Review of Systems   Unable to perform ROS: Medical condition (Encephalopathic)   Constitutional:  Negative for malaise/fatigue.   Respiratory:  Negative for shortness of breath.    Cardiovascular:  Negative for chest pain.    Gastrointestinal:  Negative for abdominal pain and nausea.   Neurological:  Positive for weakness.   Psychiatric/Behavioral:  Positive for memory loss.         Physical Exam  Temp:  [36.3 °C (97.4 °F)-37.8 °C (100 °F)] 36.3 °C (97.4 °F)  Pulse:  [85-94] 85  Resp:  [16] 16  BP: (117-123)/(55-69) 121/69  SpO2:  [90 %-94 %] 90 %    Physical Exam  Vitals and nursing note reviewed.   Constitutional:       Appearance: She is ill-appearing.      Comments: cachectic   HENT:      Head: Normocephalic.      Mouth/Throat:      Mouth: Mucous membranes are dry.   Eyes:      General:         Right eye: No discharge.         Left eye: No discharge.      Pupils: Pupils are equal, round, and reactive to light.      Comments: Bruising under left eye   Cardiovascular:      Rate and Rhythm: Normal rate and regular rhythm.      Heart sounds: Murmur heard.   Pulmonary:      Effort: No respiratory distress.      Breath sounds: No wheezing or rales.   Abdominal:      General: There is no distension.      Palpations: Abdomen is soft.      Tenderness: There is no abdominal tenderness.   Musculoskeletal:         General: No swelling.      Cervical back: Neck supple.      Comments: Diffuse muscle atrophy   Skin:     General: Skin is warm and dry.      Coloration: Skin is pale.      Comments: Healing surgical incision to left lower leg, previous Mohs surgery per daughter   Neurological:      Mental Status: She is alert.      Comments: Oriented to self, did not know she was at the hospital  Able to lift both arms and legs off bed but left side is weaker than right         Fluids    Intake/Output Summary (Last 24 hours) at 9/20/2024 1345  Last data filed at 9/20/2024 0749  Gross per 24 hour   Intake --   Output 500 ml   Net -500 ml          Laboratory  Recent Labs     09/17/24  2121 09/19/24  0431 09/20/24  0122   WBC 4.0* 4.9 4.2*   RBC 3.09* 3.56* 3.23*   HEMOGLOBIN 10.1* 11.7* 10.6*   HEMATOCRIT 31.2* 35.4* 32.1*   .0* 99.4* 99.4*    MCH 32.7 32.9 32.8   MCHC 32.4 33.1 33.0   RDW 61.7* 59.7* 60.3*   PLATELETCT 307 349 334   MPV 9.8 10.0 9.8     Recent Labs     09/17/24  2121 09/19/24  0431 09/20/24  0122   SODIUM 132* 134* 135   POTASSIUM 4.9 4.3 4.1   CHLORIDE 99 100 101   CO2 23 21 22   GLUCOSE 96 126* 114*   BUN 11 13 12   CREATININE 0.57 0.81 0.62   CALCIUM 9.1 9.5 9.0                   Imaging  CT-HEAD W/O   Final Result      1.  No demonstrable interval change in the RIGHT parafalcine and holohemispheric subdural hematomas   2.  5 mm LEFT frontal subdural hematoma unchanged   3.  Senescent changes               DX-CHEST-PORTABLE (1 VIEW)   Final Result      Interstitial opacities again noted which could represent chronic interstitial changes versus mild vascular congestion.      MR-BRAIN-WITH & W/O    (Results Pending)        Assessment/Plan  * Failure to thrive in adult  Assessment & Plan  Patient recently discharged after fall complicated by subdural hematoma 5 mm, wanted repeat 4 weeks follow-up head CT as outpatient clinic, discharged home however failure to thrive with debility and unable to care for herself  PT OT  Palliative care consulted for goals of care discussion    Moderate protein-calorie malnutrition (HCC)  Assessment & Plan  Due to malignancy.  See dietitian's note    Encephalopathy  Assessment & Plan  CT head showed unchanged hematoma  Has persistent left-sided weakness which is new. Check MRI brain for stroke, tumors  EEG negative for seizure activity.  Keppra was stopped and her mentation is improved, it was likely contributing to her somnolence  NPH, neurosurgery has adjusted her shunt  PT OT, recommending SNF    Hypoxia- (present on admission)  Assessment & Plan  Chest x-ray with pulmonary edema and given a dose of Lasix  Has been weaned to room air      Constipation  Assessment & Plan  Bowel medication as needed    Overactive bladder- (present on admission)  Assessment & Plan  Due to concerns for somnolence,  transition oxybutynin to Gemtesa to avoid anticholinergic effects    Subdural hematoma (HCC)- (present on admission)  Assessment & Plan  Recent hospitalization discharged on 9/17/2024  Seizure prophylaxis Keppra has been stopped because of encephalopathy  Neurosurgery recommended repeat CT in 4 weeks  Repeat CT head during this hospitalization is stable    Valvular heart disease- (present on admission)  Assessment & Plan  Reviewed echocardiogram from 9/2/2024 with moderate aortic stenosis and moderate mitral regurgitation      Cholangiocarcinoma (HCC)- (present on admission)  Assessment & Plan  Patient follows with Dr. Arias and Dr. Tabor  She is on palliative chemotherapy, on hold.  Will need to follow-up with her oncology team    Normal pressure hydrocephalus (HCC)- (present on admission)  Assessment & Plan  History of status post  shunt         VTE prophylaxis:   SCDs/TEDs      I have performed a physical exam and reviewed and updated ROS and Plan today (9/20/2024). In review of yesterday's note (9/19/2024), there are no changes except as documented above.

## 2024-09-21 ENCOUNTER — APPOINTMENT (OUTPATIENT)
Dept: RADIOLOGY | Facility: MEDICAL CENTER | Age: 84
End: 2024-09-21
Attending: INTERNAL MEDICINE
Payer: MEDICARE

## 2024-09-21 LAB
ALBUMIN SERPL BCP-MCNC: 3.3 G/DL (ref 3.2–4.9)
BASOPHILS # BLD AUTO: 0.2 % (ref 0–1.8)
BASOPHILS # BLD: 0.01 K/UL (ref 0–0.12)
BUN SERPL-MCNC: 9 MG/DL (ref 8–22)
CALCIUM ALBUM COR SERPL-MCNC: 9.2 MG/DL (ref 8.5–10.5)
CALCIUM SERPL-MCNC: 8.6 MG/DL (ref 8.5–10.5)
CHLORIDE SERPL-SCNC: 100 MMOL/L (ref 96–112)
CO2 SERPL-SCNC: 22 MMOL/L (ref 20–33)
CREAT SERPL-MCNC: 0.71 MG/DL (ref 0.5–1.4)
EOSINOPHIL # BLD AUTO: 0.02 K/UL (ref 0–0.51)
EOSINOPHIL NFR BLD: 0.4 % (ref 0–6.9)
ERYTHROCYTE [DISTWIDTH] IN BLOOD BY AUTOMATED COUNT: 58.7 FL (ref 35.9–50)
GFR SERPLBLD CREATININE-BSD FMLA CKD-EPI: 83 ML/MIN/1.73 M 2
GLUCOSE SERPL-MCNC: 117 MG/DL (ref 65–99)
HCT VFR BLD AUTO: 32.8 % (ref 37–47)
HGB BLD-MCNC: 10.9 G/DL (ref 12–16)
IMM GRANULOCYTES # BLD AUTO: 0.02 K/UL (ref 0–0.11)
IMM GRANULOCYTES NFR BLD AUTO: 0.4 % (ref 0–0.9)
LYMPHOCYTES # BLD AUTO: 1.38 K/UL (ref 1–4.8)
LYMPHOCYTES NFR BLD: 25.2 % (ref 22–41)
MAGNESIUM SERPL-MCNC: 1.9 MG/DL (ref 1.5–2.5)
MCH RBC QN AUTO: 32.8 PG (ref 27–33)
MCHC RBC AUTO-ENTMCNC: 33.2 G/DL (ref 32.2–35.5)
MCV RBC AUTO: 98.8 FL (ref 81.4–97.8)
MONOCYTES # BLD AUTO: 0.97 K/UL (ref 0–0.85)
MONOCYTES NFR BLD AUTO: 17.7 % (ref 0–13.4)
NEUTROPHILS # BLD AUTO: 3.08 K/UL (ref 1.82–7.42)
NEUTROPHILS NFR BLD: 56.1 % (ref 44–72)
NRBC # BLD AUTO: 0 K/UL
NRBC BLD-RTO: 0 /100 WBC (ref 0–0.2)
PHOSPHATE SERPL-MCNC: 3.2 MG/DL (ref 2.5–4.5)
PLATELET # BLD AUTO: 353 K/UL (ref 164–446)
PMV BLD AUTO: 9.9 FL (ref 9–12.9)
POTASSIUM SERPL-SCNC: 4.2 MMOL/L (ref 3.6–5.5)
RBC # BLD AUTO: 3.32 M/UL (ref 4.2–5.4)
SODIUM SERPL-SCNC: 133 MMOL/L (ref 135–145)
WBC # BLD AUTO: 5.5 K/UL (ref 4.8–10.8)

## 2024-09-21 PROCEDURE — 665999 HH PPS REVENUE DEBIT

## 2024-09-21 PROCEDURE — 770004 HCHG ROOM/CARE - ONCOLOGY PRIVATE *

## 2024-09-21 PROCEDURE — 36415 COLL VENOUS BLD VENIPUNCTURE: CPT

## 2024-09-21 PROCEDURE — 85025 COMPLETE CBC W/AUTO DIFF WBC: CPT

## 2024-09-21 PROCEDURE — 80069 RENAL FUNCTION PANEL: CPT

## 2024-09-21 PROCEDURE — A9270 NON-COVERED ITEM OR SERVICE: HCPCS

## 2024-09-21 PROCEDURE — A9270 NON-COVERED ITEM OR SERVICE: HCPCS | Performed by: HOSPITALIST

## 2024-09-21 PROCEDURE — 99232 SBSQ HOSP IP/OBS MODERATE 35: CPT | Performed by: INTERNAL MEDICINE

## 2024-09-21 PROCEDURE — 700102 HCHG RX REV CODE 250 W/ 637 OVERRIDE(OP)

## 2024-09-21 PROCEDURE — 700117 HCHG RX CONTRAST REV CODE 255: Performed by: INTERNAL MEDICINE

## 2024-09-21 PROCEDURE — 665998 HH PPS REVENUE CREDIT

## 2024-09-21 PROCEDURE — 700102 HCHG RX REV CODE 250 W/ 637 OVERRIDE(OP): Performed by: HOSPITALIST

## 2024-09-21 PROCEDURE — 74160 CT ABDOMEN W/CONTRAST: CPT

## 2024-09-21 PROCEDURE — 83735 ASSAY OF MAGNESIUM: CPT

## 2024-09-21 RX ADMIN — CYANOCOBALAMIN TAB 500 MCG 500 MCG: 500 TAB at 04:16

## 2024-09-21 RX ADMIN — BUSPIRONE HYDROCHLORIDE 15 MG: 5 TABLET ORAL at 12:20

## 2024-09-21 RX ADMIN — VIBEGRON 75 MG: 75 TABLET, FILM COATED ORAL at 04:17

## 2024-09-21 RX ADMIN — OMEPRAZOLE 40 MG: 20 CAPSULE, DELAYED RELEASE ORAL at 04:19

## 2024-09-21 RX ADMIN — IOHEXOL 100 ML: 350 INJECTION, SOLUTION INTRAVENOUS at 16:20

## 2024-09-21 RX ADMIN — VALSARTAN 160 MG: 80 TABLET, FILM COATED ORAL at 04:18

## 2024-09-21 RX ADMIN — ATORVASTATIN CALCIUM 40 MG: 40 TABLET, FILM COATED ORAL at 18:00

## 2024-09-21 RX ADMIN — BUSPIRONE HYDROCHLORIDE 15 MG: 5 TABLET ORAL at 04:14

## 2024-09-21 RX ADMIN — VENLAFAXINE HYDROCHLORIDE 150 MG: 75 CAPSULE, EXTENDED RELEASE ORAL at 04:17

## 2024-09-21 RX ADMIN — BUSPIRONE HYDROCHLORIDE 15 MG: 5 TABLET ORAL at 18:00

## 2024-09-21 ASSESSMENT — PAIN DESCRIPTION - PAIN TYPE
TYPE: ACUTE PAIN
TYPE: ACUTE PAIN

## 2024-09-21 ASSESSMENT — ENCOUNTER SYMPTOMS
SHORTNESS OF BREATH: 0
WEAKNESS: 1
MEMORY LOSS: 1
NAUSEA: 0
ABDOMINAL PAIN: 0

## 2024-09-21 NOTE — PROGRESS NOTES
[facility] Medicine Daily Progress Note    Date of Service  9/21/2024    Chief Complaint  Aaliyah Fulton is a 84 y.o. female admitted 9/17/2024 with weakness    Hospital Course  85 yo woman with cholangiocarcinoma, NPH s/p  shunt, HTN, HLD who was recently discharged from the hospital for ground-level fall and SDH.  On day of discharge she was too weak to get out of the car and returned to the ER.  She was noted to have some hypoxia and placed on 2 L O2.  Chest x-ray showed interstitial changes and she was given a dose of Lasix.  CT head was stable.  Dr. Arias was contacted who recommended palliative care or hospice.  Palliative care was consulted and patient is not ready for hospice but would like comfort driven care.  There is concerns that Keppra is causing increased somnolence so it was stopped.    Interval Problem Update  Afebrile overnight.  Patient is alert, has no complaints.  She thinks she is at a friend's house.  Continues to have left-sided weakness.  MRI brain pending.  She is not eating much.  I spoke with daughter Angi, she is concerned with the progression of her cancer.  I ordered for CT liver.  Angi is agreeable to SNF, I discussed at IDT rounds    I have discussed this patient's plan of care and discharge plan at IDT rounds today with Case Management, Nursing, Nursing leadership, and other members of the IDT team.    Consultants/Specialty  neurosurgery and palliative care    Code Status  DNAR/DNI    Disposition  The patient is not medically cleared for discharge to home or a post-acute facility.  Anticipate discharge to: skilled nursing facility    I have placed the appropriate orders for post-discharge needs.    Review of Systems  Review of Systems   Unable to perform ROS: Medical condition (Encephalopathic)   Constitutional:  Negative for malaise/fatigue.   Respiratory:  Negative for shortness of breath.    Cardiovascular:  Negative for chest pain.   Gastrointestinal:  Negative for abdominal  pain and nausea.   Neurological:  Positive for weakness.   Psychiatric/Behavioral:  Positive for memory loss.         Physical Exam  Temp:  [36.5 °C (97.7 °F)-37.6 °C (99.7 °F)] 36.5 °C (97.7 °F)  Pulse:  [] 81  Resp:  [16-18] 16  BP: (118-132)/(50-68) 121/50  SpO2:  [90 %-94 %] 90 %    Physical Exam  Vitals and nursing note reviewed.   Constitutional:       Appearance: She is ill-appearing.      Comments: cachectic   HENT:      Head: Normocephalic.      Mouth/Throat:      Mouth: Mucous membranes are moist.   Eyes:      General:         Right eye: No discharge.         Left eye: No discharge.      Pupils: Pupils are equal, round, and reactive to light.      Comments: Bruising under left eye   Cardiovascular:      Rate and Rhythm: Normal rate and regular rhythm.      Heart sounds: Murmur heard.   Pulmonary:      Effort: No respiratory distress.      Breath sounds: No wheezing or rales.   Abdominal:      General: There is no distension.      Palpations: Abdomen is soft.      Tenderness: There is no abdominal tenderness.   Musculoskeletal:         General: No swelling.      Cervical back: Neck supple.      Comments: Diffuse muscle atrophy   Skin:     General: Skin is warm and dry.      Coloration: Skin is pale.      Comments: Healing surgical incision to left lower leg, previous Mohs surgery per daughter   Neurological:      Mental Status: She is alert.      Comments: Oriented to self, disoriented to place and date  Left hand  and bicep flexion weaker compared to right.  Left dorsi and plantarflexion and hip flexion weaker compared to right         Fluids  No intake or output data in the 24 hours ending 09/21/24 1319         Laboratory  Recent Labs     09/19/24  0431 09/20/24  0122 09/21/24  0157   WBC 4.9 4.2* 5.5   RBC 3.56* 3.23* 3.32*   HEMOGLOBIN 11.7* 10.6* 10.9*   HEMATOCRIT 35.4* 32.1* 32.8*   MCV 99.4* 99.4* 98.8*   MCH 32.9 32.8 32.8   MCHC 33.1 33.0 33.2   RDW 59.7* 60.3* 58.7*   PLATELETCT 349 334  353   MPV 10.0 9.8 9.9     Recent Labs     09/19/24  0431 09/20/24  0122 09/21/24  0157   SODIUM 134* 135 133*   POTASSIUM 4.3 4.1 4.2   CHLORIDE 100 101 100   CO2 21 22 22   GLUCOSE 126* 114* 117*   BUN 13 12 9   CREATININE 0.81 0.62 0.71   CALCIUM 9.5 9.0 8.6                   Imaging  CT-HEAD W/O   Final Result      1.  No demonstrable interval change in the RIGHT parafalcine and holohemispheric subdural hematomas   2.  5 mm LEFT frontal subdural hematoma unchanged   3.  Senescent changes               DX-CHEST-PORTABLE (1 VIEW)   Final Result      Interstitial opacities again noted which could represent chronic interstitial changes versus mild vascular congestion.      MR-BRAIN-WITH & W/O    (Results Pending)   CT-ABDOMEN LIVER FOR HEPATIC MASS (CIRRHOSIS)    (Results Pending)        Assessment/Plan  * Failure to thrive in adult  Assessment & Plan  Patient recently discharged after fall complicated by subdural hematoma 5 mm, wanted repeat 4 weeks follow-up head CT as outpatient clinic, discharged home however failure to thrive with debility and unable to care for herself  PT OT  Palliative care consulted for goals of care discussion    Moderate protein-calorie malnutrition (HCC)  Assessment & Plan  Due to malignancy.  See dietitian's note    Encephalopathy  Assessment & Plan  CT head showed unchanged hematoma  Has persistent left-sided weakness which is new. Check MRI brain for stroke, tumors, this is pending   EEG negative for seizure activity.  Keppra was stopped and her mentation is improved, it was likely contributing to her somnolence  NPH, neurosurgery has adjusted her shunt  PT OT, recommending SNF    Hypoxia- (present on admission)  Assessment & Plan  Chest x-ray with pulmonary edema and given a dose of Lasix  Has been weaned to room air      Constipation  Assessment & Plan  Bowel medication as needed    Overactive bladder- (present on admission)  Assessment & Plan  Due to concerns for somnolence,  transition oxybutynin to Gemtesa to avoid anticholinergic effects    Subdural hematoma (HCC)- (present on admission)  Assessment & Plan  Recent hospitalization discharged on 9/17/2024  Seizure prophylaxis Keppra has been stopped because of encephalopathy  Neurosurgery recommended repeat CT in 4 weeks  Repeat CT head during this hospitalization is stable    Valvular heart disease- (present on admission)  Assessment & Plan  Reviewed echocardiogram from 9/2/2024 with moderate aortic stenosis and moderate mitral regurgitation      Cholangiocarcinoma (HCC)- (present on admission)  Assessment & Plan  Patient follows with Dr. Arias and Dr. Tabor  She is on palliative chemotherapy, on hold.  Will need to follow-up with her oncology team  Reassess liver lesion, CT ordered    Normal pressure hydrocephalus (HCC)- (present on admission)  Assessment & Plan  History of status post  shunt         VTE prophylaxis:   SCDs/TEDs      I have performed a physical exam and reviewed and updated ROS and Plan today (9/21/2024). In review of yesterday's note (9/20/2024), there are no changes except as documented above.

## 2024-09-21 NOTE — CARE PLAN
Problem: Pain - Standard  Goal: Alleviation of pain or a reduction in pain to the patient’s comfort goal  Outcome: Progressing   The patient is Stable - Low risk of patient condition declining or worsening    Shift Goals  Clinical Goals: rest  Patient Goals: jonel  Family Goals: n/a    Progress made toward(s) clinical / shift goals:      Patients pain will be well managed during shift     Patient is not progressing towards the following goals:

## 2024-09-22 LAB
ALBUMIN SERPL BCP-MCNC: 3.2 G/DL (ref 3.2–4.9)
BUN SERPL-MCNC: 10 MG/DL (ref 8–22)
CALCIUM ALBUM COR SERPL-MCNC: 9.1 MG/DL (ref 8.5–10.5)
CALCIUM SERPL-MCNC: 8.5 MG/DL (ref 8.5–10.5)
CHLORIDE SERPL-SCNC: 101 MMOL/L (ref 96–112)
CO2 SERPL-SCNC: 23 MMOL/L (ref 20–33)
CREAT SERPL-MCNC: 0.71 MG/DL (ref 0.5–1.4)
GFR SERPLBLD CREATININE-BSD FMLA CKD-EPI: 83 ML/MIN/1.73 M 2
GLUCOSE SERPL-MCNC: 153 MG/DL (ref 65–99)
MAGNESIUM SERPL-MCNC: 1.8 MG/DL (ref 1.5–2.5)
PHOSPHATE SERPL-MCNC: 3.1 MG/DL (ref 2.5–4.5)
POTASSIUM SERPL-SCNC: 3.7 MMOL/L (ref 3.6–5.5)
SODIUM SERPL-SCNC: 134 MMOL/L (ref 135–145)

## 2024-09-22 PROCEDURE — 83735 ASSAY OF MAGNESIUM: CPT

## 2024-09-22 PROCEDURE — A9270 NON-COVERED ITEM OR SERVICE: HCPCS | Performed by: HOSPITALIST

## 2024-09-22 PROCEDURE — 665998 HH PPS REVENUE CREDIT

## 2024-09-22 PROCEDURE — A9270 NON-COVERED ITEM OR SERVICE: HCPCS

## 2024-09-22 PROCEDURE — 700102 HCHG RX REV CODE 250 W/ 637 OVERRIDE(OP): Performed by: HOSPITALIST

## 2024-09-22 PROCEDURE — 770004 HCHG ROOM/CARE - ONCOLOGY PRIVATE *

## 2024-09-22 PROCEDURE — 665999 HH PPS REVENUE DEBIT

## 2024-09-22 PROCEDURE — 99232 SBSQ HOSP IP/OBS MODERATE 35: CPT | Performed by: INTERNAL MEDICINE

## 2024-09-22 PROCEDURE — 80069 RENAL FUNCTION PANEL: CPT

## 2024-09-22 PROCEDURE — 36415 COLL VENOUS BLD VENIPUNCTURE: CPT

## 2024-09-22 PROCEDURE — 700102 HCHG RX REV CODE 250 W/ 637 OVERRIDE(OP)

## 2024-09-22 RX ORDER — TRAMADOL HYDROCHLORIDE 50 MG/1
50 TABLET ORAL EVERY 6 HOURS PRN
Status: DISCONTINUED | OUTPATIENT
Start: 2024-09-22 | End: 2024-09-24 | Stop reason: HOSPADM

## 2024-09-22 RX ADMIN — BUSPIRONE HYDROCHLORIDE 15 MG: 5 TABLET ORAL at 11:26

## 2024-09-22 RX ADMIN — CYANOCOBALAMIN TAB 500 MCG 500 MCG: 500 TAB at 04:25

## 2024-09-22 RX ADMIN — BUSPIRONE HYDROCHLORIDE 15 MG: 5 TABLET ORAL at 01:24

## 2024-09-22 RX ADMIN — ATORVASTATIN CALCIUM 40 MG: 40 TABLET, FILM COATED ORAL at 18:09

## 2024-09-22 RX ADMIN — TRAMADOL HYDROCHLORIDE 50 MG: 50 TABLET ORAL at 20:37

## 2024-09-22 RX ADMIN — VENLAFAXINE HYDROCHLORIDE 150 MG: 75 CAPSULE, EXTENDED RELEASE ORAL at 04:25

## 2024-09-22 RX ADMIN — OMEPRAZOLE 40 MG: 20 CAPSULE, DELAYED RELEASE ORAL at 04:25

## 2024-09-22 RX ADMIN — BUSPIRONE HYDROCHLORIDE 15 MG: 5 TABLET ORAL at 18:10

## 2024-09-22 RX ADMIN — VALSARTAN 160 MG: 80 TABLET, FILM COATED ORAL at 04:25

## 2024-09-22 RX ADMIN — VIBEGRON 75 MG: 75 TABLET, FILM COATED ORAL at 04:25

## 2024-09-22 ASSESSMENT — PAIN DESCRIPTION - PAIN TYPE
TYPE: ACUTE PAIN

## 2024-09-22 ASSESSMENT — ENCOUNTER SYMPTOMS
WEAKNESS: 1
SHORTNESS OF BREATH: 0
NAUSEA: 0
MEMORY LOSS: 1
ABDOMINAL PAIN: 0

## 2024-09-22 NOTE — PROGRESS NOTES
Hospital Medicine Daily Progress Note    Date of Service  9/22/2024    Chief Complaint  Aaliyah Fulton is a 84 y.o. female admitted 9/17/2024 with weakness    Hospital Course  83 yo woman with cholangiocarcinoma, NPH s/p  shunt, HTN, HLD who was recently discharged from the hospital for ground-level fall and SDH.  On day of discharge she was too weak to get out of the car and returned to the ER.  She was noted to have some hypoxia and placed on 2 L O2.  Chest x-ray showed interstitial changes and she was given a dose of Lasix.  CT head was stable.  Dr. Arias was contacted who recommended palliative care or hospice.  Palliative care was consulted and patient is not ready for hospice but would like comfort driven care.  There is concerns that Keppra is causing increased somnolence so it was stopped.    Interval Problem Update  Vital signs remained stable.  CT abdomen did not show a liver lesion, I discussed with her daughter.  MRI brain is pending.  Patient is more oriented today, able to tell me she is at [facility] and the year.  She feels weak but otherwise well    I have discussed this patient's plan of care and discharge plan at IDT rounds today with Case Management, Nursing, Nursing leadership, and other members of the IDT team.    Consultants/Specialty  neurosurgery and palliative care    Code Status  DNAR/DNI    Disposition  The patient is not medically cleared for discharge to home or a post-acute facility.  Anticipate discharge to: skilled nursing facility    I have placed the appropriate orders for post-discharge needs.    Review of Systems  Review of Systems   Constitutional:  Negative for malaise/fatigue.   Respiratory:  Negative for shortness of breath.    Cardiovascular:  Negative for chest pain.   Gastrointestinal:  Negative for abdominal pain and nausea.   Neurological:  Positive for weakness.   Psychiatric/Behavioral:  Positive for memory loss.         Physical Exam  Temp:  [36.5 °C (97.7 °F)-37.3  °C (99.1 °F)] 36.8 °C (98.2 °F)  Pulse:  [85-92] 85  Resp:  [16-18] 16  BP: (105-119)/(49-64) 105/49  SpO2:  [94 %-95 %] 94 %    Physical Exam  Vitals and nursing note reviewed.   Constitutional:       Appearance: She is ill-appearing.      Comments: cachectic   HENT:      Head: Normocephalic.      Mouth/Throat:      Mouth: Mucous membranes are moist.   Eyes:      General:         Right eye: No discharge.         Left eye: No discharge.      Comments: Bruising under left eye   Cardiovascular:      Rate and Rhythm: Normal rate and regular rhythm.      Heart sounds: Murmur heard.   Pulmonary:      Effort: No respiratory distress.      Breath sounds: No wheezing or rales.   Abdominal:      General: There is no distension.      Palpations: Abdomen is soft.      Tenderness: There is no abdominal tenderness.   Musculoskeletal:         General: No swelling.      Cervical back: Neck supple.      Comments: Diffuse muscle atrophy   Skin:     General: Skin is warm and dry.      Coloration: Skin is pale.      Comments: Healing surgical incision to left lower leg, previous Mohs surgery per daughter   Neurological:      Mental Status: She is alert.      Comments: Oriented to self, [facility] and year.  Left hand  and bicep flexion weaker compared to right.  Left dorsi and plantarflexion and hip flexion weaker compared to right         Fluids    Intake/Output Summary (Last 24 hours) at 9/22/2024 1247  Last data filed at 9/22/2024 0541  Gross per 24 hour   Intake --   Output 900 ml   Net -900 ml            Laboratory  Recent Labs     09/20/24  0122 09/21/24  0157   WBC 4.2* 5.5   RBC 3.23* 3.32*   HEMOGLOBIN 10.6* 10.9*   HEMATOCRIT 32.1* 32.8*   MCV 99.4* 98.8*   MCH 32.8 32.8   MCHC 33.0 33.2   RDW 60.3* 58.7*   PLATELETCT 334 353   MPV 9.8 9.9     Recent Labs     09/20/24  0122 09/21/24  0157 09/22/24  0737   SODIUM 135 133* 134*   POTASSIUM 4.1 4.2 3.7   CHLORIDE 101 100 101   CO2 22 22 23   GLUCOSE 114* 117* 153*   BUN 12 9 10    CREATININE 0.62 0.71 0.71   CALCIUM 9.0 8.6 8.5                   Imaging  CT-ABDOMEN LIVER FOR HEPATIC MASS (CIRRHOSIS)   Final Result      1.  No acute abnormality      2.  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  Aortic atherosclerotic plaque      4.  Peritoneal catheter present with tip lying at the inferior aspect of the liver/paracolic gutter      5.  Prior cholecystectomy      6.  No other finding                  LI-RADS:      CT-HEAD W/O   Final Result      1.  No demonstrable interval change in the RIGHT parafalcine and holohemispheric subdural hematomas   2.  5 mm LEFT frontal subdural hematoma unchanged   3.  Senescent changes               DX-CHEST-PORTABLE (1 VIEW)   Final Result      Interstitial opacities again noted which could represent chronic interstitial changes versus mild vascular congestion.      MR-BRAIN-WITH & W/O    (Results Pending)        Assessment/Plan  * Failure to thrive in adult  Assessment & Plan  Patient recently discharged after fall complicated by subdural hematoma 5 mm, wanted repeat 4 weeks follow-up head CT as outpatient clinic, discharged home however failure to thrive with debility and unable to care for herself  PT OT  Palliative care consulted for goals of care discussion    Moderate protein-calorie malnutrition (HCC)  Assessment & Plan  Due to malignancy.  See dietitian's note    Encephalopathy  Assessment & Plan  CT head showed unchanged hematoma  Has persistent left-sided weakness which is new. Check MRI brain for stroke, tumors, this is pending   EEG negative for seizure activity.  Keppra was stopped and her mentation continues to improve, it was likely contributing to her somnolence  NPH, neurosurgery has adjusted her shunt  PT OT, recommending SNF    Hypoxia- (present on admission)  Assessment & Plan  Chest x-ray with pulmonary edema and given a dose of Lasix  Has been weaned to room air      Constipation  Assessment & Plan  Bowel medication as  needed    Overactive bladder- (present on admission)  Assessment & Plan  Due to concerns for somnolence, transition oxybutynin to Gemtesa to avoid anticholinergic effects    Subdural hematoma (HCC)- (present on admission)  Assessment & Plan  Recent hospitalization discharged on 9/17/2024  Seizure prophylaxis Keppra has been stopped because of encephalopathy  Neurosurgery recommended repeat CT in 4 weeks  Repeat CT head during this hospitalization is stable    Valvular heart disease- (present on admission)  Assessment & Plan  Reviewed echocardiogram from 9/2/2024 with moderate aortic stenosis and moderate mitral regurgitation      Cholangiocarcinoma (HCC)- (present on admission)  Assessment & Plan  Patient follows with Dr. Arais and Dr. Tabor  She is on palliative chemotherapy, on hold.  Will need to follow-up with her oncology team  CT liver did not show a lesion    Normal pressure hydrocephalus (HCC)- (present on admission)  Assessment & Plan  History of status post  shunt         VTE prophylaxis:   SCDs/TEDs      I have performed a physical exam and reviewed and updated ROS and Plan today (9/22/2024). In review of yesterday's note (9/21/2024), there are no changes except as documented above.

## 2024-09-22 NOTE — CARE PLAN
The patient is Watcher - Medium risk of patient condition declining or worsening    Shift Goals  Clinical Goals: rest and comfort  Patient Goals: jonel  Family Goals: n/a    Progress made toward(s) clinical / shift goals: All questions answered at this time. Patient is stating a comfortable pain level. Patient remained free of falls throughout shift, fall precautions in place. Bed in lowest position, belongings and call light in reach.

## 2024-09-22 NOTE — CARE PLAN
The patient is Watcher - Medium risk of patient condition declining or worsening    Shift Goals  Clinical Goals: Comfort and MRI  Patient Goals: Rest  Family Goals: SHALINI    Progress made toward(s) clinical / shift goals: All questions answered at this time. Patient is stating a comfortable pain level. Patient remained free of falls throughout shift, fall precautions in place. Bed in lowest position, belongings and call light in reach.

## 2024-09-22 NOTE — CARE PLAN
The patient is Unstable - High likelihood or risk of patient condition declining or worsening    Shift Goals  Clinical Goals: Comfort and rest, CT & MRI  Patient Goals: rest  Family Goals: SHALINI    Progress made toward(s) clinical / shift goals:    Problem: Fall Risk  Goal: Patient will remain free from falls  Outcome: Progressing  Note: Placed bed alarm and reinforced to  used the call.  Bed in low position and wheels are lock.  Belonging with in reached and checked every two hour.      Problem: Skin Integrity  Goal: Skin integrity is maintained or improved  Outcome: Progressing  Note: Encourage patient to to turn every two hour.monitored any skin breakdown  Used pillow to support back and heel           Patient is not progressing towards the following goals:

## 2024-09-22 NOTE — PROGRESS NOTES
Bedside report received, Assume care.     A&O x 2, Able to make needs known.  Pain Assessment: 0/10.  Bed in low position and locked, pt resting comfortably now, call light with in reach, all needs met at this time. Interventions will be executed per plan of care. Bedside report r

## 2024-09-23 ENCOUNTER — APPOINTMENT (OUTPATIENT)
Dept: RADIOLOGY | Facility: MEDICAL CENTER | Age: 84
End: 2024-09-23
Attending: INTERNAL MEDICINE
Payer: MEDICARE

## 2024-09-23 PROBLEM — I63.9 ACUTE CVA (CEREBROVASCULAR ACCIDENT) (HCC): Status: ACTIVE | Noted: 2024-09-23

## 2024-09-23 PROCEDURE — 70553 MRI BRAIN STEM W/O & W/DYE: CPT

## 2024-09-23 PROCEDURE — 700102 HCHG RX REV CODE 250 W/ 637 OVERRIDE(OP)

## 2024-09-23 PROCEDURE — 97535 SELF CARE MNGMENT TRAINING: CPT

## 2024-09-23 PROCEDURE — 665998 HH PPS REVENUE CREDIT

## 2024-09-23 PROCEDURE — 99222 1ST HOSP IP/OBS MODERATE 55: CPT | Performed by: PSYCHIATRY & NEUROLOGY

## 2024-09-23 PROCEDURE — 770004 HCHG ROOM/CARE - ONCOLOGY PRIVATE *

## 2024-09-23 PROCEDURE — 99233 SBSQ HOSP IP/OBS HIGH 50: CPT | Performed by: INTERNAL MEDICINE

## 2024-09-23 PROCEDURE — 700117 HCHG RX CONTRAST REV CODE 255: Mod: JZ | Performed by: INTERNAL MEDICINE

## 2024-09-23 PROCEDURE — A9270 NON-COVERED ITEM OR SERVICE: HCPCS

## 2024-09-23 PROCEDURE — A9579 GAD-BASE MR CONTRAST NOS,1ML: HCPCS | Mod: JZ | Performed by: INTERNAL MEDICINE

## 2024-09-23 PROCEDURE — 665999 HH PPS REVENUE DEBIT

## 2024-09-23 PROCEDURE — A9270 NON-COVERED ITEM OR SERVICE: HCPCS | Performed by: HOSPITALIST

## 2024-09-23 PROCEDURE — 700102 HCHG RX REV CODE 250 W/ 637 OVERRIDE(OP): Performed by: HOSPITALIST

## 2024-09-23 PROCEDURE — 97530 THERAPEUTIC ACTIVITIES: CPT

## 2024-09-23 RX ADMIN — BUSPIRONE HYDROCHLORIDE 15 MG: 5 TABLET ORAL at 17:47

## 2024-09-23 RX ADMIN — OMEPRAZOLE 40 MG: 20 CAPSULE, DELAYED RELEASE ORAL at 05:45

## 2024-09-23 RX ADMIN — BUSPIRONE HYDROCHLORIDE 15 MG: 5 TABLET ORAL at 12:43

## 2024-09-23 RX ADMIN — VIBEGRON 75 MG: 75 TABLET, FILM COATED ORAL at 05:45

## 2024-09-23 RX ADMIN — VENLAFAXINE HYDROCHLORIDE 150 MG: 75 CAPSULE, EXTENDED RELEASE ORAL at 05:45

## 2024-09-23 RX ADMIN — CYANOCOBALAMIN TAB 500 MCG 500 MCG: 500 TAB at 05:45

## 2024-09-23 RX ADMIN — BUSPIRONE HYDROCHLORIDE 15 MG: 5 TABLET ORAL at 01:54

## 2024-09-23 RX ADMIN — ATORVASTATIN CALCIUM 40 MG: 40 TABLET, FILM COATED ORAL at 17:47

## 2024-09-23 RX ADMIN — VALSARTAN 160 MG: 80 TABLET, FILM COATED ORAL at 05:46

## 2024-09-23 RX ADMIN — GADOTERIDOL 15 ML: 279.3 INJECTION, SOLUTION INTRAVENOUS at 10:56

## 2024-09-23 ASSESSMENT — COGNITIVE AND FUNCTIONAL STATUS - GENERAL
SUGGESTED CMS G CODE MODIFIER DAILY ACTIVITY: CK
HELP NEEDED FOR BATHING: A LOT
DRESSING REGULAR LOWER BODY CLOTHING: A LOT
DAILY ACTIVITIY SCORE: 14
TOILETING: A LOT
EATING MEALS: A LITTLE
PERSONAL GROOMING: A LITTLE
DRESSING REGULAR UPPER BODY CLOTHING: A LOT

## 2024-09-23 ASSESSMENT — ENCOUNTER SYMPTOMS
WEAKNESS: 1
NAUSEA: 0
ABDOMINAL PAIN: 0
SHORTNESS OF BREATH: 0
MEMORY LOSS: 1

## 2024-09-23 ASSESSMENT — PAIN DESCRIPTION - PAIN TYPE: TYPE: ACUTE PAIN

## 2024-09-23 NOTE — PROGRESS NOTES
Hospital Medicine Daily Progress Note    Date of Service  9/23/2024    Chief Complaint  Aaliyah Fulton is a 84 y.o. female admitted 9/17/2024 with weakness    Hospital Course  83 yo woman with cholangiocarcinoma, NPH s/p  shunt, HTN, HLD who was recently discharged from the hospital for ground-level fall and SDH.  On day of discharge she was too weak to get out of the car and returned to the ER.  She was noted to have some hypoxia and placed on 2 L O2.  Chest x-ray showed interstitial changes and she was given a dose of Lasix.  CT head was stable.  Dr. Arias was contacted who recommended palliative care or hospice.  Palliative care was consulted and patient is not ready for hospice but would like comfort driven care.  She is trying to be there for the birth of her great grandbaby in November.  There is concerns that Keppra is causing increased somnolence so it was stopped and she had improvement of her mentation.  She was noted to have left hemiparesis and MRI brain showed acute stroke.  PT OT recommended SNF.    Interval Problem Update  Patient is alert and oriented to [facility] and year.  MRI brain showed acute stroke, I consulted neurology.  Daughter is bedside and updated.  I spoke with Dr. Blanchard, he will round likely tomorrow to check the  shunt after her MRI was done    I have discussed this patient's plan of care and discharge plan at IDT rounds today with Case Management, Nursing, Nursing leadership, and other members of the IDT team.    Consultants/Specialty  neurosurgery and palliative care, neurology    Code Status  DNAR/DNI    Disposition  The patient is not medically cleared for discharge to home or a post-acute facility.  Anticipate discharge to: skilled nursing facility    I have placed the appropriate orders for post-discharge needs.    Review of Systems  Review of Systems   Constitutional:  Negative for malaise/fatigue.   Respiratory:  Negative for shortness of breath.    Cardiovascular:   Negative for chest pain.   Gastrointestinal:  Negative for abdominal pain and nausea.   Neurological:  Positive for weakness.   Psychiatric/Behavioral:  Positive for memory loss.         Physical Exam  Temp:  [36.9 °C (98.5 °F)-37.7 °C (99.8 °F)] 37.2 °C (98.9 °F)  Pulse:  [77-91] 91  Resp:  [16-18] 16  BP: (115-139)/() 115/62  SpO2:  [93 %-95 %] 95 %    Physical Exam  Vitals and nursing note reviewed.   Constitutional:       Appearance: She is ill-appearing.      Comments: cachectic   HENT:      Head: Normocephalic.      Mouth/Throat:      Mouth: Mucous membranes are moist.   Eyes:      General:         Right eye: No discharge.         Left eye: No discharge.      Comments: Bruising under left eye   Cardiovascular:      Rate and Rhythm: Normal rate and regular rhythm.      Heart sounds: Murmur heard.   Pulmonary:      Effort: No respiratory distress.      Breath sounds: No wheezing or rales.   Abdominal:      General: There is no distension.      Palpations: Abdomen is soft.      Tenderness: There is no abdominal tenderness.   Musculoskeletal:         General: No swelling.      Cervical back: Neck supple.      Comments: Diffuse muscle atrophy   Skin:     General: Skin is warm and dry.      Coloration: Skin is pale.      Comments: Healing surgical incision to left lower leg, previous Mohs surgery per daughter   Neurological:      Mental Status: She is alert.      Comments: Oriented to self, [facility] and year.  Left hand  and bicep flexion weaker compared to right.  Left dorsi and plantarflexion and hip flexion weaker compared to right         Fluids    Intake/Output Summary (Last 24 hours) at 9/23/2024 1414  Last data filed at 9/23/2024 0539  Gross per 24 hour   Intake --   Output 2400 ml   Net -2400 ml            Laboratory  Recent Labs     09/21/24  0157   WBC 5.5   RBC 3.32*   HEMOGLOBIN 10.9*   HEMATOCRIT 32.8*   MCV 98.8*   MCH 32.8   MCHC 33.2   RDW 58.7*   PLATELETCT 353   MPV 9.9     Recent Labs      09/21/24  0157 09/22/24  0737   SODIUM 133* 134*   POTASSIUM 4.2 3.7   CHLORIDE 100 101   CO2 22 23   GLUCOSE 117* 153*   BUN 9 10   CREATININE 0.71 0.71   CALCIUM 8.6 8.5                   Imaging  MR-BRAIN-WITH & W/O   Final Result         Small focus of acute infarction the right inferior cerebellar cortex.      Right holohemispheric subdural hematoma involving the cerebral convexity as well as extending to the parasagittal region in the medial frontoparietal region.      Small left holohemispheric convexity subdural hematoma  without significant mass effect.      Age-related volume loss and chronic microvascular ischemic changes.      Shunt catheter tip is within the right frontal horn.      CT-ABDOMEN LIVER FOR HEPATIC MASS (CIRRHOSIS)   Final Result      1.  No acute abnormality      2.  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  Aortic atherosclerotic plaque      4.  Peritoneal catheter present with tip lying at the inferior aspect of the liver/paracolic gutter      5.  Prior cholecystectomy      6.  No other finding                  LI-RADS:      CT-HEAD W/O   Final Result      1.  No demonstrable interval change in the RIGHT parafalcine and holohemispheric subdural hematomas   2.  5 mm LEFT frontal subdural hematoma unchanged   3.  Senescent changes               DX-CHEST-PORTABLE (1 VIEW)   Final Result      Interstitial opacities again noted which could represent chronic interstitial changes versus mild vascular congestion.           Assessment/Plan  * Failure to thrive in adult  Assessment & Plan  Patient recently discharged after fall complicated by subdural hematoma 5 mm, wanted repeat 4 weeks follow-up head CT as outpatient clinic, discharged home however failure to thrive with debility and unable to care for herself  PT OT  Palliative care consulted for goals of care discussion    Acute CVA (cerebrovascular accident) (HCC)  Assessment & Plan  Acute CVA noted on MRI, she also does have  the SDH  I consulted neurology, follow-up recommendations. Dr. Wharotn said no further diagnostic studies recommended    Moderate protein-calorie malnutrition (HCC)  Assessment & Plan  Due to malignancy.  See dietitian's note    Encephalopathy  Assessment & Plan  CT head showed unchanged hematoma  Has persistent left-sided weakness which is new. Check MRI brain for stroke, tumors, this is pending   EEG negative for seizure activity.  Keppra was stopped and her mentation continues to improve, it was likely contributing to her somnolence  NPH, neurosurgery has adjusted her shunt to increase flow  PT OT, recommending SNF    Hypoxia- (present on admission)  Assessment & Plan  Chest x-ray with pulmonary edema and given a dose of Lasix  Has been weaned to room air      Constipation  Assessment & Plan  Bowel medication as needed    Overactive bladder- (present on admission)  Assessment & Plan  Due to concerns for somnolence, transition oxybutynin to Gemtesa to avoid anticholinergic effects    Subdural hematoma (HCC)- (present on admission)  Assessment & Plan  Recent hospitalization discharged on 9/17/2024  Seizure prophylaxis Keppra has been stopped because of encephalopathy  Neurosurgery recommended repeat CT in 4 weeks  Repeat CT head during this hospitalization is stable    Valvular heart disease- (present on admission)  Assessment & Plan  Reviewed echocardiogram from 9/2/2024 with moderate aortic stenosis and moderate mitral regurgitation      Cholangiocarcinoma (HCC)- (present on admission)  Assessment & Plan  Patient follows with Dr. Arias and Dr. Tabor  She is on palliative chemotherapy, on hold.  Will need to follow-up with her oncology team  CT liver did not show a lesion    Normal pressure hydrocephalus (HCC)- (present on admission)  Assessment & Plan  History of status post  shunt  Had MRI, Dr. Anderson to stop by tomorrow to check her shunt         VTE prophylaxis:   SCDs/TEDs      I have performed a physical exam  and reviewed and updated ROS and Plan today (9/23/2024). In review of yesterday's note (9/22/2024), there are no changes except as documented above.

## 2024-09-23 NOTE — PROGRESS NOTES
I met with the patient at bedside this morning.  Patient was awake and conversant.  Patient with no somatic complaints at time of my evaluation.    Patient pleasant and conversant oriented x 3 moving all 4 extremities although with 4-5 strength in the left upper and left lower improved from last week.  Well-perfused respiratory distress    Patient with some clinical improvement from last week reports desire to move and work with physical therapy.  Patient amenable to skilled nursing versus rehab.  Palliative medicine will continue to follow along.

## 2024-09-23 NOTE — DISCHARGE PLANNING
Case Management Discharge Planning    Admission Date: 9/17/2024  GMLOS: 3.6  ALOS: 5    6-Clicks ADL Score: 14  6-Clicks Mobility Score: 11  PT and/or OT Eval ordered: Yes  Post-acute Referrals Ordered: Yes  Post-acute Choice Obtained: Yes  Has referral(s) been sent to post-acute provider:  Yes      Anticipated Discharge Dispo: Discharge Disposition: D/T to home under A care in anticipation of covered skilled care (06)    DME Needed: No    Action(s) Taken: Patient was discussed in IDT rounds. [facility] is the only accepting facility. RNCM placed call to [facility] [phone] to discuss discharge planning; no answer, left VM requesting a call back.     Escalations Completed: None    Medically Clear: No    Next Steps: pending medical clearance     Barriers to Discharge: Medical clearance    Is the patient up for discharge tomorrow: No

## 2024-09-23 NOTE — CONSULTS
Neurology Initial Consult H&P  Neurohospitalist Service, [facility]    Referring Physician: Monet Hernandez M.D.    Chief Complaint   Patient presents with    Weakness     Patient DC home today at 1430 after spending two nights in the hospital for a Subdural hematoma. Family reports patient was transferred into car by staff but was unable to stand or assist with the transfer. Daughter states family is unable to care for patient as she is unable to assist to transfer herself.        HPI: Aaliyah Fulton is a 84 year old woman with history of hypertension, hyperlipidemia, recent admission for R side subdural hematoma following a fall, history of cholangiocarcinoma on active chemotherapy, history of VPS for NPH.  Stroke Neurology consulted for incidental ischemic stroke found on MRI.  Aaliyah was admitted on 9/15 to 9/17 after a ground level fall.  She was found to have a R sided subdural hematoma.  The SDH was medically managed, and she was started on keppra therapy for seizure prophylaxis.  She re-presented to the ER on day of discharge for profound weakness and lethargy- to the extent she was unable to transfer or care for herself.  Her keppra therapy was discontinued, and this lead to improving mentation.  She was noted to have mild L side weakness on exam, and this triggered an MRI brain.  On the MRI, in addition to the SDH, there is a small R cerebellar ischemic stroke.  Aaliyah was previously on ASA therapy, but this was discontinued several years ago.  She is compliant on statin and anti-hypertensives at home. On ROS, she is endorsing frequent falls, denies pain, reports good appetite.    Review of systems: In addition to what is detailed in the HPI above, all other systems reviewed and are negative.    Past Medical History:    has a past medical history of Arthritis, ASTHMA, Breast cancer (HCC) (2005), Cancer (HCC) (06/2024), Cataract, Dental disorder, Heart burn, Heart murmur, High  cholesterol, History of cervical fracture (2022), Hypertension (01/03/2022), Indigestion, Osteoporosis, Pain, Pneumonia (12/2021), PONV (postoperative nausea and vomiting), Psychiatric problem, Stroke (HCC) (2021), and Unspecified urinary incontinence.    FHx:  family history includes Breast Cancer in her daughter; Cancer in her daughter; Lung Disease in her mother; Psychiatric Illness in her daughter and daughter; Stroke in her mother.    SHx:   reports that she has never smoked. She has never used smokeless tobacco. She reports that she does not drink alcohol and does not use drugs.    Allergies:  No Known Allergies    Medications:    Current Facility-Administered Medications:     traMADol (Ultram) 50 MG tablet 50 mg, 50 mg, Oral, Q6HRS PRN, Marge Romero, A.P.R.N., 50 mg at 09/22/24 2037    melatonin tablet 5 mg, 5 mg, Oral, HS PRN, Marge Romero, A.P.R.N.    acetaminophen (Tylenol) tablet 1,000 mg, 1,000 mg, Oral, Q6HRS PRN, Dangelo Rosa, D.O., 1,000 mg at 09/19/24 1117    atorvastatin (Lipitor) tablet 40 mg, 40 mg, Oral, Q EVENING, Dangelo Rosa, D.O., 40 mg at 09/22/24 1809    busPIRone (Buspar) tablet 15 mg, 15 mg, Oral, TID, Dangelo Rosa, D.O., 15 mg at 09/23/24 1243    omeprazole (PriLOSEC) capsule 40 mg, 40 mg, Oral, DAILY, Dangelo Rosa, D.O., 40 mg at 09/23/24 0545    valsartan (Diovan) tablet 160 mg, 160 mg, Oral, DAILY, Dangelo Rosa, D.O., 160 mg at 09/23/24 0546    venlafaxine XR (Effexor XR) capsule 150 mg, 150 mg, Oral, DAILY, Dangeol Rosa, D.O., 150 mg at 09/23/24 0545    vibegron (Gemtesa) tablet 75 mg, 75 mg, Oral, DAILY, Dangelolee Rosa D.O., 75 mg at 09/23/24 0545    ondansetron (Zofran) syringe/vial injection 4 mg, 4 mg, Intravenous, Q4HRS PRN, Dangelo Rosa D.O.    ondansetron (Zofran ODT) dispertab 4 mg, 4 mg, Oral, Q4HRS PRN, Dangelo Rosa D.O.    cyanocobalamin (Vitamin B-12) tablet 500 mcg, 500 mcg, Oral, DAILY, Shaheed Hurt M.D.,  "500 mcg at 09/23/24 0545    Physical Examination:     General: Lethargic and in no acute distress  Eye: Examination of optic disks not indicated at this time given acuity of consult  Neck: There is normal range of motion  CV: regular rate   Extremities:  clear, dry, intact, without peripheral edema    NEUROLOGICAL EXAM:     /62   Pulse 91   Temp 37.2 °C (98.9 °F) (Temporal)   Resp 16   Ht 1.727 m (5' 8\")   Wt 70.5 kg (155 lb 6.8 oz)   SpO2 95%   BMI 23.63 kg/m²       Mental status: Lethargic, falling asleep during assessment, but easily arouses to tactile  Speech and language: Speech is clear and fluent. The patient is able to name and repeat, and follow commands  Cranial nerve exam: Visual fields are full. There is no nystagmus. Extraocular muscles are intact. Face is symmetric.   Motor exam: There is sustained antigravity with no downward drift in bilateral arms and legs.  LLE is antigravity, with decreased range of motion, slight drift.  Sensory exam: Reacts to tactile in all 4 extremities, no neglect to double stim   Coordination: No ataxia on elicited movements  Gait: Deferred due to patient preference    NIHSS: National Institutes of Health Stroke Scale    [1] 1a:Level of Consciousness    0-alert 1-drowsy   2-stupor   3-coma  [0] 1b:LOC Questions                  0-both  1-one      2-neither  [0] 1c:LOC Commands                   0-both  1-one      2-neither  [0] 2: Best Gaze                     0-nl    1-partial  2-forced  [0] 3: Visual Fields                   0-nl    1-partial  2-complete 3-bilat  [0] 4: Facial Paresis                0-nl    1-minor    2-partial  3-full  MOTOR                       0-nl  [0] 5: Right Arm           1-drift  [0] 6: Left Arm             2-some effort vs gravity  [0] 7: Right Leg           3-no effort vs gravity  [1] 8: Left Leg             4-no movement                             x-untestable  [0] 9: Limb Ataxia                    0-abs   1-1_limb   " 2-2+_limbs       x-untestable  [0] 10:Sensory                        0-nl    1-partial  2-dense  [0] 11:Best Language/Aphasia         0-nl    1-mild/mod 2-severe   3-mute  [0] 12:Dysarthria                     0-nl    1-mild/mod 2-severe       x-untestable  [0] 13:Neglect/Inattention            0-none  1-partial  2-complete  [2] TOTAL      Objective Data:    Labs:  Lab Results   Component Value Date/Time    PROTHROMBTM 14.8 (H) 09/15/2024 11:56 AM    INR 1.14 (H) 09/15/2024 11:56 AM      Lab Results   Component Value Date/Time    WBC 5.5 09/21/2024 01:57 AM    RBC 3.32 (L) 09/21/2024 01:57 AM    HEMOGLOBIN 10.9 (L) 09/21/2024 01:57 AM    HEMATOCRIT 32.8 (L) 09/21/2024 01:57 AM    MCV 98.8 (H) 09/21/2024 01:57 AM    MCH 32.8 09/21/2024 01:57 AM    MCHC 33.2 09/21/2024 01:57 AM    MPV 9.9 09/21/2024 01:57 AM    NEUTSPOLYS 56.10 09/21/2024 01:57 AM    LYMPHOCYTES 25.20 09/21/2024 01:57 AM    MONOCYTES 17.70 (H) 09/21/2024 01:57 AM    EOSINOPHILS 0.40 09/21/2024 01:57 AM    BASOPHILS 0.20 09/21/2024 01:57 AM      Lab Results   Component Value Date/Time    SODIUM 134 (L) 09/22/2024 07:37 AM    POTASSIUM 3.7 09/22/2024 07:37 AM    CHLORIDE 101 09/22/2024 07:37 AM    CO2 23 09/22/2024 07:37 AM    GLUCOSE 153 (H) 09/22/2024 07:37 AM    BUN 10 09/22/2024 07:37 AM    CREATININE 0.71 09/22/2024 07:37 AM      Lab Results   Component Value Date/Time    CHOLSTRLTOT 147 09/09/2019 07:29 AM    LDL 68 09/09/2019 07:29 AM    HDL 52 09/09/2019 07:29 AM    TRIGLYCERIDE 137 09/09/2019 07:29 AM       Lab Results   Component Value Date/Time    ALKPHOSPHAT 153 (H) 09/20/2024 01:22 AM    ASTSGOT 20 09/20/2024 01:22 AM    ALTSGPT 12 09/20/2024 01:22 AM    TBILIRUBIN 0.5 09/20/2024 01:22 AM        Imaging/Testing:    I interpreted and/or reviewed the patient's neuroimaging    MR-BRAIN-WITH & W/O   Final Result         Small focus of acute infarction the right inferior cerebellar cortex.      Right holohemispheric subdural hematoma involving  the cerebral convexity as well as extending to the parasagittal region in the medial frontoparietal region.      Small left holohemispheric convexity subdural hematoma  without significant mass effect.      Age-related volume loss and chronic microvascular ischemic changes.      Shunt catheter tip is within the right frontal horn.      CT-ABDOMEN LIVER FOR HEPATIC MASS (CIRRHOSIS)   Final Result      1.  No acute abnormality      2.  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  Aortic atherosclerotic plaque      4.  Peritoneal catheter present with tip lying at the inferior aspect of the liver/paracolic gutter      5.  Prior cholecystectomy      6.  No other finding                  LI-RADS:      CT-HEAD W/O   Final Result      1.  No demonstrable interval change in the RIGHT parafalcine and holohemispheric subdural hematomas   2.  5 mm LEFT frontal subdural hematoma unchanged   3.  Senescent changes               DX-CHEST-PORTABLE (1 VIEW)   Final Result      Interstitial opacities again noted which could represent chronic interstitial changes versus mild vascular congestion.          Assessment and Plan:  Aaliyah Fulton is a 84 year old woman with cancer, on active chemotherapy treatments, recent traumatic R side SDH, who had an incidentally found ischemic stroke on MRI for L side weakness.  Of note- a right cerebellar stroke, if symptomatic would cause R side symptoms, and as such, cannot explain her L side deficits- which I think is more likely attributed to her R cortical subdural hematoma.  Stroke etiology unclear- her vascular risk factors are well controlled, with LDL 68, and excellent BP control.  Her CTA neck from 2022 with minimal atherosclerotic burden.  Possible cardioembolic etiology, or perhaps related hypercoagulability related to her cancer and/or cancer treatments.      I had a lengthy discussion with Angi, her daughter at bedside.  I explained that restarting antithrombotic  therapy may best optimize her mom's secondary stroke prevention, but she is at increased bleeding risk- related to her baseline debility as witnessed by frequent falls, and perhaps exacerbated by mild cancer-related coagulopathy.  Consensus ultimately was that we should best mitigate Aaliyah's hemorrhage risk, while acknowledging that remaining off antithrombotic therapy will increase risk for recurrent ischemic strokes.    Problem list:   R cerebellar stroke   R subdural hematoma   Cancer   Hypertension   Hyperlipidemia    Plan:   - q4h neurochecks/ NIHSS during day, qshift at night while hospitalized   - I do not recommend additional stroke diagnostics   - continue atorvastatin 40mg daily for goal LDL < 70   - long-term BP goal is 100-130/60-80, at goal with current regimen   - PT/OT/SLP as tolerated   - no antithrombotic therapy indefinitely   - please reconsult Stroke Neurology with any additional questions or concerns    The evaluation of the patient, and recommended management, was discussed with Dr. Hernandez, attending hospitalist.    Gurjit Wharton MD  Vascular Neurology

## 2024-09-23 NOTE — CARE PLAN
The patient is Unstable - High likelihood or risk of patient condition declining or worsening    Shift Goals  Clinical Goals: Pain control and rest  Patient Goals: Pain control and rest  Family Goals: Pain control and rest    Problem: Fall Risk  Goal: Patient will remain free from falls  Outcome: Progressing       Progress made toward(s) clinical / shift goals:  Patient remain free from fall, still waiting for MRI procedure.Pain controlled medicated in MAR, offered heat pack and reposition patient. Possible D/C today going to SNF.

## 2024-09-23 NOTE — CARE PLAN
The patient is Unstable - High likelihood or risk of patient condition declining or worsening    Shift Goals  Clinical Goals: Comfort and rest, CT & MRI  Patient Goals: rest  Family Goals: SHALINI    Progress made toward(s) clinical / shift goals:    Problem: Fall Risk  Goal: Patient will remain free from falls  Outcome: Progressing  Note: Placed bed alarm and reinforced to  used the call.  Bed in low position and wheels are lock.  Belonging with in reached and checked every two hour.      Problem: Skin Integrity  Goal: Skin integrity is maintained or improved  Outcome: Progressing  Note: Encourage patient to to turn every two hour.monitored any skin breakdown  Used pillow to support back and heel           Patient is not progressing towards the following goals:    The patient is Stable - Low risk of patient condition declining or worsening    Shift Goals  Clinical Goals: Pain control and rest  Patient Goals: Pain control and rest  Family Goals: Pain control and rest    Progress made toward(s) clinical / shift goals:      Patient is not progressing towards the following goals:

## 2024-09-23 NOTE — ASSESSMENT & PLAN NOTE
Acute CVA noted on MRI, she also does have the SDH  I consulted neurology, follow-up recommendations. Dr. Wharton said no further diagnostic studies recommended

## 2024-09-23 NOTE — PROGRESS NOTES
Bedside report received, Assume care.     A&O x 2, Able to make needs known.  Pain Assessment: 9/10 to back . Medication provided per MAR.    Bed in low position and locked, sitting up in bed with facial grimace , call light with in reach, all needs met at this time. Interventions will be executed per plan of care.

## 2024-09-23 NOTE — THERAPY
Occupational Therapy  Daily Treatment     Patient Name: Aaliyah Fulton  Age:  84 y.o., Sex:  female  Medical Record #: [id number]  Today's Date: 9/23/2024     Precautions  Precautions: Fall Risk    Assessment    Pt seen for OT treatment. Pt required min-mod A for bed mobility, mod A to stand/take side steps/weight shift near the EOB, mod A to don/doff gown, and SBA to wipe face/hands while seated. Pt current functional performance limited by impaired activity tolerance, impaired balance, L wrist pain, and impaired cognition. Pt will continue to benefit from skilled OT while admitted to acute care.     Plan    Treatment Plan Status: Continue Current Treatment Plan  Type of Treatment: Self Care / Activities of Daily Living, Adaptive Equipment, Neuro Re-Education / Balance, Therapeutic Exercises, Therapeutic Activity  Treatment Frequency: 4 Times per Week  Treatment Duration: Until Therapy Goals Met    DC Equipment Recommendations: Unable to determine at this time  Discharge Recommendations: Recommend post-acute placement for additional occupational therapy services prior to discharge home     Objective     09/23/24 3428   Pain   Pain Scales 0 to 10 Scale    Pain 0 - 10 Group   Therapist Pain Assessment Nurse Notified;During Activity  (L wrist pain, not rated, agreeable to session)   Non Verbal Descriptors   Non Verbal Scale  Calm   Cognition    Cognition / Consciousness X   Speech/ Communication Delayed Responses   Level of Consciousness Alert   Ability To Follow Commands 1 Step  (when not distracted)   Safety Awareness Impaired   New Learning Impaired   Attention Impaired   Sequencing Impaired   Initiation Impaired   Comments Pt benefits from less stimulation for improved command following. Very pleasant and cooperative.   Active ROM Upper Body   Active ROM Upper Body  WDL   Comments Improved attention to L UE and movement of it on command.   Strength Upper Body   Upper Body Strength  X   Comments L UE grossly 3/5    Sensation Upper Body   Upper Extremity Sensation  WDL   Upper Body Muscle Tone   Upper Body Muscle Tone  WDL   Neuro-Muscular Treatments   Neuro-Muscular Treatments Anterior weight shift;Postural Changes;Postural Facilitation;Weight Shift Right;Weight Shift Left   Comments Initially noted to have R sided lean, improved after lateral weight shifts. Pt had a hard time finding midline without therapist assist.   Balance   Sitting Balance (Static) Fair -   Sitting Balance (Dynamic) Poor +   Standing Balance (Static) Trace +   Standing Balance (Dynamic) Trace +   Weight Shift Sitting Poor   Weight Shift Standing Poor   Skilled Intervention Verbal Cuing;Tactile Cuing;Sequencing;Postural Facilitation;Facilitation   Comments w/ FWW and x2 assist for safety   Bed Mobility    Supine to Sit Minimal Assist   Sit to Supine Moderate Assist   Scooting Minimal Assist   Rolling Minimal Assist to Rt.;Minimum Assist to Lt.   Skilled Intervention Verbal Cuing;Tactile Cuing;Sequencing;Facilitation   Comments Extra time required, easily distracted mid transition, poor motor planning   Activities of Daily Living   Grooming Standby Assist;Seated  (washed hands/face)   Upper Body Dressing Moderate Assist  (don/doff gown)   Lower Body Dressing Maximal Assist  (don/doff socks)   Skilled Intervention Verbal Cuing;Tactile Cuing;Sequencing;Postural Facilitation;Facilitation   Functional Mobility   Sit to Stand Moderate Assist   Mobility EOB>stand x3 with steps/weight shifts>supine   Skilled Intervention Verbal Cuing;Tactile Cuing;Sequencing;Postural Facilitation;Facilitation   Comments w/ FWW and x2 assist, no transfer to chair due to increased difficulty weight shifting/motor planning for transfer   Visual Perception   Visual Perception    (Noted to have R gaze preference, able to look the L give mod cues from therapist)   Activity Tolerance   Sitting in Chair NT   Sitting Edge of Bed >5 min   Standing ~5 min total   Comments limited by  weakness, fatigue, L wrist pain   Patient / Family Goals   Patient / Family Goal #1 to get stronger   Goal #1 Outcome Progressing slower than expected   Short Term Goals   Short Term Goal # 1 Pt will perform ADL transfer w/ min A   Goal Outcome # 1 Progressing slower than expected   Short Term Goal # 2 Pt will perform g/h w/ supv   Goal Outcome # 2 Progressing as expected   Short Term Goal # 3 Pt will perform LB dressing w/ min A   Goal Outcome # 3 Progressing slower than expected   Education Group   Education Provided Role of Occupational Therapist;Activities of Daily Living;Pathology of bedrest   Role of Occupational Therapist Patient Response Patient;Acceptance;Explanation;Verbal Demonstration   ADL Patient Response Patient;Acceptance;Explanation;Demonstration;Verbal Demonstration;Action Demonstration   Pathology of Bedrest Patient Response Patient;Acceptance;Explanation;Demonstration;Verbal Demonstration;Action Demonstration

## 2024-09-24 ENCOUNTER — HOME CARE VISIT (OUTPATIENT)
Dept: HOME HEALTH SERVICES | Facility: HOME HEALTHCARE | Age: 84
End: 2024-09-24
Payer: MEDICARE

## 2024-09-24 VITALS
SYSTOLIC BLOOD PRESSURE: 105 MMHG | BODY MASS INDEX: 23.56 KG/M2 | HEIGHT: 68 IN | WEIGHT: 155.42 LBS | RESPIRATION RATE: 18 BRPM | DIASTOLIC BLOOD PRESSURE: 59 MMHG | TEMPERATURE: 99.3 F | HEART RATE: 85 BPM | OXYGEN SATURATION: 94 %

## 2024-09-24 PROCEDURE — A9270 NON-COVERED ITEM OR SERVICE: HCPCS | Performed by: HOSPITALIST

## 2024-09-24 PROCEDURE — 99239 HOSP IP/OBS DSCHRG MGMT >30: CPT | Performed by: INTERNAL MEDICINE

## 2024-09-24 PROCEDURE — 700102 HCHG RX REV CODE 250 W/ 637 OVERRIDE(OP): Performed by: HOSPITALIST

## 2024-09-24 PROCEDURE — 665999 HH PPS REVENUE DEBIT

## 2024-09-24 PROCEDURE — 700102 HCHG RX REV CODE 250 W/ 637 OVERRIDE(OP)

## 2024-09-24 PROCEDURE — 665998 HH PPS REVENUE CREDIT

## 2024-09-24 PROCEDURE — A9270 NON-COVERED ITEM OR SERVICE: HCPCS

## 2024-09-24 RX ADMIN — OMEPRAZOLE 40 MG: 20 CAPSULE, DELAYED RELEASE ORAL at 04:35

## 2024-09-24 RX ADMIN — VALSARTAN 160 MG: 80 TABLET, FILM COATED ORAL at 04:36

## 2024-09-24 RX ADMIN — CYANOCOBALAMIN TAB 500 MCG 500 MCG: 500 TAB at 04:35

## 2024-09-24 RX ADMIN — VENLAFAXINE HYDROCHLORIDE 150 MG: 75 CAPSULE, EXTENDED RELEASE ORAL at 04:35

## 2024-09-24 RX ADMIN — BUSPIRONE HYDROCHLORIDE 15 MG: 5 TABLET ORAL at 01:05

## 2024-09-24 RX ADMIN — BUSPIRONE HYDROCHLORIDE 15 MG: 5 TABLET ORAL at 11:47

## 2024-09-24 RX ADMIN — VIBEGRON 75 MG: 75 TABLET, FILM COATED ORAL at 04:36

## 2024-09-24 ASSESSMENT — SOCIAL DETERMINANTS OF HEALTH (SDOH)
WITHIN THE PAST 12 MONTHS, YOU WORRIED THAT YOUR FOOD WOULD RUN OUT BEFORE YOU GOT THE MONEY TO BUY MORE: NEVER TRUE
WITHIN THE LAST YEAR, HAVE YOU BEEN KICKED, HIT, SLAPPED, OR OTHERWISE PHYSICALLY HURT BY YOUR PARTNER OR EX-PARTNER?: NO
WITHIN THE LAST YEAR, HAVE TO BEEN RAPED OR FORCED TO HAVE ANY KIND OF SEXUAL ACTIVITY BY YOUR PARTNER OR EX-PARTNER?: NO
WITHIN THE LAST YEAR, HAVE YOU BEEN AFRAID OF YOUR PARTNER OR EX-PARTNER?: NO
WITHIN THE LAST YEAR, HAVE YOU BEEN HUMILIATED OR EMOTIONALLY ABUSED IN OTHER WAYS BY YOUR PARTNER OR EX-PARTNER?: NO
IN THE PAST 12 MONTHS, HAS THE ELECTRIC, GAS, OIL, OR WATER COMPANY THREATENED TO SHUT OFF SERVICE IN YOUR HOME?: NO
WITHIN THE PAST 12 MONTHS, THE FOOD YOU BOUGHT JUST DIDN'T LAST AND YOU DIDN'T HAVE MONEY TO GET MORE: NEVER TRUE

## 2024-09-24 NOTE — CARE PLAN
The patient is Stable - Low risk of patient condition declining or worsening    Shift Goals  Clinical Goals: ensure comfort, pain control  Patient Goals: sleep  Family Goals: updates      Problem: Pain - Standard  Goal: Alleviation of pain or a reduction in pain to the patient’s comfort goal  Outcome: Progressing     Problem: Fall Risk  Goal: Patient will remain free from falls  Outcome: Progressing       Progress made toward(s) clinical / shift goals:  Patient is oriented to self and situation. Able to make needs known. Hourly rounding in place. Call light within reach.     Patient is not progressing towards the following goals:

## 2024-09-24 NOTE — PROGRESS NOTES
Patient seen at bedside this afternoon.  Patient appears significantly better sitting upright answering questions reporting that she is able to stand and walk with a walker with feet PT.  [facility]

## 2024-09-24 NOTE — CASE COMMUNICATION
Quality Review for 9.13.24 SOC OASIS performed on by PELON Aguilar RN on 9.24.2024:    Edits completed by PELON Aguilar RN:  1.  and  dx coding updated per chart review.   2. Changed  to 9.13.24 per the LSOC order  3. Added #1 to  per fall with injury on 9/15/24. Changed  to 9/15/24 data used as part of the collaboration convention  4. Changed  to 2 per narrative pt needs mod assist with ADLs and needs supervision  with oral care.   5. Added heart healthy diet to nutritional requirements per dxs  6.  Updated F2F data  7. Changed  to 0

## 2024-09-24 NOTE — CASE COMMUNICATION
Quality Review for 9.17.24 Transfer OASIS performed on by PELON Aguilar RN on 9.24.2024:    Edits completed by PELON Aguilar RN:  1. Changed  to a 9 per SOC and chart review, there were no clinical significant med issues.   2. Changed  to 2 and  to 19

## 2024-09-24 NOTE — DISCHARGE PLANNING
DC Transport Scheduled    Transport Company Scheduled:  [employer]  Spoke with Tereso at [facility] to schedule transport.    Scheduled Date: 9/24/2024  Scheduled Time: 1300    Transport Type: [facility]  Destination[facility]   Destination address: [place] [address]    Notified care team of scheduled transport via [employer].     If there are any changes needed to the DC transportation scheduled, please contact [facility] at ext. [phone] between the hours of 4846-6974 Mon-Fri. If outside those hours, contact the ED Case Manager at ext. [phone].

## 2024-09-24 NOTE — DISCHARGE SUMMARY
Discharge Summary    CHIEF COMPLAINT ON ADMISSION  Chief Complaint   Patient presents with    Weakness     Patient DC home today at 1430 after spending two nights in the hospital for a Subdural hematoma. Family reports patient was transferred into car by staff but was unable to stand or assist with the transfer. Daughter states family is unable to care for patient as she is unable to assist to transfer herself.        Reason for Admission  Weakness    Admission Date  9/17/2024     CODE STATUS  DNAR/DNI    HPI & HOSPITAL COURSE  This is a 84 y.o. female here with weakness.    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 On day of discharge she was too weak to get out of the car and returned to the ER. She was noted to have some hypoxia and placed on 2 L O2. Chest x-ray showed interstitial changes and she was given a dose of Lasix. CT head was stable. Dr. Arias was contacted who recommended palliative care or hospice. Palliative care was consulted and patient is not ready for hospice but would like comfort driven care. She is trying to be there for the birth of her great grandbaby in November. There is concerns that Keppra is causing increased somnolence so it was stopped and she had improvement of her mentation. She was noted to have left hemiparesis and MRI brain showed acute right cerebellar stroke in the right subdural hematoma.  Neurology was consulted did not recommend any further stroke diagnostics.  Also recommended no antithrombotics therapy indefinitely.  Continue atorvastatin 40 mg daily.  Case was discussed with neurosurgery who recommended no further intervention.  Patient has chronic incontinence and Woodson catheter was placed which has been discontinued prior to discharge.  PT OT recommended SNF.  Patient's vital signs are stable and she is ready for discharge to SNF.  She is to follow-up in the neurology  [facility] and return to the ER if her condition worsens.    Therefore, she is discharged in fair and stable condition to skilled nursing facility.    The patient met 2-midnight criteria for an inpatient stay at the time of discharge.      FOLLOW UP ITEMS POST DISCHARGE  Follow-up in the [facility] neurology clinic  Follow-up with primary care as needed    DISCHARGE DIAGNOSES  Principal Problem:    Failure to thrive in adult (POA: Unknown)  Active Problems:    Normal pressure hydrocephalus (HCC) (POA: Yes)      Overview: Last Assessment & Plan:       Formatting of this note might be different from the original.      Dx by neuro, was seen for freq falls, lethargy and h/a      1/10/22 a ventriculoperitoneal shunt was placed with post op f/u revealing       fluid was not collecting on brain.      Now feels well, cont to use walker, feels unsteady without a walker, fell       and broke her hip when using a cane      Shunt noticeable on R side of skull, parietal area      Followed by neuro    Cholangiocarcinoma (HCC) (POA: Yes)    Valvular heart disease (POA: Yes)    Subdural hematoma (HCC) (POA: Yes)    Overactive bladder (POA: Yes)    Constipation (POA: Unknown)    Hypoxia (POA: Yes)    Encephalopathy (POA: Unknown)    Moderate protein-calorie malnutrition (HCC) (POA: Unknown)    Acute CVA (cerebrovascular accident) (HCC) (POA: Unknown)  Resolved Problems:    * No resolved hospital problems. *      FOLLOW UP  Future Appointments   Date Time Provider Department Center   10/9/2024 10:20 AM [address]   10/11/2024  9:30 AM [facility] None   10/11/2024 10:15 AM [facility] [employer] [place]   10/18/2024 10:15 AM [facility]     Ashvin Arias D.O.  [address]  [place] [address]  [phone]    Follow up  As needed      MEDICATIONS ON DISCHARGE     Medication List        START taking these medications        Instructions    vibegron 75 MG tablet  Start taking on: September 25, 2024  Commonly known as: Gemtesa   Take 1 Tablet by mouth every day.  Dose: 75 mg            CHANGE how you take these medications        Instructions   cyanocobalamin 500 MCG tablet  Start taking on: September 25, 2024  What changed: how much to take  Commonly known as: Vitamin B12   Take 1 Tablet by mouth every day. Indications: supplement  Dose: 500 mcg            CONTINUE taking these medications        Instructions   acetaminophen 500 MG Tabs  Commonly known as: Tylenol   Take 1,000 mg by mouth every 6 hours as needed for Moderate Pain. Indications: Pain  Dose: 1,000 mg     atorvastatin 40 MG Tabs  Commonly known as: Lipitor   TAKE ONE TABLET BY MOUTH EVERY EVENING  Dose: 40 mg     busPIRone 15 MG tablet  Commonly known as: Buspar   Take 15 mg by mouth 3 times a day. Indications: Anxiety Disorder  Dose: 15 mg     CALCIUM-VITAMIN D3 PO   Take 600 mg by mouth every morning. Indications: supplement  Dose: 600 mg     D-MANNOSE PO   Take 500 mg by mouth every evening. Indications: healthy urinary tract  Dose: 500 mg     estradiol 0.1 MG/GM vaginal cream  Commonly known as: Estrace   Insert 2 g into the vagina 3 times a week. Indications: Simple Infection of the Urinary Tract  Dose: 2 g     lidocaine 4 % Ptch  Commonly known as: Asperflex   Place 1 Patch on the skin every 12 hours as needed (lower back pain).  Dose: 1 Patch     methenamine hip 1 GM Tabs  Commonly known as: Hipprex   Take 1 g by mouth 2 times a day. LONG TERM TREATMENT FOR CHRONIC UTI.  Indications: Urinary Tract Infection  Dose: 1 g     omeprazole 40 MG delayed-release capsule  Commonly known as: PriLOSEC   Take 1 Capsule by mouth every day. Indications: Heartburn  Dose: 1 Capsule     oxybutynin 15 MG CR tablet  Commonly known as: Ditropan-XL   Take 1 Tablet by mouth every day. Indications: Urinary Incontinence  Dose: 1 Tablet     valsartan 160 MG Tabs  Commonly known as: Diovan   Take 160 mg by  mouth every day.  Dose: 160 mg     * venlafaxine XR 75 MG Cp24  Commonly known as: Effexor XR   Take 75 mg by mouth every day. For a daily total of 225 mg  Indications: Generalized Anxiety Disorder  Dose: 75 mg     * venlafaxine 150 MG extended-release capsule  Commonly known as: Effexor-XR   Take 1 Capsule by mouth every day. Taken with 75 mg for 225mg daily  Dose: 150 mg     vitamin D3 1000 Unit (25 mcg) Tabs  Commonly known as: Cholecalciferol   Take 2,000 Units by mouth every morning. Indications: supplement  Dose: 2,000 Units           * This list has 2 medication(s) that are the same as other medications prescribed for you. Read the directions carefully, and ask your doctor or other care provider to review them with you.                STOP taking these medications      levETIRAcetam 500 MG Tabs  Commonly known as: Keppra              Allergies  No Known Allergies    DIET  Orders Placed This Encounter   Procedures    Diet Order Diet: Cardiac     Standing Status:   Standing     Number of Occurrences:   1     Order Specific Question:   Diet:     Answer:   Cardiac [6]       ACTIVITY  As tolerated.  Weight bearing as tolerated    LINES, DRAINS, AND WOUNDS  This is an automated list. Peripheral IVs will be removed prior to discharge.  Peripheral IV 09/21/24 20 G Anterior;Left Forearm (Active)   Site Assessment Clean;Dry;Intact 09/23/24 2217   Dressing Type Transparent Film 09/23/24 2217   Line Status Blood return noted;Scrubbed the hub prior to access 09/23/24 2217   Dressing Status Clean;Dry;Intact 09/23/24 2217   Dressing Intervention N/A 09/23/24 1100   Infiltration Grading ([facility], [facility]) 0 09/23/24 2217   Phlebitis Scale ([facility] Only) 0 09/23/24 2217     Urethral Catheter (Active)   Site Assessment Clean;Skin intact 09/23/24 0539   Collection Container Standard drainage bag 09/23/24 0539   Urinary Catheter Care Tamper Evident Seal in Place;Drainage Tube Extended;Drainage Bag Not Overfilled;Drainage Tubing  Properly Secured;Drainage Bag Below Bladder Level and Not on Floor 09/23/24 0539   Securement Method Securing device (Describe) 09/23/24 0539   Output (mL) 800 mL 09/23/24 0539      Moisture Associated Skin Damage 09/15/24 Midline Buttock (Active)   Drainage Amount None 09/16/24 0800   Periwound Assessment Clean;Dry;Intact 09/17/24 0955       Wound 09/13/24 Incision Leg Anterior Left (Active)       Wound 09/15/24 Incision Leg Left healing incision to L. shin (sutures) (Active)   Wound Image   09/15/24 1300   Site Assessment SHALINI 09/17/24 0955   Periwound Assessment SHALINI 09/16/24 2000   Margins SHALINI 09/16/24 2000   Closure Adhesive bandage;Sutures 09/16/24 2000   Drainage Amount None 09/16/24 2000   Dressing Status Clean;Dry;Intact 09/16/24 2000   Dressing Changed Observed 09/16/24 2000   Dressing Options Open to Air 09/16/24 0800       Peripheral IV 09/21/24 20 G Anterior;Left Forearm (Active)   Site Assessment Clean;Dry;Intact 09/23/24 2217   Dressing Type Transparent Film 09/23/24 2217   Line Status Blood return noted;Scrubbed the hub prior to access 09/23/24 2217   Dressing Status Clean;Dry;Intact 09/23/24 2217   Dressing Intervention N/A 09/23/24 1100   Infiltration Grading ([facility], [facility]) 0 09/23/24 2217   Phlebitis Scale ([facility] Only) 0 09/23/24 2217           Urethral Catheter (Active)   Site Assessment Clean;Skin intact 09/23/24 0539   Collection Container Standard drainage bag 09/23/24 0539   Urinary Catheter Care Tamper Evident Seal in Place;Drainage Tube Extended;Drainage Bag Not Overfilled;Drainage Tubing Properly Secured;Drainage Bag Below Bladder Level and Not on Floor 09/23/24 0539   Securement Method Securing device (Describe) 09/23/24 0539   Output (mL) 800 mL 09/23/24 0539        MENTAL STATUS ON TRANSFER             CONSULTATIONS  Neurology  Palliative care  Neurosurgery    PROCEDURES  EEG    LABORATORY  Lab Results   Component Value Date    SODIUM 134 (L) 09/22/2024    POTASSIUM 3.7 09/22/2024     CHLORIDE 101 09/22/2024    CO2 23 09/22/2024    GLUCOSE 153 (H) 09/22/2024    BUN 10 09/22/2024    CREATININE 0.71 09/22/2024        Lab Results   Component Value Date    WBC 5.5 09/21/2024    HEMOGLOBIN 10.9 (L) 09/21/2024    HEMATOCRIT 32.8 (L) 09/21/2024    PLATELETCT 353 09/21/2024        Total time of the discharge process exceeds 34 minutes.

## 2024-09-24 NOTE — DISCHARGE PLANNING
Case Management Discharge Planning    Admission Date: 9/17/2024  GMLOS: 3.6  ALOS: 6    6-Clicks ADL Score: 14  6-Clicks Mobility Score: 11  PT and/or OT Eval ordered: Yes  Post-acute Referrals Ordered: Yes  Post-acute Choice Obtained: Yes  Has referral(s) been sent to post-acute provider:  Yes      Anticipated Discharge Dispo: Discharge Disposition: D/T to SNF with Medicare cert in anticipation of skilled care (03)    DME Needed: No    Action(s) Taken: Patient is medically cleared to discharge to SNF. [facility] accepted. Rosa Maria from [facility] stated they can take patient at 1300. RNCM placed call to patient's daughter Angi who stated she is agreeable to [facility]. RNCM placed [facility] order requesting  at 1300 via [facility]. PCS form was faxed to [facility].   1208: Transport was confirmed for 1300 via [facility]. RNCM handed completed COBRA to bedside RN.    Escalations Completed: None    Medically Clear: Yes    Next Steps: pending transport time    Barriers to Discharge: None    Is the patient up for discharge tomorrow: No

## 2024-09-24 NOTE — Clinical Note
I agree with these changes.  Thank you,  Henny Harper RN.      ----- Message -----  From: Heidy Aguilar R.N.  Sent: 9/24/2024   4:10 PM PDT  To: Henny Harper R.N.      Quality Review for 9.17.24 Transfer OASIS performed on by PELON Aguilar RN on 9.24.2024:    Edits completed by PELON Aguilar RN:  1. Changed  to a 9 per SOC and chart review, there were no clinical significant med issues.   2. Changed  to 2 and  to 19

## 2024-09-24 NOTE — PROGRESS NOTES
MRI with acute right cerebellar hemisphere stroke, no significant edema; interhemispheric, right hemispheric SDH stable, no mass effect  Anticoagulation ok from NSGY standpoint if medically indicated for stroke management, will need follow up head CT 48 hr after anticoagulation given SDH if indicated

## 2024-09-24 NOTE — CARE PLAN
The patient is Unstable - High likelihood or risk of patient condition declining or worsening    Shift Goals  Clinical Goals: Comfort and rest, CT & MRI  Patient Goals: rest  Family Goals: SHALINI    Progress made toward(s) clinical / shift goals:    Problem: Fall Risk  Goal: Patient will remain free from falls  Outcome: Progressing  Note: Placed bed alarm and reinforced to  used the call.  Bed in low position and wheels are lock.  Belonging with in reached and checked every two hour.      Problem: Skin Integrity  Goal: Skin integrity is maintained or improved  Outcome: Progressing  Note: Encourage patient to to turn every two hour.monitored any skin breakdown  Used pillow to support back and heel           Patient is not progressing towards the following goals:    The patient is Stable - Low risk of patient condition declining or worsening    Shift Goals  Clinical Goals: Pain control and rest  Patient Goals: Pain control and rest  Family Goals: Pain control and rest    Progress made toward(s) clinical / shift goals:      Patient is not progressing towards the following goals:    The patient is Stable - Low risk of patient condition declining or worsening    Shift Goals  Clinical Goals: ensure comfort, pain control  Patient Goals: sleep  Family Goals: updates    Progress made toward(s) clinical / shift goals:      Patient is not progressing towards the following goals:

## 2024-09-25 ENCOUNTER — HOME CARE VISIT (OUTPATIENT)
Dept: HOME HEALTH SERVICES | Facility: HOME HEALTHCARE | Age: 84
End: 2024-09-25
Payer: MEDICARE

## 2024-09-25 PROCEDURE — 665999 HH PPS REVENUE DEBIT

## 2024-09-25 PROCEDURE — 665998 HH PPS REVENUE CREDIT

## 2024-09-26 PROCEDURE — 665998 HH PPS REVENUE CREDIT

## 2024-09-26 PROCEDURE — 665999 HH PPS REVENUE DEBIT

## 2024-09-26 NOTE — CASE COMMUNICATION
I agree with these changes.   ----- Message -----  From: Heidy Aguilar R.N.  Sent: 9/24/2024   3:58 PM PDT  To: Kalpana Apple R.N.      Quality Review for 9.13.24 SOC OASIS performed on by PELON Aguilar RN on 9.24.2024:    Edits completed by PELON Aguilar RN:  1.  and  dx coding updated per chart review.   2. Changed  to 9.13.24 per the [facility] order  3. Added #1 to  per fall with injury on 9/15/24. Changed  to 9/15/24  data used as part of the collaboration convention  4. Changed  to 2 per narrative pt needs mod assist with ADLs and needs supervision with oral care.   5. Added heart healthy diet to nutritional requirements per dxs  6.  Updated F2F data  7. Changed  to 0

## 2024-09-27 ENCOUNTER — APPOINTMENT (OUTPATIENT)
Dept: ONCOLOGY | Facility: MEDICAL CENTER | Age: 84
End: 2024-09-27
Attending: STUDENT IN AN ORGANIZED HEALTH CARE EDUCATION/TRAINING PROGRAM
Payer: MEDICARE

## 2024-09-27 PROCEDURE — 665999 HH PPS REVENUE DEBIT

## 2024-09-27 PROCEDURE — 665998 HH PPS REVENUE CREDIT

## 2024-09-28 PROCEDURE — 665998 HH PPS REVENUE CREDIT

## 2024-09-28 PROCEDURE — 665999 HH PPS REVENUE DEBIT

## 2024-09-29 PROCEDURE — 665998 HH PPS REVENUE CREDIT

## 2024-09-29 PROCEDURE — 665999 HH PPS REVENUE DEBIT

## 2024-10-01 NOTE — CONSULTS
DATE OF SERVICE:  09/15/2024     CHIEF COMPLAINT:  1.  Fall with closed head injury.  2.  Status post placement of ventriculoperitoneal shunt with subdural   hematoma.     HISTORY OF PRESENT ILLNESS:  The patient is an 84-year-old woman who was at   home in her usual state of health.  She got up, became a little bit dizzy and   then fell hitting her head.  She was brought in for evaluation.  CT   examination showed a falcine subdural hematoma and some hematoma spread over   the right convexity.  She has a lot of atrophy.  No mass effect, no brain   compression.  Shunt was in place and we were called for that as well.  Dr. Blanchard had done this some time ago for normal pressure hydrocephalus.     PAST MEDICAL HISTORY:  Remarkable for arthritis, asthma, breast cancer, GERD,   heart murmur, hypercholesterolemia, hypertension, osteoporosis,     PAST SURGICAL HISTORY:  Remarkable for sinus surgery, breast biopsy and   treatment, lumbar laminectomy and diskectomy, radiation therapy, hip   replacement, further lumbar surgery, ventriculoperitoneal shunt placement.     FAMILY HISTORY:  Remarkable for breast cancer in her daughter.     SOCIAL HISTORY:  Never smokes.  Does not drink alcohol.     CURRENT MEDICATIONS:  Her prescriptions at home include calcium, D-Mannose,   acetaminophen, Lipitor and Buspar.  She also takes esterase, Keppra,   AsperFlex, Hiprex, Prilosec, Ditropan XL.     PHYSICAL EXAMINATION:  GENERAL:  Well-developed, well nourished woman, she is very pleasant,   oriented.  HEENT:  She has some bruising to the right side of her head.  CARDIOVASCULAR:  Heart is regular rate and rhythm.  PULMONARY:  Rales present at the bases.  NEUROLOGIC:  She is alert and oriented x4.  Cranial nerves are intact.  Speech   is fluent.     Shunt evaluation.  Her shunt does pump normally.  I tested the pressure and it   was at 1.  I did change it to 2.5 as she had a subdural hematoma and I did   not want this to expand.      IMPRESSION AND PLAN: An 84-year-old woman who fell and hit her head with a   traumatic subdural hematoma, which will resolve in time.  Sometimes these   things will enlarge and cause herniation as the CSF is drained from the brain   by a shunt.  We have essentially set this off by turning it to 2.5.  I will   let Dr. Blanchard know about her being here and he will follow her during her   admission.        ______________________________  MD ALEX DOVER/KEN/GASTON    DD:  09/19/2024 09:17  DT:  09/19/2024 09:55    Job#:  [id number]    CC:Ashvin Arias,

## 2024-10-02 ENCOUNTER — HOME CARE VISIT (OUTPATIENT)
Dept: HOME HEALTH SERVICES | Facility: HOME HEALTHCARE | Age: 84
End: 2024-10-02
Payer: MEDICARE

## 2024-10-09 ENCOUNTER — HOSPITAL ENCOUNTER (OUTPATIENT)
Dept: RADIOLOGY | Facility: MEDICAL CENTER | Age: 84
End: 2024-10-09
Attending: NURSE PRACTITIONER
Payer: MEDICARE

## 2024-10-09 DIAGNOSIS — C22.0 HEPATOCELLULAR CARCINOMA (HCC): ICD-10-CM

## 2024-10-09 DIAGNOSIS — C22.1 CHOLANGIOCARCINOMA (HCC): ICD-10-CM

## 2024-10-09 PROCEDURE — 71260 CT THORAX DX C+: CPT

## 2024-10-09 PROCEDURE — 700117 HCHG RX CONTRAST REV CODE 255: Performed by: NURSE PRACTITIONER

## 2024-10-09 RX ADMIN — IOHEXOL 100 ML: 350 INJECTION, SOLUTION INTRAVENOUS at 10:47

## 2024-10-11 ENCOUNTER — APPOINTMENT (OUTPATIENT)
Dept: ONCOLOGY | Facility: MEDICAL CENTER | Age: 84
End: 2024-10-11
Attending: STUDENT IN AN ORGANIZED HEALTH CARE EDUCATION/TRAINING PROGRAM
Payer: MEDICARE

## 2024-10-12 ENCOUNTER — HOSPITAL ENCOUNTER (OUTPATIENT)
Dept: RADIOLOGY | Facility: MEDICAL CENTER | Age: 84
End: 2024-10-12
Attending: NEUROLOGICAL SURGERY
Payer: MEDICARE

## 2024-10-12 DIAGNOSIS — S06.5X0D TRAUMATIC SUBDURAL HEMORRHAGE WITHOUT LOSS OF CONSCIOUSNESS WITHOUT OPEN INTRACRANIAL WOUND, SUBSEQUENT ENCOUNTER: ICD-10-CM

## 2024-10-12 PROCEDURE — 70450 CT HEAD/BRAIN W/O DYE: CPT

## 2024-10-18 ENCOUNTER — APPOINTMENT (OUTPATIENT)
Dept: ONCOLOGY | Facility: MEDICAL CENTER | Age: 84
End: 2024-10-18
Payer: MEDICARE

## 2024-11-05 NOTE — THERAPY
Hu Christiansen  62 y.o. male  1962  MRN:[id number]  [facility]  Progress Note     Encounter Date: 11/5/2024    Assessment and Plan:       ICD-10-CM    1. Type 2 diabetes mellitus with diabetic nephropathy, with long-term current use of insulin (HCC)  E11.21     Z79.4       2. Primary hypertension  I10         1. Type 2 diabetes mellitus with diabetic nephropathy, with long-term current use of insulin (HCC)  Now we have successfully gotten him CGM, should have by end of this week.  FU 6 weeks and we will review data and adjust insulin  2. Primary hypertension  Elevated today.  [facility]:  hydralazine, nifedipine and clonidine all stopped because of low BP with dialysis  Given copy of current meds to review with RENAL for adequacy of BP control.       I have discussed the diagnosis with the patient and the intended plan as seen in the above orders.  he has expressed understanding.  The patient has received an after-visit summary and questions were answered concerning future plans.  I have discussed medication side effects and warnings with the patient as well.    Electronically Signed: Cheikh Fall MD    Current Medications after this visit     Current Outpatient Medications   Medication Sig    insulin glargine (LANTUS SOLOSTAR) 100 UNIT/ML injection pen ADMINISTER 12 UNITS UNDER THE SKIN EVERY NIGHT    triamcinolone (KENALOG) 0.1 % cream Apply topically 2 times daily.    losartan (COZAAR) 100 MG tablet TAKE 1 TABLET BY MOUTH EVERY DAY    sucralfate (CARAFATE) 1 GM tablet TAKE 1 TABLET BY MOUTH THREE TIMES DAILY WITH MEALS    isosorbide mononitrate (IMDUR) 60 MG extended release tablet TAKE 1 TABLET BY MOUTH EVERY DAY    tamsulosin (FLOMAX) 0.4 MG capsule TAKE 1 CAPSULE BY MOUTH EVERY EVENING    Continuous Glucose Sensor (DEXCOM G7 SENSOR) MISC 1 each by Does not apply route every 10 days    Continuous Glucose  (DEXCOM G7 ) ALBA 1 each by Does not apply  Recreational Therapy  Daily Treatment     Patient Name: Aaliyah Fulton  AGE:  81 y.o., SEX:  female  Medical Record #: [id number]  Today's Date: 12/6/2021       Subjective    Pt reporting a 5/10 for pain to the nurse post standing.      Objective       12/06/21 0831   Functional Ability Status - Cognitive   Attention Span Remains on Task   Comprehension Follows Three Step Commands   Judgment Able to Make Independent Decisions   Functional Ability Status - Emotional    Affect Appropriate;Bright   Mood Appropriate   Behavior Appropriate   Skilled Intervention    Skilled Intervention Relaxation / Coping Skills;Gross Motor Leisure   Skilled Intervention Comments Standing dual tasking puzzle   Interdisciplinary Plan of Care Collaboration   IDT Collaboration with  Nursing   Patient Position at End of Therapy Seated;Tray Table within Reach;Call Light within Reach   Collaboration Comments Nursing giving medication   Strengths & Barriers   Strengths Able to follow instructions;Alert and oriented;Motivated for self care and independence;Supportive family;Pleasant and cooperative;Willingly participates in therapeutic activities   Barriers Decreased endurance;Fatigue;Pain   Treatment Time   Total Time Spent (mins) 30   Procedural Tracking   Procedural Tracking Community Re-Integration;Community Skills Development;Leisure Skills Awareness;Leisure Skills Development;Gross Motor Functional Leisure Skills     Pt stood for 5 minutes x1 and 2 minutes x1 CGA    Assessment    Pt stood while dual tasking while putting together a puzzle. CGA no LOB.       Strengths: (P) Able to follow instructions,Alert and oriented,Motivated for self care and independence,Supportive family,Pleasant and cooperative,Willingly participates in therapeutic activities  Barriers: (P) Decreased endurance,Fatigue,Pain    Plan    dc    Passport items to be completed:  Passport items to be completed:  Verbalize two positive leisure activities, discuss returning to  work, hobbies, community groups or volunteer activities, explore community resources

## 2024-11-27 ENCOUNTER — HOSPITAL ENCOUNTER (OUTPATIENT)
Dept: HEMATOLOGY ONCOLOGY | Facility: MEDICAL CENTER | Age: 84
End: 2024-11-27
Attending: STUDENT IN AN ORGANIZED HEALTH CARE EDUCATION/TRAINING PROGRAM
Payer: MEDICARE

## 2024-11-27 VITALS
RESPIRATION RATE: 18 BRPM | SYSTOLIC BLOOD PRESSURE: 132 MMHG | TEMPERATURE: 99.3 F | HEIGHT: 68 IN | OXYGEN SATURATION: 99 % | DIASTOLIC BLOOD PRESSURE: 70 MMHG | BODY MASS INDEX: 24.19 KG/M2 | WEIGHT: 159.6 LBS | HEART RATE: 86 BPM

## 2024-11-27 DIAGNOSIS — R09.1 PLEURITIS: ICD-10-CM

## 2024-11-27 DIAGNOSIS — C22.1 CHOLANGIOCARCINOMA (HCC): ICD-10-CM

## 2024-11-27 DIAGNOSIS — C24.0 BILE DUCT CANCER (HCC): ICD-10-CM

## 2024-11-27 PROCEDURE — 99212 OFFICE O/P EST SF 10 MIN: CPT | Performed by: STUDENT IN AN ORGANIZED HEALTH CARE EDUCATION/TRAINING PROGRAM

## 2024-11-27 PROCEDURE — 99214 OFFICE O/P EST MOD 30 MIN: CPT | Performed by: STUDENT IN AN ORGANIZED HEALTH CARE EDUCATION/TRAINING PROGRAM

## 2024-11-27 RX ORDER — MELOXICAM 7.5 MG/1
7.5 TABLET ORAL DAILY
Qty: 30 TABLET | Refills: 0 | Status: SHIPPED | OUTPATIENT
Start: 2024-11-27

## 2024-11-27 ASSESSMENT — FIBROSIS 4 INDEX: FIB4 SCORE: 1.41

## 2024-11-28 ENCOUNTER — HOSPITAL ENCOUNTER (EMERGENCY)
Facility: MEDICAL CENTER | Age: 84
End: 2024-11-29
Attending: STUDENT IN AN ORGANIZED HEALTH CARE EDUCATION/TRAINING PROGRAM
Payer: MEDICARE

## 2024-11-28 ENCOUNTER — APPOINTMENT (OUTPATIENT)
Dept: RADIOLOGY | Facility: MEDICAL CENTER | Age: 84
End: 2024-11-28
Attending: STUDENT IN AN ORGANIZED HEALTH CARE EDUCATION/TRAINING PROGRAM
Payer: MEDICARE

## 2024-11-28 DIAGNOSIS — R05.1 ACUTE COUGH: ICD-10-CM

## 2024-11-28 DIAGNOSIS — R07.9 CHEST PAIN, UNSPECIFIED TYPE: Primary | ICD-10-CM

## 2024-11-28 LAB
ALBUMIN SERPL BCP-MCNC: 3.6 G/DL (ref 3.2–4.9)
ALBUMIN/GLOB SERPL: 0.8 G/DL
ALP SERPL-CCNC: 150 U/L (ref 30–99)
ALT SERPL-CCNC: 14 U/L (ref 2–50)
ANION GAP SERPL CALC-SCNC: 9 MMOL/L (ref 7–16)
AST SERPL-CCNC: 25 U/L (ref 12–45)
BASOPHILS # BLD AUTO: 0 % (ref 0–1.8)
BASOPHILS # BLD: 0 K/UL (ref 0–0.12)
BILIRUB SERPL-MCNC: 0.3 MG/DL (ref 0.1–1.5)
BUN SERPL-MCNC: 16 MG/DL (ref 8–22)
CALCIUM ALBUM COR SERPL-MCNC: 9.6 MG/DL (ref 8.5–10.5)
CALCIUM SERPL-MCNC: 9.3 MG/DL (ref 8.5–10.5)
CHLORIDE SERPL-SCNC: 105 MMOL/L (ref 96–112)
CO2 SERPL-SCNC: 24 MMOL/L (ref 20–33)
CREAT SERPL-MCNC: 0.78 MG/DL (ref 0.5–1.4)
D DIMER PPP IA.FEU-MCNC: 2.01 UG/ML (FEU) (ref 0–0.5)
EKG IMPRESSION: NORMAL
EOSINOPHIL # BLD AUTO: 0 K/UL (ref 0–0.51)
EOSINOPHIL NFR BLD: 0 % (ref 0–6.9)
ERYTHROCYTE [DISTWIDTH] IN BLOOD BY AUTOMATED COUNT: 47 FL (ref 35.9–50)
GFR SERPLBLD CREATININE-BSD FMLA CKD-EPI: 74 ML/MIN/1.73 M 2
GLOBULIN SER CALC-MCNC: 4.7 G/DL (ref 1.9–3.5)
GLUCOSE SERPL-MCNC: 133 MG/DL (ref 65–99)
HCT VFR BLD AUTO: 35.5 % (ref 37–47)
HGB BLD-MCNC: 11.5 G/DL (ref 12–16)
IMM GRANULOCYTES # BLD AUTO: 0.03 K/UL (ref 0–0.11)
IMM GRANULOCYTES NFR BLD AUTO: 0.5 % (ref 0–0.9)
LYMPHOCYTES # BLD AUTO: 1.01 K/UL (ref 1–4.8)
LYMPHOCYTES NFR BLD: 17.1 % (ref 22–41)
MCH RBC QN AUTO: 33.1 PG (ref 27–33)
MCHC RBC AUTO-ENTMCNC: 32.4 G/DL (ref 32.2–35.5)
MCV RBC AUTO: 102.3 FL (ref 81.4–97.8)
MONOCYTES # BLD AUTO: 0.53 K/UL (ref 0–0.85)
MONOCYTES NFR BLD AUTO: 9 % (ref 0–13.4)
NEUTROPHILS # BLD AUTO: 4.35 K/UL (ref 1.82–7.42)
NEUTROPHILS NFR BLD: 73.4 % (ref 44–72)
NRBC # BLD AUTO: 0 K/UL
NRBC BLD-RTO: 0 /100 WBC (ref 0–0.2)
NT-PROBNP SERPL IA-MCNC: 1138 PG/ML (ref 0–125)
PLATELET # BLD AUTO: 214 K/UL (ref 164–446)
PMV BLD AUTO: 10 FL (ref 9–12.9)
POTASSIUM SERPL-SCNC: 4.1 MMOL/L (ref 3.6–5.5)
PROT SERPL-MCNC: 8.3 G/DL (ref 6–8.2)
RBC # BLD AUTO: 3.47 M/UL (ref 4.2–5.4)
SODIUM SERPL-SCNC: 138 MMOL/L (ref 135–145)
TROPONIN T SERPL-MCNC: 24 NG/L (ref 6–19)
WBC # BLD AUTO: 5.9 K/UL (ref 4.8–10.8)

## 2024-11-28 PROCEDURE — 71045 X-RAY EXAM CHEST 1 VIEW: CPT

## 2024-11-28 PROCEDURE — 700102 HCHG RX REV CODE 250 W/ 637 OVERRIDE(OP): Performed by: STUDENT IN AN ORGANIZED HEALTH CARE EDUCATION/TRAINING PROGRAM

## 2024-11-28 PROCEDURE — 80053 COMPREHEN METABOLIC PANEL: CPT

## 2024-11-28 PROCEDURE — 85025 COMPLETE CBC W/AUTO DIFF WBC: CPT

## 2024-11-28 PROCEDURE — 99285 EMERGENCY DEPT VISIT HI MDM: CPT

## 2024-11-28 PROCEDURE — 85379 FIBRIN DEGRADATION QUANT: CPT

## 2024-11-28 PROCEDURE — 93005 ELECTROCARDIOGRAM TRACING: CPT

## 2024-11-28 PROCEDURE — 36415 COLL VENOUS BLD VENIPUNCTURE: CPT

## 2024-11-28 PROCEDURE — 93005 ELECTROCARDIOGRAM TRACING: CPT | Performed by: STUDENT IN AN ORGANIZED HEALTH CARE EDUCATION/TRAINING PROGRAM

## 2024-11-28 PROCEDURE — 84484 ASSAY OF TROPONIN QUANT: CPT

## 2024-11-28 PROCEDURE — 83880 ASSAY OF NATRIURETIC PEPTIDE: CPT

## 2024-11-28 PROCEDURE — A9270 NON-COVERED ITEM OR SERVICE: HCPCS | Performed by: STUDENT IN AN ORGANIZED HEALTH CARE EDUCATION/TRAINING PROGRAM

## 2024-11-28 RX ORDER — ACETAMINOPHEN 500 MG
1000 TABLET ORAL ONCE
Status: COMPLETED | OUTPATIENT
Start: 2024-11-28 | End: 2024-11-28

## 2024-11-28 RX ADMIN — ACETAMINOPHEN 1000 MG: 500 TABLET ORAL at 23:29

## 2024-11-28 ASSESSMENT — FIBROSIS 4 INDEX: FIB4 SCORE: 1.41

## 2024-11-29 ENCOUNTER — PHARMACY VISIT (OUTPATIENT)
Dept: PHARMACY | Facility: MEDICAL CENTER | Age: 84
End: 2024-11-29
Payer: COMMERCIAL

## 2024-11-29 VITALS
TEMPERATURE: 97 F | SYSTOLIC BLOOD PRESSURE: 145 MMHG | WEIGHT: 152 LBS | RESPIRATION RATE: 18 BRPM | BODY MASS INDEX: 23.04 KG/M2 | HEIGHT: 68 IN | DIASTOLIC BLOOD PRESSURE: 74 MMHG | HEART RATE: 88 BPM | OXYGEN SATURATION: 89 %

## 2024-11-29 LAB — TROPONIN T SERPL-MCNC: 19 NG/L (ref 6–19)

## 2024-11-29 PROCEDURE — 84484 ASSAY OF TROPONIN QUANT: CPT

## 2024-11-29 PROCEDURE — 700101 HCHG RX REV CODE 250: Performed by: STUDENT IN AN ORGANIZED HEALTH CARE EDUCATION/TRAINING PROGRAM

## 2024-11-29 PROCEDURE — RXMED WILLOW AMBULATORY MEDICATION CHARGE: Performed by: STUDENT IN AN ORGANIZED HEALTH CARE EDUCATION/TRAINING PROGRAM

## 2024-11-29 PROCEDURE — 71275 CT ANGIOGRAPHY CHEST: CPT

## 2024-11-29 PROCEDURE — A9270 NON-COVERED ITEM OR SERVICE: HCPCS | Performed by: STUDENT IN AN ORGANIZED HEALTH CARE EDUCATION/TRAINING PROGRAM

## 2024-11-29 PROCEDURE — 700117 HCHG RX CONTRAST REV CODE 255: Performed by: STUDENT IN AN ORGANIZED HEALTH CARE EDUCATION/TRAINING PROGRAM

## 2024-11-29 PROCEDURE — 700102 HCHG RX REV CODE 250 W/ 637 OVERRIDE(OP): Performed by: STUDENT IN AN ORGANIZED HEALTH CARE EDUCATION/TRAINING PROGRAM

## 2024-11-29 RX ORDER — BENZONATATE 100 MG/1
100 CAPSULE ORAL ONCE
Status: COMPLETED | OUTPATIENT
Start: 2024-11-29 | End: 2024-11-29

## 2024-11-29 RX ORDER — LIDOCAINE 4 G/G
1 PATCH TOPICAL ONCE
Status: DISCONTINUED | OUTPATIENT
Start: 2024-11-29 | End: 2024-11-29 | Stop reason: HOSPADM

## 2024-11-29 RX ORDER — BENZONATATE 100 MG/1
100 CAPSULE ORAL 3 TIMES DAILY PRN
Qty: 30 CAPSULE | Refills: 0 | Status: SHIPPED | OUTPATIENT
Start: 2024-11-29 | End: 2024-12-13

## 2024-11-29 RX ADMIN — BENZONATATE 100 MG: 100 CAPSULE ORAL at 01:22

## 2024-11-29 RX ADMIN — IOHEXOL 36 ML: 350 INJECTION, SOLUTION INTRAVENOUS at 00:06

## 2024-11-29 RX ADMIN — LIDOCAINE 1 PATCH: 4 PATCH TOPICAL at 01:22

## 2024-11-29 ASSESSMENT — ENCOUNTER SYMPTOMS
PALPITATIONS: 0
WEIGHT LOSS: 0
MYALGIAS: 0
HEARTBURN: 0
CHILLS: 0
SINUS PAIN: 0
TINGLING: 0
BLOOD IN STOOL: 0
SORE THROAT: 0
WHEEZING: 0
INSOMNIA: 0
DIARRHEA: 0
FEVER: 0
CONSTIPATION: 0
VOMITING: 0
BLURRED VISION: 0
ORTHOPNEA: 0
WEAKNESS: 1
SPUTUM PRODUCTION: 0
NERVOUS/ANXIOUS: 0
DOUBLE VISION: 0
NAUSEA: 0
ABDOMINAL PAIN: 0
DIAPHORESIS: 0
SHORTNESS OF BREATH: 0
BACK PAIN: 0
HEADACHES: 0
COUGH: 0
DIZZINESS: 0
BRUISES/BLEEDS EASILY: 0

## 2024-11-29 NOTE — ED TRIAGE NOTES
"Chief Complaint   Patient presents with    Chest Pain    Shortness of Breath     Reports sharp mid sternal chest pain, non radiating with shortness of breath started Tuesday night. Patient states chest pain occur whenever she coughs. -N/V -fever.      Patient states she had chemo last October and no longer on active chemo at this time.   EKG done  /70   Pulse 96   Temp 36.1 °C (97 °F) (Temporal)   Resp 19   Ht 1.727 m (5' 8\")   Wt 68.9 kg (152 lb)   SpO2 95%   BMI 23.11 kg/m²     Pain: 10/10    Pt came in to triage ambulatory with steady gait for the above complaints.     Pt is alert and oriented x 4, speaking in full sentences, follows commands and responds appropriately to questions.     Respirations are even and unlabored.    Pt placed Phleb area. Pt educated on triage process.     Pt encouraged to inform staff for any changes in condition or if needs help while waiting to be room in.    "

## 2024-11-29 NOTE — ED NOTES
Patient discharged home per ERP.  Discharge teaching and education discussed with patient. POC discussed.   Patient verbalized understanding of discharge teaching and education. No other questions at this time.     RX tubed down from pharmacy.   PIV removed.     VSS. Patient alert and oriented. Patient arranged ride for self. Wheeled out of unit safely

## 2024-11-29 NOTE — ED PROVIDER NOTES
ED Provider Note    CHIEF COMPLAINT  Chief Complaint   Patient presents with    Chest Pain    Shortness of Breath     Reports sharp mid sternal chest pain, non radiating with shortness of breath started Tuesday night. Patient states chest pain occur whenever she coughs. -N/V -fever.        EXTERNAL RECORDS REVIEWED  Outpatient Notes patient was seen by neurosurgery for subdural hygroma, normal pressure hydrocephalus on October 31, 2024    HPI/ROS  LIMITATION TO HISTORY   Select: : None  OUTSIDE HISTORIAN(S):  None    Aaliyah Fulton is a 84 y.o. female who presents for evaluation of nonradiating substernal chest pain that occurs every time she coughs.  She states that pain started on Wednesday morning after she had lots of coughing on Tuesday night.  She is not sure if it is exertional.  She is not coughing anything up.  She denies any hemoptysis.  She has no leg pain or leg swelling.  She actually has no pain right now.  She denies any fever, runny nose, sore throat.  She does have a history of asthma however has not used an inhaler in a long time.  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  She still has active disease.  She denies any history of coronary artery disease.    PAST MEDICAL HISTORY   has a past medical history of Arthritis, ASTHMA, Breast cancer ([facility]) (2005), Cancer ([facility]) (06/2024), Cataract, Dental disorder, Heart burn, Heart murmur, High cholesterol, History of cervical fracture (2022), Hypertension (01/03/2022), Indigestion, Osteoporosis, Pain, Pneumonia (12/2021), PONV (postoperative nausea and vomiting), Psychiatric problem, Stroke ([facility]) (2021), and Unspecified urinary incontinence.    SURGICAL HISTORY   has a past surgical history that includes sinuscopy; breast biopsy (05/21/2010); other orthopedic surgery; knee arthroplasty total (09/19/2011); lumbar laminectomy diskectomy (10/17/2012); foraminotomy (10/17/2012); radiation therapy plan simple; recovery  (05/10/2016); partial hip replacement (Left, 11/24/2021); lumbar decompression (10/17/2012); create shunt:ventric-peritoneal (01/10/2022); cholecystectomy; hammertoe correction (Bilateral); shoulder surgery; knee arthroscopy; total knee arthroplasty (Left, 03/28/2023); and lumpectomy.    FAMILY HISTORY  Family History   Problem Relation Age of Onset    Lung Disease Mother         copd    Stroke Mother     Cancer Daughter         Breast    Breast Cancer Daughter     Psychiatric Illness Daughter         Anxiety    Psychiatric Illness Daughter         Anxiety and Depression       SOCIAL HISTORY  Social History     Tobacco Use    Smoking status: Never    Smokeless tobacco: Never   Vaping Use    Vaping status: Never Used   Substance and Sexual Activity    Alcohol use: No    Drug use: Never    Sexual activity: Not on file     Comment: ; two daughters; retired ( @ [employer] / [employer])       CURRENT MEDICATIONS  Home Medications       Reviewed by Coco Madsen R.N. (Registered Nurse) on 11/28/24 at 2202  Med List Status: Partial     Medication Last Dose Status   acetaminophen (TYLENOL) 500 MG Tab  Active   atorvastatin (LIPITOR) 40 MG Tab  Active   busPIRone (BUSPAR) 15 MG tablet  Active   Calcium Carbonate-Vitamin D (CALCIUM-VITAMIN D3 PO)  Active   cyanocobalamin (VITAMIN B12) 500 MCG tablet  Active   D-MANNOSE PO  Active   estradiol (ESTRACE) 0.1 MG/GM vaginal cream  Active   lidocaine (ASPERFLEX) 4 % Patch  Active   meloxicam (MOBIC) 7.5 MG Tab  Active   methenamine hip (HIPPREX) 1 GM Tab  Active   omeprazole (PRILOSEC) 40 MG delayed-release capsule  Active   oxybutynin (DITROPAN XL) 15 MG CR tablet  Active   valsartan (DIOVAN) 160 MG Tab  Active   venlafaxine (EFFEXOR-XR) 150 MG extended-release capsule  Active   venlafaxine XR (EFFEXOR XR) 75 MG CAPSULE SR 24 HR  Active   vibegron (GEMTESA) 75 MG tablet  Active   vitamin D3 (CHOLECALCIFEROL) 1000 Unit (25 mcg) Tab  Active          "         Audit from Redirected Encounters    **Home medications have not yet been reviewed for this encounter**       ALLERGIES  No Known Allergies    PHYSICAL EXAM  VITAL SIGNS: /70   Pulse 96   Temp 36.1 °C (97 °F) (Temporal)   Resp 19   Ht 1.727 m (5' 8\")   Wt 68.9 kg (152 lb)   SpO2 95%   BMI 23.11 kg/m²      Constitutional: Well developed, Well nourished, No acute distress, Non-toxic appearance.   HEENT: Normocephalic, Atraumatic,  external ears normal, pharynx pink,  Mucous  Membranes moist, No rhinorrhea or mucosal edema  Eyes: PERRL, EOMI, Conjunctiva normal, No discharge.   Neck: Normal range of motion, No tenderness, Supple, No stridor.   Cardiovascular: Regular Rate and Rhythm, +systolic murmur  Thorax & Lungs: Lungs clear to auscultation bilaterally, No respiratory distress, No wheezes, rhales or rhonchi, No chest wall tenderness.   Abdomen:Soft, non tender, non distended,  No pulsatile masses., no rebound guarding or peritoneal signs.   Skin: Warm, Dry, No erythema, No rash,   Back:  No CVA tenderness,  No spinal tenderness, bony crepitance step offs or instability.   Extremities: Equal, intact distal pulses, No cyanosis, clubbing or edema,  No tenderness.   Neurologic: Alert & oriented No focal deficits noted.  Psychiatric: Affect normal, Judgment normal, Mood normal.    EKG/LABS  Results for orders placed or performed during the hospital encounter of 24   EKG    Collection Time: 24  9:54 PM   Result Value Ref Range    Report       [facility] Emergency Dept.    Test Date:  2024  Pt Name:    LISA WINTERS                 Department: ER  MRN:        [id number]                      Room:  Gender:     Female                       Technician: [id number]  :        [id number]                   Requested By:ER TRIAGE PROTOCOL  Order #:    [id number]                    Reading MD: Shannon Barrett    Measurements  Intervals                                Axis  Rate:   "     88                           P:          -12  ND:         169                          QRS:        -25  QRSD:       92                           T:          35  QT:         373  QTc:        452    Interpretive Statements  Sinus rhythm  Normal axis  Normal intervals  T wave flattening in lead III  Compared to ECG 09/15/2024 14:22:27  No significant changes  Electronically Signed On 11- 22:40:33 PST by Shannon Barrett     CBC with Differential    Collection Time: 11/28/24 11:04 PM   Result Value Ref Range    WBC 5.9 4.8 - 10.8 K/uL    RBC 3.47 (L) 4.20 - 5.40 M/uL    Hemoglobin 11.5 (L) 12.0 - 16.0 g/dL    Hematocrit 35.5 (L) 37.0 - 47.0 %    .3 (H) 81.4 - 97.8 fL    MCH 33.1 (H) 27.0 - 33.0 pg    MCHC 32.4 32.2 - 35.5 g/dL    RDW 47.0 35.9 - 50.0 fL    Platelet Count 214 164 - 446 K/uL    MPV 10.0 9.0 - 12.9 fL    Neutrophils-Polys 73.40 (H) 44.00 - 72.00 %    Lymphocytes 17.10 (L) 22.00 - 41.00 %    Monocytes 9.00 0.00 - 13.40 %    Eosinophils 0.00 0.00 - 6.90 %    Basophils 0.00 0.00 - 1.80 %    Immature Granulocytes 0.50 0.00 - 0.90 %    Nucleated RBC 0.00 0.00 - 0.20 /100 WBC    Neutrophils (Absolute) 4.35 1.82 - 7.42 K/uL    Lymphs (Absolute) 1.01 1.00 - 4.80 K/uL    Monos (Absolute) 0.53 0.00 - 0.85 K/uL    Eos (Absolute) 0.00 0.00 - 0.51 K/uL    Baso (Absolute) 0.00 0.00 - 0.12 K/uL    Immature Granulocytes (abs) 0.03 0.00 - 0.11 K/uL    NRBC (Absolute) 0.00 K/uL   Complete Metabolic Panel (CMP)    Collection Time: 11/28/24 11:04 PM   Result Value Ref Range    Sodium 138 135 - 145 mmol/L    Potassium 4.1 3.6 - 5.5 mmol/L    Chloride 105 96 - 112 mmol/L    Co2 24 20 - 33 mmol/L    Anion Gap 9.0 7.0 - 16.0    Glucose 133 (H) 65 - 99 mg/dL    Bun 16 8 - 22 mg/dL    Creatinine 0.78 0.50 - 1.40 mg/dL    Calcium 9.3 8.5 - 10.5 mg/dL    Correct Calcium 9.6 8.5 - 10.5 mg/dL    AST(SGOT) 25 12 - 45 U/L    ALT(SGPT) 14 2 - 50 U/L    Alkaline Phosphatase 150 (H) 30 - 99 U/L    Total Bilirubin 0.3 0.1 -  1.5 mg/dL    Albumin 3.6 3.2 - 4.9 g/dL    Total Protein 8.3 (H) 6.0 - 8.2 g/dL    Globulin 4.7 (H) 1.9 - 3.5 g/dL    A-G Ratio 0.8 g/dL   proBrain Natriuretic Peptide, NT (BNP)    Collection Time: 11/28/24 11:04 PM   Result Value Ref Range    NT-proBNP 1138 (H) 0 - 125 pg/mL   Troponins NOW    Collection Time: 11/28/24 11:04 PM   Result Value Ref Range    Troponin T 24 (H) 6 - 19 ng/L   ESTIMATED GFR    Collection Time: 11/28/24 11:04 PM   Result Value Ref Range    GFR (CKD-EPI) 74 >60 mL/min/1.73 m 2   D-DIMER    Collection Time: 11/28/24 11:05 PM   Result Value Ref Range    D-Dimer 2.01 (H) 0.00 - 0.50 ug/mL (FEU)   Troponins in two (2) hours    Collection Time: 11/29/24 12:08 AM   Result Value Ref Range    Troponin T 19 6 - 19 ng/L     *Note: Due to a large number of results and/or encounters for the requested time period, some results have not been displayed. A complete set of results can be found in Results Review.       I have independently interpreted this EKG    RADIOLOGY/PROCEDURES   I have independently interpreted the diagnostic imaging associated with this visit and am waiting the final reading from the radiologist.   My preliminary interpretation is as follows: no focal consolidation    Radiologist interpretation:  CT-CTA CHEST PULMONARY ARTERY W/ RECONS   Final Result         1. No evidence for pulmonary embolism.   2. Atherosclerosis including coronary artery disease.   3. Mediastinal and hilar lymphadenopathy.   4. Patchy ground glass opacity and interstitial thickening may represent chronic interstitial disease.            DX-CHEST-PORTABLE (1 VIEW)   Final Result      Diffuse interstitial prominence with bibasilar atelectasis versus infiltrate.          COURSE & MEDICAL DECISION MAKING    ASSESSMENT, COURSE AND PLAN  Care Narrative:   This is a 84-year-old female presenting here for evaluation of chest pain that started Wednesday after she was coughing a lot on Tuesday.  On arrival, her vital  signs are stable.  Differential diagnosis includes but is not limited to MSK, pneumonia, PE, ACS.    Labs without leukocytosis.  Hemoglobin at 11.5 which is stable anemia.  Serial troponins are downtrending.  First one is at 24 and second is at 19.  EKG with no acute ischemic changes.  No exertional component of chest pain therefore doubt ACS.  D-dimer positive therefore CTA of chest was obtained and shows no evidence of PE.  No evidence of focal consolidation to suggest pneumonia.  There is evidence of probable chronic interstitial lung disease but patient is not hypoxic and does not have any shortness of breath.  Incidental findings were discussed with patient and her daughter.  I do suspect that her pain is likely musculoskeletal in nature given she has pain with coughing.  I will prescribe her Tessalon to help with cough medication.  I have advised Tylenol and lidocaine patches.  They are requesting a referral to pulmonology due to CT findings of chronic interstitial lung disease therefore this was provided and she is advised to follow-up with her PCP.  Patient and her daughter are instructed to bring her back if she has any worsening pain, fever or any other concerns.  Patient and her daughter are agreeable to discharge plan with no further questions.            ADDITIONAL PROBLEMS MANAGED  None    DISPOSITION AND DISCUSSIONS  I have discussed management of the patient with the following physicians and BIRGIT's:  None    Discussion of management with other QHP or appropriate source(s): None     Escalation of care considered, and ultimately not performed:acute inpatient care management, however at this time, the patient is most appropriate for outpatient management    Barriers to care at this time, including but not limited to:  None .     Decision tools and prescription drugs considered including, but not limited to:  None .    The patient will return for new or worsening symptoms and is stable at the time of  discharge.    The patient is referred to a primary physician for blood pressure management, diabetic screening, and for all other preventative health concerns.      DISPOSITION:  Patient will be discharged home in stable condition.    FOLLOW UP:  Ashvin Arias D.O.  [address] [address]  [phone]          [facility], Emergency Dept  [address] [facility] [id number] [phone]          OUTPATIENT MEDICATIONS:  Discharge Medication List as of 11/29/2024  1:29 AM        START taking these medications    Details   benzonatate (TESSALON) 100 MG Cap Take 1 Capsule by mouth 3 times a day as needed for Cough., Disp-30 Capsule, R-0, Normal               FINAL DIAGNOSIS  1. Chest pain, unspecified type Acute   2. Acute cough Acute        Electronically signed by: Shannon Barrett M.D., 11/28/2024 10:34 PM

## 2024-11-29 NOTE — PROGRESS NOTES
Follow Up Note:  Hematology/Oncology      Primary Care:  Unknown/Not in system    Diagnosis: Cholangiocarcinoma, intrahepatic    Chief Complaint: On-treatment visit    Current Treatment: Gemcitabine, cisplatin, durvalumab    Prior Treatment: NA    Oncology History of Presenting Illness:  Aaliyah Fulton is a 84 y.o.  woman who presents to the clinic for evaluation for systemic therapy for a diagnosis of intrahepatic cholangiocarcinoma. She was incidentally found to have a liver mass when being worked up for kidney issues with a CT scan, and that was subsequently biopsied and found to be a moderate-to-poorly differentiated adenocarcinoma. She was evaluated by surgical oncology and felt to not be a surgical candidate at this time, so she was referred for medical and radiation oncology evaluation.     Treatment History:   07/19/24: C1D1 gem/cis/durva  08/09/24: C2D1 gem/cis/durva   08/30/24: C3D1 gem/cis/durva    Interval History:  Patient is here for follow up visit. She is doing better having spent a lot of time off chemotherapy. She was having a tough time with chemotherapy. She had to deal with a brain bleed requiring neurosurgery interventions and is wondering about doing more chemotherapy, given her disease status.      Allergies as of 11/27/2024    (No Known Allergies)         Current Outpatient Medications:     meloxicam (MOBIC) 7.5 MG Tab, Take 1 Tablet by mouth every day., Disp: 30 Tablet, Rfl: 0    cyanocobalamin (VITAMIN B12) 500 MCG tablet, Take 1 Tablet by mouth every day. Indications: supplement, Disp: , Rfl:     vibegron (GEMTESA) 75 MG tablet, Take 1 Tablet by mouth every day., Disp: , Rfl:     valsartan (DIOVAN) 160 MG Tab, Take 160 mg by mouth every day., Disp: , Rfl:     venlafaxine XR (EFFEXOR XR) 75 MG CAPSULE SR 24 HR, Take 75 mg by mouth every day. For a daily total of 225 mg  Indications: Generalized Anxiety Disorder, Disp: , Rfl:     atorvastatin (LIPITOR) 40 MG Tab, TAKE ONE TABLET  BY MOUTH EVERY EVENING, Disp: 90 Tablet, Rfl: 0    omeprazole (PRILOSEC) 40 MG delayed-release capsule, Take 1 Capsule by mouth every day. Indications: Heartburn, Disp: , Rfl:     oxybutynin (DITROPAN XL) 15 MG CR tablet, Take 1 Tablet by mouth every day. Indications: Urinary Incontinence, Disp: , Rfl:     methenamine hip (HIPPREX) 1 GM Tab, Take 1 g by mouth 2 times a day. LONG TERM TREATMENT FOR CHRONIC UTI.  Indications: Urinary Tract Infection, Disp: , Rfl:     estradiol (ESTRACE) 0.1 MG/GM vaginal cream, Insert 2 g into the vagina 3 times a week. Indications: Simple Infection of the Urinary Tract, Disp: , Rfl:     Calcium Carbonate-Vitamin D (CALCIUM-VITAMIN D3 PO), Take 600 mg by mouth every morning. Indications: supplement, Disp: , Rfl:     vitamin D3 (CHOLECALCIFEROL) 1000 Unit (25 mcg) Tab, Take 2,000 Units by mouth every morning. Indications: supplement, Disp: , Rfl:     busPIRone (BUSPAR) 15 MG tablet, Take 15 mg by mouth 3 times a day. Indications: Anxiety Disorder, Disp: , Rfl:     acetaminophen (TYLENOL) 500 MG Tab, Take 1,000 mg by mouth every 6 hours as needed for Moderate Pain. Indications: Pain, Disp: , Rfl:     D-MANNOSE PO, Take 500 mg by mouth every evening. Indications: healthy urinary tract , Disp: , Rfl:     venlafaxine (EFFEXOR-XR) 150 MG extended-release capsule, Take 1 Capsule by mouth every day. Taken with 75 mg for 225mg daily, Disp: 30 Capsule, Rfl: 2    benzonatate (TESSALON) 100 MG Cap, Take 1 Capsule by mouth 3 times a day as needed for Cough., Disp: 30 Capsule, Rfl: 0    lidocaine (ASPERFLEX) 4 % Patch, Place 1 Patch on the skin every 12 hours as needed (lower back pain)., Disp: , Rfl:       Review of Systems:  Review of Systems   Constitutional:  Positive for malaise/fatigue. Negative for chills, diaphoresis, fever and weight loss.   HENT:  Negative for hearing loss, nosebleeds, sinus pain and sore throat.    Eyes:  Negative for blurred vision and double vision.   Respiratory:   "Negative for cough, sputum production, shortness of breath and wheezing.    Cardiovascular:  Negative for chest pain, palpitations, orthopnea and leg swelling.   Gastrointestinal:  Negative for abdominal pain, blood in stool, constipation, diarrhea, heartburn, melena, nausea and vomiting.   Genitourinary:  Negative for dysuria, frequency, hematuria and urgency.   Musculoskeletal:  Negative for back pain, joint pain and myalgias.   Skin:  Negative for rash.   Neurological:  Positive for weakness. Negative for dizziness, tingling and headaches.   Endo/Heme/Allergies:  Does not bruise/bleed easily.   Psychiatric/Behavioral:  The patient is not nervous/anxious and does not have insomnia.          Physical Exam:  Vitals:    11/27/24 1355   BP: 132/70   BP Location: Right arm   Patient Position: Sitting   BP Cuff Size: Adult   Pulse: 86   Resp: 18   Temp: 37.4 °C (99.3 °F)   TempSrc: Temporal   SpO2: 99%   Weight: 72.4 kg (159 lb 9.6 oz)   Height: 1.727 m (5' 8\")       DESC; KARNOFSKY SCALE WITH ECOG EQUIVALENT: 60, Requires occasional assistance, but is able to care for most of his personal needs (ECOG equivalent 2)    DISTRESS LEVEL: no apparent distress    Physical Exam  Vitals and nursing note reviewed.   Constitutional:       General: She is awake. She is not in acute distress.     Appearance: Normal appearance. She is normal weight. She is not ill-appearing, toxic-appearing or diaphoretic.   HENT:      Head: Normocephalic and atraumatic.      Nose: Nose normal. No congestion.      Mouth/Throat:      Pharynx: Oropharynx is clear. No oropharyngeal exudate or posterior oropharyngeal erythema.   Eyes:      General: No scleral icterus.     Extraocular Movements: Extraocular movements intact.      Conjunctiva/sclera: Conjunctivae normal.      Pupils: Pupils are equal, round, and reactive to light.   Cardiovascular:      Rate and Rhythm: Normal rate and regular rhythm.      Pulses: Normal pulses.      Heart sounds: Normal " heart sounds. No murmur heard.     No friction rub. No gallop.   Pulmonary:      Effort: Pulmonary effort is normal.      Breath sounds: Normal breath sounds. No decreased air movement. No wheezing, rhonchi or rales.   Abdominal:      General: Bowel sounds are normal. There is no distension.      Tenderness: There is no abdominal tenderness.   Musculoskeletal:         General: No deformity. Normal range of motion.      Cervical back: Normal range of motion and neck supple. No tenderness.      Right lower leg: No edema.      Left lower leg: No edema.   Lymphadenopathy:      Cervical: No cervical adenopathy.      Upper Body:      Right upper body: No axillary adenopathy.      Left upper body: No axillary adenopathy.      Lower Body: No right inguinal adenopathy. No left inguinal adenopathy.   Skin:     General: Skin is warm and dry.      Coloration: Skin is not jaundiced.      Findings: No erythema or rash.   Neurological:      General: No focal deficit present.      Mental Status: She is alert and oriented to person, place, and time.      Sensory: Sensation is intact.      Motor: Weakness present.      Gait: Gait abnormal (Ambulating with walker).   Psychiatric:         Attention and Perception: Attention normal.         Mood and Affect: Mood normal.         Behavior: Behavior normal. Behavior is cooperative.         Thought Content: Thought content normal.         Judgment: Judgment normal.           Labs:  No visits with results within 7 Day(s) from this visit.   Latest known visit with results is:   Admission on 09/17/2024, Discharged on 09/24/2024   Component Date Value Ref Range Status    WBC 09/17/2024 4.0 (L)  4.8 - 10.8 K/uL Final    RBC 09/17/2024 3.09 (L)  4.20 - 5.40 M/uL Final    Hemoglobin 09/17/2024 10.1 (L)  12.0 - 16.0 g/dL Final    Hematocrit 09/17/2024 31.2 (L)  37.0 - 47.0 % Final    MCV 09/17/2024 101.0 (H)  81.4 - 97.8 fL Final    MCH 09/17/2024 32.7  27.0 - 33.0 pg Final    MCHC 09/17/2024 32.4   32.2 - 35.5 g/dL Final    RDW 09/17/2024 61.7 (H)  35.9 - 50.0 fL Final    Platelet Count 09/17/2024 307  164 - 446 K/uL Final    MPV 09/17/2024 9.8  9.0 - 12.9 fL Final    Neutrophils-Polys 09/17/2024 43.30 (L)  44.00 - 72.00 % Final    Lymphocytes 09/17/2024 35.40  22.00 - 41.00 % Final    Monocytes 09/17/2024 20.30 (H)  0.00 - 13.40 % Final    Eosinophils 09/17/2024 0.50  0.00 - 6.90 % Final    Basophils 09/17/2024 0.00  0.00 - 1.80 % Final    Immature Granulocytes 09/17/2024 0.50  0.00 - 0.90 % Final    Nucleated RBC 09/17/2024 0.00  0.00 - 0.20 /100 WBC Final    Neutrophils (Absolute) 09/17/2024 1.71 (L)  1.82 - 7.42 K/uL Final    Includes immature neutrophils, if present.    Lymphs (Absolute) 09/17/2024 1.40  1.00 - 4.80 K/uL Final    Monos (Absolute) 09/17/2024 0.80  0.00 - 0.85 K/uL Final    Eos (Absolute) 09/17/2024 0.02  0.00 - 0.51 K/uL Final    Baso (Absolute) 09/17/2024 0.00  0.00 - 0.12 K/uL Final    Immature Granulocytes (abs) 09/17/2024 0.02  0.00 - 0.11 K/uL Final    NRBC (Absolute) 09/17/2024 0.00  K/uL Final    Sodium 09/17/2024 132 (L)  135 - 145 mmol/L Final    Potassium 09/17/2024 4.9  3.6 - 5.5 mmol/L Final    Comment: The hemolysis index of the specimen exceeds the allowed tolerance for the  test.  Result may be affected.  Specimen recollection is recommended to  confirm the result.      Chloride 09/17/2024 99  96 - 112 mmol/L Final    Co2 09/17/2024 23  20 - 33 mmol/L Final    Anion Gap 09/17/2024 10.0  7.0 - 16.0 Final    Glucose 09/17/2024 96  65 - 99 mg/dL Final    Bun 09/17/2024 11  8 - 22 mg/dL Final    Creatinine 09/17/2024 0.57  0.50 - 1.40 mg/dL Final    Calcium 09/17/2024 9.1  8.5 - 10.5 mg/dL Final    Correct Calcium 09/17/2024 9.6  8.5 - 10.5 mg/dL Final    AST(SGOT) 09/17/2024 28  12 - 45 U/L Final    ALT(SGPT) 09/17/2024 15  2 - 50 U/L Final    Alkaline Phosphatase 09/17/2024 163 (H)  30 - 99 U/L Final    Total Bilirubin 09/17/2024 0.4  0.1 - 1.5 mg/dL Final    Albumin  09/17/2024 3.4  3.2 - 4.9 g/dL Final    Total Protein 09/17/2024 7.5  6.0 - 8.2 g/dL Final    Globulin 09/17/2024 4.1 (H)  1.9 - 3.5 g/dL Final    A-G Ratio 09/17/2024 0.8  g/dL Final    Color 09/18/2024 Yellow   Final    Character 09/18/2024 Clear   Final    Specific Gravity 09/18/2024 1.009  <1.035 Final    Ph 09/18/2024 7.0  5.0 - 8.0 Final    Glucose 09/18/2024 Negative  Negative mg/dL Final    Ketones 09/18/2024 Negative  Negative mg/dL Final    Protein 09/18/2024 Negative  Negative mg/dL Final    Bilirubin 09/18/2024 Negative  Negative Final    Urobilinogen, Urine 09/18/2024 0.2  Negative Final    Nitrite 09/18/2024 Negative  Negative Final    Leukocyte Esterase 09/18/2024 Negative  Negative Final    Occult Blood 09/18/2024 Negative  Negative Final    Micro Urine Req 09/18/2024 see below   Final    Comment: Microscopic examination not performed when specimen is clear  and chemically negative for protein, blood, leukocyte esterase  and nitrite.      POC Influenza A RNA, PCR 09/17/2024 Negative  Negative Final    POC Influenza B RNA, PCR 09/17/2024 Negative  Negative Final    POC RSV, by PCR 09/17/2024 Negative  Negative Final    POC SARS-CoV-2, PCR 09/17/2024 NotDetected  NotDetected Final    Comment: [facility] providers: PLEASE REFER TO DE-ESCALATION AND RETESTING PROTOCOL  on [email or web address]    **The [email or web address] GeneXpert Xpress SARS-CoV-2 RT-PCR Test has been made  available for use under the Emergency Use Authorization (EUA) only.      GFR (CKD-EPI) 09/17/2024 88  >60 mL/min/1.73 m 2 Final    Comment: Estimated Glomerular Filtration Rate is calculated using  race neutral CKD-EPI 2021 equation per NKF-ASN recommendations.      WBC 09/19/2024 4.9  4.8 - 10.8 K/uL Final    RBC 09/19/2024 3.56 (L)  4.20 - 5.40 M/uL Final    Hemoglobin 09/19/2024 11.7 (L)  12.0 - 16.0 g/dL Final    Hematocrit 09/19/2024 35.4 (L)  37.0 - 47.0 % Final    MCV 09/19/2024 99.4 (H)  81.4 - 97.8 fL Final    MCH 09/19/2024 32.9  27.0 - 33.0  pg Final    MCHC 09/19/2024 33.1  32.2 - 35.5 g/dL Final    RDW 09/19/2024 59.7 (H)  35.9 - 50.0 fL Final    Platelet Count 09/19/2024 349  164 - 446 K/uL Final    MPV 09/19/2024 10.0  9.0 - 12.9 fL Final    Neutrophils-Polys 09/19/2024 48.30  44.00 - 72.00 % Final    Lymphocytes 09/19/2024 28.10  22.00 - 41.00 % Final    Monocytes 09/19/2024 22.40 (H)  0.00 - 13.40 % Final    Eosinophils 09/19/2024 0.40  0.00 - 6.90 % Final    Basophils 09/19/2024 0.20  0.00 - 1.80 % Final    Immature Granulocytes 09/19/2024 0.60  0.00 - 0.90 % Final    Nucleated RBC 09/19/2024 0.00  0.00 - 0.20 /100 WBC Final    Neutrophils (Absolute) 09/19/2024 2.37  1.82 - 7.42 K/uL Final    Includes immature neutrophils, if present.    Lymphs (Absolute) 09/19/2024 1.38  1.00 - 4.80 K/uL Final    Monos (Absolute) 09/19/2024 1.10 (H)  0.00 - 0.85 K/uL Final    Eos (Absolute) 09/19/2024 0.02  0.00 - 0.51 K/uL Final    Baso (Absolute) 09/19/2024 0.01  0.00 - 0.12 K/uL Final    Immature Granulocytes (abs) 09/19/2024 0.03  0.00 - 0.11 K/uL Final    NRBC (Absolute) 09/19/2024 0.00  K/uL Final    Magnesium 09/19/2024 2.0  1.5 - 2.5 mg/dL Final    Vitamin B12 -True Cobalamin 09/19/2024 >4000 (H)  211 - 911 pg/mL Final    Results obtained by dilution.    Sodium 09/19/2024 134 (L)  135 - 145 mmol/L Final    Potassium 09/19/2024 4.3  3.6 - 5.5 mmol/L Final    Comment: The hemolysis index of the specimen exceeds the allowed tolerance for the  test.  Result may be affected.  Specimen recollection is recommended to  confirm the result.      Chloride 09/19/2024 100  96 - 112 mmol/L Final    Co2 09/19/2024 21  20 - 33 mmol/L Final    Glucose 09/19/2024 126 (H)  65 - 99 mg/dL Final    Bun 09/19/2024 13  8 - 22 mg/dL Final    Creatinine 09/19/2024 0.81  0.50 - 1.40 mg/dL Final    Calcium 09/19/2024 9.5  8.5 - 10.5 mg/dL Final    Anion Gap 09/19/2024 13.0  7.0 - 16.0 Final    NT-proBNP 09/19/2024 729 (H)  0 - 125 pg/mL Final    GFR (CKD-EPI) 09/19/2024 70  >60  mL/min/1.73 m 2 Final    Comment: Estimated Glomerular Filtration Rate is calculated using  race neutral CKD-EPI 2021 equation per NKF-ASN recommendations.      WBC 09/20/2024 4.2 (L)  4.8 - 10.8 K/uL Final    RBC 09/20/2024 3.23 (L)  4.20 - 5.40 M/uL Final    Hemoglobin 09/20/2024 10.6 (L)  12.0 - 16.0 g/dL Final    Hematocrit 09/20/2024 32.1 (L)  37.0 - 47.0 % Final    MCV 09/20/2024 99.4 (H)  81.4 - 97.8 fL Final    MCH 09/20/2024 32.8  27.0 - 33.0 pg Final    MCHC 09/20/2024 33.0  32.2 - 35.5 g/dL Final    RDW 09/20/2024 60.3 (H)  35.9 - 50.0 fL Final    Platelet Count 09/20/2024 334  164 - 446 K/uL Final    MPV 09/20/2024 9.8  9.0 - 12.9 fL Final    Neutrophils-Polys 09/20/2024 46.40  44.00 - 72.00 % Final    Lymphocytes 09/20/2024 30.90  22.00 - 41.00 % Final    Monocytes 09/20/2024 21.70 (H)  0.00 - 13.40 % Final    Eosinophils 09/20/2024 0.50  0.00 - 6.90 % Final    Basophils 09/20/2024 0.00  0.00 - 1.80 % Final    Immature Granulocytes 09/20/2024 0.50  0.00 - 0.90 % Final    Nucleated RBC 09/20/2024 0.00  0.00 - 0.20 /100 WBC Final    Neutrophils (Absolute) 09/20/2024 1.97  1.82 - 7.42 K/uL Final    Includes immature neutrophils, if present.    Lymphs (Absolute) 09/20/2024 1.31  1.00 - 4.80 K/uL Final    Monos (Absolute) 09/20/2024 0.92 (H)  0.00 - 0.85 K/uL Final    Eos (Absolute) 09/20/2024 0.02  0.00 - 0.51 K/uL Final    Baso (Absolute) 09/20/2024 0.00  0.00 - 0.12 K/uL Final    Immature Granulocytes (abs) 09/20/2024 0.02  0.00 - 0.11 K/uL Final    NRBC (Absolute) 09/20/2024 0.00  K/uL Final    Sodium 09/20/2024 135  135 - 145 mmol/L Final    Potassium 09/20/2024 4.1  3.6 - 5.5 mmol/L Final    Chloride 09/20/2024 101  96 - 112 mmol/L Final    Co2 09/20/2024 22  20 - 33 mmol/L Final    Anion Gap 09/20/2024 12.0  7.0 - 16.0 Final    Glucose 09/20/2024 114 (H)  65 - 99 mg/dL Final    Bun 09/20/2024 12  8 - 22 mg/dL Final    Creatinine 09/20/2024 0.62  0.50 - 1.40 mg/dL Final    Calcium 09/20/2024 9.0  8.5 -  10.5 mg/dL Final    Correct Calcium 09/20/2024 9.6  8.5 - 10.5 mg/dL Final    AST(SGOT) 09/20/2024 20  12 - 45 U/L Final    ALT(SGPT) 09/20/2024 12  2 - 50 U/L Final    Alkaline Phosphatase 09/20/2024 153 (H)  30 - 99 U/L Final    Total Bilirubin 09/20/2024 0.5  0.1 - 1.5 mg/dL Final    Albumin 09/20/2024 3.3  3.2 - 4.9 g/dL Final    Total Protein 09/20/2024 7.3  6.0 - 8.2 g/dL Final    Globulin 09/20/2024 4.0 (H)  1.9 - 3.5 g/dL Final    A-G Ratio 09/20/2024 0.8  g/dL Final    Magnesium 09/20/2024 1.9  1.5 - 2.5 mg/dL Final    Phosphorus 09/20/2024 3.8  2.5 - 4.5 mg/dL Final    GFR (CKD-EPI) 09/20/2024 86  >60 mL/min/1.73 m 2 Final    Comment: Estimated Glomerular Filtration Rate is calculated using  race neutral CKD-EPI 2021 equation per NKF-ASN recommendations.      WBC 09/21/2024 5.5  4.8 - 10.8 K/uL Final    RBC 09/21/2024 3.32 (L)  4.20 - 5.40 M/uL Final    Hemoglobin 09/21/2024 10.9 (L)  12.0 - 16.0 g/dL Final    Hematocrit 09/21/2024 32.8 (L)  37.0 - 47.0 % Final    MCV 09/21/2024 98.8 (H)  81.4 - 97.8 fL Final    MCH 09/21/2024 32.8  27.0 - 33.0 pg Final    MCHC 09/21/2024 33.2  32.2 - 35.5 g/dL Final    RDW 09/21/2024 58.7 (H)  35.9 - 50.0 fL Final    Platelet Count 09/21/2024 353  164 - 446 K/uL Final    MPV 09/21/2024 9.9  9.0 - 12.9 fL Final    Neutrophils-Polys 09/21/2024 56.10  44.00 - 72.00 % Final    Lymphocytes 09/21/2024 25.20  22.00 - 41.00 % Final    Monocytes 09/21/2024 17.70 (H)  0.00 - 13.40 % Final    Eosinophils 09/21/2024 0.40  0.00 - 6.90 % Final    Basophils 09/21/2024 0.20  0.00 - 1.80 % Final    Immature Granulocytes 09/21/2024 0.40  0.00 - 0.90 % Final    Nucleated RBC 09/21/2024 0.00  0.00 - 0.20 /100 WBC Final    Neutrophils (Absolute) 09/21/2024 3.08  1.82 - 7.42 K/uL Final    Includes immature neutrophils, if present.    Lymphs (Absolute) 09/21/2024 1.38  1.00 - 4.80 K/uL Final    Monos (Absolute) 09/21/2024 0.97 (H)  0.00 - 0.85 K/uL Final    Eos (Absolute) 09/21/2024 0.02  0.00  - 0.51 K/uL Final    Baso (Absolute) 09/21/2024 0.01  0.00 - 0.12 K/uL Final    Immature Granulocytes (abs) 09/21/2024 0.02  0.00 - 0.11 K/uL Final    NRBC (Absolute) 09/21/2024 0.00  K/uL Final    Sodium 09/21/2024 133 (L)  135 - 145 mmol/L Final    Potassium 09/21/2024 4.2  3.6 - 5.5 mmol/L Final    Chloride 09/21/2024 100  96 - 112 mmol/L Final    Co2 09/21/2024 22  20 - 33 mmol/L Final    Glucose 09/21/2024 117 (H)  65 - 99 mg/dL Final    Creatinine 09/21/2024 0.71  0.50 - 1.40 mg/dL Final    Bun 09/21/2024 9  8 - 22 mg/dL Final    Calcium 09/21/2024 8.6  8.5 - 10.5 mg/dL Final    Correct Calcium 09/21/2024 9.2  8.5 - 10.5 mg/dL Final    Phosphorus 09/21/2024 3.2  2.5 - 4.5 mg/dL Final    Albumin 09/21/2024 3.3  3.2 - 4.9 g/dL Final    Magnesium 09/21/2024 1.9  1.5 - 2.5 mg/dL Final    GFR (CKD-EPI) 09/21/2024 83  >60 mL/min/1.73 m 2 Final    Comment: Estimated Glomerular Filtration Rate is calculated using  race neutral CKD-EPI 2021 equation per NKF-ASN recommendations.      Sodium 09/22/2024 134 (L)  135 - 145 mmol/L Final    Potassium 09/22/2024 3.7  3.6 - 5.5 mmol/L Final    Chloride 09/22/2024 101  96 - 112 mmol/L Final    Co2 09/22/2024 23  20 - 33 mmol/L Final    Glucose 09/22/2024 153 (H)  65 - 99 mg/dL Final    Creatinine 09/22/2024 0.71  0.50 - 1.40 mg/dL Final    Bun 09/22/2024 10  8 - 22 mg/dL Final    Calcium 09/22/2024 8.5  8.5 - 10.5 mg/dL Final    Correct Calcium 09/22/2024 9.1  8.5 - 10.5 mg/dL Final    Phosphorus 09/22/2024 3.1  2.5 - 4.5 mg/dL Final    Albumin 09/22/2024 3.2  3.2 - 4.9 g/dL Final    Magnesium 09/22/2024 1.8  1.5 - 2.5 mg/dL Final    GFR (CKD-EPI) 09/22/2024 83  >60 mL/min/1.73 m 2 Final    Comment: Estimated Glomerular Filtration Rate is calculated using  race neutral CKD-EPI 2021 equation per NKF-ASN recommendations.       Imaging:     All listed images below have been independently reviewed by me. I agree with the findings as summarized below:    DX-LUMBAR SPINE-4+  VIEWS    Result Date: 7/24/2024  4 views of the lumbar spine AP, lateral, flexion-extension demonstrate prior kyphoplasty at L3.  Osteophyte formation and loss of disc space height L4-L5.  Mild scoliosis.  No spondylosis or spondylolisthesis.  Left total hip arthroplasty is incompletely visualized.    [id number] BASE TO MID-THIGH    Result Date: 7/17/2024 7/16/2024 3:44 PM HISTORY/REASON FOR EXAM:  Staging for cholangiocarcinoma. TECHNIQUE/EXAM DESCRIPTION AND NUMBER OF VIEWS: PET body imaging. Initially, 10.67 mCi F-18 FDG was administered intravenously under standardized conditions. Approximately 45 minutes after FDG administration, the patient was placed in the supine position on the PET CT table. Blood glucose level was 89 mg/dL. Low dose spiral CT imaging was performed from the skull base to the mid thighs. PET imaging was then performed from the skull base to the mid thighs. CT images, PET images, and PET/CT fused images were reviewed on a PACS 3D workstation. The limited non-contrast CT data are used primarily for attenuation correction and anatomic correlation.  Evaluation of solid organs and bowel are especially limited utilizing this technique. COMPARISON: CT abdomen 6/10/2024 FINDINGS: Head and neck: Focal increased activity in the LEFT nasal cavity corresponding to site of prior dental work. Chest: Mild increased activity in the distal esophagus, likely inflammatory.  No focal abnormal activity in the lungs. Abdomen and pelvis: Large area of increased activity in the medial segment LEFT lobe liver corresponding to low-density lesion on CT, max SUV 26.19.  No abnormal activity in the spleen.  Expected urinary activity.  Physiologic bowel uptake.  Focal increased activity in the ascending colon without CT correlate. Musculoskeletal: No abnormal focal FDG activity. Incidental findings on CT: RIGHT-sided  shunt catheter in place.  Lungs show mild peripheral intrahepatic changes.  LEFT hip prosthesis.      1.  Large hyperintense mass in the medial segment LEFT lobe liver consistent with known malignancy. 2.  Focal increased activity in the distal ascending colon without CT correlate which may represent benign or malignant process. 3.  No definite metastatic disease. 4.  Mild uptake in the distal esophagus likely inflammatory. 5.  Focal increased activity in the LEFT nasal cavity most likely related to dental disease with nearby dental hardware present.      Pathology:  FINAL DIAGNOSIS:     A. Liver mass biopsy:          Moderately to poorly differentiated adenocarcinoma.     Comment: The tumor shows a non-specific immunophenotype (positive for   CK7, negative for CDX2, GATA3, ER), but supports cholangiocarcinoma in   the proper clinical context.                                           Diagnosis performed by:                                       ELANA ADAMS DO     Assessment & Plan:  1. Pleuritis  meloxicam (MOBIC) 7.5 MG Tab      2. Bile duct cancer (HCC)  Referral to Hospice      3. Cholangiocarcinoma (HCC)          This is an 84 year old  woman with intrahepatic cholangiocarcinoma, [id number] stage IIIb disease. She presents for evaluation.      Current Diagnosis and Staging: Intrahepatic cholangiocarcinoma, [id number] stage IIIb disease.     Update: The patient is having a lot of toxicity with treatment. We discussed goals of care in more detail, and the patient is agreeable that further treatment is not beneficial for her in prolonging quality life. She will go on hospice care.      Treatment Plan: Hospice care     Treatment Citation: NCCN     Plan of Care:     Primary Therapy: NA  Supportive Therapy: Hospice care  Toxicity: NA  Labs: NA   Imaging: NA  Treatment Planning: Patient has elected to transition to hospice care.   Consultations: Radiation oncology (Dr. Tabor); Surgical oncology (Dr. Moreno)  Code Status: Full  Miscellaneous: NA  Return for Follow Up: PRN    Any questions and concerns  raised by the patient were answered to the best of my ability. Thank you for allowing me to participate in the care for this patient. Please feel free to contact me for any questions or concerns.       Total time spent on chart review, clinic encounter, and documentation: 29 minutes.

## 2024-11-29 NOTE — DISCHARGE INSTRUCTIONS
You were seen for chest pain. There is no evidence of blood clot or heart attack or rib fracture on work up. Please take tylenol and use lidocaine patch. Return for worsening pain or any other concerns.

## 2024-12-02 ENCOUNTER — TELEPHONE (OUTPATIENT)
Dept: HOSPICE | Facility: HOSPICE | Age: 84
End: 2024-12-02
Payer: MEDICARE

## 2024-12-02 ENCOUNTER — HOSPICE ADMISSION (OUTPATIENT)
Dept: HOSPICE | Facility: HOSPICE | Age: 84
End: 2024-12-02
Payer: MEDICARE

## 2024-12-02 DIAGNOSIS — C22.1 CHOLANGIOCARCINOMA (HCC): ICD-10-CM

## 2024-12-03 ENCOUNTER — HOSPITAL ENCOUNTER (OUTPATIENT)
Dept: RADIATION ONCOLOGY | Facility: MEDICAL CENTER | Age: 84
End: 2024-12-03

## 2024-12-03 ENCOUNTER — HOSPITAL ENCOUNTER (OUTPATIENT)
Dept: RADIATION ONCOLOGY | Facility: MEDICAL CENTER | Age: 84
End: 2024-12-03
Attending: RADIOLOGY
Payer: MEDICARE

## 2024-12-03 VITALS
HEART RATE: 96 BPM | BODY MASS INDEX: 24.57 KG/M2 | WEIGHT: 161.6 LBS | OXYGEN SATURATION: 93 % | SYSTOLIC BLOOD PRESSURE: 150 MMHG | TEMPERATURE: 98.1 F | DIASTOLIC BLOOD PRESSURE: 75 MMHG

## 2024-12-03 PROCEDURE — 77470 SPECIAL RADIATION TREATMENT: CPT | Mod: 26 | Performed by: RADIOLOGY

## 2024-12-03 PROCEDURE — 99214 OFFICE O/P EST MOD 30 MIN: CPT | Mod: 25 | Performed by: RADIOLOGY

## 2024-12-03 PROCEDURE — 77290 THER RAD SIMULAJ FIELD CPLX: CPT | Performed by: RADIOLOGY

## 2024-12-03 PROCEDURE — 77263 THER RADIOLOGY TX PLNG CPLX: CPT | Performed by: RADIOLOGY

## 2024-12-03 PROCEDURE — 77334 RADIATION TREATMENT AID(S): CPT | Performed by: RADIOLOGY

## 2024-12-03 PROCEDURE — 77334 RADIATION TREATMENT AID(S): CPT | Mod: 26 | Performed by: RADIOLOGY

## 2024-12-03 PROCEDURE — 99212 OFFICE O/P EST SF 10 MIN: CPT | Mod: 25 | Performed by: RADIOLOGY

## 2024-12-03 PROCEDURE — 77470 SPECIAL RADIATION TREATMENT: CPT | Performed by: RADIOLOGY

## 2024-12-03 PROCEDURE — 77290 THER RAD SIMULAJ FIELD CPLX: CPT | Mod: 26 | Performed by: RADIOLOGY

## 2024-12-03 ASSESSMENT — FIBROSIS 4 INDEX: FIB4 SCORE: 2.69

## 2024-12-03 ASSESSMENT — PAIN SCALES - GENERAL: PAINLEVEL_OUTOF10: NO PAIN

## 2024-12-03 NOTE — PROGRESS NOTES
RADIATION ONCOLOGY FOLLOW-UP    Patient name:  Aaliyah Fulton    Primary Physician:  Ashvin Arias D.O. MRN: [id number]  [facility]: [id number]   Referring physician:  James Moreno *  : 1940, 84 y.o.     DATE OF SERVICE: 12/3/2024    IDENTIFICATION:   A 84 y.o. female with    Cholangiocarcinoma (HCC)  Staging form: Intrahepatic Bile Duct, AJCC 8th Edition  - Clinical stage from 2024: Stage IIIB (cT2, cN1, cM0) - Signed by Marco Tabor M.D. on 2024  Histologic grade (G): G2  Histologic grading system: 3 grade system      HISTORY OF PRESENT ILLNESS:  Patient originally presented with weakness to the emergency department urologist related for kidney stone and had CT abdomen which showed enhancing mass in left liver concerning for malignancy with enlarged periportal nodes underwent CT-guided liver biopsy May 23 which showed mildly to poorly differentiated adenocarcinoma.  MRI abdomen May 15, 2024 showed hypervascular mass in the left lobe segment of the left lobe 4.8 cm with associated kianna hepatis and celiac adenopathy.  Minimal ascites.  Repeat CT liver protocol Lillian 10, 2024 showed stable 4.4 cm left lateral lobe liver mass with left portal vein occlusion stable periportal adenopathy 19 x 12 mm.  Patient has PET/CT scheduled for 2024.  Overall patient feeling well.     INTERVAL HISTORY:  Patient had 3 cycles of gemcitabine and cisplatin and durvalumab.  She started develop toxicities including weakness and falls.  Most recent CT scan 2024 showed stable 4 cm tumor.    PROBLEM LIST:  Patient Active Problem List   Diagnosis    Thoracic kyphosis    S/P laminectomy    Osteoarthrosis    Hand arthritis    Endogenous depression (HCC)    Breast cancer in situ    Hydrocephalus (HCC)    Systolic murmur    Closed left hip fracture (HCC)    Other specified anemias    Normal pressure hydrocephalus (HCC)    Abnormal gait    Benign essential hypertension    Disorder of bone and articular  cartilage    Gastroesophageal reflux disease without esophagitis    History of CVA (cerebrovascular accident)    History of recurrent urinary tract infection    Impaired fasting glucose    Memory deficit    Menopause present    Nonrheumatic aortic valve stenosis    Osteoporosis, postmenopausal    Other hyperlipidemia    Advanced care planning/counseling discussion    Pain in left knee    Personal history UTI    Poor short-term memory    Prediabetes    Urinary tract infection    Vitamin D deficiency    Osteoarthritis of left knee    Artificial knee joint present    S/P total knee arthroplasty, left    Closed fracture of right distal radius    Cholangiocarcinoma (HCC)    Hepatocellular carcinoma (HCC)    Abnormal CT of the abdomen    Liver mass    High risk medication use    Neutropenic fever (HCC)    Bile duct cancer (HCC)    Valvular heart disease    Acute respiratory failure with hypoxemia (HCC)    Pneumonia    Acute pain    Bilateral pleural effusion    Acute heart failure with preserved ejection fraction (HFpEF) (HCC)    Pulmonary hypertension (HCC)    Subdural hematoma (HCC)    Nasal bone fracture    Contraindication to deep vein thrombosis (DVT) prophylaxis    Trauma    Overactive bladder    Generalized anxiety disorder    Right wrist injury, initial encounter    Hyperlipidemia    Sutured skin wound    Anemia    Failure to thrive in adult    Constipation    Hypoxia    Encephalopathy    Moderate protein-calorie malnutrition (HCC)    Acute CVA (cerebrovascular accident) (HCC)       CURRENT MEDICATIONS:  Current Outpatient Medications   Medication Sig Dispense Refill    benzonatate (TESSALON) 100 MG Cap Take 1 Capsule by mouth 3 times a day as needed for Cough. 30 Capsule 0    meloxicam (MOBIC) 7.5 MG Tab Take 1 Tablet by mouth every day. 30 Tablet 0    cyanocobalamin (VITAMIN B12) 500 MCG tablet Take 1 Tablet by mouth every day. Indications: supplement      vibegron (GEMTESA) 75 MG tablet Take 1 Tablet by mouth  every day.      valsartan (DIOVAN) 160 MG Tab Take 160 mg by mouth every day.      lidocaine (ASPERFLEX) 4 % Patch Place 1 Patch on the skin every 12 hours as needed (lower back pain).      venlafaxine XR (EFFEXOR XR) 75 MG CAPSULE SR 24 HR Take 75 mg by mouth every day. For a daily total of 225 mg  Indications: Generalized Anxiety Disorder      atorvastatin (LIPITOR) 40 MG Tab TAKE ONE TABLET BY MOUTH EVERY EVENING 90 Tablet 0    omeprazole (PRILOSEC) 40 MG delayed-release capsule Take 1 Capsule by mouth every day. Indications: Heartburn      oxybutynin (DITROPAN XL) 15 MG CR tablet Take 1 Tablet by mouth every day. Indications: Urinary Incontinence      methenamine hip (HIPPREX) 1 GM Tab Take 1 g by mouth 2 times a day. LONG TERM TREATMENT FOR CHRONIC UTI.  Indications: Urinary Tract Infection      estradiol (ESTRACE) 0.1 MG/GM vaginal cream Insert 2 g into the vagina 3 times a week. Indications: Simple Infection of the Urinary Tract      Calcium Carbonate-Vitamin D (CALCIUM-VITAMIN D3 PO) Take 600 mg by mouth every morning. Indications: supplement      vitamin D3 (CHOLECALCIFEROL) 1000 Unit (25 mcg) Tab Take 2,000 Units by mouth every morning. Indications: supplement      busPIRone (BUSPAR) 15 MG tablet Take 15 mg by mouth 3 times a day. Indications: Anxiety Disorder      acetaminophen (TYLENOL) 500 MG Tab Take 1,000 mg by mouth every 6 hours as needed for Moderate Pain. Indications: Pain      D-MANNOSE PO Take 500 mg by mouth every evening. Indications: healthy urinary tract       venlafaxine (EFFEXOR-XR) 150 MG extended-release capsule Take 1 Capsule by mouth every day. Taken with 75 mg for 225mg daily 30 Capsule 2     No current facility-administered medications for this encounter.       ALLERGIES:  Patient has no known allergies.    REVIEW OF SYSTEMS:    A complete review of system taken. Pertinent items in HPI.  All others negative.    PHYSICAL EXAM:  PERFORMANCE STATUS:      6/21/2024    12:46 PM   ECOG  Performance Review   ECOG Performance Status Restricted in physically strenuous activity but ambulatory and able to carry out work of a light or sedentary nature, e.g., light house work, office work         6/21/2024    12:47 PM   Karnofsky Score   Karnofsky Score 80     BP (!) 150/75 (BP Location: Right arm, Patient Position: Sitting, BP Cuff Size: Adult)   Pulse 96   Temp 36.7 °C (98.1 °F) (Temporal)   Wt 73.3 kg (161 lb 9.6 oz)   SpO2 93%   BMI 24.57 kg/m²   Physical Exam  Vitals reviewed.   Eyes:      Pupils: Pupils are equal, round, and reactive to light.   Cardiovascular:      Rate and Rhythm: Normal rate.   Pulmonary:      Effort: Pulmonary effort is normal.   Abdominal:      General: Abdomen is flat.   Musculoskeletal:         General: Normal range of motion.   Neurological:      General: No focal deficit present.      Mental Status: She is alert.   Psychiatric:         Mood and Affect: Mood normal.         LABORATORY DATA:   Lab Results   Component Value Date/Time    WBC 5.9 11/28/2024 11:04 PM    RBC 3.47 (L) 11/28/2024 11:04 PM    HEMOGLOBIN 11.5 (L) 11/28/2024 11:04 PM    HEMATOCRIT 35.5 (L) 11/28/2024 11:04 PM    .3 (H) 11/28/2024 11:04 PM    MCH 33.1 (H) 11/28/2024 11:04 PM    MCHC 32.4 11/28/2024 11:04 PM    RDW 47.0 11/28/2024 11:04 PM    PLATELETCT 214 11/28/2024 11:04 PM    MPV 10.0 11/28/2024 11:04 PM    NEUTSPOLYS 73.40 (H) 11/28/2024 11:04 PM    LYMPHOCYTES 17.10 (L) 11/28/2024 11:04 PM    MONOCYTES 9.00 11/28/2024 11:04 PM    EOSINOPHILS 0.00 11/28/2024 11:04 PM    BASOPHILS 0.00 11/28/2024 11:04 PM      Lab Results   Component Value Date/Time    SODIUM 138 11/28/2024 11:04 PM    POTASSIUM 4.1 11/28/2024 11:04 PM    CHLORIDE 105 11/28/2024 11:04 PM    CO2 24 11/28/2024 11:04 PM    GLUCOSE 133 (H) 11/28/2024 11:04 PM    BUN 16 11/28/2024 11:04 PM    CREATININE 0.78 11/28/2024 11:04 PM         RADIOLOGY DATA:  CT-CHEST,ABDOMEN,PELVIS WITH    Result Date: 10/10/2024  1. Stable 4 cm  lesion within left hepatic lobe, consistent with either stable disease or treated disease. Consider PET/CT for further characterization on next follow-up, which help with differentiation of treated disease versus stable disease. 2. Stable 9 mm periportal lymph node. 3. Otherwise, stable.    CT-HEAD W/O    Result Date: 10/12/2024  1. Redemonstration of subacute left frontal subdural hematoma, measuring up to 7 mm in thickness, previously 5 mm. 2. Previous right parafalcine and right frontal parietal subdural hematoma are smaller, mostly resolved.     DX-CHEST-PORTABLE (1 VIEW)    Result Date: 11/28/2024  Diffuse interstitial prominence with bibasilar atelectasis versus infiltrate.    CT-CTA CHEST PULMONARY ARTERY W/ RECONS    Result Date: 11/29/2024  1. No evidence for pulmonary embolism. 2. Atherosclerosis including coronary artery disease. 3. Mediastinal and hilar lymphadenopathy. 4. Patchy ground glass opacity and interstitial thickening may represent chronic interstitial disease.       IMPRESSION:    A 84 y.o. with   Cholangiocarcinoma (HCC)  Staging form: Intrahepatic Bile Duct, AJCC 8th Edition  - Clinical stage from 6/21/2024: Stage IIIB (cT2, cN1, cM0) - Signed by Marco Tabor M.D. on 6/21/2024  Histologic grade (G): G2  Histologic grading system: 3 grade system      CANCER STATUS:  Disease Stable    RECOMMENDATIONS:   I discussed the role of ablative stereotactic body radiation therapy for the treatment of cholangiocarcinoma patient is amenable to treatment we will plan for 40 Gray in 5 fractions.  We discussed recent evidence from [facility] group showing to 2-year local control of 70%.  We discussed risk benefits patient is amenable to treatment and will undergo radiation mapping today with plan to start treatment next week.    Thank you for the opportunity to participate in her care.  If any questions or comments, please do not hesitate in calling.    No orders of the defined types were  placed in this encounter.

## 2024-12-03 NOTE — TELEPHONE ENCOUNTER
T/c with daughter Angi to schedule hospice discussion. Angi stated that she and Purnima are not ready for hospice. Discussed palliative care vs hospice care. Discussed hospice focusing on comfort and quality of life and brief review of benefits. Angi stated that she would call [facility] [phone] when they we were ready to enrol.

## 2024-12-04 NOTE — RADIATION COMPLETION NOTES
Clinical Treatment Planning Note    DATE OF SERVICE: 12/3/2024    DIAGNOSIS:  Cholangiocarcinoma (HCC)  Staging form: Intrahepatic Bile Duct, AJCC 8th Edition  - Clinical stage from 6/21/2024: Stage IIIB (cT2, cN1, cM0) - Signed by Marco Tabor M.D. on 6/21/2024  Histologic grade (G): G2  Histologic grading system: 3 grade system         IMAGING REVIEWED:  [x] CT     [] MRI     [] PET/CT     [] BONE SCAN     [] MAMMO     [] OTHER      TREATMENT INTENT:   [x] CURATIVE     [] MAINTENANCE     []  PALLIATIVE      []  SUPPORTIVE     []  PROPHYLACTIC     [] BENIGN     []  CONSOLIDATIVE      [] DEFINITIVE   []  OLOGIMETASTATIC      LINE OF TREATMENT:  [] ADJUVANT   [x] DEFINITIVE   [] NEOADJUVANT   [] RE-TREATMENT      TECHNIQUE PLANNED:  [] IMRT   [] 3D   [x] SBRT   [] SRS/SRT   [] HDR   [] ELECTRON       IMRT JUSTIFICATION:  []   An immediately adjacent area has been previously irradiated and abutting portals must be established with high precision.    []  Dose escalation is planned to deliver radiation doses in excess of those commonly utilized for similar tumors with conventional treatment.    [x]  The target volume is concave or convex, and the critical normal tissues are within or around that convexity or concavity.    []  The target volume is in close proximity to critical structures that must be protected.    []  The volume of interest must be covered with narrow margins to adequately protect  immediately adjacent structures.      FIELDS & BLOCKING:  [x] COMPLEX BLOCKS     []  = 3 TX AREAS     [x]  ARCS     []  CUSTOM SHEILD        []  SIMPLE BLOCK      CHEMOTHERAPY:  []  CONCURRENT     []  INDUCTION     [] SEQUENTIAL     []  <30 DAYS FROM XRT      NOTES:  OAR CONSTRAINTS: (GUIDELINES ONLY NOT ABSOLUTE) QUANTEC OR AS STATED     One fraction Three fractions Five fractions    Serial Issue Max critical volume above threshold Threshold dose    (Gy) Max point dose (Gy)^a  Threshold dose   (Gy) Max point dose (Gy)^a  Threshold dose   (Gy) Max point dose (Gy)^a End point (greater than or equal to Grade3)   Optic pathway <0.2 cc 8 10 15.3 (5.1 Gy/fx) 17.4 (5.8 Gy/fx) 23 (4.6 Gy/fx) 25 (5 Gy/fx) Neuritis    Cochlea      9  17.1 (5.7 Gy/fx)  25 (5 Gy/fx) Hearing loss    Brainstem  (non medulla) <0.5 cc 10 15 18 (6 Gy/fx) 23.1 (7.7 Gy/fx) 23 (4.6 Gy/fx)   31 (6.2 Gy/fx) Cranial neuropathy   Spinal chord   and medulla <0.35 cc      <1.2 cc 10      7 14 18 (6 Gy/fx)    12.3 (4.1 Gy/fx) 21.9 (7.3 Gy/fx) 23 (4.6 Gy/fx)    14.5 (2.9 Gy/fx) 30 (6 Gy/fx) Myelitis   Spinal cord subvolume (5-6mm above and below level treated per Albaro) <10% of  subvolume 10 14 18 (6 Gy/fx) 21.9 (7.3 Gy/fx) 23 (4.6 Gy/fx) 30 (6 Gy/fx) Myelitis   Cauda equina  <5 cc 14 16 21.9 (7.3 Gy/fx) 24 (8 Gy/fx) 30 (6 Gy/fx) 32 (6.4 Gy/fx) Neuritis   Sacral plexus <5 cc 14.4 16 22.5 (7.5 Gy/fx) 24 (8 (Gy/fx) 30 (6 Gy/fx) 32 (6.4 Gy/fx) Neuropathy   Esophagus^b <5 cc 11.9 15.4 17.7 (5.9 Gy/fx) 25.2 (8.4 Gy/fx) 19.5 (3.9 Gy/fx) 35 (7 Gy/fx) Stenosis/fistula   Brachial plexus <3 cc 14 17.5 20.4 (6.8 Gy/fx 24 (8 Gy/fx) 27 (5.4 Gy/fx) 30.5 (6.1 Gy/fx) Neuropathy   Heart/ pericardium <15 cc 16 22 24 (8 Gy/fx) 30 (10 Gy/fx) 32 (6.4 Gy/fx) 38 (7.6 Gy/fx) Pericarditis   Great vessels <10 cc 31 37 39 (13 Gy/fx) 45 (15 Gy/fx) 47 (9.4 Gy/fx) 53 (10.6 Gy/fx) Aneurysm   Trachea and large bronchus^b <4 cc 10.5 20.2 15 (5 Gy/fx) 30 (10 Gy/fx) 16.5 (3.3 Gy/fx) 40 (8 Gy/fx) Stenosis/ fistula   Bronchus- smaller airways <0.5 cc 12.4 13.3 18.9 (6.3 Gy/fx) 23.1 (7.7 Gy/fx) 21 (4.2 Gy/fx) 33 (6.6 Gy/fx) Stenosis with atelectasis   Rib <1 cc      <30 cc 22 30 28.8 (9.6 Gy/fx)    30.0 (10.0 Gy/fx) 36.9 (12.3 Gy/fx) 35 (7 Gy/fx) 43 (8.6 Gy/fx) Pain or fracture   Skin <10 cc 23 26 30 (10 Gy/fx) 33 (11 Gy/fx) 36.5 (7.3 Gy/fx) 39.5 (7.9 Gy/fx) Ulceration   Stomach <10 cc 11.2 12.4 16.5 (5.5 Gy/fx) 22.2 (7.4 Gy/fx) 18 (3.6 Gy/fx) 32 (6.4 Gy/fx) Ulceration/fistula   Duodenum^b <5 cc      <10 cc  11.2      9 12.4 16.5 (5.5 Gy/fx)    11.4 (3.8 Gy/fx) 22.2 (7.4 Gy/fx) 18 (3.6 Gy/fx)    12.5 (2.5 Gy/fx) 32 (6.4 Gy/fx) Ulceration   Jejunum/ Ileum^b  <5 cc 11.9 15.4 17.7 (5.9 Gy/fx) 25.2 (8.4 Gy/fx) 19.5 (3.9 Gy/fx) 35 (7 Gy/fx) Enteritis/ obstruction   Colon^b <20 cc 14.3 18.4 24 (8 Gy/fx) 28.2 (9.4 gy/fx) 25 (5 Gy/fx) 38 (7.6 Gy/fx) Colitis/ fistula   Rectum^b <20 cc 14.3 18.4 24 (8 Gy/fx) 28.2 (9.4 gy/fx) 25 (5 Gy/fx) 38 (7.6 Gy/fx) Proctitis/ fistula   Bladder wall <15 cc 11.4 18.4 16.8 (5.6 Gy/fx) 28.2 (9.4 Gy/fx) 18.3 (3.65 Gy/fx) 38 (7.6 Gy/fx) Cystitis/ fistula   Penile bulb <3 cc 14 34 21.9 (7.3 Gy/fx) 42 (14 Gy/fx) 30 (6 Gy/fx) 50 (10 Gy/fx) Impotence   Femoral heads (right and left) <10 cc 14  21.9 (7.3 Gy/fx)  30 (6 Gy/fx)  Necrosis   Renal hilium/ vascular trunk <2/3 volume 1.6 18.6 (6.2 Gy/fx)   23 (4.6 Gy/fx)  Malignant hypertension         One fraction Three fractions Five fractions    Parallel tissue Max critical volume below threshold Threshold dose (Gy) Max point dose (Gy)^a Threshold dose (Gy) Max point dose (Gy)^a Threshold dose (Gy) Max point dose (Gy)^a End point (greater than or equal to Grade 3)   Lung (right and left) 1500 cc 7 NA- Parallel tissue 11.6 (2.96 Gy/fx) NA- Parallel tissue 12.5 (2.5 Gy/fx) NA- Parallel tissue Basic lung function    Lung (right and left) 1000 cc 7.4 NA- Parallel tissue 12.4 (3.1 Gy/fx) NA- Parallel tissue 13.5 (2.7 Gy/fx) NA- Parallel tissue Pneumonitis   Liver 700 cc 9.1 NA- Parallel tissue 19.2 (4.8 Gy/fx) NA- Parallel tissue 21 (4.2 Gy/fx) NA- Parallel tissue Basic liver function   Renal Cortex (right and left) 200 cc 8.4 NA- Parallel tissue 16 (4 Gy/fx) NA- Parallel tissue 17.5 (3.5 Gy/fx) NA- Parallel tissue Basic renal function   ^a “Point” defined as 0.035 cc or less.  ^b Avoid circumferential irradiation.

## 2024-12-04 NOTE — RADIATION COMPLETION NOTES
DATE OF SERVICE: 12/3/2024    DIAGNOSIS:  Cholangiocarcinoma (HCC)  Staging form: Intrahepatic Bile Duct, AJCC 8th Edition  - Clinical stage from 6/21/2024: Stage IIIB (cT2, cN1, cM0) - Signed by Marco Tabor M.D. on 6/21/2024  Histologic grade (G): G2  Histologic grading system: 3 grade system       DATE OF SERVICE: 12/3/2024    TYPE OF SIMULATION: SBRT liver    GOAL OF TREATMENT:   [x] Curative  [] Palliative  [] Oligometastatic    CONTRAST:    [x] IV Contrast*  [] Oral Contrast               POSITION:    [x]  Supine  [] Prone     IMMOBILIZATION DEVICE: []  Body-Fix  [x] Omniboard  []  Bellyboard    PROCEDURE: Patient placed in vaklok immobilization device. 4D gated and non-gated CT obtained to assess organ motion.  Images viewed and approved.  Images reconstructed as need.  Image data sets transferred to "[employer] for planning.    I have personally reviewed the relevant data, performed the target localization, and determined all relevant factors for this patient’s simulation.    *Omnipaque 80 -100cc IVP in conjunction with 500cc NS

## 2024-12-04 NOTE — RADIATION COMPLETION NOTES
PATIENT NAME Aaliyah Fulton   PRIMARY PHYSICIAN Ashvin Arias [id number]   REFERRING PHYSICIAN No ref. provider found 1940     DATE OF SERVICE: 12/3/2024    DIAGNOSIS:  Cholangiocarcinoma (HCC)  Staging form: Intrahepatic Bile Duct, AJCC 8th Edition  - Clinical stage from 6/21/2024: Stage IIIB (cT2, cN1, cM0) - Signed by Marco Tabor M.D. on 6/21/2024  Histologic grade (G): G2  Histologic grading system: 3 grade system         SPECIAL TREATMENT PROCEDURE NOTE:  Considerable additional effort required in the management of this case because of administration of Stereotactic Radiotherapy, which may result in increased normal tissue toxicity and require greater effort in contouring and treatment because of greater precision.

## 2024-12-10 PROCEDURE — 77293 RESPIRATOR MOTION MGMT SIMUL: CPT | Performed by: RADIOLOGY

## 2024-12-10 PROCEDURE — 77334 RADIATION TREATMENT AID(S): CPT | Performed by: RADIOLOGY

## 2024-12-10 PROCEDURE — 77370 RADIATION PHYSICS CONSULT: CPT | Performed by: RADIOLOGY

## 2024-12-10 PROCEDURE — 77300 RADIATION THERAPY DOSE PLAN: CPT | Mod: 26 | Performed by: RADIOLOGY

## 2024-12-10 PROCEDURE — 77293 RESPIRATOR MOTION MGMT SIMUL: CPT | Mod: 26 | Performed by: RADIOLOGY

## 2024-12-10 PROCEDURE — 77334 RADIATION TREATMENT AID(S): CPT | Mod: 26 | Performed by: RADIOLOGY

## 2024-12-10 PROCEDURE — 77300 RADIATION THERAPY DOSE PLAN: CPT | Performed by: RADIOLOGY

## 2024-12-10 PROCEDURE — 77295 3-D RADIOTHERAPY PLAN: CPT | Mod: 26 | Performed by: RADIOLOGY

## 2024-12-10 PROCEDURE — 77295 3-D RADIOTHERAPY PLAN: CPT | Performed by: RADIOLOGY

## 2024-12-11 ENCOUNTER — HOSPITAL ENCOUNTER (OUTPATIENT)
Dept: RADIATION ONCOLOGY | Facility: MEDICAL CENTER | Age: 84
End: 2024-12-11

## 2024-12-11 LAB
CHEMOTHERAPY INFUSION START DATE: NORMAL
CHEMOTHERAPY RECORDS: 4000 CGY
CHEMOTHERAPY RECORDS: 8 GY
CHEMOTHERAPY RECORDS: NORMAL
CHEMOTHERAPY RX CANCER: NORMAL
DATE 1ST CHEMO CANCER: NORMAL
RAD ONC ARIA COURSE LAST TREATMENT DATE: NORMAL
RAD ONC ARIA COURSE TREATMENT ELAPSED DAYS: NORMAL
RAD ONC ARIA REFERENCE POINT DOSAGE GIVEN TO DATE: 8 GY
RAD ONC ARIA REFERENCE POINT ID: NORMAL
RAD ONC ARIA REFERENCE POINT SESSION DOSAGE GIVEN: 8 GY

## 2024-12-11 PROCEDURE — 77280 THER RAD SIMULAJ FIELD SMPL: CPT | Mod: 26 | Performed by: RADIOLOGY

## 2024-12-11 PROCEDURE — 77373 STRTCTC BDY RAD THER TX DLVR: CPT | Performed by: RADIOLOGY

## 2024-12-11 PROCEDURE — 77435 SBRT MANAGEMENT: CPT | Performed by: RADIOLOGY

## 2024-12-11 PROCEDURE — 77280 THER RAD SIMULAJ FIELD SMPL: CPT | Performed by: RADIOLOGY

## 2024-12-11 NOTE — PROCEDURES
DATE OF SERVICE: 12/11/2024    DIAGNOSIS:  Cholangiocarcinoma (HCC)  Staging form: Intrahepatic Bile Duct, AJCC 8th Edition  - Clinical stage from 6/21/2024: Stage IIIB (cT2, cN1, cM0) - Signed by Marco Tabor M.D. on 6/21/2024  Histologic grade (G): G2  Histologic grading system: 3 grade system       TREATMENT:  Radiation Therapy Episodes       Active Episodes       Radiation Therapy: SBRT (12/11/2024)                   Radiation Treatments         Plan Last Treated On Elapsed Days Fractions Treated Prescribed Fraction Dose (cGy) Prescribed Total Dose (cGy)    Liver_SBRT 12/11/2024 0 @ 12/11/2024 1 of 5 800 4,000                  Reference Point Last Treated On Elapsed Days Most Recent Session Dose (cGy) Total Dose (cGy)    Liver_SBRT 12/11/2024 0 @ 12/11/2024 800 800                            STEREOTACTIC PROCEDURE NOTE:    Called by Truebeam machine to verify treatment parameters including:  treatment site, treatment dose, and treatment setup prior to stereotactic treatment..    Patient was placed in the treatment position with use of immobilization device and  laser guidance. CBCT images were acquired for target localization.  Images were reviewed in the axial, coronal, and saggital views and shifts were made as necessary to ensure that patient position matched simulation position.      Treatment delivered per  prescription.  The medical physicist was present throughout the set-up, verification and treatment delivery to oversee the procedure and ensure all parameters agreed with the computerized plan.    I have personally reviewed the relevant data, performed the target localization, and determined all relevant factors for this patient’s simulation.

## 2024-12-12 ENCOUNTER — HOSPITAL ENCOUNTER (OUTPATIENT)
Dept: RADIATION ONCOLOGY | Facility: MEDICAL CENTER | Age: 84
End: 2024-12-12

## 2024-12-12 ENCOUNTER — OFFICE VISIT (OUTPATIENT)
Dept: SLEEP MEDICINE | Facility: MEDICAL CENTER | Age: 84
End: 2024-12-12
Attending: STUDENT IN AN ORGANIZED HEALTH CARE EDUCATION/TRAINING PROGRAM
Payer: MEDICARE

## 2024-12-12 VITALS
HEART RATE: 77 BPM | DIASTOLIC BLOOD PRESSURE: 62 MMHG | OXYGEN SATURATION: 96 % | WEIGHT: 161 LBS | SYSTOLIC BLOOD PRESSURE: 110 MMHG | BODY MASS INDEX: 24.4 KG/M2 | HEIGHT: 68 IN

## 2024-12-12 DIAGNOSIS — J84.9 INTERSTITIAL PULMONARY DISEASE (HCC): ICD-10-CM

## 2024-12-12 DIAGNOSIS — C22.1 CHOLANGIOCARCINOMA (HCC): ICD-10-CM

## 2024-12-12 DIAGNOSIS — T45.AX5A ADVERSE EFFECT OF IMMUNE CHECKPOINT INHIBITOR, INITIAL ENCOUNTER: ICD-10-CM

## 2024-12-12 LAB
CHEMOTHERAPY INFUSION START DATE: NORMAL
CHEMOTHERAPY RECORDS: 4000 CGY
CHEMOTHERAPY RECORDS: 8 GY
CHEMOTHERAPY RECORDS: NORMAL
CHEMOTHERAPY RX CANCER: NORMAL
DATE 1ST CHEMO CANCER: NORMAL
RAD ONC ARIA COURSE LAST TREATMENT DATE: NORMAL
RAD ONC ARIA COURSE TREATMENT ELAPSED DAYS: NORMAL
RAD ONC ARIA REFERENCE POINT DOSAGE GIVEN TO DATE: 16 GY
RAD ONC ARIA REFERENCE POINT ID: NORMAL
RAD ONC ARIA REFERENCE POINT SESSION DOSAGE GIVEN: 8 GY

## 2024-12-12 PROCEDURE — 99205 OFFICE O/P NEW HI 60 MIN: CPT | Performed by: STUDENT IN AN ORGANIZED HEALTH CARE EDUCATION/TRAINING PROGRAM

## 2024-12-12 PROCEDURE — 3074F SYST BP LT 130 MM HG: CPT | Performed by: STUDENT IN AN ORGANIZED HEALTH CARE EDUCATION/TRAINING PROGRAM

## 2024-12-12 PROCEDURE — 77280 THER RAD SIMULAJ FIELD SMPL: CPT | Performed by: RADIOLOGY

## 2024-12-12 PROCEDURE — 77280 THER RAD SIMULAJ FIELD SMPL: CPT | Mod: 26 | Performed by: RADIOLOGY

## 2024-12-12 PROCEDURE — 77373 STRTCTC BDY RAD THER TX DLVR: CPT | Performed by: RADIOLOGY

## 2024-12-12 PROCEDURE — 99213 OFFICE O/P EST LOW 20 MIN: CPT | Performed by: STUDENT IN AN ORGANIZED HEALTH CARE EDUCATION/TRAINING PROGRAM

## 2024-12-12 PROCEDURE — 3078F DIAST BP <80 MM HG: CPT | Performed by: STUDENT IN AN ORGANIZED HEALTH CARE EDUCATION/TRAINING PROGRAM

## 2024-12-12 ASSESSMENT — ENCOUNTER SYMPTOMS
HEMOPTYSIS: 0
RESPIRATORY SYMPTOMS COMMENTS: YES
WHEEZING: 0
CHEST TIGHTNESS: 0
DYSPNEA AT REST: 0
SHORTNESS OF BREATH: 1

## 2024-12-12 ASSESSMENT — FIBROSIS 4 INDEX: FIB4 SCORE: 2.69

## 2024-12-12 NOTE — PROCEDURES
DATE OF SERVICE: 12/12/2024    DIAGNOSIS:  Cholangiocarcinoma (HCC)  Staging form: Intrahepatic Bile Duct, AJCC 8th Edition  - Clinical stage from 6/21/2024: Stage IIIB (cT2, cN1, cM0) - Signed by Marco Tabor M.D. on 6/21/2024  Histologic grade (G): G2  Histologic grading system: 3 grade system       TREATMENT:  Radiation Therapy Episodes       Active Episodes       Radiation Therapy: SBRT (12/11/2024)                   Radiation Treatments         Plan Last Treated On Elapsed Days Fractions Treated Prescribed Fraction Dose (cGy) Prescribed Total Dose (cGy)    Liver_SBRT 12/12/2024 1 @ 12/12/2024 2 of 5 800 4,000                  Reference Point Last Treated On Elapsed Days Most Recent Session Dose (cGy) Total Dose (cGy)    Liver_SBRT 12/12/2024 1 @ 12/12/2024 800 1,600                            STEREOTACTIC PROCEDURE NOTE:    Called by Truebeam machine to verify treatment parameters including:  treatment site, treatment dose, and treatment setup prior to stereotactic treatment..    Patient was placed in the treatment position with use of immobilization device and  laser guidance. CBCT images were acquired for target localization.  Images were reviewed in the axial, coronal, and saggital views and shifts were made as necessary to ensure that patient position matched simulation position.      Treatment delivered per  prescription.  The medical physicist was present throughout the set-up, verification and treatment delivery to oversee the procedure and ensure all parameters agreed with the computerized plan.    I have personally reviewed the relevant data, performed the target localization, and determined all relevant factors for this patient’s simulation.

## 2024-12-12 NOTE — PROGRESS NOTES
Pulmonary Clinic- Initial Consult    Date of Service: 12/12/24    Referring Physician: Shannon Barrett MD    Reason for Consult: shortness of breath, ILD    Chief Complaint: shortness of breath    HPI:   Aaliyah Fulton is a very pleasant 84 y.o. female never smoker referred for shortness of breath and ILD.    Ms. 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  She received chemotherapy 7/24 through 8/24 (gemcitabine/cisplatin/durvalumab) and had difficulty tolerating chemotherapy.  After discussion with Dr. Arias decision was made to not pursue further chemotherapy and patient was referred to hospice.  She is following with radiation oncology and is planning to start SBRT for the treatment of cholangiocarcinoma. She has also had a subdural hematoma from a fall during this time frame.    On 11/28/24 she had an emergency room visit for nonradiating substernal chest pain associated with shortness of breath severe cough.  She had negative troponins and EKGs and CTPA was obtained which was negative for PE but demonstrated some interstitial lung disease with bilateral bronchiectasis and some scattered GGO. These changes are new compared to her pre-immunotherapy CT scans. She was not hypoxic in the emergency room and is not hypoxic today in clinic.  Referral to pulmonology was placed to discuss ILD further.    Chemotherapy history  07/19/24: C1D1 gem/cis/durvalumab  08/09/24: C2D1 gem/cis/durva   08/30/24: C3D1 gem/cis/durva      Past Medical History:   Diagnosis Date    Arthritis     ASTHMA     CHEMICAL INDUCED    Breast cancer (HCC) 2005    stage 0 - Dr. 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 Had to wear brace only.    Hypertension 01/03/2022    states well controlled on meds    Indigestion     taking omeprazole    Osteoporosis     Pain     \"everywhere\"    Pneumonia 12/2021    on antibiotics    PONV (postoperative nausea and vomiting)     Have always had this.    Psychiatric problem     depression on zoloft    Sputum production     Stroke (HCC) 2021    Some memory problems and expressive aphasia.    Unspecified urinary incontinence     wears depends       Past Surgical History:   Procedure Laterality Date    PB TOTAL KNEE ARTHROPLASTY Left 03/28/2023    Procedure: LEFT TOTAL KNEE ARTHROPLASTY;  Surgeon: Marcus Hall M.D.;  Location: SURGERY [facility];  Service: Orthopedics    MS CREATE SHUNT:VENTRIC-PERITONEAL  01/10/2022    Procedure: INSERTION, SHUNT, VENTRICULOPERITONEAL, LAPAROSCOPIC PERITONEAL CATHETER - STEALTH GUIDED;  Surgeon: Gregory Blanchard M.D.;  Location: SURGERY [facility];  Service: Neurosurgery    PB PARTIAL HIP REPLACEMENT Left 11/24/2021    Procedure: HEMIARTHROPLASTY, HIP;  Surgeon: Marlon Culver M.D.;  Location: SURGERY [facility];  Service: Orthopedics    RECOVERY  05/10/2016    Procedure: [id number];  Surgeon: Recoveryonly Surgery;  Location: SURGERY PRE-POST PROC UNIT [facility] [place] [place];  Service:     LUMBAR LAMINECTOMY DISKECTOMY  10/17/2012    Performed by Daksha Melendez M.D. at SURGERY [facility] ORS    FORAMINOTOMY  10/17/2012    Performed by Daksha Melendez M.D. at SURGERY [facility] ORS    LUMBAR DECOMPRESSION  10/17/2012    Performed by Daksha Melendez M.D. at SURGERY [facility] ORS    KNEE ARTHROPLASTY TOTAL  09/19/2011    Performed by KISHAN CARDENAS at SURGERY [facility] ORS    BREAST BIOPSY  05/21/2010    Performed by ROB TOMAS at SURGERY SAME DAY [facility] ORS    CHOLECYSTECTOMY      HAMMERTOE CORRECTION Bilateral     with bunions    KNEE ARTHROSCOPY      LUMPECTOMY  "     OTHER ORTHOPEDIC SURGERY      foot, shoulder, and knee    WI RADIATION THERAPY PLAN SIMPLE      SHOULDER SURGERY      SINUSCOPY         Social History     Socioeconomic History    Marital status:      Spouse name: Not on file    Number of children: Not on file    Years of education: Not on file    Highest education level: Not on file   Occupational History    Not on file   Tobacco Use    Smoking status: Never    Smokeless tobacco: Never   Vaping Use    Vaping status: Never Used   Substance and Sexual Activity    Alcohol use: No    Drug use: Never    Sexual activity: Not on file     Comment: ; two daughters; retired ( @ [employer])   Other Topics Concern    Not on file   Social History Narrative    Retired-       Social Drivers of Health     Financial Resource Strain: Not on file   Food Insecurity: No Food Insecurity (9/24/2024)    Hunger Vital Sign     Worried About Running Out of Food in the Last Year: Never true     Ran Out of Food in the Last Year: Never true   Transportation Needs: No Transportation Needs (9/24/2024)    PRAPARE - Transportation     Lack of Transportation (Medical): No     Lack of Transportation (Non-Medical): No   Physical Activity: Not on file   Stress: Not on file   Social Connections: Feeling Somewhat Isolated (9/13/2024)    OASIS : Social Isolation     Frequency of experiencing loneliness or isolation: Sometimes   Intimate Partner Violence: Not At Risk (9/24/2024)    Humiliation, Afraid, Rape, and Kick questionnaire     Fear of Current or Ex-Partner: No     Emotionally Abused: No     Physically Abused: No     Sexually Abused: No   Housing Stability: Low Risk  (9/24/2024)    Housing Stability Vital Sign     Unable to Pay for Housing in the Last Year: No     Number of Times Moved in the Last Year: 0     Homeless in the Last Year: No          Family History   Problem Relation Age of Onset    Lung Disease Mother         copd    Stroke  Mother     Cancer Daughter         Breast    Breast Cancer Daughter     Psychiatric Illness Daughter         Anxiety    Psychiatric Illness Daughter         Anxiety and Depression       Current Outpatient Medications on File Prior to Visit   Medication Sig Dispense Refill    cyanocobalamin (VITAMIN B12) 500 MCG tablet Take 1 Tablet by mouth every day. Indications: supplement      vibegron (GEMTESA) 75 MG tablet Take 1 Tablet by mouth every day.      valsartan (DIOVAN) 160 MG Tab Take 160 mg by mouth every day.      venlafaxine XR (EFFEXOR XR) 75 MG CAPSULE SR 24 HR Take 75 mg by mouth every day. For a daily total of 225 mg  Indications: Generalized Anxiety Disorder      atorvastatin (LIPITOR) 40 MG Tab TAKE ONE TABLET BY MOUTH EVERY EVENING 90 Tablet 0    omeprazole (PRILOSEC) 40 MG delayed-release capsule Take 1 Capsule by mouth every day. Indications: Heartburn      oxybutynin (DITROPAN XL) 15 MG CR tablet Take 1 Tablet by mouth every day. Indications: Urinary Incontinence      methenamine hip (HIPPREX) 1 GM Tab Take 1 g by mouth 2 times a day. LONG TERM TREATMENT FOR CHRONIC UTI.  Indications: Urinary Tract Infection      estradiol (ESTRACE) 0.1 MG/GM vaginal cream Insert 2 g into the vagina 3 times a week. Indications: Simple Infection of the Urinary Tract      Calcium Carbonate-Vitamin D (CALCIUM-VITAMIN D3 PO) Take 600 mg by mouth every morning. Indications: supplement      vitamin D3 (CHOLECALCIFEROL) 1000 Unit (25 mcg) Tab Take 2,000 Units by mouth every morning. Indications: supplement      busPIRone (BUSPAR) 15 MG tablet Take 15 mg by mouth 3 times a day. Indications: Anxiety Disorder      acetaminophen (TYLENOL) 500 MG Tab Take 1,000 mg by mouth every 6 hours as needed for Moderate Pain. Indications: Pain      D-MANNOSE PO Take 500 mg by mouth every evening. Indications: healthy urinary tract       venlafaxine (EFFEXOR-XR) 150 MG extended-release capsule Take 1 Capsule by mouth every day. Taken with 75 mg  "for 225mg daily 30 Capsule 2    benzonatate (TESSALON) 100 MG Cap Take 1 Capsule by mouth 3 times a day as needed for Cough. 30 Capsule 0    meloxicam (MOBIC) 7.5 MG Tab Take 1 Tablet by mouth every day. 30 Tablet 0    lidocaine (ASPERFLEX) 4 % Patch Place 1 Patch on the skin every 12 hours as needed (lower back pain).       No current facility-administered medications on file prior to visit.       Allergies: Patient has no known allergies.      Vitals:  /62 (BP Location: Right arm, Patient Position: Sitting, BP Cuff Size: Adult)   Pulse 77   Ht 1.727 m (5' 8\")   Wt 73 kg (161 lb)   SpO2 96%     Physical Exam:  Physical Exam  General - alert, oriented x4  HEENT - normocephalic, trachea midline  Cardiovascular - no JVD, RRR, s1, s2, RRR  Pulmonary - not in respiratory distress, CTAB, no wheezes, rhonchi or rales  Abdominal - soft, nondistended  Skin - hands appear normal, no rashes  Extremities - no lower extremity edema bilaterally  Psych - affect and mood appropriate    Objective Data:    Labs:  NT-proBNP   Date Value Ref Range Status   11/28/2024 1138 (H) 0 - 125 pg/mL Final   09/19/2024 729 (H) 0 - 125 pg/mL Final   09/01/2024 3252 (H) 0 - 125 pg/mL Final   11/23/2021 400 (H) 0 - 125 pg/mL Final     Comment:     As of July 9th 2019 at 0900, the BNP test has been replaced with the  NT-proBNP test.  Please note new analyte, methodology, and reference range.        No results found for: \"COMMENT\"       Pulmonary Function Tests:  PFTs as reviewed by me personally show:    Imaging:  Imaging as reviewed by me personally show:    CT-CHEST (THORAX) W/O 09/02/2024    Narrative  9/2/2024 3:50 PM    HISTORY/REASON FOR EXAM:  Fever, bacteremia, concern for pneumonia.    TECHNIQUE/EXAM DESCRIPTION:  CT scan of the chest without contrast.    Noncontrast helical scanning of the chest was obtained from the lung apices through the adrenal glands.    Low dose optimization technique was utilized for this CT exam including " automated exposure control and adjustment of the mA and/or kV according to patient size.    COMPARISON: 1 view chest 8/31/2024    FINDINGS:  Lungs: There is linear density consistent with atelectasis or parenchymal scarring.    There are small bilateral pleural effusion..    Mediastinum/Alissa: There are borderline enlarged lymph nodes throughout the mediastinum.    Pleura: No pleural effusion.    Cardiac: Heart normal in size without pericardial effusion. There is no coronary artery calcification.    Vascular: There is aortic and branch vessel atherosclerotic plaque including the coronary arteries..    Soft tissues: Unremarkable.    Bones: No acute or destructive process.    Impression  1.  Small bilateral pleural effusion    2.  Bilateral linear density consistent with atelectasis and/or parenchymal scar    3.  Borderline enlargement lymph nodes in the mediastinum    Fleischner Society pulmonary nodule recommendations:  Not Applicable       Cardiac:  No results found for this or any previous visit from the past 365 days.        Assessment/Plan:    1. Interstitial pulmonary disease (HCC)  CT-CHEST, HIGH RESOLUTION LUNG      2. Adverse effect of immune checkpoint inhibitor, initial encounter        3. Cholangiocarcinoma (HCC)          Patient has evidence of scattered bilateral GGOs and bilateral bronchiectasis on CT CHEST that seem temporally related to introduction of immune checkpoint inhibitor durvulumab 8/2024-9/2024. She has already stopped durvulumab given general non-tolerance of chemo/immunotherapy and has transitioned to hospice care with continued radiation therapy for cholangiocarcinoma. 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 The ASCO recommended treatment of Grade II pneumonitis includes holding the checkpoint inhibitor and consideration of prolonged courses of prednisone 0.5mg/kg-1.0mg/kg. Given her age and frailty and  recent falls and relative mild symptoms (mild dyspnea with exertion, cough, no hypoxemia), we discussed that a prolonged course of steroids may present more risk than benefit in her case given age and frailty. Will opt give a 5d burst of prednisone 40mg to see if that helps some with her cough and mild ABEL but otherwise will let the durvulumab wash out of her system and can repeat CT CHEST in 6 months to ensure pneumonitis is improving and isn't evolving into organizing pneumonia. She will return in 6 months and notify us if symptoms worsen before then. Plan: 5d prednisone 40mg burst, repeat CT CHEST 6 months. Patient and family amenable to plan.  As above  Undergoing radiotherapy    CC Dr. Tabor, Dr. Arias    Return in about 6 months (around 6/12/2025) for ILD/pneumonitis followup with MD.     This note was generated using voice recognition software which has a chance of producing errors of grammar and possibly content.  I have made every reasonable attempt to find and correct any obvious errors, but it should be expected that some may not be found prior to finalization of this note.    Time spent in record review prior to patient arrival, reviewing results, and in face-to-face encounter totaled 65 min, excluding any procedures if performed.  __________  Ildefonso Tamayo MD  Pulmonary and Critical Care Medicine  [facility]

## 2024-12-12 NOTE — PATIENT INSTRUCTIONS
Please return to see us in 6 months with a CT scan.    Please elevate the head of your bed while sleeping

## 2024-12-13 ENCOUNTER — HOSPITAL ENCOUNTER (OUTPATIENT)
Dept: RADIATION ONCOLOGY | Facility: MEDICAL CENTER | Age: 84
End: 2024-12-13

## 2024-12-13 PROBLEM — T45.AX5A: Status: ACTIVE | Noted: 2024-12-13

## 2024-12-13 LAB
CHEMOTHERAPY INFUSION START DATE: NORMAL
CHEMOTHERAPY RECORDS: 4000 CGY
CHEMOTHERAPY RECORDS: 8 GY
CHEMOTHERAPY RECORDS: NORMAL
CHEMOTHERAPY RX CANCER: NORMAL
DATE 1ST CHEMO CANCER: NORMAL
RAD ONC ARIA COURSE LAST TREATMENT DATE: NORMAL
RAD ONC ARIA COURSE TREATMENT ELAPSED DAYS: NORMAL
RAD ONC ARIA REFERENCE POINT DOSAGE GIVEN TO DATE: 24 GY
RAD ONC ARIA REFERENCE POINT ID: NORMAL
RAD ONC ARIA REFERENCE POINT SESSION DOSAGE GIVEN: 8 GY

## 2024-12-13 PROCEDURE — 77280 THER RAD SIMULAJ FIELD SMPL: CPT | Mod: 26 | Performed by: RADIOLOGY

## 2024-12-13 PROCEDURE — 77373 STRTCTC BDY RAD THER TX DLVR: CPT | Performed by: RADIOLOGY

## 2024-12-13 PROCEDURE — 77280 THER RAD SIMULAJ FIELD SMPL: CPT | Performed by: RADIOLOGY

## 2024-12-13 PROCEDURE — 77336 RADIATION PHYSICS CONSULT: CPT | Mod: XU | Performed by: RADIOLOGY

## 2024-12-13 RX ORDER — PREDNISONE 20 MG/1
40 TABLET ORAL DAILY
Qty: 10 TABLET | Refills: 0 | Status: SHIPPED | OUTPATIENT
Start: 2024-12-13

## 2024-12-13 RX ORDER — PREDNISONE 20 MG/1
20 TABLET ORAL DAILY
Qty: 10 TABLET | Refills: 0 | Status: SHIPPED | OUTPATIENT
Start: 2024-12-13 | End: 2024-12-13

## 2024-12-13 NOTE — PROCEDURES
DATE OF SERVICE: 12/13/2024    DIAGNOSIS:  Cholangiocarcinoma (HCC)  Staging form: Intrahepatic Bile Duct, AJCC 8th Edition  - Clinical stage from 6/21/2024: Stage IIIB (cT2, cN1, cM0) - Signed by Marco Tabor M.D. on 6/21/2024  Histologic grade (G): G2  Histologic grading system: 3 grade system       TREATMENT:  Radiation Therapy Episodes       Active Episodes       Radiation Therapy: SBRT (12/11/2024)                   Radiation Treatments         Plan Last Treated On Elapsed Days Fractions Treated Prescribed Fraction Dose (cGy) Prescribed Total Dose (cGy)    Liver_SBRT 12/13/2024 2 @ 12/13/2024 3 of 5 800 4,000                  Reference Point Last Treated On Elapsed Days Most Recent Session Dose (cGy) Total Dose (cGy)    Liver_SBRT 12/13/2024 2 @ 12/13/2024 800 2,400                            STEREOTACTIC PROCEDURE NOTE:    Called by Truebeam machine to verify treatment parameters including:  treatment site, treatment dose, and treatment setup prior to stereotactic treatment..    Patient was placed in the treatment position with use of immobilization device and  laser guidance. CBCT images were acquired for target localization.  Images were reviewed in the axial, coronal, and saggital views and shifts were made as necessary to ensure that patient position matched simulation position.      Treatment delivered per  prescription.  The medical physicist was present throughout the set-up, verification and treatment delivery to oversee the procedure and ensure all parameters agreed with the computerized plan.    I have personally reviewed the relevant data, performed the target localization, and determined all relevant factors for this patient’s simulation.

## 2024-12-16 ENCOUNTER — HOSPITAL ENCOUNTER (OUTPATIENT)
Dept: RADIATION ONCOLOGY | Facility: MEDICAL CENTER | Age: 84
End: 2024-12-16

## 2024-12-16 LAB
CHEMOTHERAPY INFUSION START DATE: NORMAL
CHEMOTHERAPY RECORDS: 4000 CGY
CHEMOTHERAPY RECORDS: 8 GY
CHEMOTHERAPY RECORDS: NORMAL
CHEMOTHERAPY RX CANCER: NORMAL
DATE 1ST CHEMO CANCER: NORMAL
RAD ONC ARIA COURSE LAST TREATMENT DATE: NORMAL
RAD ONC ARIA COURSE TREATMENT ELAPSED DAYS: NORMAL
RAD ONC ARIA REFERENCE POINT DOSAGE GIVEN TO DATE: 32 GY
RAD ONC ARIA REFERENCE POINT ID: NORMAL
RAD ONC ARIA REFERENCE POINT SESSION DOSAGE GIVEN: 8 GY

## 2024-12-16 PROCEDURE — 77373 STRTCTC BDY RAD THER TX DLVR: CPT | Performed by: RADIOLOGY

## 2024-12-16 PROCEDURE — 77280 THER RAD SIMULAJ FIELD SMPL: CPT | Performed by: RADIOLOGY

## 2024-12-16 PROCEDURE — 77280 THER RAD SIMULAJ FIELD SMPL: CPT | Mod: 26 | Performed by: RADIOLOGY

## 2024-12-17 ENCOUNTER — HOSPITAL ENCOUNTER (OUTPATIENT)
Dept: RADIATION ONCOLOGY | Facility: MEDICAL CENTER | Age: 84
End: 2024-12-17

## 2024-12-17 LAB
CHEMOTHERAPY INFUSION START DATE: NORMAL
CHEMOTHERAPY INFUSION START DATE: NORMAL
CHEMOTHERAPY INFUSION STOP DATE: NORMAL
CHEMOTHERAPY RECORDS: 4000 CGY
CHEMOTHERAPY RECORDS: 4000 CGY
CHEMOTHERAPY RECORDS: 8 GY
CHEMOTHERAPY RECORDS: 8 GY
CHEMOTHERAPY RECORDS: NORMAL
CHEMOTHERAPY RX CANCER: NORMAL
CHEMOTHERAPY RX CANCER: NORMAL
DATE 1ST CHEMO CANCER: NORMAL
DATE 1ST CHEMO CANCER: NORMAL
RAD ONC ARIA COURSE LAST TREATMENT DATE: NORMAL
RAD ONC ARIA COURSE LAST TREATMENT DATE: NORMAL
RAD ONC ARIA COURSE TREATMENT ELAPSED DAYS: NORMAL
RAD ONC ARIA COURSE TREATMENT ELAPSED DAYS: NORMAL
RAD ONC ARIA REFERENCE POINT DOSAGE GIVEN TO DATE: 40 GY
RAD ONC ARIA REFERENCE POINT DOSAGE GIVEN TO DATE: 40 GY
RAD ONC ARIA REFERENCE POINT ID: NORMAL
RAD ONC ARIA REFERENCE POINT ID: NORMAL
RAD ONC ARIA REFERENCE POINT SESSION DOSAGE GIVEN: 8 GY

## 2024-12-17 PROCEDURE — 77280 THER RAD SIMULAJ FIELD SMPL: CPT | Performed by: RADIOLOGY

## 2024-12-17 PROCEDURE — 77373 STRTCTC BDY RAD THER TX DLVR: CPT | Performed by: RADIOLOGY

## 2024-12-17 PROCEDURE — 77280 THER RAD SIMULAJ FIELD SMPL: CPT | Mod: 26 | Performed by: RADIOLOGY

## 2024-12-17 NOTE — PROCEDURES
DATE OF SERVICE: 12/17/2024    DIAGNOSIS:  Cholangiocarcinoma (HCC)  Staging form: Intrahepatic Bile Duct, AJCC 8th Edition  - Clinical stage from 6/21/2024: Stage IIIB (cT2, cN1, cM0) - Signed by Mraco Tabor M.D. on 6/21/2024  Histologic grade (G): G2  Histologic grading system: 3 grade system       TREATMENT:  Radiation Therapy Episodes       Active Episodes       Radiation Therapy: SBRT (12/11/2024)                   Radiation Treatments         Plan Last Treated On Elapsed Days Fractions Treated Prescribed Fraction Dose (cGy) Prescribed Total Dose (cGy)    Liver_SBRT 12/17/2024 6 @ 12/17/2024 5 of 5 800 4,000                  Reference Point Last Treated On Elapsed Days Most Recent Session Dose (cGy) Total Dose (cGy)    Liver_SBRT 12/17/2024 6 @ 12/17/2024 800 4,000                            STEREOTACTIC PROCEDURE NOTE:    Called by Truebeam machine to verify treatment parameters including:  treatment site, treatment dose, and treatment setup prior to stereotactic treatment..    Patient was placed in the treatment position with use of immobilization device and  laser guidance. CBCT images were acquired for target localization.  Images were reviewed in the axial, coronal, and saggital views and shifts were made as necessary to ensure that patient position matched simulation position.      Treatment delivered per  prescription.  The medical physicist was present throughout the set-up, verification and treatment delivery to oversee the procedure and ensure all parameters agreed with the computerized plan.    I have personally reviewed the relevant data, performed the target localization, and determined all relevant factors for this patient’s simulation.

## 2024-12-20 NOTE — ADDENDUM NOTE
Encounter addended by: Kandis Espinal, Med Ass't on: 12/20/2024 10:01 AM   Actions taken: Pend clinical note

## 2024-12-20 NOTE — RADIATION COMPLETION NOTES
END OF TREATMENT SUMMARY    Patient name:  Aaliyah Fulton    Primary Physician:  Ashvin Arias D.O. MRN: [id number]  CSN: [id number]   Referring physician:  No ref. provider found  : 1940, 84 y.o.       TREATMENT SUMMARY:        Course First Treatment Date 2024    Course Last Treatment Date 2024   Course Elapsed Days 6 @ 2024   Course Intent Curative     Radiation Therapy Episodes       Active Episodes       Radiation Therapy: SBRT (2024)                   Radiation Treatments         Plan Last Treated On Elapsed Days Fractions Treated Prescribed Fraction Dose (cGy) Prescribed Total Dose (cGy)    Liver_SBRT 2024 6 @ 2024 5 of 5 800 4,000                  Reference Point Last Treated On Elapsed Days Most Recent Session Dose (cGy) Total Dose (cGy)    Liver_SBRT 2024 6 @ 2024 -- 4,000                                     STAGE:   Cholangiocarcinoma (HCC)  Staging form: Intrahepatic Bile Duct, AJCC 8th Edition  - Clinical stage from 2024: Stage IIIB (cT2, cN1, cM0) - Signed by Marco Tabor M.D. on 2024  Histologic grade (G): G2  Histologic grading system: 3 grade system       TREATMENT INDICATION:   curative     CONCURRENT SYSTEMIC TREATMENT:   sequential     RT COURSE DISCONTINUED EARLY:   No     PATIENT EXPERIENCE:        No data to display                 FOLLOW-UP PLAN:   8 Weeks     COMMENT:          ANATOMIC TARGET SUMMARY    ANATOMIC TARGET MODALITY TECHNIQUE   liver   External beam, photons SBRT            COMMENT:         DIAGRAMS:      DOSE VOLUME HISTOGRAMS:

## 2024-12-23 NOTE — ADDENDUM NOTE
Encounter addended by: Marco Tabor M.D. on: 12/23/2024 3:42 PM   Actions taken: Clinical Note Signed

## 2025-01-07 ENCOUNTER — OFFICE VISIT (OUTPATIENT)
Dept: URGENT CARE | Facility: CLINIC | Age: 85
End: 2025-01-07
Payer: MEDICARE

## 2025-01-07 VITALS
RESPIRATION RATE: 16 BRPM | TEMPERATURE: 98.3 F | HEIGHT: 68 IN | BODY MASS INDEX: 24.4 KG/M2 | DIASTOLIC BLOOD PRESSURE: 78 MMHG | OXYGEN SATURATION: 95 % | SYSTOLIC BLOOD PRESSURE: 122 MMHG | HEART RATE: 83 BPM | WEIGHT: 161 LBS

## 2025-01-07 DIAGNOSIS — R05.3 CHRONIC COUGH: ICD-10-CM

## 2025-01-07 PROCEDURE — 3078F DIAST BP <80 MM HG: CPT

## 2025-01-07 PROCEDURE — 99214 OFFICE O/P EST MOD 30 MIN: CPT

## 2025-01-07 PROCEDURE — 3074F SYST BP LT 130 MM HG: CPT

## 2025-01-07 RX ORDER — BENZONATATE 100 MG/1
100 CAPSULE ORAL 3 TIMES DAILY PRN
Qty: 30 CAPSULE | Refills: 0 | Status: SHIPPED | OUTPATIENT
Start: 2025-01-07 | End: 2025-01-17

## 2025-01-07 ASSESSMENT — FIBROSIS 4 INDEX: FIB4 SCORE: 2.69

## 2025-01-08 NOTE — PROGRESS NOTES
"Chief Complaint   Patient presents with    Cough     X 11/2 months         Subjective:   HISTORY OF PRESENT ILLNESS: Aaliyah Fulton is a 84 y.o. female who presents for chronic cough for 2 month sor more.  She was seen by pulmonology about 3 weeks ago for this after an ER visit revealed chronic interstitial disease.  Her cough has not changed, she has no SOB or fevers, no chest pain.  She states the pulmonologist did a short course of steroids and wants to check her in 6 months.       Medications, Allergies, current problem list, Social and Family history reviewed today in Epic.     Objective:     /78 (BP Location: Left arm, Patient Position: Sitting, BP Cuff Size: Adult)   Pulse 83   Temp 36.8 °C (98.3 °F) (Temporal)   Resp 16   Ht 1.727 m (5' 8\")   Wt 73 kg (161 lb)   SpO2 95%     Physical Exam  Vitals reviewed.   Constitutional:       Appearance: Normal appearance.   HENT:      Mouth/Throat:      Mouth: Mucous membranes are moist.   Cardiovascular:      Rate and Rhythm: Normal rate.   Pulmonary:      Effort: Pulmonary effort is normal.      Comments: Fine crackles throughout, no wheezing  Skin:     General: Skin is warm and dry.   Neurological:      Mental Status: She is alert and oriented to person, place, and time.   Psychiatric:         Mood and Affect: Mood normal.          Assessment/Plan:     Diagnosis and associated orders    I personally reviewed prior external notes and test results pertinent to today's visit.     1. Chronic cough  benzonatate (TESSALON) 100 MG Cap    CANCELED: DX-CHEST-2 VIEWS            IMPRESSION: The patient is well appearing here with reassuring exam and vitals signs. Symptomatology is consistent with her chronic interstitial disease.  Her cough has not changed in nature, she denies any new SOB or fevers.  Looking at notes from pulmonary, it was decided to not do a longer course of steroids given her age.  Will trial tessalon.  I feel they need to see the pulmonogist " sooner and discuss expectations for pt's chronic cough and if there are other therapies that could provide some relief.     Discussed anticipated duration of illness, return to work/school, and indications to RTC or go to the Emergency department.    Patient states understanding of the plan of care and discharge instructions.  They are discharged in stable condition.         Please note that this dictation was created using voice recognition software. I have made a reasonable attempt to correct obvious errors, but I expect that there are errors of grammar and possibly content that I did not discover before finalizing the note.    This note was electronically signed by TODD Johnson

## 2025-01-14 ENCOUNTER — TELEPHONE (OUTPATIENT)
Dept: HEMATOLOGY ONCOLOGY | Facility: MEDICAL CENTER | Age: 85
End: 2025-01-14
Payer: MEDICARE

## 2025-01-14 NOTE — TELEPHONE ENCOUNTER
Received voicemail from patients daughter requesting an update on getting a new walker for Aaliyah- please contact daughter.

## 2025-01-16 NOTE — TELEPHONE ENCOUNTER
[employer] message regarding orders from [employer] for walker sent to pt.  Notified pt that the order stated they will obtain the walker from [facility].

## 2025-01-19 DIAGNOSIS — R05.3 CHRONIC COUGH: ICD-10-CM

## 2025-01-20 ENCOUNTER — HOSPITAL ENCOUNTER (OUTPATIENT)
Dept: RADIATION ONCOLOGY | Facility: MEDICAL CENTER | Age: 85
End: 2025-01-20
Attending: RADIOLOGY
Payer: MEDICARE

## 2025-01-20 DIAGNOSIS — C22.1 CHOLANGIOCARCINOMA (HCC): ICD-10-CM

## 2025-01-20 DIAGNOSIS — I50.31 ACUTE HEART FAILURE WITH PRESERVED EJECTION FRACTION (HFPEF) (HCC): ICD-10-CM

## 2025-01-20 NOTE — PROGRESS NOTES
Called patient after SBRT to liver completed December 17, 2024 40 [place] in 5 fractions.  Patient having some cough thought to be immune related pneumonitis.  Did add prednisone course possibly continues to be worsening told him to contact Dr. Tamayo's office for further help.  Will plan repeat CT liver in 3 months.

## 2025-02-12 ENCOUNTER — APPOINTMENT (OUTPATIENT)
Dept: MEDICAL GROUP | Facility: PHYSICIAN GROUP | Age: 85
End: 2025-02-12
Payer: MEDICARE

## 2025-02-19 PROBLEM — I50.31 ACUTE HEART FAILURE WITH PRESERVED EJECTION FRACTION (HFPEF) (HCC): Status: RESOLVED | Noted: 2024-09-02 | Resolved: 2025-02-19

## 2025-02-19 PROBLEM — J90 BILATERAL PLEURAL EFFUSION: Chronic | Status: ACTIVE | Noted: 2024-09-02

## 2025-02-19 PROBLEM — E44.0 MODERATE PROTEIN-CALORIE MALNUTRITION (HCC): Chronic | Status: RESOLVED | Noted: 2024-09-20 | Resolved: 2025-02-19

## 2025-02-19 PROBLEM — Z86.73 HISTORY OF CVA (CEREBROVASCULAR ACCIDENT): Chronic | Status: ACTIVE | Noted: 2021-12-12

## 2025-02-19 PROBLEM — T14.8XXA SUTURED SKIN WOUND: Status: RESOLVED | Noted: 2024-09-16 | Resolved: 2025-02-19

## 2025-02-19 PROBLEM — J96.01 ACUTE RESPIRATORY FAILURE WITH HYPOXEMIA (HCC): Chronic | Status: RESOLVED | Noted: 2024-09-01 | Resolved: 2025-02-19

## 2025-02-19 PROBLEM — S72.002A CLOSED LEFT HIP FRACTURE (HCC): Chronic | Status: ACTIVE | Noted: 2021-11-24

## 2025-02-19 PROBLEM — I27.20 PULMONARY HYPERTENSION (HCC): Chronic | Status: ACTIVE | Noted: 2024-09-02

## 2025-02-19 PROBLEM — R05.3 CHRONIC COUGH: Chronic | Status: ACTIVE | Noted: 2025-02-19

## 2025-02-19 PROBLEM — R52 ACUTE PAIN: Status: RESOLVED | Noted: 2024-09-01 | Resolved: 2025-02-19

## 2025-02-19 PROBLEM — J96.01 ACUTE RESPIRATORY FAILURE WITH HYPOXEMIA (HCC): Chronic | Status: ACTIVE | Noted: 2024-09-01

## 2025-02-19 PROBLEM — T14.90XA TRAUMA: Status: RESOLVED | Noted: 2024-09-15 | Resolved: 2025-02-19

## 2025-02-19 PROBLEM — G91.2 NORMAL PRESSURE HYDROCEPHALUS (HCC): Chronic | Status: ACTIVE | Noted: 2021-06-16

## 2025-02-19 PROBLEM — J84.9 INTERSTITIAL LUNG DISEASE (HCC): Chronic | Status: ACTIVE | Noted: 2024-12-12

## 2025-02-19 PROBLEM — G93.40 ENCEPHALOPATHY: Status: RESOLVED | Noted: 2024-09-19 | Resolved: 2025-02-19

## 2025-02-19 PROBLEM — R50.81 NEUTROPENIC FEVER (HCC): Status: RESOLVED | Noted: 2024-09-01 | Resolved: 2025-02-19

## 2025-02-19 PROBLEM — Z53.09 CONTRAINDICATION TO DEEP VEIN THROMBOSIS (DVT) PROPHYLAXIS: Chronic | Status: ACTIVE | Noted: 2024-09-15

## 2025-02-19 PROBLEM — R05.3 CHRONIC COUGH: Status: ACTIVE | Noted: 2025-02-19

## 2025-02-19 PROBLEM — E78.5 HYPERLIPIDEMIA: Chronic | Status: ACTIVE | Noted: 2024-09-16

## 2025-02-19 PROBLEM — T17.308A CHOKING: Status: ACTIVE | Noted: 2025-02-19

## 2025-02-19 PROBLEM — E44.0 MODERATE PROTEIN-CALORIE MALNUTRITION (HCC): Chronic | Status: ACTIVE | Noted: 2024-09-20

## 2025-02-19 PROBLEM — C22.1 CHOLANGIOCARCINOMA (HCC): Chronic | Status: ACTIVE | Noted: 2024-04-30

## 2025-02-19 PROBLEM — R62.7 FAILURE TO THRIVE IN ADULT: Status: RESOLVED | Noted: 2024-09-18 | Resolved: 2025-02-19

## 2025-02-19 PROBLEM — J18.9 PNEUMONIA: Status: RESOLVED | Noted: 2024-09-01 | Resolved: 2025-02-19

## 2025-02-19 PROBLEM — T17.308A CHOKING: Chronic | Status: ACTIVE | Noted: 2025-02-19

## 2025-02-19 PROBLEM — C24.0 BILE DUCT CANCER (HCC): Chronic | Status: ACTIVE | Noted: 2024-09-01

## 2025-02-19 PROBLEM — R09.02 HYPOXIA: Status: RESOLVED | Noted: 2024-09-18 | Resolved: 2025-02-19

## 2025-02-19 PROBLEM — C22.0 HEPATOCELLULAR CARCINOMA (HCC): Chronic | Status: ACTIVE | Noted: 2024-04-30

## 2025-02-19 PROBLEM — D70.9 NEUTROPENIC FEVER (HCC): Status: RESOLVED | Noted: 2024-09-01 | Resolved: 2025-02-19

## 2025-02-19 PROBLEM — F41.1 GENERALIZED ANXIETY DISORDER: Chronic | Status: ACTIVE | Noted: 2024-09-15

## 2025-02-19 PROBLEM — S69.91XA RIGHT WRIST INJURY, INITIAL ENCOUNTER: Status: RESOLVED | Noted: 2024-09-16 | Resolved: 2025-02-19

## 2025-02-19 PROBLEM — S06.5XAA SUBDURAL HEMATOMA (HCC): Chronic | Status: ACTIVE | Noted: 2024-09-15

## 2025-04-03 ENCOUNTER — HOSPITAL ENCOUNTER (OUTPATIENT)
Dept: LAB | Facility: MEDICAL CENTER | Age: 85
End: 2025-04-03
Attending: RADIOLOGY
Payer: MEDICARE

## 2025-04-03 DIAGNOSIS — C22.1 CHOLANGIOCARCINOMA (HCC): ICD-10-CM

## 2025-04-03 DIAGNOSIS — I50.31 ACUTE HEART FAILURE WITH PRESERVED EJECTION FRACTION (HFPEF) (HCC): ICD-10-CM

## 2025-04-03 LAB
ALBUMIN SERPL BCP-MCNC: 3.5 G/DL (ref 3.2–4.9)
ALBUMIN/GLOB SERPL: 0.7 G/DL
ALP SERPL-CCNC: 141 U/L (ref 30–99)
ALT SERPL-CCNC: 16 U/L (ref 2–50)
ANION GAP SERPL CALC-SCNC: 11 MMOL/L (ref 7–16)
AST SERPL-CCNC: 27 U/L (ref 12–45)
BILIRUB SERPL-MCNC: 0.4 MG/DL (ref 0.1–1.5)
BUN SERPL-MCNC: 25 MG/DL (ref 8–22)
CALCIUM ALBUM COR SERPL-MCNC: 10 MG/DL (ref 8.5–10.5)
CALCIUM SERPL-MCNC: 9.6 MG/DL (ref 8.5–10.5)
CHLORIDE SERPL-SCNC: 103 MMOL/L (ref 96–112)
CO2 SERPL-SCNC: 21 MMOL/L (ref 20–33)
CREAT SERPL-MCNC: 0.97 MG/DL (ref 0.5–1.4)
GFR SERPLBLD CREATININE-BSD FMLA CKD-EPI: 57 ML/MIN/1.73 M 2
GLOBULIN SER CALC-MCNC: 4.9 G/DL (ref 1.9–3.5)
GLUCOSE SERPL-MCNC: 78 MG/DL (ref 65–99)
POTASSIUM SERPL-SCNC: 4.4 MMOL/L (ref 3.6–5.5)
PROT SERPL-MCNC: 8.4 G/DL (ref 6–8.2)
SODIUM SERPL-SCNC: 135 MMOL/L (ref 135–145)

## 2025-04-03 PROCEDURE — 80053 COMPREHEN METABOLIC PANEL: CPT

## 2025-04-03 PROCEDURE — 36415 COLL VENOUS BLD VENIPUNCTURE: CPT

## 2025-04-21 ENCOUNTER — APPOINTMENT (OUTPATIENT)
Dept: RADIOLOGY | Facility: MEDICAL CENTER | Age: 85
End: 2025-04-21
Attending: RADIOLOGY
Payer: MEDICARE

## 2025-04-22 ENCOUNTER — HOSPITAL ENCOUNTER (OUTPATIENT)
Dept: RADIOLOGY | Facility: MEDICAL CENTER | Age: 85
End: 2025-04-22
Payer: MEDICARE

## 2025-04-22 DIAGNOSIS — Z12.31 VISIT FOR SCREENING MAMMOGRAM: ICD-10-CM

## 2025-04-22 PROCEDURE — 77067 SCR MAMMO BI INCL CAD: CPT

## 2025-04-28 ENCOUNTER — HOSPITAL ENCOUNTER (OUTPATIENT)
Dept: RADIOLOGY | Facility: MEDICAL CENTER | Age: 85
End: 2025-04-28
Attending: RADIOLOGY
Payer: MEDICARE

## 2025-04-28 ENCOUNTER — HOSPITAL ENCOUNTER (OUTPATIENT)
Dept: RADIOLOGY | Facility: MEDICAL CENTER | Age: 85
End: 2025-04-28
Attending: INTERNAL MEDICINE
Payer: MEDICARE

## 2025-04-28 DIAGNOSIS — C22.1 CHOLANGIOCARCINOMA (HCC): ICD-10-CM

## 2025-04-28 DIAGNOSIS — I35.0 NONRHEUMATIC AORTIC (VALVE) STENOSIS: ICD-10-CM

## 2025-04-28 PROCEDURE — 74160 CT ABDOMEN W/CONTRAST: CPT

## 2025-04-28 PROCEDURE — 700117 HCHG RX CONTRAST REV CODE 255: Performed by: RADIOLOGY

## 2025-04-28 RX ADMIN — IOHEXOL 80 ML: 350 INJECTION, SOLUTION INTRAVENOUS at 16:04

## 2025-05-02 ENCOUNTER — HOSPITAL ENCOUNTER (OUTPATIENT)
Facility: MEDICAL CENTER | Age: 85
End: 2025-05-02
Payer: MEDICARE

## 2025-05-02 ENCOUNTER — TELEPHONE (OUTPATIENT)
Dept: CARDIOLOGY | Facility: MEDICAL CENTER | Age: 85
End: 2025-05-02
Payer: MEDICARE

## 2025-05-02 PROCEDURE — 87077 CULTURE AEROBIC IDENTIFY: CPT

## 2025-05-02 PROCEDURE — 87086 URINE CULTURE/COLONY COUNT: CPT

## 2025-05-02 PROCEDURE — 87186 SC STD MICRODIL/AGAR DIL: CPT

## 2025-05-02 NOTE — TELEPHONE ENCOUNTER
Called and spoke with patient's daughter Angi. She is currently driving and states she will call back later.

## 2025-05-02 NOTE — TELEPHONE ENCOUNTER
Received message from imaging scheduling team requesting patient be contacted to schedule TAVR CTA ordered by Dr. Oneal.

## 2025-05-02 NOTE — TELEPHONE ENCOUNTER
Received call back from Angi. Patient scheduled for CT on 5/8/2025. Pre-imaging instructions provided. Angi verbalizes understanding.

## 2025-05-05 NOTE — TELEPHONE ENCOUNTER
Received notification that patient's daughter Angi had called and requested call back to reschedule TAVR CTA.     Called and spoke to Angi. Discussed rescheduling imaging. Angi states she will keep imaging scheduled as is at this time.

## 2025-05-06 ENCOUNTER — HOSPITAL ENCOUNTER (OUTPATIENT)
Dept: RADIATION ONCOLOGY | Facility: MEDICAL CENTER | Age: 85
End: 2025-05-06
Attending: RADIOLOGY
Payer: MEDICARE

## 2025-05-06 VITALS — OXYGEN SATURATION: 90 % | BODY MASS INDEX: 24.64 KG/M2 | WEIGHT: 157 LBS | HEIGHT: 67 IN

## 2025-05-06 DIAGNOSIS — C22.1 CHOLANGIOCARCINOMA (HCC): Chronic | ICD-10-CM

## 2025-05-06 ASSESSMENT — FIBROSIS 4 INDEX: FIB4 SCORE: 2.74

## 2025-05-06 ASSESSMENT — PAIN SCALES - GENERAL: PAINLEVEL_OUTOF10: 3=SLIGHT PAIN

## 2025-05-06 NOTE — PROGRESS NOTES
Virtual Visit: Established Patient   This visit was conducted via Teams using secure and encrypted videoconferencing technology.   The patient was in their home in the [facility].    The patient's identity was confirmed and verbal consent was obtained for this virtual visit.    Subjective:   CC:Cholangiocarcinoma    Aaliyah Fulton is a 85 y.o. female presenting for evaluation and management of:    Patient originally presented with weakness to the emergency department urologist related for kidney stone and had CT abdomen which showed enhancing mass in left liver concerning for malignancy with enlarged periportal nodes underwent CT-guided liver biopsy May 23 which showed mildly to poorly differentiated adenocarcinoma.  MRI abdomen May 15, 2024 showed hypervascular mass in the left lobe segment of the left lobe 4.8 cm with associated kianna hepatis and celiac adenopathy.  Minimal ascites.  Repeat CT liver protocol Lillian 10, 2024 showed stable 4.4 cm left lateral lobe liver mass with left portal vein occlusion stable periportal adenopathy 19 x 12 mm.  Patient has PET/CT scheduled for July 16, 2024.  Overall patient feeling well.      12/3/24  Patient had 3 cycles of gemcitabine and cisplatin and durvalumab.  She started develop toxicities including weakness and falls.  Most recent CT scan October 9, 2024 showed stable 4 cm tumor.    1/20/25  Called patient after SBRT to liver completed December 17, 2024 40 Morejon in 5 fractions.  Patient having some cough thought to be immune related pneumonitis.  Did add prednisone course possibly continues to be worsening told him to contact Dr. Tamayo's office for further help.  Will plan repeat CT liver in 3 months     Interval Hx:  Patient doing well with significant interval decrease in the size of the left hepatic lobe mass and small residual ill-defined hypoenhancement probably representing posttreatment changes.  No adverse side effects.    ROS   Comprehensive review of  systems is negative with the exception of the aforementioned HPI, PMH, and PSH bullets in accordance with CMS guidelines.      Current medicines (including changes today)  Current Outpatient Medications   Medication Sig Dispense Refill    oxybutynin (DITROPAN-XL) 15 MG CR tablet Take 1 Tablet by mouth every day. Indications: Urinary Incontinence 90 Tablet 3    busPIRone (BUSPAR) 15 MG tablet Take 1 Tablet by mouth 3 times a day. Indications: Anxiety Disorder 270 Tablet 3    venlafaxine (EFFEXOR-XR) 150 MG extended-release capsule Take 1 Capsule by mouth every day. Taken with 75 mg for 225mg daily  Indications: Generalized Anxiety Disorder 90 Capsule 3    venlafaxine XR (EFFEXOR XR) 75 MG CAPSULE SR 24 HR Take 1 Capsule by mouth every day. For a daily total of 225 mg  Indications: Generalized Anxiety Disorder 90 Capsule 3    omeprazole (PRILOSEC) 40 MG delayed-release capsule Take 1 Capsule by mouth every day. Indications: Heartburn 90 Capsule 3    Biotin 5000 MCG Cap Take  by mouth.      Calcium Carb-Cholecalciferol (CALCIUM 500 +D PO) Take  by mouth.      loratadine (CLARITIN) 10 MG Tab Take 10 mg by mouth every day.      guaiFENesin ER (MUCINEX) 600 MG TABLET SR 12 HR Take 600 mg by mouth every 12 hours.      fluticasone (FLONASE) 50 MCG/ACT nasal spray Administer 1 Spray into affected nostril(S) every day. FOR ALLERGIES 16 g 5    cyanocobalamin (VITAMIN B12) 500 MCG tablet Take 1 Tablet by mouth every day. Indications: supplement      valsartan (DIOVAN) 160 MG Tab Take 160 mg by mouth every day.      atorvastatin (LIPITOR) 40 MG Tab TAKE ONE TABLET BY MOUTH EVERY EVENING 90 Tablet 0    methenamine hip (HIPPREX) 1 GM Tab Take 1 g by mouth 2 times a day. LONG TERM TREATMENT FOR CHRONIC UTI.  Indications: Urinary Tract Infection      Calcium Carbonate-Vitamin D (CALCIUM-VITAMIN D3 PO) Take 600 mg by mouth every morning. Indications: supplement      vitamin D3 (CHOLECALCIFEROL) 1000 Unit (25 mcg) Tab Take 2,000 Units  by mouth every morning. Indications: supplement      acetaminophen (TYLENOL) 500 MG Tab Take 1,000 mg by mouth every 6 hours as needed for Moderate Pain. Indications: Pain      D-MANNOSE PO Take 500 mg by mouth every evening. Indications: healthy urinary tract        No current facility-administered medications for this encounter.       Patient Active Problem List    Diagnosis Date Noted    Chronic cough 02/19/2025    Choking 02/19/2025    Adverse effect of immune checkpoint inhibitor 12/13/2024    Interstitial lung disease (HCC) 12/12/2024    Constipation 09/18/2024    Hyperlipidemia 09/16/2024    Anemia 09/16/2024    Subdural hematoma (HCC) 09/15/2024    Nasal bone fracture 09/15/2024    Contraindication to deep vein thrombosis (DVT) prophylaxis 09/15/2024    Overactive bladder 09/15/2024    Generalized anxiety disorder 09/15/2024    Bilateral pleural effusion 09/02/2024    Pulmonary hypertension (HCC) 09/02/2024    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    Valvular heart disease 09/01/2024    High risk medication use 08/26/2024    Cholangiocarcinoma (HCC) 04/30/2024    Hepatocellular carcinoma (HCC) 04/30/2024    Abnormal CT of the abdomen 04/30/2024    Liver mass 04/30/2024    Closed fracture of right distal radius 10/05/2023    S/P total knee arthroplasty, left 03/31/2023    Artificial knee joint present 03/28/2023    Osteoarthritis of left knee 08/17/2022    Benign essential hypertension 03/24/2022    Personal history UTI 02/24/2022    Gastroesophageal reflux disease without esophagitis 12/12/2021    History of CVA (cerebrovascular accident) 12/12/2021    Osteoporosis, postmenopausal 12/12/2021    Other hyperlipidemia 12/12/2021    Other specified anemias 11/26/2021    Systolic murmur 11/24/2021    Closed left hip fracture (HCC) 11/24/2021    Normal pressure hydrocephalus (HCC) 06/16/2021    Pain in left knee 05/04/2021    Memory deficit 04/16/2021    Abnormal gait 03/29/2021    Advanced care planning/counseling  "discussion 03/12/2021    Prediabetes 09/23/2019    Nonrheumatic aortic valve stenosis 03/26/2019    Poor short-term memory 09/28/2017    Disorder of bone and articular cartilage 09/29/2016    Vitamin D deficiency 09/29/2016    History of recurrent urinary tract infection 06/28/2016    Impaired fasting glucose 05/20/2016    Menopause present 05/20/2016    History of recurrent UTIs 04/21/2016    Hand arthritis 03/04/2015    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  Skin: No rashes in visible areas.  Eye: Round. Conjunctiva clear, lids normal. No icterus.   ENMT: Lips pink without lesions, good dentition, moist mucous membranes. Phonation normal.  Neck: No masses, no thyromegaly. Moves freely without pain.  Respiratory: Unlabored respiratory effort, no cough or audible wheeze  Psych: Alert and oriented x3, normal affect and mood.     Assessment and Plan:   The following treatment plan was discussed:    Cancer Staging   Cholangiocarcinoma (HCC)  Staging form: Intrahepatic Bile Duct, AJCC 8th Edition  - Clinical stage from 6/21/2024: Stage IIIB (cT2, cN1, cM0) - Signed by Marco Tabor M.D. on 6/21/2024    No evidence of disease    Follow-up: 5 months CT liver with follow up virtual         "

## 2025-05-08 ENCOUNTER — HOSPITAL ENCOUNTER (OUTPATIENT)
Dept: RADIOLOGY | Facility: MEDICAL CENTER | Age: 85
End: 2025-05-08
Attending: INTERNAL MEDICINE
Payer: MEDICARE

## 2025-05-08 DIAGNOSIS — I35.0 NONRHEUMATIC AORTIC (VALVE) STENOSIS: ICD-10-CM

## 2025-05-08 PROCEDURE — 76497 UNLISTED CT PROCEDURE: CPT

## 2025-05-08 PROCEDURE — 700117 HCHG RX CONTRAST REV CODE 255: Performed by: INTERNAL MEDICINE

## 2025-05-08 RX ADMIN — IOHEXOL 100 ML: 350 INJECTION, SOLUTION INTRAVENOUS at 11:30

## 2025-06-03 ENCOUNTER — TELEPHONE (OUTPATIENT)
Dept: SLEEP MEDICINE | Facility: MEDICAL CENTER | Age: 85
End: 2025-06-03
Payer: MEDICARE

## 2025-06-03 NOTE — TELEPHONE ENCOUNTER
Please contact daughter, Angi Johnson, at [phone]. Patient just had a CT scan done at [facility] last week. Daughter called and wanted to know if they could cancel the CT on 6/12/25.Please call to advise as soon as possible.

## 2025-06-12 ENCOUNTER — APPOINTMENT (OUTPATIENT)
Dept: RADIOLOGY | Facility: MEDICAL CENTER | Age: 85
End: 2025-06-12
Attending: STUDENT IN AN ORGANIZED HEALTH CARE EDUCATION/TRAINING PROGRAM
Payer: MEDICARE

## 2025-08-04 ENCOUNTER — HOSPITAL ENCOUNTER (OUTPATIENT)
Facility: MEDICAL CENTER | Age: 85
End: 2025-08-04
Payer: MEDICARE

## 2025-08-04 LAB
AMBIGUOUS DTTM AMBI4: NORMAL
APPEARANCE UR: ABNORMAL
BACTERIA #/AREA URNS HPF: ABNORMAL /HPF
BILIRUB UR QL STRIP.AUTO: NEGATIVE
CASTS URNS QL MICRO: ABNORMAL /LPF (ref 0–2)
COLOR UR: YELLOW
EPITHELIAL CELLS 1715: ABNORMAL /HPF (ref 0–5)
GLUCOSE UR STRIP.AUTO-MCNC: NEGATIVE MG/DL
KETONES UR STRIP.AUTO-MCNC: NEGATIVE MG/DL
LEUKOCYTE ESTERASE UR QL STRIP.AUTO: ABNORMAL
MICRO URNS: ABNORMAL
NITRITE UR QL STRIP.AUTO: POSITIVE
PH UR STRIP.AUTO: 6 [PH] (ref 5–8)
PROT UR QL STRIP: NEGATIVE MG/DL
RBC # URNS HPF: ABNORMAL /HPF (ref 0–2)
RBC UR QL AUTO: ABNORMAL
SP GR UR STRIP.AUTO: 1.02
UROBILINOGEN UR STRIP.AUTO-MCNC: 0.2 EU/DL
WBC #/AREA URNS HPF: >100 /HPF

## 2025-08-04 PROCEDURE — 87186 SC STD MICRODIL/AGAR DIL: CPT

## 2025-08-04 PROCEDURE — 87086 URINE CULTURE/COLONY COUNT: CPT

## 2025-08-04 PROCEDURE — 81001 URINALYSIS AUTO W/SCOPE: CPT

## 2025-08-04 PROCEDURE — 87077 CULTURE AEROBIC IDENTIFY: CPT

## 2025-08-07 LAB
BACTERIA UR CULT: ABNORMAL
BACTERIA UR CULT: ABNORMAL
SIGNIFICANT IND 70042: ABNORMAL
SITE SITE: ABNORMAL
SOURCE SOURCE: ABNORMAL

## (undated) DEVICE — SODIUM CHL. IRRIGATION 0.9% 3000ML (4EA/CA 65CA/PF)

## (undated) DEVICE — SHUNT PASSER 60CM

## (undated) DEVICE — DRAPE LARGE 3 QUARTER - (20/CA)

## (undated) DEVICE — ELECTRODE DUAL RETURN W/ CORD - (50/PK)

## (undated) DEVICE — TRACKER ENT PATIENT

## (undated) DEVICE — DRESSING TRANSPARENT FILM TEGADERM 4 X 4.75" (50EA/BX)"

## (undated) DEVICE — PACK TOTAL KNEE  (1/CA)

## (undated) DEVICE — SUCTION INSTRUMENT YANKAUER BULBOUS TIP W/O VENT (50EA/CA)

## (undated) DEVICE — HUMID-VENT HEAT AND MOISTURE EXCHANGE- (50/BX)

## (undated) DEVICE — CONNECTOR 5-IN-1 STERILE - (25EA/BX)

## (undated) DEVICE — SURGIFOAM (12X7) - (12EA/CA)

## (undated) DEVICE — TROCARCANN&SEAL 5X55 ZTHREAD - 12/BX

## (undated) DEVICE — SUTURE 2-0 SILK 12 X 18" (36PK/BX)"

## (undated) DEVICE — BLADE SAGITTAL SYSTEM 18MM

## (undated) DEVICE — TUBING INSUFFLATION - (10/BX)

## (undated) DEVICE — CANISTER SUCTION 3000ML MECHANICAL FILTER AUTO SHUTOFF MEDI-VAC NONSTERILE LF DISP  (40EA/CA)

## (undated) DEVICE — BLADE SURGICAL CLIPPER - (50EA/CA)

## (undated) DEVICE — CANISTER SUCTION RIGID RED 1500CC (40EA/CA)

## (undated) DEVICE — DRESSING POST OP BORDER 4 X 10 (5EA/BX)

## (undated) DEVICE — KIT HIP PREP IM ENCHANCE TOTAL (5EA/BX)

## (undated) DEVICE — TUBING CLEARLINK DUO-VENT - C-FLO (48EA/CA)

## (undated) DEVICE — TROCAR SEPARATOR 5X55 - 6/BX

## (undated) DEVICE — SUTURE 3-0 VICRYL PLUS SH - 8X 18 INCH (12/BX)

## (undated) DEVICE — PIN TROCAR 3"

## (undated) DEVICE — PROTECTOR ULNA NERVE - (36PR/CA)

## (undated) DEVICE — GLOVE BIOGEL INDICATOR SZ 7SURGICAL PF LTX - (50/BX 4BX/CA)

## (undated) DEVICE — SUTURE 1 VICRYL PLUS CTX - 8 X 18 INCH (12/BX)

## (undated) DEVICE — BLADE SAGITTAL SAW DUAL CUT 75.0 X 25.0MM (1/EA)

## (undated) DEVICE — MASK ANESTHESIA ADULT  - (100/CA)

## (undated) DEVICE — GLOVE SZ 7 BIOGEL PI MICRO - PF LF (50PR/BX 4BX/CA)

## (undated) DEVICE — SODIUM CHL IRRIGATION 0.9% 1000ML (12EA/CA)

## (undated) DEVICE — CHLORAPREP 26 ML APPLICATOR - ORANGE TINT(25/CA)

## (undated) DEVICE — ELECTRODE 850 FOAM ADHESIVE - HYDROGEL RADIOTRNSPRNT (50/PK)

## (undated) DEVICE — SUTURE GENERAL

## (undated) DEVICE — LACTATED RINGERS INJ 1000 ML - (14EA/CA 60CA/PF)

## (undated) DEVICE — TIP INTPLS HFLO ML ORFC BTRY - (12/CS)  FOR SURGILAV

## (undated) DEVICE — SUTURE 5 TI-CRON HOS-14 - (36/BX)

## (undated) DEVICE — GLOVE SZ 7.5 BIOGEL PI MICRO - PF LF (50PR/BX)

## (undated) DEVICE — NEPTUNE 4 PORT MANIFOLD - (20/PK)

## (undated) DEVICE — PACK TOTAL HIP - (1/CA)

## (undated) DEVICE — PACK MAJOR ORTHO - (2EA/CA)

## (undated) DEVICE — GLOVE BIOGEL SZ 7.5 SURGICAL PF LTX - (50PR/BX 4BX/CA)

## (undated) DEVICE — SLEEVE, VASO, THIGH, MED

## (undated) DEVICE — KIT ANESTHESIA W/CIRCUIT & 3/LT BAG W/FILTER (20EA/CA)

## (undated) DEVICE — TOWEL STOP TIMEOUT SAFETY FLAG (40EA/CA)

## (undated) DEVICE — SUTURE 4-0 NUROLON CR/8 TF - (12/BX) ETHICON

## (undated) DEVICE — GLOVE BIOGEL PI INDICATOR SZ 6.0 SURGICAL PF LF -(200PR/CA)

## (undated) DEVICE — PACK NEURO - (2EA/CA)

## (undated) DEVICE — GLOVE BIOGEL INDICATOR SZ 8 SURGICAL PF LTX - (50/BX 4BX/CA)

## (undated) DEVICE — STAPLER SKIN DISP - (6/BX 10BX/CA) VISISTAT

## (undated) DEVICE — GLOVE BIOGEL PI ORTHO SZ 6 SURGICAL PF LF (40PR/BX)

## (undated) DEVICE — HANDPIECE 10FT INTPLS SCT PLS IRRIGATION HAND CONTROL SET (6/PK)

## (undated) DEVICE — GOWN WARMING STANDARD FLEX - (30/CA)

## (undated) DEVICE — STYLET COIL 2 SINGLE PACKAGE

## (undated) DEVICE — DRAPE STRLE REG TOWEL 18X24 - (10/BX 4BX/CA)"

## (undated) DEVICE — SET LEADWIRE 5 LEAD BEDSIDE DISPOSABLE ECG (1SET OF 5/EA)

## (undated) DEVICE — DRILL BIT 2.7MM

## (undated) DEVICE — MIDAS LUBRICATOR DIFFUSER PACK (4EA/CA)

## (undated) DEVICE — GLOVE, LITE (PAIR)

## (undated) DEVICE — TOWELS CLOTH SURGICAL - (4/PK 20PK/CA)

## (undated) DEVICE — SENSOR SPO2 NEO LNCS ADHESIVE (20/BX) SEE USER NOTES

## (undated) DEVICE — ELECTRODE 5MM LHK LAPSCP STERILE DISP- MEGADYNE  (5/CA)

## (undated) DEVICE — DRESSING POST OP BORDER 4INX6IN AG (70/CA)

## (undated) DEVICE — SUTURE 3-0 MONOCRYL PLUS PS-1 - 27 INCH (36/BX)

## (undated) DEVICE — INSTRUMENT JAWCOVER SUTURE - BOOTS (5PK/BX)

## (undated) DEVICE — POINTER TRACKER 1-PACK

## (undated) DEVICE — TUBING INSUFFLATION PNEUMOCLEAR HIGH-FLOW (10EA/BX)

## (undated) DEVICE — SET EXTENSION WITH 2 PORTS (48EA/CA) ***PART #2C8610 IS A SUBSTITUTE*****

## (undated) DEVICE — SUTURE 2-0 MONOCRYL PLUS UNDYED CT-1 1 X 36 (36EA/BX)"

## (undated) DEVICE — SPONGE GAUZESTER. 2X2 4-PL - (2/PK 50PK/BX 30BX/CS)

## (undated) DEVICE — SUTURE 2-0 VICRYL PLUS CT-1 - 8 X 18 INCH(12/BX)

## (undated) DEVICE — BLADE CLIPPER FITS 2501 CLIPPER (BLUE)  (20EA/CA)

## (undated) DEVICE — MIXER BONE CEMENT REVOLUTION - W/FEMORAL PRESSURIZER (6/CA)

## (undated) DEVICE — GLOVE BIOGEL SZ 8 SURGICAL PF LTX - (50PR/BX 4BX/CA)

## (undated) DEVICE — GLOVE SIZE 7.0 SURGEON ACCELERATOR FREE GREEN (50PR/BX 4BX/CA)

## (undated) DEVICE — DISSECT TOOL MIDAS REX 9MH30

## (undated) DEVICE — DERMABOND ADVANCED - (12EA/BX)

## (undated) DEVICE — GLOVESZ 8.5 BIOGEL PI MICRO - PF LF (50PR/BX)

## (undated) DEVICE — GLOVE BIOGEL SZ 7 SURGICAL PF LTX - (50PR/BX 4BX/CA)

## (undated) DEVICE — DEVICE SKIN CLOSURE SURGICAL ZIP 24 (5EA/PK)

## (undated) DEVICE — DRESSING XEROFORM 1X8 - (50/BX 4BX/CA)

## (undated) DEVICE — GLOVE BIOGEL SZ 6.5 SURGICAL PF LTX (50PR/BX 4BX/CA)

## (undated) DEVICE — CONTAINER SPECIMEN BAG OR - STERILE 4 OZ W/LID (100EA/CA)

## (undated) DEVICE — SENSOR OXIMETER ADULT SPO2 RD SET (20EA/BX)

## (undated) DEVICE — DRESSING, 4X4 VIGILON STERILE

## (undated) DEVICE — BAG SPONGE COUNT 10.25 X 32 - BLUE (250/CA)

## (undated) DEVICE — STOCKINET TUBULAR 6IN STERILE - 6 X 48YDS (25/CA)

## (undated) DEVICE — BLADE SAGITTAL 6 SYSTEM 25MM

## (undated) DEVICE — SUTURE 4-0 MONOCRYL PLUS PS-2 - 27 INCH (36/BX)

## (undated) DEVICE — LENS/HOOD FOR SPACESUIT - (32/PK) PEEL AWAY FACE

## (undated) DEVICE — BOVIE NEEDLE TIP 3CM COLORADO

## (undated) DEVICE — DRAPE SURGICAL U 77X120 - (10/CA)

## (undated) DEVICE — KIT SURGIFLO W/OUT THROMBIN - (6EA/CA)

## (undated) DEVICE — GLOVE PROTEXIS PI MICRO SZ 8.5 (200PR/CA)

## (undated) DEVICE — TRAY SKIN SCRUB PVP WET (20EA/CA) PART #DYND70356 DISCONTINUED

## (undated) DEVICE — GLOVE SZ 8 BIOGEL PI MICRO - PF LF (50PR/BX)

## (undated) DEVICE — PAD PREP 24 X 48 CUFFED - (100/CA)

## (undated) DEVICE — GLOVE BIOGEL PI INDICATOR SZ 8.0 SURGICAL PF LF -(50/BX 4BX/CA)

## (undated) DEVICE — GOWN SURGEONS X-LARGE - DISP. (30/CA)

## (undated) DEVICE — HEAD HOLDER JUNIOR/ADULT

## (undated) DEVICE — DRESSING STERILE BURN ACTICOAT FLEX 7 (50EA/CA)

## (undated) DEVICE — NEEDLE SPINAL NON-SAFETY 18 GA X 3 IN (25EA/BX)

## (undated) DEVICE — ANTI-FOG SOLUTION - 60BTL/CA

## (undated) DEVICE — BLADE SAW RECIPROCATING DOUBLE SIDED 70MM X 12.5MM X 0.64MM STERILE (1EA)

## (undated) DEVICE — LACTATED RINGERS INJ. 500 ML - (24EA/CA)

## (undated) DEVICE — TUBING C&T SET FLYING LEADS DRAIN TUBING (10EA/BX)